# Patient Record
Sex: MALE | Race: WHITE | NOT HISPANIC OR LATINO | Employment: OTHER | URBAN - METROPOLITAN AREA
[De-identification: names, ages, dates, MRNs, and addresses within clinical notes are randomized per-mention and may not be internally consistent; named-entity substitution may affect disease eponyms.]

---

## 2017-01-04 ENCOUNTER — HOSPITAL ENCOUNTER (OUTPATIENT)
Dept: RESPIRATORY THERAPY | Facility: CLINIC | Age: 72
Discharge: HOME | End: 2017-01-04
Attending: INTERNAL MEDICINE | Admitting: INTERNAL MEDICINE
Payer: COMMERCIAL

## 2017-01-04 DIAGNOSIS — J44.9 CHRONIC OBSTRUCTIVE PULMONARY DISEASE, UNSPECIFIED COPD TYPE (H): ICD-10-CM

## 2017-01-04 PROCEDURE — 94729 DIFFUSING CAPACITY: CPT | Mod: 26 | Performed by: INTERNAL MEDICINE

## 2017-01-04 PROCEDURE — 94729 DIFFUSING CAPACITY: CPT

## 2017-01-04 PROCEDURE — 94726 PLETHYSMOGRAPHY LUNG VOLUMES: CPT

## 2017-01-04 PROCEDURE — 94060 EVALUATION OF WHEEZING: CPT | Mod: 26 | Performed by: INTERNAL MEDICINE

## 2017-01-04 PROCEDURE — 94060 EVALUATION OF WHEEZING: CPT

## 2017-01-04 PROCEDURE — 94726 PLETHYSMOGRAPHY LUNG VOLUMES: CPT | Mod: 26 | Performed by: INTERNAL MEDICINE

## 2017-01-07 LAB
DLCOCOR-%PRED-PRE: 94 %
DLCOCOR-PRE: 23.21 ML/MIN/MMHG
DLCOUNC-%PRED-PRE: 97 %
DLCOUNC-PRE: 23.9 ML/MIN/MMHG
DLCOUNC-PRED: 24.6 ML/MIN/MMHG
ERV-%PRED-PRE: 117 %
ERV-PRE: 1.18 L
ERV-PRED: 1.01 L
EXPTIME-PRE: 10.59 SEC
FEF2575-%PRED-POST: 26 %
FEF2575-%PRED-PRE: 32 %
FEF2575-POST: 0.58 L/SEC
FEF2575-PRE: 0.72 L/SEC
FEF2575-PRED: 2.22 L/SEC
FEFMAX-%PRED-PRE: 71 %
FEFMAX-PRE: 5.43 L/SEC
FEFMAX-PRED: 7.57 L/SEC
FEV1-%PRED-PRE: 72 %
FEV1-PRE: 2.08 L
FEV1FEV6-PRE: 57 %
FEV1FEV6-PRED: 77 %
FEV1FVC-PRE: 50 %
FEV1FVC-PRED: 76 %
FEV1SVC-PRE: 35 %
FEV1SVC-PRED: 68 %
FIFMAX-PRE: 6.46 L/SEC
FRCPLETH-%PRED-PRE: 173 %
FRCPLETH-PRE: 6.14 L
FRCPLETH-PRED: 3.53 L
FVC-%PRED-PRE: 110 %
FVC-PRE: 4.18 L
FVC-PRED: 3.78 L
IC-%PRED-PRE: 118 %
IC-PRE: 3.79 L
IC-PRED: 3.2 L
RVPLETH-%PRED-PRE: 155 %
RVPLETH-PRE: 3.99 L
RVPLETH-PRED: 2.57 L
TLCPLETH-%PRED-PRE: 152 %
TLCPLETH-PRE: 9.93 L
TLCPLETH-PRED: 6.52 L
VA-%PRED-PRE: 153 %
VA-PRE: 9.55 L
VC-%PRED-PRE: 140 %
VC-PRE: 5.94 L
VC-PRED: 4.21 L

## 2017-01-10 DIAGNOSIS — J44.1 CHRONIC OBSTRUCTIVE PULMONARY DISEASE WITH ACUTE EXACERBATION (H): Primary | ICD-10-CM

## 2017-01-12 ENCOUNTER — HOSPITAL ENCOUNTER (OUTPATIENT)
Dept: CT IMAGING | Facility: CLINIC | Age: 72
Discharge: HOME | End: 2017-01-12
Attending: NURSE PRACTITIONER | Admitting: NURSE PRACTITIONER
Payer: COMMERCIAL

## 2017-01-12 DIAGNOSIS — J44.1 CHRONIC OBSTRUCTIVE PULMONARY DISEASE WITH ACUTE EXACERBATION (H): ICD-10-CM

## 2017-01-12 PROCEDURE — 71250 CT THORAX DX C-: CPT

## 2017-01-13 PROBLEM — I71.20 THORACIC AORTIC ANEURYSM WITHOUT RUPTURE (H): Status: ACTIVE | Noted: 2017-01-13

## 2017-01-13 PROBLEM — J43.2 CENTRILOBULAR EMPHYSEMA (H): Status: ACTIVE | Noted: 2017-01-13

## 2017-01-18 ENCOUNTER — OFFICE VISIT (OUTPATIENT)
Dept: FAMILY MEDICINE | Facility: CLINIC | Age: 72
End: 2017-01-18
Payer: COMMERCIAL

## 2017-01-18 ENCOUNTER — TELEPHONE (OUTPATIENT)
Dept: OTHER | Facility: CLINIC | Age: 72
End: 2017-01-18

## 2017-01-18 VITALS
BODY MASS INDEX: 24.06 KG/M2 | WEIGHT: 156 LBS | DIASTOLIC BLOOD PRESSURE: 72 MMHG | SYSTOLIC BLOOD PRESSURE: 128 MMHG | HEART RATE: 73 BPM | TEMPERATURE: 97.3 F

## 2017-01-18 DIAGNOSIS — J44.9 CHRONIC OBSTRUCTIVE PULMONARY DISEASE, UNSPECIFIED COPD TYPE (H): ICD-10-CM

## 2017-01-18 DIAGNOSIS — I71.20 THORACIC AORTIC ANEURYSM WITHOUT RUPTURE (H): Primary | ICD-10-CM

## 2017-01-18 PROCEDURE — 99214 OFFICE O/P EST MOD 30 MIN: CPT | Performed by: NURSE PRACTITIONER

## 2017-01-18 NOTE — NURSING NOTE
"Initial /72 mmHg  Pulse 73  Temp(Src) 97.3  F (36.3  C) (Tympanic)  Wt 156 lb (70.761 kg) Estimated body mass index is 24.06 kg/(m^2) as calculated from the following:    Height as of 6/9/15: 5' 7.5\" (1.715 m).    Weight as of this encounter: 156 lb (70.761 kg). .      "

## 2017-01-18 NOTE — MR AVS SNAPSHOT
After Visit Summary   1/18/2017    Adolfo Rendon    MRN: [id number]           Patient Information     Date Of Birth          1945        Visit Information        Provider Department      1/18/2017 11:20 AM Danielle Mercado APRN [facility]        Today's Diagnoses     Thoracic aortic aneurysm without rupture (H)    -  1     Chronic obstructive pulmonary disease, unspecified COPD type (H)           Care Instructions      Discharge Instructions: COPD  You have been diagnosed with chronic obstructive pulmonary disease (COPD). This is a name given to a group of diseases that limit the flow of air in and out of your lungs. This makes it harder to breathe. With COPD, you are also more likely to get lung infections. COPD includes chronic bronchitis and emphysema. COPD is most often caused by heavy, long-term cigarette smoking.  Home care  Quit smoking    If you smoke, quit. It is the best thing you can do for your COPD and your overall health.    Join a stop-smoking program. There are even telephone, text message, and Internet programs to help you quit.    Ask your health care provider about medications or other methods to help you quit.    Ask family members to quit smoking as well.    Don't allow people to smoke in your home, in your car, or when they are around you.  Protect yourself from infection    Wash your hands often. Do your best to keep your hands away from your face. Most germs are spread from your hands to your mouth.    Get a flu shot every year. Also ask your provider about pneumonia vaccines.    Avoid crowds. It's especially important to do this in the winter when more people have colds and flu.    To stay healthy, get enough sleep, exercise regularly, and eat a balanced diet. You should:    Get about 8 hours of sleep every night.    Try to exercise for at least 30 minutes on most days.    Have healthy foods including fruits and vegetables, 100% whole grains,  lean meats and fish, and low-fat dairy products. Try to stay away from foods high in fats and sugar.  Take your medications  Take your medications exactly as directed. Don't skip doses.  Manage your stress  Stress can make COPD worse. Use this stress management technique:    Find a quiet place and sit or lie in a comfortable position.    Close your eyes and perform breathing exercises for several minutes. Ask your provider about the best way to breathe.  Pulmonary rehabilitation    Pulmonary rehab can help you feel better. These programs include exercise, breathing techniques, information about COPD, counseling, and help for smokers.    Ask your provider or your local hospital about programs in your area.  When to call your health care provider  Call your provider immediately if you have any of the following:    Shortness of breath, wheezing, or coughing    Increased mucus    Yellow, green, bloody, or smelly mucus    Fever or chills    Tightness in your chest that does not go away with rest or medication    An irregular heartbeat or a feeling that your heart is beating very fast    Swollen ankles     7949-2059 [employer]. [address] [place] [address]. All rights reserved. This information is not intended as a substitute for professional medical care. Always follow your healthcare professional's instructions.        Medications for Chronic Obstructive Pulmonary Disease  Some medications for COPD help control or prevent symptoms. They are called maintenance medications. Take these medications every day or as instructed by your health care provider. Some are rescue medications. Take these only when you have symptoms like increased shortness of breath or chest tightness. Take this medication sheet with you to your next office visit and ask your provider or nurse to help you complete it.  Bronchodilators  What they do: Relax the muscles around airways. This allows you to breathe more  easily.     Short-acting beta-2 agonists. These start working shortly after you use them. They are rescue medications.  My medications: __________________________________________  When to take: __________________________________________     Long-acting beta-2 agonists. These work more slowly than the fast-acting type. But the effects last longer. They are maintenance medications.  My medications: __________________________________________  When to take: __________________________________________     Anticholinergics. Rescue: These may be used along with a short-acting beta-2 agonist to help keep airways open.  My medications: __________________________________________  When to take: __________________________________________     Anticholinergics. Maintenance: There are long-acting anticholinergenics.  My medications: __________________________________________  When to take: __________________________________________     Methylxanthines. These are maintenance medications and have long-lasting effects. They may be useful if symptoms occur during sleep.  My medications: __________________________________________  When to take: __________________________________________     Corticosteroids  What they do: Reduce inflammation, swelling, and mucus production. This allows you to breathe more easily.     Inhaled corticosteroids. These medications are taken with an inhaler or nebulizer. They are maintenance medications.  My medications: __________________________________________  When to take: __________________________________________     Oral corticosteroids. These medications are taken by mouth. They may be used when symptoms worsen.  My medications: __________________________________________  When to take: __________________________________________     Phosphodiesterase Type 4 (PDE4) Inhibitors  What they do: Reduce the risk of flare-ups in patients with severe COPD.  My medications:  __________________________________________  When to take: __________________________________________     Combination Medications  What they do: Combine the effects of different types of medications. For example, a combination medication may relax the muscles around the airways and lessen the swelling or inflammation in the airway.  My medications: __________________________________________  When to take: __________________________________________     Other Medications  Other medication for COPD.  My medication: __________________________________________  What it does: __________________________________________  When to take: __________________________________________  Herbal products and supplements  There are products for COPD that are available without a subscription. For example, herbs, extracts, or supplements. Be sure to talk with your health care provider before taking any of these products. They can interact with medications you re taking.     0270-5763 [employer]. [address] [place] [address], [place] [address]. All rights reserved. This information is not intended as a substitute for professional medical care. Always follow your healthcare professional's instructions.        Chronic Lung Disease: Preventing Lung Infections  Chronic lung diseases include chronic obstructive pulmonary disease (COPD), which includes chronic bronchitis and emphysema. Other chronic lung diseases include pulmonary fibrosis, sarcoidosis, and other conditions. When you have chronic lung diseases, it's very important to protect yourself from respiratory infections, like colds, the flu, and lung infections. Infections may cause your lung condition to worsen. Although you can't completely avoid them, there are things you can do to lessen the chance of infections.    Take precautions  Taking the following precautions can help you avoid illness:    Remember to keep your hands away from your nose and mouth. Germs on your hands  get into your respiratory system this way.    Wash your hands often. When you wash them:    Use soap and warm water.    Rub your hands together well for at least 20 seconds.    Make sure to rinse them well.    Dry your hands on clean towels or air-dry them.    Use hand  containing alcohol, if you are unable to wash your hands. Use the  after touching doorknobs, handles, and supermarket carts, for example, since lots of people touch them. Then wash your hands as soon as you can.    To help prevent the flu, get a flu vaccination every year. This may be given at your health care provider's office, a drugstore, or pharmacy, or at work. Get your flu shot as soon as the vaccines are available in your area. This is usually around September each year.    To help prevent pneumococcal pneumonia, get pneumonia vaccinations. Talk with your health care provider about which pneumococcal vaccinations you need.    Try to stay away from people with respiratory infections, such as colds or the flu. Stay away from crowded places, like shopping centers or movie theatres during cold and flu season.    If you smoke, think about quitting. In addition to causing or worsening many lung conditions, the lung damage from smoking increases your risk of infections. Stay away from others who smoke, too. This is also harmful and increases your chance of infections.    5774-3043 [employer]. [address] [place] [address], [place] [id number]. All rights reserved. This information is not intended as a substitute for professional medical care. Always follow your healthcare professional's instructions.              Follow-ups after your visit        Additional Services     PULMONARY MEDICINE REFERRAL       Your provider has referred you to: [facility]: [facility] [phone]   [email or web address]    Please be aware that coverage of these services is subject to the terms and  limitations of your health insurance plan.  Call member services at your health plan with any benefit or coverage questions.      Please bring the following with you to your appointment:    (1) Any X-Rays, CTs or MRIs which have been performed.  Contact the facility where they were done to arrange for  prior to your scheduled appointment.    (2) List of current medications   (3) This referral request   (4) Any documents/labs given to you for this referral            VASCULAR REFERRAL       Your provider has referred [facility] to [facility].  WANTS TO BE SEEN IN [facility]     Reason for Referral: Vascular Medicine    Urgency of Referral: Next available    Please be aware that coverage of these services is subject to the terms and limitations of your health insurance plan.  Call member services at your health plan with any benefit or coverage questions.      Please bring the following with you to your appointment:  (1) Any X-Rays, CTs or MRIs which have been performed.  Contact the facility where they were done to arrange for  prior to your scheduled appointment.  Any new CT, MRI or other procedures ordered by your specialist must be performed at a [facility] facility or coordinated by your clinic's referral office.    (2) List of current medications   (3) This referral request   (4) Any documents/labs given to you for this referral                  Who to contact     If you have questions or need follow up information about today's clinic visit or your schedule please contact [facility] directly at [phone].  Normal or non-critical lab and imaging results will be communicated to you by MyChart, letter or phone within 4 business days after the clinic has received the results. If you do not hear from us within 7 days, please contact the clinic through MyChart or phone. If you have a critical or abnormal lab result, we will notify you by phone as soon as  "possible.  Submit refill requests through [facility] or call your pharmacy and they will forward the refill request to us. Please allow 3 business days for your refill to be completed.          Additional Information About Your Visit        [facility] Information     [facility] lets you send messages to your doctor, view your test results, renew your prescriptions, schedule appointments and more. To sign up, go to [email or web address] . Click on \"Log in\" on the left side of the screen, which will take you to the Welcome page. Then click on \"Sign up Now\" on the right side of the page.     You will be asked to enter the access code listed below, as well as some personal information. Please follow the directions to create your username and password.     Your access code is: [email or web address]  Expires: 2017 11:56 AM     Your access code will  in 90 days. If you need help or a new code, please call your [facility] clinic or [phone].        Care EveryWhere ID     This is your Care EveryWhere ID. This could be used by other organizations to access your [facility] medical records  [id number]        Your Vitals Were     Pulse Temperature                73 97.3  F (36.3  C) (Tympanic)           Blood Pressure from Last 3 Encounters:   17 128/72   16 158/90   10/05/16 157/95    Weight from Last 3 Encounters:   17 156 lb (70.761 kg)   16 157 lb (71.215 kg)   10/05/16 156 lb (70.761 kg)              We Performed the Following     PULMONARY MEDICINE REFERRAL     VASCULAR REFERRAL          Today's Medication Changes          These changes are accurate as of: 17 11:56 AM.  If you have any questions, ask your nurse or doctor.               These medicines have changed or have updated prescriptions.        Dose/Directions    lisinopril 40 MG tablet   Commonly known as:  PRINIVIL/ZESTRIL   This may have changed:  Another medication with the same name was removed. Continue taking this medication, and " follow the directions you see here.   Used for:  Essential hypertension with goal blood pressure less than 140/90   Changed by:  Danielle Mercado APRN CNP        Dose:  40 mg   Take 1 tablet (40 mg) by mouth daily   Quantity:  90 tablet   Refills:  1                Primary Care Provider Office Phone # Fax #    ANTONIO Carranza [phone]       [facility] [address] [facility] [address]        Thank you!     Thank you for choosing [facility]  for your care. Our goal is always to provide you with excellent care. Hearing back from our patients is one way we can continue to improve our services. Please take a few minutes to complete the written survey that you may receive in the mail after your visit with us. Thank you!             Your Updated Medication List - Protect others around you: Learn how to safely use, store and throw away your medicines at www.disposemymeds.org.          This list is accurate as of: 1/18/17 11:56 AM.  Always use your most recent med list.                   Brand Name Dispense Instructions for use    albuterol 108 (90 BASE) MCG/ACT Inhaler    PROAIR HFA/PROVENTIL HFA/VENTOLIN HFA    1 Inhaler    Inhale 2 puffs into the lungs every 6 hours as needed for shortness of breath / dyspnea or wheezing       aspirin 81 MG tablet     100 tablet    ONE DAILY       carvedilol 12.5 MG tablet    COREG    180 tablet    Take 1 tablet (12.5 mg) by mouth 2 times daily (with meals)       lisinopril 40 MG tablet    PRINIVIL/ZESTRIL    90 tablet    Take 1 tablet (40 mg) by mouth daily       PARoxetine 40 MG tablet    PAXIL    14 tablet    Take 1 tablet (40 mg) by mouth At Bedtime       pravastatin 20 MG tablet    PRAVACHOL    90 tablet    Take 1 tablet (20 mg) by mouth daily

## 2017-01-18 NOTE — TELEPHONE ENCOUNTER
Pt referred to [facility] by Danielle Mercado NP for mild thoracic aorta aneurysm.    1/12/17 CT chest w/o contrast:  IMPRESSION: Mild aneurysmal dilatation of the thoracic aorta, as  above.    Pt needs to be scheduled for a consult with Vascular Surgery.  Will route to scheduling to coordinate an appointment next available.    Mary Cortes RN BSN

## 2017-01-18 NOTE — PATIENT INSTRUCTIONS
Discharge Instructions: COPD  You have been diagnosed with chronic obstructive pulmonary disease (COPD). This is a name given to a group of diseases that limit the flow of air in and out of your lungs. This makes it harder to breathe. With COPD, you are also more likely to get lung infections. COPD includes chronic bronchitis and emphysema. COPD is most often caused by heavy, long-term cigarette smoking.  Home care  Quit smoking    If you smoke, quit. It is the best thing you can do for your COPD and your overall health.    Join a stop-smoking program. There are even telephone, text message, and Internet programs to help you quit.    Ask your health care provider about medications or other methods to help you quit.    Ask family members to quit smoking as well.    Don't allow people to smoke in your home, in your car, or when they are around you.  Protect yourself from infection    Wash your hands often. Do your best to keep your hands away from your face. Most germs are spread from your hands to your mouth.    Get a flu shot every year. Also ask your provider about pneumonia vaccines.    Avoid crowds. It's especially important to do this in the winter when more people have colds and flu.    To stay healthy, get enough sleep, exercise regularly, and eat a balanced diet. You should:    Get about 8 hours of sleep every night.    Try to exercise for at least 30 minutes on most days.    Have healthy foods including fruits and vegetables, 100% whole grains, lean meats and fish, and low-fat dairy products. Try to stay away from foods high in fats and sugar.  Take your medications  Take your medications exactly as directed. Don't skip doses.  Manage your stress  Stress can make COPD worse. Use this stress management technique:    Find a quiet place and sit or lie in a comfortable position.    Close your eyes and perform breathing exercises for several minutes. Ask your provider about the best way to breathe.  Pulmonary  rehabilitation    Pulmonary rehab can help you feel better. These programs include exercise, breathing techniques, information about COPD, counseling, and help for smokers.    Ask your provider or your local hospital about programs in your area.  When to call your health care provider  Call your provider immediately if you have any of the following:    Shortness of breath, wheezing, or coughing    Increased mucus    Yellow, green, bloody, or smelly mucus    Fever or chills    Tightness in your chest that does not go away with rest or medication    An irregular heartbeat or a feeling that your heart is beating very fast    Swollen ankles     7458-7465 [employer]. [address] [place] [address]. All rights reserved. This information is not intended as a substitute for professional medical care. Always follow your healthcare professional's instructions.        Medications for Chronic Obstructive Pulmonary Disease  Some medications for COPD help control or prevent symptoms. They are called maintenance medications. Take these medications every day or as instructed by your health care provider. Some are rescue medications. Take these only when you have symptoms like increased shortness of breath or chest tightness. Take this medication sheet with you to your next office visit and ask your provider or nurse to help you complete it.  Bronchodilators  What they do: Relax the muscles around airways. This allows you to breathe more easily.     Short-acting beta-2 agonists. These start working shortly after you use them. They are rescue medications.  My medications: __________________________________________  When to take: __________________________________________     Long-acting beta-2 agonists. These work more slowly than the fast-acting type. But the effects last longer. They are maintenance medications.  My medications: __________________________________________  When to  take: __________________________________________     Anticholinergics. Rescue: These may be used along with a short-acting beta-2 agonist to help keep airways open.  My medications: __________________________________________  When to take: __________________________________________     Anticholinergics. Maintenance: There are long-acting anticholinergenics.  My medications: __________________________________________  When to take: __________________________________________     Methylxanthines. These are maintenance medications and have long-lasting effects. They may be useful if symptoms occur during sleep.  My medications: __________________________________________  When to take: __________________________________________     Corticosteroids  What they do: Reduce inflammation, swelling, and mucus production. This allows you to breathe more easily.     Inhaled corticosteroids. These medications are taken with an inhaler or nebulizer. They are maintenance medications.  My medications: __________________________________________  When to take: __________________________________________     Oral corticosteroids. These medications are taken by mouth. They may be used when symptoms worsen.  My medications: __________________________________________  When to take: __________________________________________     Phosphodiesterase Type 4 (PDE4) Inhibitors  What they do: Reduce the risk of flare-ups in patients with severe COPD.  My medications: __________________________________________  When to take: __________________________________________     Combination Medications  What they do: Combine the effects of different types of medications. For example, a combination medication may relax the muscles around the airways and lessen the swelling or inflammation in the airway.  My medications: __________________________________________  When to take: __________________________________________     Other Medications  Other medication  for COPD.  My medication: __________________________________________  What it does: __________________________________________  When to take: __________________________________________  Herbal products and supplements  There are products for COPD that are available without a subscription. For example, herbs, extracts, or supplements. Be sure to talk with your health care provider before taking any of these products. They can interact with medications you re taking.     8753-4450 [employer]. [address] [place] [address]. All rights reserved. This information is not intended as a substitute for professional medical care. Always follow your healthcare professional's instructions.        Chronic Lung Disease: Preventing Lung Infections  Chronic lung diseases include chronic obstructive pulmonary disease (COPD), which includes chronic bronchitis and emphysema. Other chronic lung diseases include pulmonary fibrosis, sarcoidosis, and other conditions. When you have chronic lung diseases, it's very important to protect yourself from respiratory infections, like colds, the flu, and lung infections. Infections may cause your lung condition to worsen. Although you can't completely avoid them, there are things you can do to lessen the chance of infections.    Take precautions  Taking the following precautions can help you avoid illness:    Remember to keep your hands away from your nose and mouth. Germs on your hands get into your respiratory system this way.    Wash your hands often. When you wash them:    Use soap and warm water.    Rub your hands together well for at least 20 seconds.    Make sure to rinse them well.    Dry your hands on clean towels or air-dry them.    Use hand  containing alcohol, if you are unable to wash your hands. Use the  after touching doorknobs, handles, and supermarket carts, for example, since lots of people touch them. Then wash your hands as soon as  you can.    To help prevent the flu, get a flu vaccination every year. This may be given at your health care provider's office, a drugstore, or pharmacy, or at work. Get your flu shot as soon as the vaccines are available in your area. This is usually around September each year.    To help prevent pneumococcal pneumonia, get pneumonia vaccinations. Talk with your health care provider about which pneumococcal vaccinations you need.    Try to stay away from people with respiratory infections, such as colds or the flu. Stay away from crowded places, like shopping centers or movie theatres during cold and flu season.    If you smoke, think about quitting. In addition to causing or worsening many lung conditions, the lung damage from smoking increases your risk of infections. Stay away from others who smoke, too. This is also harmful and increases your chance of infections.    3822-6427 [employer]. [address] [place] [address], [place] [address]. All rights reserved. This information is not intended as a substitute for professional medical care. Always follow your healthcare professional's instructions.

## 2017-01-18 NOTE — PROGRESS NOTES
SUBJECTIVE:                                                    Adolfo Rendon is a 71 year old male who presents to clinic today for the following health issues:      RESULTS   Results for orders placed or performed during the hospital encounter of 01/12/17   CT Chest w/o Contrast    Narrative    CT CHEST WITHOUT CONTRAST  1/12/2017 2:59 PM     HISTORY: COPD. Long smoking history. Shortness of breath with  exertion. Abnormal pulmonary function tests.    TECHNIQUE:   No IV contrast. Radiation dose for this scan was reduced  using automated exposure control, adjustment of the mA and/or kV  according to patient size, or iterative reconstruction technique.    COMPARISON: None.    FINDINGS:   There is mild aneurysmal dilatation of the descending  thoracic aorta with a diameter of 3.2 cm. There is mild aneurysmal  dilatation at the posterior aspect of the aortic arch with a diameter  of 3.6 cm. There is minimal aneurysmal dilatation of the ascending  thoracic aorta with a diameter 4 cm. Coronary artery calcifications.  No pleural effusion or acute consolidation. Mild bilateral  emphysematous changes. There are two small calcified left lung  granulomas. There is a tiny right lung granuloma.      Impression    IMPRESSION: Mild aneurysmal dilatation of the thoracic aorta, as  above.    SUSAN ARMSTRONG MD     Quit smoking last Saturday  Using gum which causes sores in his mouth. Using the patches which cause red hives on his skin.  Shortness of breath with exertion. Deconditioned. Intermittent use of albuterol at bedtime helps with cough. Does not use consistently during the day for cough wheeze or shortness of breath.  Patient is here to review his recent CTFindings    -------------------------------------    Problem list and histories reviewed & adjusted, as indicated.  Additional history: as documented    Patient Active Problem List   Diagnosis     MIXED HYPERLIPIDEMIA     Benign neoplasm of epididymis     Malignant  neoplasm of prostate (H)     Major depressive disorder, recurrent episode, severe (H)     HL (hearing loss)     HYPERLIPIDEMIA LDL GOAL <130     Muscle pain     Tobacco abuse     SANTIAGO (dyspnea on exertion)     Advanced directives, counseling/discussion     Generalized anxiety disorder     Right inguinal hernia     Health Care Home     HTN, goal below 140/90     COPD (chronic obstructive pulmonary disease) (H)     Thoracic aortic aneurysm without rupture (H)     Centrilobular emphysema (H)     Past Surgical History   Procedure Laterality Date     Hydrocelectomy scrotal  01/2004     left hydrocele repair     Suprapubic prostatectomy       Herniorrhaphy inguinal  7/14/2011     Procedure:HERNIORRHAPHY INGUINAL; Open Repair Right Inguinal Hernia Repair        Colonoscopy  3/1/2005     Appendectomy  1966       Social History   Substance Use Topics     Smoking status: Former Smoker -- 1.00 packs/day     Types: Cigarettes     Quit date: 01/14/2017     Smokeless tobacco: Never Used     Alcohol Use: No      Comment: quit 1989     Family History   Problem Relation Age of Onset     CEREBROVASCULAR DISEASE Mother      Hypertension Mother      Prostate Cancer Brother      Dementia Brother      Arthritis Sister      rhematoid     CANCER Brother      Lung cancer, lymphoma     Other - See Comments Brother      HIV     CANCER Sister      skin cancer on nose     Pacemaker Sister      Aneurysm Sister      heart           ROS:  C: NEGATIVE for fever, chills, change in weight  E/M: NEGATIVE for ear, mouth and throat problems  R: NEGATIVE for significant cough or SOB  CV: NEGATIVE for chest pain, palpitations or peripheral edema    OBJECTIVE:                                                    /72 mmHg  Pulse 73  Temp(Src) 97.3  F (36.3  C) (Tympanic)  Wt 156 lb (70.761 kg)  Body mass index is 24.06 kg/(m^2).   GENERAL: healthy, alert, well nourished, well hydrated, no distress  HENT: ear canals- normal; TMs- normal; Nose- normal;  Mouth- no ulcers, no lesions  NECK: no tenderness, no adenopathy, no asymmetry, no masses, no stiffness; thyroid- normal to palpation  RESP: lungs clear to auscultation - no rales, no rhonchi, no wheezes  CV: regular rates and rhythm, normal S1 S2, no S3 or S4 and no murmur, no click or rub -  ABDOMEN: soft, no tenderness, no  hepatosplenomegaly, no masses, normal bowel sounds    Diagnostic test results:  Results for orders placed or performed during the hospital encounter of 01/12/17   CT Chest w/o Contrast    Narrative    CT CHEST WITHOUT CONTRAST  1/12/2017 2:59 PM     HISTORY: COPD. Long smoking history. Shortness of breath with  exertion. Abnormal pulmonary function tests.    TECHNIQUE:   No IV contrast. Radiation dose for this scan was reduced  using automated exposure control, adjustment of the mA and/or kV  according to patient size, or iterative reconstruction technique.    COMPARISON: None.    FINDINGS:   There is mild aneurysmal dilatation of the descending  thoracic aorta with a diameter of 3.2 cm. There is mild aneurysmal  dilatation at the posterior aspect of the aortic arch with a diameter  of 3.6 cm. There is minimal aneurysmal dilatation of the ascending  thoracic aorta with a diameter 4 cm. Coronary artery calcifications.  No pleural effusion or acute consolidation. Mild bilateral  emphysematous changes. There are two small calcified left lung  granulomas. There is a tiny right lung granuloma.      Impression    IMPRESSION: Mild aneurysmal dilatation of the thoracic aorta, as  above.    SUSAN ARMSTRONG MD        ASSESSMENT/PLAN:                                                    1. Thoracic aortic aneurysm without rupture (H)  We discussed thoracic aneurysm. Information given about controlling blood pressure, cholesterol and warning signs to be aware of.  Smoking cessation strongly encouraged  Referral placed to consult with thoracic team. No surgery indicated at this time. We will need to continue to  monitor this.  - VASCULAR REFERRAL    2. Chronic obstructive pulmonary disease, unspecified COPD type (H)  Smoking cessation strongly encouraged. Chantix offered and declined  Use albuterol as needed for wheeze, shortness of breath  Consider use of Serevent  Referral placed for  - PULMONARY MEDICINE REFERRAL      Follow up with Provider - Call or return to the clinic with any worsening of symptoms or no resolution. Patient/Parent verbalized understanding and is in agreement. Medication side effects reviewed.   Current Outpatient Prescriptions   Medication Sig Dispense Refill     pravastatin (PRAVACHOL) 20 MG tablet Take 1 tablet (20 mg) by mouth daily 90 tablet 1     albuterol (PROAIR HFA/PROVENTIL HFA/VENTOLIN HFA) 108 (90 BASE) MCG/ACT Inhaler Inhale 2 puffs into the lungs every 6 hours as needed for shortness of breath / dyspnea or wheezing 1 Inhaler 4     PARoxetine (PAXIL) 40 MG tablet Take 1 tablet (40 mg) by mouth At Bedtime 14 tablet 0     lisinopril (PRINIVIL/ZESTRIL) 40 MG tablet Take 1 tablet (40 mg) by mouth daily 90 tablet 1     carvedilol (COREG) 12.5 MG tablet Take 1 tablet (12.5 mg) by mouth 2 times daily (with meals) 180 tablet 3     aspirin 81 MG tablet ONE DAILY 100 tablet 3     [DISCONTINUED] lisinopril (PRINIVIL,ZESTRIL) 10 MG tablet Take 2 tablets (20 mg) by mouth daily 180 tablet 1        See Patient Instructions    ANTONIO Carranza [facility]

## 2017-01-19 NOTE — TELEPHONE ENCOUNTER
LM on pt's home phone asking for callback to schedule recommended consult.      Mary Cortes RN BSN

## 2017-01-23 NOTE — TELEPHONE ENCOUNTER
Left message on pt's VM stating that this is our last attempt to contact you regarding the referral that we received from Danielle Mercado NP.  We will not be making any additional attempts to contact you, however, if you would like to schedule the recommended consult you can call us back and we will be happy to assist you.     Mary Cortes RN BSN

## 2017-01-24 ENCOUNTER — TELEPHONE (OUTPATIENT)
Dept: FAMILY MEDICINE | Facility: CLINIC | Age: 72
End: 2017-01-24

## 2017-01-24 NOTE — TELEPHONE ENCOUNTER
Pt was evaluated at [facility] ED [place], DX near syncope pt does not have a seizure HX, and asymptomatic since, pt will keep his appt for 1-25-17 and F/U sooner or call me back with questions.  Whitney Yeung RN

## 2017-01-24 NOTE — TELEPHONE ENCOUNTER
Reason for call:  Patient reporting a symptom    Symptom or request: Seizure over the weekend- went to ER. Pt is wondering if he should be seen sooner or can he wait until 1/25/17. Did schedule pt with Danielle EMERY 1/25/17.       Phone Number patient can be reached at:  Home number on file [phone] (home)    Best Time:  Any Time      Can we leave a detailed message on this number:  YES    Call taken on 1/24/2017 at 8:26 AM by Tammy Vázquez

## 2017-01-25 ENCOUNTER — OFFICE VISIT (OUTPATIENT)
Dept: FAMILY MEDICINE | Facility: CLINIC | Age: 72
End: 2017-01-25
Payer: COMMERCIAL

## 2017-01-25 VITALS
WEIGHT: 158 LBS | DIASTOLIC BLOOD PRESSURE: 86 MMHG | HEART RATE: 72 BPM | SYSTOLIC BLOOD PRESSURE: 132 MMHG | BODY MASS INDEX: 24.37 KG/M2 | TEMPERATURE: 97.3 F

## 2017-01-25 DIAGNOSIS — I10 ESSENTIAL HYPERTENSION WITH GOAL BLOOD PRESSURE LESS THAN 140/90: Primary | ICD-10-CM

## 2017-01-25 DIAGNOSIS — K14.8 TONGUE BITING: ICD-10-CM

## 2017-01-25 DIAGNOSIS — R55 VASOVAGAL SYNCOPE: ICD-10-CM

## 2017-01-25 PROCEDURE — 99214 OFFICE O/P EST MOD 30 MIN: CPT | Performed by: NURSE PRACTITIONER

## 2017-01-25 RX ORDER — LISINOPRIL 40 MG/1
60 TABLET ORAL DAILY
Qty: 135 TABLET | Refills: 1 | Status: SHIPPED
Start: 2017-01-25 | End: 2017-04-19

## 2017-01-25 NOTE — PROGRESS NOTES
SUBJECTIVE:                                                    Adolfo Rendon is a 71 year old male who presents to clinic today for the following health issues:      ED/UC Followup:    Facility:  [place]   Date of visit: 01/21/2017  Reason for visit: syncope   Current Status: better but some weakness    Weaned off the carvedilol 2 days before this episode. Felt that his moods and his fatigue were better off of this medication.  Patient had episodes where he was with a friend helping him old needed to stop for a minute while helping him hold things and his friend noticed he started having shocking sensations in his head after that he was noted  to be having jerking-like sensations that lasted for approximately 5 minutes.  Patient states he was seen at the [facility] in the emergency department for an hour and a half for evaluation and was sent home. Care everywhere records are reviewed.  He has had another episode since then  where he woke up in the morning after biting his tongue  Feels very lethargic and worn out after these episodes for 24 hours or more.  He has not had any problems with incontinence of bowel or bladder      -------------------------------------    Problem list and histories reviewed & adjusted, as indicated.  Additional history: as documented    Labs reviewed in EPIC  Problem list, Medication list, Allergies, and Medical/Social/Surgical histories reviewed in [facility] and updated as appropriate.    ROS:  C: NEGATIVE for fever, chills, change in weight  E/M: NEGATIVE for ear, mouth and throat problems  R: NEGATIVE for significant cough or SOB  CV: NEGATIVE for chest pain, palpitations or peripheral edema  GI no incontinence   no complaints    OBJECTIVE:                                                    /86 mmHg  Pulse 72  Temp(Src) 97.3  F (36.3  C) (Tympanic)  Wt 158 lb (71.668 kg)  Body mass index is 24.37 kg/(m^2).   GENERAL: healthy, alert, well nourished, well  hydrated, no distress  HENT: ear canals- normal; TMs- normal; Nose- normal; Mouth- no ulcers, no lesions  NECK: no tenderness, no adenopathy, no asymmetry, no masses, no stiffness; thyroid- normal to palpation  RESP: lungs clear to auscultation - no rales, no rhonchi, no wheezes  CV: regular rates and rhythm, normal S1 S2, no S3 or S4 and no murmur, no click or rub -  ABDOMEN: soft, no tenderness, no  hepatosplenomegaly, no masses, normal bowel sounds  NEURO NEURO:  equal  strength, neg Romberg, DTR II/IV bilaterally (UE and LE), finger to nose normal, CN intact, ambulates without difficulty.  no focal deficits noted.      Diagnostic test results:  Results for orders placed or performed in visit on 01/18/17   VASCULAR REFERRAL    Impression    After several attempts to reach the patient, we have been unable  to schedule the below referral.  We will not be making any more  attempts to contact the pt, if the pt would like to schedule the  recommended consult he/she can feel free to contact the [facility].   Please make sure that the referring provider is aware that the  patient has not scheduled the recommended consult.         Thank you,    Mary Cortes RN BSN            ASSESSMENT/PLAN:                                                    1. Essential hypertension with goal blood pressure less than 140/90  BP Readings from Last 3 Encounters:   01/25/17 132/86   01/18/17 128/72   12/22/16 158/90     Blood pressure stable today. Continue  - lisinopril (PRINIVIL/ZESTRIL) 40 MG tablet; Take 1.5 tablets (60 mg) by mouth daily  Dispense: 135 tablet; Refill: 1      2. Vasovagal syncope  Questioning seizure like activity  Discussed EEG  Would like to see neurology for consultation first  - NEUROLOGY ADULT REFERRAL    3. Tongue biting  Safety reviewed  - NEUROLOGY ADULT REFERRAL      Follow up with Provider - Call or return to the clinic with any worsening of symptoms or no resolution. Patient/Parent verbalized understanding  and is in agreement. Medication side effects reviewed.   Current Outpatient Prescriptions   Medication Sig Dispense Refill     lisinopril (PRINIVIL/ZESTRIL) 40 MG tablet Take 1.5 tablets (60 mg) by mouth daily 135 tablet 1     pravastatin (PRAVACHOL) 20 MG tablet Take 1 tablet (20 mg) by mouth daily 90 tablet 1     albuterol (PROAIR HFA/PROVENTIL HFA/VENTOLIN HFA) 108 (90 BASE) MCG/ACT Inhaler Inhale 2 puffs into the lungs every 6 hours as needed for shortness of breath / dyspnea or wheezing 1 Inhaler 4     PARoxetine (PAXIL) 40 MG tablet Take 1 tablet (40 mg) by mouth At Bedtime 14 tablet 0     aspirin 81 MG tablet ONE DAILY 100 tablet 3        See Patient Instructions    ANTONIO Carranza [facility]

## 2017-01-25 NOTE — MR AVS SNAPSHOT
After Visit Summary   1/25/2017    Adolfo Rendon    MRN: [id number]           Patient Information     Date Of Birth          1945        Visit Information        Provider Department      1/25/2017 11:00 AM Danielle Mercado APRN [facility]        Today's Diagnoses     Essential hypertension with goal blood pressure less than 140/90    -  1     Vasovagal syncope         Tongue biting           Care Instructions      Fainting: Uncertain Cause  Fainting (syncope) is a temporary loss of consciousness. It s also called passing out. It occurs when blood flow to the brain is less than normal. Near-fainting (near-syncope) is very similar to fainting, but you don t fully pass out.  Common minor causes of fainting include:    Sudden fear    Pain    Nausea    Emotional stress    Overexertion  Suddenly standing up after sitting or lying for a long time can also cause fainting.  More serious causes of fainting include:    Very slow or very fast heartbeat (arrhythmia)    Other types of heart disease    Dehydration    Loss of blood loss    Seizure    Stroke    Ruptured blood vessel in the brain  Taking too much high blood pressure medicine can also cause low blood pressure and fainting.  Your health care provider does not know the exact cause of your fainting. But the tests today did not show any of the serious causes of fainting. Sometimes you may need more tests to find out if you have a serious problem. That s why it s important to follow up with your provider as advised.  Home care  Follow these guidelines when caring for yourself at home:    Rest today. You may go back to your normal activities when you are feeling back to normal. It is best to stay with someone who can check on you for the next 24 hours to watch for another episode of fainting.    If you become lightheaded or dizzy, lie down right away. Or sit with your head between your knees.    Because the provider doesn t  know the exact cause of your fainting or near-fainting spell, it s possible for you to have another spell without warning. Because of this, don t drive a car or operate dangerous equipment. Don t take a bath alone. Use a shower instead. Don t swim alone until your health care provider says that you are no longer in danger of having another fainting spell.  Follow-up care  Follow up with your health care provider, or as advised.  When to seek medical advice  Call your health care provider right away if any of these occur:    Another fainting spell that s not explained by the common causes listed above    Pain in your chest, arm, neck, jaw, back, or abdomen    Shortness of breath    Severe headache or seizure    Blood in vomit or stools (black or red color)    Unexpected vaginal bleeding    Your heart beats very rapidly, very slowly, or irregularly (palpitations)  Also call your provider if you have signs of stroke:    Weakness in an arm or leg or on one side of the face    Difficulty speaking or seeing    Extreme drowsiness, confusion, dizziness, or fainting    7829-3435 [employer]. [address] [place] [address]. All rights reserved. This information is not intended as a substitute for professional medical care. Always follow your healthcare professional's instructions.        Fainting: Vagal Reaction  Fainting (syncope) is a temporary loss of consciousness. It s also called passing out. It occurs when blood flow to the brain is less than normal. Your health care provider believes that your fainting was because of a vagal reaction. This condition is not a sign of serious disease.  A vagal reaction is a response in your body that causes your pulse to slow down or the blood vessels to expand. This causes your blood pressure to fall. And this sends less blood to your brain if you are standing or sitting. That results in dizziness, near-fainting, or fainting. Lying down usually stops the  reaction within 60 seconds.  This response can occur during sudden fear, severe pain, emotional stress, overexertion, overheating, hunger, nausea or vomiting, prolonged standing, or standing up after sitting or lying for a long time.  Home care  Follow these guidelines when caring for yourself at home:    Rest today. Go back to your normal activities as soon as you are feeling back to normal.    If you feel lightheaded or dizzy, lie down right away. Or sit with your head lowered between your knees.  Follow-up care  Follow up with your health care provider, or as advised.  When to seek medical advice  Call your health care provider right away if any of these occur:    Another fainting spell that s not explained by the common causes listed above    Pain in your chest, arm, neck, jaw, back, or abdomen    Shortness of breath    Severe headache or seizure    Blood in vomit or stools (black or red color)    Unexpected vaginal bleeding    Your heart beats very rapidly, very slowly, or irregularly (palpitations)  Also call your provider if you have signs of stroke:    Weakness in an arm or leg or on one side of the face    Difficulty speaking or seeing    Extreme drowsiness, confusion, dizziness, or fainting     9526-7564 [employer]. [address] [place] [address]. All rights reserved. This information is not intended as a substitute for professional medical care. Always follow your healthcare professional's instructions.              Follow-ups after your visit        Additional Services     NEUROLOGY ADULT REFERRAL       Your provider has referred you to: [facility]: [facility] [phone]   [email or web address]    Reason for Referral: Consult    Please be aware that coverage of these services is subject to the terms and limitations of your health insurance plan.  Call member services at your health plan with any benefit or coverage questions.      Please  "bring the following with you to your appointment:    (1) Any X-Rays, CTs or MRIs which have been performed.  Contact the facility where they were done to arrange for  prior to your scheduled appointment.    (2) List of current medications  (3) This referral request   (4) Any documents/labs given to you for this referral                  Your next 10 appointments already scheduled     Feb 07, 2017  9:45 AM   New Visit with Kameron Guzman MD   [facility] ([facility]    [address] [facility] [address] [phone]              Who to contact     If you have questions or need follow up information about today's clinic visit or your schedule please contact [facility] directly at [phone].  Normal or non-critical lab and imaging results will be communicated to you by MyChart, letter or phone within 4 business days after the clinic has received the results. If you do not hear from us within 7 days, please contact the clinic through MyChart or phone. If you have a critical or abnormal lab result, we will notify you by phone as soon as possible.  Submit refill requests through [employer] or call your pharmacy and they will forward the refill request to us. Please allow 3 business days for your refill to be completed.          Additional Information About Your Visit        [employer] Information     [employer] lets you send messages to your doctor, view your test results, renew your prescriptions, schedule appointments and more. To sign up, go to [email or web address] . Click on \"Log in\" on the left side of the screen, which will take you to the Welcome page. Then click on \"Sign up Now\" on the right side of the page.     You will be asked to enter the access code listed below, as well as some personal information. Please follow the directions to create your username and password.     Your access code is: [email or web address]  Expires: 4/18/2017 11:56 AM     Your " access code will  in 90 days. If you need help or a new code, please call your [facility] clinic or [phone].        Care EveryWhere ID     This is your Care EveryWhere ID. This could be used by other organizations to access your [facility] medical records  [id number]        Your Vitals Were     Pulse Temperature                72 97.3  F (36.3  C) (Tympanic)           Blood Pressure from Last 3 Encounters:   17 132/86   17 128/72   16 158/90    Weight from Last 3 Encounters:   17 158 lb (71.668 kg)   17 156 lb (70.761 kg)   16 157 lb (71.215 kg)              We Performed the Following     NEUROLOGY ADULT REFERRAL          Today's Medication Changes          These changes are accurate as of: 17 12:12 PM.  If you have any questions, ask your nurse or doctor.               These medicines have changed or have updated prescriptions.        Dose/Directions    lisinopril 40 MG tablet   Commonly known as:  PRINIVIL/ZESTRIL   This may have changed:  how much to take   Used for:  Essential hypertension with goal blood pressure less than 140/90   Changed by:  Danielle Mercado APRN CNP        Dose:  60 mg   Take 1.5 tablets (60 mg) by mouth daily   Quantity:  135 tablet   Refills:  1         Stop taking these medicines if you haven't already. Please contact your care team if you have questions.     carvedilol 12.5 MG tablet   Commonly known as:  COREG   Stopped by:  Danielle Mercado APRN CNP                Where to get your medicines      These medications were sent to [facility] [place], [place] - [facility] [address]  [address] [place] [address] [address] [facility] [address]     Phone:  [phone]    - lisinopril 40 MG tablet             Primary Care Provider Office Phone # Fax #    ANTONIO Carranza [phone]       [facility] [address]  [facility] [address]        Thank you!     Thank you for choosing [facility]  for your care. Our goal is always to provide you with excellent care. Hearing back from our patients is one way we can continue to improve our services. Please take a few minutes to complete the written survey that you may receive in the mail after your visit with us. Thank you!             Your Updated Medication List - Protect others around you: Learn how to safely use, store and throw away your medicines at www.disposemymeds.org.          This list is accurate as of: 1/25/17 12:12 PM.  Always use your most recent med list.                   Brand Name Dispense Instructions for use    albuterol 108 (90 BASE) MCG/ACT Inhaler    PROAIR HFA/PROVENTIL HFA/VENTOLIN HFA    1 Inhaler    Inhale 2 puffs into the lungs every 6 hours as needed for shortness of breath / dyspnea or wheezing       aspirin 81 MG tablet     100 tablet    ONE DAILY       lisinopril 40 MG tablet    PRINIVIL/ZESTRIL    135 tablet    Take 1.5 tablets (60 mg) by mouth daily       PARoxetine 40 MG tablet    PAXIL    14 tablet    Take 1 tablet (40 mg) by mouth At Bedtime       pravastatin 20 MG tablet    PRAVACHOL    90 tablet    Take 1 tablet (20 mg) by mouth daily

## 2017-01-25 NOTE — NURSING NOTE
"Initial /62 mmHg  Pulse 72  Temp(Src) 97.3  F (36.3  C) (Tympanic)  Wt 158 lb (71.668 kg) Estimated body mass index is 24.37 kg/(m^2) as calculated from the following:    Height as of 6/9/15: 5' 7.5\" (1.715 m).    Weight as of this encounter: 158 lb (71.668 kg). .      "

## 2017-01-25 NOTE — PATIENT INSTRUCTIONS
Fainting: Uncertain Cause  Fainting (syncope) is a temporary loss of consciousness. It s also called passing out. It occurs when blood flow to the brain is less than normal. Near-fainting (near-syncope) is very similar to fainting, but you don t fully pass out.  Common minor causes of fainting include:    Sudden fear    Pain    Nausea    Emotional stress    Overexertion  Suddenly standing up after sitting or lying for a long time can also cause fainting.  More serious causes of fainting include:    Very slow or very fast heartbeat (arrhythmia)    Other types of heart disease    Dehydration    Loss of blood loss    Seizure    Stroke    Ruptured blood vessel in the brain  Taking too much high blood pressure medicine can also cause low blood pressure and fainting.  Your health care provider does not know the exact cause of your fainting. But the tests today did not show any of the serious causes of fainting. Sometimes you may need more tests to find out if you have a serious problem. That s why it s important to follow up with your provider as advised.  Home care  Follow these guidelines when caring for yourself at home:    Rest today. You may go back to your normal activities when you are feeling back to normal. It is best to stay with someone who can check on you for the next 24 hours to watch for another episode of fainting.    If you become lightheaded or dizzy, lie down right away. Or sit with your head between your knees.    Because the provider doesn t know the exact cause of your fainting or near-fainting spell, it s possible for you to have another spell without warning. Because of this, don t drive a car or operate dangerous equipment. Don t take a bath alone. Use a shower instead. Don t swim alone until your health care provider says that you are no longer in danger of having another fainting spell.  Follow-up care  Follow up with your health care provider, or as advised.  When to seek medical advice  Call  your health care provider right away if any of these occur:    Another fainting spell that s not explained by the common causes listed above    Pain in your chest, arm, neck, jaw, back, or abdomen    Shortness of breath    Severe headache or seizure    Blood in vomit or stools (black or red color)    Unexpected vaginal bleeding    Your heart beats very rapidly, very slowly, or irregularly (palpitations)  Also call your provider if you have signs of stroke:    Weakness in an arm or leg or on one side of the face    Difficulty speaking or seeing    Extreme drowsiness, confusion, dizziness, or fainting    3696-7509 [employer]. [address] [place] [address]. All rights reserved. This information is not intended as a substitute for professional medical care. Always follow your healthcare professional's instructions.        Fainting: Vagal Reaction  Fainting (syncope) is a temporary loss of consciousness. It s also called passing out. It occurs when blood flow to the brain is less than normal. Your health care provider believes that your fainting was because of a vagal reaction. This condition is not a sign of serious disease.  A vagal reaction is a response in your body that causes your pulse to slow down or the blood vessels to expand. This causes your blood pressure to fall. And this sends less blood to your brain if you are standing or sitting. That results in dizziness, near-fainting, or fainting. Lying down usually stops the reaction within 60 seconds.  This response can occur during sudden fear, severe pain, emotional stress, overexertion, overheating, hunger, nausea or vomiting, prolonged standing, or standing up after sitting or lying for a long time.  Home care  Follow these guidelines when caring for yourself at home:    Rest today. Go back to your normal activities as soon as you are feeling back to normal.    If you feel lightheaded or dizzy, lie down right away. Or sit with your  head lowered between your knees.  Follow-up care  Follow up with your health care provider, or as advised.  When to seek medical advice  Call your health care provider right away if any of these occur:    Another fainting spell that s not explained by the common causes listed above    Pain in your chest, arm, neck, jaw, back, or abdomen    Shortness of breath    Severe headache or seizure    Blood in vomit or stools (black or red color)    Unexpected vaginal bleeding    Your heart beats very rapidly, very slowly, or irregularly (palpitations)  Also call your provider if you have signs of stroke:    Weakness in an arm or leg or on one side of the face    Difficulty speaking or seeing    Extreme drowsiness, confusion, dizziness, or fainting     1926-2112 [employer]. [address] [place] [address], [place] [address]. All rights reserved. This information is not intended as a substitute for professional medical care. Always follow your healthcare professional's instructions.

## 2017-02-07 ENCOUNTER — OFFICE VISIT (OUTPATIENT)
Dept: SURGERY | Facility: CLINIC | Age: 72
End: 2017-02-07
Payer: COMMERCIAL

## 2017-02-07 VITALS
DIASTOLIC BLOOD PRESSURE: 94 MMHG | WEIGHT: 156.8 LBS | TEMPERATURE: 98.1 F | HEIGHT: 68 IN | SYSTOLIC BLOOD PRESSURE: 148 MMHG | HEART RATE: 82 BPM | BODY MASS INDEX: 23.76 KG/M2

## 2017-02-07 DIAGNOSIS — I71.20 THORACIC AORTIC ANEURYSM WITHOUT RUPTURE (H): Primary | ICD-10-CM

## 2017-02-07 PROCEDURE — 99204 OFFICE O/P NEW MOD 45 MIN: CPT | Performed by: SURGERY

## 2017-02-07 NOTE — NURSING NOTE
"Initial /97 mmHg  Pulse 75  Temp(Src) 98.1  F (36.7  C) (Oral)  Ht 1.727 m (5' 8\")  Wt 71.124 kg (156 lb 12.8 oz)  BMI 23.85 kg/m2 Estimated body mass index is 23.85 kg/(m^2) as calculated from the following:    Height as of this encounter: 1.727 m (5' 8\").    Weight as of this encounter: 71.124 kg (156 lb 12.8 oz). .    Renee Omalley MA    "

## 2017-02-07 NOTE — MR AVS SNAPSHOT
After Visit Summary   2/7/2017    Adolfo Rendon    MRN: [id number]           Patient Information     Date Of Birth          1945        Visit Information        Provider Department      2/7/2017 9:45 AM Kameron Guzman MD [facility]        Today's Diagnoses     Thoracic aortic aneurysm without rupture (H)    -  1       Care Instructions    Per physician's instructions        Follow-ups after your visit        Your next 10 appointments already scheduled     Mar 13, 2017  8:45 AM   New Visit with India Means MD   [facility] ([facility])    [address] [facility] [address] [phone]            Mar 17, 2017 12:30 PM   New Visit with Beau Mesa MD   [facility] ([facility])    [address] [facility] [address] [phone]              Future tests that were ordered for you today     Open Future Orders        Priority Expected Expires Ordered    [place] Aorta Medicare AAA Screening Routine  2/7/2018 2/7/2017            Who to contact     If you have questions or need follow up information about today's clinic visit or your schedule please contact [facility] directly at [phone].  Normal or non-critical lab and imaging results will be communicated to you by MyChart, letter or phone within 4 business days after the clinic has received the results. If you do not hear from us within 7 days, please contact the clinic through [employer] or phone. If you have a critical or abnormal lab result, we will notify you by phone as soon as possible.  Submit refill requests through [employer] or call your pharmacy and they will forward the refill request to us. Please allow 3 business days for your refill to be completed.          Additional Information About Your Visit        [employer] Information     [employer] lets you send messages to your doctor, view your test results, renew your  "prescriptions, schedule appointments and more. To sign up, go to [email or web address] . Click on \"Log in\" on the left side of the screen, which will take you to the Welcome page. Then click on \"Sign up Now\" on the right side of the page.     You will be asked to enter the access code listed below, as well as some personal information. Please follow the directions to create your username and password.     Your access code is: [email or web address]  Expires: 2017 11:56 AM     Your access code will  in 90 days. If you need help or a new code, please call your [facility] clinic or [phone].        Care EveryWhere ID     This is your Care EveryWhere ID. This could be used by other organizations to access your [facility] medical records  [id number]        Your Vitals Were     Pulse Temperature Height BMI (Body Mass Index)          75 98.1  F (36.7  C) (Oral) 1.727 m (5' 8\") 23.85 kg/m2         Blood Pressure from Last 3 Encounters:   17 151/97   17 132/86   17 128/72    Weight from Last 3 Encounters:   17 71.124 kg (156 lb 12.8 oz)   17 71.668 kg (158 lb)   17 70.761 kg (156 lb)               Primary Care Provider Office Phone # Fax #    ANTONIO Carranza [facility] [phone]       [address] [address]        Thank you!     Thank you for choosing [facility]  for your care. Our goal is always to provide you with excellent care. Hearing back from our patients is one way we can continue to improve our services. Please take a few minutes to complete the written survey that you may receive in the mail after your visit with us. Thank you!             Your Updated Medication List - Protect others around you: Learn how to safely use, store and throw away your medicines at www.disposemymeds.org.          This list is accurate as of: 17 10:21 AM.  Always use your most recent med list.                   Brand Name " Dispense Instructions for use    albuterol 108 (90 BASE) MCG/ACT Inhaler    PROAIR HFA/PROVENTIL HFA/VENTOLIN HFA    1 Inhaler    Inhale 2 puffs into the lungs every 6 hours as needed for shortness of breath / dyspnea or wheezing       aspirin 81 MG tablet     100 tablet    ONE DAILY       lisinopril 40 MG tablet    PRINIVIL/ZESTRIL    135 tablet    Take 1.5 tablets (60 mg) by mouth daily       PARoxetine 40 MG tablet    PAXIL    14 tablet    Take 1 tablet (40 mg) by mouth At Bedtime       pravastatin 20 MG tablet    PRAVACHOL    90 tablet    Take 1 tablet (20 mg) by mouth daily

## 2017-02-08 DIAGNOSIS — I71.20 ANEURYSM OF THORACIC AORTA (H): Primary | ICD-10-CM

## 2017-02-08 NOTE — PROGRESS NOTES
2017      CLINIC NOTE      Re:  Adolfo Rendon,  1945      PRESENTING COMPLAINT:  Thoracic aortic aneurysm.      HISTORY OF PRESENTING ILLNESS:  Mr. Rendon is a 71-year-old patient whom we have been asked to see for further evaluation of an incidentally found thoracic aortic aneurysm.  He was undergoing workup for poor pulmonary function tests and shortness of breath when he was found to have an incidental arch aneurysm.  He has never had any symptoms from that.  He has also never been evaluated for an abdominal aortic aneurysm.      PAST MEDICAL HISTORY:   1.  Hyperlipidemia.   2.  History of prostate cancer.   3.  Depression.   4.  Hearing loss.   5.  Anxiety disorder.   6.  Hypertension.   7.  Chronic obstructive pulmonary disease.      PAST SURGICAL HISTORY:     1.  Laparoscopic prostatectomy.   2.  Bilateral inguinal hernia repair.      SOCIAL HISTORY:  He used to work in the construction field.  He quit smoking 3 weeks ago.      FAMILY HISTORY:  He tells me one of his sisters  of an aneurysm that was located in her abdomen.  She was in her 50s.      REVIEW OF SYSTEMS:  Recently he had a vasovagal episode for which he was evaluated.  The initial thought was that this was a seizure, but evaluation further into it did not reveal that to be the case.  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  Otherwise, review of systems is negative.      PHYSICAL EXAMINATION:   GENERAL:  He appears comfortable and is in no acute distress.   VITAL SIGNS:  Reviewed.   HEENT:  Head is atraumatic, normocephalic, mucosa are pink.   EYES:  Extraocular motions are intact.  Sclerae are anicteric.   MENTAL:  Alert and oriented.  Judgment and insight are good.   LYMPHATIC:  No supraclavicular or cervical adenopathy noted.   Re:  Adolfo CRAIGTorey Sinholli, page 2      CARDIOVASCULAR:  Regular rate and rhythm.  S1 plus S2 plus 0.   RESPIRATORY:  Good air entry bilaterally.   Good respiratory effort, no accessory muscles are being used.   ABDOMEN:  Soft, nontender, no hepatosplenomegaly is noted.   EXTREMITIES:  No clubbing, cyanosis or edema.   VASCULAR:  No carotid bruits.  3+ bilateral radial and femoral pulses.  Aortic impulse is palpable and nontender.      IMAGING DATA:  I reviewed the CT scan of the chest.  This was done without contrast.  On axial imaging, the ascending aorta is 4.1 cm, and descending is 3.2.  The arch itself is 3.7.  The scan goes down to his renals, but the infrarenal aorta is not visualized.      DIAGNOSIS:  Aortic arch aneurysm.      PLAN:  I explained to him that his aneurysm is quite small and does not need any further evaluation.  We will get a CT angiogram of his chest in 1 year for reevaluation.  Also, with history of smoking and a proximal aneurysm, I would like to formally rule out the presence of an abdominal aortic aneurysm, and he will be scheduled for an ultrasonography.  I will contact him once I review the results of the ultrasound.  We will see him back in 1 year.            MD AARON Fierro/lizeth      Dictation #[id number]  D: 2017  T: 2017         MOUNA MEADOWS MD             D: 2017 10:41   T: 2017 07:48   MT: lizeth      Name:     RACHAEL COVARRUBIAS   MRN:      [id number]        Account:      [id number]   :      1945           Service Date: 2017      Document: [id number]

## 2017-02-09 ENCOUNTER — HOSPITAL ENCOUNTER (OUTPATIENT)
Dept: ULTRASOUND IMAGING | Facility: CLINIC | Age: 72
Discharge: HOME | End: 2017-02-09
Attending: SURGERY | Admitting: SURGERY
Payer: COMMERCIAL

## 2017-02-09 DIAGNOSIS — I71.20 THORACIC AORTIC ANEURYSM WITHOUT RUPTURE (H): ICD-10-CM

## 2017-02-09 PROCEDURE — 76706 US ABDL AORTA SCREEN AAA: CPT

## 2017-02-10 ENCOUNTER — TELEPHONE (OUTPATIENT)
Dept: OTHER | Facility: CLINIC | Age: 72
End: 2017-02-10
Payer: COMMERCIAL

## 2017-02-10 DIAGNOSIS — I71.20 ANEURYSM OF THORACIC AORTA (H): Primary | ICD-10-CM

## 2017-03-13 ENCOUNTER — OFFICE VISIT (OUTPATIENT)
Dept: NEUROLOGY | Facility: CLINIC | Age: 72
End: 2017-03-13
Payer: COMMERCIAL

## 2017-03-13 VITALS
HEART RATE: 76 BPM | OXYGEN SATURATION: 94 % | WEIGHT: 159.4 LBS | SYSTOLIC BLOOD PRESSURE: 144 MMHG | DIASTOLIC BLOOD PRESSURE: 97 MMHG | BODY MASS INDEX: 24.24 KG/M2 | TEMPERATURE: 98.2 F

## 2017-03-13 DIAGNOSIS — R40.4 SPELL OF ALTERED CONSCIOUSNESS: Primary | ICD-10-CM

## 2017-03-13 PROCEDURE — 99204 OFFICE O/P NEW MOD 45 MIN: CPT | Performed by: PSYCHIATRY & NEUROLOGY

## 2017-03-13 NOTE — NURSING NOTE
"Chief Complaint   Patient presents with     Consult     Seizure.  Referral by Danielle Mercado NP.       Initial BP (!) 144/97 (BP Location: Left arm, Patient Position: Chair, Cuff Size: Adult Regular)  Pulse 76  Temp 98.2  F (36.8  C) (Oral)  Wt 159 lb 6.4 oz (72.3 kg)  SpO2 94%  BMI 24.24 kg/m2 Estimated body mass index is 24.24 kg/(m^2) as calculated from the following:    Height as of 2/7/17: 5' 8\" (1.727 m).    Weight as of this encounter: 159 lb 6.4 oz (72.3 kg).  Medication Reconciliation: complete    Patient prefers to be contacted: [phone] and letter  Okay to leave detailed message on voicemail: yes    Lalitha Felix NR-CMA    "

## 2017-03-13 NOTE — PROGRESS NOTES
"INITIAL NEUROLOGY CONSULTATION    DATE OF VISIT: 3/13/2017  MRN: [id number]  PATIENT NAME: Adolfo Rendon  YOB: 1945    REFERRING PROVIDER: No ref. provider found    Chief Complaint   Patient presents with     Consult     Seizure.  Referral by Danielle Mercado NP.       SUBJECTIVE:                                                      HPI:   Adolfo Rendon is a 71 year old male who presents for evaluation of a spell. This occurred in January, when he was helping a friend move. The patient tells me that he suddenly felt a strange sensation, like his head was \"shorting out\" and he had to squat down due to weakness. He then had some intermittent jerking movements of the arms and yelling, per report. No incontinence, no tongue bite. He apparently returned to baseline rather quickly and did not clearly lose consciousness during the event. Witnesses said this lasted 4-5 minutes. He was evaluated in the [place] ED. Blood pressure was quite high. The patient mentions that he had weaned off of a beta-blocker the day prior to this event (carvedilol). No head imaging was done. EKG, CXR and blood work were unremarakble. He did have an incidental finding of a thoracic aortic aneurysm. He has seen Dr. Guzman for this.     The patient denies previous spells as above or similar events since. He does have periods of extreme fatigue that last 24 hours or more. These are not necessarily predicated by any particular events. He says these have been happening over the past 2-3 years. He denies staring spells, convulsions or febrile seizures. No major concussions, no central nervous system infection. He has occasional vertigo. The note by his primary care provider from 27 indicates one tongue bite in sleep. Patient does not mention this event in our discussion today.     In terms of family history, he says that his brother had one seizure and was on Dilantin for 30+ years. He also had a cousin who  with " epilepsy.     Past Medical History   Diagnosis Date     Depressive disorder, not elsewhere classified      Hypertrophy (benign) of prostate      Impotence of organic origin      Other and unspecified hyperlipidemia      Other anxiety states      Tobacco use disorder      Past Surgical History   Procedure Laterality Date     Hydrocelectomy scrotal  2004     left hydrocele repair     Suprapubic prostatectomy       Herniorrhaphy inguinal  2011     Procedure:HERNIORRHAPHY INGUINAL; Open Repair Right Inguinal Hernia Repair        Colonoscopy  3/1/2005     Appendectomy  1966         Current Outpatient Prescriptions on File Prior to Visit:  lisinopril (PRINIVIL/ZESTRIL) 40 MG tablet Take 1.5 tablets (60 mg) by mouth daily   pravastatin (PRAVACHOL) 20 MG tablet Take 1 tablet (20 mg) by mouth daily   albuterol (PROAIR HFA/PROVENTIL HFA/VENTOLIN HFA) 108 (90 BASE) MCG/ACT Inhaler Inhale 2 puffs into the lungs every 6 hours as needed for shortness of breath / dyspnea or wheezing   PARoxetine (PAXIL) 40 MG tablet Take 1 tablet (40 mg) by mouth At Bedtime   aspirin 81 MG tablet ONE DAILY     No current facility-administered medications on file prior to visit.   Allergies   Allergen Reactions     Claritin      Mood , irritablity      Remeron [Mirtazapine]      Agitation        Wellbutrin [Bupropion Hcl]      Sig mood issues           Problem (# of Occurrences) Relation (Name,Age of Onset)    Aneurysm (1) Sister (Keeley- 65): heart    Arthritis (1) Sister (Arias): rhematoid    CANCER (2) Brother ( at 53): Lung cancer, lymphoma, Sister (Annabel): skin cancer on nose    CEREBROVASCULAR DISEASE (1) Mother    Dementia (1) Brother (Shailesh)    Hypertension (1) Mother    Other - See Comments (1) Brother ( at 53): HIV    Pacemaker (1) Sister (Britta)    Prostate Cancer (1) Brother (Shailesh)        Social History   Substance Use Topics     Smoking status: Former Smoker     Packs/day: 1.00     Types: Cigarettes     Quit date:  1/14/2017     Smokeless tobacco: Never Used     Alcohol use No      Comment: quit 1989       REVIEW OF SYSTEMS:                                                      10-point review of systems is negative except as mentioned above in HPI.     EXAM:                                                      Physical Exam:   Vitals: BP (!) 144/97 (BP Location: Left arm, Patient Position: Chair, Cuff Size: Adult Regular)  Pulse 76  Temp 98.2  F (36.8  C) (Oral)  Wt 159 lb 6.4 oz (72.3 kg)  SpO2 94%  BMI 24.24 kg/m2  BMI= Body mass index is 24.24 kg/(m^2).  GENERAL: NAD.   Neurologic:  MENTAL STATUS: Alert, attentive. Speech is fluent. Normal comprehension. Normal concentration. Adequate fund of knowledge.   CRANIAL NERVES: Visual fields intact to confrontation. Pupils equally, round and reactive to light. Facial sensation and movement normal. EOM full. Slightly hard of hearing. Trapezius strength intact. Palate moves symmetrically. Tongue midline.  MOTOR: 5/5 in proximal and distal muscle groups of upper and lower extremities. Tone and bulk normal.   DTRs: Intact and symmetric. Babinski down-going bilaeterally.   SENSATION: Normal light touch and pinprick. Intact proprioception. Vibration: Normal at both ankles.   COORDINATION: Normal finger nose finger. Finger tapping normal. Knee heel shin normal.  STATION AND GAIT: Romberg negative. Tandem minimally unsteady.  CV: RRR. S1, S2.   NECK: No bruits.    ASSESSMENT and PLAN:                                                      Assessment and Plan:     ICD-10-CM    1. Spell of altered consciousness R40.4 MR Brain w/o & w Contrast     EEG sleep deprived       Mr. Rendon is a pleasant 72 yo man seen in neurology for follow-up regarding a spell concerning for seizure. The patient has one near-syncopal spell with twitching. The patient also has days of generalized fatigue, but no additional clear seizure history. An alternative diagnosis for his spell is convulsive syncope related  to his blood pressure given the correlating medication change. I recommend that we do a first-time seizure work-up to be on the safe side. Of course, normal work-up does not necessarily rule out possible seizure disorder. Still, I would not recommend an anti-epileptic unless he has additional spells convincing for seizure or if there is an abnormality on electroencephalogram indicating a propensity for seizures.  The patient understands and agrees with the plan.    Patient Instructions:  MRI brain and electroencephalogram.  Return to clinic after the above studies are completed (6-8 weeks).  Remember the safety rules we discussed regarding driving (3 months).  Do seek medical attention if you have another spell.    Total Time: 45 minutes were spent with the patient. More than 50% of the time spent on counseling (as described above in Assessment and Plan) /coordinating the care.    India Means MD  Neurology    CC: Danielle Mercado, MORIAH

## 2017-03-13 NOTE — PATIENT INSTRUCTIONS
Plan:    MRI brain and electroencephalogram.  Return to clinic after the above studies are completed (6-8 weeks).  Remember the safety rules we discussed regarding driving (3 months).  Do seek medical attention if you have another spell.

## 2017-03-13 NOTE — MR AVS SNAPSHOT
After Visit Summary   3/13/2017    Adolfo Rendon    MRN: [id number]           Patient Information     Date Of Birth          1945        Visit Information        Provider Department      3/13/2017 8:45 AM India Means MD [facility]        Today's Diagnoses     Spell of altered consciousness    -  1      Care Instructions    Plan:    MRI brain and electroencephalogram.  Return to clinic after the above studies are completed (6-8 weeks).  Remember the safety rules we discussed regarding driving (3 months).  Do seek medical attention if you have another spell.        Follow-ups after your visit        Your next 10 appointments already scheduled     Mar 17, 2017 12:30 PM CDT   New Visit with Beau Mesa MD   [facility]    [address] [address] [phone]              Future tests that were ordered for you today     Open Future Orders        Priority Expected Expires Ordered    MR Brain w/o & w Contrast Routine  3/13/2018 3/13/2017    EEG sleep deprived Routine  9/13/2017 3/13/2017            Who to contact     If you have questions or need follow up information about today's clinic visit or your schedule please contact [facility] directly at [phone].  Normal or non-critical lab and imaging results will be communicated to you by [employer], letter or phone within 4 business days after the clinic has received the results. If you do not hear from us within 7 days, please contact the clinic through [employer] or phone. If you have a critical or abnormal lab result, we will notify you by phone as soon as possible.  Submit refill requests through [employer] or call your pharmacy and they will forward the refill request to us. Please allow 3 business days for your refill to be completed.          Additional Information About Your Visit        [employer] Information     [employer] lets you send messages  "to your doctor, view your test results, renew your prescriptions, schedule appointments and more. To sign up, go to [email or web address] . Click on \"Log in\" on the left side of the screen, which will take you to the Welcome page. Then click on \"Sign up Now\" on the right side of the page.     You will be asked to enter the access code listed below, as well as some personal information. Please follow the directions to create your username and password.     Your access code is: [email or web address]  Expires: 2017 12:56 PM     Your access code will  in 90 days. If you need help or a new code, please call your [place] clinic or [phone].        Care EveryWhere ID     This is your Care EveryWhere ID. This could be used by other organizations to access your [place] medical records  [id number]        Your Vitals Were     Pulse Temperature Pulse Oximetry BMI (Body Mass Index)          76 98.2  F (36.8  C) (Oral) 94% 24.24 kg/m2         Blood Pressure from Last 3 Encounters:   17 (!) 144/97   17 (!) 151/97   17 132/86    Weight from Last 3 Encounters:   17 159 lb 6.4 oz (72.3 kg)   17 156 lb 12.8 oz (71.1 kg)   17 158 lb (71.7 kg)               Primary Care Provider Office Phone # Fax #    ANTONIO Carranza [facility] [phone]       [facility] [address] [address]        Thank you!     Thank you for choosing [facility]  for your care. Our goal is always to provide you with excellent care. Hearing back from our patients is one way we can continue to improve our services. Please take a few minutes to complete the written survey that you may receive in the mail after your visit with us. Thank you!             Your Updated Medication List - Protect others around you: Learn how to safely use, store and throw away your medicines at www.disposemymeds.org.          This list is accurate as of: 3/13/17  9:32 AM.  Always use " your most recent med list.                   Brand Name Dispense Instructions for use    albuterol 108 (90 BASE) MCG/ACT Inhaler    PROAIR HFA/PROVENTIL HFA/VENTOLIN HFA    1 Inhaler    Inhale 2 puffs into the lungs every 6 hours as needed for shortness of breath / dyspnea or wheezing       aspirin 81 MG tablet     100 tablet    ONE DAILY       lisinopril 40 MG tablet    PRINIVIL/ZESTRIL    135 tablet    Take 1.5 tablets (60 mg) by mouth daily       PARoxetine 40 MG tablet    PAXIL    14 tablet    Take 1 tablet (40 mg) by mouth At Bedtime       pravastatin 20 MG tablet    PRAVACHOL    90 tablet    Take 1 tablet (20 mg) by mouth daily

## 2017-03-17 ENCOUNTER — HOSPITAL ENCOUNTER (OUTPATIENT)
Dept: MRI IMAGING | Facility: CLINIC | Age: 72
Discharge: HOME | End: 2017-03-17
Attending: PSYCHIATRY & NEUROLOGY | Admitting: PSYCHIATRY & NEUROLOGY
Payer: COMMERCIAL

## 2017-03-17 ENCOUNTER — OFFICE VISIT (OUTPATIENT)
Dept: PULMONOLOGY | Facility: CLINIC | Age: 72
End: 2017-03-17
Payer: COMMERCIAL

## 2017-03-17 VITALS
RESPIRATION RATE: 18 BRPM | WEIGHT: 157.1 LBS | HEART RATE: 94 BPM | HEIGHT: 68 IN | OXYGEN SATURATION: 96 % | TEMPERATURE: 97.4 F | BODY MASS INDEX: 23.81 KG/M2

## 2017-03-17 DIAGNOSIS — J43.2 CENTRILOBULAR EMPHYSEMA (H): Primary | ICD-10-CM

## 2017-03-17 DIAGNOSIS — R40.4 SPELL OF ALTERED CONSCIOUSNESS: ICD-10-CM

## 2017-03-17 LAB
CREAT BLD-MCNC: 1.1 MG/DL (ref 0.66–1.25)
FEF 25/75: 21
FEV-1: 75
FEV1/FVC: 59
FVC: 128
GFR SERPL CREATININE-BSD FRML MDRD: 66 ML/MIN/1.7M2

## 2017-03-17 PROCEDURE — 82565 ASSAY OF CREATININE: CPT | Performed by: PSYCHIATRY & NEUROLOGY

## 2017-03-17 PROCEDURE — 25500064 ZZH RX 255 OP 636: Performed by: RADIOLOGY

## 2017-03-17 PROCEDURE — 99204 OFFICE O/P NEW MOD 45 MIN: CPT | Mod: 25 | Performed by: INTERNAL MEDICINE

## 2017-03-17 PROCEDURE — 70553 MRI BRAIN STEM W/O & W/DYE: CPT

## 2017-03-17 PROCEDURE — A9585 GADOBUTROL INJECTION: HCPCS | Performed by: RADIOLOGY

## 2017-03-17 PROCEDURE — 94010 BREATHING CAPACITY TEST: CPT | Performed by: INTERNAL MEDICINE

## 2017-03-17 RX ORDER — GADOBUTROL 604.72 MG/ML
7.5 INJECTION INTRAVENOUS ONCE
Status: COMPLETED | OUTPATIENT
Start: 2017-03-17 | End: 2017-03-17

## 2017-03-17 RX ADMIN — GADOBUTROL 7.5 ML: 604.72 INJECTION INTRAVENOUS at 14:45

## 2017-03-17 ASSESSMENT — PAIN SCALES - GENERAL: PAINLEVEL: NO PAIN (0)

## 2017-03-17 NOTE — MR AVS SNAPSHOT
After Visit Summary   3/17/2017    Adolfo Rendon    MRN: [id number]           Patient Information     Date Of Birth          1945        Visit Information        Provider Department      3/17/2017 12:30 PM Beau Mesa MD [facility]        Today's Diagnoses     Centrilobular emphysema (H)    -  1       Follow-ups after your visit        Your next 10 appointments already scheduled     Mar 17, 2017  2:00 PM CDT   MR BRAIN W/O & W CONTRAST with PHMR1   [facility] ([facility])    [address] [place] [address]   [phone]           Take your medicines as usual, unless your doctor tells you not to. Bring a list of your current medicines to your exam (including vitamins, minerals and over-the-counter drugs).  You will be given intravenous contrast for this exam. To prepare:   The day before your exam, drink extra fluids at least six 8-ounce glasses (unless your doctor tells you to restrict your fluids).   Have a blood test (creatinine test) within 30 days of your exam. Go to your clinic or Diagnostic Imaging Department for this test.  The MRI machine uses a strong magnet. Please wear clothes without metal (snaps, zippers). A sweatsuit works well, or we may give you a hospital gown.  Please remove any body piercings and hair extensions before you arrive. You will also remove watches, jewelry, hairpins, wallets, dentures, partial dental plates and hearing aids. You may wear contact lenses, and you may be able to wear your rings. We have a safe place to keep your personal items, but it is safer to leave them at home.   **IMPORTANT** THE INSTRUCTIONS BELOW ARE ONLY FOR THOSE PATIENTS WHO HAVE BEEN TOLD THEY WILL RECEIVE SEDATION OR GENERAL ANESTHESIA DURING THEIR MRI PROCEDURE:  IF YOU WILL RECEIVE SEDATION (take medicine to help you relax during your exam):   You must get the medicine from your doctor before you arrive. Bring the  "medicine to the exam. Do not take it at home.   Arrive one hour early. Bring someone who can take you home after the test. Your medicine will make you sleepy. After the exam, you may not drive, take a bus or take a taxi by yourself.   No eating 8 hours before your exam. You may have clear liquids up until 4 hours before your exam. (Clear liquids include water, clear tea, black coffee and fruit juice without pulp.)  IF YOU WILL RECEIVE ANESTHESIA (be asleep for your exam):   Arrive 1 1/2 hours early. Bring someone who can take you home after the test. You may not drive, take a bus or take a taxi by yourself.   No eating 8 hours before your exam. You may have clear liquids up until 4 hours before your exam. (Clear liquids include water, clear tea, black coffee and fruit juice without pulp.)  Please call the Imaging Department at your exam site with any questions.              Who to contact     If you have questions or need follow up information about today's clinic visit or your schedule please contact [facility] directly at [phone].  Normal or non-critical lab and imaging results will be communicated to you by [employer], letter or phone within 4 business days after the clinic has received the results. If you do not hear from us within 7 days, please contact the clinic through [employer] or phone. If you have a critical or abnormal lab result, we will notify you by phone as soon as possible.  Submit refill requests through [employer] or call your pharmacy and they will forward the refill request to us. Please allow 3 business days for your refill to be completed.          Additional Information About Your Visit        [employer] Information     [employer] lets you send messages to your doctor, view your test results, renew your prescriptions, schedule appointments and more. To sign up, go to [email or web address] . Click on \"Log in\" on the left side of the screen, which will take you to the Welcome page. " "Then click on \"Sign up Now\" on the right side of the page.     You will be asked to enter the access code listed below, as well as some personal information. Please follow the directions to create your username and password.     Your access code is: [email or web address]  Expires: 2017 12:56 PM     Your access code will  in 90 days. If you need help or a new code, please call your [place] clinic or [phone].        Care EveryWhere ID     This is your Care EveryWhere ID. This could be used by other organizations to access your [place] medical records  [id number]        Your Vitals Were     Pulse Temperature Respirations Height Pulse Oximetry BMI (Body Mass Index)    94 97.4  F (36.3  C) (Temporal) 18 5' 8\" (1.727 m) 96% 23.89 kg/m2       Blood Pressure from Last 3 Encounters:   17 (!) 144/97   17 (!) 151/97   17 132/86    Weight from Last 3 Encounters:   17 157 lb 1.6 oz (71.3 kg)   17 159 lb 6.4 oz (72.3 kg)   17 156 lb 12.8 oz (71.1 kg)              We Performed the Following     Spirometry, Breathing Capacity: Normal Order, Clinic Performed        Primary Care Provider Office Phone # Fax #    ANTONIO Carranza [facility] [phone]       [address] [address]        Thank you!     Thank you for choosing [facility]  for your care. Our goal is always to provide you with excellent care. Hearing back from our patients is one way we can continue to improve our services. Please take a few minutes to complete the written survey that you may receive in the mail after your visit with us. Thank you!             Your Updated Medication List - Protect others around you: Learn how to safely use, store and throw away your medicines at www.disposemymeds.org.          This list is accurate as of: 3/17/17  1:56 PM.  Always use your most recent med list.                   Brand Name Dispense Instructions for use    " albuterol 108 (90 BASE) MCG/ACT Inhaler    PROAIR HFA/PROVENTIL HFA/VENTOLIN HFA    1 Inhaler    Inhale 2 puffs into the lungs every 6 hours as needed for shortness of breath / dyspnea or wheezing       aspirin 81 MG tablet     100 tablet    ONE DAILY       lisinopril 40 MG tablet    PRINIVIL/ZESTRIL    135 tablet    Take 1.5 tablets (60 mg) by mouth daily       PARoxetine 40 MG tablet    PAXIL    14 tablet    Take 1 tablet (40 mg) by mouth At Bedtime       pravastatin 20 MG tablet    PRAVACHOL    90 tablet    Take 1 tablet (20 mg) by mouth daily

## 2017-03-17 NOTE — PROGRESS NOTES
Past Medical History   Diagnosis Date     Depressive disorder, not elsewhere classified      Hypertrophy (benign) of prostate      Impotence of organic origin      Other and unspecified hyperlipidemia      Other anxiety states      Tobacco use disorder      Current Outpatient Prescriptions   Medication Sig Dispense Refill     lisinopril (PRINIVIL/ZESTRIL) 40 MG tablet Take 1.5 tablets (60 mg) by mouth daily 135 tablet 1     pravastatin (PRAVACHOL) 20 MG tablet Take 1 tablet (20 mg) by mouth daily 90 tablet 1     albuterol (PROAIR HFA/PROVENTIL HFA/VENTOLIN HFA) 108 (90 BASE) MCG/ACT Inhaler Inhale 2 puffs into the lungs every 6 hours as needed for shortness of breath / dyspnea or wheezing 1 Inhaler 4     PARoxetine (PAXIL) 40 MG tablet Take 1 tablet (40 mg) by mouth At Bedtime 14 tablet 0     aspirin 81 MG tablet ONE DAILY 100 tablet 3     Family History   Problem Relation Age of Onset     CEREBROVASCULAR DISEASE Mother      Hypertension Mother      Prostate Cancer Brother      Dementia Brother      Arthritis Sister      rhematoid     CANCER Brother      Lung cancer, lymphoma     Other - See Comments Brother      HIV     CANCER Sister      skin cancer on nose     Pacemaker Sister      Aneurysm Sister      heart     Social History     Social History     Marital status:      Spouse name: N/A     Number of children: N/A     Years of education: N/A     Occupational History     Not on file.     Social History Main Topics     Smoking status: Current Every Day Smoker     Packs/day: 1.00     Types: Cigarettes     Last attempt to quit: 1/14/2017     Smokeless tobacco: Never Used     Alcohol use No      Comment: quit 1989     Drug use: No     Sexual activity: Not Currently     Other Topics Concern     Caffeine Concern No     2-3 cups of coffee in the morning     Sleep Concern No     falls asleep easy but doesn't sleep long     Stress Concern No     Weight Concern No     Special Diet No     Exercise No     work:  "hard labor     Seat Belt Yes     Social History Narrative     Constitutional: occasional overwhelming fatigue with resolution after 24h, No fever or weight loss  Eyes: NEGATIVE for vision changes or irritation and redness.  ENT/Mouth: NEGATIVE for hoarseness and sore throat  CV: NEGATIVE for chest pain, palpitations or chest pressure, lower extremity edema but near -syncope in Jan   Respiratory:  NEGATIVE for significant cough or shortness of breath at rest, hemoptysis, excessive sleepiness  GI: NEGATIVE for nausea, abdominal pain, heartburn, or change in bowel habits  Musculoskeletal: no arthralgias or joint swelling  Integumentary/Skin: NEGATIVE for rash  Neurological:  NEGATIVE for numbness or tingling and focal weakness  Hemotologic/Lymphatic: NEGATIVE for bleeding disorder and swollen nodes  Allergic/Immunologic: No rhinitis or hives  RESPIRATORY EXAM:  Pulse 94  Temp 97.4  F (36.3  C) (Temporal)  Resp 18  Ht 5' 8\" (1.727 m)  Wt 157 lb 1.6 oz (71.3 kg)  SpO2 96%  BMI 23.89 kg/m2  Patient is well-nourished and well-appearing   Moves easily to exam table without dyspnea, voice quality normal  Nares have no obstruction or d/c and normal mucosa.  No nataliia-pharyngeal lesions.    No neck masses or thyromegaly or jugular venous distention   Symmetrical chest, normal configuration, without accessory muscle use or tenderness on palpation. Clear breath sounds. No stridor.   Normal but soft S1 and S2 heart sounds without murmur rub or gallop. No limb edema.  No abdominal distension  No neck or supraclavicular adenopathy.   No muscle atrophy. 5/5 lower limb strength bilaterally.  No tremor.  No digit clubbing.  No skin rash.   Affect is normal; cognition is normal.     Remainder of visit dictated. Transcription immediately below.  Beau Mesa MD, MPH  Associate Professor of Medicine      "

## 2017-03-17 NOTE — NURSING NOTE
"Chief Complaint   Patient presents with     Consult     COPD       Initial Pulse 94  Temp 97.4  F (36.3  C) (Temporal)  Resp 18  Ht 5' 8\" (1.727 m)  Wt 157 lb 1.6 oz (71.3 kg)  SpO2 96%  BMI 23.89 kg/m2 Estimated body mass index is 23.89 kg/(m^2) as calculated from the following:    Height as of this encounter: 5' 8\" (1.727 m).    Weight as of this encounter: 157 lb 1.6 oz (71.3 kg).  Medication Reconciliation: complete   Spirometry performed in clinic per provider.    Liss Emerson CMA        "

## 2017-03-17 NOTE — PROGRESS NOTES
PULMONARY CONSULTATION      PHYSICIAN REQUESTING CONSULTATION:  Danielle Mercado, APRN, CNP.      REASON FOR CONSULTATION:  COPD and exertional dyspnea.      HISTORY OF PRESENT ILLNESS:  Mr. Adolfo Rendon is a 71-year-old male who started smoking when he was 40 after starting a stressful job and he has continued about 1 to 1-1/2 packs per day up until the current time.  He had no respiratory difficulties until a couple years ago when he noticed exertional dyspnea.  This was not severe.  He was prescribed an albuterol inhaler for this about a year ago, but did not use it very much.  In the last 3 or 4 months this has been more obvious to him that when he has to hurry or climb a slight hill on his property he becomes breathless.  He is never short of breath at night.  He does not wake up at night.  He is never short of breath at rest and has no nocturnal awakening.  He has a minimal cough productive of white sputum.  Never has hemoptysis.  He can walk around a large store without any difficulty.  He was represcribed the albuterol inhaler, but he takes this only 2 puffs prior to bedtime.      He has had other unexplained issues such as an event in January where he was with some friends; became confused and had to sit down for fear of fainting and then per onlookers had some jerking motions for several minutes.  He has poor recollection of this.  He is now seeing a neurologist for seizure workup and getting an MRI today.  He is not on antiepileptic medication and it is possible this was related to high blood pressure from not being on enough blood pressure medications.  He did stop smoking for a couple of weeks, but has restarted.  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  He is up-to-date on his influenza and pneumococcal vaccinations.  Weight has been stable.  Appetite is good.  No aspiration, not a lot of sinus problems.  No allergy  symptoms.  Not much heartburn.  Does hear himself wheeze occasionally at night, but this is not associated with dyspnea.      FAMILY HISTORY:  Significant for father who was a smoker, also had COPD.         LABORATORY DATA AND DIAGNOSTIC STUDIES:  Pulmonary function tests obtained today and reviewed by me show an FVC of 5.13 128% of predicted, FEV1 of 2.21, 75% of predicted with a ratio of 43%.  This is mild airflow obstruction and is actually slightly better than 01/04/2017 when the FEV1 was 2.08.  He did have a CT scan on 02/10.  I reviewed the images with the patient showing mild upper lobe bilateral emphysema but otherwise normal looking lung tissue with no suspicious pulmonary nodules, no pleural effusions, and no pulmonary fibrosis.  He does have an incidentally found 3.5 cm thoracic aneurysm which was followed up by an ultrasound of his aorta showing the maximum diameter was 3 cm which is borderline dilated.  Metabolic panel in December was normal and he had a normal hemoglobin.      ASSESSMENT AND PLAN:  A 71-year-old male, smoker with evidence of mild emphysema on CT scan and mild airflow obstruction on 2 pulmonary function tests consistent with COPD, however, this is mild.  The patient has symptoms only with significant exertion and has normal oxygen levels.  The patient is reluctant to take a lot of medications if it is not going to make him feel that much better and I would say at this point he probably would not have a lot of benefit to combination long-acting bronchodilators and inhaled corticosteroids or Spiriva.  I did ask him to switch his albuterol from nighttime to daytime and he can take 2 puffs prior to any planned exertion.  He is up-to-date on his immunizations.  He should seek medical attention for symptoms of an exacerbation, which he has not had in years.  I did tell him that complete smoking cessation is the best thing for his lungs and actually will reduce the probability of his aneurysm  progressing.  Seizure workup is ongoing.  I did tell him I did not think his spell had anything to do with his respiratory status.  He can follow with me shereen LUCAS MD, MPH             D: 2017 13:45   T: 2017 14:25   MT: HEIDE#[id number]      Name:     RACHAEL COVARRUBIAS   MRN:      [id number]        Account:      [id number]   :      1945           Visit Date:   2017      Document: [id number]       cc: Danielle ALVAREZ, CNP

## 2017-03-23 NOTE — PROGRESS NOTES
"Called and spoke to patient, informed per Dr Means that \"his MRI shows some age-related changes. Nothing structurally to explain his spell in January. Will await electroencephalogram results.\" Patient voiced understanding and in agreement with plan.    Mamta Diez MA  "

## 2017-03-23 NOTE — PROGRESS NOTES
Please advise Adolfo Rendon,  1945, that his MRI shows some age-related changes. Nothing structurally to explain his spell in January. Will await electroencephalogram results.   [phone] (home)   India Means

## 2017-04-10 ENCOUNTER — ALLIED HEALTH/NURSE VISIT (OUTPATIENT)
Dept: NEUROLOGY | Facility: CLINIC | Age: 72
End: 2017-04-10

## 2017-04-10 DIAGNOSIS — R40.4 SPELL OF ALTERED CONSCIOUSNESS: ICD-10-CM

## 2017-04-10 NOTE — MR AVS SNAPSHOT
After Visit Summary   4/10/2017    Adolfo Rendon    MRN: [id number]           Patient Information     Date Of Birth          1945        Visit Information        Provider Department      4/10/2017 8:00 AM [facility] EEG TECH 1 [facility]        Today's Diagnoses     Spell of altered consciousness           Follow-ups after your visit        Who to contact     Please call your clinic at [phone] to:    Ask questions about your health    Make or cancel appointments    Discuss your medicines    Learn about your test results    Speak to your doctor   If you have compliments or concerns about an experience at your clinic, or if you wish to file a complaint, please contact [facility] Patient Relations at [phone] or email us at [email or web address]         Additional Information About Your Visit        MyChart Information     [email or web address] is an electronic gateway that provides easy, online access to your medical records. With [email or web address], you can request a clinic appointment, read your test results, renew a prescription or communicate with your care team.     To sign up for [employer] visit the website at [email or web address]   You will be asked to enter the access code listed below, as well as some personal information. Please follow the directions to create your username and password.     Your access code is: [email or web address]  Expires: 2017 12:56 PM     Your access code will  in 90 days. If you need help or a new code, please contact your [facility] or call [phone] for assistance.        Care EveryWhere ID     This is your Care EveryWhere ID. This could be used by other organizations to access your [place] medical records  [id number]         Blood Pressure from Last 3 Encounters:   17 (!) 144/97   17 (!) 151/97   17 132/86    Weight from Last 3 Encounters:   17 71.3 kg (157 lb 1.6 oz)    03/13/17 72.3 kg (159 lb 6.4 oz)   02/07/17 71.1 kg (156 lb 12.8 oz)              We Performed the Following     EEG SLEEP DEPRIVED ([id number])     EEG sleep deprived        Primary Care Provider Office Phone # Fax #    ANTONIO Carranza [phone]       [facility] [address] [facility] [address]        Thank you!     Thank you for choosing [facility]  for your care. Our goal is always to provide you with excellent care. Hearing back from our patients is one way we can continue to improve our services. Please take a few minutes to complete the written survey that you may receive in the mail after your visit with us. Thank you!             Your Updated Medication List - Protect others around you: Learn how to safely use, store and throw away your medicines at www.disposemymeds.org.          This list is accurate as of: 4/10/17 11:30 AM.  Always use your most recent med list.                   Brand Name Dispense Instructions for use    albuterol 108 (90 BASE) MCG/ACT Inhaler    PROAIR HFA/PROVENTIL HFA/VENTOLIN HFA    1 Inhaler    Inhale 2 puffs into the lungs every 6 hours as needed for shortness of breath / dyspnea or wheezing       aspirin 81 MG tablet     100 tablet    ONE DAILY       lisinopril 40 MG tablet    PRINIVIL/ZESTRIL    135 tablet    Take 1.5 tablets (60 mg) by mouth daily       PARoxetine 40 MG tablet    PAXIL    14 tablet    Take 1 tablet (40 mg) by mouth At Bedtime       pravastatin 20 MG tablet    PRAVACHOL    90 tablet    Take 1 tablet (20 mg) by mouth daily

## 2017-04-19 DIAGNOSIS — I10 ESSENTIAL HYPERTENSION WITH GOAL BLOOD PRESSURE LESS THAN 140/90: ICD-10-CM

## 2017-04-19 RX ORDER — LISINOPRIL 40 MG/1
60 TABLET ORAL DAILY
Qty: 135 TABLET | Refills: 3 | Status: SHIPPED | OUTPATIENT
Start: 2017-04-19 | End: 2018-02-22

## 2017-04-19 NOTE — TELEPHONE ENCOUNTER
Patient needs RX [facility] daily, last rx went to [facility] and not his mail order so he did not get. No need to return call to patient

## 2017-06-08 DIAGNOSIS — F33.2 SEVERE EPISODE OF RECURRENT MAJOR DEPRESSIVE DISORDER, WITHOUT PSYCHOTIC FEATURES (H): ICD-10-CM

## 2017-06-08 NOTE — TELEPHONE ENCOUNTER
PARoxetine (PAXIL) 40 MG tablet     Last Written Prescription Date: 12/22/2016  Last Fill Quantity: 14, # refills: 0  Last Office Visit with G primary care provider:  1/25/2017        Last PHQ-9 score on record=   PHQ-9 SCORE 4/20/2016   Total Score -   Total Score 13

## 2017-06-13 NOTE — TELEPHONE ENCOUNTER
Unable to leave voicemail to clarify Paxil.  Whitney Yeung RN      . Severe episode of recurrent major depressive disorder, without psychotic features (H)  PHQ-9 score:    PHQ-9 SCORE 4/20/2016   Total Score -   Total Score 13    improvement needed psychotherapy recommended   verbal no harm contract generated  Continue SSRI  - PARoxetine (PAXIL) 40 MG tablet; Take 1 tablet (40 mg) by mouth At Bedtime  Dispense: 14 tablet; Refill: 0

## 2017-06-19 RX ORDER — PAROXETINE 40 MG/1
TABLET, FILM COATED ORAL
Qty: 90 TABLET | Refills: 0 | OUTPATIENT
Start: 2017-06-19

## 2017-07-13 DIAGNOSIS — F33.2 SEVERE EPISODE OF RECURRENT MAJOR DEPRESSIVE DISORDER, WITHOUT PSYCHOTIC FEATURES (H): ICD-10-CM

## 2017-07-13 DIAGNOSIS — E78.5 HYPERLIPIDEMIA LDL GOAL <100: ICD-10-CM

## 2017-07-13 RX ORDER — PRAVASTATIN SODIUM 20 MG
20 TABLET ORAL DAILY
Qty: 30 TABLET | Refills: 0 | Status: SHIPPED | OUTPATIENT
Start: 2017-07-13 | End: 2017-07-13

## 2017-07-13 RX ORDER — PAROXETINE 40 MG/1
40 TABLET, FILM COATED ORAL AT BEDTIME
Qty: 90 TABLET | Refills: 0 | Status: SHIPPED | OUTPATIENT
Start: 2017-07-13 | End: 2017-10-23

## 2017-07-13 RX ORDER — PAROXETINE 40 MG/1
40 TABLET, FILM COATED ORAL AT BEDTIME
Qty: 30 TABLET | Refills: 0 | Status: SHIPPED | OUTPATIENT
Start: 2017-07-13 | End: 2017-07-13

## 2017-07-13 RX ORDER — PRAVASTATIN SODIUM 20 MG
20 TABLET ORAL DAILY
Qty: 90 TABLET | Refills: 0 | Status: SHIPPED | OUTPATIENT
Start: 2017-07-13 | End: 2017-10-23

## 2017-07-14 ASSESSMENT — PATIENT HEALTH QUESTIONNAIRE - PHQ9: SUM OF ALL RESPONSES TO PHQ QUESTIONS 1-9: 3

## 2017-07-15 ENCOUNTER — HEALTH MAINTENANCE LETTER (OUTPATIENT)
Age: 72
End: 2017-07-15

## 2017-10-23 DIAGNOSIS — F33.2 SEVERE EPISODE OF RECURRENT MAJOR DEPRESSIVE DISORDER, WITHOUT PSYCHOTIC FEATURES (H): ICD-10-CM

## 2017-10-23 DIAGNOSIS — E78.5 HYPERLIPIDEMIA LDL GOAL <100: ICD-10-CM

## 2017-10-23 RX ORDER — PRAVASTATIN SODIUM 20 MG
TABLET ORAL
Qty: 90 TABLET | Refills: 1 | Status: SHIPPED | OUTPATIENT
Start: 2017-10-23 | End: 2018-02-22

## 2017-10-23 RX ORDER — PAROXETINE 40 MG/1
TABLET, FILM COATED ORAL
Qty: 90 TABLET | Refills: 1 | Status: SHIPPED | OUTPATIENT
Start: 2017-10-23 | End: 2018-02-22

## 2017-10-23 NOTE — TELEPHONE ENCOUNTER
PARoxetine (PAXIL) 40 MG tablet      Last Written Prescription Date: 7/13/17  Last Fill Quantity: 90,  # refills: 0   Last Office Visit with [facility], [facility] or [facility] prescribing provider:     pravastatin (PRAVACHOL) 20 MG tablet see protocol

## 2018-02-22 ENCOUNTER — OFFICE VISIT (OUTPATIENT)
Dept: FAMILY MEDICINE | Facility: CLINIC | Age: 73
End: 2018-02-22
Payer: COMMERCIAL

## 2018-02-22 VITALS
DIASTOLIC BLOOD PRESSURE: 89 MMHG | WEIGHT: 155 LBS | SYSTOLIC BLOOD PRESSURE: 135 MMHG | OXYGEN SATURATION: 99 % | TEMPERATURE: 98.3 F | BODY MASS INDEX: 23.49 KG/M2 | HEIGHT: 68 IN | HEART RATE: 85 BPM | RESPIRATION RATE: 14 BRPM

## 2018-02-22 DIAGNOSIS — E78.5 HYPERLIPIDEMIA LDL GOAL <100: ICD-10-CM

## 2018-02-22 DIAGNOSIS — F33.2 SEVERE EPISODE OF RECURRENT MAJOR DEPRESSIVE DISORDER, WITHOUT PSYCHOTIC FEATURES (H): ICD-10-CM

## 2018-02-22 DIAGNOSIS — Z23 NEED FOR PROPHYLACTIC VACCINATION AND INOCULATION AGAINST INFLUENZA: Primary | ICD-10-CM

## 2018-02-22 DIAGNOSIS — I25.10 CORONARY ARTERY DISEASE INVOLVING NATIVE CORONARY ARTERY OF NATIVE HEART WITHOUT ANGINA PECTORIS: ICD-10-CM

## 2018-02-22 DIAGNOSIS — I10 ESSENTIAL HYPERTENSION WITH GOAL BLOOD PRESSURE LESS THAN 140/90: ICD-10-CM

## 2018-02-22 LAB
ALT SERPL W P-5'-P-CCNC: 16 U/L (ref 0–70)
ANION GAP SERPL CALCULATED.3IONS-SCNC: 5 MMOL/L (ref 3–14)
BUN SERPL-MCNC: 18 MG/DL (ref 7–30)
CALCIUM SERPL-MCNC: 9.2 MG/DL (ref 8.5–10.1)
CHLORIDE SERPL-SCNC: 105 MMOL/L (ref 94–109)
CHOLEST SERPL-MCNC: 204 MG/DL
CO2 SERPL-SCNC: 28 MMOL/L (ref 20–32)
CREAT SERPL-MCNC: 1.07 MG/DL (ref 0.66–1.25)
GFR SERPL CREATININE-BSD FRML MDRD: 68 ML/MIN/1.7M2
GLUCOSE SERPL-MCNC: 85 MG/DL (ref 70–99)
HDLC SERPL-MCNC: 48 MG/DL
LDLC SERPL CALC-MCNC: 140 MG/DL
NONHDLC SERPL-MCNC: 156 MG/DL
POTASSIUM SERPL-SCNC: 4.2 MMOL/L (ref 3.4–5.3)
SODIUM SERPL-SCNC: 138 MMOL/L (ref 133–144)
TRIGL SERPL-MCNC: 82 MG/DL

## 2018-02-22 PROCEDURE — 99214 OFFICE O/P EST MOD 30 MIN: CPT | Mod: 25 | Performed by: NURSE PRACTITIONER

## 2018-02-22 PROCEDURE — 90662 IIV NO PRSV INCREASED AG IM: CPT | Performed by: NURSE PRACTITIONER

## 2018-02-22 PROCEDURE — 36415 COLL VENOUS BLD VENIPUNCTURE: CPT | Performed by: INTERNAL MEDICINE

## 2018-02-22 PROCEDURE — 80048 BASIC METABOLIC PNL TOTAL CA: CPT | Performed by: INTERNAL MEDICINE

## 2018-02-22 PROCEDURE — 80061 LIPID PANEL: CPT | Performed by: INTERNAL MEDICINE

## 2018-02-22 PROCEDURE — 84460 ALANINE AMINO (ALT) (SGPT): CPT | Performed by: INTERNAL MEDICINE

## 2018-02-22 PROCEDURE — G0008 ADMIN INFLUENZA VIRUS VAC: HCPCS | Performed by: NURSE PRACTITIONER

## 2018-02-22 RX ORDER — PRAVASTATIN SODIUM 20 MG
20 TABLET ORAL DAILY
Qty: 90 TABLET | Refills: 3 | Status: SHIPPED | OUTPATIENT
Start: 2018-02-22 | End: 2019-08-01

## 2018-02-22 RX ORDER — LISINOPRIL 40 MG/1
40 TABLET ORAL DAILY
Qty: 90 TABLET | Refills: 3 | Status: SHIPPED | OUTPATIENT
Start: 2018-02-22 | End: 2019-08-01

## 2018-02-22 RX ORDER — HYDROCHLOROTHIAZIDE 25 MG/1
25 TABLET ORAL DAILY
Qty: 90 TABLET | Refills: 3 | Status: SHIPPED | OUTPATIENT
Start: 2018-02-22 | End: 2019-08-01

## 2018-02-22 RX ORDER — PAROXETINE 40 MG/1
40 TABLET, FILM COATED ORAL AT BEDTIME
Qty: 90 TABLET | Refills: 3 | Status: SHIPPED | OUTPATIENT
Start: 2018-02-22 | End: 2019-04-05

## 2018-02-22 ASSESSMENT — PATIENT HEALTH QUESTIONNAIRE - PHQ9: 5. POOR APPETITE OR OVEREATING: SEVERAL DAYS

## 2018-02-22 ASSESSMENT — ANXIETY QUESTIONNAIRES
7. FEELING AFRAID AS IF SOMETHING AWFUL MIGHT HAPPEN: NOT AT ALL
IF YOU CHECKED OFF ANY PROBLEMS ON THIS QUESTIONNAIRE, HOW DIFFICULT HAVE THESE PROBLEMS MADE IT FOR YOU TO DO YOUR WORK, TAKE CARE OF THINGS AT HOME, OR GET ALONG WITH OTHER PEOPLE: NOT DIFFICULT AT ALL
6. BECOMING EASILY ANNOYED OR IRRITABLE: SEVERAL DAYS
GAD7 TOTAL SCORE: 6
2. NOT BEING ABLE TO STOP OR CONTROL WORRYING: SEVERAL DAYS
1. FEELING NERVOUS, ANXIOUS, OR ON EDGE: SEVERAL DAYS
3. WORRYING TOO MUCH ABOUT DIFFERENT THINGS: SEVERAL DAYS
5. BEING SO RESTLESS THAT IT IS HARD TO SIT STILL: SEVERAL DAYS

## 2018-02-22 ASSESSMENT — PAIN SCALES - GENERAL: PAINLEVEL: NO PAIN (0)

## 2018-02-22 NOTE — PROGRESS NOTES
"  SUBJECTIVE:   Adolfo Rendon is a 72 year old male who presents to clinic today for the following health issues:      Hyperlipidemia Follow-Up      Rate your low fat/cholesterol diet?: good    Taking statin?  No    Other lipid medications/supplements?:  none    Hypertension Follow-up      Outpatient blood pressures are not being checked.    Low Salt Diet: no added salt  BP Readings from Last 3 Encounters:   02/22/18 135/89   03/13/17 (!) 144/97   02/07/17 (!) 151/97             Depression Followup    Status since last visit: Stable     See PHQ-9 for current symptoms.  Other associated symptoms: None    Complicating factors:   Significant life event:  No   Current substance abuse:  None  Anxiety or Panic symptoms:  No    PHQ-9 4/20/2016 7/13/2017   Total Score 13 3   Q9: Suicide Ideation Several days Not at all           -------------------------------------    Problem list and histories reviewed & adjusted, as indicated.  Additional history: as documented    Labs reviewed in EPIC    Reviewed and updated as needed this visit by clinical staff  Tobacco  Allergies  Meds       Reviewed and updated as needed this visit by Provider         ROS:   ROS: 10 point ROS neg other than the symptoms noted above in the HPI.      OBJECTIVE:                                                    /89  Pulse 85  Temp 98.3  F (36.8  C) (Tympanic)  Resp 14  Ht 5' 8\" (1.727 m)  Wt 155 lb (70.3 kg)  SpO2 99%  BMI 23.57 kg/m2  Body mass index is 23.57 kg/(m^2).   GENERAL: healthy, alert, well nourished, well hydrated, no distress  HENT: ear canals- normal; TMs- normal; Nose- normal; Mouth- no ulcers, no lesions  NECK: no tenderness, no adenopathy, no asymmetry, no masses, no stiffness; thyroid- normal to palpation  RESP: lungs clear to auscultation - no rales, no rhonchi, no wheezes  CV: regular rates and rhythm, normal S1 S2, no S3 or S4 and no murmur, no click or rub -  ABDOMEN: soft, no tenderness, no  " hepatosplenomegaly, no masses, normal bowel sounds  MS: extremities- no gross deformities noted, no edema  NEURO: strength and tone- normal, sensory exam- grossly normal, mentation- intact, speech- normal, reflexes- symmetric    Diagnostic test results:  Results for orders placed or performed in visit on 02/22/18   Basic metabolic panel   Result Value Ref Range    Sodium 138 133 - 144 mmol/L    Potassium 4.2 3.4 - 5.3 mmol/L    Chloride 105 94 - 109 mmol/L    Carbon Dioxide 28 20 - 32 mmol/L    Anion Gap 5 3 - 14 mmol/L    Glucose 85 70 - 99 mg/dL    Urea Nitrogen 18 7 - 30 mg/dL    Creatinine 1.07 0.66 - 1.25 mg/dL    GFR Estimate 68 >60 mL/min/1.7m2    GFR Estimate If Black 82 >60 mL/min/1.7m2    Calcium 9.2 8.5 - 10.1 mg/dL   Lipid Profile   Result Value Ref Range    Cholesterol 204 (H) <200 mg/dL    Triglycerides 82 <150 mg/dL    HDL Cholesterol 48 >39 mg/dL    LDL Cholesterol Calculated 140 (H) <100 mg/dL    Non HDL Cholesterol 156 (H) <130 mg/dL   ALT   Result Value Ref Range    ALT 16 0 - 70 U/L        ASSESSMENT/PLAN:                                                    1. Hyperlipidemia LDL goal <100  Return to the clinic for fasting labs.  Statin refilled today.  Low-fat diet recommended.  Daily physical activity for 30-60 minutes recommended  - pravastatin (PRAVACHOL) 20 MG tablet; Take 1 tablet (20 mg) by mouth daily  Dispense: 90 tablet; Refill: 3    2. Severe episode of recurrent major depressive disorder, without psychotic features (H)  Improve this winter.  Stable.  Denies any suicidal or homicidal ideation  Continue paroxetine  - PARoxetine (PAXIL) 40 MG tablet; Take 1 tablet (40 mg) by mouth At Bedtime  Dispense: 90 tablet; Refill: 3    3. Essential hypertension with goal blood pressure less than 140/90  Blood pressure elevated.  Add in hydrochlorothiazide recheck blood pressure and basic metabolic panel 1 month  - lisinopril (PRINIVIL/ZESTRIL) 40 MG tablet; Take 1 tablet (40 mg) by mouth daily   Dispense: 90 tablet; Refill: 3  - Basic metabolic panel  - Lipid Profile  - ALT  - hydrochlorothiazide (HYDRODIURIL) 25 MG tablet; Take 1 tablet (25 mg) by mouth daily  Dispense: 90 tablet; Refill: 3  - Basic metabolic panel  (Ca, Cl, CO2, Creat, Gluc, K, Na, BUN); Future    4. Need for prophylactic vaccination and inoculation against influenza  Today  - FLU VACCINE, INCREASED ANTIGEN, PRESV FREE, AGE 65+ [95501]  - ADMIN INFLUENZA (For MEDICARE Patients ONLY) []    5. Coronary artery disease involving native coronary artery of native heart without angina pectoris  Labs ordered  - Basic metabolic panel  - Lipid Profile  - ALT      Follow up with Provider - Please fax copy of results to [facility] at [phone].  Current Outpatient Prescriptions   Medication Sig Dispense Refill     pravastatin (PRAVACHOL) 20 MG tablet Take 1 tablet (20 mg) by mouth daily 90 tablet 3     PARoxetine (PAXIL) 40 MG tablet Take 1 tablet (40 mg) by mouth At Bedtime 90 tablet 3     lisinopril (PRINIVIL/ZESTRIL) 40 MG tablet Take 1 tablet (40 mg) by mouth daily 90 tablet 3     hydrochlorothiazide (HYDRODIURIL) 25 MG tablet Take 1 tablet (25 mg) by mouth daily 90 tablet 3     aspirin 81 MG tablet ONE DAILY 100 tablet 3     albuterol (PROAIR HFA/PROVENTIL HFA/VENTOLIN HFA) 108 (90 BASE) MCG/ACT Inhaler Inhale 2 puffs into the lungs every 6 hours as needed for shortness of breath / dyspnea or wheezing 1 Inhaler 4        See Patient Instructions    ANTONIO Carranza [facility]    Injectable Influenza Immunization Documentation    1.  Is the person to be vaccinated sick today?   No    2. Does the person to be vaccinated have an allergy to a component   of the vaccine?   No  Egg Allergy Algorithm Link    3. Has the person to be vaccinated ever had a serious reaction   to influenza vaccine in the past?   No    4. Has the person to be vaccinated ever had Guillain-Barré syndrome?   No    Form  completed by sb

## 2018-02-22 NOTE — PATIENT INSTRUCTIONS
Continue lisinopril 40 mg daily  Start hydrochlorithiazide daily  Repeat lab non fasting in 1 month after starting new blood pressure medication.   Fasting labs done today  Call central scheduling to schedule the CT Angiogram      Uncontrolled High Blood Pressure (Established)    Your blood pressure was unusually high today. This can occur if you ve missed doses of your blood pressure medicine. Or it can happen if you are taking other medicines. These include some asthma inhalers, decongestants, diet pills, and street drugs like cocaine and amphetamine.  Other causes include:    Weight gain    More salt in your diet    Smoking    Caffeine  Your blood pressure can also rise if you are emotionally upset or in intense pain. It may go back to normal after a period of rest.  A blood pressure reading is made up of 2 numbers. There is a top number over a bottom number. The top number is the systolic pressure. The bottom number is the diastolic pressure. A normal blood pressure is a systolic pressure of less than 120 over a diastolic pressure of less than 80. High blood pressure (hypertension) is when the top number is 140 or higher. Or it is when the bottom number is 90 or higher. You will see your blood pressure readings written together. For example, a person with a systolic pressure of 118 and a diastolic pressure of 78 will have 118/78 written in the medical record. To be high blood pressure, the numbers must be higher when tested over a period of time. The blood pressures between normal and hypertension are called prehypertension. Prehypertension is a warning sign. The information gives you a chance to make lifestyle changes (weight loss, more exercise) that can keep your blood pressure from going higher.  Home care  It s important to take steps to lower your blood pressure. If you are taking blood pressure medicine, the guidelines below may help you need less or no medicines in the future.    Begin a weight-loss  program if you are overweight.    Cut back on the amount of salt in your diet:    Avoid high-salt foods like olives, pickles, smoked meats, and salted potato chips.    Don t add salt to your food at the table.    Use only small amounts of salt when cooking.    Begin an exercise program. Talk with your health care provider about what exercise program is best for you. It doesn t have to be difficult. Even brisk walking for 20 minutes 3 times a week is a good form of exercise.    Avoid medicines that stimulates the heart. This includes many over-the-counter cold and sinus decongestant pills and sprays, as well as diet pills. Check the warnings about hypertension on the label. Before purchasing any over-the-counter medicines or supplements, always ask the pharmacist about the product's potential interaction with your high blood pressure and your medicines.    Stimulants such as amphetamine or cocaine could be lethal for someone with hypertension. Never take these.    Limit how much caffeine you drink. Or switch to noncaffeinated beverages.    Stop smoking. If you are a long-time smoker, this can be hard. Enroll in a stop-smoking program to make it more likely that you will succeed. Talk with your provider about ways to quit.    Learn how to handle stress better. This is an important part of any program to lower blood pressure. Learn ways to relax. These include meditation, yoga, and biofeedback.    If medicines were prescribed, take them exactly as directed. Missing doses may cause your blood pressure to get out of control.    If you miss a dose or doses of your medicines, check with your healthcare provider or pharmacist about what to do.    Consider buying an automatic blood pressure machine. Your provider may recommend a certain type. You can get one of these at most pharmacies. Measure your blood pressure twice a day, in the morning, and in the late afternoon. Keep a written record of your home blood pressure  readings and take the record to your medical appointments.  Here are some additional guidelines on home blood pressure monitoring from the American [employer] Association.    Don't smoke or drink coffee for 30 minutes    Go to the bathroom before the test.    Relax for 5 minutes before taking the measurement.    Sit correctly. Be sure your back is supported. Don't sit on a couch or soft chair. Uncross your feet and place them flat on the floor. Place your arm on a solid, flat surface like a table with the upper arm at heart level. Make certain the middle of the cuff is directly above the eye of the elbow. Check the monitor's instruction manual for an illustration.    Take multiple readings. When you measure, take 2 or 3 readings one minute apart and record all of the results.    Take your blood pressure at the same time every day, or as your healthcare provider recommends.    Record the date, time, and blood pressure reading.    Take the record with you to your next appointment. If your blood pressure monitor has a built-in memory, simply take the monitor with you to your next appointment.    Call your provider if you have several high readings. Don't be frightened by a single high reading, but if you get several high readings, check in with your healthcare provider.    Note: When blood pressure reaches a systolic (top number) of 180 or higher or a diastolic (bottom number) of 110 or higher, emergency medical treatment is required. Call your healthcare provider immediately.  Follow-up care  Regular visits to your own healthcare provider for blood pressure and medicine checks are an important part of your care. Make a follow-up appointment as directed. Bring the record of your home blood pressure readings to the appointment.  When to seek medical advice  Call your healthcare provider right away if any of these occur:    Blood pressure reaches a systolic (top number) of 180 or higher or diastolic (bottom number) of 110 or  higher, emergency medical treatment is required.    Chest, arm, shoulder, neck, or upper back pain    Shortness of breath    Severe headache    Throbbing or rushing sound in the ears    Nosebleed    Extreme drowsiness, confusion, or fainting    Dizziness or dizziness with spinning sensation (vertigo)    Weakness in an arm or leg or on one side of the face    Trouble speaking or seeing   Date Last Reviewed: 1/1/2017 2000-2017 [employer]. [address] [place] [address]. All rights reserved. This information is not intended as a substitute for professional medical care. Always follow your healthcare professional's instructions.

## 2018-02-22 NOTE — LETTER
My Depression Action Plan  Name: Adolfo Rendon   Date of Birth 1945  Date: 2/22/2018    My doctor: Danielle Mercado   My clinic: [facility]  [address] [facility] [address]  [phone]          GREEN    ZONE   Good Control    What it looks like:     Things are going generally well. You have normal up s and down s. You may even feel depressed from time to time, but bad moods usually last less than a day.   What you need to do:  1. Continue to care for yourself (see self care plan)  2. Check your depression survival kit and update it as needed  3. Follow your physician s recommendations including any medication.  4. Do not stop taking medication unless you consult with your physician first.           YELLOW         ZONE Getting Worse    What it looks like:     Depression is starting to interfere with your life.     It may be hard to get out of bed; you may be starting to isolate yourself from others.    Symptoms of depression are starting to last most all day and this has happened for several days.     You may have suicidal thoughts but they are not constant.   What you need to do:     1. Call your care team, your response to treatment will improve if you keep your care team informed of your progress. Yellow periods are signs an adjustment may need to be made.     2. Continue your self-care, even if you have to fake it!    3. Talk to someone in your support network    4. Open up your depression survival kit           RED    ZONE Medical Alert - Get Help    What it looks like:     Depression is seriously interfering with your life.     You may experience these or other symptoms: You can t get out of bed most days, can t work or engage in other necessary activities, you have trouble taking care of basic hygiene, or basic responsibilities, thoughts of suicide or death that will not go away, self-injurious behavior.     What you need to do:  1. Call your care team and  request a same-day appointment. If they are not available (weekends or after hours) call your local crisis line, emergency room or 911.      Electronically signed by: Telma Cannon, February 22, 2018    Depression Self Care Plan / Survival Kit    Self-Care for Depression  Here s the deal. Your body and mind are really not as separate as most people think.  What you do and think affects how you feel and how you feel influences what you do and think. This means if you do things that people who feel good do, it will help you feel better.  Sometimes this is all it takes.  There is also a place for medication and therapy depending on how severe your depression is, so be sure to consult with your medical provider and/ or Behavioral Health Consultant if your symptoms are worsening or not improving.     In order to better manage my stress, I will:    Exercise  Get some form of exercise, every day. This will help reduce pain and release endorphins, the  feel good  chemicals in your brain. This is almost as good as taking antidepressants!  This is not the same as joining a gym and then never going! (they count on that by the way ) It can be as simple as just going for a walk or doing some gardening, anything that will get you moving.      Hygiene   Maintain good hygiene (Get out of bed in the morning, Make your bed, Brush your teeth, Take a shower, and Get dressed like you were going to work, even if you are unemployed).  If your clothes don't fit try to get ones that do.    Diet  I will strive to eat foods that are good for me, drink plenty of water, and avoid excessive sugar, caffeine, alcohol, and other mood-altering substances.  Some foods that are helpful in depression are: complex carbohydrates, B vitamins, flaxseed, fish or fish oil, fresh fruits and vegetables.    Psychotherapy  I agree to participate in Individual Therapy (if recommended).    Medication  If prescribed medications, I agree to take them.  Missing  doses can result in serious side effects.  I understand that drinking alcohol, or other illicit drug use, may cause potential side effects.  I will not stop my medication abruptly without first discussing it with my provider.    Staying Connected With Others  I will stay in touch with my friends, family members, and my primary care provider/team.    Use your imagination  Be creative.  We all have a creative side; it doesn t matter if it s oil painting, sand castles, or mud pies! This will also kick up the endorphins.    Witness Beauty  (AKA stop and smell the roses) Take a look outside, even in mid-winter. Notice colors, textures. Watch the squirrels and birds.     Service to others  Be of service to others.  There is always someone else in need.  By helping others we can  get out of ourselves  and remember the really important things.  This also provides opportunities for practicing all the other parts of the program.    Humor  Laugh and be silly!  Adjust your TV habits for less news and crime-drama and more comedy.    Control your stress  Try breathing deep, massage therapy, biofeedback, and meditation. Find time to relax each day.     My support system    Clinic Contact:  Phone number:    Contact 1:  Phone number:    Contact 2:  Phone number:    [employer]/:  Phone number:    Therapist:  Phone number:    Local crisis center:    Phone number:    Other community support:  Phone number:

## 2018-02-22 NOTE — MR AVS SNAPSHOT
After Visit Summary   2/22/2018    Adolfo Rendon    MRN: [id number]           Patient Information     Date Of Birth          1945        Visit Information        Provider Department      2/22/2018 9:20 AM Danielle Mercado APRN [facility]        Today's Diagnoses     Need for prophylactic vaccination and inoculation against influenza    -  1    Hyperlipidemia LDL goal <100        Severe episode of recurrent major depressive disorder, without psychotic features (H)        Essential hypertension with goal blood pressure less than 140/90        Coronary artery disease involving native coronary artery of native heart without angina pectoris          Care Instructions    Continue lisinopril 40 mg daily  Start hydrochlorithiazide daily  Repeat lab non fasting in 1 month after starting new blood pressure medication.   Fasting labs done today  Call central scheduling to schedule the CT Angiogram      Uncontrolled High Blood Pressure (Established)    Your blood pressure was unusually high today. This can occur if you ve missed doses of your blood pressure medicine. Or it can happen if you are taking other medicines. These include some asthma inhalers, decongestants, diet pills, and street drugs like cocaine and amphetamine.  Other causes include:    Weight gain    More salt in your diet    Smoking    Caffeine  Your blood pressure can also rise if you are emotionally upset or in intense pain. It may go back to normal after a period of rest.  A blood pressure reading is made up of 2 numbers. There is a top number over a bottom number. The top number is the systolic pressure. The bottom number is the diastolic pressure. A normal blood pressure is a systolic pressure of less than 120 over a diastolic pressure of less than 80. High blood pressure (hypertension) is when the top number is 140 or higher. Or it is when the bottom number is 90 or higher. You will see your blood pressure  readings written together. For example, a person with a systolic pressure of 118 and a diastolic pressure of 78 will have 118/78 written in the medical record. To be high blood pressure, the numbers must be higher when tested over a period of time. The blood pressures between normal and hypertension are called prehypertension. Prehypertension is a warning sign. The information gives you a chance to make lifestyle changes (weight loss, more exercise) that can keep your blood pressure from going higher.  Home care  It s important to take steps to lower your blood pressure. If you are taking blood pressure medicine, the guidelines below may help you need less or no medicines in the future.    Begin a weight-loss program if you are overweight.    Cut back on the amount of salt in your diet:    Avoid high-salt foods like olives, pickles, smoked meats, and salted potato chips.    Don t add salt to your food at the table.    Use only small amounts of salt when cooking.    Begin an exercise program. Talk with your health care provider about what exercise program is best for you. It doesn t have to be difficult. Even brisk walking for 20 minutes 3 times a week is a good form of exercise.    Avoid medicines that stimulates the heart. This includes many over-the-counter cold and sinus decongestant pills and sprays, as well as diet pills. Check the warnings about hypertension on the label. Before purchasing any over-the-counter medicines or supplements, always ask the pharmacist about the product's potential interaction with your high blood pressure and your medicines.    Stimulants such as amphetamine or cocaine could be lethal for someone with hypertension. Never take these.    Limit how much caffeine you drink. Or switch to noncaffeinated beverages.    Stop smoking. If you are a long-time smoker, this can be hard. Enroll in a stop-smoking program to make it more likely that you will succeed. Talk with your provider about  ways to quit.    Learn how to handle stress better. This is an important part of any program to lower blood pressure. Learn ways to relax. These include meditation, yoga, and biofeedback.    If medicines were prescribed, take them exactly as directed. Missing doses may cause your blood pressure to get out of control.    If you miss a dose or doses of your medicines, check with your healthcare provider or pharmacist about what to do.    Consider buying an automatic blood pressure machine. Your provider may recommend a certain type. You can get one of these at most pharmacies. Measure your blood pressure twice a day, in the morning, and in the late afternoon. Keep a written record of your home blood pressure readings and take the record to your medical appointments.  Here are some additional guidelines on home blood pressure monitoring from the American Heart Association.    Don't smoke or drink coffee for 30 minutes    Go to the bathroom before the test.    Relax for 5 minutes before taking the measurement.    Sit correctly. Be sure your back is supported. Don't sit on a couch or soft chair. Uncross your feet and place them flat on the floor. Place your arm on a solid, flat surface like a table with the upper arm at heart level. Make certain the middle of the cuff is directly above the eye of the elbow. Check the monitor's instruction manual for an illustration.    Take multiple readings. When you measure, take 2 or 3 readings one minute apart and record all of the results.    Take your blood pressure at the same time every day, or as your healthcare provider recommends.    Record the date, time, and blood pressure reading.    Take the record with you to your next appointment. If your blood pressure monitor has a built-in memory, simply take the monitor with you to your next appointment.    Call your provider if you have several high readings. Don't be frightened by a single high reading, but if you get several high  readings, check in with your healthcare provider.    Note: When blood pressure reaches a systolic (top number) of 180 or higher or a diastolic (bottom number) of 110 or higher, emergency medical treatment is required. Call your healthcare provider immediately.  Follow-up care  Regular visits to your own healthcare provider for blood pressure and medicine checks are an important part of your care. Make a follow-up appointment as directed. Bring the record of your home blood pressure readings to the appointment.  When to seek medical advice  Call your healthcare provider right away if any of these occur:    Blood pressure reaches a systolic (top number) of 180 or higher or diastolic (bottom number) of 110 or higher, emergency medical treatment is required.    Chest, arm, shoulder, neck, or upper back pain    Shortness of breath    Severe headache    Throbbing or rushing sound in the ears    Nosebleed    Extreme drowsiness, confusion, or fainting    Dizziness or dizziness with spinning sensation (vertigo)    Weakness in an arm or leg or on one side of the face    Trouble speaking or seeing   Date Last Reviewed: 1/1/2017 2000-2017 [employer]. [address] [place] [address]. All rights reserved. This information is not intended as a substitute for professional medical care. Always follow your healthcare professional's instructions.                Follow-ups after your visit        Your next 10 appointments already scheduled     Feb 27, 2018  2:00 PM CST   Return Visit with Sina Cardona MD   [facility] ([facility])    [address] [place] [id number]   [phone]              Future tests that were ordered for you today     Open Future Orders        Priority Expected Expires Ordered    Basic metabolic panel  (Ca, Cl, CO2, Creat, Gluc, K, Na, BUN) Routine  2/22/2019 2/22/2018            Who to contact     If you have questions or  "need follow up information about today's clinic visit or your schedule please contact [facility] directly at [phone].  Normal or non-critical lab and imaging results will be communicated to you by Get10hart, letter or phone within 4 business days after the clinic has received the results. If you do not hear from us within 7 days, please contact the clinic through Get10hart or phone. If you have a critical or abnormal lab result, we will notify you by phone as soon as possible.  Submit refill requests through iTherX or call your pharmacy and they will forward the refill request to us. Please allow 3 business days for your refill to be completed.          Additional Information About Your Visit        Get10harBuildFax Information     iTherX lets you send messages to your doctor, view your test results, renew your prescriptions, schedule appointments and more. To sign up, go to [email or web address] . Click on \"Log in\" on the left side of the screen, which will take you to the Welcome page. Then click on \"Sign up Now\" on the right side of the page.     You will be asked to enter the access code listed below, as well as some personal information. Please follow the directions to create your username and password.     Your access code is: [email or web address]  Expires: 2018 10:14 AM     Your access code will  in 90 days. If you need help or a new code, please call your [place] clinic or [phone].        Care EveryWhere ID     This is your Care EveryWhere ID. This could be used by other organizations to access your [place] medical records  [id number]        Your Vitals Were     Pulse Temperature Respirations Height Pulse Oximetry BMI (Body Mass Index)    85 98.3  F (36.8  C) (Tympanic) 14 5' 8\" (1.727 m) 99% 23.57 kg/m2       Blood Pressure from Last 3 Encounters:   18 135/89   17 (!) 144/97   17 (!) 151/97    Weight from Last 3 Encounters:   18 155 lb (70.3 kg)   17 157 " lb 1.6 oz (71.3 kg)   03/13/17 159 lb 6.4 oz (72.3 kg)              We Performed the Following     ADMIN INFLUENZA (For MEDICARE Patients ONLY) []     ALT     Basic metabolic panel     DEPRESSION ACTION PLAN (DAP)     FLU VACCINE, INCREASED ANTIGEN, PRESV FREE, AGE 65+ [61038]     Lipid Profile          Today's Medication Changes          These changes are accurate as of 2/22/18 10:14 AM.  If you have any questions, ask your nurse or doctor.               Start taking these medicines.        Dose/Directions    hydrochlorothiazide 25 MG tablet   Commonly known as:  HYDRODIURIL   Used for:  Essential hypertension with goal blood pressure less than 140/90   Started by:  Danielle Mercado APRN CNP        Dose:  25 mg   Take 1 tablet (25 mg) by mouth daily   Quantity:  90 tablet   Refills:  3         These medicines have changed or have updated prescriptions.        Dose/Directions    lisinopril 40 MG tablet   Commonly known as:  PRINIVIL/ZESTRIL   This may have changed:  how much to take   Used for:  Essential hypertension with goal blood pressure less than 140/90   Changed by:  Danielle Mercado APRN CNP        Dose:  40 mg   Take 1 tablet (40 mg) by mouth daily   Quantity:  90 tablet   Refills:  3       PARoxetine 40 MG tablet   Commonly known as:  PAXIL   This may have changed:  See the new instructions.   Used for:  Severe episode of recurrent major depressive disorder, without psychotic features (H)   Changed by:  Danielle Mercado APRN CNP        Dose:  40 mg   Take 1 tablet (40 mg) by mouth At Bedtime   Quantity:  90 tablet   Refills:  3       pravastatin 20 MG tablet   Commonly known as:  PRAVACHOL   This may have changed:  See the new instructions.   Used for:  Hyperlipidemia LDL goal <100   Changed by:  Danielle Mercado APRN CNP        Dose:  20 mg   Take 1 tablet (20 mg) by mouth daily   Quantity:  90 tablet   Refills:  3            Where to get your medicines      These  medications were sent to [facility] - [place], [place] - [address]  [address] [address]     Phone:  [phone]     hydrochlorothiazide 25 MG tablet    lisinopril 40 MG tablet    PARoxetine 40 MG tablet    pravastatin 20 MG tablet                Primary Care Provider Office Phone # Fax #    Danielle Mercado, [facility] [phone]       [address] [facility] [address]        Equal Access to Services     ROBERT NEGRO : Hadii aad ku hadasho Soomaali, waaxda luqadaha, qaybta kaalmada adeegyada, waxay idiin hayaan adeeg kharash la'neena . So [facility] [phone].    ATENCIÓN: Si habla español, tiene a roman disposición servicios gratuitos de asistencia lingüística. [facility] [phone].    We comply with applicable federal civil rights laws and [place] laws. We do not discriminate on the basis of race, color, national origin, age, disability, sex, sexual orientation, or gender identity.            Thank you!     Thank you for choosing [facility]  for your care. Our goal is always to provide you with excellent care. Hearing back from our patients is one way we can continue to improve our services. Please take a few minutes to complete the written survey that you may receive in the mail after your visit with us. Thank you!             Your Updated Medication List - Protect others around you: Learn how to safely use, store and throw away your medicines at www.disposemymeds.org.          This list is accurate as of 2/22/18 10:14 AM.  Always use your most recent med list.                   Brand Name Dispense Instructions for use Diagnosis    albuterol 108 (90 BASE) MCG/ACT Inhaler    PROAIR HFA/PROVENTIL HFA/VENTOLIN HFA    1 Inhaler    Inhale 2 puffs into the lungs every 6 hours as needed for shortness of breath / dyspnea or wheezing    Chronic obstructive pulmonary disease, unspecified COPD type (H)       aspirin 81 MG tablet     100 tablet     ONE DAILY        hydrochlorothiazide 25 MG tablet    HYDRODIURIL    90 tablet    Take 1 tablet (25 mg) by mouth daily    Essential hypertension with goal blood pressure less than 140/90       lisinopril 40 MG tablet    PRINIVIL/ZESTRIL    90 tablet    Take 1 tablet (40 mg) by mouth daily    Essential hypertension with goal blood pressure less than 140/90       PARoxetine 40 MG tablet    PAXIL    90 tablet    Take 1 tablet (40 mg) by mouth At Bedtime    Severe episode of recurrent major depressive disorder, without psychotic features (H)       pravastatin 20 MG tablet    PRAVACHOL    90 tablet    Take 1 tablet (20 mg) by mouth daily    Hyperlipidemia LDL goal <100

## 2018-02-23 ASSESSMENT — ANXIETY QUESTIONNAIRES: GAD7 TOTAL SCORE: 6

## 2018-02-23 ASSESSMENT — PATIENT HEALTH QUESTIONNAIRE - PHQ9: SUM OF ALL RESPONSES TO PHQ QUESTIONS 1-9: 4

## 2018-02-27 ENCOUNTER — OFFICE VISIT (OUTPATIENT)
Dept: CARDIOLOGY | Facility: CLINIC | Age: 73
End: 2018-02-27
Payer: COMMERCIAL

## 2018-02-27 VITALS
HEART RATE: 74 BPM | BODY MASS INDEX: 23.87 KG/M2 | DIASTOLIC BLOOD PRESSURE: 89 MMHG | WEIGHT: 157 LBS | OXYGEN SATURATION: 97 % | SYSTOLIC BLOOD PRESSURE: 127 MMHG

## 2018-02-27 DIAGNOSIS — Z72.0 TOBACCO ABUSE: ICD-10-CM

## 2018-02-27 DIAGNOSIS — R06.09 DOE (DYSPNEA ON EXERTION): ICD-10-CM

## 2018-02-27 DIAGNOSIS — I25.10 CORONARY ARTERY DISEASE INVOLVING NATIVE CORONARY ARTERY OF NATIVE HEART WITHOUT ANGINA PECTORIS: Primary | ICD-10-CM

## 2018-02-27 DIAGNOSIS — E78.5 HYPERLIPIDEMIA LDL GOAL <130: ICD-10-CM

## 2018-02-27 PROCEDURE — 99214 OFFICE O/P EST MOD 30 MIN: CPT | Performed by: INTERNAL MEDICINE

## 2018-02-27 NOTE — PROGRESS NOTES
Service Date: 02/27/2018      HISTORY OF PRESENT ILLNESS:  Mr. Rendon is a pleasant 72-year-old gentleman with a history of coronary artery disease with a cardiac catheterization in 06/2015 showing a chronically occluded right coronary artery with left to right collaterals, and a 50% proximal LAD stenosis, tobacco abuse with mild COPD with an FEV1 of 75% of predicted, dyslipidemia with intolerance to more potent statins, and mildly dilated ascending aorta measuring 4.1 cm for which he is seeing Dr. Guzman in Vascular Medicine, who returns in annual followup.      Since I have seen the patient last, he has had progressive fatigue with exertion.  He states that when he exercises vigorously he becomes quite drained.  He states this occurred several months after his cardiac catheterization.  In reviewing his cardiac catheterization workup prior to that, he had a stress test performed in which he could perform 14.8 mets.  He does not feel that he has the same level of exercise ability.  He otherwise has no other cardiovascular complaints other than some mild dyspnea with exertion.  He denies any chest pain, chest heaviness.  He has had no shortness of breath at rest.  He denies increased dyspnea with exertion, has had no orthopnea or paroxysmal nocturnal dyspnea.      Of note, the patient did not tolerate beta blockers in the past which he states worsened his overall sense of fatigue.      Unfortunately, the patient continues to smoke 1 pack per day.  He is quite frustrated by this.  His most recent lipids were performed this month showing a total cholesterol of 204, HDL of 48, LDL of 140, and triglycerides of 82.  The patient states that he has been taking his pravastatin 20 mg daily without fail.      Please see my separate note with his full physical examination.      IMPRESSION AND PLAN:     1.  Overall fatigue with exertion - while this could be an anginal equivalent it is likely noncardiac in origin.  We  discussed a possible cardiac catheterization versus watchful waiting versus a stress test.  At this time, we have elected to proceed with a stress test.  He has a known occluded right coronary artery and if we see inferior ischemia, I would likely recommend medical management; however, I would like to screen for ischemia in the LAD territory.  If there is evidence for ischemia in the LAD territory, I would plan to send the patient for a cardiac catheterization and possible revascularization.  I will ask the patient to follow back with Chang Carvajal after the stress test has been completed.   2.  Coronary artery disease - as above, we are obtaining a stress test for risk stratification.  Unfortunately, he cannot take more potent statins.  I did discuss a plant-based diet to try to lower his LDL. Unfortunately, he also continues to smoke and we discussed tobacco cessation to lessen his future cardiovascular risk.  Finally, his blood pressure is currently well controlled.   3.  Thoracic aortic aneurysm - the patient is following with Dr. Guzman for this and has a CT scheduled, I believe upcoming soon.      The patient will undergo a stress test and follow back with Danielle Carvajal.  If this test is unremarkable, then I would ask Danielle to set up a 1-year followup thereafter.         JT STEWART MD             D: 2018   T: 2018   MT: NIYA      Name:     RACHAEL COVARRUBIAS   MRN:      [id number]        Account:      [id number]   :      1945           Service Date: 2018      Document: [id number]

## 2018-02-27 NOTE — MR AVS SNAPSHOT
After Visit Summary   2/27/2018    Adolfo Rendon    MRN: [id number]           Patient Information     Date Of Birth          1945        Visit Information        Provider Department      2/27/2018 2:00 PM Sina Cardona MD [facility]        Today's Diagnoses     Coronary artery disease involving native coronary artery of native heart without angina pectoris    -  1    SANTIAGO (dyspnea on exertion)        Hyperlipidemia LDL goal <130        Tobacco abuse           Follow-ups after your visit        Additional Services     Follow-Up with Cardiac Advanced Practice Provider                 Your next 10 appointments already scheduled     Feb 27, 2018  2:00 PM CST   Return Visit with Sina Cardona MD   [facility] ([facility])    [address] [address]   [phone]            Mar 06, 2018 10:00 AM CST   (Arrive by 9:45 AM)   CT CHEST ANGIO W/O & W CONTRAST with [facility]CT1   [facility] CT ([facility])    [address] [address]   [phone]           Please bring any scans or X-rays taken at other hospitals, if similar tests were done. Also bring a list of your medicines, including vitamins, minerals and over-the-counter drugs. It is safest to leave personal items at home.  Be sure to tell your doctor:   If you have any allergies.   If there s any chance you are pregnant.   If you are breastfeeding.    If you have diabetes as your medication may need to be adjusted for this exam.  You will have contrast for this exam. To prepare:   Do not eat or drink for 2 hours before your exam. If you need to take medicine, you may take it with small sips of water. (We may ask you to take liquid medicine as well.)   The day before your exam, drink extra fluids at least six 8-ounce glasses (unless your doctor tells you to restrict your fluids).  Patients over 70 or  patients with diabetes or kidney problems:   If you haven t had a blood test (creatinine test) within the last 30 days, the Cardiologist/Radiologist may require you to get this test prior to your exam.  Please wear loose clothing, such as a sweat suit or jogging clothes. Avoid snaps, zippers and other metal. We may ask you to undress and put on a hospital gown.  If you have any questions, please call the Imaging Department where you will have your exam.            Mar 06, 2018 11:30 AM CST   Return Visit with Kameron Guzman MD   [facility]    [address] [address]   [phone]              Future tests that were ordered for you today     Open Future Orders        Priority Expected Expires Ordered    Follow-Up with Cardiac Advanced Practice Provider Routine 3/29/2018 2/27/2019 2/27/2018    NM Exercise stress test (nuc card) Routine 3/6/2018 2/27/2019 2/27/2018            Who to contact     If you have questions or need follow up information about today's clinic visit or your schedule please contact [facility] directly at [phone].  Normal or non-critical lab and imaging results will be communicated to you by MyChart, letter or phone within 4 business days after the clinic has received the results. If you do not hear from us within 7 days, please contact the clinic through [facility] Ling[facility] or phone. If you have a critical or abnormal lab result, we will notify you by phone as soon as possible.  Submit refill requests through OrderBorder or call your pharmacy and they will forward the refill request to us. Please allow 3 business days for your refill to be completed.          Additional Information About Your Visit        MD LingoharCTS Media Information     OrderBorder lets you send messages to your doctor, view your test results, renew your prescriptions, schedule appointments and more. To sign up, go to [email or web address]  [email or web address] Click on \"Log in\" on the left side of the screen, which will take you to the Welcome page. Then click on \"Sign up Now\" on the right side of the page.     You will be asked to enter the access code listed below, as well as some personal information. Please follow the directions to create your username and password.     Your access code is: [email or web address]  Expires: 2018 10:14 AM     Your access code will  in 90 days. If you need help or a new code, please call your [facility] clinic or [phone].        Care EveryWhere ID     This is your Care EveryWhere ID. This could be used by other organizations to access your [facility] medical records  [id number]        Your Vitals Were     Pulse Pulse Oximetry BMI (Body Mass Index)             74 97% 23.87 kg/m2          Blood Pressure from Last 3 Encounters:   18 127/89   18 135/89   17 (!) 144/97    Weight from Last 3 Encounters:   18 71.2 kg (157 lb)   18 70.3 kg (155 lb)   17 71.3 kg (157 lb 1.6 oz)               Primary Care Provider Office Phone # Fax #    Danielle MckayANTONIO [facility] [phone]       [address] [place] [id number]        Equal Access to Services     ROBERT NEGRO : Hadii radha brown hadasho Soomaali, waaxda luqadaha, qaybta kaalmada yamilet, irvin almazan . So [facility] [phone].    ATENCIÓN: Si habla español, tiene a roman disposición servicios gratuitos de asistencia lingüística. Leonard al [phone].    We comply with applicable federal civil rights laws and [place] laws. We do not discriminate on the basis of race, color, national origin, age, disability, sex, sexual orientation, or gender identity.            Thank you!     Thank you for choosing [facility]  for your care. Our goal is always to provide you with excellent care. Hearing back from our patients is one way we can continue to improve our services. Please take a " few minutes to complete the written survey that you may receive in the mail after your visit with us. Thank you!             Your Updated Medication List - Protect others around you: Learn how to safely use, store and throw away your medicines at www.disposemymeds.org.          This list is accurate as of 2/27/18  1:57 PM.  Always use your most recent med list.                   Brand Name Dispense Instructions for use Diagnosis    albuterol 108 (90 BASE) MCG/ACT Inhaler    PROAIR HFA/PROVENTIL HFA/VENTOLIN HFA    1 Inhaler    Inhale 2 puffs into the lungs every 6 hours as needed for shortness of breath / dyspnea or wheezing    Chronic obstructive pulmonary disease, unspecified COPD type (H)       aspirin 81 MG tablet     100 tablet    ONE DAILY        CO Q 10 PO           hydrochlorothiazide 25 MG tablet    HYDRODIURIL    90 tablet    Take 1 tablet (25 mg) by mouth daily    Essential hypertension with goal blood pressure less than 140/90       lisinopril 40 MG tablet    PRINIVIL/ZESTRIL    90 tablet    Take 1 tablet (40 mg) by mouth daily    Essential hypertension with goal blood pressure less than 140/90       PARoxetine 40 MG tablet    PAXIL    90 tablet    Take 1 tablet (40 mg) by mouth At Bedtime    Severe episode of recurrent major depressive disorder, without psychotic features (H)       pravastatin 20 MG tablet    PRAVACHOL    90 tablet    Take 1 tablet (20 mg) by mouth daily    Hyperlipidemia LDL goal <100       VITAMIN D (CHOLECALCIFEROL) PO      Take by mouth daily

## 2018-02-27 NOTE — LETTER
2/27/2018    Danielle Mercado, ANTONIO CNP  [address] [facility] [address]    RE: Adolfo Rendon       Dear Colleague,    I had the pleasure of seeing Adolfo Rendon in the [facility].    HPI and Plan:   See dictation    Orders Placed This Encounter   Procedures     NM Exercise stress test (nuc card)     Follow-Up with Cardiac Advanced Practice Provider       Orders Placed This Encounter   Medications     Coenzyme Q10 (CO Q 10 PO)     VITAMIN D, CHOLECALCIFEROL, PO     Sig: Take by mouth daily       There are no discontinued medications.      Encounter Diagnoses   Name Primary?     Coronary artery disease involving native coronary artery of native heart without angina pectoris Yes     SANTIAGO (dyspnea on exertion)      Hyperlipidemia LDL goal <130      Tobacco abuse        CURRENT MEDICATIONS:  Current Outpatient Prescriptions   Medication Sig Dispense Refill     Coenzyme Q10 (CO Q 10 PO)        VITAMIN D, CHOLECALCIFEROL, PO Take by mouth daily       pravastatin (PRAVACHOL) 20 MG tablet Take 1 tablet (20 mg) by mouth daily 90 tablet 3     PARoxetine (PAXIL) 40 MG tablet Take 1 tablet (40 mg) by mouth At Bedtime 90 tablet 3     lisinopril (PRINIVIL/ZESTRIL) 40 MG tablet Take 1 tablet (40 mg) by mouth daily 90 tablet 3     hydrochlorothiazide (HYDRODIURIL) 25 MG tablet Take 1 tablet (25 mg) by mouth daily 90 tablet 3     aspirin 81 MG tablet ONE DAILY 100 tablet 3     albuterol (PROAIR HFA/PROVENTIL HFA/VENTOLIN HFA) 108 (90 BASE) MCG/ACT Inhaler Inhale 2 puffs into the lungs every 6 hours as needed for shortness of breath / dyspnea or wheezing (Patient not taking: Reported on 2/27/2018) 1 Inhaler 4       ALLERGIES     Allergies   Allergen Reactions     Claritin      Mood , irritablity      Remeron [Mirtazapine]      Agitation        Wellbutrin [Bupropion Hcl]      Sig mood issues          PAST MEDICAL HISTORY:  Past Medical History:   Diagnosis Date     Depressive disorder, not  elsewhere classified      Hypertrophy (benign) of prostate      Impotence of organic origin      Other and unspecified hyperlipidemia      Other anxiety states      Tobacco use disorder        PAST SURGICAL HISTORY:  Past Surgical History:   Procedure Laterality Date     APPENDECTOMY  1966     COLONOSCOPY  3/1/2005     HERNIORRHAPHY INGUINAL  7/14/2011    Procedure:HERNIORRHAPHY INGUINAL; Open Repair Right Inguinal Hernia Repair        HYDROCELECTOMY SCROTAL  01/2004    left hydrocele repair     SUPRAPUBIC PROSTATECTOMY         FAMILY HISTORY:  Family History   Problem Relation Age of Onset     CEREBROVASCULAR DISEASE Mother      Hypertension Mother      Prostate Cancer Brother      Dementia Brother      Arthritis Sister      rhematoid     CANCER Brother      Lung cancer, lymphoma     Other - See Comments Brother      HIV     CANCER Sister      skin cancer on nose     Pacemaker Sister      Aneurysm Sister      heart       SOCIAL HISTORY:  Social History     Social History     Marital status:      Spouse name: N/A     Number of children: N/A     Years of education: N/A     Social History Main Topics     Smoking status: Former Smoker     Packs/day: 1.00     Types: Cigarettes     Quit date: 1/14/2017     Smokeless tobacco: Former User     Quit date: 9/1/2017     Alcohol use No      Comment: quit 1989     Drug use: No     Sexual activity: Not Currently     Other Topics Concern     Caffeine Concern No     2-3 cups of coffee in the morning     Sleep Concern No     falls asleep easy but doesn't sleep long     Stress Concern No     Weight Concern No     Special Diet No     Exercise No     work: hard labor     Seat Belt Yes     Social History Narrative       Review of Systems:  Skin:  Negative       Eyes:  Positive for glasses reading  ENT:  Positive for hearing loss    Respiratory:  Positive for dyspnea on exertion;cough;shortness of breath COPD   Cardiovascular:    Positive for;dizziness;lightheadedness;fatigue weak  spells  Gastroenterology: Negative      Genitourinary:  Negative      Musculoskeletal:  Negative      Neurologic:  Positive for seizures    Psychiatric:  Positive for sleep disturbances    Heme/Lymph/Imm:  Negative      Endocrine:  Negative        Physical Exam:  Vitals: /89  Pulse 74  Wt 71.2 kg (157 lb)  SpO2 97%  BMI 23.87 kg/m2    Constitutional:  cooperative;in no acute distress        Skin:  warm and dry to the touch          Head:  normocephalic        Eyes:  pupils equal and round        Lymph:No Cervical lymphadenopathy present     ENT:  no pallor or cyanosis        Neck:  JVP normal        Respiratory:  clear to auscultation         Cardiac: regular rhythm;normal S1 and S2                pulses full and equal                                        GI:  abdomen soft        Extremities and Muscular Skeletal:  no edema              Neurological:  no gross motor deficits;affect appropriate        Psych:  affect appropriate, oriented to time, person and place        CC  No referring provider defined for this encounter.                Thank you for allowing me to participate in the care of your patient.      Sincerely,     Sina Cardona MD     [facility]    cc:   No referring provider defined for this encounter.

## 2018-02-27 NOTE — LETTER
2/27/2018      Danielle Mercado, ANTONIO CNP  [address] [facility] [address]      RE: Adolfo Rendon       Dear Colleague,    I had the pleasure of seeing Adolfo Rendon in the [facility].    Service Date: 02/27/2018      HISTORY OF PRESENT ILLNESS:  Mr. Rendon is a pleasant 72-year-old gentleman with a history of coronary artery disease with a cardiac catheterization in 06/2015 showing a chronically occluded right coronary artery with left to right collaterals, and a 50% proximal LAD stenosis, tobacco abuse with mild COPD with an FEV1 of 75% of predicted, dyslipidemia with intolerance to more potent statins, and mildly dilated ascending aorta measuring 4.1 cm for which he is seeing Dr. Guzman in Vascular Medicine, who returns in annual followup.      Since I have seen the patient last, he has had progressive fatigue with exertion.  He states that when he exercises vigorously he becomes quite drained.  He states this occurred several months after his cardiac catheterization.  In reviewing his cardiac catheterization workup prior to that, he had a stress test performed in which he could perform 14.8 mets.  He does not feel that he has the same level of exercise ability.  He otherwise has no other cardiovascular complaints other than some mild dyspnea with exertion.  He denies any chest pain, chest heaviness.  He has had no shortness of breath at rest.  He denies increased dyspnea with exertion, has had no orthopnea or paroxysmal nocturnal dyspnea.      Of note, the patient did not tolerate beta blockers in the past which he states worsened his overall sense of fatigue.      Unfortunately, the patient continues to smoke 1 pack per day.  He is quite frustrated by this.  His most recent lipids were performed this month showing a total cholesterol of 204, HDL of 48, LDL of 140, and triglycerides of 82.  The patient states that he has been taking his pravastatin 20 mg daily without  fail.      Please see my separate note with his full physical examination.      IMPRESSION AND PLAN:     1.  Overall fatigue with exertion - while this could be an anginal equivalent it is likely noncardiac in origin.  We discussed a possible cardiac catheterization versus watchful waiting versus a stress test.  At this time, we have elected to proceed with a stress test.  He has a known occluded right coronary artery and if we see inferior ischemia, I would likely recommend medical management; however, I would like to screen for ischemia in the LAD territory.  If there is evidence for ischemia in the LAD territory, I would plan to send the patient for a cardiac catheterization and possible revascularization.  I will ask the patient to follow back with Chang Carvajal after the stress test has been completed.   2.  Coronary artery disease - as above, we are obtaining a stress test for risk stratification.  Unfortunately, he cannot take more potent statins.  I did discuss a plant-based diet to try to lower his LDL. Unfortunately, he also continues to smoke and we discussed tobacco cessation to lessen his future cardiovascular risk.  Finally, his blood pressure is currently well controlled.   3.  Thoracic aortic aneurysm - the patient is following with Dr. Guzman for this and has a CT scheduled, I believe upcoming soon.      The patient will undergo a stress test and follow back with Danielle Carvajal.  If this test is unremarkable, then I would ask Danielle to set up a 1-year followup thereafter.         JT STEWART MD             D: 2018   T: 2018   MT: NIYA      Name:     RACHAEL COVARRUBIAS   MRN:      [id number]        Account:      [id number]   :      1945           Service Date: 2018      Document: [id number]         Outpatient Encounter Prescriptions as of 2018   Medication Sig Dispense Refill     Coenzyme Q10 (CO Q 10 PO)        VITAMIN D, CHOLECALCIFEROL, PO Take by mouth  daily       pravastatin (PRAVACHOL) 20 MG tablet Take 1 tablet (20 mg) by mouth daily 90 tablet 3     PARoxetine (PAXIL) 40 MG tablet Take 1 tablet (40 mg) by mouth At Bedtime 90 tablet 3     lisinopril (PRINIVIL/ZESTRIL) 40 MG tablet Take 1 tablet (40 mg) by mouth daily 90 tablet 3     hydrochlorothiazide (HYDRODIURIL) 25 MG tablet Take 1 tablet (25 mg) by mouth daily 90 tablet 3     aspirin 81 MG tablet ONE DAILY 100 tablet 3     albuterol (PROAIR HFA/PROVENTIL HFA/VENTOLIN HFA) 108 (90 BASE) MCG/ACT Inhaler Inhale 2 puffs into the lungs every 6 hours as needed for shortness of breath / dyspnea or wheezing (Patient not taking: Reported on 2/27/2018) 1 Inhaler 4     No facility-administered encounter medications on file as of 2/27/2018.        Again, thank you for allowing me to participate in the care of your patient.      Sincerely,    Sina Cardona MD     [facility]

## 2018-02-27 NOTE — PROGRESS NOTES
HPI and Plan:   See dictation    Orders Placed This Encounter   Procedures     NM Exercise stress test (nuc card)     Follow-Up with Cardiac Advanced Practice Provider       Orders Placed This Encounter   Medications     Coenzyme Q10 (CO Q 10 PO)     VITAMIN D, CHOLECALCIFEROL, PO     Sig: Take by mouth daily       There are no discontinued medications.      Encounter Diagnoses   Name Primary?     Coronary artery disease involving native coronary artery of native heart without angina pectoris Yes     SANTIAGO (dyspnea on exertion)      Hyperlipidemia LDL goal <130      Tobacco abuse        CURRENT MEDICATIONS:  Current Outpatient Prescriptions   Medication Sig Dispense Refill     Coenzyme Q10 (CO Q 10 PO)        VITAMIN D, CHOLECALCIFEROL, PO Take by mouth daily       pravastatin (PRAVACHOL) 20 MG tablet Take 1 tablet (20 mg) by mouth daily 90 tablet 3     PARoxetine (PAXIL) 40 MG tablet Take 1 tablet (40 mg) by mouth At Bedtime 90 tablet 3     lisinopril (PRINIVIL/ZESTRIL) 40 MG tablet Take 1 tablet (40 mg) by mouth daily 90 tablet 3     hydrochlorothiazide (HYDRODIURIL) 25 MG tablet Take 1 tablet (25 mg) by mouth daily 90 tablet 3     aspirin 81 MG tablet ONE DAILY 100 tablet 3     albuterol (PROAIR HFA/PROVENTIL HFA/VENTOLIN HFA) 108 (90 BASE) MCG/ACT Inhaler Inhale 2 puffs into the lungs every 6 hours as needed for shortness of breath / dyspnea or wheezing (Patient not taking: Reported on 2/27/2018) 1 Inhaler 4       ALLERGIES     Allergies   Allergen Reactions     Claritin      Mood , irritablity      Remeron [Mirtazapine]      Agitation        Wellbutrin [Bupropion Hcl]      Sig mood issues          PAST MEDICAL HISTORY:  Past Medical History:   Diagnosis Date     Depressive disorder, not elsewhere classified      Hypertrophy (benign) of prostate      Impotence of organic origin      Other and unspecified hyperlipidemia      Other anxiety states      Tobacco use disorder        PAST SURGICAL HISTORY:  Past Surgical  History:   Procedure Laterality Date     APPENDECTOMY  1966     COLONOSCOPY  3/1/2005     HERNIORRHAPHY INGUINAL  7/14/2011    Procedure:HERNIORRHAPHY INGUINAL; Open Repair Right Inguinal Hernia Repair        HYDROCELECTOMY SCROTAL  01/2004    left hydrocele repair     SUPRAPUBIC PROSTATECTOMY         FAMILY HISTORY:  Family History   Problem Relation Age of Onset     CEREBROVASCULAR DISEASE Mother      Hypertension Mother      Prostate Cancer Brother      Dementia Brother      Arthritis Sister      rhematoid     CANCER Brother      Lung cancer, lymphoma     Other - See Comments Brother      HIV     CANCER Sister      skin cancer on nose     Pacemaker Sister      Aneurysm Sister      heart       SOCIAL HISTORY:  Social History     Social History     Marital status:      Spouse name: N/A     Number of children: N/A     Years of education: N/A     Social History Main Topics     Smoking status: Former Smoker     Packs/day: 1.00     Types: Cigarettes     Quit date: 1/14/2017     Smokeless tobacco: Former User     Quit date: 9/1/2017     Alcohol use No      Comment: quit 1989     Drug use: No     Sexual activity: Not Currently     Other Topics Concern     Caffeine Concern No     2-3 cups of coffee in the morning     Sleep Concern No     falls asleep easy but doesn't sleep long     Stress Concern No     Weight Concern No     Special Diet No     Exercise No     work: hard labor     Seat Belt Yes     Social History Narrative       Review of Systems:  Skin:  Negative       Eyes:  Positive for glasses reading  ENT:  Positive for hearing loss    Respiratory:  Positive for dyspnea on exertion;cough;shortness of breath COPD   Cardiovascular:    Positive for;dizziness;lightheadedness;fatigue weak spells  Gastroenterology: Negative      Genitourinary:  Negative      Musculoskeletal:  Negative      Neurologic:  Positive for seizures    Psychiatric:  Positive for sleep disturbances    Heme/Lymph/Imm:  Negative      Endocrine:   Negative        Physical Exam:  Vitals: /89  Pulse 74  Wt 71.2 kg (157 lb)  SpO2 97%  BMI 23.87 kg/m2    Constitutional:  cooperative;in no acute distress        Skin:  warm and dry to the touch          Head:  normocephalic        Eyes:  pupils equal and round        Lymph:No Cervical lymphadenopathy present     ENT:  no pallor or cyanosis        Neck:  JVP normal        Respiratory:  clear to auscultation         Cardiac: regular rhythm;normal S1 and S2                pulses full and equal                                        GI:  abdomen soft        Extremities and Muscular Skeletal:  no edema              Neurological:  no gross motor deficits;affect appropriate        Psych:  affect appropriate, oriented to time, person and place        CC  No referring provider defined for this encounter.

## 2018-03-05 RX ORDER — IOPAMIDOL 755 MG/ML
100 INJECTION, SOLUTION INTRAVASCULAR ONCE
Status: COMPLETED | OUTPATIENT
Start: 2018-03-05 | End: 2018-03-06

## 2018-03-06 ENCOUNTER — OFFICE VISIT (OUTPATIENT)
Dept: VASCULAR SURGERY | Facility: CLINIC | Age: 73
End: 2018-03-06
Attending: SURGERY
Payer: COMMERCIAL

## 2018-03-06 ENCOUNTER — HOSPITAL ENCOUNTER (OUTPATIENT)
Dept: CT IMAGING | Facility: CLINIC | Age: 73
Discharge: HOME | End: 2018-03-06
Attending: SURGERY | Admitting: SURGERY
Payer: COMMERCIAL

## 2018-03-06 ENCOUNTER — HOSPITAL ENCOUNTER (OUTPATIENT)
Dept: NUCLEAR MEDICINE | Facility: CLINIC | Age: 73
Setting detail: NUCLEAR MEDICINE
Discharge: HOME | End: 2018-03-06
Attending: INTERNAL MEDICINE | Admitting: INTERNAL MEDICINE
Payer: COMMERCIAL

## 2018-03-06 VITALS — DIASTOLIC BLOOD PRESSURE: 95 MMHG | SYSTOLIC BLOOD PRESSURE: 145 MMHG | RESPIRATION RATE: 16 BRPM | HEART RATE: 71 BPM

## 2018-03-06 DIAGNOSIS — I25.10 CORONARY ARTERY DISEASE INVOLVING NATIVE CORONARY ARTERY OF NATIVE HEART WITHOUT ANGINA PECTORIS: ICD-10-CM

## 2018-03-06 DIAGNOSIS — I71.20 ANEURYSM OF THORACIC AORTA (H): ICD-10-CM

## 2018-03-06 DIAGNOSIS — R06.09 DOE (DYSPNEA ON EXERTION): ICD-10-CM

## 2018-03-06 DIAGNOSIS — I71.22 AORTIC ARCH ANEURYSM (H): Primary | ICD-10-CM

## 2018-03-06 PROCEDURE — 25000125 ZZHC RX 250: Performed by: SURGERY

## 2018-03-06 PROCEDURE — 78452 HT MUSCLE IMAGE SPECT MULT: CPT

## 2018-03-06 PROCEDURE — 34300033 ZZH RX 343: Performed by: INTERNAL MEDICINE

## 2018-03-06 PROCEDURE — 78452 HT MUSCLE IMAGE SPECT MULT: CPT | Mod: 26 | Performed by: INTERNAL MEDICINE

## 2018-03-06 PROCEDURE — 25000128 H RX IP 250 OP 636: Performed by: SURGERY

## 2018-03-06 PROCEDURE — 93016 CV STRESS TEST SUPVJ ONLY: CPT | Performed by: INTERNAL MEDICINE

## 2018-03-06 PROCEDURE — 93018 CV STRESS TEST I&R ONLY: CPT | Performed by: INTERNAL MEDICINE

## 2018-03-06 PROCEDURE — 93017 CV STRESS TEST TRACING ONLY: CPT

## 2018-03-06 PROCEDURE — 71275 CT ANGIOGRAPHY CHEST: CPT

## 2018-03-06 PROCEDURE — A9502 TC99M TETROFOSMIN: HCPCS | Performed by: INTERNAL MEDICINE

## 2018-03-06 PROCEDURE — 99212 OFFICE O/P EST SF 10 MIN: CPT | Performed by: SURGERY

## 2018-03-06 RX ADMIN — SODIUM CHLORIDE 100 ML: 9 INJECTION, SOLUTION INTRAVENOUS at 09:45

## 2018-03-06 RX ADMIN — TETROFOSMIN 10.6 MCI.: 1.38 INJECTION, POWDER, LYOPHILIZED, FOR SOLUTION INTRAVENOUS at 11:40

## 2018-03-06 RX ADMIN — TETROFOSMIN 32.5 MCI.: 1.38 INJECTION, POWDER, LYOPHILIZED, FOR SOLUTION INTRAVENOUS at 13:14

## 2018-03-06 RX ADMIN — IOPAMIDOL 100 ML: 755 INJECTION, SOLUTION INTRAVENOUS at 09:45

## 2018-03-06 NOTE — NURSING NOTE
"Chief Complaint   Patient presents with     RECHECK       Initial BP (!) 145/95 (BP Location: Left arm, Patient Position: Chair, Cuff Size: Adult Regular)  Pulse 71  Resp 16 Estimated body mass index is 23.87 kg/(m^2) as calculated from the following:    Height as of 2/22/18: 1.727 m (5' 8\").    Weight as of 2/27/18: 71.2 kg (157 lb).  Medication Reconciliation: complete.  nilson lópez LPN      "

## 2018-03-06 NOTE — PROGRESS NOTES
I had seen Mr. Rendon last year a little over a year ago for an incidental and asymptomatic arch aneurysm. It measured 4.1 cm. His BP has been about 140/90 mmHg at home he has recently had addition of HCTZ 25 mg daily. There is no change in the size of his arch aneurysm on CTA from today. If BP is not controlled (target 120/80 mmHg for his arch aneurysm) then I would advise addition of metoprolol 25 mg PO daily.   We will see him back in one year with CTA chest.

## 2018-03-06 NOTE — LETTER
[facility] [address]  [place] [address]  Phone: [phone]  Fax: [phone]      2018    Re: Adolfo Rendon - 1945    I had seen Mr. Rendon last year a little over a year ago for an incidental and asymptomatic arch aneurysm. It measured 4.1 cm. His BP has been about 140/90 mmHg at home he has recently had addition of HCTZ 25 mg daily. There is no change in the size of his arch aneurysm on CTA from today. If BP is not controlled (target 120/80 mmHg for his arch aneurysm) then I would advise addition of metoprolol 25 mg PO daily.   We will see him back in one year with CTA chest.    MOUNA MEADOWS MD

## 2018-03-07 DIAGNOSIS — I71.22 AORTIC ARCH ANEURYSM (H): Primary | ICD-10-CM

## 2018-03-13 PROBLEM — I70.1: Status: ACTIVE | Noted: 2018-03-13

## 2018-03-13 PROBLEM — R91.8 PULMONARY NODULES: Status: ACTIVE | Noted: 2018-03-13

## 2018-04-02 ENCOUNTER — ALLIED HEALTH/NURSE VISIT (OUTPATIENT)
Dept: FAMILY MEDICINE | Facility: CLINIC | Age: 73
End: 2018-04-02
Payer: COMMERCIAL

## 2018-04-02 ENCOUNTER — OFFICE VISIT (OUTPATIENT)
Dept: CARDIOLOGY | Facility: CLINIC | Age: 73
End: 2018-04-02
Attending: INTERNAL MEDICINE
Payer: COMMERCIAL

## 2018-04-02 VITALS — HEART RATE: 82 BPM | SYSTOLIC BLOOD PRESSURE: 116 MMHG | DIASTOLIC BLOOD PRESSURE: 86 MMHG

## 2018-04-02 VITALS
OXYGEN SATURATION: 97 % | HEART RATE: 79 BPM | WEIGHT: 155.4 LBS | BODY MASS INDEX: 23.63 KG/M2 | SYSTOLIC BLOOD PRESSURE: 120 MMHG | DIASTOLIC BLOOD PRESSURE: 81 MMHG

## 2018-04-02 VITALS — SYSTOLIC BLOOD PRESSURE: 116 MMHG | HEART RATE: 82 BPM | DIASTOLIC BLOOD PRESSURE: 86 MMHG

## 2018-04-02 DIAGNOSIS — I10 ESSENTIAL HYPERTENSION WITH GOAL BLOOD PRESSURE LESS THAN 140/90: Primary | ICD-10-CM

## 2018-04-02 DIAGNOSIS — I10 ESSENTIAL HYPERTENSION: ICD-10-CM

## 2018-04-02 DIAGNOSIS — Z72.0 TOBACCO ABUSE: ICD-10-CM

## 2018-04-02 DIAGNOSIS — I25.10 CORONARY ARTERY DISEASE INVOLVING NATIVE CORONARY ARTERY OF NATIVE HEART WITHOUT ANGINA PECTORIS: Primary | ICD-10-CM

## 2018-04-02 PROCEDURE — 99207 ZZC NO CHARGE NURSE ONLY: CPT

## 2018-04-02 PROCEDURE — 99214 OFFICE O/P EST MOD 30 MIN: CPT | Performed by: PHYSICIAN ASSISTANT

## 2018-04-02 RX ORDER — AMLODIPINE BESYLATE 2.5 MG/1
2.5 TABLET ORAL DAILY
Qty: 30 TABLET | Refills: 1 | Status: SHIPPED | OUTPATIENT
Start: 2018-04-02 | End: 2018-05-29

## 2018-04-02 NOTE — LETTER
"4/2/2018      Danielle Mercado, ANTONIO CNP  [address] [facility] [address]      RE: Adolfo KIARA Rendon       Dear Colleague,    I had the pleasure of seeing Adolfo CRAIG Odsabrinaholli in the [facility].    Service Date: 04/02/2018      HISTORY OF PRESENT ILLNESS:  Mr. Rendon is a pleasant 72-year-old gentleman who presents to Cardiology office today to follow up after a recent stress test.      His cardiac history includes coronary artery disease with cardiac catheterization in 06/2015 showing a chronically occluded RCA with left-to-right collaterals and a 50% proximal LAD stenosis, dyslipidemia with intolerance to more potent statins, hypertension and a mildly dilated ascending aorta with a maximal diameter of 3.9 cm (he does follow with Vascular Medicine).  He also has ongoing tobacco use and a history of mild COPD with an FEV1 of 75% of the predicted.  He recently returned for routine followup with Dr. Cardona.  At that time he complained of progressive fatigue with exertion.  He had been on beta blockers in the past, but this had worsened his overall sense of fatigue.  When they were discontinued, he did improve somewhat.  Due to his symptomatology, he was sent for a repeat stress test.      At this point, the patient states that he is overall stable.  Again, he also has a \"draining\" sensation with more strenuous fatigue.      CURRENT CARDIAC MEDICATIONS:   1.  Pravastatin 20 mg daily.   2.  Lisinopril 40 mg daily.   3.  Hydrochlorothiazide 25 mg daily.   4.  Aspirin 81 mg daily.      The remainder of his medications, allergies and review of systems were reviewed and as are documented separately.      PHYSICAL EXAMINATION:   GENERAL:  The patient is a 72-year-old gentleman who appears his stated age.  He is in no apparent distress.   VITAL SIGNS:  His blood pressure is 120/81, pulse is 79, weight is 155 pounds which is stable.  Breathing is nonlabored.   LUNGS:  Clear to auscultation, " although he has somewhat prolonged expiratory phase.   CARDIAC:  Regular rate and rhythm, no murmurs appreciated.   NEUROLOGIC:  Alert and oriented.      We reviewed the results of his recent nuclear stress test.  The perfusion images showed a moderate intensity of the basal to mid inferior and inferolateral wall with mild genaro-infarct ischemia.  Gated images showed normal systolic function at rest at 57% and a reduction in the EF to 44% with stress.      ASSESSMENT AND PLAN:  The patient is a 72-year-old gentleman with known coronary artery disease including a  of the RCA as well as a 50% known stenosis in his LAD, based on cardiac catheterization in 2015 along with hypertension, dyslipidemia, ongoing tobacco use, and a mild and stable aortic aneurysm who has ongoing fatigue with exertion.  Overall, his recent stress test is consistent with prior findings on his prior coronary angiogram.  We discussed that I do not see a definitive cardiac reason for his symptomatology and that it is certainly possible that his symptoms are a combination of cardiac and pulmonary etiologies.  I did suggest that we could trial a low dose of amlodipine as an antianginal to see if this helps.  He was interested in this and will start amlodipine 2.5 mg daily.  I suggested he follow up in the Cardiology office in about 6-8 weeks so we can re-evaluate his symptoms.      Thank you for allowing me to participate in the care of this pleasant patient.         PARTH BYNUM PA-C             D: 2018   T: 2018   MT: NIYA      Name:     RACHAEL COVARRUBIAS   MRN:      [id number]        Account:      [id number]   :      1945           Service Date: 2018      Document: [id number]           Outpatient Encounter Prescriptions as of 2018   Medication Sig Dispense Refill     amLODIPine (NORVASC) 2.5 MG tablet Take 1 tablet (2.5 mg) by mouth daily 30 tablet 1     Coenzyme Q10 (CO Q 10 PO)        VITAMIN D,  CHOLECALCIFEROL, PO Take by mouth daily       pravastatin (PRAVACHOL) 20 MG tablet Take 1 tablet (20 mg) by mouth daily 90 tablet 3     PARoxetine (PAXIL) 40 MG tablet Take 1 tablet (40 mg) by mouth At Bedtime 90 tablet 3     lisinopril (PRINIVIL/ZESTRIL) 40 MG tablet Take 1 tablet (40 mg) by mouth daily 90 tablet 3     hydrochlorothiazide (HYDRODIURIL) 25 MG tablet Take 1 tablet (25 mg) by mouth daily 90 tablet 3     albuterol (PROAIR HFA/PROVENTIL HFA/VENTOLIN HFA) 108 (90 BASE) MCG/ACT Inhaler Inhale 2 puffs into the lungs every 6 hours as needed for shortness of breath / dyspnea or wheezing 1 Inhaler 4     aspirin 81 MG tablet ONE DAILY 100 tablet 3     No facility-administered encounter medications on file as of 4/2/2018.        Again, thank you for allowing me to participate in the care of your patient.      Sincerely,    Danielle Carvajal PA-C     [facility]

## 2018-04-02 NOTE — PATIENT INSTRUCTIONS
[facility] HEART CARE  [facility]~[address]~[place], [place]~[address]  Thank you for your [facility] Heart Care visit today. If you have questions regarding your visit, please contact your cardiology RN's, Elizabeth Acharya or Melba Rodriguez, at [phone]. Your provider has recommended the following:  Medication Changes:  Start amlodipine 2.5mg daily.  Recommendations:  Nothing else new at this time  Follow-up:  See Danielle DELATORRE for cardiology follow up in 6-8 weeks  To schedule a future appointment, we kindly ask that you call cardiology scheduling at [phone] three months prior to requested revisit date.               Reminder:  For your safety, we ask that you bring in your current medication(s) or an updated list of your medications with you to EACH office visit. Include the medication name, dose of pill on bottle and how you are taking it. Include over-the-counter medications or supplements. Your provider will review this at each visit and plan your care based on your current information.

## 2018-04-02 NOTE — NURSING NOTE
Left non-detailed message for patient to return a call to the clinic RN hoping to catch him before he left the clinic campus.  I am happy to assist him.    No blood pressure reading collected since pt chose not to wait.       EVERETTE Paniagua RN

## 2018-04-02 NOTE — MR AVS SNAPSHOT
"              After Visit Summary   4/2/2018    Adolfo Rendon    MRN: [id number]           Patient Information     Date Of Birth          1945        Visit Information        Provider Department      4/2/2018 11:30 AM RADHA URIOSTEGUI/KELI KHOURY [facility]        Today's Diagnoses     Essential hypertension with goal blood pressure less than 140/90    -  1       Follow-ups after your visit        Your next 10 appointments already scheduled     May 29, 2018 11:00 AM CDT   Return Visit with Danielle Carvajal PA-C   [facility] ([facility])    [address] [address]   [phone]              Future tests that were ordered for you today     Open Future Orders        Priority Expected Expires Ordered    Follow-Up with Cardiac Advanced Practice Provider Routine 5/14/2018 4/1/2020 4/2/2018            Who to contact     If you have questions or need follow up information about today's clinic visit or your schedule please contact [facility] directly at [phone].  Normal or non-critical lab and imaging results will be communicated to you by MyChart, letter or phone within 4 business days after the clinic has received the results. If you do not hear from us within 7 days, please contact the clinic through Superblyhart or phone. If you have a critical or abnormal lab result, we will notify you by phone as soon as possible.  Submit refill requests through Baitianshi or call your pharmacy and they will forward the refill request to us. Please allow 3 business days for your refill to be completed.          Additional Information About Your Visit        MyChart Information     Baitianshi lets you send messages to your doctor, view your test results, renew your prescriptions, schedule appointments and more. To sign up, go to [email or web address] . Click on \"Log in\" on the left side of the screen, which will take you to the Welcome page. " "Then click on \"Sign up Now\" on the right side of the page.     You will be asked to enter the access code listed below, as well as some personal information. Please follow the directions to create your username and password.     Your access code is: [email or web address]  Expires: 2018 11:14 AM     Your access code will  in 90 days. If you need help or a new code, please call your [facility] clinic or [phone].        Care EveryWhere ID     This is your Care EveryWhere ID. This could be used by other organizations to access your [facility] medical records  [id number]        Your Vitals Were     Pulse                   82            Blood Pressure from Last 3 Encounters:   18 116/86   18 120/81   18 116/86    Weight from Last 3 Encounters:   18 155 lb 6.4 oz (70.5 kg)   18 157 lb (71.2 kg)   18 155 lb (70.3 kg)              Today, you had the following     No orders found for display         Today's Medication Changes          These changes are accurate as of 18 12:47 PM.  If you have any questions, ask your nurse or doctor.               Start taking these medicines.        Dose/Directions    amLODIPine 2.5 MG tablet   Commonly known as:  NORVASC   Used for:  Essential hypertension, Coronary artery disease involving native coronary artery of native heart without angina pectoris   Started by:  Danielle Carvajal PA-C        Dose:  2.5 mg   Take 1 tablet (2.5 mg) by mouth daily   Quantity:  30 tablet   Refills:  1            Where to get your medicines      These medications were sent to [facility] [address] [address]     Phone:  [phone]     amLODIPine 2.5 MG tablet                Primary Care Provider Office Phone # Fax #    ANTONIO Carranza [phone]       [facility] [address] [place] [address]        Equal Access to Services     ROBERT NEGRO AH: Jessi mathias " Sobrooks, wanishada luqadaha, qaybta kaalmada yamilet, irvin kwonhari fallon. So [facility] [phone].    ATENCIÓN: Si michael azar, tiene a roman disposición servicios gratuitos de asistencia lingüística. Leonard al [phone].    We comply with applicable federal civil rights laws and [place] laws. We do not discriminate on the basis of race, color, national origin, age, disability, sex, sexual orientation, or gender identity.            Thank you!     Thank you for choosing [facility]  for your care. Our goal is always to provide you with excellent care. Hearing back from our patients is one way we can continue to improve our services. Please take a few minutes to complete the written survey that you may receive in the mail after your visit with us. Thank you!             Your Updated Medication List - Protect others around you: Learn how to safely use, store and throw away your medicines at www.disposemymeds.org.          This list is accurate as of 4/2/18 12:47 PM.  Always use your most recent med list.                   Brand Name Dispense Instructions for use Diagnosis    albuterol 108 (90 BASE) MCG/ACT Inhaler    PROAIR HFA/PROVENTIL HFA/VENTOLIN HFA    1 Inhaler    Inhale 2 puffs into the lungs every 6 hours as needed for shortness of breath / dyspnea or wheezing    Chronic obstructive pulmonary disease, unspecified COPD type (H)       amLODIPine 2.5 MG tablet    NORVASC    30 tablet    Take 1 tablet (2.5 mg) by mouth daily    Essential hypertension, Coronary artery disease involving native coronary artery of native heart without angina pectoris       aspirin 81 MG tablet     100 tablet    ONE DAILY        CO Q 10 PO           hydrochlorothiazide 25 MG tablet    HYDRODIURIL    90 tablet    Take 1 tablet (25 mg) by mouth daily    Essential hypertension with goal blood pressure less than 140/90       lisinopril 40 MG tablet    PRINIVIL/ZESTRIL    90 tablet    Take 1 tablet (40  mg) by mouth daily    Essential hypertension with goal blood pressure less than 140/90       PARoxetine 40 MG tablet    PAXIL    90 tablet    Take 1 tablet (40 mg) by mouth At Bedtime    Severe episode of recurrent major depressive disorder, without psychotic features (H)       pravastatin 20 MG tablet    PRAVACHOL    90 tablet    Take 1 tablet (20 mg) by mouth daily    Hyperlipidemia LDL goal <100       VITAMIN D (CHOLECALCIFEROL) PO      Take by mouth daily

## 2018-04-02 NOTE — MR AVS SNAPSHOT
After Visit Summary   4/2/2018    Adolfo Rendon    MRN: [id number]           Patient Information     Date Of Birth          1945        Visit Information        Provider Department      4/2/2018 11:00 AM Danielle Carvajal PA-C [facility]        Today's Diagnoses     Coronary artery disease involving native coronary artery of native heart without angina pectoris    -  1    Tobacco abuse        Essential hypertension          Care Instructions    [facility]~[address]~[place], [place]~[address]  Thank you for your  Heart Care visit today. If you have questions regarding your visit, please contact your cardiology RN's, Elizabeth Acharya or Melba Rodriguez, at [phone]. Your provider has recommended the following:  Medication Changes:  Start amlodipine 2.5mg daily.  Recommendations:  Nothing else new at this time  Follow-up:  See Danielle DELATORRE for cardiology follow up in 6-8 weeks  To schedule a future appointment, we kindly ask that you call cardiology scheduling at [phone] three months prior to requested revisit date.               Reminder:  For your safety, we ask that you bring in your current medication(s) or an updated list of your medications with you to EACH office visit. Include the medication name, dose of pill on bottle and how you are taking it. Include over-the-counter medications or supplements. Your provider will review this at each visit and plan your care based on your current information.               Follow-ups after your visit        Additional Services     Follow-Up with Cardiac Advanced Practice Provider                 Future tests that were ordered for you today     Open Future Orders        Priority Expected Expires Ordered    Follow-Up with Cardiac Advanced Practice Provider Routine 5/14/2018 4/1/2020 4/2/2018            Who to contact     If you have questions or  "need follow up information about today's clinic visit or your schedule please contact [facility] directly at [phone].  Normal or non-critical lab and imaging results will be communicated to you by MyChart, letter or phone within 4 business days after the clinic has received the results. If you do not hear from us within 7 days, please contact the clinic through Pocket Change Cardhart or phone. If you have a critical or abnormal lab result, we will notify you by phone as soon as possible.  Submit refill requests through H[employer] Top-Level Domain or call your pharmacy and they will forward the refill request to us. Please allow 3 business days for your refill to be completed.          Additional Information About Your Visit        Pocket Change CardharHunch Information     [employer]Level [employer] lets you send messages to your doctor, view your test results, renew your prescriptions, schedule appointments and more. To sign up, go to [email or web address] . Click on \"Log in\" on the left side of the screen, which will take you to the Welcome page. Then click on \"Sign up Now\" on the right side of the page.     You will be asked to enter the access code listed below, as well as some personal information. Please follow the directions to create your username and password.     Your access code is: [email or web address]  Expires: 2018 11:14 AM     Your access code will  in 90 days. If you need help or a new code, please call your [facility] clinic or [phone].        Care EveryWhere ID     This is your Care EveryWhere ID. This could be used by other organizations to access your [facility] medical records  [id number]        Your Vitals Were     Pulse Pulse Oximetry BMI (Body Mass Index)             79 97% 23.63 kg/m2          Blood Pressure from Last 3 Encounters:   18 120/81   18 (!) 145/95   18 127/89    Weight from Last 3 Encounters:   18 70.5 kg (155 lb 6.4 oz)   18 71.2 kg (157 lb)   18 70.3 kg (155 " lb)              We Performed the Following     Follow-Up with Cardiac Advanced Practice Provider          Today's Medication Changes          These changes are accurate as of 4/2/18 11:22 AM.  If you have any questions, ask your nurse or doctor.               Start taking these medicines.        Dose/Directions    amLODIPine 2.5 MG tablet   Commonly known as:  NORVASC   Used for:  Essential hypertension, Coronary artery disease involving native coronary artery of native heart without angina pectoris   Started by:  Danielle Carvajal PA-C        Dose:  2.5 mg   Take 1 tablet (2.5 mg) by mouth daily   Quantity:  30 tablet   Refills:  1            Where to get your medicines      These medications were sent to [facility] [address] [address]     Phone:  [phone]     amLODIPine 2.5 MG tablet                Primary Care Provider Office Phone # Fax #    Danielle Weller ANTONIO Mercado [facility] [phone]       [address] [place] [address]        Equal Access to Services     ROBERT NEGRO AH: Hadii aad ku hadasho Soomaali, waaxda luqadaha, qaybta kaalmada adeegyada, waxay idiin hayaan tammie zamoraarabharath almazan . So [facility] [phone].    ATENCIÓN: Si habla español, tiene a roman disposición servicios gratuitos de asistencia lingüística. [facility] [phone].    We comply with applicable federal civil rights laws and [place] laws. We do not discriminate on the basis of race, color, national origin, age, disability, sex, sexual orientation, or gender identity.            Thank you!     Thank you for choosing [facility]  for your care. Our goal is always to provide you with excellent care. Hearing back from our patients is one way we can continue to improve our services. Please take a few minutes to complete the written survey that you may receive in the mail after your visit with us. Thank you!              Your Updated Medication List - Protect others around you: Learn how to safely use, store and throw away your medicines at www.disposemymeds.org.          This list is accurate as of 4/2/18 11:22 AM.  Always use your most recent med list.                   Brand Name Dispense Instructions for use Diagnosis    albuterol 108 (90 BASE) MCG/ACT Inhaler    PROAIR HFA/PROVENTIL HFA/VENTOLIN HFA    1 Inhaler    Inhale 2 puffs into the lungs every 6 hours as needed for shortness of breath / dyspnea or wheezing    Chronic obstructive pulmonary disease, unspecified COPD type (H)       amLODIPine 2.5 MG tablet    NORVASC    30 tablet    Take 1 tablet (2.5 mg) by mouth daily    Essential hypertension, Coronary artery disease involving native coronary artery of native heart without angina pectoris       aspirin 81 MG tablet     100 tablet    ONE DAILY        CO Q 10 PO           hydrochlorothiazide 25 MG tablet    HYDRODIURIL    90 tablet    Take 1 tablet (25 mg) by mouth daily    Essential hypertension with goal blood pressure less than 140/90       lisinopril 40 MG tablet    PRINIVIL/ZESTRIL    90 tablet    Take 1 tablet (40 mg) by mouth daily    Essential hypertension with goal blood pressure less than 140/90       PARoxetine 40 MG tablet    PAXIL    90 tablet    Take 1 tablet (40 mg) by mouth At Bedtime    Severe episode of recurrent major depressive disorder, without psychotic features (H)       pravastatin 20 MG tablet    PRAVACHOL    90 tablet    Take 1 tablet (20 mg) by mouth daily    Hyperlipidemia LDL goal <100       VITAMIN D (CHOLECALCIFEROL) PO      Take by mouth daily

## 2018-04-02 NOTE — NURSING NOTE
Nursing Note   Tiff Paniagua RN (Registered Nurse)      Pt returned for RN blood pressure check.    It turns out pt had to leave to attend his cardiology appt.       Even though his blood pressure was very good up in cardiology today, pt wanted a second reading here with the clinic RN today.  He explains that he had been getting erratic and elevated readings at home.  Pt wonders if his home monitor is accurate?     Today's reading is at goal of 116/86, pulse of 82.  This is consistent with his reading from cardiology.     Pt will bring his home BP monitor to his next appt for comparison readings.    Reviewed steps is checking home readings and reminded to relax for 10 min before checking his BP.         BP Readings from Last 6 Encounters:   04/02/18 120/81   04/02/18 116/86   03/06/18 (!) 145/95   02/27/18 127/89   02/22/18 135/89   03/13/17 (!) 144/97            Lab Results   Component Value Date     CR 1.07 02/22/2018            Lab Results   Component Value Date     POTASSIUM 4.2 02/22/2018      Tiff Paniagua RN

## 2018-04-02 NOTE — NURSING NOTE
I called pt back for his 10:30 appt at 10:41, I was finishing up with a complex care pt prior to him, but pt elected not to wait for his blood pressure appt.   Pt was a walk in.  His appt was not pre-scheduled.  Tiff Paniagua RN

## 2018-04-02 NOTE — NURSING NOTE
Pt returned for RN blood pressure check.    It turns out pt had to leave to attend his cardiology appt.      Even though his blood pressure was very good up in cardiology today, pt wanted a second reading here with the clinic RN today.  He explains that he had been getting erratic and elevated readings at home.  Pt wonders if his home monitor is accurate?    Today's reading is at goal of 116/86, pulse of 82.  This is consistent with his reading from cardiology.    Pt will bring his home BP monitor to his next appt for comparison readings.    Reviewed steps is checking home readings and reminded to relax for 10 min before checking his BP.    BP Readings from Last 6 Encounters:   04/02/18 120/81   04/02/18 116/86   03/06/18 (!) 145/95   02/27/18 127/89   02/22/18 135/89   03/13/17 (!) 144/97     Lab Results   Component Value Date    CR 1.07 02/22/2018     Lab Results   Component Value Date    POTASSIUM 4.2 02/22/2018     Tiff Paniagua RN

## 2018-04-02 NOTE — PROGRESS NOTES
"Service Date: 04/02/2018      HISTORY OF PRESENT ILLNESS:  Mr. Rendon is a pleasant 72-year-old gentleman who presents to Cardiology office today to follow up after a recent stress test.      His cardiac history includes coronary artery disease with cardiac catheterization in 06/2015 showing a chronically occluded RCA with left-to-right collaterals and a 50% proximal LAD stenosis, dyslipidemia with intolerance to more potent statins, hypertension and a mildly dilated ascending aorta with a maximal diameter of 3.9 cm (he does follow with Vascular Medicine).  He also has ongoing tobacco use and a history of mild COPD with an FEV1 of 75% of the predicted.  He recently returned for routine followup with Dr. Cardona.  At that time he complained of progressive fatigue with exertion.  He had been on beta blockers in the past, but this had worsened his overall sense of fatigue.  When they were discontinued, he did improve somewhat.  Due to his symptomatology, he was sent for a repeat stress test.      At this point, the patient states that he is overall stable.  Again, he also has a \"draining\" sensation with more strenuous fatigue.      CURRENT CARDIAC MEDICATIONS:   1.  Pravastatin 20 mg daily.   2.  Lisinopril 40 mg daily.   3.  Hydrochlorothiazide 25 mg daily.   4.  Aspirin 81 mg daily.      The remainder of his medications, allergies and review of systems were reviewed and as are documented separately.      PHYSICAL EXAMINATION:   GENERAL:  The patient is a 72-year-old gentleman who appears his stated age.  He is in no apparent distress.   VITAL SIGNS:  His blood pressure is 120/81, pulse is 79, weight is 155 pounds which is stable.  Breathing is nonlabored.   LUNGS:  Clear to auscultation, although he has somewhat prolonged expiratory phase.   CARDIAC:  Regular rate and rhythm, no murmurs appreciated.   NEUROLOGIC:  Alert and oriented.      We reviewed the results of his recent nuclear stress test.  The perfusion " images showed a moderate intensity of the basal to mid inferior and inferolateral wall with mild genaro-infarct ischemia.  Gated images showed normal systolic function at rest at 57% and a reduction in the EF to 44% with stress.      ASSESSMENT AND PLAN:  The patient is a 72-year-old gentleman with known coronary artery disease including a  of the RCA as well as a 50% known stenosis in his LAD, based on cardiac catheterization in  along with hypertension, dyslipidemia, ongoing tobacco use, and a mild and stable aortic aneurysm who has ongoing fatigue with exertion.  Overall, his recent stress test is consistent with prior findings on his prior coronary angiogram.  We discussed that I do not see a definitive cardiac reason for his symptomatology and that it is certainly possible that his symptoms are a combination of cardiac and pulmonary etiologies.  I did suggest that we could trial a low dose of amlodipine as an antianginal to see if this helps.  He was interested in this and will start amlodipine 2.5 mg daily.  I suggested he follow up in the Cardiology office in about 6-8 weeks so we can re-evaluate his symptoms.      Thank you for allowing me to participate in the care of this pleasant patient.         PARTH BYNUM PA-C             D: 2018   T: 2018   MT: NIYA      Name:     RACHAEL COVARRUBIAS   MRN:      [id number]        Account:      [id number]   :      1945           Service Date: 2018      Document: [id number]

## 2018-04-02 NOTE — PROGRESS NOTES
Please see separate dictation for HPI, PHYSICAL EXAM AND IMPRESSION/PLAN.    CURRENT MEDICATIONS:  Current Outpatient Prescriptions   Medication Sig Dispense Refill     Coenzyme Q10 (CO Q 10 PO)        VITAMIN D, CHOLECALCIFEROL, PO Take by mouth daily       pravastatin (PRAVACHOL) 20 MG tablet Take 1 tablet (20 mg) by mouth daily 90 tablet 3     PARoxetine (PAXIL) 40 MG tablet Take 1 tablet (40 mg) by mouth At Bedtime 90 tablet 3     lisinopril (PRINIVIL/ZESTRIL) 40 MG tablet Take 1 tablet (40 mg) by mouth daily 90 tablet 3     hydrochlorothiazide (HYDRODIURIL) 25 MG tablet Take 1 tablet (25 mg) by mouth daily 90 tablet 3     albuterol (PROAIR HFA/PROVENTIL HFA/VENTOLIN HFA) 108 (90 BASE) MCG/ACT Inhaler Inhale 2 puffs into the lungs every 6 hours as needed for shortness of breath / dyspnea or wheezing 1 Inhaler 4     aspirin 81 MG tablet ONE DAILY 100 tablet 3       ALLERGIES:     Allergies   Allergen Reactions     Claritin      Mood , irritablity      Remeron [Mirtazapine]      Agitation        Wellbutrin [Bupropion Hcl]      Sig mood issues          PAST MEDICAL HISTORY:  Past Medical History:   Diagnosis Date     Depressive disorder, not elsewhere classified      Hypertrophy (benign) of prostate      Impotence of organic origin      Other and unspecified hyperlipidemia      Other anxiety states      Tobacco use disorder        PAST SURGICAL HISTORY:  Past Surgical History:   Procedure Laterality Date     APPENDECTOMY  1966     COLONOSCOPY  3/1/2005     HERNIORRHAPHY INGUINAL  7/14/2011    Procedure:HERNIORRHAPHY INGUINAL; Open Repair Right Inguinal Hernia Repair        HYDROCELECTOMY SCROTAL  01/2004    left hydrocele repair     SUPRAPUBIC PROSTATECTOMY         SOCIAL HISTORY:  Social History     Social History     Marital status:      Spouse name: N/A     Number of children: N/A     Years of education: N/A     Social History Main Topics     Smoking status: Former Smoker     Packs/day: 1.00     Types:  Cigarettes     Quit date: 1/14/2017     Smokeless tobacco: Former User     Quit date: 9/1/2017     Alcohol use No      Comment: quit 1989     Drug use: No     Sexual activity: Not Currently     Other Topics Concern     Caffeine Concern No     2-3 cups of coffee in the morning     Sleep Concern No     falls asleep easy but doesn't sleep long     Stress Concern No     Weight Concern No     Special Diet No     Exercise No     work: hard labor     Seat Belt Yes     Social History Narrative       FAMILY HISTORY:  Family History   Problem Relation Age of Onset     CEREBROVASCULAR DISEASE Mother      Hypertension Mother      Prostate Cancer Brother      Dementia Brother      Arthritis Sister      rhematoid     CANCER Brother      Lung cancer, lymphoma     Other - See Comments Brother      HIV     CANCER Sister      skin cancer on nose     Pacemaker Sister      Aneurysm Sister      heart       Review of Systems:  Skin:  Negative       Eyes:  Positive for glasses reading  ENT:  Positive for hearing loss;nasal congestion    Respiratory:  Positive for dyspnea on exertion;cough;wheezing COPD   Cardiovascular:    Positive for;fatigue;lightheadedness    Gastroenterology: Negative      Genitourinary:  Negative      Musculoskeletal:  Negative      Neurologic:  Positive for seizures    Psychiatric:  Positive for sleep disturbances;anxiety;depression    Heme/Lymph/Imm:  Negative      Endocrine:  Negative         Reviewed. Remainder of the note dictated.    Danielle Carvajal PA-C

## 2018-04-02 NOTE — MR AVS SNAPSHOT
"              After Visit Summary   4/2/2018    Adolfo Rendon    MRN: [id number]           Patient Information     Date Of Birth          1945        Visit Information        Provider Department      4/2/2018 10:30 AM RADHA URIOSTEGUI/KELI KHOURY [facility]        Today's Diagnoses     Essential hypertension with goal blood pressure less than 140/90    -  1       Follow-ups after your visit        Your next 10 appointments already scheduled     Apr 02, 2018 11:00 AM CDT   Return Visit with Danielle Carvajal PA-C   [facility] ([facility])    [address] [address] [phone]              Who to contact     If you have questions or need follow up information about today's clinic visit or your schedule please contact [facility] directly at [phone].  Normal or non-critical lab and imaging results will be communicated to you by MyChart, letter or phone within 4 business days after the clinic has received the results. If you do not hear from us within 7 days, please contact the clinic through MyChart or phone. If you have a critical or abnormal lab result, we will notify you by phone as soon as possible.  Submit refill requests through Actions or call your pharmacy and they will forward the refill request to us. Please allow 3 business days for your refill to be completed.          Additional Information About Your Visit        MyChart Information     Actions lets you send messages to your doctor, view your test results, renew your prescriptions, schedule appointments and more. To sign up, go to [email or web address] . Click on \"Log in\" on the left side of the screen, which will take you to the Welcome page. Then click on \"Sign up Now\" on the right side of the page.     You will be asked to enter the access code listed below, as well as some personal information. Please follow the directions to create your username " and password.     Your access code is: [email or web address]  Expires: 2018 11:14 AM     Your access code will  in 90 days. If you need help or a new code, please call your [place] clinic or [phone].        Care EveryWhere ID     This is your Care EveryWhere ID. This could be used by other organizations to access your [place] medical records  [id number]         Blood Pressure from Last 3 Encounters:   18 (!) 145/95   18 127/89   18 135/89    Weight from Last 3 Encounters:   18 157 lb (71.2 kg)   18 155 lb (70.3 kg)   17 157 lb 1.6 oz (71.3 kg)              Today, you had the following     No orders found for display       Primary Care Provider Office Phone # Fax #    Danielle Mckayrai Mercado, APRN [facility] [phone]       [address] [place] [address]        Equal Access to Services     NIGEL NEGRO AH: Hadii aad ku hadasho Soomaali, waaxda luqadaha, qaybta kaalmada adeegyada, waxay idiin hayneena almazan . So [facility] [phone].    ATENCIÓN: Si habla español, tiene a roman disposición servicios gratuitos de asistencia lingüística. Ll[facility] [phone].    We comply with applicable federal civil rights laws and [place] laws. We do not discriminate on the basis of race, color, national origin, age, disability, sex, sexual orientation, or gender identity.            Thank you!     Thank you for choosing [facility]  for your care. Our goal is always to provide you with excellent care. Hearing back from our patients is one way we can continue to improve our services. Please take a few minutes to complete the written survey that you may receive in the mail after your visit with us. Thank you!             Your Updated Medication List - Protect others around you: Learn how to safely use, store and throw away your medicines at www.disposemymeds.org.          This list is accurate as of 18 10:48 AM.  Always use your most recent med list.                    Brand Name Dispense Instructions for use Diagnosis    albuterol 108 (90 BASE) MCG/ACT Inhaler    PROAIR HFA/PROVENTIL HFA/VENTOLIN HFA    1 Inhaler    Inhale 2 puffs into the lungs every 6 hours as needed for shortness of breath / dyspnea or wheezing    Chronic obstructive pulmonary disease, unspecified COPD type (H)       aspirin 81 MG tablet     100 tablet    ONE DAILY        CO Q 10 PO           hydrochlorothiazide 25 MG tablet    HYDRODIURIL    90 tablet    Take 1 tablet (25 mg) by mouth daily    Essential hypertension with goal blood pressure less than 140/90       lisinopril 40 MG tablet    PRINIVIL/ZESTRIL    90 tablet    Take 1 tablet (40 mg) by mouth daily    Essential hypertension with goal blood pressure less than 140/90       PARoxetine 40 MG tablet    PAXIL    90 tablet    Take 1 tablet (40 mg) by mouth At Bedtime    Severe episode of recurrent major depressive disorder, without psychotic features (H)       pravastatin 20 MG tablet    PRAVACHOL    90 tablet    Take 1 tablet (20 mg) by mouth daily    Hyperlipidemia LDL goal <100       VITAMIN D (CHOLECALCIFEROL) PO      Take by mouth daily

## 2018-04-02 NOTE — LETTER
4/2/2018    Danielle Mercado, ANTONIO CNP  [address] [facility] [address]    RE: Adolfo Rendon       Dear Colleague,    I had the pleasure of seeing Adolfo Rendon in the [facility].    Please see separate dictation for HPI, PHYSICAL EXAM AND IMPRESSION/PLAN.    CURRENT MEDICATIONS:  Current Outpatient Prescriptions   Medication Sig Dispense Refill     Coenzyme Q10 (CO Q 10 PO)        VITAMIN D, CHOLECALCIFEROL, PO Take by mouth daily       pravastatin (PRAVACHOL) 20 MG tablet Take 1 tablet (20 mg) by mouth daily 90 tablet 3     PARoxetine (PAXIL) 40 MG tablet Take 1 tablet (40 mg) by mouth At Bedtime 90 tablet 3     lisinopril (PRINIVIL/ZESTRIL) 40 MG tablet Take 1 tablet (40 mg) by mouth daily 90 tablet 3     hydrochlorothiazide (HYDRODIURIL) 25 MG tablet Take 1 tablet (25 mg) by mouth daily 90 tablet 3     albuterol (PROAIR HFA/PROVENTIL HFA/VENTOLIN HFA) 108 (90 BASE) MCG/ACT Inhaler Inhale 2 puffs into the lungs every 6 hours as needed for shortness of breath / dyspnea or wheezing 1 Inhaler 4     aspirin 81 MG tablet ONE DAILY 100 tablet 3       ALLERGIES:     Allergies   Allergen Reactions     Claritin      Mood , irritablity      Remeron [Mirtazapine]      Agitation        Wellbutrin [Bupropion Hcl]      Sig mood issues          PAST MEDICAL HISTORY:  Past Medical History:   Diagnosis Date     Depressive disorder, not elsewhere classified      Hypertrophy (benign) of prostate      Impotence of organic origin      Other and unspecified hyperlipidemia      Other anxiety states      Tobacco use disorder        PAST SURGICAL HISTORY:  Past Surgical History:   Procedure Laterality Date     APPENDECTOMY  1966     COLONOSCOPY  3/1/2005     HERNIORRHAPHY INGUINAL  7/14/2011    Procedure:HERNIORRHAPHY INGUINAL; Open Repair Right Inguinal Hernia Repair        HYDROCELECTOMY SCROTAL  01/2004    left hydrocele repair     SUPRAPUBIC PROSTATECTOMY         SOCIAL HISTORY:  Social  History     Social History     Marital status:      Spouse name: N/A     Number of children: N/A     Years of education: N/A     Social History Main Topics     Smoking status: Former Smoker     Packs/day: 1.00     Types: Cigarettes     Quit date: 1/14/2017     Smokeless tobacco: Former User     Quit date: 9/1/2017     Alcohol use No      Comment: quit 1989     Drug use: No     Sexual activity: Not Currently     Other Topics Concern     Caffeine Concern No     2-3 cups of coffee in the morning     Sleep Concern No     falls asleep easy but doesn't sleep long     Stress Concern No     Weight Concern No     Special Diet No     Exercise No     work: hard labor     Seat Belt Yes     Social History Narrative       FAMILY HISTORY:  Family History   Problem Relation Age of Onset     CEREBROVASCULAR DISEASE Mother      Hypertension Mother      Prostate Cancer Brother      Dementia Brother      Arthritis Sister      rhematoid     CANCER Brother      Lung cancer, lymphoma     Other - See Comments Brother      HIV     CANCER Sister      skin cancer on nose     Pacemaker Sister      Aneurysm Sister      heart       Review of Systems:  Skin:  Negative       Eyes:  Positive for glasses reading  ENT:  Positive for hearing loss;nasal congestion    Respiratory:  Positive for dyspnea on exertion;cough;wheezing COPD   Cardiovascular:    Positive for;fatigue;lightheadedness    Gastroenterology: Negative      Genitourinary:  Negative      Musculoskeletal:  Negative      Neurologic:  Positive for seizures    Psychiatric:  Positive for sleep disturbances;anxiety;depression    Heme/Lymph/Imm:  Negative      Endocrine:  Negative         Reviewed. Remainder of the note dictated.    Danielle Carvajal PA-C        Service Date: 04/02/2018      HISTORY OF PRESENT ILLNESS:  Mr. Rendon is a pleasant 72-year-old gentleman who presents to Cardiology office today to follow up after a recent stress test.      His cardiac history includes  "coronary artery disease with cardiac catheterization in 06/2015 showing a chronically occluded RCA with left-to-right collaterals and a 50% proximal LAD stenosis, dyslipidemia with intolerance to more potent statins, hypertension and a mildly dilated ascending aorta with a maximal diameter of 3.9 cm (he does follow with Vascular Medicine).  He also has ongoing tobacco use and a history of mild COPD with an FEV1 of 75% of the predicted.  He recently returned for routine followup with Dr. Cardona.  At that time he complained of progressive fatigue with exertion.  He had been on beta blockers in the past, but this had worsened his overall sense of fatigue.  When they were discontinued, he did improve somewhat.  Due to his symptomatology, he was sent for a repeat stress test.      At this point, the patient states that he is overall stable.  Again, he also has a \"draining\" sensation with more strenuous fatigue.      CURRENT CARDIAC MEDICATIONS:   1.  Pravastatin 20 mg daily.   2.  Lisinopril 40 mg daily.   3.  Hydrochlorothiazide 25 mg daily.   4.  Aspirin 81 mg daily.      The remainder of his medications, allergies and review of systems were reviewed and as are documented separately.      PHYSICAL EXAMINATION:   GENERAL:  The patient is a 72-year-old gentleman who appears his stated age.  He is in no apparent distress.   VITAL SIGNS:  His blood pressure is 120/81, pulse is 79, weight is 155 pounds which is stable.  Breathing is nonlabored.   LUNGS:  Clear to auscultation, although he has somewhat prolonged expiratory phase.   CARDIAC:  Regular rate and rhythm, no murmurs appreciated.   NEUROLOGIC:  Alert and oriented.      We reviewed the results of his recent nuclear stress test.  The perfusion images showed a moderate intensity of the basal to mid inferior and inferolateral wall with mild genaro-infarct ischemia.  Gated images showed normal systolic function at rest at 57% and a reduction in the EF to 44% with stress. "      ASSESSMENT AND PLAN:  The patient is a 72-year-old gentleman with known coronary artery disease including a  of the RCA as well as a 50% known stenosis in his LAD, based on cardiac catheterization in 2015 along with hypertension, dyslipidemia, ongoing tobacco use, and a mild and stable aortic aneurysm who has ongoing fatigue with exertion.  Overall, his recent stress test is consistent with prior findings on his prior coronary angiogram.  We discussed that I do not see a definitive cardiac reason for his symptomatology and that it is certainly possible that his symptoms are a combination of cardiac and pulmonary etiologies.  I did suggest that we could trial a low dose of amlodipine as an antianginal to see if this helps.  He was interested in this and will start amlodipine 2.5 mg daily.  I suggested he follow up in the Cardiology office in about 6-8 weeks so we can re-evaluate his symptoms.      Thank you for allowing me to participate in the care of this pleasant patient.         DANIELLE BYNUM PA-C             D: 2018   T: 2018   MT: NIYA      Name:     RACHAEL OCVARRUBIAS   MRN:      [id number]        Account:      [id number]   :      1945           Service Date: 2018      Document: [id number]        Thank you for allowing me to participate in the care of your patient.      Sincerely,     Danielle Bynum PA-C     [facility]    cc:   Sina Cardona MD  [address]  [place] [id number]

## 2018-05-29 ENCOUNTER — OFFICE VISIT (OUTPATIENT)
Dept: CARDIOLOGY | Facility: CLINIC | Age: 73
End: 2018-05-29
Attending: PHYSICIAN ASSISTANT
Payer: COMMERCIAL

## 2018-05-29 VITALS
SYSTOLIC BLOOD PRESSURE: 110 MMHG | OXYGEN SATURATION: 100 % | DIASTOLIC BLOOD PRESSURE: 82 MMHG | BODY MASS INDEX: 23.26 KG/M2 | HEART RATE: 83 BPM | WEIGHT: 153 LBS

## 2018-05-29 DIAGNOSIS — I10 ESSENTIAL HYPERTENSION: ICD-10-CM

## 2018-05-29 DIAGNOSIS — I25.10 CORONARY ARTERY DISEASE INVOLVING NATIVE CORONARY ARTERY OF NATIVE HEART WITHOUT ANGINA PECTORIS: Primary | ICD-10-CM

## 2018-05-29 PROCEDURE — 99214 OFFICE O/P EST MOD 30 MIN: CPT | Performed by: PHYSICIAN ASSISTANT

## 2018-05-29 RX ORDER — AMLODIPINE BESYLATE 2.5 MG/1
2.5 TABLET ORAL DAILY
Qty: 90 TABLET | Refills: 3 | Status: SHIPPED | OUTPATIENT
Start: 2018-05-29 | End: 2019-08-01

## 2018-05-29 NOTE — PATIENT INSTRUCTIONS
[facility]  [facility]~[address]~[place], [place]~[address]  Thank you for your [facility] visit today. If you have questions regarding your visit, please contact your cardiology RN's, Elizabeth Acharya or Melba Rodriguez, at [phone]. Your provider has recommended the following:  Medication Changes:  No changes. I [facility] a ref[facility]  Recommendations:  Try to increase activity as much as you can tolerate  Follow-up:  See Dr Cardona or Danielle DELATORRE for cardiology follow up in 1 year  To schedule a future appointment, we kindly ask that you call cardiology scheduling at [phone] three months prior to requested revisit date.               Reminder:  For your safety, we ask that you bring in your current medication(s) or an updated list of your medications with you to EACH office visit. Include the medication name, dose of pill on bottle and how you are taking it. Include over-the-counter medications or supplements. Your provider will review this at each visit and plan your care based on your current information.

## 2018-05-29 NOTE — PROGRESS NOTES
Please see separate dictation for HPI, PHYSICAL EXAM AND IMPRESSION/PLAN.    CURRENT MEDICATIONS:  Current Outpatient Prescriptions   Medication Sig Dispense Refill     amLODIPine (NORVASC) 2.5 MG tablet Take 1 tablet (2.5 mg) by mouth daily 30 tablet 1     aspirin 81 MG tablet ONE DAILY 100 tablet 3     Coenzyme Q10 (CO Q 10 PO)        hydrochlorothiazide (HYDRODIURIL) 25 MG tablet Take 1 tablet (25 mg) by mouth daily 90 tablet 3     lisinopril (PRINIVIL/ZESTRIL) 40 MG tablet Take 1 tablet (40 mg) by mouth daily 90 tablet 3     PARoxetine (PAXIL) 40 MG tablet Take 1 tablet (40 mg) by mouth At Bedtime 90 tablet 3     pravastatin (PRAVACHOL) 20 MG tablet Take 1 tablet (20 mg) by mouth daily 90 tablet 3     VITAMIN D, CHOLECALCIFEROL, PO Take by mouth daily       albuterol (PROAIR HFA/PROVENTIL HFA/VENTOLIN HFA) 108 (90 BASE) MCG/ACT Inhaler Inhale 2 puffs into the lungs every 6 hours as needed for shortness of breath / dyspnea or wheezing (Patient not taking: Reported on 5/29/2018) 1 Inhaler 4       ALLERGIES:     Allergies   Allergen Reactions     Claritin      Mood , irritablity      Remeron [Mirtazapine]      Agitation        Wellbutrin [Bupropion Hcl]      Sig mood issues          PAST MEDICAL HISTORY:  Past Medical History:   Diagnosis Date     Depressive disorder, not elsewhere classified      Hypertrophy (benign) of prostate      Impotence of organic origin      Other and unspecified hyperlipidemia      Other anxiety states      Tobacco use disorder        PAST SURGICAL HISTORY:  Past Surgical History:   Procedure Laterality Date     APPENDECTOMY  1966     COLONOSCOPY  3/1/2005     HERNIORRHAPHY INGUINAL  7/14/2011    Procedure:HERNIORRHAPHY INGUINAL; Open Repair Right Inguinal Hernia Repair        HYDROCELECTOMY SCROTAL  01/2004    left hydrocele repair     SUPRAPUBIC PROSTATECTOMY         SOCIAL HISTORY:  Social History     Social History     Marital status:      Spouse name: N/A     Number of  children: N/A     Years of education: N/A     Social History Main Topics     Smoking status: Former Smoker     Packs/day: 1.00     Types: Cigarettes     Quit date: 1/14/2017     Smokeless tobacco: Former User     Quit date: 9/1/2017     Alcohol use No      Comment: quit 1989     Drug use: No     Sexual activity: Not Currently     Other Topics Concern     Caffeine Concern No     2-3 cups of coffee in the morning     Sleep Concern No     falls asleep easy but doesn't sleep long     Stress Concern No     Weight Concern No     Special Diet No     Exercise No     work: hard labor     Seat Belt Yes     Social History Narrative       FAMILY HISTORY:  Family History   Problem Relation Age of Onset     CEREBROVASCULAR DISEASE Mother      Hypertension Mother      Prostate Cancer Brother      Dementia Brother      Arthritis Sister      rhematoid     CANCER Brother      Lung cancer, lymphoma     Other - See Comments Brother      HIV     CANCER Sister      skin cancer on nose     Pacemaker Sister      Aneurysm Sister      heart       Review of Systems:  Skin:  Negative       Eyes:  Positive for glasses reading  ENT:  Positive for hearing loss;nasal congestion    Respiratory:  Positive for dyspnea on exertion;cough;wheezing COPD   Cardiovascular:    Positive for;fatigue;lightheadedness weak spells  Gastroenterology: Negative      Genitourinary:  Negative      Musculoskeletal:  Negative   general aches & pains  Neurologic:  Positive for seizures    Psychiatric:  Positive for sleep disturbances;anxiety;depression    Heme/Lymph/Imm:  Negative      Endocrine:  Negative         Reviewed. Remainder of the note dictated.    Danielle Carvajal PA-C

## 2018-05-29 NOTE — LETTER
5/29/2018    Danielle Mercado, ANTONIO CNP  [address] [facility] [address]    RE: Adolfo Rendon       Dear Colleague,    I had the pleasure of seeing Adolfo Rendon in the [facility].    Please see separate dictation for HPI, PHYSICAL EXAM AND IMPRESSION/PLAN.    CURRENT MEDICATIONS:  Current Outpatient Prescriptions   Medication Sig Dispense Refill     amLODIPine (NORVASC) 2.5 MG tablet Take 1 tablet (2.5 mg) by mouth daily 30 tablet 1     aspirin 81 MG tablet ONE DAILY 100 tablet 3     Coenzyme Q10 (CO Q 10 PO)        hydrochlorothiazide (HYDRODIURIL) 25 MG tablet Take 1 tablet (25 mg) by mouth daily 90 tablet 3     lisinopril (PRINIVIL/ZESTRIL) 40 MG tablet Take 1 tablet (40 mg) by mouth daily 90 tablet 3     PARoxetine (PAXIL) 40 MG tablet Take 1 tablet (40 mg) by mouth At Bedtime 90 tablet 3     pravastatin (PRAVACHOL) 20 MG tablet Take 1 tablet (20 mg) by mouth daily 90 tablet 3     VITAMIN D, CHOLECALCIFEROL, PO Take by mouth daily       albuterol (PROAIR HFA/PROVENTIL HFA/VENTOLIN HFA) 108 (90 BASE) MCG/ACT Inhaler Inhale 2 puffs into the lungs every 6 hours as needed for shortness of breath / dyspnea or wheezing (Patient not taking: Reported on 5/29/2018) 1 Inhaler 4       ALLERGIES:     Allergies   Allergen Reactions     Claritin      Mood , irritablity      Remeron [Mirtazapine]      Agitation        Wellbutrin [Bupropion Hcl]      Sig mood issues          PAST MEDICAL HISTORY:  Past Medical History:   Diagnosis Date     Depressive disorder, not elsewhere classified      Hypertrophy (benign) of prostate      Impotence of organic origin      Other and unspecified hyperlipidemia      Other anxiety states      Tobacco use disorder        PAST SURGICAL HISTORY:  Past Surgical History:   Procedure Laterality Date     APPENDECTOMY  1966     COLONOSCOPY  3/1/2005     HERNIORRHAPHY INGUINAL  7/14/2011    Procedure:HERNIORRHAPHY INGUINAL; Open Repair Right Inguinal Hernia  Repair        HYDROCELECTOMY SCROTAL  01/2004    left hydrocele repair     SUPRAPUBIC PROSTATECTOMY         SOCIAL HISTORY:  Social History     Social History     Marital status:      Spouse name: N/A     Number of children: N/A     Years of education: N/A     Social History Main Topics     Smoking status: Former Smoker     Packs/day: 1.00     Types: Cigarettes     Quit date: 1/14/2017     Smokeless tobacco: Former User     Quit date: 9/1/2017     Alcohol use No      Comment: quit 1989     Drug use: No     Sexual activity: Not Currently     Other Topics Concern     Caffeine Concern No     2-3 cups of coffee in the morning     Sleep Concern No     falls asleep easy but doesn't sleep long     Stress Concern No     Weight Concern No     Special Diet No     Exercise No     work: hard labor     Seat Belt Yes     Social History Narrative       FAMILY HISTORY:  Family History   Problem Relation Age of Onset     CEREBROVASCULAR DISEASE Mother      Hypertension Mother      Prostate Cancer Brother      Dementia Brother      Arthritis Sister      rhematoid     CANCER Brother      Lung cancer, lymphoma     Other - See Comments Brother      HIV     CANCER Sister      skin cancer on nose     Pacemaker Sister      Aneurysm Sister      heart       Review of Systems:  Skin:  Negative       Eyes:  Positive for glasses reading  ENT:  Positive for hearing loss;nasal congestion    Respiratory:  Positive for dyspnea on exertion;cough;wheezing COPD   Cardiovascular:    Positive for;fatigue;lightheadedness weak spells  Gastroenterology: Negative      Genitourinary:  Negative      Musculoskeletal:  Negative   general aches & pains  Neurologic:  Positive for seizures    Psychiatric:  Positive for sleep disturbances;anxiety;depression    Heme/Lymph/Imm:  Negative      Endocrine:  Negative         Reviewed. Remainder of the note dictated.    Danielle Carvajal PA-C        Service Date: 05/29/2018      HISTORY OF PRESENT ILLNESS:   Mr. Rendon is a pleasant 72-year-old gentleman who presents to Cardiology office today for followup after the recent addition of amlodipine to his medication regimen.      His cardiac history includes coronary artery disease with cardiac catheterization in 2015 showing a chronically occluded RCA with left-to-right collaterals and a 50% proximal LAD stenosis, dyslipidemia with intolerance to more potent statins, hypertension and a mildly dilated ascending aorta with maximal diameter of 3.9 cm (he follows with Vascular Medicine).  He also has ongoing tobacco use and history of mild COPD with an FEV1 of 75% of predicted.        Earlier this year, he presented for routine followup.  At that time, he complained of progressive fatigue with exertion.  He had been on beta blockers in the past but this had worsened his overall sense of fatigue when they were discontinued.  It did improve somewhat but did not completely resolve.  Due to this, he was sent for repeat nuclear stress test.  This showed a moderate intensity defect of the basal to mid inferior and inferolateral wall with mild genaro-infarct ischemia.  No anterior wall ischemia was noted.  I reviewed the results of his stress test with him and due to somewhat ongoing symptoms, I discussed with him potentially adding amlodipine to see if this improved his symptomatology at all.  He returns today for followup.      The patient thinks he feels a bit better, although he is not sure if this is due to the recent warmer weather and finally getting outside and being a bit more active or due to the medication.  He denies any side effects from the medication.  He really does not have any new concerns or complaints today.      CURRENT CARDIAC MEDICATIONS:   1.  Amlodipine 2.5 mg daily.   2.  Aspirin 81 mg daily.   3.  CoQ10.   4.  Hydrochlorothiazide 25 mg daily.   5.  Lisinopril 40 mg daily.   6.  Pravastatin 20 mg daily.      The remainder of his medications, allergies and  review of systems were reviewed and as are documented separately.      PHYSICAL EXAMINATION:   GENERAL:  The patient is a 72-year-old gentleman who appears his stated age.  He is in no apparent distress.   VITAL SIGNS:  Blood pressure is 110/82, pulse 83, weight is 153 pounds, which is stable.   LUNGS:  Clear to auscultation bilaterally.  His breathing is nonlabored.   CARDIAC:  Reveals a regular rate and rhythm.  He has a 1/6 systolic ejection murmur heard best at the upper left sternal border.   NEUROLOGIC:  Alert and oriented.      ASSESSMENT AND PLAN:  The patient is a 72-year-old gentleman with known coronary artery disease with a  of RCA with left-to-right collaterals as well as a 50% proximal LAD stenosis who also has hypertension, dyslipidemia, ongoing tobacco use and a mild stable aortic aneurysm who was complaining of fatigue with exertion earlier in the year.  He underwent a recent stress test which was consistent with his prior RCA disease without any anterior ischemia noted.  I did start him on a low dose of amlodipine as an antianginal to see if this improved his symptoms and he is feeling somewhat better at this time.  I suggested that we continue on his current dose.  I encouraged him to try to increase his activity as much as tolerated.      We will plan to see him back in the Cardiology office in about 1 year with repeat blood work prior.  Certainly, if his symptoms progress over the next year, we would be happy to see him back sooner.      Thank you for allowing us to participate in the care of this pleasant patient.         PARTH BYNUM PA-C             D: 2018   T: 2018   MT: KAMAR      Name:     RACHAEL COVARRUBIAS   MRN:      [id number]        Account:      [id number]   :      1945           Service Date: 2018      Document: [id number]        Thank you for allowing me to participate in the care of your patient.      Sincerely,     Parth Bynum  KIRILL     [facility]    cc:   Danielle Carvajal PA-C  [address] [place] [address]

## 2018-05-29 NOTE — MR AVS SNAPSHOT
After Visit Summary   5/29/2018    Adolfo Rendon    MRN: [id number]           Patient Information     Date Of Birth          1945        Visit Information        Provider Department      5/29/2018 11:00 AM Danielle Carvajal PA-C [facility]        Today's Diagnoses     Coronary artery disease involving native coronary artery of native heart without angina pectoris    -  1    Essential hypertension          Care Instructions    [facility]~[address]~[place], [place]~[address]  Thank you for your  Heart Care visit today. If you have questions regarding your visit, please contact your cardiology RN's, Elizabeth Acharya or Melba Rodriguez, at [phone]. Your provider has recommended the following:  Medication Changes:  No changes. I [facility] a [facility]  Recommendations:  Try to increase activity as much as you can tolerate  Follow-up:  See Dr Cardona or Danielle DELATORRE for cardiology follow up in 1 year  To schedule a future appointment, we kindly ask that you call cardiology scheduling at [phone] three months prior to requested revisit date.               Reminder:  For your safety, we ask that you bring in your current medication(s) or an updated list of your medications with you to EACH office visit. Include the medication name, dose of pill on bottle and how you are taking it. Include over-the-counter medications or supplements. Your provider will review this at each visit and plan your care based on your current information.               Follow-ups after your visit        Additional Services     Follow-Up with Cardiologist                 Future tests that were ordered for you today     Open Future Orders        Priority Expected Expires Ordered    Basic metabolic panel Routine 5/29/2019 5/30/2019 5/29/2018    Lipid Profile Routine 5/29/2019 5/29/2019 5/29/2018     ALT Routine 5/29/2019 5/29/2019 5/29/2018    Follow-Up with Cardiologist Routine 5/29/2019 5/30/2019 5/29/2018            Who to contact     If you have questions or need follow up information about today's clinic visit or your schedule please contact [facility] directly at [phone].  Normal or non-critical lab and imaging results will be communicated to you by MyChart, letter or phone within 4 business days after the clinic has received the results. If you do not hear from us within 7 days, please contact the clinic through MyChart or phone. If you have a critical or abnormal lab result, we will notify you by phone as soon as possible.  Submit refill requests through [employer] or call your pharmacy and they will forward the refill request to us. Please allow 3 business days for your refill to be completed.          Additional Information About Your Visit        Care EveryWhere ID     This is your Care EveryWhere ID. This could be used by other organizations to access your [place] medical records  [id number]        Your Vitals Were     Pulse Pulse Oximetry BMI (Body Mass Index)             83 100% 23.26 kg/m2          Blood Pressure from Last 3 Encounters:   05/29/18 110/82   04/02/18 116/86   04/02/18 120/81    Weight from Last 3 Encounters:   05/29/18 69.4 kg (153 lb)   04/02/18 70.5 kg (155 lb 6.4 oz)   02/27/18 71.2 kg (157 lb)              We Performed the Following     Follow-Up with Cardiac Advanced Practice Provider          Where to get your medicines      These medications were sent to [facility], [address]  [address] [facility] [address]     Phone:  [phone]     amLODIPine 2.5 MG tablet          Primary Care Provider Office Phone # Fax #    ANTONIO Carranza [facility] [phone] [address] [place] [address]        Equal Access to Services     ROBERT NEGRO AH: Jessi garcia  kevin Velez, wanishada maryadaha, qaybta kacheo woodard, irvin tamicain hayaahari kuhnambika noahkristian la'yihari fallon. So [facility] [phone].    ATENCIÓN: Si habla español, tiene a roman disposición servicios gratuitos de asistencia lingüística. Leonard al [phone].    We comply with applicable federal civil rights laws and [place] laws. We do not discriminate on the basis of race, color, national origin, age, disability, sex, sexual orientation, or gender identity.            Thank you!     Thank you for choosing [facility]  for your care. Our goal is always to provide you with excellent care. Hearing back from our patients is one way we can continue to improve our services. Please take a few minutes to complete the written survey that you may receive in the mail after your visit with us. Thank you!             Your Updated Medication List - Protect others around you: Learn how to safely use, store and throw away your medicines at www.disposemymeds.org.          This list is accurate as of 5/29/18 11:12 AM.  Always use your most recent med list.                   Brand Name Dispense Instructions for use Diagnosis    albuterol 108 (90 Base) MCG/ACT Inhaler    PROAIR HFA/PROVENTIL HFA/VENTOLIN HFA    1 Inhaler    Inhale 2 puffs into the lungs every 6 hours as needed for shortness of breath / dyspnea or wheezing    Chronic obstructive pulmonary disease, unspecified COPD type (H)       amLODIPine 2.5 MG tablet    NORVASC    90 tablet    Take 1 tablet (2.5 mg) by mouth daily    Essential hypertension, Coronary artery disease involving native coronary artery of native heart without angina pectoris       aspirin 81 MG tablet     100 tablet    ONE DAILY        CO Q 10 PO           hydrochlorothiazide 25 MG tablet    HYDRODIURIL    90 tablet    Take 1 tablet (25 mg) by mouth daily    Essential hypertension with goal blood pressure less than 140/90       lisinopril 40 MG tablet     PRINIVIL/ZESTRIL    90 tablet    Take 1 tablet (40 mg) by mouth daily    Essential hypertension with goal blood pressure less than 140/90       PARoxetine 40 MG tablet    PAXIL    90 tablet    Take 1 tablet (40 mg) by mouth At Bedtime    Severe episode of recurrent major depressive disorder, without psychotic features (H)       pravastatin 20 MG tablet    PRAVACHOL    90 tablet    Take 1 tablet (20 mg) by mouth daily    Hyperlipidemia LDL goal <100       VITAMIN D (CHOLECALCIFEROL) PO      Take by mouth daily

## 2018-05-29 NOTE — PROGRESS NOTES
Service Date: 05/29/2018      HISTORY OF PRESENT ILLNESS:  Mr. Rendon is a pleasant 72-year-old gentleman who presents to Cardiology office today for followup after the recent addition of amlodipine to his medication regimen.      His cardiac history includes coronary artery disease with cardiac catheterization in 2015 showing a chronically occluded RCA with left-to-right collaterals and a 50% proximal LAD stenosis, dyslipidemia with intolerance to more potent statins, hypertension and a mildly dilated ascending aorta with maximal diameter of 3.9 cm (he follows with Vascular Medicine).  He also has ongoing tobacco use and history of mild COPD with an FEV1 of 75% of predicted.        Earlier this year, he presented for routine followup.  At that time, he complained of progressive fatigue with exertion.  He had been on beta blockers in the past but this had worsened his overall sense of fatigue when they were discontinued.  It did improve somewhat but did not completely resolve.  Due to this, he was sent for repeat nuclear stress test.  This showed a moderate intensity defect of the basal to mid inferior and inferolateral wall with mild genaro-infarct ischemia.  No anterior wall ischemia was noted.  I reviewed the results of his stress test with him and due to somewhat ongoing symptoms, I discussed with him potentially adding amlodipine to see if this improved his symptomatology at all.  He returns today for followup.      The patient thinks he feels a bit better, although he is not sure if this is due to the recent warmer weather and finally getting outside and being a bit more active or due to the medication.  He denies any side effects from the medication.  He really does not have any new concerns or complaints today.      CURRENT CARDIAC MEDICATIONS:   1.  Amlodipine 2.5 mg daily.   2.  Aspirin 81 mg daily.   3.  CoQ10.   4.  Hydrochlorothiazide 25 mg daily.   5.  Lisinopril 40 mg daily.   6.  Pravastatin 20 mg  daily.      The remainder of his medications, allergies and review of systems were reviewed and as are documented separately.      PHYSICAL EXAMINATION:   GENERAL:  The patient is a 72-year-old gentleman who appears his stated age.  He is in no apparent distress.   VITAL SIGNS:  Blood pressure is 110/82, pulse 83, weight is 153 pounds, which is stable.   LUNGS:  Clear to auscultation bilaterally.  His breathing is nonlabored.   CARDIAC:  Reveals a regular rate and rhythm.  He has a 1/6 systolic ejection murmur heard best at the upper left sternal border.   NEUROLOGIC:  Alert and oriented.      ASSESSMENT AND PLAN:  The patient is a 72-year-old gentleman with known coronary artery disease with a  of RCA with left-to-right collaterals as well as a 50% proximal LAD stenosis who also has hypertension, dyslipidemia, ongoing tobacco use and a mild stable aortic aneurysm who was complaining of fatigue with exertion earlier in the year.  He underwent a recent stress test which was consistent with his prior RCA disease without any anterior ischemia noted.  I did start him on a low dose of amlodipine as an antianginal to see if this improved his symptoms and he is feeling somewhat better at this time.  I suggested that we continue on his current dose.  I encouraged him to try to increase his activity as much as tolerated.      We will plan to see him back in the Cardiology office in about 1 year with repeat blood work prior.  Certainly, if his symptoms progress over the next year, we would be happy to see him back sooner.      Thank you for allowing us to participate in the care of this pleasant patient.         PARTH BYNUM PA-C             D: 2018   T: 2018   MT: KAMAR      Name:     RACHAEL COVARRUBIAS   MRN:      [id number]        Account:      [id number]   :      1945           Service Date: 2018      Document: [id number]

## 2018-05-29 NOTE — LETTER
5/29/2018      Danielle Mercado, ANTONIO CNP  [address] [facility] [address]      RE: Adolfo Rendon       Dear Colleague,    I had the pleasure of seeing Adolfo CRAIG Odsabrinaholli in the [facility].    Service Date: 05/29/2018      HISTORY OF PRESENT ILLNESS:  Mr. Rendon is a pleasant 72-year-old gentleman who presents to Cardiology office today for followup after the recent addition of amlodipine to his medication regimen.      His cardiac history includes coronary artery disease with cardiac catheterization in 2015 showing a chronically occluded RCA with left-to-right collaterals and a 50% proximal LAD stenosis, dyslipidemia with intolerance to more potent statins, hypertension and a mildly dilated ascending aorta with maximal diameter of 3.9 cm (he follows with Vascular Medicine).  He also has ongoing tobacco use and history of mild COPD with an FEV1 of 75% of predicted.        Earlier this year, he presented for routine followup.  At that time, he complained of progressive fatigue with exertion.  He had been on beta blockers in the past but this had worsened his overall sense of fatigue when they were discontinued.  It did improve somewhat but did not completely resolve.  Due to this, he was sent for repeat nuclear stress test.  This showed a moderate intensity defect of the basal to mid inferior and inferolateral wall with mild genaro-infarct ischemia.  No anterior wall ischemia was noted.  I reviewed the results of his stress test with him and due to somewhat ongoing symptoms, I discussed with him potentially adding amlodipine to see if this improved his symptomatology at all.  He returns today for followup.      The patient thinks he feels a bit better, although he is not sure if this is due to the recent warmer weather and finally getting outside and being a bit more active or due to the medication.  He denies any side effects from the medication.  He really does not have any new  concerns or complaints today.      CURRENT CARDIAC MEDICATIONS:   1.  Amlodipine 2.5 mg daily.   2.  Aspirin 81 mg daily.   3.  CoQ10.   4.  Hydrochlorothiazide 25 mg daily.   5.  Lisinopril 40 mg daily.   6.  Pravastatin 20 mg daily.      The remainder of his medications, allergies and review of systems were reviewed and as are documented separately.      PHYSICAL EXAMINATION:   GENERAL:  The patient is a 72-year-old gentleman who appears his stated age.  He is in no apparent distress.   VITAL SIGNS:  Blood pressure is 110/82, pulse 83, weight is 153 pounds, which is stable.   LUNGS:  Clear to auscultation bilaterally.  His breathing is nonlabored.   CARDIAC:  Reveals a regular rate and rhythm.  He has a 1/6 systolic ejection murmur heard best at the upper left sternal border.   NEUROLOGIC:  Alert and oriented.      ASSESSMENT AND PLAN:  The patient is a 72-year-old gentleman with known coronary artery disease with a  of RCA with left-to-right collaterals as well as a 50% proximal LAD stenosis who also has hypertension, dyslipidemia, ongoing tobacco use and a mild stable aortic aneurysm who was complaining of fatigue with exertion earlier in the year.  He underwent a recent stress test which was consistent with his prior RCA disease without any anterior ischemia noted.  I did start him on a low dose of amlodipine as an antianginal to see if this improved his symptoms and he is feeling somewhat better at this time.  I suggested that we continue on his current dose.  I encouraged him to try to increase his activity as much as tolerated.      We will plan to see him back in the Cardiology office in about 1 year with repeat blood work prior.  Certainly, if his symptoms progress over the next year, we would be happy to see him back sooner.      Thank you for allowing us to participate in the care of this pleasant patient.         PARTH BYNUM PA-C             D: 05/29/2018   T: 05/29/2018   MT: KAMAR       Name:     RACHAEL COVARRUBIAS   MRN:      [id number]        Account:      [id number]   :      1945           Service Date: 2018      Document: [id number]           Outpatient Encounter Prescriptions as of 2018   Medication Sig Dispense Refill     amLODIPine (NORVASC) 2.5 MG tablet Take 1 tablet (2.5 mg) by mouth daily 90 tablet 3     aspirin 81 MG tablet ONE DAILY 100 tablet 3     Coenzyme Q10 (CO Q 10 PO)        hydrochlorothiazide (HYDRODIURIL) 25 MG tablet Take 1 tablet (25 mg) by mouth daily 90 tablet 3     lisinopril (PRINIVIL/ZESTRIL) 40 MG tablet Take 1 tablet (40 mg) by mouth daily 90 tablet 3     PARoxetine (PAXIL) 40 MG tablet Take 1 tablet (40 mg) by mouth At Bedtime 90 tablet 3     pravastatin (PRAVACHOL) 20 MG tablet Take 1 tablet (20 mg) by mouth daily 90 tablet 3     VITAMIN D, CHOLECALCIFEROL, PO Take by mouth daily       albuterol (PROAIR HFA/PROVENTIL HFA/VENTOLIN HFA) 108 (90 BASE) MCG/ACT Inhaler Inhale 2 puffs into the lungs every 6 hours as needed for shortness of breath / dyspnea or wheezing (Patient not taking: Reported on 2018) 1 Inhaler 4     [DISCONTINUED] amLODIPine (NORVASC) 2.5 MG tablet Take 1 tablet (2.5 mg) by mouth daily 30 tablet 1     No facility-administered encounter medications on file as of 2018.        Again, thank you for allowing me to participate in the care of your patient.      Sincerely,    Danielle Carvajal PA-C     [facility]

## 2019-02-12 DIAGNOSIS — I71.22 AORTIC ARCH ANEURYSM (H): Primary | ICD-10-CM

## 2019-04-05 DIAGNOSIS — F33.2 SEVERE EPISODE OF RECURRENT MAJOR DEPRESSIVE DISORDER, WITHOUT PSYCHOTIC FEATURES (H): ICD-10-CM

## 2019-04-05 RX ORDER — PAROXETINE 40 MG/1
40 TABLET, FILM COATED ORAL AT BEDTIME
Qty: 90 TABLET | Refills: 0 | Status: SHIPPED | OUTPATIENT
Start: 2019-04-05 | End: 2019-05-06

## 2019-04-05 NOTE — TELEPHONE ENCOUNTER
"Requested Prescriptions   Pending Prescriptions Disp Refills     PARoxetine (PAXIL) 40 MG tablet 90 tablet 3     Sig: Take 1 tablet (40 mg) by mouth At Bedtime    SSRIs Protocol Failed - 4/5/2019 10:47 AM       Failed - PHQ-9 score less than 5 in past 6 months    Please review last PHQ-9 score.          Passed - Medication is active on med list       Passed - Patient is age 18 or older       Passed - Recent (6 mo) or future (30 days) visit within the authorizing provider's specialty    Patient had office visit in the last 6 months or has a visit in the next 30 days with authorizing provider or within the authorizing provider's specialty.  See \"Patient Info\" tab in inbasket, or \"Choose Columns\" in Meds & Orders section of the refill encounter.              "

## 2019-04-10 ENCOUNTER — ALLIED HEALTH/NURSE VISIT (OUTPATIENT)
Dept: FAMILY MEDICINE | Facility: CLINIC | Age: 74
End: 2019-04-10
Payer: COMMERCIAL

## 2019-04-10 VITALS — DIASTOLIC BLOOD PRESSURE: 88 MMHG | HEART RATE: 64 BPM | SYSTOLIC BLOOD PRESSURE: 138 MMHG

## 2019-04-10 DIAGNOSIS — I10 HTN, GOAL BELOW 140/90: Primary | ICD-10-CM

## 2019-04-10 PROCEDURE — 99207 ZZC NO CHARGE NURSE ONLY: CPT | Performed by: NURSE PRACTITIONER

## 2019-04-10 NOTE — NURSING NOTE
Adolfo Rendon is enrolled/participating in the retail pharmacy Blood Pressure Goals Achievement Program (BPGAP).  Adolfo Rendon was evaluated at [facility] on April 10, 2019 at which time his blood pressure was:    BP Readings from Last 3 Encounters:   04/10/19 138/88   05/29/18 110/82   04/02/18 116/86     Reviewed lifestyle modifications for blood pressure control and reduction: including making healthy food choices, managing weight, getting regular exercise, smoking cessation, reducing alcohol consumption, monitoring blood pressure regularly.     Adolfo Rendon is not experiencing symptoms.    Follow-Up: BP is at goal of < 140/90mmHg (patient 18+ years of age with or without diabetes).  Recommended follow-up at pharmacy in 6 months.     [facility]  [phone]      This note completed by: Rupesh Nur [facility]  [phone]

## 2019-04-12 ENCOUNTER — OFFICE VISIT (OUTPATIENT)
Dept: FAMILY MEDICINE | Facility: CLINIC | Age: 74
End: 2019-04-12
Payer: COMMERCIAL

## 2019-04-12 VITALS
RESPIRATION RATE: 18 BRPM | TEMPERATURE: 97.3 F | HEART RATE: 76 BPM | WEIGHT: 145 LBS | BODY MASS INDEX: 21.98 KG/M2 | HEIGHT: 68 IN | SYSTOLIC BLOOD PRESSURE: 146 MMHG | DIASTOLIC BLOOD PRESSURE: 98 MMHG

## 2019-04-12 DIAGNOSIS — F32.0 MILD MAJOR DEPRESSION (H): ICD-10-CM

## 2019-04-12 DIAGNOSIS — I10 ESSENTIAL HYPERTENSION WITH GOAL BLOOD PRESSURE LESS THAN 140/90: ICD-10-CM

## 2019-04-12 DIAGNOSIS — Z12.11 SCREEN FOR COLON CANCER: ICD-10-CM

## 2019-04-12 DIAGNOSIS — F41.1 GAD (GENERALIZED ANXIETY DISORDER): ICD-10-CM

## 2019-04-12 DIAGNOSIS — E78.5 HYPERLIPIDEMIA LDL GOAL <100: ICD-10-CM

## 2019-04-12 DIAGNOSIS — Z12.5 SCREENING FOR PROSTATE CANCER: ICD-10-CM

## 2019-04-12 DIAGNOSIS — J44.9 CHRONIC OBSTRUCTIVE PULMONARY DISEASE, UNSPECIFIED COPD TYPE (H): ICD-10-CM

## 2019-04-12 DIAGNOSIS — I10 ESSENTIAL HYPERTENSION: ICD-10-CM

## 2019-04-12 DIAGNOSIS — I25.10 CORONARY ARTERY DISEASE INVOLVING NATIVE CORONARY ARTERY OF NATIVE HEART WITHOUT ANGINA PECTORIS: ICD-10-CM

## 2019-04-12 DIAGNOSIS — Z00.00 ENCOUNTER FOR INITIAL PREVENTIVE PHYSICAL EXAMINATION COVERED BY MEDICARE: Primary | ICD-10-CM

## 2019-04-12 LAB
ALBUMIN SERPL-MCNC: 3.8 G/DL (ref 3.4–5)
ALP SERPL-CCNC: 64 U/L (ref 40–150)
ALT SERPL W P-5'-P-CCNC: 12 U/L (ref 0–70)
ANION GAP SERPL CALCULATED.3IONS-SCNC: 5 MMOL/L (ref 3–14)
AST SERPL W P-5'-P-CCNC: 13 U/L (ref 0–45)
BILIRUB SERPL-MCNC: 0.5 MG/DL (ref 0.2–1.3)
BUN SERPL-MCNC: 15 MG/DL (ref 7–30)
CALCIUM SERPL-MCNC: 8.9 MG/DL (ref 8.5–10.1)
CHLORIDE SERPL-SCNC: 104 MMOL/L (ref 94–109)
CHOLEST SERPL-MCNC: 239 MG/DL
CO2 SERPL-SCNC: 29 MMOL/L (ref 20–32)
CREAT SERPL-MCNC: 1.09 MG/DL (ref 0.66–1.25)
ERYTHROCYTE [DISTWIDTH] IN BLOOD BY AUTOMATED COUNT: 14.2 % (ref 10–15)
GFR SERPL CREATININE-BSD FRML MDRD: 67 ML/MIN/{1.73_M2}
GLUCOSE SERPL-MCNC: 86 MG/DL (ref 70–99)
HCT VFR BLD AUTO: 45.5 % (ref 40–53)
HDLC SERPL-MCNC: 50 MG/DL
HGB BLD-MCNC: 15.6 G/DL (ref 13.3–17.7)
LDLC SERPL CALC-MCNC: 167 MG/DL
MCH RBC QN AUTO: 28.1 PG (ref 26.5–33)
MCHC RBC AUTO-ENTMCNC: 34.3 G/DL (ref 31.5–36.5)
MCV RBC AUTO: 82 FL (ref 78–100)
NONHDLC SERPL-MCNC: 189 MG/DL
PLATELET # BLD AUTO: 201 10E9/L (ref 150–450)
POTASSIUM SERPL-SCNC: 4.1 MMOL/L (ref 3.4–5.3)
PROT SERPL-MCNC: 7.3 G/DL (ref 6.8–8.8)
PSA SERPL-ACNC: <0.01 UG/L (ref 0–4)
RBC # BLD AUTO: 5.55 10E12/L (ref 4.4–5.9)
SODIUM SERPL-SCNC: 138 MMOL/L (ref 133–144)
TRIGL SERPL-MCNC: 108 MG/DL
WBC # BLD AUTO: 5.7 10E9/L (ref 4–11)

## 2019-04-12 PROCEDURE — G0438 PPPS, INITIAL VISIT: HCPCS | Performed by: NURSE PRACTITIONER

## 2019-04-12 PROCEDURE — 80061 LIPID PANEL: CPT | Performed by: NURSE PRACTITIONER

## 2019-04-12 PROCEDURE — 99214 OFFICE O/P EST MOD 30 MIN: CPT | Mod: 25 | Performed by: NURSE PRACTITIONER

## 2019-04-12 PROCEDURE — G0103 PSA SCREENING: HCPCS | Performed by: NURSE PRACTITIONER

## 2019-04-12 PROCEDURE — 80053 COMPREHEN METABOLIC PANEL: CPT | Performed by: NURSE PRACTITIONER

## 2019-04-12 PROCEDURE — 36415 COLL VENOUS BLD VENIPUNCTURE: CPT | Performed by: NURSE PRACTITIONER

## 2019-04-12 PROCEDURE — 85027 COMPLETE CBC AUTOMATED: CPT | Performed by: NURSE PRACTITIONER

## 2019-04-12 RX ORDER — HYDROCHLOROTHIAZIDE 25 MG/1
25 TABLET ORAL DAILY
Qty: 90 TABLET | Refills: 3 | Status: CANCELLED | OUTPATIENT
Start: 2019-04-12

## 2019-04-12 RX ORDER — METOPROLOL SUCCINATE 50 MG/1
50 TABLET, EXTENDED RELEASE ORAL DAILY
Qty: 90 TABLET | Refills: 1 | Status: SHIPPED | OUTPATIENT
Start: 2019-04-12 | End: 2019-08-01

## 2019-04-12 RX ORDER — LISINOPRIL 40 MG/1
40 TABLET ORAL DAILY
Qty: 90 TABLET | Refills: 3 | Status: CANCELLED | OUTPATIENT
Start: 2019-04-12

## 2019-04-12 RX ORDER — PAROXETINE 20 MG/1
20 TABLET, FILM COATED ORAL EVERY MORNING
Qty: 90 TABLET | Refills: 1 | Status: SHIPPED | OUTPATIENT
Start: 2019-04-12 | End: 2019-12-23

## 2019-04-12 RX ORDER — PRAVASTATIN SODIUM 20 MG
20 TABLET ORAL DAILY
Qty: 90 TABLET | Refills: 3 | Status: CANCELLED | OUTPATIENT
Start: 2019-04-12

## 2019-04-12 RX ORDER — AMLODIPINE BESYLATE 2.5 MG/1
2.5 TABLET ORAL DAILY
Qty: 90 TABLET | Refills: 3 | Status: CANCELLED | OUTPATIENT
Start: 2019-04-12

## 2019-04-12 RX ORDER — EZETIMIBE 10 MG/1
10 TABLET ORAL DAILY
Qty: 90 TABLET | Refills: 1 | Status: SHIPPED | OUTPATIENT
Start: 2019-04-12 | End: 2019-08-01

## 2019-04-12 RX ORDER — PAROXETINE 40 MG/1
40 TABLET, FILM COATED ORAL EVERY MORNING
Qty: 90 TABLET | Refills: 1 | Status: SHIPPED | OUTPATIENT
Start: 2019-04-12 | End: 2020-04-06

## 2019-04-12 ASSESSMENT — MIFFLIN-ST. JEOR: SCORE: 1381.19

## 2019-04-12 NOTE — PROGRESS NOTES
"  SUBJECTIVE:   Adolfo Rendon is a 73 year old male who presents for Preventive Visit.  Are you in the first 12 months of your Medicare Part B coverage?  No    Physical Health:    In general, how would you rate your overall physical health? poor    Outside of work, how many days during the week do you exercise? none not enough    Outside of work, approximately how many minutes a day do you exercise?not applicable    If you drink alcohol do you typically have >3 drinks per day or >7 drinks per week? No    Do you usually eat at least 4 servings of fruit and vegetables a day, include whole grains & fiber and avoid regularly eating high fat or \"junk\" foods? NO, not 4 servings    Do you have any problems taking medications regularly?  No, stopped taking all medications except Paxil    Do you have any side effects from medications? none    Needs assistance for the following daily activities: no assistance needed    Which of the following safety concerns are present in your home?  none identified     Hearing impairment: No    In the past 6 months, have you been bothered by leaking of urine? no    Mental Health:    In general, how would you rate your overall mental or emotional health? Poor, depression and anxiety have gotten worse  PHQ-2 Score:      Do you feel safe in your environment? Yes    Do you have a Health Care Directive? No: Advance care planning reviewed with patient; information given to patient to review.    Additional concerns to address?  No    Fall risk:  Fallen 2 or more times in the past year?: No  Any fall with injury in the past year?: No    Cognitive Screenin) Repeat 3 items (Leader, Season, Table)    2) Clock draw: NORMAL  3) 3 item recall: Recalls 3 objects  Results: 3 items recalled: COGNITIVE IMPAIRMENT LESS LIKELY    Mini-CogTM Copyright NANDINI Melchor. Licensed by the author for use in [facility]; reprinted with permission (viky@.[facility]). All rights reserved.      Do you have sleep " apnea, excessive snoring or daytime drowsiness?: daytime drownsiness      Mid summer stopped all his meds   Taking his antidepressant and his asa daily  Stopped taking his meds. Felt like he perked up during the summer but not sure this winter has been a downer.  Napping during the day every day. Affects his ability to sleep.  Moods have not been good during the winter.   Doesn't want to leaver home or go anywhere.  Feels safe and secure at home. Hard to step out of his comfort zone is difficult.   Appetite is poor in the morning.   Smoking again  Wheezing     Wt Readings from Last 5 Encounters:   19 65.8 kg (145 lb)   18 69.4 kg (153 lb)   18 70.5 kg (155 lb 6.4 oz)   18 71.2 kg (157 lb)   18 70.3 kg (155 lb)     Reviewed and updated as needed this visit by clinical staff  Allergies  Meds         Reviewed and updated as needed this visit by Provider        Social History     Tobacco Use     Smoking status: Current Every Day Smoker     Packs/day: 1.00     Types: Cigarettes     Last attempt to quit: 2017     Years since quittin.3     Smokeless tobacco: Former User     Quit date: 2017   Substance Use Topics     Alcohol use: No     Alcohol/week: 0.0 oz     Comment: quit                            Current providers sharing in care for this patient include:   Patient Care Team:  Danielle Mercado APRN CNP as PCP - General (Family Practice)  Danielle Mercado APRN CNP as Assigned PCP    The following health maintenance items are reviewed in Epic and correct as of today:  Health Maintenance   Topic Date Due     ZOSTER IMMUNIZATION (2 of 3) 2009     MEDICARE ANNUAL WELLNESS VISIT  2012     DTAP/TDAP/TD IMMUNIZATION (2 - Td) 2012     COLONOSCOPY Q10 YR  2015     ADVANCE DIRECTIVE PLANNING Q5 YRS  2016     COPD ACTION PLAN Q1 YR  2017     PHQ-9 Q6 MONTHS  2018     INFLUENZA VACCINE (Season Ended) 2019     FALL RISK  "ASSESSMENT  04/12/2020     LIPID SCREEN Q5 YR MALE (SYSTEM ASSIGNED)  04/12/2024     SPIROMETRY ONETIME  Completed     DEPRESSION ACTION PLAN  Completed     AORTIC ANEURYSM SCREENING (SYSTEM ASSIGNED)  Completed     HEPATITIS C SCREENING  Completed     IPV IMMUNIZATION  Aged Out     MENINGITIS IMMUNIZATION  Aged Out     Labs reviewed in EPIC  Pneumonia Vaccine: COMPLETED     ROS:  Constitutional, HEENT, cardiovascular, pulmonary, GI, , musculoskeletal, neuro, skin, endocrine and psych systems are negative, except as otherwise noted.    OBJECTIVE:   BP (!) 146/98 (BP Location: Right arm, Patient Position: Chair, Cuff Size: Adult Regular)   Pulse 76   Temp 97.3  F (36.3  C) (Tympanic)   Resp 18   Ht 1.734 m (5' 8.25\")   Wt 65.8 kg (145 lb)   BMI 21.89 kg/m   Estimated body mass index is 21.89 kg/m  as calculated from the following:    Height as of this encounter: 1.734 m (5' 8.25\").    Weight as of this encounter: 65.8 kg (145 lb).  EXAM:   GENERAL: healthy, alert and no distress  EYES: Eyes grossly normal to inspection, PERRL and conjunctivae and sclerae normal  HENT: ear canals and TM's normal, nose and mouth without ulcers or lesions  NECK: no adenopathy, no asymmetry, masses, or scars and thyroid normal to palpation  RESP: lungs clear to auscultation - no rales, rhonchi or wheezes  CV: regular rate and rhythm, normal S1 S2, no S3 or S4, no murmur, click or rub, no peripheral edema and peripheral pulses strong  ABDOMEN: soft, nontender, no hepatosplenomegaly, no masses and bowel sounds normal  MS: no gross musculoskeletal defects noted, no edema  SKIN: no suspicious lesions or rashes  NEURO: Normal strength and tone, mentation intact and speech normal  PSYCH: mentation appears normal, affect normal/bright    Results for orders placed or performed in visit on 04/12/19   CBC with platelets   Result Value Ref Range    WBC 5.7 4.0 - 11.0 10e9/L    RBC Count 5.55 4.4 - 5.9 10e12/L    Hemoglobin 15.6 13.3 - 17.7 " g/dL    Hematocrit 45.5 40.0 - 53.0 %    MCV 82 78 - 100 fl    MCH 28.1 26.5 - 33.0 pg    MCHC 34.3 31.5 - 36.5 g/dL    RDW 14.2 10.0 - 15.0 %    Platelet Count 201 150 - 450 10e9/L   Comprehensive metabolic panel   Result Value Ref Range    Sodium 138 133 - 144 mmol/L    Potassium 4.1 3.4 - 5.3 mmol/L    Chloride 104 94 - 109 mmol/L    Carbon Dioxide 29 20 - 32 mmol/L    Anion Gap 5 3 - 14 mmol/L    Glucose 86 70 - 99 mg/dL    Urea Nitrogen 15 7 - 30 mg/dL    Creatinine 1.09 0.66 - 1.25 mg/dL    GFR Estimate 67 >60 mL/min/[1.73_m2]    GFR Estimate If Black 77 >60 mL/min/[1.73_m2]    Calcium 8.9 8.5 - 10.1 mg/dL    Bilirubin Total 0.5 0.2 - 1.3 mg/dL    Albumin 3.8 3.4 - 5.0 g/dL    Protein Total 7.3 6.8 - 8.8 g/dL    Alkaline Phosphatase 64 40 - 150 U/L    ALT 12 0 - 70 U/L    AST 13 0 - 45 U/L   Lipid panel reflex to direct LDL Fasting   Result Value Ref Range    Cholesterol 239 (H) <200 mg/dL    Triglycerides 108 <150 mg/dL    HDL Cholesterol 50 >39 mg/dL    LDL Cholesterol Calculated 167 (H) <100 mg/dL    Non HDL Cholesterol 189 (H) <130 mg/dL   PSA, screen   Result Value Ref Range    PSA <0.01 0 - 4 ug/L     PHQ-9 SCORE 4/20/2016 7/13/2017 2/22/2018   PHQ-9 Total Score - - -   PHQ-9 Total Score 13 3 4     GRETA-7 SCORE 1/27/2014 3/24/2015 2/22/2018   Total Score 2 2 -   Total Score - - 6           ASSESSMENT / PLAN:   Adolfo was seen today for medicare visit and hypertension.    Diagnoses and all orders for this visit:    Screen for colon cancer  -     GASTROENTEROLOGY ADULT REF PROCEDURE ONLY    Hyperlipidemia LDL goal <100  -     ezetimibe (ZETIA) 10 MG tablet; Take 1 tablet (10 mg) by mouth daily  -     CBC with platelets  -     Comprehensive metabolic panel  -     Lipid panel reflex to direct LDL Fasting    Essential hypertension with goal blood pressure less than 140/90  -     metoprolol succinate ER (TOPROL-XL) 50 MG 24 hr tablet; Take 1 tablet (50 mg) by mouth daily    Essential hypertension    Coronary  "artery disease involving native coronary artery of native heart without angina pectoris    Screening for prostate cancer  -     PSA, screen    Chronic obstructive pulmonary disease, unspecified COPD type (H)  -     ipratropium (ATROVENT HFA) 17 MCG/ACT inhaler; Inhale 2 puffs into the lungs every 6 hours  -     salmeterol (SEREVENT) 50 MCG/DOSE inhaler; Inhale 1 puff into the lungs 2 times daily    GRETA (generalized anxiety disorder)  -     PARoxetine (PAXIL) 40 MG tablet; Take 1 tablet (40 mg) by mouth every morning Add to 20 mg daily  -     PARoxetine (PAXIL) 20 MG tablet; Take 1 tablet (20 mg) by mouth every morning Add to 40 mg daily    Mild major depression (H)  -     PARoxetine (PAXIL) 40 MG tablet; Take 1 tablet (40 mg) by mouth every morning Add to 20 mg daily  -     PARoxetine (PAXIL) 20 MG tablet; Take 1 tablet (20 mg) by mouth every morning Add to 40 mg daily      Increase Paxil 60 mg daily  Psychotherapy strongly recommended  Begin Serevent  Continue Atrovent  Labs today we will call with results  Colonoscopy ordered call schedule  Not able to tolerate statin  Zetia 10 mg daily  Increase metoprolol to 50 mg daily  Recheck blood pressure heart rate 1 month    End of Life Planning:  Patient currently has an advanced directive: No.  I have verified the patient's ablity to prepare an advanced directive/make health care decisions.  Literature was provided to assist patient in preparing an advanced directive.    COUNSELING:  Reviewed preventive health counseling, as reflected in patient instructions    BP Readings from Last 1 Encounters:   04/12/19 (!) 146/98     Estimated body mass index is 21.89 kg/m  as calculated from the following:    Height as of this encounter: 1.734 m (5' 8.25\").    Weight as of this encounter: 65.8 kg (145 lb).  Wt Readings from Last 5 Encounters:   04/12/19 65.8 kg (145 lb)   05/29/18 69.4 kg (153 lb)   04/02/18 70.5 kg (155 lb 6.4 oz)   02/27/18 71.2 kg (157 lb)   02/22/18 70.3 kg " (155 lb)       Weight management plan nutritionist consultation and return visit in 3 months     reports that he has been smoking cigarettes.  He has been smoking about 1.00 pack per day. He quit smokeless tobacco use about 20 months ago.  Tobacco Cessation Action Plan: Information offered: Patient not interested at this time    Appropriate preventive services were discussed with this patient, including applicable screening as appropriate for cardiovascular disease, diabetes, osteopenia/osteoporosis, and glaucoma.  As appropriate for age/gender, discussed screening for colorectal cancer, prostate cancer, breast cancer, and cervical cancer. Checklist reviewing preventive services available has been given to the patient.    Reviewed patients plan of care and provided an AVS. The Intermediate Care Plan ( asthma action plan, low back pain action plan, and migraine action plan) for Adolfo meets the Care Plan requirement. This Care Plan has been established and reviewed with the Patient    Call or return to the clinic with any worsening of symptoms or no resolution. Patient/Parent verbalized understanding and is in agreement. Medication side effects reviewed.   Current Outpatient Medications   Medication Sig Dispense Refill     albuterol (PROAIR HFA/PROVENTIL HFA/VENTOLIN HFA) 108 (90 BASE) MCG/ACT Inhaler Inhale 2 puffs into the lungs every 6 hours as needed for shortness of breath / dyspnea or wheezing 1 Inhaler 4     aspirin 81 MG tablet ONE DAILY 100 tablet 3     ezetimibe (ZETIA) 10 MG tablet Take 1 tablet (10 mg) by mouth daily 90 tablet 1     ipratropium (ATROVENT HFA) 17 MCG/ACT inhaler Inhale 2 puffs into the lungs every 6 hours 12.9 g 5     metoprolol succinate ER (TOPROL-XL) 50 MG 24 hr tablet Take 1 tablet (50 mg) by mouth daily 90 tablet 1     PARoxetine (PAXIL) 20 MG tablet Take 1 tablet (20 mg) by mouth every morning Add to 40 mg daily 90 tablet 1     PARoxetine (PAXIL) 40 MG tablet Take 1 tablet (40 mg) by  mouth every morning Add to 20 mg daily 90 tablet 1     salmeterol (SEREVENT) 50 MCG/DOSE inhaler Inhale 1 puff into the lungs 2 times daily 60 each 5     amLODIPine (NORVASC) 2.5 MG tablet Take 1 tablet (2.5 mg) by mouth daily (Patient not taking: Reported on 4/12/2019) 90 tablet 3     Coenzyme Q10 (CO Q 10 PO)        hydrochlorothiazide (HYDRODIURIL) 25 MG tablet Take 1 tablet (25 mg) by mouth daily (Patient not taking: Reported on 4/12/2019) 90 tablet 3     lisinopril (PRINIVIL/ZESTRIL) 40 MG tablet Take 1 tablet (40 mg) by mouth daily (Patient not taking: Reported on 4/12/2019) 90 tablet 3     pravastatin (PRAVACHOL) 20 MG tablet Take 1 tablet (20 mg) by mouth daily (Patient not taking: Reported on 4/12/2019) 90 tablet 3     VITAMIN D, CHOLECALCIFEROL, PO Take by mouth daily       Chart documentation with Dragon Voice recognition Software. Although reviewed after completion, some words and grammatical errors may remain.  .    Counseling Resources:  ATP IV Guidelines  Pooled Cohorts Equation Calculator  Breast Cancer Risk Calculator  FRAX Risk Assessment  ICSI Preventive Guidelines  Dietary Guidelines for Americans, 2010  USDA's MyPlate  ASA Prophylaxis  Lung CA Screening    ANTONIO Carranza [facility]

## 2019-04-12 NOTE — NURSING NOTE
"Chief Complaint   Patient presents with     Medicare Visit     Hypertension       Initial BP (!) 146/98 (BP Location: Right arm, Patient Position: Chair, Cuff Size: Adult Regular)   Pulse 76   Temp 97.3  F (36.3  C) (Tympanic)   Resp 18   Ht 1.734 m (5' 8.25\")   Wt 65.8 kg (145 lb)   BMI 21.89 kg/m   Estimated body mass index is 21.89 kg/m  as calculated from the following:    Height as of this encounter: 1.734 m (5' 8.25\").    Weight as of this encounter: 65.8 kg (145 lb).    Patient presents to the clinic using No DME    Health Maintenance that is potentially due pending provider review:  Colonoscopy/FIT    Kathya Hernandez MA  9:39 AM 4/12/2019    .        "

## 2019-04-12 NOTE — PROGRESS NOTES
"SUBJECTIVE:   Adolfo Rendon is a 73 year old male who presents for Preventive Visit.  Are you in the first 12 months of your Medicare coverage?  No    HPI  Do you feel safe in your environment? Yes    Do you have a Health Care Directive? Yes: Advance Directive has been received and scanned.      Fall risk  { :382722}  {If any of the above assessments are answered yes, consider ordering appropriate referrals (Optional):808526::\"click delete button to remove this line now\"}  Cognitive Screening { :094756}    {Do you have sleep apnea, excessive snoring or daytime drowsiness? (Optional):193821}    Reviewed and updated as needed this visit by clinical staff  Allergies  Meds         Reviewed and updated as needed this visit by Provider        Social History     Tobacco Use     Smoking status: Former Smoker     Packs/day: 1.00     Types: Cigarettes     Last attempt to quit: 2017     Years since quittin.2     Smokeless tobacco: Former User     Quit date: 2017   Substance Use Topics     Alcohol use: No     Alcohol/week: 0.0 oz     Comment: quit      {Rooming Staff- Complete this question if Prescreen response is not shown below for today's visit. If you drink alcohol do you typically have >3 drinks per day or >7 drinks per week? (Optional):968466}    No flowsheet data found.{add AUDIT responses (Optional) (A score of 7 for adult men is an indication of hazardous drinking; a score of 8 or more is an indication of an alcohol use disorder.  A score of 7 or more for adult women is an indication of hazardous drinking or an alchohol use disorder):425668}    {Outside tests to abstract? :939001}    {additional problems to add (Optional):179492}    Current providers sharing in care for this patient include: {Rooming staff:  Please update Care Team in Rooming Activity, refresh this note and then delete this statement}  Patient Care Team:  Danielle Mercado APRN CNP as PCP - General (Family " "Practice)  Danielle Mercado, ANTONIO MAJOR as Assigned PCP    The following health maintenance items are reviewed in Epic and correct as of today:  Health Maintenance   Topic Date Due     ZOSTER IMMUNIZATION (2 of 3) 2009     MEDICARE ANNUAL WELLNESS VISIT  2012     DTAP/TDAP/TD IMMUNIZATION (2 - Td) 2012     COLONOSCOPY Q10 YR  2015     ADVANCE DIRECTIVE PLANNING Q5 YRS  2016     COPD ACTION PLAN Q1 YR  2017     PHQ-9 Q6 MONTHS  2018     INFLUENZA VACCINE (1) 2018     FALL RISK ASSESSMENT  2019     LIPID SCREEN Q5 YR MALE (SYSTEM ASSIGNED)  2023     SPIROMETRY ONETIME  Completed     DEPRESSION ACTION PLAN  Completed     AORTIC ANEURYSM SCREENING (SYSTEM ASSIGNED)  Completed     HEPATITIS C SCREENING  Completed     IPV IMMUNIZATION  Aged Out     MENINGITIS IMMUNIZATION  Aged Out     {Chronicprobdata (Optional):271318}  {Decision Support (Optional):783544}    Review of Systems  {ROS COMP (Optional):314188}    OBJECTIVE:   There were no vitals taken for this visit. Estimated body mass index is 23.26 kg/m  as calculated from the following:    Height as of 18: 1.727 m (5' 8\").    Weight as of 18: 69.4 kg (153 lb).  Physical Exam  {Exam (Optional) :975514}    {Diagnostic Test Results (Optional):714838::\"Diagnostic Test Results:\",\"none \"}    ASSESSMENT / PLAN:   {Diag Picklist:081851}    End of Life Planning:  Patient currently has an advanced directive: { :528884}    COUNSELING:  {Medicare Counselin}    BP Readings from Last 1 Encounters:   04/10/19 138/88     Estimated body mass index is 23.26 kg/m  as calculated from the following:    Height as of 18: 1.727 m (5' 8\").    Weight as of 18: 69.4 kg (153 lb).    {BP Counseling- Complete if BP >= 120/80  (Optional):432557}  {Weight Management Plan (ACO) Complete if BMI is abnormal-  Ages 18-64  BMI >24.9.  Age 65+ with BMI <23 or >30 (Optional):410612}     reports that he quit smoking " about 2 years ago. His smoking use included cigarettes. He smoked 1.00 pack per day. He quit smokeless tobacco use about 19 months ago.  {Tobacco Cessation -- Complete if patient is a smoker (Optional):389483}    Appropriate preventive services were discussed with this patient, including applicable screening as appropriate for cardiovascular disease, diabetes, osteopenia/osteoporosis, and glaucoma.  As appropriate for age/gender, discussed screening for colorectal cancer, prostate cancer, breast cancer, and cervical cancer. Checklist reviewing preventive services available has been given to the patient.    Reviewed patients plan of care and provided an AVS. The {CarePlan:475409} for Adolfo meets the Care Plan requirement. This Care Plan has been established and reviewed with the {PATIENT, FAMILY MEMBER, CAREGIVER:527359}.    Counseling Resources:  ATP IV Guidelines  Pooled Cohorts Equation Calculator  Breast Cancer Risk Calculator  FRAX Risk Assessment  ICSI Preventive Guidelines  Dietary Guidelines for Americans, 2010  USDA's MyPlate  ASA Prophylaxis  Lung CA Screening    ANTONIO Carranza CNP  [facility]    Identified Health Risks:

## 2019-04-12 NOTE — PATIENT INSTRUCTIONS
Patient Education   Personalized Prevention Plan  You are due for the preventive services outlined below.  Your care team is available to assist you in scheduling these services.  If you have already completed any of these items, please share that information with your care team to update in your medical record.  Health Maintenance Due   Topic Date Due     Zoster (Shingles) Vaccine (2 of 3) 08/31/2009     Annual Wellness Visit  04/20/2012     Diptheria Tetanus Pertussis (DTAP/TDAP/TD) Vaccine (2 - Td) 07/07/2012     Colonoscopy - ever 10 years  03/01/2015     Discuss Advance Directive Planning  04/20/2016     COPD ACTION PLAN Q1 YR  12/22/2017     Depression Assessment - every 6 months  08/22/2018     Flu Vaccine (1) 09/01/2018     FALL RISK ASSESSMENT  02/22/2019

## 2019-04-12 NOTE — LETTER
April 15, 2019      Adolfo Rendon  [address] [facility] [address]        Dear ,    We are writing to inform you of your test results.    Normal electrolytes.  Normal fasting glucose.  Normal kidney function.  Normal liver function.   Normal red and white blood cell count.   Normal prostate screening   Cholesterol levels are elevated.  LDL cholesterol is high.   Has increased since stopping your statin medication due to side effects.   Repeat the labs again in 3 months to include lipids fasting and an AST after starting the Zetia.   Continue to work on dietary changes to lower your cholesterol   Resulted Orders   CBC with platelets   Result Value Ref Range    WBC 5.7 4.0 - 11.0 10e9/L    RBC Count 5.55 4.4 - 5.9 10e12/L    Hemoglobin 15.6 13.3 - 17.7 g/dL    Hematocrit 45.5 40.0 - 53.0 %    MCV 82 78 - 100 fl    MCH 28.1 26.5 - 33.0 pg    MCHC 34.3 31.5 - 36.5 g/dL    RDW 14.2 10.0 - 15.0 %    Platelet Count 201 150 - 450 10e9/L   Comprehensive metabolic panel   Result Value Ref Range    Sodium 138 133 - 144 mmol/L    Potassium 4.1 3.4 - 5.3 mmol/L    Chloride 104 94 - 109 mmol/L    Carbon Dioxide 29 20 - 32 mmol/L    Anion Gap 5 3 - 14 mmol/L    Glucose 86 70 - 99 mg/dL      Comment:      Fasting specimen    Urea Nitrogen 15 7 - 30 mg/dL    Creatinine 1.09 0.66 - 1.25 mg/dL    GFR Estimate 67 >60 mL/min/[1.73_m2]      Comment:      Non  GFR Calc  Starting 12/18/2018, serum creatinine based estimated GFR (eGFR) will be   calculated using the Chronic Kidney Disease Epidemiology Collaboration   (CKD-EPI) equation.      GFR Estimate If Black 77 >60 mL/min/[1.73_m2]      Comment:       GFR Calc  Starting 12/18/2018, serum creatinine based estimated GFR (eGFR) will be   calculated using the Chronic Kidney Disease Epidemiology Collaboration   (CKD-EPI) equation.      Calcium 8.9 8.5 - 10.1 mg/dL    Bilirubin Total 0.5 0.2 - 1.3 mg/dL    Albumin 3.8 3.4 - 5.0 g/dL     Protein Total 7.3 6.8 - 8.8 g/dL    Alkaline Phosphatase 64 40 - 150 U/L    ALT 12 0 - 70 U/L    AST 13 0 - 45 U/L   Lipid panel reflex to direct LDL Fasting   Result Value Ref Range    Cholesterol 239 (H) <200 mg/dL      Comment:      Desirable:       <200 mg/dl    Triglycerides 108 <150 mg/dL      Comment:      Fasting specimen    HDL Cholesterol 50 >39 mg/dL    LDL Cholesterol Calculated 167 (H) <100 mg/dL      Comment:      Above desirable:  100-129 mg/dl  Borderline High:  130-159 mg/dL  High:             160-189 mg/dL  Very high:       >189 mg/dl      Non HDL Cholesterol 189 (H) <130 mg/dL      Comment:      Above Desirable:  130-159 mg/dl  Borderline high:  160-189 mg/dl  High:             190-219 mg/dl  Very high:       >219 mg/dl     PSA, screen   Result Value Ref Range    PSA <0.01 0 - 4 ug/L      Comment:      Assay Method:  Chemiluminescence using Siemens Vista analyzer   If you have any questions or concerns, please call the clinic at the number listed above.     Sincerely,    ANTONIO Carranza CNP

## 2019-05-07 ENCOUNTER — HOSPITAL ENCOUNTER (OUTPATIENT)
Dept: CT IMAGING | Facility: CLINIC | Age: 74
Discharge: HOME | End: 2019-05-07
Attending: SURGERY | Admitting: SURGERY
Payer: COMMERCIAL

## 2019-05-07 ENCOUNTER — OFFICE VISIT (OUTPATIENT)
Dept: VASCULAR SURGERY | Facility: CLINIC | Age: 74
End: 2019-05-07
Attending: SURGERY
Payer: COMMERCIAL

## 2019-05-07 VITALS
HEIGHT: 68 IN | HEART RATE: 78 BPM | BODY MASS INDEX: 21.98 KG/M2 | DIASTOLIC BLOOD PRESSURE: 92 MMHG | SYSTOLIC BLOOD PRESSURE: 141 MMHG | RESPIRATION RATE: 18 BRPM | WEIGHT: 145 LBS

## 2019-05-07 DIAGNOSIS — I71.22 AORTIC ARCH ANEURYSM (H): ICD-10-CM

## 2019-05-07 DIAGNOSIS — I71.20 THORACIC AORTIC ANEURYSM WITHOUT RUPTURE (H): Primary | ICD-10-CM

## 2019-05-07 PROCEDURE — 25000125 ZZHC RX 250: Performed by: RADIOLOGY

## 2019-05-07 PROCEDURE — 25000128 H RX IP 250 OP 636: Performed by: RADIOLOGY

## 2019-05-07 PROCEDURE — 99213 OFFICE O/P EST LOW 20 MIN: CPT | Performed by: SURGERY

## 2019-05-07 PROCEDURE — 71275 CT ANGIOGRAPHY CHEST: CPT

## 2019-05-07 RX ORDER — IOPAMIDOL 755 MG/ML
80 INJECTION, SOLUTION INTRAVASCULAR ONCE
Status: COMPLETED | OUTPATIENT
Start: 2019-05-07 | End: 2019-05-07

## 2019-05-07 RX ADMIN — IOPAMIDOL 80 ML: 755 INJECTION, SOLUTION INTRAVENOUS at 08:11

## 2019-05-07 RX ADMIN — SODIUM CHLORIDE 100 ML: 9 INJECTION, SOLUTION INTRAVENOUS at 08:11

## 2019-05-07 ASSESSMENT — MIFFLIN-ST. JEOR: SCORE: 1381.19

## 2019-05-07 NOTE — LETTER
[facility] [address]  [place] [address]  Phone: [phone]  Fax: [phone]      May 7, 2019       Re: Adolfo Rendon - 1945    Mr. Rendon has been under our surveillance for a thoracic aortic aneurysm.  This was incidentally found in 2017.  He has been asymptomatic from that.  It measured 4.1 cm at the time of diagnosis.     No surgical intervention was undertaken and blood pressure management and tobacco cessation was advised.     Unfortunately he has failed to follow either of these advice.  He tells me that he is not taking any of his blood pressure medication and that his blood pressure at home is routinely above 140 mmHg systolic.  He continues to smoke.     I reviewed the CT angiogram of the chest that was performed.  There is no change in the size of his aneurysm.  I reassured him of the findings.  But I continue to stress upon the importance of good blood pressure control and tobacco cessation.     We will see him back in 1 year with CT angiogram of the chest      MOUNA MEADOWS MD

## 2019-05-07 NOTE — NURSING NOTE
"Chief Complaint   Patient presents with     RECHECK     aortic arch aneurysm - had CTA done today       Initial BP (!) 141/92 (BP Location: Left arm, Patient Position: Chair, Cuff Size: Adult Regular)   Pulse 78   Resp 18   Ht 1.734 m (5' 8.25\")   Wt 65.8 kg (145 lb)   BMI 21.89 kg/m   Estimated body mass index is 21.89 kg/m  as calculated from the following:    Height as of this encounter: 1.734 m (5' 8.25\").    Weight as of this encounter: 65.8 kg (145 lb).  Medications and allergies reviewed.    Vane ASHBY, CMA    "

## 2019-05-07 NOTE — PROGRESS NOTES
Mr. Rendon has been under our surveillance for a thoracic aortic aneurysm.  This was incidentally found in February 2017.  He has been asymptomatic from that.  It measured 4.1 cm at the time of diagnosis.    No surgical intervention was undertaken and blood pressure management and tobacco cessation was advised.    Unfortunately he has failed to follow either of these advice.  He tells me that he is not taking any of his blood pressure medication and that his blood pressure at home is routinely above 140 mmHg systolic.  He continues to smoke.    I reviewed the CT angiogram of the chest that was performed.  There is no change in the size of his aneurysm.  I reassured him of the findings.  But I continue to stress upon the importance of good blood pressure control and tobacco cessation.    We will see him back in 1 year with CT angiogram of the chest.

## 2019-05-08 ENCOUNTER — ANESTHESIA EVENT (OUTPATIENT)
Dept: GASTROENTEROLOGY | Facility: CLINIC | Age: 74
End: 2019-05-08
Payer: COMMERCIAL

## 2019-05-08 ASSESSMENT — LIFESTYLE VARIABLES: TOBACCO_USE: 1

## 2019-05-08 ASSESSMENT — COPD QUESTIONNAIRES: COPD: 1

## 2019-05-08 NOTE — ANESTHESIA PREPROCEDURE EVALUATION
Anesthesia Pre-Procedure Evaluation    Patient: Adolfo Rendon   MRN: [id number] : 1945          Preoperative Diagnosis: screening    Procedure(s):  COLONOSCOPY    Past Medical History:   Diagnosis Date     Depressive disorder, not elsewhere classified      Hypertrophy (benign) of prostate      Impotence of organic origin      Other and unspecified hyperlipidemia      Other anxiety states      Tobacco use disorder      Past Surgical History:   Procedure Laterality Date     APPENDECTOMY  1966     COLONOSCOPY  3/1/2005     HERNIORRHAPHY INGUINAL  2011    Procedure:HERNIORRHAPHY INGUINAL; Open Repair Right Inguinal Hernia Repair        HYDROCELECTOMY SCROTAL  2004    left hydrocele repair     SUPRAPUBIC PROSTATECTOMY         Anesthesia Evaluation     . Pt has had prior anesthetic. Type: MAC and General    No history of anesthetic complications          ROS/MED HX    ENT/Pulmonary:     (+)tobacco use, Current use mild COPD, , . Other pulmonary disease pulmonary nodules.    Neurologic:  - neg neurologic ROS     Cardiovascular: Comment: Thoracic aortic aneurysm without rupture  Aortic arch aneurysm    (+) Dyslipidemia, hypertension-Peripheral Vascular Disease---. : . . SANTIAGO, . :. . Previous cardiac testing date:results:Stress Testdate:3-2018 results:Impression    1. Exercise: Average functional capacity, target heart rate  achieved       2. EKG: Mild baseline abnormalities, 1.5 mm diffuse ST depression  with horizontal segments. This is a nonspecific finding in the context  of baseline abnormalities.       3. Symptoms: Mild dyspnea, but no chest pain with exercise  4. Myocardial perfusion imaging using single isotope technique  demonstrated moderate sized infarct, moderate intensity of the basal  to mid inferior and inferolateral wall. Minimal genaro-infarct ischemia.  Summed stress score 15, summed rest score 13.  5. Gated images demonstrated hypokinesis of the basal inferior wall,  otherwise normal wall  motion.  The left ventricular systolic function  is 57% at rest, 44% with stress.  6. Compared to the prior study from June 2015, there are no changes . date: results:Cath date: 6-2015 results:FINDINGS  1. Left ventricular end diastolic pressure was around 13 mmHg. There  was no gradient across the aortic valve on pullback.   2. Left ventriculogram revealed moderate to severe hypokinesia of the  proximal two-thirds of the inferior wall. The apical inferior wall and  anterior wall contracted normally. Visually estimated ejection  fraction was around 45%. There was some ectopy with the left  ventriculogram, but no significant mitral insufficiency was seen.   3. The right coronary artery was dominant. There was a diffuse ectasia  of the proximal right coronary. It is a large right coronary artery  with.then  diffuse narrowing in the midportion followed by occlusion  of the vessel in the midportion. There was slight bridging collaterals  right to right. There was extensive left to right collaterals from the  left coronary injection.   4. The left main had some of ulceration, but there was no stenosis  greater than of 20% by angiography. The circumflex artery and  intermediate artery had no significant stenosis. The circumflex artery  in the AV groove there may be some mild narrowing of around of 30%.  The very proximal center of the left anterior descending artery was  large. This was followed by of a narrowing compared to the proximal  part of the LAD of around 50%. The rest of the LAD was relatively  angiographically smooth. Again there was extensive collateralization  to the large distal right coronary artery.      ASSESSMENT AND RECOMMENDATION: The patient has an occlusion of the  right coronary artery. Angiographically it looks chronic as there is a  extensive left to right as well as some bridging collaterals right to  right.  By report, the patient underwent a 12-minute exercise nuclear  stress test with with a  manual protocol, although he was achieved of  14.8 METs without chest pain.  At this point, I would recommend  aggressive medical treatment. His lipids and blood pressure should be  controlled. The patient should refrain from any smoking. If he fails  medical therapy, one could consider recanalization of the total  occlusion. Angiographically, it looks chronic.          METS/Exercise Tolerance:  4 - Raking leaves, gardening   Hematologic:  - neg hematologic  ROS       Musculoskeletal:  - neg musculoskeletal ROS       GI/Hepatic:  - neg GI/hepatic ROS       Renal/Genitourinary: Comment: Unilateral atherosclerotic renal artery stenosis     (+) chronic renal disease, BPH,       Endo:  - neg endo ROS       Psychiatric:     (+) psychiatric history anxiety and depression      Infectious Disease:  - neg infectious disease ROS       Malignancy:   (+) Malignancy History of Prostate and Other  Prostate CA Remission status post Surgery,         Other:                          Physical Exam  Normal systems: cardiovascular, pulmonary and dental    Airway   Mallampati: I  TM distance: >3 FB  Neck ROM: full    Dental     Cardiovascular       Pulmonary             Lab Results   Component Value Date    WBC 5.7 04/12/2019    HGB 15.6 04/12/2019    HCT 45.5 04/12/2019     04/12/2019    SED 4 04/20/2011     04/12/2019    POTASSIUM 4.1 04/12/2019    CHLORIDE 104 04/12/2019    CO2 29 04/12/2019    BUN 15 04/12/2019    CR 1.09 04/12/2019    GLC 86 04/12/2019    KARL 8.9 04/12/2019    PHOS 3.7 06/07/2007    MAG 2.0 06/07/2007    ALBUMIN 3.8 04/12/2019    PROTTOTAL 7.3 04/12/2019    ALT 12 04/12/2019    AST 13 04/12/2019    ALKPHOS 64 04/12/2019    BILITOTAL 0.5 04/12/2019    PTT 39 (H) 06/09/2015    INR 0.94 06/09/2015    TSH 1.48 04/20/2016    T4 1.10 04/20/2011    T3 125 04/20/2011       Preop Vitals  BP Readings from Last 3 Encounters:   05/07/19 (!) 141/92   04/12/19 (!) 146/98   04/10/19 138/88    Pulse Readings from Last  "3 Encounters:   05/07/19 78   04/12/19 76   04/08/19 64      Resp Readings from Last 3 Encounters:   05/07/19 18   04/12/19 18   03/06/18 16    SpO2 Readings from Last 3 Encounters:   05/29/18 100%   04/02/18 97%   02/27/18 97%      Temp Readings from Last 1 Encounters:   04/12/19 36.3  C (97.3  F) (Tympanic)    Ht Readings from Last 1 Encounters:   05/07/19 1.734 m (5' 8.25\")      Wt Readings from Last 1 Encounters:   05/07/19 65.8 kg (145 lb)    Estimated body mass index is 21.89 kg/m  as calculated from the following:    Height as of 5/7/19: 1.734 m (5' 8.25\").    Weight as of 5/7/19: 65.8 kg (145 lb).       Anesthesia Plan      History & Physical Review  History and physical reviewed and following examination; no interval change.    ASA Status:  3 .    NPO Status:  > 4 hours    Plan for MAC Maintenance will be Balanced.  Reason for MAC:  Deep or markedly invasive procedure (G8)         Postoperative Care      Consents  Anesthetic plan, risks, benefits and alternatives discussed with:  Patient and Other (See Comment)..                 Drew Schmidt CRNA, APRN CRNA  "

## 2019-05-09 ENCOUNTER — ANESTHESIA (OUTPATIENT)
Dept: GASTROENTEROLOGY | Facility: CLINIC | Age: 74
End: 2019-05-09
Payer: COMMERCIAL

## 2019-05-09 ENCOUNTER — HOSPITAL ENCOUNTER (OUTPATIENT)
Facility: CLINIC | Age: 74
Discharge: HOME | End: 2019-05-09
Attending: SURGERY | Admitting: SURGERY
Payer: COMMERCIAL

## 2019-05-09 VITALS
BODY MASS INDEX: 23.49 KG/M2 | WEIGHT: 155 LBS | TEMPERATURE: 97.6 F | OXYGEN SATURATION: 97 % | HEIGHT: 68 IN | HEART RATE: 90 BPM | DIASTOLIC BLOOD PRESSURE: 97 MMHG | RESPIRATION RATE: 16 BRPM | SYSTOLIC BLOOD PRESSURE: 125 MMHG

## 2019-05-09 LAB — COLONOSCOPY: NORMAL

## 2019-05-09 PROCEDURE — 37000008 ZZH ANESTHESIA TECHNICAL FEE, 1ST 30 MIN: Performed by: SURGERY

## 2019-05-09 PROCEDURE — 25800030 ZZH RX IP 258 OP 636: Performed by: NURSE ANESTHETIST, CERTIFIED REGISTERED

## 2019-05-09 PROCEDURE — G0121 COLON CA SCRN NOT HI RSK IND: HCPCS | Performed by: SURGERY

## 2019-05-09 PROCEDURE — 25000128 H RX IP 250 OP 636: Performed by: NURSE ANESTHETIST, CERTIFIED REGISTERED

## 2019-05-09 PROCEDURE — 45378 DIAGNOSTIC COLONOSCOPY: CPT | Performed by: SURGERY

## 2019-05-09 PROCEDURE — 25000125 ZZHC RX 250: Performed by: NURSE ANESTHETIST, CERTIFIED REGISTERED

## 2019-05-09 RX ORDER — PROPOFOL 10 MG/ML
INJECTION, EMULSION INTRAVENOUS PRN
Status: DISCONTINUED | OUTPATIENT
Start: 2019-05-09 | End: 2019-05-09

## 2019-05-09 RX ORDER — LIDOCAINE HYDROCHLORIDE 10 MG/ML
INJECTION, SOLUTION INFILTRATION; PERINEURAL PRN
Status: DISCONTINUED | OUTPATIENT
Start: 2019-05-09 | End: 2019-05-09

## 2019-05-09 RX ORDER — PROPOFOL 10 MG/ML
INJECTION, EMULSION INTRAVENOUS CONTINUOUS PRN
Status: DISCONTINUED | OUTPATIENT
Start: 2019-05-09 | End: 2019-05-09

## 2019-05-09 RX ORDER — GLYCOPYRROLATE 0.2 MG/ML
INJECTION, SOLUTION INTRAMUSCULAR; INTRAVENOUS PRN
Status: DISCONTINUED | OUTPATIENT
Start: 2019-05-09 | End: 2019-05-09

## 2019-05-09 RX ORDER — SODIUM CHLORIDE, SODIUM LACTATE, POTASSIUM CHLORIDE, CALCIUM CHLORIDE 600; 310; 30; 20 MG/100ML; MG/100ML; MG/100ML; MG/100ML
INJECTION, SOLUTION INTRAVENOUS CONTINUOUS PRN
Status: DISCONTINUED | OUTPATIENT
Start: 2019-05-09 | End: 2019-05-09

## 2019-05-09 RX ORDER — ONDANSETRON 2 MG/ML
4 INJECTION INTRAMUSCULAR; INTRAVENOUS
Status: DISCONTINUED | OUTPATIENT
Start: 2019-05-09 | End: 2019-05-09 | Stop reason: HOSPADM

## 2019-05-09 RX ORDER — LIDOCAINE 40 MG/G
CREAM TOPICAL
Status: DISCONTINUED | OUTPATIENT
Start: 2019-05-09 | End: 2019-05-09 | Stop reason: HOSPADM

## 2019-05-09 RX ORDER — SODIUM CHLORIDE, SODIUM LACTATE, POTASSIUM CHLORIDE, CALCIUM CHLORIDE 600; 310; 30; 20 MG/100ML; MG/100ML; MG/100ML; MG/100ML
INJECTION, SOLUTION INTRAVENOUS CONTINUOUS
Status: DISCONTINUED | OUTPATIENT
Start: 2019-05-09 | End: 2019-05-09 | Stop reason: HOSPADM

## 2019-05-09 RX ADMIN — LIDOCAINE HYDROCHLORIDE 30 MG: 10 INJECTION, SOLUTION INFILTRATION; PERINEURAL at 10:11

## 2019-05-09 RX ADMIN — GLYCOPYRROLATE 0.2 MG: 0.2 INJECTION, SOLUTION INTRAMUSCULAR; INTRAVENOUS at 10:10

## 2019-05-09 RX ADMIN — PROPOFOL 200 MCG/KG/MIN: 10 INJECTION, EMULSION INTRAVENOUS at 10:10

## 2019-05-09 RX ADMIN — SODIUM CHLORIDE, POTASSIUM CHLORIDE, SODIUM LACTATE AND CALCIUM CHLORIDE: 600; 310; 30; 20 INJECTION, SOLUTION INTRAVENOUS at 10:09

## 2019-05-09 RX ADMIN — PROPOFOL 40 MG: 10 INJECTION, EMULSION INTRAVENOUS at 10:11

## 2019-05-09 ASSESSMENT — COPD QUESTIONNAIRES: CAT_SEVERITY: MILD

## 2019-05-09 ASSESSMENT — MIFFLIN-ST. JEOR: SCORE: 1422.58

## 2019-05-09 NOTE — ANESTHESIA POSTPROCEDURE EVALUATION
Patient: Adolfo Rendon    Procedure(s):  COLONOSCOPY    Diagnosis:screening  Diagnosis Additional Information: No value filed.    Anesthesia Type:  No value filed.    Note:  Anesthesia Post Evaluation    Patient location during evaluation: Phase 2  Patient participation: Able to fully participate in evaluation  Level of consciousness: awake  Pain management: adequate  Airway patency: patent  Cardiovascular status: acceptable and hemodynamically stable  Respiratory status: acceptable, room air and spontaneous ventilation  Hydration status: acceptable  PONV: none     Anesthetic complications: None          Last vitals:  Vitals:    05/09/19 0921   BP: (!) 131/103   Pulse: 90   Resp: 18   Temp: 36.4  C (97.6  F)   SpO2: 98%         Electronically Signed By: ANTONIO Gold CRNA  May 9, 2019  10:15 AM

## 2019-05-09 NOTE — H&P
"73 year old year old male here for colonoscopy for screening.    Patient Active Problem List   Diagnosis     MIXED HYPERLIPIDEMIA     Benign neoplasm of epididymis     Malignant neoplasm of prostate (H)     Major depressive disorder, recurrent episode, severe (H)     HL (hearing loss)     HYPERLIPIDEMIA LDL GOAL <130     Muscle pain     Tobacco abuse     SANTIAGO (dyspnea on exertion)     Advanced directives, counseling/discussion     Generalized anxiety disorder     Right inguinal hernia     Health Care Home     HTN, goal below 140/90     COPD (chronic obstructive pulmonary disease) (H)     Thoracic aortic aneurysm without rupture (H)     Centrilobular emphysema (H)     Aortic arch aneurysm (H)     Unilateral atherosclerotic renal artery stenosis (H)     Pulmonary nodules       Past Medical History:   Diagnosis Date     Depressive disorder, not elsewhere classified      Hypertrophy (benign) of prostate      Impotence of organic origin      Other and unspecified hyperlipidemia      Other anxiety states      Tobacco use disorder        Past Surgical History:   Procedure Laterality Date     APPENDECTOMY  1966     COLONOSCOPY  3/1/2005     HERNIORRHAPHY INGUINAL  7/14/2011    Procedure:HERNIORRHAPHY INGUINAL; Open Repair Right Inguinal Hernia Repair        HYDROCELECTOMY SCROTAL  01/2004    left hydrocele repair     SUPRAPUBIC PROSTATECTOMY         @[facility]@    No current outpatient medications on file.       Allergies   Allergen Reactions     Claritin      Mood , irritablity      Remeron [Mirtazapine]      Agitation        Wellbutrin [Bupropion Hcl]      Sig mood issues          Pt reports that he has been smoking cigarettes.  He has been smoking about 1.00 pack per day. He quit smokeless tobacco use about 20 months ago. He reports that he does not drink alcohol or use drugs.    Exam:  BP (!) 131/103 (BP Location: Right arm)   Pulse 90   Temp 97.6  F (36.4  C) (Oral)   Resp 18   Ht 1.727 m (5' 8\")   Wt 70.3 kg (155 lb)  "  SpO2 98%   BMI 23.57 kg/m      Awake, Alert OX3  Lungs - CTA bilaterally  CV - RRR, no murmurs, distal pulses intact  Abd - soft, non-distended, non-tender, +BS  Extr - No cyanosis or edema    A/P 73 year old year old male in need of colonoscopy for screening. Risks, benefits, alternatives, and complications were discussed including the possibility of perforation and the patient agreed to proceed    Camilo Beard MD

## 2019-05-09 NOTE — ANESTHESIA CARE TRANSFER NOTE
Patient: Adolfo Rendon    Procedure(s):  COLONOSCOPY    Diagnosis: screening  Diagnosis Additional Information: No value filed.    Anesthesia Type:   No value filed.     Note:  Airway :Room Air  Patient transferred to:Phase II  Handoff Report: Identifed the Patient, Identified the Reponsible Provider, Reviewed the pertinent medical history, Discussed the surgical course, Reviewed Intra-OP anesthesia mangement and issues during anesthesia, Set expectations for post-procedure period and Allowed opportunity for questions and acknowledgement of understanding      Vitals: (Last set prior to Anesthesia Care Transfer)    CRNA VITALS  5/9/2019 0954 - 5/9/2019 1024      5/9/2019             Pulse:  91    Ht Rate:  90    SpO2:  98 %                Electronically Signed By: ANTONIO Gold CRNA  May 9, 2019  10:24 AM

## 2019-05-19 PROBLEM — K57.30 DIVERTICULOSIS OF LARGE INTESTINE: Status: ACTIVE | Noted: 2019-05-19

## 2019-05-19 PROBLEM — K51.20: Status: ACTIVE | Noted: 2019-05-19

## 2019-07-25 DIAGNOSIS — I25.10 CORONARY ARTERY DISEASE INVOLVING NATIVE CORONARY ARTERY OF NATIVE HEART WITHOUT ANGINA PECTORIS: ICD-10-CM

## 2019-07-25 LAB
ALT SERPL W P-5'-P-CCNC: 14 U/L (ref 0–70)
ANION GAP SERPL CALCULATED.3IONS-SCNC: 7 MMOL/L (ref 3–14)
BUN SERPL-MCNC: 14 MG/DL (ref 7–30)
CALCIUM SERPL-MCNC: 8.9 MG/DL (ref 8.5–10.1)
CHLORIDE SERPL-SCNC: 105 MMOL/L (ref 94–109)
CHOLEST SERPL-MCNC: 227 MG/DL
CO2 SERPL-SCNC: 28 MMOL/L (ref 20–32)
CREAT SERPL-MCNC: 0.97 MG/DL (ref 0.66–1.25)
GFR SERPL CREATININE-BSD FRML MDRD: 77 ML/MIN/{1.73_M2}
GLUCOSE SERPL-MCNC: 91 MG/DL (ref 70–99)
HDLC SERPL-MCNC: 57 MG/DL
LDLC SERPL CALC-MCNC: 150 MG/DL
NONHDLC SERPL-MCNC: 170 MG/DL
POTASSIUM SERPL-SCNC: 3.9 MMOL/L (ref 3.4–5.3)
SODIUM SERPL-SCNC: 140 MMOL/L (ref 133–144)
TRIGL SERPL-MCNC: 101 MG/DL

## 2019-07-25 PROCEDURE — 80061 LIPID PANEL: CPT | Performed by: PHYSICIAN ASSISTANT

## 2019-07-25 PROCEDURE — 36415 COLL VENOUS BLD VENIPUNCTURE: CPT | Performed by: PHYSICIAN ASSISTANT

## 2019-07-25 PROCEDURE — 84460 ALANINE AMINO (ALT) (SGPT): CPT | Performed by: PHYSICIAN ASSISTANT

## 2019-07-25 PROCEDURE — 80048 BASIC METABOLIC PNL TOTAL CA: CPT | Performed by: PHYSICIAN ASSISTANT

## 2019-07-25 NOTE — RESULT ENCOUNTER NOTE
Results noted: slight improvement in lipids, ALT, electrolytes and kidney function WNL. To be discussed at OV with Dr Espino on 8/1/19.

## 2019-08-01 ENCOUNTER — OFFICE VISIT (OUTPATIENT)
Dept: CARDIOLOGY | Facility: CLINIC | Age: 74
End: 2019-08-01
Attending: PHYSICIAN ASSISTANT
Payer: COMMERCIAL

## 2019-08-01 VITALS
WEIGHT: 144.8 LBS | HEART RATE: 66 BPM | SYSTOLIC BLOOD PRESSURE: 166 MMHG | DIASTOLIC BLOOD PRESSURE: 105 MMHG | BODY MASS INDEX: 22.02 KG/M2 | OXYGEN SATURATION: 97 %

## 2019-08-01 DIAGNOSIS — I25.9 CHEST PAIN DUE TO MYOCARDIAL ISCHEMIA, UNSPECIFIED ISCHEMIC CHEST PAIN TYPE: Primary | ICD-10-CM

## 2019-08-01 DIAGNOSIS — I25.10 CORONARY ARTERY DISEASE INVOLVING NATIVE CORONARY ARTERY OF NATIVE HEART WITHOUT ANGINA PECTORIS: ICD-10-CM

## 2019-08-01 PROBLEM — R07.9 CHEST PAIN: Status: ACTIVE | Noted: 2019-08-01

## 2019-08-01 PROCEDURE — 99205 OFFICE O/P NEW HI 60 MIN: CPT | Performed by: INTERNAL MEDICINE

## 2019-08-01 RX ORDER — LIDOCAINE 40 MG/G
CREAM TOPICAL
Status: CANCELLED | OUTPATIENT
Start: 2019-08-01

## 2019-08-01 RX ORDER — ASPIRIN 81 MG/1
81 TABLET ORAL ONCE
Status: CANCELLED | OUTPATIENT
Start: 2019-08-01 | End: 2019-08-01

## 2019-08-01 RX ORDER — ISOSORBIDE MONONITRATE 30 MG/1
30 TABLET, EXTENDED RELEASE ORAL DAILY
Qty: 30 TABLET | Refills: 3 | Status: ON HOLD | OUTPATIENT
Start: 2019-08-01 | End: 2019-09-09

## 2019-08-01 RX ORDER — CARVEDILOL 3.12 MG/1
3.12 TABLET ORAL 2 TIMES DAILY WITH MEALS
Qty: 60 TABLET | Refills: 3 | Status: SHIPPED | OUTPATIENT
Start: 2019-08-01 | End: 2019-08-16

## 2019-08-01 RX ORDER — SODIUM CHLORIDE 9 MG/ML
INJECTION, SOLUTION INTRAVENOUS CONTINUOUS
Status: CANCELLED | OUTPATIENT
Start: 2019-08-01

## 2019-08-01 NOTE — PROGRESS NOTES
"HPI and Plan:   See dictation [id number]    No orders of the defined types were placed in this encounter.    No orders of the defined types were placed in this encounter.    Medications Discontinued During This Encounter   Medication Reason     amLODIPine (NORVASC) 2.5 MG tablet Stopped by Patient     ezetimibe (ZETIA) 10 MG tablet Stopped by Patient     hydrochlorothiazide (HYDRODIURIL) 25 MG tablet Stopped by Patient     lisinopril (PRINIVIL/ZESTRIL) 40 MG tablet Stopped by Patient     metoprolol succinate ER (TOPROL-XL) 50 MG 24 hr tablet Stopped by Patient     pravastatin (PRAVACHOL) 20 MG tablet Stopped by Patient     albuterol (PROAIR HFA/PROVENTIL HFA/VENTOLIN HFA) 108 (90 BASE) MCG/ACT Inhaler Stopped by Patient         Encounter Diagnoses   Name Primary?     Coronary artery disease involving native coronary artery of native heart without angina pectoris      Chest pain Yes       CURRENT MEDICATIONS:  Current Outpatient Medications   Medication Sig Dispense Refill     aspirin 81 MG tablet ONE DAILY 100 tablet 3     ipratropium (ATROVENT HFA) 17 MCG/ACT inhaler Inhale 2 puffs into the lungs every 6 hours 12.9 g 5     PARoxetine (PAXIL) 20 MG tablet Take 1 tablet (20 mg) by mouth every morning Add to 40 mg daily 90 tablet 1     PARoxetine (PAXIL) 40 MG tablet Take 1 tablet (40 mg) by mouth every morning Add to 20 mg daily 90 tablet 1     salmeterol (SEREVENT) 50 MCG/DOSE inhaler Inhale 1 puff into the lungs 2 times daily 60 each 5       ALLERGIES     Allergies   Allergen Reactions     Amlodipine Fatigue     \"felt like zombie\"     Claritin      Mood , irritablity      Hctz [Hydrochlorothiazide] Fatigue     \"felt like zombie\"     Lisinopril Fatigue     \"felt like zombie\"     Metoprolol Fatigue     \"felt like zombie\"     Pravastatin Fatigue     \"felt like zombie\"     Remeron [Mirtazapine]      Agitation        Wellbutrin [Bupropion Hcl]      Sig mood issues          PAST MEDICAL HISTORY:  Past Medical History: "   Diagnosis Date     Coronary artery disease      Depressive disorder, not elsewhere classified      Hypertrophy (benign) of prostate      Impotence of organic origin      Other and unspecified hyperlipidemia      Other anxiety states      Tobacco use disorder        PAST SURGICAL HISTORY:  Past Surgical History:   Procedure Laterality Date     APPENDECTOMY  1966     COLONOSCOPY  3/1/2005     COLONOSCOPY N/A 2019    Procedure: COLONOSCOPY;  Surgeon: Camilo Beard MD;  Location: [facility]     HERNIORRHAPHY INGUINAL  2011    Procedure:HERNIORRHAPHY INGUINAL; Open Repair Right Inguinal Hernia Repair        HYDROCELECTOMY SCROTAL  2004    left hydrocele repair     SUPRAPUBIC PROSTATECTOMY         FAMILY HISTORY:  Family History   Problem Relation Age of Onset     Cerebrovascular Disease Mother      Hypertension Mother      Prostate Cancer Brother      Dementia Brother      Arthritis Sister         rhematoid     Cancer Brother         Lung cancer, lymphoma     Other - See Comments Brother         HIV     Cancer Sister         skin cancer on nose     Pacemaker Sister      Aneurysm Sister         heart       SOCIAL HISTORY:  Social History     Socioeconomic History     Marital status:      Spouse name: None     Number of children: None     Years of education: None     Highest education level: None   Occupational History     None   Social Needs     Financial resource strain: None     Food insecurity:     Worry: None     Inability: None     Transportation needs:     Medical: None     Non-medical: None   Tobacco Use     Smoking status: Current Every Day Smoker     Packs/day: 1.00     Types: Cigarettes     Last attempt to quit: 2017     Years since quittin.5     Smokeless tobacco: Former User     Quit date: 2017   Substance and Sexual Activity     Alcohol use: No     Alcohol/week: 0.0 oz     Comment: quit      Drug use: No     Sexual activity: Not Currently   Lifestyle     Physical activity:      Days per week: None     Minutes per session: None     Stress: None   Relationships     Social connections:     Talks on phone: None     Gets together: None     Attends Hinduism service: None     Active member of club or organization: None     Attends meetings of clubs or organizations: None     Relationship status: None     Intimate partner violence:     Fear of current or ex partner: None     Emotionally abused: None     Physically abused: None     Forced sexual activity: None   Other Topics Concern     Parent/sibling w/ CABG, MI or angioplasty before 65F 55M? Not Asked      Service Not Asked     Blood Transfusions Not Asked     Caffeine Concern No     Comment: 2-3 cups of coffee in the morning     Occupational Exposure Not Asked     Hobby Hazards Not Asked     Sleep Concern No     Comment: falls asleep easy but doesn't sleep long     Stress Concern No     Weight Concern No     Special Diet No     Back Care Not Asked     Exercise No     Comment: work: hard labor     Bike Helmet Not Asked     Seat Belt Yes     Self-Exams Not Asked   Social History Narrative     None       Review of Systems:  Skin:  Negative     Eyes:  Positive for glasses  ENT:  Positive for hearing loss;nasal congestion  Respiratory:  Positive for dyspnea on exertion;cough;wheezing  Cardiovascular:    Positive for;fatigue;lightheadedness  Gastroenterology: Negative    Genitourinary:  Negative    Musculoskeletal:  Positive for joint pain  Neurologic:  Positive for seizures  Psychiatric:  Positive for sleep disturbances;anxiety;depression  Heme/Lymph/Imm:  Negative    Endocrine:  Negative      Physical Exam:  Vitals: BP (!) 166/105 (BP Location: Right arm, Patient Position: Sitting, Cuff Size: Adult Regular)   Pulse 66   Wt 65.7 kg (144 lb 12.8 oz)   SpO2 97%   BMI 22.02 kg/m      Recent Lab Results:  LIPID RESULTS:  Lab Results   Component Value Date    CHOL 227 (H) 07/25/2019    HDL 57 07/25/2019     (H) 07/25/2019    TRIG 101  07/25/2019    CHOLHDLRATIO 4.1 03/24/2015       LIVER ENZYME RESULTS:  Lab Results   Component Value Date    AST 13 04/12/2019    ALT 14 07/25/2019       CBC RESULTS:  Lab Results   Component Value Date    WBC 5.7 04/12/2019    RBC 5.55 04/12/2019    HGB 15.6 04/12/2019    HCT 45.5 04/12/2019    MCV 82 04/12/2019    MCH 28.1 04/12/2019    MCHC 34.3 04/12/2019    RDW 14.2 04/12/2019     04/12/2019       BMP RESULTS:  Lab Results   Component Value Date     07/25/2019    POTASSIUM 3.9 07/25/2019    CHLORIDE 105 07/25/2019    CO2 28 07/25/2019    ANIONGAP 7 07/25/2019    GLC 91 07/25/2019    BUN 14 07/25/2019    CR 0.97 07/25/2019    GFRESTIMATED 77 07/25/2019    GFRESTBLACK 89 07/25/2019    KARL 8.9 07/25/2019        A1C RESULTS:  Lab Results   Component Value Date    A1C 5.5 04/20/2016       INR RESULTS:  Lab Results   Component Value Date    INR 0.94 06/09/2015           CC  ARDEN KanC  [facility]  [address] [place] [address]

## 2019-08-01 NOTE — PROGRESS NOTES
Service Date: 08/01/2019      REASON FOR VISIT:  Coronary artery disease.      HISTORY OF PRESENTING ILLNESS:  This is a very pleasant, 73-year-old gentleman who is presenting to the Cardiology office today.  He was a patient of Dr. Nunez, who since has left the practice, and as such, he is following up with me.  The patient's cardiovascular history includes a history of coronary artery disease, including a cardiac catheterization in 2015 that showed a chronically occluded right coronary artery with left-to-right collaterals and a 50% proximal LAD stenosis.  The patient over the years has had marked intolerance to multiple medications that have been put on his allergy list, including amlodipine, hydrochlorothiazide, high-potency statin and metoprolol, etc.  More recently, he used to be on pravastatin, which he stopped.  He has also stopped all his antihypertensive medications.  Blood pressure at home runs in the 140-160 range systolic.  His LDLs have consistently been in the 150 range.  He takes his aspirin diligently, but no other cardiac medications.  No active bleeding problems.      He is here to establish care.  He was last seen in the Cardiology Clinic last year by Danielle Carvajal.  Today he tells me that he is just getting out of wind and out of stamina.  Doing any strenuous activity is significantly burdensome.  Occasionally, he gets right-sided chest heaviness that radiates to his right neck.  Clearly, he has significant vascular disease.  He denies any syncope, presyncope, palpitations, lower extremity edema or orthopnea.      ALLERGIES:  Include amlodipine, hydrochlorothiazide, high-potency statin and metoprolol.      PHYSICAL EXAMINATION:   VITAL SIGNS:  Blood pressure is 166/105 with a pulse of 66.  Weight is 144 pounds.   GENERAL:  Alert and oriented x3, in no acute distress.   NECK:  Supple.  JVP of 6 cm.   LUNGS:  Clear to auscultation bilaterally.   CARDIAC:  Cardiac sounds are regular  "without murmurs, rubs or gallops.   EXTREMITIES:  Warm without edema.   PSYCHIATRIC:  Appropriate affect.   ABDOMEN:  Soft, nontender and nondistended.      PERTINENT DATA:  LDL is 150, HDL 57, total cholesterol 227.  Creatinine is normal, potassium and sodium normal.  Hemoglobin is normal at 15.6.  Last nuclear stress test was in 03/2018, which showed inferior defect with no anterior ischemia.  Last coronary angiography was in 2015.  He also has a mild ascending aortic aneurysm at 40 mm being followed up with Vascular Medicine.        ASSESSMENT AND PLAN:  Mr. Rendon is a 73-year-old gentleman with a history as mentioned above.  He has clearly significant risk factors for progression for coronary artery disease, including active smoking, a very uncontrolled lipid panel and uncontrolled blood pressures due to \"medication intolerance.\"  Today he tells me that despite not being on these medications, he is still fatigued and extremely tired.  As such, he was wondering during our conversation if it is actually his medications, or is there something else going on.  I completely agree that he has multiple different classes of medications that have been put on his allergy list, and as such, I do not think it is these medications that are causing him to be fatigued.  After an extensive discussion, we decided to start Imdur 30 mg and carvedilol 3.125 mg b.i.d.  His PCP had recently prescribed him Zetia, which he has not started, he says, but I have encouraged him to start 10 mg of Zetia at this time.  He wishes to hold off on a statin, which I think is okay at this time, and let us see how he is doing with the other meds that we are starting.  I want him to follow up with his blood pressures very closely.  Given his right-sided chest discomfort and his known coronary history, I recommend coronary angiography with intent to revascularize. We will also start off with an echocardiogram, and I will see him back in clinic with " all of these results.  Should dual antiplatelet therapy be recommended in case a PCI were to be undertaken, he is okay with taking that.  He reports no upcoming surgeries or bleeding problems.  I will let him follow up with his Vascular doctors for his renal artery stenosis that has been noted on the CT and his mild ascending thoracic aneurysm.  I will see him back in about a month.     Risks and benefits of the procedure were discussed with the patient in detail that includes but not limited to the risk of stroke, heart attack, death, cardiac or vascular perforation, emergent stenting or bypass, contrast induced allergic reaction, renal dysfunction (including risks of temporary or permanent dialysis), and pulmonary, sedation, and vascular complications (including bleeding and transfusion). Patient denies any major active bleeding issues and is willing to take and comply with dual antiplatelet therapy and understands associated bleeding risks. Patient understands the overall risks of the procedure and wishes to proceed.    cc:   Danielle Carvajal PA-C   [facility]    [address] [place] [address]      ANTONIO Najera, CNP   [address] [address]         MAMIE SMITH MD             D: 2019   T: 2019   MT: elliot      Name:     RACHAEL COVARRUBIAS   MRN:      [id number]        Account:      [id number]   :      1945           Service Date: 2019      Document: [id number]

## 2019-08-01 NOTE — LETTER
8/1/2019      Danielle Mercado, APRN CNP  [address] [facility] [address]      RE: Adolfo Rendon       Dear Colleague,    I had the pleasure of seeing Adolfo Rendon in the [facility].    Service Date: 08/01/2019      REASON FOR VISIT:  Coronary artery disease.      HISTORY OF PRESENTING ILLNESS:  This is a very pleasant, 73-year-old gentleman who is presenting to the Cardiology office today.  He was a patient of Dr. Nunez, who since has left the practice, and as such, he is following up with me.  The patient's cardiovascular history includes a history of coronary artery disease, including a cardiac catheterization in 2015 that showed a chronically occluded right coronary artery with left-to-right collaterals and a 50% proximal LAD stenosis.  The patient over the years has had marked intolerance to multiple medications that have been put on his allergy list, including amlodipine, hydrochlorothiazide, high-potency statin and metoprolol, etc.  More recently, he used to be on pravastatin, which he stopped.  He has also stopped all his antihypertensive medications.  Blood pressure at home runs in the 140-160 range systolic.  His LDLs have consistently been in the 150 range.  He takes his aspirin diligently, but no other cardiac medications.  No active bleeding problems.      He is here to establish care.  He was last seen in the Cardiology Clinic last year by Danielle Carvajal.  Today he tells me that he is just getting out of wind and out of stamina.  Doing any strenuous activity is significantly burdensome.  Occasionally, he gets right-sided chest heaviness that radiates to his right neck.  Clearly, he has significant vascular disease.  He denies any syncope, presyncope, palpitations, lower extremity edema or orthopnea.      ALLERGIES:  Include amlodipine, hydrochlorothiazide, high-potency statin and metoprolol.          PHYSICAL EXAMINATION:   VITAL SIGNS:  Blood  "pressure is 166/105 with a pulse of 66.  Weight is 144 pounds.   GENERAL:  Alert and oriented x3, in no acute distress.   NECK:  Supple.  JVP of 6 cm.   LUNGS:  Clear to auscultation bilaterally.   CARDIAC:  Cardiac sounds are regular without murmurs, rubs or gallops.   EXTREMITIES:  Warm without edema.   PSYCHIATRIC:  Appropriate affect.   ABDOMEN:  Soft, nontender and nondistended.      PERTINENT DATA:  LDL is 150, HDL 57, total cholesterol 227.  Creatinine is normal, potassium and sodium normal.  Hemoglobin is normal at 15.6.  Last nuclear stress test was in 03/2018, which showed inferior defect with no anterior ischemia.  Last coronary angiography was in 2015.  He also has a mild ascending aortic aneurysm at 40 mm being followed up with Vascular Medicine.        ASSESSMENT AND PLAN:  Mr. Rendon is a 73-year-old gentleman with a history as mentioned above.  He has clearly significant risk factors for progression for coronary artery disease, including active smoking, a very uncontrolled lipid panel and uncontrolled blood pressures due to \"medication intolerance.\"  Today he tells me that despite not being on these medications, he is still fatigued and extremely tired.  As such, he was wondering during our conversation if it is actually his medications, or is there something else going on.  I completely agree that he has multiple different classes of medications that have been put on his allergy list, and as such, I do not think it is these medications that are causing him to be fatigued.  After an extensive discussion, we decided to start Imdur 30 mg and carvedilol 3.125 mg b.i.d.  His PCP had recently prescribed him Zetia, which he has not started, he says, but I have encouraged him to start 10 mg of Zetia at this time.  He wishes to hold off on a statin, which I think is okay at this time, and let us see how he is doing with the other meds that we are starting.  I want him to follow up with his blood pressures " very closely.  Given his right-sided chest discomfort and his known coronary history, I recommend coronary angiography with intent to revascularize. We will also start off with an echocardiogram, and I will see him back in clinic with all of these results.  Should dual antiplatelet therapy be recommended in case a PCI were to be undertaken, he is okay with taking that.  He reports no upcoming surgeries or bleeding problems.  I will let him follow up with his Vascular doctors for his renal artery stenosis that has been noted on the CT and his mild ascending thoracic aneurysm.  I will see him back in about a month.     Risks and benefits of the procedure were discussed with the patient in detail that includes but not limited to the risk of stroke, heart attack, death, cardiac or vascular perforation, emergent stenting or bypass, contrast induced allergic reaction, renal dysfunction (including risks of temporary or permanent dialysis), and pulmonary, sedation, and vascular complications (including bleeding and transfusion). Patient denies any major active bleeding issues and is willing to take and comply with dual antiplatelet therapy and understands associated bleeding risks. Patient understands the overall risks of the procedure and wishes to proceed.    cc:   Danielle Carvajal PA-C   [facility]    [address] [place] [address]      ANTONIO Najera, CNP   [facility]    [address] [place] [address]         MAMIE SMITH MD             D: 2019   T: 2019   MT: elliot      Name:     RACHAEL COVARRUBIAS   MRN:      [id number]        Account:      [id number]   :      1945           Service Date: 2019      Document: [id number]           Outpatient Encounter Medications as of 2019   Medication Sig Dispense Refill     aspirin 81 MG tablet ONE DAILY 100 tablet 3     carvedilol (COREG) 3.125 MG tablet Take 1 tablet (3.125 mg) by  mouth 2 times daily (with meals) 60 tablet 3     ipratropium (ATROVENT HFA) 17 MCG/ACT inhaler Inhale 2 puffs into the lungs every 6 hours 12.9 g 5     isosorbide mononitrate (IMDUR) 30 MG 24 hr tablet Take 1 tablet (30 mg) by mouth daily 30 tablet 3     PARoxetine (PAXIL) 20 MG tablet Take 1 tablet (20 mg) by mouth every morning Add to 40 mg daily 90 tablet 1     PARoxetine (PAXIL) 40 MG tablet Take 1 tablet (40 mg) by mouth every morning Add to 20 mg daily 90 tablet 1     salmeterol (SEREVENT) 50 MCG/DOSE inhaler Inhale 1 puff into the lungs 2 times daily 60 each 5     [DISCONTINUED] albuterol (PROAIR HFA/PROVENTIL HFA/VENTOLIN HFA) 108 (90 BASE) MCG/ACT Inhaler Inhale 2 puffs into the lungs every 6 hours as needed for shortness of breath / dyspnea or wheezing 1 Inhaler 4     [DISCONTINUED] amLODIPine (NORVASC) 2.5 MG tablet Take 1 tablet (2.5 mg) by mouth daily 90 tablet 3     [DISCONTINUED] ezetimibe (ZETIA) 10 MG tablet Take 1 tablet (10 mg) by mouth daily 90 tablet 1     [DISCONTINUED] hydrochlorothiazide (HYDRODIURIL) 25 MG tablet Take 1 tablet (25 mg) by mouth daily 90 tablet 3     [DISCONTINUED] lisinopril (PRINIVIL/ZESTRIL) 40 MG tablet Take 1 tablet (40 mg) by mouth daily 90 tablet 3     [DISCONTINUED] metoprolol succinate ER (TOPROL-XL) 50 MG 24 hr tablet Take 1 tablet (50 mg) by mouth daily 90 tablet 1     [DISCONTINUED] pravastatin (PRAVACHOL) 20 MG tablet Take 1 tablet (20 mg) by mouth daily 90 tablet 3     No facility-administered encounter medications on file as of 8/1/2019.        Again, thank you for allowing me to participate in the care of your patient.      Sincerely,    Lucy Espino MD     [facility]

## 2019-08-06 ENCOUNTER — HOSPITAL ENCOUNTER (OUTPATIENT)
Dept: CARDIOLOGY | Facility: CLINIC | Age: 74
Discharge: HOME | End: 2019-08-06
Attending: INTERNAL MEDICINE | Admitting: INTERNAL MEDICINE
Payer: COMMERCIAL

## 2019-08-06 ENCOUNTER — TELEPHONE (OUTPATIENT)
Dept: FAMILY MEDICINE | Facility: CLINIC | Age: 74
End: 2019-08-06

## 2019-08-06 ENCOUNTER — ALLIED HEALTH/NURSE VISIT (OUTPATIENT)
Dept: FAMILY MEDICINE | Facility: CLINIC | Age: 74
End: 2019-08-06
Payer: COMMERCIAL

## 2019-08-06 VITALS — OXYGEN SATURATION: 96 % | SYSTOLIC BLOOD PRESSURE: 174 MMHG | HEART RATE: 63 BPM | DIASTOLIC BLOOD PRESSURE: 106 MMHG

## 2019-08-06 DIAGNOSIS — Z00.00 HEALTH CARE MAINTENANCE: Primary | ICD-10-CM

## 2019-08-06 DIAGNOSIS — I25.9 CHEST PAIN DUE TO MYOCARDIAL ISCHEMIA, UNSPECIFIED ISCHEMIC CHEST PAIN TYPE: ICD-10-CM

## 2019-08-06 DIAGNOSIS — I25.10 CORONARY ARTERY DISEASE INVOLVING NATIVE CORONARY ARTERY OF NATIVE HEART WITHOUT ANGINA PECTORIS: ICD-10-CM

## 2019-08-06 PROCEDURE — 99207 ZZC NO CHARGE NURSE ONLY: CPT

## 2019-08-06 PROCEDURE — 93306 TTE W/DOPPLER COMPLETE: CPT | Mod: 26 | Performed by: INTERNAL MEDICINE

## 2019-08-06 PROCEDURE — 93306 TTE W/DOPPLER COMPLETE: CPT

## 2019-08-06 NOTE — TELEPHONE ENCOUNTER
S:  Walk in visit for BP check     B:  Patient started on carvedilol 8/1/19  Patient has been taking medication as prescribed  Patient was in cardiology today and had BP taken up there and patient reports /100 in cardiology  Patient came to Walk IN RN visit to have BP reassessed    A:  Vital Signs 8/6/2019   Systolic 173   Diastolic 107   Pulse 63   Temperature    Respirations    Weight (LB)    Height    BMI (Calculated)    Pain    O2 96     Vital Signs 8/6/2019   Systolic 174   Diastolic 106     Denies headache, palpitations, pressure behind eyes, hearing heart beat in ears, SOB, weakness, chest pain/tightness reports he is asymptomatic other than the high BP    R:  ED precautions r/t above listed symptoms discussed and patient stated understanding.  Patient declined to go to ER for evaluation at this time.  Routed to PCP:  Please review vitals and provide further instruction regarding therapy and follow up.    Channing Cunningham RN

## 2019-08-06 NOTE — TELEPHONE ENCOUNTER
Recommend increase in carvedilol to 6.25 mg twice daily.   Recheck BP and Pulse in 1 week.  Thanks Danielle Mercado P-BC

## 2019-08-06 NOTE — PROGRESS NOTES
S:  Walk in visit for BP check     B:  Patient started on carvedilol 8/1/19  Patient has been taking medication as prescribed  Patient was in cardiology today and had BP taken up there and patient reports /100 in cardiology  Patient came to Walk IN RN visit to have BP reassessed    A:  Vital Signs 8/6/2019   Systolic 173   Diastolic 107   Pulse 63   Temperature    Respirations    Weight (LB)    Height    BMI (Calculated)    Pain    O2 96     Vital Signs 8/6/2019   Systolic 174   Diastolic 106     Denies headache, palpitations, pressure behind eyes, hearing heart beat in ears, SOB, weakness, chest pain/tightness reports he is asymptomatic other than the high BP    R:  ED precautions r/t above listed symptoms discussed and patient stated understanding.  Patient declined to go to ER for evaluation at this time.  Routed to PCP:  Please review vitals and provide further instruction regarding therapy and follow up.    No further action necessary.  Encounter closed.    Channing Cunningham RN

## 2019-08-06 NOTE — RESULT ENCOUNTER NOTE
Results noted: EF 45-50% (45% on 2015 cath); WMAs as specified, no significant valve disease. To be discussed at OV with Garima Clemons NP on 8/16/19.

## 2019-08-08 ENCOUNTER — APPOINTMENT (OUTPATIENT)
Dept: ULTRASOUND IMAGING | Facility: CLINIC | Age: 74
End: 2019-08-08
Attending: THORACIC SURGERY (CARDIOTHORACIC VASCULAR SURGERY)
Payer: COMMERCIAL

## 2019-08-08 ENCOUNTER — APPOINTMENT (OUTPATIENT)
Dept: GENERAL RADIOLOGY | Facility: CLINIC | Age: 74
End: 2019-08-08
Attending: THORACIC SURGERY (CARDIOTHORACIC VASCULAR SURGERY)
Payer: COMMERCIAL

## 2019-08-08 ENCOUNTER — TELEPHONE (OUTPATIENT)
Dept: OTHER | Facility: CLINIC | Age: 74
End: 2019-08-08

## 2019-08-08 ENCOUNTER — HOSPITAL ENCOUNTER (OUTPATIENT)
Facility: CLINIC | Age: 74
Discharge: HOME | End: 2019-08-08
Attending: INTERNAL MEDICINE | Admitting: INTERNAL MEDICINE
Payer: COMMERCIAL

## 2019-08-08 VITALS
DIASTOLIC BLOOD PRESSURE: 101 MMHG | WEIGHT: 146.1 LBS | HEART RATE: 55 BPM | OXYGEN SATURATION: 96 % | SYSTOLIC BLOOD PRESSURE: 143 MMHG | TEMPERATURE: 97.6 F | RESPIRATION RATE: 16 BRPM | BODY MASS INDEX: 22.14 KG/M2 | HEIGHT: 68 IN

## 2019-08-08 DIAGNOSIS — R07.9 CHEST PAIN: ICD-10-CM

## 2019-08-08 DIAGNOSIS — I25.10 CORONARY ARTERY DISEASE INVOLVING NATIVE CORONARY ARTERY OF NATIVE HEART WITHOUT ANGINA PECTORIS: Primary | ICD-10-CM

## 2019-08-08 DIAGNOSIS — I25.10 CORONARY ARTERY DISEASE INVOLVING NATIVE CORONARY ARTERY OF NATIVE HEART WITHOUT ANGINA PECTORIS: ICD-10-CM

## 2019-08-08 PROBLEM — Z98.890 STATUS POST CORONARY ANGIOGRAM: Status: ACTIVE | Noted: 2019-08-08

## 2019-08-08 LAB
ALBUMIN SERPL-MCNC: 3.3 G/DL (ref 3.4–5)
ALBUMIN UR-MCNC: NEGATIVE MG/DL
ALP SERPL-CCNC: 62 U/L (ref 40–150)
ALT SERPL W P-5'-P-CCNC: 14 U/L (ref 0–70)
ANION GAP SERPL CALCULATED.3IONS-SCNC: 3 MMOL/L (ref 3–14)
APPEARANCE UR: CLEAR
APTT PPP: 41 SEC (ref 22–37)
AST SERPL W P-5'-P-CCNC: 14 U/L (ref 0–45)
BILIRUB DIRECT SERPL-MCNC: <0.1 MG/DL (ref 0–0.2)
BILIRUB SERPL-MCNC: 0.4 MG/DL (ref 0.2–1.3)
BILIRUB UR QL STRIP: NEGATIVE
BUN SERPL-MCNC: 24 MG/DL (ref 7–30)
CALCIUM SERPL-MCNC: 8.7 MG/DL (ref 8.5–10.1)
CHLORIDE SERPL-SCNC: 110 MMOL/L (ref 94–109)
CO2 SERPL-SCNC: 29 MMOL/L (ref 20–32)
COLOR UR AUTO: YELLOW
CREAT SERPL-MCNC: 1.03 MG/DL (ref 0.66–1.25)
ERYTHROCYTE [DISTWIDTH] IN BLOOD BY AUTOMATED COUNT: 14 % (ref 10–15)
GFR SERPL CREATININE-BSD FRML MDRD: 71 ML/MIN/{1.73_M2}
GLUCOSE SERPL-MCNC: 91 MG/DL (ref 70–99)
GLUCOSE UR STRIP-MCNC: NEGATIVE MG/DL
HBA1C MFR BLD: 5.7 % (ref 0–5.6)
HCT VFR BLD AUTO: 40.8 % (ref 40–53)
HGB BLD-MCNC: 14.1 G/DL (ref 13.3–17.7)
HGB UR QL STRIP: NEGATIVE
INR PPP: 0.96 (ref 0.86–1.14)
KCT BLD-ACNC: 344 SEC (ref 75–150)
KETONES UR STRIP-MCNC: NEGATIVE MG/DL
LEUKOCYTE ESTERASE UR QL STRIP: NEGATIVE
MCH RBC QN AUTO: 28.5 PG (ref 26.5–33)
MCHC RBC AUTO-ENTMCNC: 34.6 G/DL (ref 31.5–36.5)
MCV RBC AUTO: 83 FL (ref 78–100)
MUCOUS THREADS #/AREA URNS LPF: PRESENT /LPF
NITRATE UR QL: NEGATIVE
PH UR STRIP: 5.5 PH (ref 5–7)
PLATELET # BLD AUTO: 186 10E9/L (ref 150–450)
POTASSIUM SERPL-SCNC: 3.9 MMOL/L (ref 3.4–5.3)
PROT SERPL-MCNC: 6.8 G/DL (ref 6.8–8.8)
RBC # BLD AUTO: 4.94 10E12/L (ref 4.4–5.9)
RBC #/AREA URNS AUTO: <1 /HPF (ref 0–2)
SODIUM SERPL-SCNC: 142 MMOL/L (ref 133–144)
SOURCE: ABNORMAL
SP GR UR STRIP: 1.01 (ref 1–1.03)
SQUAMOUS #/AREA URNS AUTO: <1 /HPF (ref 0–1)
UROBILINOGEN UR STRIP-MCNC: NORMAL MG/DL (ref 0–2)
WBC # BLD AUTO: 6.5 10E9/L (ref 4–11)
WBC #/AREA URNS AUTO: 0 /HPF (ref 0–5)

## 2019-08-08 PROCEDURE — 83036 HEMOGLOBIN GLYCOSYLATED A1C: CPT | Performed by: INTERNAL MEDICINE

## 2019-08-08 PROCEDURE — 25800030 ZZH RX IP 258 OP 636: Performed by: INTERNAL MEDICINE

## 2019-08-08 PROCEDURE — 80076 HEPATIC FUNCTION PANEL: CPT | Performed by: INTERNAL MEDICINE

## 2019-08-08 PROCEDURE — 85027 COMPLETE CBC AUTOMATED: CPT | Performed by: INTERNAL MEDICINE

## 2019-08-08 PROCEDURE — 86923 COMPATIBILITY TEST ELECTRIC: CPT | Performed by: THORACIC SURGERY (CARDIOTHORACIC VASCULAR SURGERY)

## 2019-08-08 PROCEDURE — 36415 COLL VENOUS BLD VENIPUNCTURE: CPT

## 2019-08-08 PROCEDURE — 93970 EXTREMITY STUDY: CPT

## 2019-08-08 PROCEDURE — 40000235 ZZH STATISTIC TELEMETRY

## 2019-08-08 PROCEDURE — 93005 ELECTROCARDIOGRAM TRACING: CPT

## 2019-08-08 PROCEDURE — 86850 RBC ANTIBODY SCREEN: CPT | Performed by: THORACIC SURGERY (CARDIOTHORACIC VASCULAR SURGERY)

## 2019-08-08 PROCEDURE — 40000852 ZZH STATISTIC HEART CATH LAB OR EP LAB

## 2019-08-08 PROCEDURE — 93010 ELECTROCARDIOGRAM REPORT: CPT | Performed by: INTERNAL MEDICINE

## 2019-08-08 PROCEDURE — 93454 CORONARY ARTERY ANGIO S&I: CPT | Performed by: INTERNAL MEDICINE

## 2019-08-08 PROCEDURE — 25000128 H RX IP 250 OP 636: Performed by: INTERNAL MEDICINE

## 2019-08-08 PROCEDURE — 40000065 ZZH STATISTIC EKG NON-CHARGEABLE

## 2019-08-08 PROCEDURE — 81001 URINALYSIS AUTO W/SCOPE: CPT | Performed by: THORACIC SURGERY (CARDIOTHORACIC VASCULAR SURGERY)

## 2019-08-08 PROCEDURE — 86900 BLOOD TYPING SEROLOGIC ABO: CPT | Performed by: THORACIC SURGERY (CARDIOTHORACIC VASCULAR SURGERY)

## 2019-08-08 PROCEDURE — 93880 EXTRACRANIAL BILAT STUDY: CPT

## 2019-08-08 PROCEDURE — 71046 X-RAY EXAM CHEST 2 VIEWS: CPT

## 2019-08-08 PROCEDURE — 85347 COAGULATION TIME ACTIVATED: CPT

## 2019-08-08 PROCEDURE — C1894 INTRO/SHEATH, NON-LASER: HCPCS | Performed by: INTERNAL MEDICINE

## 2019-08-08 PROCEDURE — 99152 MOD SED SAME PHYS/QHP 5/>YRS: CPT | Performed by: INTERNAL MEDICINE

## 2019-08-08 PROCEDURE — C1769 GUIDE WIRE: HCPCS | Performed by: INTERNAL MEDICINE

## 2019-08-08 PROCEDURE — 36415 COLL VENOUS BLD VENIPUNCTURE: CPT | Performed by: THORACIC SURGERY (CARDIOTHORACIC VASCULAR SURGERY)

## 2019-08-08 PROCEDURE — 80048 BASIC METABOLIC PNL TOTAL CA: CPT | Performed by: INTERNAL MEDICINE

## 2019-08-08 PROCEDURE — 85610 PROTHROMBIN TIME: CPT | Performed by: INTERNAL MEDICINE

## 2019-08-08 PROCEDURE — 86901 BLOOD TYPING SEROLOGIC RH(D): CPT | Performed by: THORACIC SURGERY (CARDIOTHORACIC VASCULAR SURGERY)

## 2019-08-08 PROCEDURE — 85730 THROMBOPLASTIN TIME PARTIAL: CPT | Performed by: INTERNAL MEDICINE

## 2019-08-08 PROCEDURE — 25000125 ZZHC RX 250: Performed by: INTERNAL MEDICINE

## 2019-08-08 PROCEDURE — 27210794 ZZH OR GENERAL SUPPLY STERILE: Performed by: INTERNAL MEDICINE

## 2019-08-08 PROCEDURE — 99153 MOD SED SAME PHYS/QHP EA: CPT | Performed by: INTERNAL MEDICINE

## 2019-08-08 PROCEDURE — C1887 CATHETER, GUIDING: HCPCS | Performed by: INTERNAL MEDICINE

## 2019-08-08 RX ORDER — LIDOCAINE 40 MG/G
CREAM TOPICAL
Status: DISCONTINUED | OUTPATIENT
Start: 2019-08-08 | End: 2019-08-08 | Stop reason: HOSPADM

## 2019-08-08 RX ORDER — HYDROCODONE BITARTRATE AND ACETAMINOPHEN 5; 325 MG/1; MG/1
1-2 TABLET ORAL EVERY 4 HOURS PRN
Status: DISCONTINUED | OUTPATIENT
Start: 2019-08-08 | End: 2019-08-08 | Stop reason: HOSPADM

## 2019-08-08 RX ORDER — VERAPAMIL HYDROCHLORIDE 2.5 MG/ML
INJECTION, SOLUTION INTRAVENOUS
Status: DISCONTINUED | OUTPATIENT
Start: 2019-08-08 | End: 2019-08-08 | Stop reason: HOSPADM

## 2019-08-08 RX ORDER — FLUMAZENIL 0.1 MG/ML
0.2 INJECTION, SOLUTION INTRAVENOUS
Status: DISCONTINUED | OUTPATIENT
Start: 2019-08-08 | End: 2019-08-08 | Stop reason: HOSPADM

## 2019-08-08 RX ORDER — ASPIRIN 81 MG/1
81 TABLET ORAL ONCE
Status: DISCONTINUED | OUTPATIENT
Start: 2019-08-08 | End: 2019-08-08 | Stop reason: HOSPADM

## 2019-08-08 RX ORDER — FENTANYL CITRATE 50 UG/ML
INJECTION, SOLUTION INTRAMUSCULAR; INTRAVENOUS
Status: DISCONTINUED | OUTPATIENT
Start: 2019-08-08 | End: 2019-08-08 | Stop reason: HOSPADM

## 2019-08-08 RX ORDER — NITROGLYCERIN 5 MG/ML
VIAL (ML) INTRAVENOUS
Status: DISCONTINUED | OUTPATIENT
Start: 2019-08-08 | End: 2019-08-08 | Stop reason: HOSPADM

## 2019-08-08 RX ORDER — LORAZEPAM 0.5 MG/1
0.5 TABLET ORAL
Status: DISCONTINUED | OUTPATIENT
Start: 2019-08-08 | End: 2019-08-08 | Stop reason: HOSPADM

## 2019-08-08 RX ORDER — LORAZEPAM 2 MG/ML
0.5 INJECTION INTRAMUSCULAR
Status: DISCONTINUED | OUTPATIENT
Start: 2019-08-08 | End: 2019-08-08 | Stop reason: HOSPADM

## 2019-08-08 RX ORDER — SODIUM CHLORIDE 9 MG/ML
INJECTION, SOLUTION INTRAVENOUS CONTINUOUS
Status: DISCONTINUED | OUTPATIENT
Start: 2019-08-08 | End: 2019-08-08 | Stop reason: HOSPADM

## 2019-08-08 RX ORDER — NALOXONE HYDROCHLORIDE 0.4 MG/ML
.1-.4 INJECTION, SOLUTION INTRAMUSCULAR; INTRAVENOUS; SUBCUTANEOUS
Status: DISCONTINUED | OUTPATIENT
Start: 2019-08-08 | End: 2019-08-08 | Stop reason: HOSPADM

## 2019-08-08 RX ORDER — NALOXONE HYDROCHLORIDE 0.4 MG/ML
.2-.4 INJECTION, SOLUTION INTRAMUSCULAR; INTRAVENOUS; SUBCUTANEOUS
Status: DISCONTINUED | OUTPATIENT
Start: 2019-08-08 | End: 2019-08-08 | Stop reason: HOSPADM

## 2019-08-08 RX ORDER — HEPARIN SODIUM 1000 [USP'U]/ML
INJECTION, SOLUTION INTRAVENOUS; SUBCUTANEOUS
Status: DISCONTINUED | OUTPATIENT
Start: 2019-08-08 | End: 2019-08-08 | Stop reason: HOSPADM

## 2019-08-08 RX ORDER — ACETAMINOPHEN 325 MG/1
650 TABLET ORAL EVERY 4 HOURS PRN
Status: DISCONTINUED | OUTPATIENT
Start: 2019-08-08 | End: 2019-08-08 | Stop reason: HOSPADM

## 2019-08-08 RX ORDER — FENTANYL CITRATE 50 UG/ML
25-50 INJECTION, SOLUTION INTRAMUSCULAR; INTRAVENOUS
Status: DISCONTINUED | OUTPATIENT
Start: 2019-08-08 | End: 2019-08-08 | Stop reason: HOSPADM

## 2019-08-08 RX ORDER — POTASSIUM CHLORIDE 1500 MG/1
20 TABLET, EXTENDED RELEASE ORAL
Status: DISCONTINUED | OUTPATIENT
Start: 2019-08-08 | End: 2019-08-08 | Stop reason: HOSPADM

## 2019-08-08 RX ORDER — ATROPINE SULFATE 0.1 MG/ML
0.5 INJECTION INTRAVENOUS EVERY 5 MIN PRN
Status: DISCONTINUED | OUTPATIENT
Start: 2019-08-08 | End: 2019-08-08 | Stop reason: HOSPADM

## 2019-08-08 RX ADMIN — SODIUM CHLORIDE: 9 INJECTION, SOLUTION INTRAVENOUS at 09:30

## 2019-08-08 ASSESSMENT — MIFFLIN-ST. JEOR: SCORE: 1382.21

## 2019-08-08 NOTE — TELEPHONE ENCOUNTER
"Pt referred to [facility] by Lai Mchugh MD (see OV note 8/8/19) For renal artery stenosis prior to scheduling CABG.    Pt already established with Dr. Guzman for Hx of Thoracic AA 4.1.      5/7/19 CTA Chest with contrast showed \"3. Moderate to severe stenosis in the proximal right renal artery with  poststenotic dilatation, unchanged.\"    Pt needs to be scheduled for f/u with Dr. Guzman. Will route to scheduling to coordinate an appointment as soon as possible.     NEIL HankinsN, RN    "

## 2019-08-08 NOTE — DISCHARGE INSTRUCTIONS
Cardiac Angiogram Discharge Instructions - Radial    After you go home:      Have an adult stay with you until tomorrow.    Drink extra fluids for 2 days.    You may resume your normal diet.    No smoking       For 24 hours - due to the sedation you received:    Relax and take it easy.    Do NOT make any important or legal decisions.    Do NOT drive or operate machines at home or at work.    Do NOT drink alcohol.    Care of Wrist Puncture Site:      For the first 24 hrs - check the puncture site every 1-2 hours while awake.    It is normal to have soreness at the puncture site and mild tingling in your hand for up to 3 days.    Remove the bandaid after 24 hours. If there is minor oozing, apply another bandaid and remove it after 12 hours.    You may shower tomorrow.  Do NOT take a bath, or use a hot tub or pool for at least 3 days. Do NOT scrub the site. Do not use lotion or powder near the puncture site.           Activity:        For 2 days:     do not use your hand or arm to support your weight (such as rising from a chair)     do not bend your wrist (such as lifting a garage door).    do not lift more than 5 pounds or exercise your arm (such as tennis, golf or bowling).    Do NOT do any heavy activity such as exercise, lifting, or straining.     Bleeding:      If you start bleeding from the site in your wrist, sit down and press firmly on/above the site for 10 minutes.     Once bleeding stops, keep arm still for 2 hours.     Call [facility] as soon as you can.       Call 911 right away if you have heavy bleeding or bleeding that does not stop.      Medicines:      If you are taking an antiplatelet medication such as Plavix, Brilinta or Effient, do not stop taking it until you talk to your cardiologist.        If you have stopped any medicines, check with your provider about when to restart them.    Follow Up Appointments:      Follow up with [facility] Nurse Practitioner at [facility] of patient preference  in 7-10 days.    Call the clinic if:      You have a large or growing hard lump around the site.    The site is red, swollen, hot or tender.    Blood or fluid is draining from the site.    You have chills or a fever greater than 101 F (38 C).    Your arm feels numb, cool or changes color.    You have hives, a rash or unusual itching.    Any questions or concerns.          [facility] [place]:    [phone] [facility] (7 days a week)    Call JOSE Kang to schedule pulmonary function test @ [phone]    Call vascular surgery at  for preop workup. @ [phone]

## 2019-08-08 NOTE — PROGRESS NOTES
Care Suites Discharge Nursing Note    Report received from Rolando to continue plan of care.  Pt has some radial site bleeding, hemostasis achieved with mannual pressure applied for 10 min.  Site is bruise.  Arm board on.   Web text message to Dr. Barrera.  Continue monitor pt for one hour.    If stable, pt will be discharged.     Education/questions answered: yes  Patient DC location: home/  door  Accompanied by: Oliver MINOR discharge time: 1720

## 2019-08-08 NOTE — CONSULTS
CARDIAC SURGERY CONSULT NOTE    Consult Reason: coronary artery disease    HPI: 73 year old male seen in Care Suites today following cor angio.    He was seen by Dr Espino in early August to establish care. At that time, he noted worsening fatigue as well as exertional right jaw pain. He affirms this history for me as well.    He has had prior CAG in 2015, which revealed  of his right coronary, and he has had several nuclear scans, most recently in 2018, revealing perfusion defect consistent with prior infarct in that territory with mild ischemia.    He continues to smoke, and has largely been non compliant with medications in the past, reportedly due to intolerance.    We also discussed the finding of right renal artery stenosis on his recent CT scan. He reports this is the first he has heard of this, and that no further evaluation has taken place for that.    He additionally states he has been told he has COPD, and uses multiple inhalers at home. He is not oxygen dependent.      A/P: Patient is a 73 year old male with severe multivessel coronary disease. He is non diabetic, but continues to smoke and has uncontrolled blood pressure and cholesterol.    His prior nuclear perfusion scans reveal infarct in the region of his known RCA occlusion, although the branch vessels do fill distally via collaterals.    We will need to obtain vein mapping, and carotid duplex.     I also strongly counseled him on smoking cessation, and would prefer to have him quit for two weeks pre op if possible.    We will also work on obtaining PFTs given his smoking history, and inhaler usage. I think this will allow us to better risk stratify him from a pulmonary perspective.    His coronary disease is certainly amenable to CABG, and I think he is an appropriate candidate for this approach, barring any significantly abnormal findings on further studies.    We will also arrange for him to be seen by Vascular for evaluation of his renal  "artery stenosis. I am concerned both for its effect on his BP control, as well as his risk for renal failure perioperatively.    Anticipate scheduling him for surgery in the near future pending the above.    Thank you for the opportunity to participate in the care of this patient.      Lai Mchugh MD        PMH:  Past Medical History:   Diagnosis Date     Coronary artery disease      Depressive disorder, not elsewhere classified      Hypertrophy (benign) of prostate      Impotence of organic origin      Other and unspecified hyperlipidemia      Other anxiety states      Tobacco use disorder          PSH:  Past Surgical History:   Procedure Laterality Date     APPENDECTOMY  1966     COLONOSCOPY  3/1/2005     COLONOSCOPY N/A 5/9/2019    Procedure: COLONOSCOPY;  Surgeon: Camilo Beard MD;  Location: [facility]     HERNIORRHAPHY INGUINAL  7/14/2011    Procedure:HERNIORRHAPHY INGUINAL; Open Repair Right Inguinal Hernia Repair        HYDROCELECTOMY SCROTAL  01/2004    left hydrocele repair     SUPRAPUBIC PROSTATECTOMY           FH:  family history includes Aneurysm in his sister; Arthritis in his sister; Cancer in his brother and sister; Cerebrovascular Disease in his mother; Dementia in his brother; Hypertension in his mother; Other - See Comments in his brother; Pacemaker in his sister; Prostate Cancer in his brother.      SH:  Active Tobacco use, over 50 pack years    Allergies:  Allergies   Allergen Reactions     Amlodipine Fatigue     \"felt like zombie\"     Claritin      Mood , irritablity      Hctz [Hydrochlorothiazide] Fatigue     \"felt like zombie\"     Lisinopril Fatigue     \"felt like zombie\"     Metoprolol Fatigue     \"felt like zombie\"     Pravastatin Fatigue     \"felt like zombie\"     Remeron [Mirtazapine]      Agitation        Wellbutrin [Bupropion Hcl]      Sig mood issues          Home Meds:  Medications Prior to Admission   Medication Sig Dispense Refill Last Dose     aspirin 81 MG tablet ONE DAILY 100 " tablet 3 8/8/2019 at am     carvedilol (COREG) 3.125 MG tablet Take 1 tablet (3.125 mg) by mouth 2 times daily (with meals) (Patient taking differently: Take 6.25 mg by mouth 2 times daily (with meals) ) 60 tablet 3 8/8/2019 at am     ipratropium (ATROVENT HFA) 17 MCG/ACT inhaler Inhale 2 puffs into the lungs every 6 hours 12.9 g 5 8/8/2019 at am     isosorbide mononitrate (IMDUR) 30 MG 24 hr tablet Take 1 tablet (30 mg) by mouth daily 30 tablet 3 8/8/2019 at am     PARoxetine (PAXIL) 20 MG tablet Take 1 tablet (20 mg) by mouth every morning Add to 40 mg daily 90 tablet 1 8/8/2019 at am     PARoxetine (PAXIL) 40 MG tablet Take 1 tablet (40 mg) by mouth every morning Add to 20 mg daily 90 tablet 1 8/8/2019 at am     salmeterol (SEREVENT) 50 MCG/DOSE inhaler Inhale 1 puff into the lungs 2 times daily 60 each 5 8/8/2019 at am       ROS: + as noted above    Physical Exam:  Temp:  [97.6  F (36.4  C)] 97.6  F (36.4  C)  Pulse:  [54-55] 55  Resp:  [16] 16  BP: (119-153)/() 150/103  SpO2:  [95 %-98 %] 98 %    Gen: NAD, resting comfortably in bed  Lungs: non-labored breathing  CV: regular rhythm, normal rate on tele, quite hypertensive (160s/100s)  Abd: non distended  Ext: cool  Neuro: AOx3    Labs:  ABG No lab results found in last 7 days.  CBC  Recent Labs   Lab 08/08/19  0925   WBC 6.5   HGB 14.1        BMP  Recent Labs   Lab 08/08/19  0925      POTASSIUM 3.9   CHLORIDE 110*   CO2 29   BUN 24   CR 1.03   GLC 91     LFT  Recent Labs   Lab 08/08/19  0925   INR 0.96     PancreasNo lab results found in last 7 days.    Imaging:    NUCLEAR STRESS 3/2018    Impression       1. Exercise: Average functional capacity, target heart rate  achieved       2. EKG: Mild baseline abnormalities, 1.5 mm diffuse ST depression  with horizontal segments. This is a nonspecific finding in the context  of baseline abnormalities.       3. Symptoms: Mild dyspnea, but no chest pain with exercise  4. Myocardial perfusion imaging  using single isotope technique  demonstrated moderate sized infarct, moderate intensity of the basal  to mid inferior and inferolateral wall. Minimal genaro-infarct ischemia.  Summed stress score 15, summed rest score 13.  5. Gated images demonstrated hypokinesis of the basal inferior wall,  otherwise normal wall motion.  The left ventricular systolic function  is 57% at rest, 44% with stress.  6. Compared to the prior study from June 2015, there are no changes .        CHEST CTA    IMPRESSION:  1. Ascending thoracic aorta measures up to 3.8 x 3.8 cm, unchanged.  Aortic arch measures up to 3.4 cm, unchanged.  2. Enlarged right hilar lymph node is 1.9 x 1.6 cm, relatively stable  since 2017.  3. Moderate to severe stenosis in the proximal right renal artery with  poststenotic dilatation, unchanged.      ECHO    Interpretation Summary     The visual ejection fraction is estimated at 45-50%.  Left ventricular systolic function is borderline reduced.  There are regional wall motion abnormalities as specified.  The ascending aorta is Mildly dilated.  _____________________________________________________________________________  __        Left Ventricle  The left ventricle is normal in size. There is normal left ventricular wall  thickness. Diastolic Doppler findings (E/E' ratio and/or other parameters)  suggest left ventricular filling pressures are indeterminate. The visual  ejection fraction is estimated at 45-50%. Grade I or early diastolic  dysfunction. Left ventricular systolic function is borderline reduced. The  basal inferior and basal inferolateral wall appears moderately to severely  hypokinetic. There are regional wall motion abnormalities as specified. A  false chord is noted (normal variant).     Right Ventricle  The right ventricle is normal in size and function.     Atria  Normal left atrial size. Right atrial size is normal. Lipomatous hypertrophy  of the interatrial septum is noted. There is no color  Doppler evidence of an  atrial shunt.     Mitral Valve  There is trace mitral regurgitation.        Tricuspid Valve  There is trace tricuspid regurgitation. The right ventricular systolic  pressure is approximated at 34.2 mmHg plus the right atrial pressure. Right  ventricular systolic pressure is elevated, consistent with mild pulmonary  hypertension.     Aortic Valve  The aortic valve is trileaflet. There is moderate trileaflet aortic sclerosis.  There is trace aortic regurgitation. No hemodynamically significant valvular  aortic stenosis.     Pulmonic Valve  There is trace pulmonic valvular regurgitation. Normal pulmonic valve  velocity.     Vessels  The aortic root is normal size. The ascending aorta is Mildly dilated. The  inferior vena cava was normal in size with preserved respiratory variability.     Pericardium  There is no pericardial effusion.        Rhythm  Sinus rhythm was noted.        COR ANGIO    Diagnostic   Dominance: Right   Left Main   Mid LM lesion is 20% stenosed. Aneurysm   Left Anterior Descending   Prox LAD lesion is 90% stenosed.   Mid LAD lesion is 50% stenosed.   Left Circumflex   Mid Cx lesion is 70% stenosed.   Right Coronary Artery   Collaterals   Dist RCA filled by collaterals from 1st Sept.      Mid RCA lesion is 100% stenosed.   Acute Marginal Branch   Collaterals   Acute Mrg filled by collaterals from Mid RCA.      Right Posterior Atrioventricular Branch   Collaterals   Post Atrio filled by collaterals from Dist LAD.

## 2019-08-08 NOTE — TELEPHONE ENCOUNTER
Left message on home number for patient to call back to schedule follow up appointment with Dr. Guzman.

## 2019-08-08 NOTE — PROGRESS NOTES
Care Suites Admission Nursing Note    Reason for admission: HC  CS arrival time: 0915  Accompanied by: rashmi  Name/phone of DC : rashmi Johnson   Medications held: na  Consent signed: no  Abnormal assessment/labs: no  If abnormal, provider notified:   Education/questions answered: Pt states understanding of procedure.  Plan: proceed    [facility]es Post-Procedure Note    Procedure: HC  CS arrival time: 1205  Accompanied by: CLRN  Concerns/abnormal assessment after procedure: no  Plan: monitor    Care Suites Discharge Nursing Note    Education/questions answered: Pt and rashmi stated understanding of discharge instructions. AVS to pt.   Patient DC location:   Accompanied by:   CS discharge time:

## 2019-08-08 NOTE — RESULT ENCOUNTER NOTE
Results noted: severe multivessel disease. Referred to CV surgery. To be discussed at OV with Garima Clemons NP on 8/16/19

## 2019-08-08 NOTE — PRE-PROCEDURE
GENERAL PRE-PROCEDURE:   Procedure:  Cath  Date/Time:  8/8/2019 11:18 AM    Verbal consent obtained?: Yes    Written consent obtained?: Yes    Risks and benefits: Risks, benefits and alternatives were discussed    Consent given by:  Patient  Patient states understanding of procedure being performed: Yes    Patient's understanding of procedure matches consent: Yes    Procedure consent matches procedure scheduled: Yes    Expected level of sedation:  Moderate  Appropriately NPO:  Yes  ASA Class:  Class 2- mild systemic disease, no acute problems, no functional limitations  Mallampati  :  Grade 2- soft palate, base of uvula, tonsillar pillars, and portion of posterior pharyngeal wall visible  Lungs:  Lungs clear with good breath sounds bilaterally  Heart:  Normal heart sounds and rate  History & Physical reviewed:  History and physical reviewed and no updates needed  Statement of review:  I have reviewed the lab findings, diagnostic data, medications, and the plan for sedation

## 2019-08-08 NOTE — PROVIDER NOTIFICATION
Notified Person Name:  March    Notification Date/Time: 4:36 PM    Notification Interaction:    Pager    Purpose of Notification: Left wrist site bleed and hemostasis    Orders Received:  No

## 2019-08-09 LAB — INTERPRETATION ECG - MUSE: NORMAL

## 2019-08-09 NOTE — TELEPHONE ENCOUNTER
Scheduled 8/12/19 at [facility] with Dr Guzman. Stephanie Dela Cruz -  at [facility]

## 2019-08-12 ENCOUNTER — OFFICE VISIT (OUTPATIENT)
Dept: OTHER | Facility: CLINIC | Age: 74
End: 2019-08-12
Attending: SURGERY
Payer: COMMERCIAL

## 2019-08-12 VITALS
WEIGHT: 147 LBS | HEART RATE: 65 BPM | RESPIRATION RATE: 16 BRPM | BODY MASS INDEX: 22.28 KG/M2 | DIASTOLIC BLOOD PRESSURE: 79 MMHG | SYSTOLIC BLOOD PRESSURE: 130 MMHG | HEIGHT: 68 IN

## 2019-08-12 DIAGNOSIS — I25.10 CORONARY ARTERY DISEASE INVOLVING NATIVE CORONARY ARTERY OF NATIVE HEART WITHOUT ANGINA PECTORIS: ICD-10-CM

## 2019-08-12 PROCEDURE — 99213 OFFICE O/P EST LOW 20 MIN: CPT | Mod: ZP | Performed by: SURGERY

## 2019-08-12 PROCEDURE — G0463 HOSPITAL OUTPT CLINIC VISIT: HCPCS

## 2019-08-12 ASSESSMENT — MIFFLIN-ST. JEOR: SCORE: 1386.29

## 2019-08-12 NOTE — PROGRESS NOTES
"Adolfo Rendon is a 73 year old male who presents for:  Chief Complaint   Patient presents with     RECHECK     New referral from Dr Simone Mchugh for renal artery stenosis prior to scheduling CABG.        Vitals:    Vitals:    08/12/19 1052   BP: 130/79   BP Location: Left arm   Patient Position: Chair   Cuff Size: Adult Regular   Pulse: 65   Resp: 16   Weight: 147 lb (66.7 kg)   Height: 5' 8\" (1.727 m)       BMI:  Estimated body mass index is 22.35 kg/m  as calculated from the following:    Height as of this encounter: 5' 8\" (1.727 m).    Weight as of this encounter: 147 lb (66.7 kg).    Pain Score:  Data Unavailable        Brenda Mcdowell    "

## 2019-08-12 NOTE — LETTER
[facility] [place] [address]  [place] [address]  Phone: [phone]  Fax: [phone]      2019       Re: Adolfo Rendon - 1945    Mr. Rendon is a 73-year-old male who is known to myself from being under surveillance for a thoracic aortic aneurysm.  He is now planning on undergoing a coronary artery bypass graft surgery.  He is been sent back to us for evaluation of right renal artery stenosis.     This is been a known finding from his CT angiogram in 2018 and again in May 2019.     He is presently on carvedilol 3.125 mg twice daily.  He has taken higher doses of this with side effects resulting in significant lethargy.  He tells me that his blood pressure has been well controlled recently.     In the clinic today his blood pressure is 130 x 79 mmHg.     I again went over the CT angiogram with him and showed him the imaging.  He has moderate stenosis of the proximal right renal artery.  This is unchanged from before.     There is no need for renal artery intervention.  If better blood pressure control is desired then he can be treated medically.  I explained the rationale to him.     We had been following his thoracic aortic aneurysm.  Now that he will be under the care of the CT surgery team he does not for the need to follow-up with vascular surgery.  He can follow-up on a as needed basis.        MOUNA MEADOWS MD

## 2019-08-13 NOTE — PROGRESS NOTES
Mr. Rendon is a 73-year-old male who is known to myself from being under surveillance for a thoracic aortic aneurysm.  He is now planning on undergoing a coronary artery bypass graft surgery.  He is been sent back to us for evaluation of right renal artery stenosis.    This is been a known finding from his CT angiogram in March 2018 and again in May 2019.    He is presently on carvedilol 3.125 mg twice daily.  He has taken higher doses of this with side effects resulting in significant lethargy.  He tells me that his blood pressure has been well controlled recently.    In the clinic today his blood pressure is 130 x 79 mmHg.    I again went over the CT angiogram with him and showed him the imaging.  He has moderate stenosis of the proximal right renal artery.  This is unchanged from before.    There is no need for renal artery intervention.  If better blood pressure control is desired then he can be treated medically.  I explained the rationale to him.    We had been following his thoracic aortic aneurysm.  Now that he will be under the care of the CT surgery team he does not for the need to follow-up with vascular surgery.  He can follow-up on a as needed basis.

## 2019-08-14 ENCOUNTER — HOSPITAL ENCOUNTER (OUTPATIENT)
Dept: RESPIRATORY THERAPY | Facility: CLINIC | Age: 74
Discharge: HOME | End: 2019-08-14
Attending: INTERNAL MEDICINE | Admitting: INTERNAL MEDICINE
Payer: COMMERCIAL

## 2019-08-14 DIAGNOSIS — I25.10 CORONARY ARTERY DISEASE INVOLVING NATIVE CORONARY ARTERY OF NATIVE HEART WITHOUT ANGINA PECTORIS: ICD-10-CM

## 2019-08-14 PROCEDURE — 94729 DIFFUSING CAPACITY: CPT

## 2019-08-14 PROCEDURE — 94060 EVALUATION OF WHEEZING: CPT

## 2019-08-14 PROCEDURE — 40000809 ZZH STATISTIC NO DOCUMENTATION TO SUPPORT CHARGE

## 2019-08-14 PROCEDURE — 94729 DIFFUSING CAPACITY: CPT | Mod: 26 | Performed by: INTERNAL MEDICINE

## 2019-08-14 PROCEDURE — 94799 UNLISTED PULMONARY SVC/PX: CPT

## 2019-08-14 PROCEDURE — 94726 PLETHYSMOGRAPHY LUNG VOLUMES: CPT

## 2019-08-14 PROCEDURE — 94060 EVALUATION OF WHEEZING: CPT | Mod: 26 | Performed by: INTERNAL MEDICINE

## 2019-08-14 PROCEDURE — 94726 PLETHYSMOGRAPHY LUNG VOLUMES: CPT | Mod: 26 | Performed by: INTERNAL MEDICINE

## 2019-08-14 PROCEDURE — 25000125 ZZHC RX 250: Performed by: THORACIC SURGERY (CARDIOTHORACIC VASCULAR SURGERY)

## 2019-08-14 PROCEDURE — 94200 LUNG FUNCTION TEST (MBC/MVV): CPT

## 2019-08-14 RX ORDER — ALBUTEROL SULFATE 0.83 MG/ML
2.5 SOLUTION RESPIRATORY (INHALATION) ONCE
Status: COMPLETED | OUTPATIENT
Start: 2019-08-14 | End: 2019-08-14

## 2019-08-14 RX ADMIN — ALBUTEROL SULFATE 2.5 MG: 2.5 SOLUTION RESPIRATORY (INHALATION) at 12:10

## 2019-08-15 NOTE — PROGRESS NOTES
Cardiology Clinic Progress Note  Aodlfo Rendon MRN# [id number]   YOB: 1945 Age: 73 year old     Reason For Visit: Angiogram follow-up   Primary Cardiologist:   Dr. Espino          History of Presenting Illness:    Adolfo Rendon is a pleasant 73 year old patient with a past cardiac history significant for CAD, ischemic cardiomyopathy, hypertension, hyperlipidemia. Past medical history significant for mild ascending aortic aneurysm 4 cm, ongoing tobacco abuse, and COPD.    He has a history of multiple medication intolerances including amlodipine, hydrochlorothiazide, high-intensity statin, metoprolol, and pravastatin. He has discontinued all antihypertensive medications in the past.   He continued taking his aspirin daily but no other cardiac medications. Coronary angiogram in 2015 showed chronically occluded RCA with left-to-right collaterals along with 50% pLAD stenosis.    Pt was last seen by Dr. Espino on 8/1/2019. He reported shortness of breath and fatigue.  He had occasional right-sided chest heaviness radiating to his right neck. He was started on imdur, carvedilol, and Zetia.  Echocardiogram and angiogram were recommended.    Pt presents today for angiogram follow-up. Echocardiogram 8/6/2019 showing LVEF 45-50%, grade 1 diastolic dysfunction, regional wall motion abnormalities, mild pulmonary hypertension, and mildly dilated ascending aorta. Coronary angiogram patient was noted to have severe multivessel CAD and was set up for CV surgery consultation for four-vessel bypass. CABG was recommended with PFTs prior. Carotid ultrasound 8/8/2019 showing less than 50% stenosis bilateral ICAs. These results were reviewed with him today.    His weight today in the clinic is stable and he denies any other heart failure symptoms. After his angiogram, he was seen by PCP who increased carvedilol to 6.25 mg b.i.d. for hypertension. He denies any side effects after starting new medications.  He has not  started Zetia but will do this when he gets home.  He is agreeable to starting PCSK9 inhibitors given that he had statin intolerances in the past. He currently declines restarting any statin therapy.  Since he was last seen, he actually has felt better and shortness of breath has been improving.  He feels like he has more energy.  He is trying to quit smoking and is using nicotine gum. Blood pressure today in the clinic is elevated but he has not taken his medications yet this morning.  He was seen on 8/12/2019 with blood pressure of 130 systolic, on medications. He did notice headaches after starting Imdur, which have been improving.  His left radial angiogram site is without ecchymosis, bleeding, hematoma, or bruit. Patient reports no chest pain, PND, orthopnea, presyncope, syncope, edema, heart racing, or palpitations.    Current Cardiac Medications   Aspirin 81 mg daily  Carvedilol 6.25 mg b.i.d.   Imdur 30 mg daily                     Assessment and Plan:     Plan  1. Start Zetia 10 mg daily as previously prescribed  2. Start PCSK9 inhibitor  3.  Follow-up with general cardiology as directed by CV surgery, post CABG      1. CAD    Severe multivessel disease - CABG recommended     SOB improving     continue aspirin, beta blocker, Imdur      2. ischemic cardiomyopathy    LVEF 45-50% 8/2019    SOB improving     Continue beta blocker    Check daily weights and call the clinic if your weight has increased more than 2 lbs in one day or 5 lbs in one week.     2000 mg Na diet       3. hypertension    Controlled    Moderate Right renal artery stenosis on CT scan 2018 and 2019 stable - no intervention recommended by vascular surgery. Med manage HTN     Continue carvedilol, Imdur    Check blood pressure at least 1 hour after medications. Call the clinic if your blood pressure is consistently greater than 130/80.       4. Hyperlipidemia    Last  and 7/2019    Declines statin therapy will start PCSK9 inhibitor        5. Thoracic aortic aneurysm    Mildly dilated ascending aorta 3.8 cm echo 8/2019 and CTa 5/2019    Follow with echo          Thank you for allowing me to participate in this delightful patient's care.      This note was completed in part using Dragon voice recognition software. Although reviewed after completion, some word and grammatical errors may occur.    Garima Thornton, APRN, CNP           Data:   All laboratory data reviewed        HPI and Plan:   See dictation    No orders of the defined types were placed in this encounter.      Orders Placed This Encounter   Medications     ezetimibe (ZETIA) 10 MG tablet     Sig: Take 1 tablet (10 mg) by mouth daily     Dispense:  30 tablet     Refill:  11     carvedilol (COREG) 6.25 MG tablet     Sig: Take 1 tablet (6.25 mg) by mouth 2 times daily (with meals)     Dispense:  180 tablet     Refill:  3       Medications Discontinued During This Encounter   Medication Reason     carvedilol (COREG) 3.125 MG tablet          Encounter Diagnoses   Name Primary?     Coronary artery disease of native artery of native heart with stable angina pectoris (H) Yes     Thoracic aortic aneurysm without rupture (H)      Hyperlipidemia LDL goal <70      Benign essential hypertension      Ischemic cardiomyopathy        CURRENT MEDICATIONS:  Current Outpatient Medications   Medication Sig Dispense Refill     aspirin 81 MG tablet ONE DAILY 100 tablet 3     carvedilol (COREG) 6.25 MG tablet Take 1 tablet (6.25 mg) by mouth 2 times daily (with meals) 180 tablet 3     ezetimibe (ZETIA) 10 MG tablet Take 1 tablet (10 mg) by mouth daily 30 tablet 11     ipratropium (ATROVENT HFA) 17 MCG/ACT inhaler Inhale 2 puffs into the lungs every 6 hours 12.9 g 5     isosorbide mononitrate (IMDUR) 30 MG 24 hr tablet Take 1 tablet (30 mg) by mouth daily 30 tablet 3     PARoxetine (PAXIL) 20 MG tablet Take 1 tablet (20 mg) by mouth every morning Add to 40 mg daily 90 tablet 1     PARoxetine  "(PAXIL) 40 MG tablet Take 1 tablet (40 mg) by mouth every morning Add to 20 mg daily 90 tablet 1     salmeterol (SEREVENT) 50 MCG/DOSE inhaler Inhale 1 puff into the lungs 2 times daily 60 each 5       ALLERGIES     Allergies   Allergen Reactions     Amlodipine Fatigue     \"felt like zombie\"     Claritin      Mood , irritablity      Hctz [Hydrochlorothiazide] Fatigue     \"felt like zombie\"     Lisinopril Fatigue     \"felt like zombie\"     Metoprolol Fatigue     \"felt like zombie\"     Pravastatin Fatigue     \"felt like zombie\"     Remeron [Mirtazapine]      Agitation        Wellbutrin [Bupropion Hcl]      Sig mood issues          PAST MEDICAL HISTORY:  Past Medical History:   Diagnosis Date     Coronary artery disease      Depressive disorder, not elsewhere classified      Hypertrophy (benign) of prostate      Impotence of organic origin      Other and unspecified hyperlipidemia      Other anxiety states      Tobacco use disorder        PAST SURGICAL HISTORY:  Past Surgical History:   Procedure Laterality Date     APPENDECTOMY  1966     COLONOSCOPY  3/1/2005     COLONOSCOPY N/A 5/9/2019    Procedure: COLONOSCOPY;  Surgeon: Camilo Beard MD;  Location: [facility]     CV LEFT HEART CATH N/A 8/8/2019    Procedure: Left Heart Cath--also measure LVEDP;  Surgeon: Krystle Barrera MD;  Location:  HEART CARDIAC CATH LAB     HERNIORRHAPHY INGUINAL  7/14/2011    Procedure:HERNIORRHAPHY INGUINAL; Open Repair Right Inguinal Hernia Repair        HYDROCELECTOMY SCROTAL  01/2004    left hydrocele repair     SUPRAPUBIC PROSTATECTOMY         FAMILY HISTORY:  Family History   Problem Relation Age of Onset     Cerebrovascular Disease Mother      Hypertension Mother      Prostate Cancer Brother      Dementia Brother      Arthritis Sister         rhematoid     Cancer Brother         Lung cancer, lymphoma     Other - See Comments Brother         HIV     Cancer Sister         skin cancer on nose     Pacemaker Sister      Aneurysm " Sister         heart       SOCIAL HISTORY:  Social History     Socioeconomic History     Marital status:      Spouse name: None     Number of children: None     Years of education: None     Highest education level: None   Occupational History     None   Social Needs     Financial resource strain: None     Food insecurity:     Worry: None     Inability: None     Transportation needs:     Medical: None     Non-medical: None   Tobacco Use     Smoking status: Current Every Day Smoker     Packs/day: 1.00     Types: Cigarettes     Last attempt to quit: 2017     Years since quittin.5     Smokeless tobacco: Former User     Quit date: 2017   Substance and Sexual Activity     Alcohol use: No     Alcohol/week: 0.0 oz     Comment: quit      Drug use: No     Sexual activity: Not Currently   Lifestyle     Physical activity:     Days per week: None     Minutes per session: None     Stress: None   Relationships     Social connections:     Talks on phone: None     Gets together: None     Attends [employer] service: None     Active member of club or organization: None     Attends meetings of clubs or organizations: None     Relationship status: None     Intimate partner violence:     Fear of current or ex partner: None     Emotionally abused: None     Physically abused: None     Forced sexual activity: None   Other Topics Concern     Parent/sibling w/ CABG, MI or angioplasty before 65F 55M? Not Asked      Service Not Asked     Blood Transfusions Not Asked     Caffeine Concern No     Comment: 2-3 cups of coffee in the morning     Occupational Exposure Not Asked     Hobby Hazards Not Asked     Sleep Concern No     Comment: falls asleep easy but doesn't sleep long     Stress Concern No     Weight Concern No     Special Diet No     Back Care Not Asked     Exercise No     Comment: work: hard labor     Bike Helmet Not Asked     Seat Belt Yes     Self-Exams Not Asked   Social History Narrative     None        Review of Systems:  Skin:  Negative       Eyes:  Positive for glasses reading  ENT:  Positive for hearing loss;nasal congestion    Respiratory:  Positive for dyspnea on exertion;cough;wheezing COPD   Cardiovascular:    Positive for;fatigue;chest pain weak spells; patient states that he feels the pulse in his head in the morning after coughing  Gastroenterology: Negative      Genitourinary:  Negative      Musculoskeletal:  Positive for joint pain general aches & pains  Neurologic:  Positive for seizures    Psychiatric:  Positive for sleep disturbances;anxiety;depression    Heme/Lymph/Imm:  Negative      Endocrine:  Negative        Physical Exam:  Vitals: BP (!) 151/101   Pulse 67   Wt 65.3 kg (144 lb)   SpO2 98%   BMI 21.90 kg/m      Constitutional:  cooperative;in no acute distress thin      Skin:  warm and dry to the touch          Head:  normocephalic        Eyes:  sclera white        Lymph:      ENT:  no pallor or cyanosis        Neck:  JVP normal        Respiratory:  clear to auscultation;normal symmetry         Cardiac: regular rhythm;normal S1 and S2                pulses full and equal                                 left radial bruit (-)      GI:  abdomen soft        Extremities and Muscular Skeletal:  no edema              Neurological:  no gross motor deficits;affect appropriate        Psych:  affect appropriate, oriented to time, person and place        CC  Lucy Espino MD  [address]  [place] [id number]

## 2019-08-16 ENCOUNTER — OFFICE VISIT (OUTPATIENT)
Dept: CARDIOLOGY | Facility: CLINIC | Age: 74
End: 2019-08-16
Payer: COMMERCIAL

## 2019-08-16 ENCOUNTER — TELEPHONE (OUTPATIENT)
Dept: CARDIOLOGY | Facility: CLINIC | Age: 74
End: 2019-08-16

## 2019-08-16 VITALS
HEART RATE: 67 BPM | DIASTOLIC BLOOD PRESSURE: 101 MMHG | BODY MASS INDEX: 21.9 KG/M2 | OXYGEN SATURATION: 98 % | SYSTOLIC BLOOD PRESSURE: 151 MMHG | WEIGHT: 144 LBS

## 2019-08-16 DIAGNOSIS — E78.5 HYPERLIPIDEMIA LDL GOAL <70: ICD-10-CM

## 2019-08-16 DIAGNOSIS — I71.20 THORACIC AORTIC ANEURYSM WITHOUT RUPTURE (H): ICD-10-CM

## 2019-08-16 DIAGNOSIS — I25.5 ISCHEMIC CARDIOMYOPATHY: ICD-10-CM

## 2019-08-16 DIAGNOSIS — I10 BENIGN ESSENTIAL HYPERTENSION: ICD-10-CM

## 2019-08-16 DIAGNOSIS — E78.5 HYPERLIPIDEMIA LDL GOAL <70: Primary | ICD-10-CM

## 2019-08-16 DIAGNOSIS — I25.118 CORONARY ARTERY DISEASE OF NATIVE ARTERY OF NATIVE HEART WITH STABLE ANGINA PECTORIS (H): Primary | ICD-10-CM

## 2019-08-16 PROCEDURE — 99215 OFFICE O/P EST HI 40 MIN: CPT | Performed by: NURSE PRACTITIONER

## 2019-08-16 RX ORDER — CARVEDILOL 6.25 MG/1
6.25 TABLET ORAL 2 TIMES DAILY WITH MEALS
Qty: 180 TABLET | Refills: 3 | Status: ON HOLD | OUTPATIENT
Start: 2019-08-16 | End: 2019-09-09

## 2019-08-16 RX ORDER — EZETIMIBE 10 MG/1
10 TABLET ORAL DAILY
Qty: 30 TABLET | Refills: 11 | COMMUNITY
Start: 2019-08-16 | End: 2020-01-03

## 2019-08-16 NOTE — LETTER
8/16/2019    Danielle Mercado, APRN CNP  [address] [facility] [address]    RE: Adolfo Rendon       Dear Colleague,    I had the pleasure of seeing Adolfo Rendon in the [facility].    Cardiology Clinic Progress Note  Adolfo Rendon MRN# [id number]   YOB: 1945 Age: 73 year old     Reason For Visit: Angiogram follow-up   Primary Cardiologist:   Dr. Espino          History of Presenting Illness:    Adolfo Rendon is a pleasant 73 year old patient with a past cardiac history significant for CAD, ischemic cardiomyopathy, hypertension, hyperlipidemia. Past medical history significant for mild ascending aortic aneurysm 4 cm, ongoing tobacco abuse, and COPD.    He has a history of multiple medication intolerances including amlodipine, hydrochlorothiazide, high-intensity statin, metoprolol, and pravastatin. He has discontinued all antihypertensive medications in the past.   He continued taking his aspirin daily but no other cardiac medications. Coronary angiogram in 2015 showed chronically occluded RCA with left-to-right collaterals along with 50% pLAD stenosis.    Pt was last seen by Dr. Espino on 8/1/2019. He reported shortness of breath and fatigue.  He had occasional right-sided chest heaviness radiating to his right neck. He was started on imdur, carvedilol, and Zetia.  Echocardiogram and angiogram were recommended.    Pt presents today for angiogram follow-up. Echocardiogram 8/6/2019 showing LVEF 45-50%, grade 1 diastolic dysfunction, regional wall motion abnormalities, mild pulmonary hypertension, and mildly dilated ascending aorta. Coronary angiogram patient was noted to have severe multivessel CAD and was set up for CV surgery consultation for four-vessel bypass. CABG was recommended with PFTs prior. Carotid ultrasound 8/8/2019 showing less than 50% stenosis bilateral ICAs. These results were reviewed with him today.    His weight today in the clinic is  stable and he denies any other heart failure symptoms. After his angiogram, he was seen by PCP who increased carvedilol to 6.25 mg b.i.d. for hypertension. He denies any side effects after starting new medications.  He has not started Zetia but will do this when he gets home.  He is agreeable to starting PCSK9 inhibitors given that he had statin intolerances in the past. He currently declines restarting any statin therapy.  Since he was last seen, he actually has felt better and shortness of breath has been improving.  He feels like he has more energy.  He is trying to quit smoking and is using nicotine gum. Blood pressure today in the clinic is elevated but he has not taken his medications yet this morning.  He was seen on 8/12/2019 with blood pressure of 130 systolic, on medications. He did notice headaches after starting Imdur, which have been improving.  His left radial angiogram site is without ecchymosis, bleeding, hematoma, or bruit. Patient reports no chest pain, PND, orthopnea, presyncope, syncope, edema, heart racing, or palpitations.    Current Cardiac Medications   Aspirin 81 mg daily  Carvedilol 6.25 mg b.i.d.   Imdur 30 mg daily                     Assessment and Plan:     Plan  1. Start Zetia 10 mg daily as previously prescribed  2. Start PCSK9 inhibitor  3.  Follow-up with general cardiology as directed by CV surgery, post CABG      1. CAD    Severe multivessel disease - CABG recommended     SOB improving     continue aspirin, beta blocker, Imdur      2. ischemic cardiomyopathy    LVEF 45-50% 8/2019    SOB improving     Continue beta blocker    Check daily weights and call the clinic if your weight has increased more than 2 lbs in one day or 5 lbs in one week.     2000 mg Na diet       3. hypertension    Controlled    Moderate Right renal artery stenosis on CT scan 2018 and 2019 stable - no intervention recommended by vascular surgery. Med manage HTN     Continue carvedilol, Imdur    Check blood  pressure at least 1 hour after medications. Call the clinic if your blood pressure is consistently greater than 130/80.       4. Hyperlipidemia    Last  and 7/2019    Declines statin therapy will start PCSK9 inhibitor       5. Thoracic aortic aneurysm    Mildly dilated ascending aorta 3.8 cm echo 8/2019 and CTa 5/2019    Follow with echo          Thank you for allowing me to participate in this delightful patient's care.      This note was completed in part using Dragon voice recognition software. Although reviewed after completion, some word and grammatical errors may occur.    Garima Thornton, APRN, CNP           Data:   All laboratory data reviewed        HPI and Plan:   See dictation    No orders of the defined types were placed in this encounter.      Orders Placed This Encounter   Medications     ezetimibe (ZETIA) 10 MG tablet     Sig: Take 1 tablet (10 mg) by mouth daily     Dispense:  30 tablet     Refill:  11     carvedilol (COREG) 6.25 MG tablet     Sig: Take 1 tablet (6.25 mg) by mouth 2 times daily (with meals)     Dispense:  180 tablet     Refill:  3       Medications Discontinued During This Encounter   Medication Reason     carvedilol (COREG) 3.125 MG tablet          Encounter Diagnoses   Name Primary?     Coronary artery disease of native artery of native heart with stable angina pectoris (H) Yes     Thoracic aortic aneurysm without rupture (H)      Hyperlipidemia LDL goal <70      Benign essential hypertension      Ischemic cardiomyopathy        CURRENT MEDICATIONS:  Current Outpatient Medications   Medication Sig Dispense Refill     aspirin 81 MG tablet ONE DAILY 100 tablet 3     carvedilol (COREG) 6.25 MG tablet Take 1 tablet (6.25 mg) by mouth 2 times daily (with meals) 180 tablet 3     ezetimibe (ZETIA) 10 MG tablet Take 1 tablet (10 mg) by mouth daily 30 tablet 11     ipratropium (ATROVENT HFA) 17 MCG/ACT inhaler Inhale 2 puffs into the lungs every 6 hours 12.9 g 5      "isosorbide mononitrate (IMDUR) 30 MG 24 hr tablet Take 1 tablet (30 mg) by mouth daily 30 tablet 3     PARoxetine (PAXIL) 20 MG tablet Take 1 tablet (20 mg) by mouth every morning Add to 40 mg daily 90 tablet 1     PARoxetine (PAXIL) 40 MG tablet Take 1 tablet (40 mg) by mouth every morning Add to 20 mg daily 90 tablet 1     salmeterol (SEREVENT) 50 MCG/DOSE inhaler Inhale 1 puff into the lungs 2 times daily 60 each 5       ALLERGIES     Allergies   Allergen Reactions     Amlodipine Fatigue     \"felt like zombie\"     Claritin      Mood , irritablity      Hctz [Hydrochlorothiazide] Fatigue     \"felt like zombie\"     Lisinopril Fatigue     \"felt like zombie\"     Metoprolol Fatigue     \"felt like zombie\"     Pravastatin Fatigue     \"felt like zombie\"     Remeron [Mirtazapine]      Agitation        Wellbutrin [Bupropion Hcl]      Sig mood issues          PAST MEDICAL HISTORY:  Past Medical History:   Diagnosis Date     Coronary artery disease      Depressive disorder, not elsewhere classified      Hypertrophy (benign) of prostate      Impotence of organic origin      Other and unspecified hyperlipidemia      Other anxiety states      Tobacco use disorder        PAST SURGICAL HISTORY:  Past Surgical History:   Procedure Laterality Date     APPENDECTOMY  1966     COLONOSCOPY  3/1/2005     COLONOSCOPY N/A 5/9/2019    Procedure: COLONOSCOPY;  Surgeon: Camilo Beard MD;  Location: [facility]     CV LEFT HEART CATH N/A 8/8/2019    Procedure: Left Heart Cath--also measure LVEDP;  Surgeon: Krystle Barrera MD;  Location:  HEART CARDIAC CATH LAB     HERNIORRHAPHY INGUINAL  7/14/2011    Procedure:HERNIORRHAPHY INGUINAL; Open Repair Right Inguinal Hernia Repair        HYDROCELECTOMY SCROTAL  01/2004    left hydrocele repair     SUPRAPUBIC PROSTATECTOMY         FAMILY HISTORY:  Family History   Problem Relation Age of Onset     Cerebrovascular Disease Mother      Hypertension Mother      Prostate Cancer Brother      Dementia " Brother      Arthritis Sister         rhematoid     Cancer Brother         Lung cancer, lymphoma     Other - See Comments Brother         HIV     Cancer Sister         skin cancer on nose     Pacemaker Sister      Aneurysm Sister         heart       SOCIAL HISTORY:  Social History     Socioeconomic History     Marital status:      Spouse name: None     Number of children: None     Years of education: None     Highest education level: None   Occupational History     None   Social Needs     Financial resource strain: None     Food insecurity:     Worry: None     Inability: None     Transportation needs:     Medical: None     Non-medical: None   Tobacco Use     Smoking status: Current Every Day Smoker     Packs/day: 1.00     Types: Cigarettes     Last attempt to quit: 2017     Years since quittin.5     Smokeless tobacco: Former User     Quit date: 2017   Substance and Sexual Activity     Alcohol use: No     Alcohol/week: 0.0 oz     Comment: quit      Drug use: No     Sexual activity: Not Currently   Lifestyle     Physical activity:     Days per week: None     Minutes per session: None     Stress: None   Relationships     Social connections:     Talks on phone: None     Gets together: None     Attends [employer] service: None     Active member of club or organization: None     Attends meetings of clubs or organizations: None     Relationship status: None     Intimate partner violence:     Fear of current or ex partner: None     Emotionally abused: None     Physically abused: None     Forced sexual activity: None   Other Topics Concern     Parent/sibling w/ CABG, MI or angioplasty before 65F 55M? Not Asked      Service Not Asked     Blood Transfusions Not Asked     Caffeine Concern No     Comment: 2-3 cups of coffee in the morning     Occupational Exposure Not Asked     Hobby Hazards Not Asked     Sleep Concern No     Comment: falls asleep easy but doesn't sleep long     Stress Concern No      Weight Concern No     Special Diet No     Back Care Not Asked     Exercise No     Comment: work: hard labor     Bike Helmet Not Asked     Seat Belt Yes     Self-Exams Not Asked   Social History Narrative     None       Review of Systems:  Skin:  Negative       Eyes:  Positive for glasses reading  ENT:  Positive for hearing loss;nasal congestion    Respiratory:  Positive for dyspnea on exertion;cough;wheezing COPD   Cardiovascular:    Positive for;fatigue;chest pain weak spells; patient states that he feels the pulse in his head in the morning after coughing  Gastroenterology: Negative      Genitourinary:  Negative      Musculoskeletal:  Positive for joint pain general aches & pains  Neurologic:  Positive for seizures    Psychiatric:  Positive for sleep disturbances;anxiety;depression    Heme/Lymph/Imm:  Negative      Endocrine:  Negative        Physical Exam:  Vitals: BP (!) 151/101   Pulse 67   Wt 65.3 kg (144 lb)   SpO2 98%   BMI 21.90 kg/m       Constitutional:  cooperative;in no acute distress thin      Skin:  warm and dry to the touch          Head:  normocephalic        Eyes:  sclera white        Lymph:      ENT:  no pallor or cyanosis        Neck:  JVP normal        Respiratory:  clear to auscultation;normal symmetry         Cardiac: regular rhythm;normal S1 and S2                pulses full and equal                                 left radial bruit (-)      GI:  abdomen soft        Extremities and Muscular Skeletal:  no edema              Neurological:  no gross motor deficits;affect appropriate        Psych:  affect appropriate, oriented to time, person and place          Thank you for allowing me to participate in the care of your patient.    Sincerely,     ANTONIO Gusman [facility]

## 2019-08-16 NOTE — PATIENT INSTRUCTIONS
"[facility] HEART CARE  [facility]~[address]~[place], [place]~[address]  Thank you for your  Heart Care visit today. If you have questions regarding your visit, please contact your cardiology RN's, Melba Rodriguez, at [phone]. Your provider has recommended the following:  Medication Changes:  START zetia 10 mg daily- call my nurse if you don't have it    Recommendations:  1. I would like you to start a medication called a \"PCSK9 inhibitor\" for high cholesterol. This medication is used to decrease your LDL cholesterol and is injected under the skin (SQ) every two weeks.  Currently, there are two medications to pick from, \"Repatha\" (evolocumab) and \"Praluent\" (alirocumab) The medication chosen will be determined by your insurance coverage as well as coverage from the . Coverage for this medication may take some time. We will submit the prescription and our dedicated RN located in [facility] will follow the progress and be in touch with you directly by phone. Until then, please feel free to contact our office ([phone]) with any questions. I am providing a phone number for you as well as a website for patient support regarding taking and administering this medication. Repatha=[phone]  [email or web address] and Praluent=[phone] [email or web address]  Once you have been started and have been taking this medication for about 8 weeks or so, we will want to recheck your fasting cholesterol. Please contact us to schedule your lab. ([phone])   2. Have home blood pressure cuff checked for accuracy with primary provider nurse    Follow-up:  1. See general cardiology follow up after bypass   To schedule a future appointment, we kindly ask that you call cardiology scheduling at [phone] three months prior to requested revisit date.      [facility] cardiology clinic is staffed with \"Advance Practice Providers\". These are our cardiology Physician Assistants " "and Nurse Practitioners.   Please call cardiology scheduling if you feel you need clinical evaluation with them at any time for any cardiac reason.   Reminder:  For your safety, we ask that you bring in your current medication(s) or an updated list of your medications with you to EACH office visit. Include the medication name, dose of pill on bottle and how you are taking it. Include over-the-counter medications or supplements. Your provider will review this at each visit and plan your care based on your current information.   ~~~~~~~~~~~~~~~~~~~~~~~~~~~~~~~~~~~~~~~  \"[facility]\" [place] telephone numbers for reference:  Cardiology Scheduling~[phone]  Diagnostic Imaging Scheduling~[phone]  Lab Scheduling~[phone]  Anticoagulation Clinic~[phone]  Cardiac Rehabilitation~[phone]  CORE Clinic RN's~[phone] (at [facility])  Cardiology Clinic RN's~[phone] (Melba Rodriguez RN)  ~~~~~~~~~~~~~~~~~~~~~~~~~~~~~~~~~~~~~~~~    "

## 2019-08-19 ENCOUNTER — TELEPHONE (OUTPATIENT)
Dept: CARDIOLOGY | Facility: CLINIC | Age: 74
End: 2019-08-19

## 2019-08-19 ENCOUNTER — TELEPHONE (OUTPATIENT)
Dept: OTHER | Facility: CLINIC | Age: 74
End: 2019-08-19

## 2019-08-19 NOTE — TELEPHONE ENCOUNTER
PA Initiation    Medication: Repatha 140mg/ mL Sureclick pens  Insurance Company: [facility] - Phone [phone] Fax [phone]  Pharmacy Filling the Rx: [facility] - [place], [place] - [address]  Filling Pharmacy Phone: [phone]  Filling Pharmacy Fax:    Start Date: 8/19/2019

## 2019-08-19 NOTE — TELEPHONE ENCOUNTER
Pre op PFT's and Vascular surgery clearance obtained.Message left with contact information to proceed with CABG.

## 2019-08-19 NOTE — TELEPHONE ENCOUNTER
Prior Authorization Approval    Authorization Effective Date: 7/20/2019  Authorization Expiration Date: 8/18/2020  Medication: Repatha 140mg/ mL Sureclick pens   Approved Dose/Quantity: 2 per 28 days  Reference #: [facility] key# [id number]   Insurance Company: [employer] - Phone [phone] Fax [phone]  Expected CoPay: $128     CoPay Card Available: No    Foundation Assistance Needed:    Which Pharmacy is filling the prescription (Not needed for infusion/clinic administered): [facility] - [place], [place] - [address]  Pharmacy Notified:    Patient Notified:

## 2019-08-19 NOTE — PATIENT INSTRUCTIONS
Staff message sent to Judith Comer RN to inform her of Dr Guzman's recommendation that no renal artery intervention is needed and he can proceed to CABG. Melba Rodriguez RN Cardiology August 19, 2019, 8:52 AM

## 2019-08-20 ENCOUNTER — TELEPHONE (OUTPATIENT)
Dept: OTHER | Facility: CLINIC | Age: 74
End: 2019-08-20

## 2019-08-21 ENCOUNTER — TELEPHONE (OUTPATIENT)
Dept: CARDIOLOGY | Facility: CLINIC | Age: 74
End: 2019-08-21

## 2019-08-21 NOTE — TELEPHONE ENCOUNTER
Per task, pt needs to schedule surgery with Dr. Mchugh. LM asking to call back. Will try again later

## 2019-08-22 RX ORDER — CEFAZOLIN SODIUM 1 G/50ML
2 SOLUTION INTRAVENOUS
Status: CANCELLED | OUTPATIENT
Start: 2019-08-22

## 2019-08-22 RX ORDER — MUPIROCIN 20 MG/G
OINTMENT TOPICAL 2 TIMES DAILY
Status: CANCELLED | OUTPATIENT
Start: 2019-08-22 | End: 2019-08-23

## 2019-08-22 RX ORDER — METOPROLOL TARTRATE 25 MG/1
25 TABLET, FILM COATED ORAL
Status: CANCELLED | OUTPATIENT
Start: 2019-08-22

## 2019-08-22 RX ORDER — CEFAZOLIN SODIUM 1 G/3ML
1 INJECTION, POWDER, FOR SOLUTION INTRAMUSCULAR; INTRAVENOUS SEE ADMIN INSTRUCTIONS
Status: CANCELLED | OUTPATIENT
Start: 2019-08-22

## 2019-08-22 NOTE — TELEPHONE ENCOUNTER
Pt has 1st order Repatha set up for 8/23 delivery from [facility].

## 2019-08-23 NOTE — TELEPHONE ENCOUNTER
RN spoke with patient regarding injection training. Pt will schedule first injection with the nurse at his PMD's office. Reviewed injection instructions, most common side effects, storage, and lipid profile lab draw. Pt will call at the time of the injection training with any questions and plans to have the lipid profile drawn there. Asked pt to have the results faxed to our clinic.Fax number provided. Provided pt with the website for the nurse to access and review the training video.

## 2019-08-26 ENCOUNTER — OFFICE VISIT (OUTPATIENT)
Dept: FAMILY MEDICINE | Facility: CLINIC | Age: 74
End: 2019-08-26
Payer: COMMERCIAL

## 2019-08-26 DIAGNOSIS — E78.5 HYPERLIPIDEMIA LDL GOAL <100: Primary | ICD-10-CM

## 2019-08-26 PROCEDURE — 99207 ZZC NO CHARGE NURSE ONLY: CPT

## 2019-08-26 NOTE — NURSING NOTE
Adolfo Ortiz from [facility] pharmacy self injection teaching. He demonstrated understanding and was able to self inject his first dose.

## 2019-08-27 LAB
DLCOCOR-%PRED-PRE: 80 %
DLCOCOR-PRE: 18.74 ML/MIN/MMHG
DLCOUNC-%PRED-PRE: 79 %
DLCOUNC-PRE: 18.47 ML/MIN/MMHG
DLCOUNC-PRED: 23.16 ML/MIN/MMHG
ERV-%PRED-PRE: 119 %
ERV-PRE: 1.3 L
ERV-PRED: 1.09 L
EXPTIME-PRE: 10.32 SEC
FEF2575-%PRED-POST: 28 %
FEF2575-%PRED-PRE: 22 %
FEF2575-POST: 0.61 L/SEC
FEF2575-PRE: 0.49 L/SEC
FEF2575-PRED: 2.15 L/SEC
FEFMAX-%PRED-PRE: 60 %
FEFMAX-PRE: 4.49 L/SEC
FEFMAX-PRED: 7.39 L/SEC
FEV1-%PRED-PRE: 53 %
FEV1-PRE: 1.52 L
FEV1FEV6-PRE: 50 %
FEV1FEV6-PRED: 77 %
FEV1FVC-PRE: 44 %
FEV1FVC-PRED: 76 %
FEV1SVC-PRE: 35 %
FEV1SVC-PRED: 68 %
FIFMAX-PRE: 4.49 L/SEC
FRCPLETH-%PRED-PRE: 200 %
FRCPLETH-PRE: 7.13 L
FRCPLETH-PRED: 3.55 L
FVC-%PRED-PRE: 92 %
FVC-PRE: 3.45 L
FVC-PRED: 3.72 L
IC-%PRED-PRE: 93 %
IC-PRE: 2.88 L
IC-PRED: 3.08 L
MEP-PRE: 154 CMH2O
MIP-PRE: -84 CMH2O
MVV-%PRED-PRE: 59 %
MVV-PRE: 68 L/MIN
MVV-PRED: 114 L/MIN
RVPLETH-%PRED-PRE: 219 %
RVPLETH-PRE: 5.72 L
RVPLETH-PRED: 2.61 L
TLCPLETH-%PRED-PRE: 153 %
TLCPLETH-PRE: 10.01 L
TLCPLETH-PRED: 6.52 L
VA-%PRED-PRE: 122 %
VA-PRE: 6.99 L
VC-%PRED-PRE: 102 %
VC-PRE: 4.29 L
VC-PRED: 4.17 L

## 2019-08-30 ENCOUNTER — ANESTHESIA EVENT (OUTPATIENT)
Dept: SURGERY | Facility: CLINIC | Age: 74
DRG: 236 | End: 2019-08-30
Payer: COMMERCIAL

## 2019-09-02 ASSESSMENT — COPD QUESTIONNAIRES
CAT_SEVERITY: MODERATE
COPD: 1

## 2019-09-02 ASSESSMENT — LIFESTYLE VARIABLES: TOBACCO_USE: 1

## 2019-09-03 ENCOUNTER — APPOINTMENT (OUTPATIENT)
Dept: GENERAL RADIOLOGY | Facility: CLINIC | Age: 74
DRG: 236 | End: 2019-09-03
Attending: PHYSICIAN ASSISTANT
Payer: COMMERCIAL

## 2019-09-03 ENCOUNTER — HOSPITAL ENCOUNTER (INPATIENT)
Facility: CLINIC | Age: 74
LOS: 6 days | Discharge: NURSING, REHAB OR LONG-TERM CARE | DRG: 236 | End: 2019-09-09
Attending: THORACIC SURGERY (CARDIOTHORACIC VASCULAR SURGERY) | Admitting: INTERNAL MEDICINE
Payer: COMMERCIAL

## 2019-09-03 ENCOUNTER — ANESTHESIA (OUTPATIENT)
Dept: SURGERY | Facility: CLINIC | Age: 74
DRG: 236 | End: 2019-09-03
Payer: COMMERCIAL

## 2019-09-03 DIAGNOSIS — F32.0 MILD MAJOR DEPRESSION (H): ICD-10-CM

## 2019-09-03 DIAGNOSIS — I25.118 CORONARY ARTERY DISEASE OF NATIVE ARTERY OF NATIVE HEART WITH STABLE ANGINA PECTORIS (H): ICD-10-CM

## 2019-09-03 DIAGNOSIS — F41.1 GAD (GENERALIZED ANXIETY DISORDER): ICD-10-CM

## 2019-09-03 DIAGNOSIS — E78.5 HYPERLIPIDEMIA LDL GOAL <70: ICD-10-CM

## 2019-09-03 DIAGNOSIS — Z95.1 S/P CABG X 4: Primary | ICD-10-CM

## 2019-09-03 LAB
ABO + RH BLD: NORMAL
ABO + RH BLD: NORMAL
ALBUMIN SERPL-MCNC: 2.9 G/DL (ref 3.4–5)
ALP SERPL-CCNC: 46 U/L (ref 40–150)
ALT SERPL W P-5'-P-CCNC: 15 U/L (ref 0–70)
ANION GAP SERPL CALCULATED.3IONS-SCNC: 4 MMOL/L (ref 3–14)
APTT PPP: 40 SEC (ref 22–37)
APTT PPP: 40 SEC (ref 22–37)
AST SERPL W P-5'-P-CCNC: 32 U/L (ref 0–45)
BASE DEFICIT BLDA-SCNC: 1.2 MMOL/L
BASE DEFICIT BLDA-SCNC: 2.1 MMOL/L
BASE DEFICIT BLDA-SCNC: 2.2 MMOL/L
BASE DEFICIT BLDA-SCNC: 3 MMOL/L
BASE DEFICIT BLDA-SCNC: 4 MMOL/L
BILIRUB SERPL-MCNC: 0.4 MG/DL (ref 0.2–1.3)
BLD GP AB SCN SERPL QL: NORMAL
BLD PROD TYP BPU: NORMAL
BLD UNIT ID BPU: 0
BLOOD BANK CMNT PATIENT-IMP: NORMAL
BLOOD BANK CMNT PATIENT-IMP: NORMAL
BLOOD PRODUCT CODE: NORMAL
BPU ID: NORMAL
BUN SERPL-MCNC: 21 MG/DL (ref 7–30)
CA-I BLD-MCNC: 4.5 MG/DL (ref 4.4–5.2)
CA-I BLD-SCNC: 4 MG/DL (ref 4.4–5.2)
CA-I BLD-SCNC: 4.1 MG/DL (ref 4.4–5.2)
CA-I BLD-SCNC: 4.2 MG/DL (ref 4.4–5.2)
CA-I BLD-SCNC: 4.3 MG/DL (ref 4.4–5.2)
CA-I BLD-SCNC: 4.7 MG/DL (ref 4.4–5.2)
CA-I BLD-SCNC: 5 MG/DL (ref 4.4–5.2)
CALCIUM SERPL-MCNC: 7.7 MG/DL (ref 8.5–10.1)
CHLORIDE SERPL-SCNC: 115 MMOL/L (ref 94–109)
CO2 BLD-SCNC: 26 MMOL/L (ref 21–28)
CO2 BLD-SCNC: 26 MMOL/L (ref 21–28)
CO2 BLD-SCNC: 27 MMOL/L (ref 21–28)
CO2 BLD-SCNC: 28 MMOL/L (ref 21–28)
CO2 BLD-SCNC: 28 MMOL/L (ref 21–28)
CO2 BLDCOV-SCNC: 28 MMOL/L (ref 21–28)
CO2 SERPL-SCNC: 26 MMOL/L (ref 20–32)
CPB APPLIED: ABNORMAL
CREAT SERPL-MCNC: 1.03 MG/DL (ref 0.66–1.25)
ERYTHROCYTE [DISTWIDTH] IN BLOOD BY AUTOMATED COUNT: 14.1 % (ref 10–15)
ERYTHROCYTE [DISTWIDTH] IN BLOOD BY AUTOMATED COUNT: 14.1 % (ref 10–15)
FIBRINOGEN PPP-MCNC: 217 MG/DL (ref 200–420)
GFR SERPL CREATININE-BSD FRML MDRD: 71 ML/MIN/{1.73_M2}
GLUCOSE BLDC GLUCOMTR-MCNC: 100 MG/DL (ref 70–99)
GLUCOSE BLDC GLUCOMTR-MCNC: 107 MG/DL (ref 70–99)
GLUCOSE BLDC GLUCOMTR-MCNC: 110 MG/DL (ref 70–99)
GLUCOSE BLDC GLUCOMTR-MCNC: 128 MG/DL (ref 70–99)
GLUCOSE BLDC GLUCOMTR-MCNC: 84 MG/DL (ref 70–99)
GLUCOSE BLDC GLUCOMTR-MCNC: 97 MG/DL (ref 70–99)
GLUCOSE SERPL-MCNC: 99 MG/DL (ref 70–99)
HCO3 BLD-SCNC: 24 MMOL/L (ref 21–28)
HCO3 BLD-SCNC: 25 MMOL/L (ref 21–28)
HCO3 BLD-SCNC: 26 MMOL/L (ref 21–28)
HCT VFR BLD AUTO: 31.7 % (ref 40–53)
HCT VFR BLD AUTO: 32 % (ref 40–53)
HCT VFR BLD CALC: 28 %PCV (ref 40–53)
HCT VFR BLD CALC: 30 %PCV (ref 40–53)
HCT VFR BLD CALC: 30 %PCV (ref 40–53)
HCT VFR BLD CALC: 35 %PCV (ref 40–53)
HGB BLD CALC-MCNC: 10.2 G/DL (ref 13.3–17.7)
HGB BLD CALC-MCNC: 10.2 G/DL (ref 13.3–17.7)
HGB BLD CALC-MCNC: 11.9 G/DL (ref 13.3–17.7)
HGB BLD CALC-MCNC: 9.5 G/DL (ref 13.3–17.7)
HGB BLD-MCNC: 10.8 G/DL (ref 13.3–17.7)
HGB BLD-MCNC: 11 G/DL (ref 13.3–17.7)
INR PPP: 1.3 (ref 0.86–1.14)
INR PPP: 1.47 (ref 0.86–1.14)
KCT BLD-ACNC: 126 SEC (ref 75–150)
KCT BLD-ACNC: 126 SEC (ref 75–150)
KCT BLD-ACNC: 445 SEC (ref 75–150)
KCT BLD-ACNC: 521 SEC (ref 75–150)
KCT BLD-ACNC: 582 SEC (ref 75–150)
LACTATE BLD-SCNC: 0.8 MMOL/L (ref 0.7–2)
MAGNESIUM SERPL-MCNC: 2.5 MG/DL (ref 1.6–2.3)
MCH RBC QN AUTO: 28.1 PG (ref 26.5–33)
MCH RBC QN AUTO: 28.4 PG (ref 26.5–33)
MCHC RBC AUTO-ENTMCNC: 34.1 G/DL (ref 31.5–36.5)
MCHC RBC AUTO-ENTMCNC: 34.4 G/DL (ref 31.5–36.5)
MCV RBC AUTO: 83 FL (ref 78–100)
MCV RBC AUTO: 83 FL (ref 78–100)
MRSA DNA SPEC QL NAA+PROBE: NEGATIVE
NUM BPU REQUESTED: 4
OXYHGB MFR BLD: 94 % (ref 92–100)
OXYHGB MFR BLD: 95 % (ref 92–100)
OXYHGB MFR BLD: 96 % (ref 92–100)
OXYHGB MFR BLD: 97 % (ref 92–100)
OXYHGB MFR BLD: 97 % (ref 92–100)
PCO2 BLD: 35 MM HG (ref 35–45)
PCO2 BLD: 39 MM HG (ref 35–45)
PCO2 BLD: 41 MM HG (ref 35–45)
PCO2 BLD: 42 MM HG (ref 35–45)
PCO2 BLD: 49 MM HG (ref 35–45)
PCO2 BLD: 50 MM HG (ref 35–45)
PCO2 BLD: 52 MM HG (ref 35–45)
PCO2 BLD: 55 MM HG (ref 35–45)
PCO2 BLD: 59 MM HG (ref 35–45)
PCO2 BLD: 69 MM HG (ref 35–45)
PCO2 BLDV: 53 MM HG (ref 40–50)
PH BLD: 7.18 PH (ref 7.35–7.45)
PH BLD: 7.24 PH (ref 7.35–7.45)
PH BLD: 7.27 PH (ref 7.35–7.45)
PH BLD: 7.29 PH (ref 7.35–7.45)
PH BLD: 7.34 PH (ref 7.35–7.45)
PH BLD: 7.36 PH (ref 7.35–7.45)
PH BLD: 7.37 PH (ref 7.35–7.45)
PH BLD: 7.43 PH (ref 7.35–7.45)
PH BLD: 7.44 PH (ref 7.35–7.45)
PH BLD: 7.48 PH (ref 7.35–7.45)
PH BLDV: 7.34 PH (ref 7.32–7.43)
PHOSPHATE SERPL-MCNC: 2.2 MG/DL (ref 2.5–4.5)
PLATELET # BLD AUTO: 92 10E9/L (ref 150–450)
PLATELET # BLD AUTO: 99 10E9/L (ref 150–450)
PO2 BLD: 100 MM HG (ref 80–105)
PO2 BLD: 108 MM HG (ref 80–105)
PO2 BLD: 113 MM HG (ref 80–105)
PO2 BLD: 129 MM HG (ref 80–105)
PO2 BLD: 180 MM HG (ref 80–105)
PO2 BLD: 385 MM HG (ref 80–105)
PO2 BLD: 418 MM HG (ref 80–105)
PO2 BLD: 423 MM HG (ref 80–105)
PO2 BLD: 458 MM HG (ref 80–105)
PO2 BLD: 511 MM HG (ref 80–105)
PO2 BLDV: 56 MM HG (ref 25–47)
POTASSIUM BLD-SCNC: 3.9 MMOL/L (ref 3.4–5.3)
POTASSIUM BLD-SCNC: 4 MMOL/L (ref 3.4–5.3)
POTASSIUM BLD-SCNC: 4.7 MMOL/L (ref 3.4–5.3)
POTASSIUM BLD-SCNC: 4.9 MMOL/L (ref 3.4–5.3)
POTASSIUM BLD-SCNC: 5.1 MMOL/L (ref 3.4–5.3)
POTASSIUM BLD-SCNC: 5.8 MMOL/L (ref 3.4–5.3)
POTASSIUM SERPL-SCNC: 4.1 MMOL/L (ref 3.4–5.3)
POTASSIUM SERPL-SCNC: 4.3 MMOL/L (ref 3.4–5.3)
PROT SERPL-MCNC: 5.1 G/DL (ref 6.8–8.8)
RBC # BLD AUTO: 3.84 10E12/L (ref 4.4–5.9)
RBC # BLD AUTO: 3.88 10E12/L (ref 4.4–5.9)
SAO2 % BLDA FROM PO2: 100 % (ref 92–100)
SAO2 % BLDV FROM PO2: 86 %
SODIUM BLD-SCNC: 141 MMOL/L (ref 133–144)
SODIUM BLD-SCNC: 142 MMOL/L (ref 133–144)
SODIUM SERPL-SCNC: 145 MMOL/L (ref 133–144)
SPECIMEN EXP DATE BLD: NORMAL
SPECIMEN SOURCE: NORMAL
TRANSFUSION STATUS PATIENT QL: NORMAL
WBC # BLD AUTO: 6.7 10E9/L (ref 4–11)
WBC # BLD AUTO: 6.7 10E9/L (ref 4–11)

## 2019-09-03 PROCEDURE — 25000128 H RX IP 250 OP 636: Performed by: THORACIC SURGERY (CARDIOTHORACIC VASCULAR SURGERY)

## 2019-09-03 PROCEDURE — 25000132 ZZH RX MED GY IP 250 OP 250 PS 637: Performed by: PHYSICIAN ASSISTANT

## 2019-09-03 PROCEDURE — 85027 COMPLETE CBC AUTOMATED: CPT | Performed by: PHYSICIAN ASSISTANT

## 2019-09-03 PROCEDURE — 25000125 ZZHC RX 250: Performed by: NURSE ANESTHETIST, CERTIFIED REGISTERED

## 2019-09-03 PROCEDURE — 85610 PROTHROMBIN TIME: CPT | Performed by: PHYSICIAN ASSISTANT

## 2019-09-03 PROCEDURE — 06BP4ZZ EXCISION OF RIGHT SAPHENOUS VEIN, PERCUTANEOUS ENDOSCOPIC APPROACH: ICD-10-PCS | Performed by: THORACIC SURGERY (CARDIOTHORACIC VASCULAR SURGERY)

## 2019-09-03 PROCEDURE — 40000008 ZZH STATISTIC AIRWAY CARE

## 2019-09-03 PROCEDURE — 85730 THROMBOPLASTIN TIME PARTIAL: CPT | Performed by: PHYSICIAN ASSISTANT

## 2019-09-03 PROCEDURE — 25000128 H RX IP 250 OP 636

## 2019-09-03 PROCEDURE — 83605 ASSAY OF LACTIC ACID: CPT | Performed by: THORACIC SURGERY (CARDIOTHORACIC VASCULAR SURGERY)

## 2019-09-03 PROCEDURE — 25000131 ZZH RX MED GY IP 250 OP 636 PS 637: Performed by: THORACIC SURGERY (CARDIOTHORACIC VASCULAR SURGERY)

## 2019-09-03 PROCEDURE — 36000073 ZZH SURGERY LEVEL 6 1ST 30 MIN: Performed by: THORACIC SURGERY (CARDIOTHORACIC VASCULAR SURGERY)

## 2019-09-03 PROCEDURE — 25000301 ZZH OR RX SURGIFLO W/THROMBIN KIT 2ML 1991 OPNP: Performed by: THORACIC SURGERY (CARDIOTHORACIC VASCULAR SURGERY)

## 2019-09-03 PROCEDURE — 25000128 H RX IP 250 OP 636: Performed by: NURSE PRACTITIONER

## 2019-09-03 PROCEDURE — 25800030 ZZH RX IP 258 OP 636: Performed by: PHYSICIAN ASSISTANT

## 2019-09-03 PROCEDURE — 25800030 ZZH RX IP 258 OP 636: Performed by: THORACIC SURGERY (CARDIOTHORACIC VASCULAR SURGERY)

## 2019-09-03 PROCEDURE — 25800030 ZZH RX IP 258 OP 636

## 2019-09-03 PROCEDURE — 37000008 ZZH ANESTHESIA TECHNICAL FEE, 1ST 30 MIN: Performed by: THORACIC SURGERY (CARDIOTHORACIC VASCULAR SURGERY)

## 2019-09-03 PROCEDURE — 25000128 H RX IP 250 OP 636: Performed by: PHYSICIAN ASSISTANT

## 2019-09-03 PROCEDURE — 41000023 ZZH PERA-PERFUSION, SH ONLY,  EACH ADDTL 15 MIN: Performed by: THORACIC SURGERY (CARDIOTHORACIC VASCULAR SURGERY)

## 2019-09-03 PROCEDURE — 84132 ASSAY OF SERUM POTASSIUM: CPT | Performed by: ANESTHESIOLOGY

## 2019-09-03 PROCEDURE — 94003 VENT MGMT INPAT SUBQ DAY: CPT

## 2019-09-03 PROCEDURE — 82805 BLOOD GASES W/O2 SATURATION: CPT | Performed by: PHYSICIAN ASSISTANT

## 2019-09-03 PROCEDURE — C9113 INJ PANTOPRAZOLE SODIUM, VIA: HCPCS | Performed by: PHYSICIAN ASSISTANT

## 2019-09-03 PROCEDURE — 93005 ELECTROCARDIOGRAM TRACING: CPT

## 2019-09-03 PROCEDURE — 25800030 ZZH RX IP 258 OP 636: Performed by: NURSE ANESTHETIST, CERTIFIED REGISTERED

## 2019-09-03 PROCEDURE — 40000275 ZZH STATISTIC RCP TIME EA 10 MIN

## 2019-09-03 PROCEDURE — 25000125 ZZHC RX 250: Performed by: NURSE PRACTITIONER

## 2019-09-03 PROCEDURE — 25000128 H RX IP 250 OP 636: Performed by: NURSE ANESTHETIST, CERTIFIED REGISTERED

## 2019-09-03 PROCEDURE — 36415 COLL VENOUS BLD VENIPUNCTURE: CPT | Performed by: ANESTHESIOLOGY

## 2019-09-03 PROCEDURE — 82803 BLOOD GASES ANY COMBINATION: CPT

## 2019-09-03 PROCEDURE — 02100Z9 BYPASS CORONARY ARTERY, ONE ARTERY FROM LEFT INTERNAL MAMMARY, OPEN APPROACH: ICD-10-PCS | Performed by: THORACIC SURGERY (CARDIOTHORACIC VASCULAR SURGERY)

## 2019-09-03 PROCEDURE — P9041 ALBUMIN (HUMAN),5%, 50ML: HCPCS

## 2019-09-03 PROCEDURE — 5A1221Z PERFORMANCE OF CARDIAC OUTPUT, CONTINUOUS: ICD-10-PCS | Performed by: THORACIC SURGERY (CARDIOTHORACIC VASCULAR SURGERY)

## 2019-09-03 PROCEDURE — 25800030 ZZH RX IP 258 OP 636: Performed by: ANESTHESIOLOGY

## 2019-09-03 PROCEDURE — 25000132 ZZH RX MED GY IP 250 OP 250 PS 637: Performed by: THORACIC SURGERY (CARDIOTHORACIC VASCULAR SURGERY)

## 2019-09-03 PROCEDURE — 87641 MR-STAPH DNA AMP PROBE: CPT | Performed by: THORACIC SURGERY (CARDIOTHORACIC VASCULAR SURGERY)

## 2019-09-03 PROCEDURE — 93010 ELECTROCARDIOGRAM REPORT: CPT | Performed by: INTERNAL MEDICINE

## 2019-09-03 PROCEDURE — 84100 ASSAY OF PHOSPHORUS: CPT | Performed by: PHYSICIAN ASSISTANT

## 2019-09-03 PROCEDURE — 25000125 ZZHC RX 250

## 2019-09-03 PROCEDURE — C1779 LEAD, PMKR, TRANSVENOUS VDD: HCPCS | Performed by: THORACIC SURGERY (CARDIOTHORACIC VASCULAR SURGERY)

## 2019-09-03 PROCEDURE — 85027 COMPLETE CBC AUTOMATED: CPT | Performed by: THORACIC SURGERY (CARDIOTHORACIC VASCULAR SURGERY)

## 2019-09-03 PROCEDURE — 25000565 ZZH ISOFLURANE, EA 15 MIN: Performed by: THORACIC SURGERY (CARDIOTHORACIC VASCULAR SURGERY)

## 2019-09-03 PROCEDURE — C1758 CATHETER, URETERAL: HCPCS | Performed by: THORACIC SURGERY (CARDIOTHORACIC VASCULAR SURGERY)

## 2019-09-03 PROCEDURE — 25000125 ZZHC RX 250: Performed by: ANESTHESIOLOGY

## 2019-09-03 PROCEDURE — 00000146 ZZHCL STATISTIC GLUCOSE BY METER IP

## 2019-09-03 PROCEDURE — 021209W BYPASS CORONARY ARTERY, THREE ARTERIES FROM AORTA WITH AUTOLOGOUS VENOUS TISSUE, OPEN APPROACH: ICD-10-PCS | Performed by: THORACIC SURGERY (CARDIOTHORACIC VASCULAR SURGERY)

## 2019-09-03 PROCEDURE — 40000986 XR CHEST PORT 1 VW

## 2019-09-03 PROCEDURE — 83735 ASSAY OF MAGNESIUM: CPT | Performed by: PHYSICIAN ASSISTANT

## 2019-09-03 PROCEDURE — 85610 PROTHROMBIN TIME: CPT | Performed by: THORACIC SURGERY (CARDIOTHORACIC VASCULAR SURGERY)

## 2019-09-03 PROCEDURE — 40000171 ZZH STATISTIC PRE-PROCEDURE ASSESSMENT III: Performed by: THORACIC SURGERY (CARDIOTHORACIC VASCULAR SURGERY)

## 2019-09-03 PROCEDURE — 80053 COMPREHEN METABOLIC PANEL: CPT | Performed by: PHYSICIAN ASSISTANT

## 2019-09-03 PROCEDURE — 82330 ASSAY OF CALCIUM: CPT

## 2019-09-03 PROCEDURE — 87640 STAPH A DNA AMP PROBE: CPT | Performed by: THORACIC SURGERY (CARDIOTHORACIC VASCULAR SURGERY)

## 2019-09-03 PROCEDURE — 25800030 ZZH RX IP 258 OP 636: Performed by: NURSE PRACTITIONER

## 2019-09-03 PROCEDURE — P9041 ALBUMIN (HUMAN),5%, 50ML: HCPCS | Performed by: NURSE ANESTHETIST, CERTIFIED REGISTERED

## 2019-09-03 PROCEDURE — 85730 THROMBOPLASTIN TIME PARTIAL: CPT | Performed by: THORACIC SURGERY (CARDIOTHORACIC VASCULAR SURGERY)

## 2019-09-03 PROCEDURE — 84295 ASSAY OF SERUM SODIUM: CPT

## 2019-09-03 PROCEDURE — 85014 HEMATOCRIT: CPT

## 2019-09-03 PROCEDURE — 85347 COAGULATION TIME ACTIVATED: CPT

## 2019-09-03 PROCEDURE — 85384 FIBRINOGEN ACTIVITY: CPT | Performed by: THORACIC SURGERY (CARDIOTHORACIC VASCULAR SURGERY)

## 2019-09-03 PROCEDURE — 99291 CRITICAL CARE FIRST HOUR: CPT | Performed by: NURSE PRACTITIONER

## 2019-09-03 PROCEDURE — 25000128 H RX IP 250 OP 636: Performed by: ANESTHESIOLOGY

## 2019-09-03 PROCEDURE — 84132 ASSAY OF SERUM POTASSIUM: CPT

## 2019-09-03 PROCEDURE — 82330 ASSAY OF CALCIUM: CPT | Performed by: THORACIC SURGERY (CARDIOTHORACIC VASCULAR SURGERY)

## 2019-09-03 PROCEDURE — 20000003 ZZH R&B ICU

## 2019-09-03 PROCEDURE — 41000022 ZZH PER-PERFUSION, SH ONLY,  1ST 30 MIN: Performed by: THORACIC SURGERY (CARDIOTHORACIC VASCULAR SURGERY)

## 2019-09-03 PROCEDURE — 37000009 ZZH ANESTHESIA TECHNICAL FEE, EACH ADDTL 15 MIN: Performed by: THORACIC SURGERY (CARDIOTHORACIC VASCULAR SURGERY)

## 2019-09-03 PROCEDURE — P9041 ALBUMIN (HUMAN),5%, 50ML: HCPCS | Performed by: PHYSICIAN ASSISTANT

## 2019-09-03 PROCEDURE — 25000125 ZZHC RX 250: Performed by: THORACIC SURGERY (CARDIOTHORACIC VASCULAR SURGERY)

## 2019-09-03 PROCEDURE — 36000075 ZZH SURGERY LEVEL 6 EA 15 ADDTL MIN: Performed by: THORACIC SURGERY (CARDIOTHORACIC VASCULAR SURGERY)

## 2019-09-03 PROCEDURE — 27210794 ZZH OR GENERAL SUPPLY STERILE: Performed by: THORACIC SURGERY (CARDIOTHORACIC VASCULAR SURGERY)

## 2019-09-03 DEVICE — LEAD PACER MYOCARDIAL BIPOLAR TEMPORARY 53CM 6495F: Type: IMPLANTABLE DEVICE | Status: FUNCTIONAL

## 2019-09-03 RX ORDER — GLYCOPYRROLATE 0.2 MG/ML
INJECTION, SOLUTION INTRAMUSCULAR; INTRAVENOUS PRN
Status: DISCONTINUED | OUTPATIENT
Start: 2019-09-03 | End: 2019-09-03

## 2019-09-03 RX ORDER — NEOSTIGMINE METHYLSULFATE 1 MG/ML
VIAL (ML) INJECTION PRN
Status: DISCONTINUED | OUTPATIENT
Start: 2019-09-03 | End: 2019-09-03

## 2019-09-03 RX ORDER — CEFAZOLIN SODIUM 1 G/3ML
1 INJECTION, POWDER, FOR SOLUTION INTRAMUSCULAR; INTRAVENOUS EVERY 8 HOURS
Status: COMPLETED | OUTPATIENT
Start: 2019-09-03 | End: 2019-09-04

## 2019-09-03 RX ORDER — ONDANSETRON 2 MG/ML
INJECTION INTRAMUSCULAR; INTRAVENOUS PRN
Status: DISCONTINUED | OUTPATIENT
Start: 2019-09-03 | End: 2019-09-03

## 2019-09-03 RX ORDER — HYDROMORPHONE HYDROCHLORIDE 1 MG/ML
.3-.5 INJECTION, SOLUTION INTRAMUSCULAR; INTRAVENOUS; SUBCUTANEOUS
Status: DISCONTINUED | OUTPATIENT
Start: 2019-09-03 | End: 2019-09-04

## 2019-09-03 RX ORDER — ALBUMIN, HUMAN INJ 5% 5 %
SOLUTION INTRAVENOUS CONTINUOUS PRN
Status: DISCONTINUED | OUTPATIENT
Start: 2019-09-03 | End: 2019-09-03

## 2019-09-03 RX ORDER — HEPARIN SODIUM 1000 [USP'U]/ML
INJECTION, SOLUTION INTRAVENOUS; SUBCUTANEOUS PRN
Status: DISCONTINUED | OUTPATIENT
Start: 2019-09-03 | End: 2019-09-03

## 2019-09-03 RX ORDER — CEFAZOLIN SODIUM 1 G/3ML
1 INJECTION, POWDER, FOR SOLUTION INTRAMUSCULAR; INTRAVENOUS SEE ADMIN INSTRUCTIONS
Status: DISCONTINUED | OUTPATIENT
Start: 2019-09-03 | End: 2019-09-03 | Stop reason: HOSPADM

## 2019-09-03 RX ORDER — ONDANSETRON 2 MG/ML
4 INJECTION INTRAMUSCULAR; INTRAVENOUS EVERY 6 HOURS PRN
Status: DISCONTINUED | OUTPATIENT
Start: 2019-09-03 | End: 2019-09-09 | Stop reason: HOSPADM

## 2019-09-03 RX ORDER — POTASSIUM CL/LIDO/0.9 % NACL 10MEQ/0.1L
10 INTRAVENOUS SOLUTION, PIGGYBACK (ML) INTRAVENOUS
Status: DISCONTINUED | OUTPATIENT
Start: 2019-09-03 | End: 2019-09-09 | Stop reason: HOSPADM

## 2019-09-03 RX ORDER — DEXTROSE MONOHYDRATE 25 G/50ML
25-50 INJECTION, SOLUTION INTRAVENOUS
Status: DISCONTINUED | OUTPATIENT
Start: 2019-09-03 | End: 2019-09-03

## 2019-09-03 RX ORDER — MEPERIDINE HYDROCHLORIDE 25 MG/ML
12.5-25 INJECTION INTRAMUSCULAR; INTRAVENOUS; SUBCUTANEOUS
Status: DISCONTINUED | OUTPATIENT
Start: 2019-09-03 | End: 2019-09-04

## 2019-09-03 RX ORDER — POTASSIUM CHLORIDE 1.5 G/1.58G
20-40 POWDER, FOR SOLUTION ORAL
Status: DISCONTINUED | OUTPATIENT
Start: 2019-09-03 | End: 2019-09-09 | Stop reason: HOSPADM

## 2019-09-03 RX ORDER — PROPOFOL 10 MG/ML
INJECTION, EMULSION INTRAVENOUS CONTINUOUS PRN
Status: DISCONTINUED | OUTPATIENT
Start: 2019-09-03 | End: 2019-09-03

## 2019-09-03 RX ORDER — EPHEDRINE SULFATE 50 MG/ML
INJECTION, SOLUTION INTRAMUSCULAR; INTRAVENOUS; SUBCUTANEOUS PRN
Status: DISCONTINUED | OUTPATIENT
Start: 2019-09-03 | End: 2019-09-03

## 2019-09-03 RX ORDER — MUPIROCIN 20 MG/G
0.5 OINTMENT TOPICAL 2 TIMES DAILY
Status: DISCONTINUED | OUTPATIENT
Start: 2019-09-03 | End: 2019-09-03 | Stop reason: CLARIF

## 2019-09-03 RX ORDER — DEXTROSE MONOHYDRATE, SODIUM CHLORIDE, AND POTASSIUM CHLORIDE 50; 1.49; 4.5 G/1000ML; G/1000ML; G/1000ML
INJECTION, SOLUTION INTRAVENOUS CONTINUOUS
Status: DISCONTINUED | OUTPATIENT
Start: 2019-09-03 | End: 2019-09-09 | Stop reason: HOSPADM

## 2019-09-03 RX ORDER — FENTANYL CITRATE 50 UG/ML
25-100 INJECTION, SOLUTION INTRAMUSCULAR; INTRAVENOUS
Status: COMPLETED | OUTPATIENT
Start: 2019-09-03 | End: 2019-09-03

## 2019-09-03 RX ORDER — FENTANYL CITRATE 50 UG/ML
INJECTION, SOLUTION INTRAMUSCULAR; INTRAVENOUS PRN
Status: DISCONTINUED | OUTPATIENT
Start: 2019-09-03 | End: 2019-09-03

## 2019-09-03 RX ORDER — POTASSIUM CHLORIDE 29.8 MG/ML
20 INJECTION INTRAVENOUS
Status: DISCONTINUED | OUTPATIENT
Start: 2019-09-03 | End: 2019-09-09 | Stop reason: HOSPADM

## 2019-09-03 RX ORDER — HYDROXYZINE HYDROCHLORIDE 10 MG/1
10 TABLET, FILM COATED ORAL EVERY 6 HOURS PRN
Status: DISCONTINUED | OUTPATIENT
Start: 2019-09-03 | End: 2019-09-09 | Stop reason: HOSPADM

## 2019-09-03 RX ORDER — MAGNESIUM SULFATE HEPTAHYDRATE 40 MG/ML
2 INJECTION, SOLUTION INTRAVENOUS DAILY PRN
Status: DISCONTINUED | OUTPATIENT
Start: 2019-09-03 | End: 2019-09-09 | Stop reason: HOSPADM

## 2019-09-03 RX ORDER — ACETAMINOPHEN 325 MG/1
650 TABLET ORAL EVERY 4 HOURS PRN
Status: DISCONTINUED | OUTPATIENT
Start: 2019-09-06 | End: 2019-09-09 | Stop reason: HOSPADM

## 2019-09-03 RX ORDER — DEXTROSE MONOHYDRATE 25 G/50ML
25-50 INJECTION, SOLUTION INTRAVENOUS
Status: DISCONTINUED | OUTPATIENT
Start: 2019-09-03 | End: 2019-09-09 | Stop reason: HOSPADM

## 2019-09-03 RX ORDER — IPRATROPIUM BROMIDE AND ALBUTEROL SULFATE 2.5; .5 MG/3ML; MG/3ML
3 SOLUTION RESPIRATORY (INHALATION) ONCE
Status: COMPLETED | OUTPATIENT
Start: 2019-09-03 | End: 2019-09-03

## 2019-09-03 RX ORDER — NICOTINE POLACRILEX 4 MG
15-30 LOZENGE BUCCAL
Status: DISCONTINUED | OUTPATIENT
Start: 2019-09-03 | End: 2019-09-03

## 2019-09-03 RX ORDER — PROTAMINE SULFATE 10 MG/ML
INJECTION, SOLUTION INTRAVENOUS PRN
Status: DISCONTINUED | OUTPATIENT
Start: 2019-09-03 | End: 2019-09-03

## 2019-09-03 RX ORDER — ONDANSETRON 4 MG/1
4 TABLET, ORALLY DISINTEGRATING ORAL EVERY 6 HOURS PRN
Status: DISCONTINUED | OUTPATIENT
Start: 2019-09-03 | End: 2019-09-09 | Stop reason: HOSPADM

## 2019-09-03 RX ORDER — FUROSEMIDE 10 MG/ML
20 INJECTION INTRAMUSCULAR; INTRAVENOUS
Status: DISCONTINUED | OUTPATIENT
Start: 2019-09-03 | End: 2019-09-05

## 2019-09-03 RX ORDER — OXYCODONE HYDROCHLORIDE 5 MG/1
5-10 TABLET ORAL EVERY 4 HOURS PRN
Status: DISCONTINUED | OUTPATIENT
Start: 2019-09-03 | End: 2019-09-09 | Stop reason: HOSPADM

## 2019-09-03 RX ORDER — MAGNESIUM HYDROXIDE 1200 MG/15ML
LIQUID ORAL PRN
Status: DISCONTINUED | OUTPATIENT
Start: 2019-09-03 | End: 2019-09-03 | Stop reason: HOSPADM

## 2019-09-03 RX ORDER — LIDOCAINE 40 MG/G
CREAM TOPICAL
Status: DISCONTINUED | OUTPATIENT
Start: 2019-09-03 | End: 2019-09-09 | Stop reason: HOSPADM

## 2019-09-03 RX ORDER — METHOCARBAMOL 500 MG/1
500 TABLET, FILM COATED ORAL 4 TIMES DAILY PRN
Status: DISCONTINUED | OUTPATIENT
Start: 2019-09-03 | End: 2019-09-09 | Stop reason: HOSPADM

## 2019-09-03 RX ORDER — NITROGLYCERIN 20 MG/100ML
.07-2 INJECTION INTRAVENOUS CONTINUOUS
Status: DISCONTINUED | OUTPATIENT
Start: 2019-09-03 | End: 2019-09-04

## 2019-09-03 RX ORDER — NALOXONE HYDROCHLORIDE 0.4 MG/ML
.1-.4 INJECTION, SOLUTION INTRAMUSCULAR; INTRAVENOUS; SUBCUTANEOUS
Status: DISCONTINUED | OUTPATIENT
Start: 2019-09-03 | End: 2019-09-09 | Stop reason: HOSPADM

## 2019-09-03 RX ORDER — NITROGLYCERIN 10 MG/100ML
INJECTION INTRAVENOUS PRN
Status: DISCONTINUED | OUTPATIENT
Start: 2019-09-03 | End: 2019-09-03

## 2019-09-03 RX ORDER — BUPIVACAINE HYDROCHLORIDE 5 MG/ML
INJECTION, SOLUTION PERINEURAL PRN
Status: DISCONTINUED | OUTPATIENT
Start: 2019-09-03 | End: 2019-09-03 | Stop reason: HOSPADM

## 2019-09-03 RX ORDER — PROPOFOL 10 MG/ML
5-75 INJECTION, EMULSION INTRAVENOUS CONTINUOUS
Status: DISCONTINUED | OUTPATIENT
Start: 2019-09-03 | End: 2019-09-04

## 2019-09-03 RX ORDER — SODIUM CHLORIDE, SODIUM LACTATE, POTASSIUM CHLORIDE, CALCIUM CHLORIDE 600; 310; 30; 20 MG/100ML; MG/100ML; MG/100ML; MG/100ML
INJECTION, SOLUTION INTRAVENOUS CONTINUOUS PRN
Status: DISCONTINUED | OUTPATIENT
Start: 2019-09-03 | End: 2019-09-03

## 2019-09-03 RX ORDER — METOPROLOL TARTRATE 1 MG/ML
5 INJECTION, SOLUTION INTRAVENOUS EVERY 6 HOURS
Status: DISCONTINUED | OUTPATIENT
Start: 2019-09-04 | End: 2019-09-04

## 2019-09-03 RX ORDER — POTASSIUM CHLORIDE 7.45 MG/ML
10 INJECTION INTRAVENOUS
Status: DISCONTINUED | OUTPATIENT
Start: 2019-09-03 | End: 2019-09-09 | Stop reason: HOSPADM

## 2019-09-03 RX ORDER — POTASSIUM CHLORIDE 1500 MG/1
20-40 TABLET, EXTENDED RELEASE ORAL
Status: DISCONTINUED | OUTPATIENT
Start: 2019-09-03 | End: 2019-09-09 | Stop reason: HOSPADM

## 2019-09-03 RX ORDER — NICOTINE POLACRILEX 4 MG
15-30 LOZENGE BUCCAL
Status: DISCONTINUED | OUTPATIENT
Start: 2019-09-03 | End: 2019-09-09 | Stop reason: HOSPADM

## 2019-09-03 RX ORDER — LIDOCAINE 4 G/G
2 PATCH TOPICAL
Status: DISCONTINUED | OUTPATIENT
Start: 2019-09-04 | End: 2019-09-09 | Stop reason: HOSPADM

## 2019-09-03 RX ORDER — CEFAZOLIN SODIUM 2 G/100ML
2 INJECTION, SOLUTION INTRAVENOUS
Status: DISCONTINUED | OUTPATIENT
Start: 2019-09-03 | End: 2019-09-03 | Stop reason: HOSPADM

## 2019-09-03 RX ORDER — PAROXETINE 30 MG/1
60 TABLET, FILM COATED ORAL EVERY MORNING
Status: DISCONTINUED | OUTPATIENT
Start: 2019-09-04 | End: 2019-09-09 | Stop reason: HOSPADM

## 2019-09-03 RX ORDER — GABAPENTIN 100 MG/1
100 CAPSULE ORAL 3 TIMES DAILY
Status: DISPENSED | OUTPATIENT
Start: 2019-09-03 | End: 2019-09-06

## 2019-09-03 RX ORDER — CEFAZOLIN SODIUM 2 G/100ML
INJECTION, SOLUTION INTRAVENOUS PRN
Status: DISCONTINUED | OUTPATIENT
Start: 2019-09-03 | End: 2019-09-03

## 2019-09-03 RX ORDER — SODIUM CHLORIDE 9 MG/ML
INJECTION, SOLUTION INTRAVENOUS CONTINUOUS PRN
Status: DISCONTINUED | OUTPATIENT
Start: 2019-09-03 | End: 2019-09-03

## 2019-09-03 RX ORDER — METOPROLOL TARTRATE 25 MG/1
25 TABLET, FILM COATED ORAL
Status: DISCONTINUED | OUTPATIENT
Start: 2019-09-03 | End: 2019-09-03 | Stop reason: HOSPADM

## 2019-09-03 RX ORDER — EZETIMIBE 10 MG/1
10 TABLET ORAL DAILY
Status: DISCONTINUED | OUTPATIENT
Start: 2019-09-03 | End: 2019-09-09 | Stop reason: HOSPADM

## 2019-09-03 RX ORDER — SODIUM CHLORIDE, SODIUM LACTATE, POTASSIUM CHLORIDE, CALCIUM CHLORIDE 600; 310; 30; 20 MG/100ML; MG/100ML; MG/100ML; MG/100ML
INJECTION, SOLUTION INTRAVENOUS CONTINUOUS
Status: DISCONTINUED | OUTPATIENT
Start: 2019-09-03 | End: 2019-09-03 | Stop reason: HOSPADM

## 2019-09-03 RX ORDER — AMOXICILLIN 250 MG
2 CAPSULE ORAL 2 TIMES DAILY
Status: DISCONTINUED | OUTPATIENT
Start: 2019-09-03 | End: 2019-09-09 | Stop reason: HOSPADM

## 2019-09-03 RX ORDER — ACETAMINOPHEN 325 MG/1
975 TABLET ORAL EVERY 8 HOURS
Status: DISPENSED | OUTPATIENT
Start: 2019-09-03 | End: 2019-09-06

## 2019-09-03 RX ORDER — HYDRALAZINE HYDROCHLORIDE 20 MG/ML
10 INJECTION INTRAMUSCULAR; INTRAVENOUS EVERY 30 MIN PRN
Status: DISCONTINUED | OUTPATIENT
Start: 2019-09-03 | End: 2019-09-09 | Stop reason: HOSPADM

## 2019-09-03 RX ORDER — AMOXICILLIN 250 MG
1 CAPSULE ORAL 2 TIMES DAILY
Status: DISCONTINUED | OUTPATIENT
Start: 2019-09-03 | End: 2019-09-09 | Stop reason: HOSPADM

## 2019-09-03 RX ORDER — PROPOFOL 10 MG/ML
INJECTION, EMULSION INTRAVENOUS PRN
Status: DISCONTINUED | OUTPATIENT
Start: 2019-09-03 | End: 2019-09-03

## 2019-09-03 RX ORDER — IBUPROFEN 200 MG
200-400 TABLET ORAL 2 TIMES DAILY PRN
Status: ON HOLD | COMMUNITY
End: 2019-09-09

## 2019-09-03 RX ORDER — VECURONIUM BROMIDE 1 MG/ML
INJECTION, POWDER, LYOPHILIZED, FOR SOLUTION INTRAVENOUS PRN
Status: DISCONTINUED | OUTPATIENT
Start: 2019-09-03 | End: 2019-09-03

## 2019-09-03 RX ORDER — SODIUM CHLORIDE 9 MG/ML
INJECTION, SOLUTION INTRAVENOUS CONTINUOUS
Status: DISCONTINUED | OUTPATIENT
Start: 2019-09-03 | End: 2019-09-08

## 2019-09-03 RX ORDER — PAROXETINE 40 MG/1
40 TABLET, FILM COATED ORAL EVERY MORNING
Status: DISCONTINUED | OUTPATIENT
Start: 2019-09-04 | End: 2019-09-03 | Stop reason: DRUGHIGH

## 2019-09-03 RX ORDER — ALBUMIN, HUMAN INJ 5% 5 %
500-1000 SOLUTION INTRAVENOUS
Status: COMPLETED | OUTPATIENT
Start: 2019-09-03 | End: 2019-09-03

## 2019-09-03 RX ORDER — MUPIROCIN 20 MG/G
OINTMENT TOPICAL 2 TIMES DAILY
Status: DISCONTINUED | OUTPATIENT
Start: 2019-09-03 | End: 2019-09-03 | Stop reason: HOSPADM

## 2019-09-03 RX ORDER — MAGNESIUM SULFATE HEPTAHYDRATE 40 MG/ML
4 INJECTION, SOLUTION INTRAVENOUS EVERY 4 HOURS PRN
Status: DISCONTINUED | OUTPATIENT
Start: 2019-09-03 | End: 2019-09-09 | Stop reason: HOSPADM

## 2019-09-03 RX ADMIN — HEPARIN SODIUM 27000 UNITS: 1000 INJECTION, SOLUTION INTRAVENOUS; SUBCUTANEOUS at 09:06

## 2019-09-03 RX ADMIN — PHENYLEPHRINE HYDROCHLORIDE 50 MCG: 10 INJECTION INTRAVENOUS at 12:14

## 2019-09-03 RX ADMIN — Medication 5 MG: at 08:45

## 2019-09-03 RX ADMIN — PROPOFOL 60 MG: 10 INJECTION, EMULSION INTRAVENOUS at 07:36

## 2019-09-03 RX ADMIN — IPRATROPIUM BROMIDE AND ALBUTEROL SULFATE 3 ML: .5; 3 SOLUTION RESPIRATORY (INHALATION) at 07:21

## 2019-09-03 RX ADMIN — FENTANYL CITRATE 200 MCG: 50 INJECTION, SOLUTION INTRAMUSCULAR; INTRAVENOUS at 11:41

## 2019-09-03 RX ADMIN — AMINOCAPROIC ACID 5 G/HR: 250 INJECTION, SOLUTION INTRAVENOUS at 08:15

## 2019-09-03 RX ADMIN — CEFAZOLIN SODIUM 1 G: 2 INJECTION, SOLUTION INTRAVENOUS at 13:45

## 2019-09-03 RX ADMIN — FENTANYL CITRATE 100 MCG: 50 INJECTION, SOLUTION INTRAMUSCULAR; INTRAVENOUS at 08:10

## 2019-09-03 RX ADMIN — ALBUMIN HUMAN: 0.05 INJECTION, SOLUTION INTRAVENOUS at 13:00

## 2019-09-03 RX ADMIN — FENTANYL CITRATE 150 MCG: 50 INJECTION, SOLUTION INTRAMUSCULAR; INTRAVENOUS at 12:06

## 2019-09-03 RX ADMIN — ONDANSETRON 4 MG: 2 INJECTION INTRAMUSCULAR; INTRAVENOUS at 12:55

## 2019-09-03 RX ADMIN — SODIUM CHLORIDE, POTASSIUM CHLORIDE, SODIUM LACTATE AND CALCIUM CHLORIDE: 600; 310; 30; 20 INJECTION, SOLUTION INTRAVENOUS at 06:40

## 2019-09-03 RX ADMIN — Medication 5 MG: at 08:40

## 2019-09-03 RX ADMIN — FENTANYL CITRATE 50 MCG: 50 INJECTION, SOLUTION INTRAMUSCULAR; INTRAVENOUS at 06:48

## 2019-09-03 RX ADMIN — SODIUM CHLORIDE 30 ML/HR: 9 INJECTION, SOLUTION INTRAVENOUS at 15:08

## 2019-09-03 RX ADMIN — EPINEPHRINE 0.03 MCG/KG/MIN: 1 INJECTION INTRAMUSCULAR; INTRAVENOUS; SUBCUTANEOUS at 11:45

## 2019-09-03 RX ADMIN — VECURONIUM BROMIDE 5 MG: 1 INJECTION, POWDER, LYOPHILIZED, FOR SOLUTION INTRAVENOUS at 09:55

## 2019-09-03 RX ADMIN — VECURONIUM BROMIDE 5 MG: 1 INJECTION, POWDER, LYOPHILIZED, FOR SOLUTION INTRAVENOUS at 09:05

## 2019-09-03 RX ADMIN — CEFAZOLIN SODIUM 2 G: 2 INJECTION, SOLUTION INTRAVENOUS at 07:45

## 2019-09-03 RX ADMIN — NITROGLYCERIN 40 MCG: 10 INJECTION INTRAVENOUS at 12:06

## 2019-09-03 RX ADMIN — PROPOFOL 5 MCG/KG/MIN: 10 INJECTION, EMULSION INTRAVENOUS at 12:31

## 2019-09-03 RX ADMIN — GABAPENTIN 100 MG: 100 CAPSULE ORAL at 21:55

## 2019-09-03 RX ADMIN — PROPOFOL 20 MG: 10 INJECTION, EMULSION INTRAVENOUS at 12:30

## 2019-09-03 RX ADMIN — RANITIDINE 150 MG: 150 TABLET ORAL at 05:56

## 2019-09-03 RX ADMIN — FENTANYL CITRATE 25 MCG: 50 INJECTION, SOLUTION INTRAMUSCULAR; INTRAVENOUS at 06:58

## 2019-09-03 RX ADMIN — PHENYLEPHRINE HYDROCHLORIDE 0.15 MCG/KG/MIN: 10 INJECTION INTRAVENOUS at 08:42

## 2019-09-03 RX ADMIN — VECURONIUM BROMIDE 5 MG: 1 INJECTION, POWDER, LYOPHILIZED, FOR SOLUTION INTRAVENOUS at 08:05

## 2019-09-03 RX ADMIN — FENTANYL CITRATE 100 MCG: 50 INJECTION, SOLUTION INTRAMUSCULAR; INTRAVENOUS at 08:05

## 2019-09-03 RX ADMIN — ACETAMINOPHEN 975 MG: 325 TABLET ORAL at 21:54

## 2019-09-03 RX ADMIN — Medication 5 MG: at 09:20

## 2019-09-03 RX ADMIN — MIDAZOLAM HYDROCHLORIDE 1 MG: 1 INJECTION, SOLUTION INTRAMUSCULAR; INTRAVENOUS at 11:42

## 2019-09-03 RX ADMIN — ALBUMIN HUMAN: 0.05 INJECTION, SOLUTION INTRAVENOUS at 12:10

## 2019-09-03 RX ADMIN — POTASSIUM PHOSPHATE, MONOBASIC AND POTASSIUM PHOSPHATE, DIBASIC 15 MMOL: 224; 236 INJECTION, SOLUTION INTRAVENOUS at 16:38

## 2019-09-03 RX ADMIN — NITROGLYCERIN 40 MCG: 10 INJECTION INTRAVENOUS at 12:07

## 2019-09-03 RX ADMIN — POTASSIUM CHLORIDE, DEXTROSE MONOHYDRATE AND SODIUM CHLORIDE 10 ML/HR: 150; 5; 450 INJECTION, SOLUTION INTRAVENOUS at 14:18

## 2019-09-03 RX ADMIN — FENTANYL CITRATE 100 MCG: 50 INJECTION, SOLUTION INTRAMUSCULAR; INTRAVENOUS at 07:53

## 2019-09-03 RX ADMIN — MIDAZOLAM HYDROCHLORIDE 1 MG: 1 INJECTION, SOLUTION INTRAMUSCULAR; INTRAVENOUS at 06:59

## 2019-09-03 RX ADMIN — Medication 5 MG: at 09:15

## 2019-09-03 RX ADMIN — GLYCOPYRROLATE 0.3 MG: 0.2 INJECTION, SOLUTION INTRAMUSCULAR; INTRAVENOUS at 13:45

## 2019-09-03 RX ADMIN — SODIUM CHLORIDE, POTASSIUM CHLORIDE, SODIUM LACTATE AND CALCIUM CHLORIDE: 600; 310; 30; 20 INJECTION, SOLUTION INTRAVENOUS at 09:59

## 2019-09-03 RX ADMIN — VECURONIUM BROMIDE 5 MG: 1 INJECTION, POWDER, LYOPHILIZED, FOR SOLUTION INTRAVENOUS at 11:06

## 2019-09-03 RX ADMIN — PHENYLEPHRINE HYDROCHLORIDE 0.2 MCG/KG/MIN: 10 INJECTION INTRAVENOUS at 07:45

## 2019-09-03 RX ADMIN — PROTAMINE SULFATE 210 MG: 10 INJECTION, SOLUTION INTRAVENOUS at 12:04

## 2019-09-03 RX ADMIN — Medication 5 MG: at 07:49

## 2019-09-03 RX ADMIN — FENTANYL CITRATE 100 MCG: 50 INJECTION, SOLUTION INTRAMUSCULAR; INTRAVENOUS at 07:59

## 2019-09-03 RX ADMIN — Medication 5 MG: at 09:41

## 2019-09-03 RX ADMIN — SODIUM CHLORIDE: 9 INJECTION, SOLUTION INTRAVENOUS at 07:30

## 2019-09-03 RX ADMIN — LIDOCAINE HYDROCHLORIDE 0.3 ML: 10 INJECTION, SOLUTION EPIDURAL; INFILTRATION; INTRACAUDAL; PERINEURAL at 06:32

## 2019-09-03 RX ADMIN — PHENYLEPHRINE HYDROCHLORIDE 50 MCG: 10 INJECTION INTRAVENOUS at 08:42

## 2019-09-03 RX ADMIN — MIDAZOLAM HYDROCHLORIDE 2 MG: 1 INJECTION, SOLUTION INTRAMUSCULAR; INTRAVENOUS at 09:35

## 2019-09-03 RX ADMIN — FENTANYL CITRATE 150 MCG: 50 INJECTION, SOLUTION INTRAMUSCULAR; INTRAVENOUS at 08:26

## 2019-09-03 RX ADMIN — SODIUM CHLORIDE: 9 INJECTION, SOLUTION INTRAVENOUS at 09:59

## 2019-09-03 RX ADMIN — PHENYLEPHRINE HYDROCHLORIDE 50 MCG: 10 INJECTION INTRAVENOUS at 12:04

## 2019-09-03 RX ADMIN — Medication 5 MG: at 08:06

## 2019-09-03 RX ADMIN — HYDROMORPHONE HYDROCHLORIDE 0.5 MG: 1 INJECTION, SOLUTION INTRAMUSCULAR; INTRAVENOUS; SUBCUTANEOUS at 15:32

## 2019-09-03 RX ADMIN — SODIUM CHLORIDE, POTASSIUM CHLORIDE, SODIUM LACTATE AND CALCIUM CHLORIDE: 600; 310; 30; 20 INJECTION, SOLUTION INTRAVENOUS at 07:30

## 2019-09-03 RX ADMIN — CEFAZOLIN 1 G: 1 INJECTION, POWDER, FOR SOLUTION INTRAMUSCULAR; INTRAVENOUS at 21:38

## 2019-09-03 RX ADMIN — NEOSTIGMINE METHYLSULFATE 3 MG: 1 INJECTION, SOLUTION INTRAVENOUS at 13:45

## 2019-09-03 RX ADMIN — AMIODARONE HYDROCHLORIDE 150 MG: 1.5 INJECTION, SOLUTION INTRAVENOUS at 11:50

## 2019-09-03 RX ADMIN — FENTANYL CITRATE 500 MCG: 50 INJECTION, SOLUTION INTRAMUSCULAR; INTRAVENOUS at 07:36

## 2019-09-03 RX ADMIN — CEFAZOLIN SODIUM 1 G: 2 INJECTION, SOLUTION INTRAVENOUS at 09:45

## 2019-09-03 RX ADMIN — Medication 5 MG: at 08:55

## 2019-09-03 RX ADMIN — CEFAZOLIN SODIUM 1 G: 2 INJECTION, SOLUTION INTRAVENOUS at 11:45

## 2019-09-03 RX ADMIN — MIDAZOLAM HYDROCHLORIDE 1 MG: 1 INJECTION, SOLUTION INTRAMUSCULAR; INTRAVENOUS at 06:47

## 2019-09-03 RX ADMIN — PHENYLEPHRINE HYDROCHLORIDE 50 MCG: 10 INJECTION INTRAVENOUS at 09:41

## 2019-09-03 RX ADMIN — PHENYLEPHRINE HYDROCHLORIDE 50 MCG: 10 INJECTION INTRAVENOUS at 08:49

## 2019-09-03 RX ADMIN — Medication 5 MG: at 09:00

## 2019-09-03 RX ADMIN — PANTOPRAZOLE SODIUM 40 MG: 40 INJECTION, POWDER, LYOPHILIZED, FOR SOLUTION INTRAVENOUS at 15:32

## 2019-09-03 RX ADMIN — MIDAZOLAM HYDROCHLORIDE 5 MG: 1 INJECTION, SOLUTION INTRAMUSCULAR; INTRAVENOUS at 07:36

## 2019-09-03 RX ADMIN — Medication 5 MG: at 09:05

## 2019-09-03 RX ADMIN — Medication 5 MG: at 07:48

## 2019-09-03 RX ADMIN — ALBUMIN HUMAN 500 ML: 0.05 INJECTION, SOLUTION INTRAVENOUS at 18:19

## 2019-09-03 RX ADMIN — Medication 5 MG: at 09:10

## 2019-09-03 RX ADMIN — ROCURONIUM BROMIDE 50 MG: 10 INJECTION INTRAVENOUS at 07:36

## 2019-09-03 RX ADMIN — SODIUM CHLORIDE, POTASSIUM CHLORIDE, SODIUM LACTATE AND CALCIUM CHLORIDE: 600; 310; 30; 20 INJECTION, SOLUTION INTRAVENOUS at 07:00

## 2019-09-03 RX ADMIN — FENTANYL CITRATE 100 MCG: 50 INJECTION, SOLUTION INTRAMUSCULAR; INTRAVENOUS at 08:15

## 2019-09-03 RX ADMIN — Medication 5 MG: at 08:50

## 2019-09-03 RX ADMIN — HYDROMORPHONE HYDROCHLORIDE 0.3 MG: 1 INJECTION, SOLUTION INTRAMUSCULAR; INTRAVENOUS; SUBCUTANEOUS at 21:18

## 2019-09-03 ASSESSMENT — ACTIVITIES OF DAILY LIVING (ADL)
COGNITION: 0 - NO COGNITION ISSUES REPORTED
RETIRED_COMMUNICATION: 0-->UNDERSTANDS/COMMUNICATES WITHOUT DIFFICULTY
TRANSFERRING: 0-->INDEPENDENT
SWALLOWING: 0-->SWALLOWS FOODS/LIQUIDS WITHOUT DIFFICULTY
BATHING: 0-->INDEPENDENT
ADLS_ACUITY_SCORE: 14
ADLS_ACUITY_SCORE: 12
FALL_HISTORY_WITHIN_LAST_SIX_MONTHS: NO
AMBULATION: 0-->INDEPENDENT
DRESS: 0-->INDEPENDENT
RETIRED_EATING: 0-->INDEPENDENT
TOILETING: 0-->INDEPENDENT

## 2019-09-03 ASSESSMENT — MIFFLIN-ST. JEOR: SCORE: 1377.22

## 2019-09-03 NOTE — H&P
[facility]    History and Physical/ Consult    Critical Care Service  Date of Admission:  9/3/2019    Date of Service (when I saw the patient): 09/03/19    Assessment & Plan   Adolfo Rendon is a 73 year old male with a past medical history of CAD, ischemic cardiomyopathy, hypertension that has been difficult to manage, hyperlipidemia, dilated AAA at 3.8 cm on 8/6/2019, renal artery stenosis, ongoing tobacco abuse, and COPD.    He was seen on  8/1/2019 by PCP due to shortness of breath, fatigue and  occasional right-sided chest heaviness radiating to his right neck. He was started on imdur, carvedilol, and Zetia and he was scheduled for an echocardiogram and angiogram. Echo showed an EF of 45-50% with WMA. Angiogram on 8/8/2019 revealed severe multivessel coronary artery disease.      Neuro/Pain/Psych  1. Acute pain  2. Sedation  3. History of depression   Plan:  -- Scheduled acetaminophen, Lidoderm, robaxin, Neurontin and melatonin with PRN dilaudid and oxycodone.  -- Propofol for sedation as needed until extubation  -- continue PTA paxil    CV  1. S/p CAB x4, LIMA to LAD, SV to PL, OM, PDA   2. Ischemic cardiomyopathy   3. HFrEF with EF of 45-50%  4. HTN, difficult to manage, possibly due to renal artery stenosis  5. AAA, last measured 8/6/2019 at 3.8cm  Plan:  -- Per pathway  -- Cardiac meds per CV surgery: asa, metoprolol, zetia  -- gtts are not currently needed but available:   Nitroglycerine to keep SBP less than 130.  Epinephrine and phenylephrine if MAP less than 65    Resp:  1. Post operative ventilator management  2. COPD. PFT 8/2019 shows FEV1 at 53 and FEV1/FVC ratio at 76  3. Nicotine dependence   Plan:  -- AC ventilation: 580/16/5/40  -- PST when hemodynamically stable  -- continue PTA Atrovent and Serevent. Duonebs PRN    GI/Nutrition  1. No prior hx  Plan:  -- NPO for now, advanced as tolerated once extubated  -- PPI    Renal  1. Renal artery stenosis. Seen by vascular surgeon and  no planned interventions  1. Baseline creatinine appears to be 1.0. Cr currently at 1.03  Plan:  --monitor function and electrolytes as needed with replacement per ICU protocols.   -- generally avoid nephrotoxic agents such as NSAID, IV contrast unless specifically required  -- adjust medications as needed for renal clearance  -- follow I/O's as appropriate.  -- maintain euvolemia    ID  1. No current issue  2. MRSA negative  Plan:  -- perioperative ancef    Endocrine  1. Stress Hyperglycemia  Plan:  --  sliding scale insulin  -- Keep BG  <180 for optimal healing    Heme:  1. Acute blood loss anemia  2. Thrombocytopenia  Plan:  -- Monitor hemoglobin and platelets  -- Transfuse to keep > 7.0    MSK  1. Potential for deconditioning due to critical illness  Plan:  -- PT/OT consults    Skin  1. Sternal incision  2. Right leg harvest site  Plan:  -- monitor site    General cares:  DVT Prophylaxis: SCDs  GI Prophylaxis: PPI  Restraints: Restraints for medical healing needed: NO  Family update by me today: Yes   Current lines are required for patient management  Access:      Lev Gilliland    Time Spent on this Encounter   Billing:  I spent 35 minutes bedside and on the inpatient unit today managing the critical care of Adolfo Rendon in relation to the issues listed in this note.    Code Status   Full Code    Primary Care Physician   Danielle Mercado    Chief Complaint   Chest pain  CAD    History is obtained from the patient    History of Present Illness   Adolfo Rendon is a 73 year old male with a past medical history of CAD, ischemic cardiomyopathy, hypertension that has been difficult to manage, hyperlipidemia, dilated AAA at 3.8 cm on 8/6/2019, renal artery stenosis, ongoing tobacco abuse, and COPD.    He was seen on  8/1/2019 by PCP due to shortness of breath, fatigue and  occasional right-sided chest heaviness radiating to his right neck. He was started on imdur, carvedilol, and Zetia and he was  scheduled for an echocardiogram and angiogram. Echo showed an EF of 45-50% with WMA. Angiogram on 8/8/2019 revealed severe multivessel coronary artery disease.    On 9/3/2019 he had CORONARY ARTERY BYPASS GRAFT X 4 - LIMA TO LAD, SV TO PL, OM, PDA ; ON PUMP WITH TERRENCE; ENDO VEIN HARVEST RIGHT LEG. Intubation was easy with the use of videoscope. On pump at 0948 and off at 1203 for a total of 135 minutes.  ml. Received 1800 crystalloids, 500cc albumin, and 140 cell saver. Presented to ICU intubated with propofol for sedation, AV paced, and 2 chest tube sites. He was reversed at 1343      Past Medical History    I have reviewed this patient's medical history and updated it with pertinent information if needed.     Past Medical History:   Diagnosis Date     Coronary artery disease      Depressive disorder, not elsewhere classified      Hypertrophy (benign) of prostate      Impotence of organic origin      Other and unspecified hyperlipidemia      Other anxiety states      Tobacco use disorder        Past Surgical History   I have reviewed this patient's surgical history and updated it with pertinent information if needed.    Past Surgical History:   Procedure Laterality Date     APPENDECTOMY  1966     COLONOSCOPY  3/1/2005     COLONOSCOPY N/A 5/9/2019    Procedure: COLONOSCOPY;  Surgeon: Camilo Beard MD;  Location: [facility]     CV LEFT HEART CATH N/A 8/8/2019    Procedure: Left Heart Cath--also measure LVEDP;  Surgeon: Krystle Barrera MD;  Location: [facility] CARDIAC CATH LAB     HERNIORRHAPHY INGUINAL  7/14/2011    Procedure:HERNIORRHAPHY INGUINAL; Open Repair Right Inguinal Hernia Repair        HYDROCELECTOMY SCROTAL  01/2004    left hydrocele repair     SUPRAPUBIC PROSTATECTOMY         Prior to Admission Medications   Prior to Admission Medications   Prescriptions Last Dose Informant Patient Reported? Taking?   PARoxetine (PAXIL) 20 MG tablet 9/2/2019 at am Self No Yes   Sig: Take 1 tablet (20 mg) by mouth  "every morning Add to 40 mg daily   PARoxetine (PAXIL) 40 MG tablet 9/2/2019 at am Self No Yes   Sig: Take 1 tablet (40 mg) by mouth every morning Add to 20 mg daily   aspirin 81 MG tablet 9/2/2019 at am Self No Yes   Sig: ONE DAILY   carvedilol (COREG) 6.25 MG tablet 9/2/2019 at pm Self No Yes   Sig: Take 1 tablet (6.25 mg) by mouth 2 times daily (with meals)   evolocumab (REPATHA) 140 MG/ML prefilled autoinjector 8/30/2019 Self No Yes   Sig: Inject 1 mL (140 mg) Subcutaneous every 14 days   ezetimibe (ZETIA) 10 MG tablet 9/2/2019 at am Self Yes Yes   Sig: Take 1 tablet (10 mg) by mouth daily   ibuprofen (ADVIL/MOTRIN) 200 MG tablet 9/1/2019 Self Yes Yes   Sig: Take 200-400 mg by mouth 2 times daily as needed for mild pain   ipratropium (ATROVENT HFA) 17 MCG/ACT inhaler 9/2/2019 at pm Self No Yes   Sig: Inhale 2 puffs into the lungs every 6 hours   isosorbide mononitrate (IMDUR) 30 MG 24 hr tablet 9/2/2019 at am Self No Yes   Sig: Take 1 tablet (30 mg) by mouth daily   nicotine polacrilex (NICORETTE) 4 MG gum 9/3/2019 at am Self Yes Yes   Sig: Place 4 mg inside cheek every hour as needed for smoking cessation   salmeterol (SEREVENT) 50 MCG/DOSE inhaler 9/2/2019 at pm Self No Yes   Sig: Inhale 1 puff into the lungs 2 times daily      Facility-Administered Medications: None     Allergies   Allergies   Allergen Reactions     Amlodipine Fatigue     \"felt like zombie\"     Claritin      Mood , irritablity      Hctz [Hydrochlorothiazide] Fatigue     \"felt like zombie\"     Lisinopril Fatigue     \"felt like zombie\"     Metoprolol Fatigue     \"felt like zombie\"     Pravastatin Fatigue     \"felt like zombie\"     Remeron [Mirtazapine]      Agitation        Wellbutrin [Bupropion Hcl]      Sig mood issues          Social History   Smoking history: He quit smoking 2 weeks before surgery      Family History   I have reviewed this patient's family history and updated it with pertinent information if needed.   Family History "   Problem Relation Age of Onset     Cerebrovascular Disease Mother      Hypertension Mother      Prostate Cancer Brother      Dementia Brother      Arthritis Sister         rhematoid     Cancer Brother         Lung cancer, lymphoma     Other - See Comments Brother         HIV     Cancer Sister         skin cancer on nose     Pacemaker Sister      Aneurysm Sister         heart       Review of Systems   The 10 point Review of Systems is negative other than noted in the HPI or here.     Physical Exam   Temp: 98.2  F (36.8  C) Temp src: Temporal Temp  Min: 98.2  F (36.8  C)  Max: 98.2  F (36.8  C) BP: (!) 172/89 Pulse: 63   Resp: 16 SpO2: 98 % O2 Device: Nasal cannula Oxygen Delivery: 2 LPM  Vital Signs with Ranges  Temp:  [98.2  F (36.8  C)] 98.2  F (36.8  C)  Pulse:  [63-65] 63  Resp:  [16] 16  BP: (172-175)/() 172/89  SpO2:  [97 %-99 %] 98 %  145 lbs 0 oz    GEN: sedated, appears comfortable  EYES: PERRL, Anicteric sclera.   HEENT:  Normocephalic, atraumatic, trachea midline, ETT secure  CV: RRR, no gallops, rubs, or murmurs  PULM/CHEST: Clear breath sounds bilaterally without rhonchi, crackles or wheeze, symmetric chest rise  GI: normal bowel sounds, soft, non-tender, no rebound tenderness or guarding, no masses  : ta catheter in place, urine yellow and clear  EXTREMITIES: No peripheral edema, moving all extremities, peripheral pulses intact  NEURO: sedated  SKIN: No rashes, sores or ulcerations. Incisions intact. Chest tube site intact  PSYCH:  sedated    Imaging personally reviewed:    CXR  ECG    Echocardiogram 8/6/2019   The visual ejection fraction is estimated at 45-50%.  Left ventricular systolic function is borderline reduced.  There are regional wall motion abnormalities as specified.  The ascending aorta is Mildly dilated.    Cath 8/8/2019  Severe multivessel coronary artery disease     PFTs 8/14/2019  Moderate Airflow Obstruction   MIP/MEP and MVV are normal      Data   Results for orders  placed or performed during the hospital encounter of 09/03/19 (from the past 24 hour(s))   Methicillin Resistant Staph Aureus PCR   Result Value Ref Range    Specimen Description Nares     Methicillin Resist/Sens S. aureus PCR Negative NEG^Negative   Potassium   Result Value Ref Range    Potassium 4.3 3.4 - 5.3 mmol/L   ISTAT gases elec ica art POCT   Result Value Ref Range    pH Arterial 7.36 7.35 - 7.45 pH    pCO2 Arterial 49 (H) 35 - 45 mm Hg    pO2 Arterial 511 (H) 80 - 105 mm Hg    Bicarbonate Arterial 28 21 - 28 mmol/L    O2 Sat Arterial 100 92 - 100 %    Sodium 141 133 - 144 mmol/L    Potassium 4.0 3.4 - 5.3 mmol/L    Calcium Ionized 4.7 4.4 - 5.2 mg/dL    Hemoglobin 11.9 (L) 13.3 - 17.7 g/dL    Hematocrit - POCT 35 (L) 40.0 - 53.0 %PCV   Glucose by meter   Result Value Ref Range    Glucose 84 70 - 99 mg/dL   Activated clotting time POCT   Result Value Ref Range    Activated Clot Time 126 75 - 150 sec   Activated clotting time POCT   Result Value Ref Range    Activated Clot Time 582 (H) 75 - 150 sec   ISTAT gases elec ica gaetano POCT   Result Value Ref Range    Ph Venous 7.34 7.32 - 7.43 pH    PCO2 Venous 53 (H) 40 - 50 mm Hg    PO2 Venous 56 (H) 25 - 47 mm Hg    Bicarbonate Venous 28 21 - 28 mmol/L    O2 Sat Venous 86 %    Sodium 142 133 - 144 mmol/L    Potassium 5.8 (H) 3.4 - 5.3 mmol/L    Calcium Ionized 4.2 (L) 4.4 - 5.2 mg/dL    CPB Applied       CPB Algorithm Applied to Hemoglobin and/or Hematocrit    Hemoglobin 9.5 (L) 13.3 - 17.7 g/dL    Hematocrit - POCT 28 (L) 40.0 - 53.0 %PCV   Glucose by meter   Result Value Ref Range    Glucose 97 70 - 99 mg/dL   ISTAT gases elec ica art POCT   Result Value Ref Range    pH Arterial 7.44 7.35 - 7.45 pH    pCO2 Arterial 41 35 - 45 mm Hg    pO2 Arterial 423 (H) 80 - 105 mm Hg    Bicarbonate Arterial 28 21 - 28 mmol/L    O2 Sat Arterial 100 92 - 100 %    Sodium 141 133 - 144 mmol/L    Potassium 5.1 3.4 - 5.3 mmol/L    Calcium Ionized 4.3 (L) 4.4 - 5.2 mg/dL    CPB  Applied       CPB Algorithm Applied to Hemoglobin and/or Hematocrit    Hemoglobin 10.2 (L) 13.3 - 17.7 g/dL    Hematocrit - POCT 30 (L) 40.0 - 53.0 %PCV   Activated clotting time POCT   Result Value Ref Range    Activated Clot Time 521 (H) 75 - 150 sec   ISTAT gases elec ica art POCT   Result Value Ref Range    pH Arterial 7.43 7.35 - 7.45 pH    pCO2 Arterial 39 35 - 45 mm Hg    pO2 Arterial 418 (H) 80 - 105 mm Hg    Bicarbonate Arterial 26 21 - 28 mmol/L    O2 Sat Arterial 100 92 - 100 %    Sodium 141 133 - 144 mmol/L    Potassium 4.9 3.4 - 5.3 mmol/L    Calcium Ionized 4.1 (L) 4.4 - 5.2 mg/dL    CPB Applied       CPB Algorithm Applied to Hemoglobin and/or Hematocrit    Hemoglobin 9.5 (L) 13.3 - 17.7 g/dL    Hematocrit - POCT 28 (L) 40.0 - 53.0 %PCV   Activated clotting time POCT   Result Value Ref Range    Activated Clot Time 445 (H) 75 - 150 sec   ISTAT gases elec ica art POCT   Result Value Ref Range    pH Arterial 7.48 (H) 7.35 - 7.45 pH    pCO2 Arterial 35 35 - 45 mm Hg    pO2 Arterial 385 (H) 80 - 105 mm Hg    Bicarbonate Arterial 26 21 - 28 mmol/L    O2 Sat Arterial 100 92 - 100 %    Sodium 142 133 - 144 mmol/L    Potassium 4.7 3.4 - 5.3 mmol/L    Calcium Ionized 4.0 (L) 4.4 - 5.2 mg/dL    CPB Applied       CPB Algorithm Applied to Hemoglobin and/or Hematocrit    Hemoglobin 9.5 (L) 13.3 - 17.7 g/dL    Hematocrit - POCT 28 (L) 40.0 - 53.0 %PCV   CBC with platelets   Result Value Ref Range    WBC 6.7 4.0 - 11.0 10e9/L    RBC Count 3.84 (L) 4.4 - 5.9 10e12/L    Hemoglobin 10.8 (L) 13.3 - 17.7 g/dL    Hematocrit 31.7 (L) 40.0 - 53.0 %    MCV 83 78 - 100 fl    MCH 28.1 26.5 - 33.0 pg    MCHC 34.1 31.5 - 36.5 g/dL    RDW 14.1 10.0 - 15.0 %    Platelet Count 99 (L) 150 - 450 10e9/L   Fibrinogen activity   Result Value Ref Range    Fibrinogen 217 200 - 420 mg/dL   INR   Result Value Ref Range    INR 1.47 (H) 0.86 - 1.14   Partial thromboplastin time   Result Value Ref Range    PTT 40 (H) 22 - 37 sec   ISTAT gases  elec ica art POCT   Result Value Ref Range    pH Arterial 7.34 (L) 7.35 - 7.45 pH    pCO2 Arterial 50 (H) 35 - 45 mm Hg    pO2 Arterial 458 (H) 80 - 105 mm Hg    Bicarbonate Arterial 27 21 - 28 mmol/L    O2 Sat Arterial 100 92 - 100 %    Sodium 142 133 - 144 mmol/L    Potassium 3.9 3.4 - 5.3 mmol/L    Calcium Ionized 5.0 4.4 - 5.2 mg/dL    CPB Applied       CPB Algorithm Applied to Hemoglobin and/or Hematocrit    Hemoglobin 10.2 (L) 13.3 - 17.7 g/dL    Hematocrit - POCT 30 (L) 40.0 - 53.0 %PCV   Activated clotting time POCT   Result Value Ref Range    Activated Clot Time 126 75 - 150 sec   Glucose by meter   Result Value Ref Range    Glucose 110 (H) 70 - 99 mg/dL

## 2019-09-03 NOTE — ANESTHESIA PROCEDURE NOTES
ARTERIAL LINE PROCEDURE NOTE:   Pre-Procedure  Performed by Shon Saldaña MD  Location: pre-op      Pre-Anesthestic Checklist: patient identified, IV checked, risks and benefits discussed, informed consent and pre-op evaluation    Timeout  Correct Patient: Yes   Correct Procedure: Yes   Correct Site: Yes   Correct Laterality: Yes   Correct Position: Yes   Site Marked: N/A   .   Procedure Documentation  Procedure: arterial line    Supine  Insertion Site:radial, right.Skin infiltrated with mL of 1% lidocaine. Injection technique: ultrasound guided  .  Artery evaluated via ultrasound confirming patency.  Using realtime imaging the artery was punctured and needle was observed entering artery..  Patient Prep;chlorhexidine gluconate and isopropyl alcohol    Assessment/Narrative    Catheter: 20 gauge, 1.75 in/4.5 cm quick cath (integral wire)     Secured by other  Tegaderm dressing used.    Arterial waveform: Yes     Comments:  Ultrasound used for placement (no image)  Secured with adhesive spray, tegaderm, and tape

## 2019-09-03 NOTE — ANESTHESIA CARE TRANSFER NOTE
Patient: Adolfo Rendon    Procedure(s):  CORONARY ARTERY BYPASS GRAFT X 4 - LIMA TO LAD, SV TO PL, OM, PDA ; ON PUMP WITH TERRENCE; ENDOVEIN HARVEST RIGHT LEG    Diagnosis: cad  Diagnosis Additional Information: No value filed.    Anesthesia Type:   General, ETT     Note:  Airway :ETT  Patient transferred to:ICU  Comments: Transferred to ICU, ETT in place, ambu bag with 10L oxygen for transport, all monitors and alarms on for transport, IV/lines patent and drips continued per anesthesia record.  Placed on ventilator per RT in ICU, , RR 16, PEEP 5, FiO2 60. Placed on ICU monitors per ICU RN, alarms on, VSS.  Report to ICU RN. Before trans. To ICU, VSS, BBSE, apical lung sounds clear, #18 oral gastric tube placed with ease, with posit. Gastric returns suctioned.ICU Handoff: Call for PAUSE to initiate/utilize ICU HANDOFF, Identified Patient, Identified Responsible Provider, Reviewed the Pertinent Medical History, Discussed Surgical Course, Reviewed Intra-OP Anesthesia Management and Issues during Anesthesia, Set Expectations for Post Procedure Period and Allowed Opportunity for Questions and Acknowledgement of Understanding      Vitals: (Last set prior to Anesthesia Care Transfer)    CRNA VITALS  9/3/2019 1257 - 9/3/2019 1352      9/3/2019             Resp Rate (observed):  8    Resp Rate (set):  8                Electronically Signed By: ANTONIO Chaney CRNA  September 3, 2019  1:52 PM

## 2019-09-03 NOTE — ANESTHESIA POSTPROCEDURE EVALUATION
Patient: Adolfo Rendon    Procedure(s):  CORONARY ARTERY BYPASS GRAFT X 4 - LIMA TO LAD, SV TO PL, OM, PDA ; ON PUMP WITH TERRENEC; ENDOVEIN HARVEST RIGHT LEG    Diagnosis:cad  Diagnosis Additional Information: No value filed.    Anesthesia Type:  General, ETT    Note:  Anesthesia Post Evaluation    Patient location during evaluation: PACU  Patient participation: Unable to evaluate secondary to administered sedation  Pain management: unable to assess  Airway patency: patent  Cardiovascular status: acceptable  Respiratory status: ETT and ventilator  Hydration status: acceptable  PONV: unable to assess     Anesthetic complications: None    Comments: Transferred the patient to the ICU, intubated, sedated, hemodynamically stable sedated with propofol.  Reversed once stable in ICU.  Should be hopefully extubatable in a typical fast track, however he does have significant COPD and uncontrolled HTN, so these may be factors delaying early extubation.          Last vitals:  Vitals:    09/03/19 0603 09/03/19 0700 09/03/19 0701   BP: (!) 175/105 (!) 172/89    Pulse: 65 63    Resp: 16     Temp: 36.8  C (98.2  F)     SpO2: 97% 99% 98%         Electronically Signed By: Shon Saldaña MD  September 3, 2019  1:50 PM

## 2019-09-03 NOTE — OP NOTE
Date of Service: 9/3/2019    Preoperative Diagnosis:   1) Severe multivessel coronary artery disease  2) Hypertension  3) Tobacco abuse  4) Renal artery stenosis  5) Hyperlipidemia  6) Chronic obstructive pulmonary disease    Postoperative Diagnosis:   Same    Operation: Coronary artery bypass grafting x4, LIMA-LAD, SVG-OM, SVG-PDA, SVG-JORDON; endoscopic harvest right greater saphenous vein    Surgeon: Lai Mchugh MD    Assistant: Bia Chambers MD; CHARISSE Valentin      Indication: 73 year old male seen in [facility] on 8/8 following cor angio.     He was seen by Dr Espino in early August to establish care. At that time, he noted worsening fatigue as well as exertional right jaw pain. He affirms this history for me as well.     He has had prior CAG in 2015, which revealed  of his right coronary, and he has had several nuclear scans, most recently in 2018, revealing perfusion defect consistent with prior infarct in that territory with mild ischemia.     His cor angio on 8/8 revealed severe multivessel disease. He was counseled on smoking cessation and reportedly has done so. Following discussion of risks and benefits, he has elected to proceed with surgery.    Findings: Sternum of adequate quality. LIMA 1.75mm and good flow. Saphenous vein 4mm and good quality. JORDON 1.75mm and good target. PDA 1.75mm and good target. OM 2mm and good target. LAD 2mm and good target.       Procedure: The patient was brought to the operating room and placed supine on the OR table. Appropriate monitoring lines were placed and general endotracheal anesthesia established. The patient was then sterilely prepped and draped in the standard fashion. A time out was performed to confirm patient identity, procedure to be performed, and the administration of pre operative antibiotics.    A midline sternotomy was performed. Peristernal hemostasis was achieved and the pericardium opened. The left pleural space was entered and the left internal  thoracic artery was dissected off the chest wall from the level of the subclavian vein to the xiphoid process. Simultaneously, the greater saphenous vein was harvested from the right lower extremity.    The patient was systemically heparinized and the internal thoracic artery divided. Hemostasis of the chest wall was confirmed. The pericardium was tented on sutures and the vein inspected and noted to be adequate for conduit. After confirmation of adequate ACT, the distal ascending aorta and right atrial appendage were cannulated. Additionally a retrograde cardioplegia catheter was inserted into the coronary sinus and an antegrade catheter/root vent secured in the mid ascending aorta. Cardiopulmonary bypass was established.    The aorta was cross clamped and the heart arrested with antegrade cardioplegia. Additional retrograde was given to ensure good position, and additional doses of cardioplegia were supplied every 15 minutes for the remainder of cross clamp.    The posterolateral coronary artery was identified and dissected out over approximately 1 cm and opened for approximately 7 mm. The vein was brought to the field and the distal anastomosis created in end to side fashion with running 7-0 Prolene. Cardioplegia was flushed down the graft and the anastomosis noted to be hemostatic.    The posterior descending coronary artery was identified and dissected out over approximately 1 cm and opened for approximately 7 mm. The vein was brought to the field and the distal anastomosis created in end to side fashion with running 7-0 Prolene. Cardioplegia was flushed down the graft and the anastomosis noted to be hemostatic.    The obtuse marginal coronary artery was identified and dissected out over approximately 1 cm and opened for approximately 7 mm. The vein was brought to the field and the distal anastomosis created in end to side fashion with running 7-0 Prolene. Cardioplegia was flushed down the graft and the  anastomosis noted to be hemostatic.    The left anterior descending coronary artery was identified and dissected out over approximately 1 cm and opened for approximately 7 mm. The internal thoracic artery was brought to the field and the distal anastomosis created in end to side fashion with running 7-0 Prolene. Native flow was allowed down the graft and the anastomosis noted to be hemostatic.    Three separate punch aortotomies were created on the ascending aorta, and the proximal anastomoses created in end to side fashion with running 6-0 Prolene. The hot shot was administered in a retrograde fashion and the aortic cross clamp removed. Total period of aortic cross clamp was 112 minutes. The vein grafts were de aired and the retrograde catheter removed. The distal anastomoses were inspected and noted to be hemostatic. Atrial and ventricular pacing wires were placed.    Ventilation was resumed and the patient weaned from cardiopulmonary bypass without difficulty. Total period of cardiopulmonary bypass was 135 minutes. Protamine was administered and the patient decannulated after the pump volume was infused.      A 32F angled chest tube was placed in the left pleural space, as well as a 32F mediastinal chest tube, both of which were brought out through separate subcostal stab incisions. The sternum was then reapproximated with interrupted single and double steel wires and the presternal tissues closed in multiple layers of absorbable suture. Sterile dressings were applied. All counts were reported as correct.    The patient was then taken to the intensive care unit in critical but stable condition.        Lai Mchugh MD

## 2019-09-03 NOTE — PROGRESS NOTES
[facility] ICU RESPIRATORY NOTE        Date of Admission: 9/3/2019    Date of Intubation (most recent): 9/3    Reason for Mechanical Ventilation: CABG    Number of Days on Mechanical Ventilation: 1    Met Criteria for Pressure Support Trial: No    Length of Pressure Support Trial: >40 minutes    Reason for Stopping Pressure Support Trial: Abnormal ABG      Significant Events Today: None    ABG Results:   Recent Labs   Lab 09/03/19  1636 09/03/19  1400 09/03/19  1224 09/03/19  1136   PH 7.18* 7.37 7.34* 7.48*   PCO2 69* 42 50* 35   PO2 100 180* 458* 385*   HCO3 26 24 27 26       Current Vent Settings: Ventilation Mode: CMV/AC  (Continuous Mandatory Ventilation/ Assist Control)  FiO2 (%): 40 %  Rate Set (breaths/minute): 16 breaths/min  Tidal Volume Set (mL): 580 mL  PEEP (cm H2O): 4 cmH2O  Oxygen Concentration (%): 40 %  Resp: 10      Skin Assessment: Intact    Plan: Weaned on PSV5/5 X 40 minutes. Stopped due to abnormal ABG. Will attempt to wean later this shift with a goal to extubate. RT will continue to follow.       Lesley Agarwal, RT

## 2019-09-03 NOTE — PROGRESS NOTES
Admission medication history interview status for the 9/3/2019  admission is complete. See EPIC admission navigator for prior to admission medications     Medication history source reliability:Good    Medication history interview source(s):Patient    Medication history resources (including written lists, pill bottles, clinic record):None    Primary pharmacy.[facility]; [place]    Additional medication history information not noted on PTA med list :None    Time spent in this activity: 30 minutes    Prior to Admission medications    Medication Sig Last Dose Taking? Auth Provider   aspirin 81 MG tablet ONE DAILY 9/2/2019 at am Yes Danielle Mercado APRN CNP   carvedilol (COREG) 6.25 MG tablet Take 1 tablet (6.25 mg) by mouth 2 times daily (with meals) 9/2/2019 at pm Yes Garima Clemons APRN CNP   evolocumab (REPATHA) 140 MG/ML prefilled autoinjector Inject 1 mL (140 mg) Subcutaneous every 14 days 8/30/2019 Yes Garima Clemons APRN CNP   ezetimibe (ZETIA) 10 MG tablet Take 1 tablet (10 mg) by mouth daily 9/2/2019 at am Yes Garima Clemons APRN CNP   ibuprofen (ADVIL/MOTRIN) 200 MG tablet Take 200-400 mg by mouth 2 times daily as needed for mild pain 9/1/2019 Yes Reported, Patient   ipratropium (ATROVENT HFA) 17 MCG/ACT inhaler Inhale 2 puffs into the lungs every 6 hours 9/2/2019 at pm Yes Danielle Mercado APRN CNP   isosorbide mononitrate (IMDUR) 30 MG 24 hr tablet Take 1 tablet (30 mg) by mouth daily 9/2/2019 at am Yes Lucy Espino MD   nicotine polacrilex (NICORETTE) 4 MG gum Place 4 mg inside cheek every hour as needed for smoking cessation 9/3/2019 at am Yes Reported, Patient   PARoxetine (PAXIL) 20 MG tablet Take 1 tablet (20 mg) by mouth every morning Add to 40 mg daily 9/2/2019 at am Yes Danielle Mercado APRN CNP   PARoxetine (PAXIL) 40 MG tablet Take 1 tablet (40 mg) by mouth every morning Add to 20 mg daily 9/2/2019 at am Yes Danielle Mercado APRN CNP    salmeterol (SEREVENT) 50 MCG/DOSE inhaler Inhale 1 puff into the lungs 2 times daily 9/2/2019 at pm Yes Danielle Mercado APRN CNP

## 2019-09-03 NOTE — ANESTHESIA PROCEDURE NOTES
CENTRAL LINE INSERTION PROCEDURE NOTE:   Pre-Procedure  Performed by Shon Saldaña MD  Location: pre-op      Pre-Anesthestic Checklist: patient identified, IV checked, risks and benefits discussed and pre-op evaluation    Timeout  Correct Patient: Yes   Correct Procedure: Yes   Correct Site: Yes     Correct Position: Yes     .   Procedure Documentation   Procedure: central line  Position: Trendelenburg  Patient Prep;all elements of maximal sterile barrier technique followed, mask, hat, sterile gown, sterile gloves, draped, hand hygiene, chlorhexidine gluconate and isopropyl alcohol, patient draped      Insertion Site:right, internal jugular  Using U/S with sterile probe cover and sterile gel, vein evaluated for patency/adequacy of cortis insertion is adequate, and using realtime U/S imaging the priyanka was punctured, and needle was observed entering vein on U/S. A permanent image is entered into the patient's record.   Skin infiltrated with 5 mL of 1% lidocaine.    Assessment/Narrative    Catheter: PA Catheter     Secured by suture  Tegaderm and Biopatch dressing used.  blood aspirated from all lumens  All lumens flushed: Yes  Verification method: Placement to be verified post-op  Comments:  -8.5F Introducer  -Ultrasound used for placement, imaged saved  -Tegaderm completely covering catheter insertion site and suture site.  Prior to threading catheter, wire viewed in lumen of blood vessel with in plane ultrasound view.  -Once introducer placed, TLIC inserted through port and secured

## 2019-09-03 NOTE — PROGRESS NOTES
Pt failed PS with multiple trials, blood gas results were acidotic. Pt remains to be quite sleepy. Plan is  to try PS in one hour. Intesivist  at bedside,  informed via text.

## 2019-09-03 NOTE — ANESTHESIA PREPROCEDURE EVALUATION
Anesthesia Pre-Procedure Evaluation    Patient: Adolfo Rendon   MRN: [id number] : 1945          Preoperative Diagnosis: cad    Procedure(s):  CORONARY ARTERY BYPASS GRAFT (ON PUMP)    Past Medical History:   Diagnosis Date     Coronary artery disease      Depressive disorder, not elsewhere classified      Hypertrophy (benign) of prostate      Impotence of organic origin      Other and unspecified hyperlipidemia      Other anxiety states      Tobacco use disorder      Past Surgical History:   Procedure Laterality Date     APPENDECTOMY  1966     COLONOSCOPY  3/1/2005     COLONOSCOPY N/A 2019    Procedure: COLONOSCOPY;  Surgeon: Camilo Beard MD;  Location: [facility]     CV LEFT HEART CATH N/A 2019    Procedure: Left Heart Cath--also measure LVEDP;  Surgeon: Krystle Barrera MD;  Location:  HEART CARDIAC CATH LAB     HERNIORRHAPHY INGUINAL  2011    Procedure:HERNIORRHAPHY INGUINAL; Open Repair Right Inguinal Hernia Repair        HYDROCELECTOMY SCROTAL  2004    left hydrocele repair     SUPRAPUBIC PROSTATECTOMY         Anesthesia Evaluation     . Pt has had prior anesthetic.     No history of anesthetic complications          ROS/MED HX    ENT/Pulmonary:     (+)tobacco use, Current use moderate COPD, , . .   (-) sleep apnea   Neurologic:       Cardiovascular:     (+) Dyslipidemia, hypertension-range: poor control, see plan discussion, Peripheral Vascular Disease (mild thoracic aneurysm, renal artery stenosis)-CAD, --. : . . . :. . Previous cardiac testing Echodate:2019results:The visual ejection fraction is estimated at 45-50%.  Left ventricular systolic function is borderline reduced.  There are regional wall motion abnormalities as specified.  The ascending aorta is Mildly dilated.date: results: date: results: date: results:          METS/Exercise Tolerance:     Hematologic:         Musculoskeletal:         GI/Hepatic:        (-) GERD   Renal/Genitourinary:         Endo:        "  Psychiatric:     (+) psychiatric history anxiety and depression      Infectious Disease:         Malignancy:         Other:                          Physical Exam  Normal systems: cardiovascular, pulmonary and dental    Airway   Mallampati: III  TM distance: >3 FB  Neck ROM: full    Dental     Cardiovascular   Rhythm and rate: regular and normal      Pulmonary    breath sounds clear to auscultation            Lab Results   Component Value Date    WBC 6.5 08/08/2019    HGB 14.1 08/08/2019    HCT 40.8 08/08/2019     08/08/2019    SED 4 04/20/2011     08/08/2019    POTASSIUM 3.9 08/08/2019    CHLORIDE 110 (H) 08/08/2019    CO2 29 08/08/2019    BUN 24 08/08/2019    CR 1.03 08/08/2019    GLC 91 08/08/2019    KARL 8.7 08/08/2019    PHOS 3.7 06/07/2007    MAG 2.0 06/07/2007    ALBUMIN 3.3 (L) 08/08/2019    PROTTOTAL 6.8 08/08/2019    ALT 14 08/08/2019    AST 14 08/08/2019    ALKPHOS 62 08/08/2019    BILITOTAL 0.4 08/08/2019    PTT 41 (H) 08/08/2019    INR 0.96 08/08/2019    TSH 1.48 04/20/2016    T4 1.10 04/20/2011    T3 125 04/20/2011       Preop Vitals  BP Readings from Last 3 Encounters:   08/16/19 (!) 151/101   08/12/19 130/79   08/08/19 (!) 143/101    Pulse Readings from Last 3 Encounters:   08/16/19 67   08/12/19 65   08/08/19 55      Resp Readings from Last 3 Encounters:   08/12/19 16   08/08/19 16   05/09/19 16    SpO2 Readings from Last 3 Encounters:   08/16/19 98%   08/08/19 96%   08/06/19 96%      Temp Readings from Last 1 Encounters:   08/08/19 36.4  C (97.6  F) (Oral)    Ht Readings from Last 1 Encounters:   08/12/19 1.727 m (5' 8\")      Wt Readings from Last 1 Encounters:   08/16/19 65.3 kg (144 lb)    Estimated body mass index is 21.9 kg/m  as calculated from the following:    Height as of 8/12/19: 1.727 m (5' 8\").    Weight as of 8/16/19: 65.3 kg (144 lb).       Anesthesia Plan      History & Physical Review  History and physical reviewed and following examination; no interval change.    ASA " Status:  4 .    NPO Status:  > 8 hours    Plan for General and ETT with Intravenous induction. Maintenance will be Balanced.      Additional equipment: Videolaryngoscope, Arterial Line, Central Line, CVP and TERRENCE Pt's BP high in preop, and per him for some time.  Had a long discussion with patient, as well as surgeon.  Given his renal artery stenosis, and BP medication intolerance, it is felt by surgery that they will not be able to get his BP under good control prior to surgery, and the patient will have to accept the potential increased risk of stroke, renal failure, etc.  The patient is aware of this and agrees, and does not want to delay surgery.  He understands there are benefits to delaying the surgery, but also risks to doing so, and he chooses to proceed today.      Postoperative Care  Postoperative pain management:  IV analgesics.      Consents  Anesthetic plan, risks, benefits and alternatives discussed with:  Patient..                 Shon Saldaña MD

## 2019-09-03 NOTE — PLAN OF CARE
Pt is getting more alert, can follow commands, but still remains drowsy. Remains paced 100%, CT out is moderate, U/O adequate . Albumin 5% given 500cc, continue to monitor SB/P >90 and <130. Family at bedside updated.

## 2019-09-03 NOTE — BRIEF OP NOTE
[facility]    Brief Operative Note    Pre-operative diagnosis: cad  Post-operative diagnosis Coronary artery disease  Procedure: Procedure(s):  CORONARY ARTERY BYPASS GRAFT X 4 - LIMA TO LAD, SV TO PL, OM, PDA ; ON PUMP WITH TERRENCE; ENDOVEIN HARVEST RIGHT LEG  Surgeon: Surgeon(s) and Role:     * Lai Mchugh MD - Primary     * Yonathan Enrique PA-C - Assisting     * Bia Chambers MD - Fellow - Assisting  Anesthesia: General   Estimated blood loss: 500 ml  Drains: Chest tubes  Specimens: * No specimens in log *  Findings:   Good conduit. Good targets..  Complications: None.  Implants:  * No implants in log *

## 2019-09-03 NOTE — CONSULTS
CARDIAC SURGERY NUTRITION CONSULT    Received standing order to assess and educate patient.    Will follow and complete assessment once patient is extubated and/or is transferred to medical unit.    Patient will receive nutrition education during the Outpatient Cardiac Rehab Program (nutrition classes/dietitian counseling).    Keeley Wynne, ED, LD, [facility]

## 2019-09-04 ENCOUNTER — APPOINTMENT (OUTPATIENT)
Dept: OCCUPATIONAL THERAPY | Facility: CLINIC | Age: 74
DRG: 236 | End: 2019-09-04
Attending: PHYSICIAN ASSISTANT
Payer: COMMERCIAL

## 2019-09-04 ENCOUNTER — APPOINTMENT (OUTPATIENT)
Dept: GENERAL RADIOLOGY | Facility: CLINIC | Age: 74
DRG: 236 | End: 2019-09-04
Attending: PHYSICIAN ASSISTANT
Payer: COMMERCIAL

## 2019-09-04 LAB
ALBUMIN SERPL-MCNC: 3.4 G/DL (ref 3.4–5)
ALP SERPL-CCNC: 32 U/L (ref 40–150)
ALT SERPL W P-5'-P-CCNC: 14 U/L (ref 0–70)
ANION GAP SERPL CALCULATED.3IONS-SCNC: 6 MMOL/L (ref 3–14)
AST SERPL W P-5'-P-CCNC: 26 U/L (ref 0–45)
BASE DEFICIT BLDA-SCNC: 2.1 MMOL/L
BASE DEFICIT BLDA-SCNC: 2.2 MMOL/L
BASE DEFICIT BLDA-SCNC: 2.4 MMOL/L
BILIRUB SERPL-MCNC: 0.5 MG/DL (ref 0.2–1.3)
BLD PROD TYP BPU: NORMAL
BLD PROD TYP BPU: NORMAL
BLD UNIT ID BPU: 0
BLD UNIT ID BPU: 0
BLOOD PRODUCT CODE: NORMAL
BLOOD PRODUCT CODE: NORMAL
BPU ID: NORMAL
BPU ID: NORMAL
BUN SERPL-MCNC: 21 MG/DL (ref 7–30)
CA-I BLD-MCNC: 4.4 MG/DL (ref 4.4–5.2)
CALCIUM SERPL-MCNC: 7.8 MG/DL (ref 8.5–10.1)
CHLORIDE SERPL-SCNC: 112 MMOL/L (ref 94–109)
CO2 SERPL-SCNC: 25 MMOL/L (ref 20–32)
CREAT SERPL-MCNC: 1.16 MG/DL (ref 0.66–1.25)
ERYTHROCYTE [DISTWIDTH] IN BLOOD BY AUTOMATED COUNT: 14.7 % (ref 10–15)
GFR SERPL CREATININE-BSD FRML MDRD: 62 ML/MIN/{1.73_M2}
GLUCOSE BLDC GLUCOMTR-MCNC: 111 MG/DL (ref 70–99)
GLUCOSE BLDC GLUCOMTR-MCNC: 111 MG/DL (ref 70–99)
GLUCOSE BLDC GLUCOMTR-MCNC: 122 MG/DL (ref 70–99)
GLUCOSE BLDC GLUCOMTR-MCNC: 126 MG/DL (ref 70–99)
GLUCOSE BLDC GLUCOMTR-MCNC: 128 MG/DL (ref 70–99)
GLUCOSE BLDC GLUCOMTR-MCNC: 129 MG/DL (ref 70–99)
GLUCOSE SERPL-MCNC: 126 MG/DL (ref 70–99)
HCO3 BLD-SCNC: 25 MMOL/L (ref 21–28)
HCT VFR BLD AUTO: 30.5 % (ref 40–53)
HGB BLD-MCNC: 10.5 G/DL (ref 13.3–17.7)
MAGNESIUM SERPL-MCNC: 2.4 MG/DL (ref 1.6–2.3)
MCH RBC QN AUTO: 28.8 PG (ref 26.5–33)
MCHC RBC AUTO-ENTMCNC: 34.4 G/DL (ref 31.5–36.5)
MCV RBC AUTO: 84 FL (ref 78–100)
OXYHGB MFR BLD: 94 % (ref 92–100)
OXYHGB MFR BLD: 97 % (ref 92–100)
OXYHGB MFR BLD: 98 % (ref 92–100)
PCO2 BLD: 52 MM HG (ref 35–45)
PCO2 BLD: 53 MM HG (ref 35–45)
PCO2 BLD: 55 MM HG (ref 35–45)
PH BLD: 7.27 PH (ref 7.35–7.45)
PH BLD: 7.28 PH (ref 7.35–7.45)
PH BLD: 7.29 PH (ref 7.35–7.45)
PHOSPHATE SERPL-MCNC: 3.9 MG/DL (ref 2.5–4.5)
PLATELET # BLD AUTO: 100 10E9/L (ref 150–450)
PO2 BLD: 129 MM HG (ref 80–105)
PO2 BLD: 139 MM HG (ref 80–105)
PO2 BLD: 79 MM HG (ref 80–105)
POTASSIUM SERPL-SCNC: 4.2 MMOL/L (ref 3.4–5.3)
PROT SERPL-MCNC: 5.5 G/DL (ref 6.8–8.8)
RBC # BLD AUTO: 3.64 10E12/L (ref 4.4–5.9)
SODIUM SERPL-SCNC: 143 MMOL/L (ref 133–144)
TRANSFUSION STATUS PATIENT QL: NORMAL
WBC # BLD AUTO: 8.2 10E9/L (ref 4–11)

## 2019-09-04 PROCEDURE — 93005 ELECTROCARDIOGRAM TRACING: CPT

## 2019-09-04 PROCEDURE — 12000000 ZZH R&B MED SURG/OB

## 2019-09-04 PROCEDURE — 99291 CRITICAL CARE FIRST HOUR: CPT | Performed by: NURSE PRACTITIONER

## 2019-09-04 PROCEDURE — 94799 UNLISTED PULMONARY SVC/PX: CPT

## 2019-09-04 PROCEDURE — 84100 ASSAY OF PHOSPHORUS: CPT | Performed by: PHYSICIAN ASSISTANT

## 2019-09-04 PROCEDURE — 82330 ASSAY OF CALCIUM: CPT | Performed by: PHYSICIAN ASSISTANT

## 2019-09-04 PROCEDURE — 25000128 H RX IP 250 OP 636: Performed by: PHYSICIAN ASSISTANT

## 2019-09-04 PROCEDURE — 40000008 ZZH STATISTIC AIRWAY CARE

## 2019-09-04 PROCEDURE — 97165 OT EVAL LOW COMPLEX 30 MIN: CPT | Mod: GO

## 2019-09-04 PROCEDURE — 93010 ELECTROCARDIOGRAM REPORT: CPT | Performed by: INTERNAL MEDICINE

## 2019-09-04 PROCEDURE — 40000275 ZZH STATISTIC RCP TIME EA 10 MIN

## 2019-09-04 PROCEDURE — C9113 INJ PANTOPRAZOLE SODIUM, VIA: HCPCS | Performed by: PHYSICIAN ASSISTANT

## 2019-09-04 PROCEDURE — 25000125 ZZHC RX 250: Performed by: PHYSICIAN ASSISTANT

## 2019-09-04 PROCEDURE — 94003 VENT MGMT INPAT SUBQ DAY: CPT

## 2019-09-04 PROCEDURE — 82805 BLOOD GASES W/O2 SATURATION: CPT | Performed by: NURSE PRACTITIONER

## 2019-09-04 PROCEDURE — 25000132 ZZH RX MED GY IP 250 OP 250 PS 637: Performed by: PHYSICIAN ASSISTANT

## 2019-09-04 PROCEDURE — 82805 BLOOD GASES W/O2 SATURATION: CPT | Performed by: PHYSICIAN ASSISTANT

## 2019-09-04 PROCEDURE — P9041 ALBUMIN (HUMAN),5%, 50ML: HCPCS | Performed by: THORACIC SURGERY (CARDIOTHORACIC VASCULAR SURGERY)

## 2019-09-04 PROCEDURE — 25000128 H RX IP 250 OP 636: Performed by: NURSE PRACTITIONER

## 2019-09-04 PROCEDURE — 97110 THERAPEUTIC EXERCISES: CPT | Mod: GO

## 2019-09-04 PROCEDURE — 85027 COMPLETE CBC AUTOMATED: CPT | Performed by: PHYSICIAN ASSISTANT

## 2019-09-04 PROCEDURE — 83735 ASSAY OF MAGNESIUM: CPT | Performed by: PHYSICIAN ASSISTANT

## 2019-09-04 PROCEDURE — 71045 X-RAY EXAM CHEST 1 VIEW: CPT

## 2019-09-04 PROCEDURE — 97530 THERAPEUTIC ACTIVITIES: CPT | Mod: GO

## 2019-09-04 PROCEDURE — 25000128 H RX IP 250 OP 636: Performed by: THORACIC SURGERY (CARDIOTHORACIC VASCULAR SURGERY)

## 2019-09-04 PROCEDURE — 00000146 ZZHCL STATISTIC GLUCOSE BY METER IP

## 2019-09-04 PROCEDURE — 80053 COMPREHEN METABOLIC PANEL: CPT | Performed by: PHYSICIAN ASSISTANT

## 2019-09-04 RX ORDER — PANTOPRAZOLE SODIUM 40 MG/1
40 TABLET, DELAYED RELEASE ORAL
Status: DISCONTINUED | OUTPATIENT
Start: 2019-09-05 | End: 2019-09-09 | Stop reason: HOSPADM

## 2019-09-04 RX ORDER — ATORVASTATIN CALCIUM 40 MG/1
40 TABLET, FILM COATED ORAL EVERY EVENING
Status: DISCONTINUED | OUTPATIENT
Start: 2019-09-04 | End: 2019-09-09 | Stop reason: HOSPADM

## 2019-09-04 RX ORDER — HEPARIN SODIUM 5000 [USP'U]/.5ML
5000 INJECTION, SOLUTION INTRAVENOUS; SUBCUTANEOUS EVERY 8 HOURS
Status: DISCONTINUED | OUTPATIENT
Start: 2019-09-04 | End: 2019-09-09 | Stop reason: HOSPADM

## 2019-09-04 RX ORDER — HYDROMORPHONE HYDROCHLORIDE 1 MG/ML
0.2 INJECTION, SOLUTION INTRAMUSCULAR; INTRAVENOUS; SUBCUTANEOUS
Status: DISCONTINUED | OUTPATIENT
Start: 2019-09-04 | End: 2019-09-09 | Stop reason: HOSPADM

## 2019-09-04 RX ORDER — ALBUMIN, HUMAN INJ 5% 5 %
25 SOLUTION INTRAVENOUS ONCE
Status: COMPLETED | OUTPATIENT
Start: 2019-09-04 | End: 2019-09-04

## 2019-09-04 RX ADMIN — SALMETEROL XINAFOATE 1 PUFF: 50 POWDER, METERED ORAL; RESPIRATORY (INHALATION) at 10:42

## 2019-09-04 RX ADMIN — HYDROMORPHONE HYDROCHLORIDE 0.3 MG: 1 INJECTION, SOLUTION INTRAMUSCULAR; INTRAVENOUS; SUBCUTANEOUS at 07:31

## 2019-09-04 RX ADMIN — METOPROLOL TARTRATE 12.5 MG: 25 TABLET, FILM COATED ORAL at 20:41

## 2019-09-04 RX ADMIN — HYDROMORPHONE HYDROCHLORIDE 0.3 MG: 1 INJECTION, SOLUTION INTRAMUSCULAR; INTRAVENOUS; SUBCUTANEOUS at 01:39

## 2019-09-04 RX ADMIN — CEFAZOLIN 1 G: 1 INJECTION, POWDER, FOR SOLUTION INTRAMUSCULAR; INTRAVENOUS at 07:00

## 2019-09-04 RX ADMIN — ACETAMINOPHEN 975 MG: 325 TABLET ORAL at 23:38

## 2019-09-04 RX ADMIN — PAROXETINE HYDROCHLORIDE 60 MG: 20 TABLET, FILM COATED ORAL at 13:07

## 2019-09-04 RX ADMIN — ASPIRIN 325 MG: 325 TABLET, DELAYED RELEASE ORAL at 16:21

## 2019-09-04 RX ADMIN — PANTOPRAZOLE SODIUM 40 MG: 40 INJECTION, POWDER, LYOPHILIZED, FOR SOLUTION INTRAVENOUS at 08:14

## 2019-09-04 RX ADMIN — GABAPENTIN 100 MG: 100 CAPSULE ORAL at 16:21

## 2019-09-04 RX ADMIN — GABAPENTIN 100 MG: 100 CAPSULE ORAL at 23:39

## 2019-09-04 RX ADMIN — CEFAZOLIN 1 G: 1 INJECTION, POWDER, FOR SOLUTION INTRAMUSCULAR; INTRAVENOUS at 14:03

## 2019-09-04 RX ADMIN — HEPARIN SODIUM 5000 UNITS: 5000 INJECTION, SOLUTION INTRAVENOUS; SUBCUTANEOUS at 13:01

## 2019-09-04 RX ADMIN — ALBUMIN HUMAN 25 G: 0.05 INJECTION, SOLUTION INTRAVENOUS at 02:54

## 2019-09-04 RX ADMIN — EZETIMIBE 10 MG: 10 TABLET ORAL at 16:21

## 2019-09-04 RX ADMIN — OXYCODONE HYDROCHLORIDE 5 MG: 5 TABLET ORAL at 10:39

## 2019-09-04 RX ADMIN — HYDROXYZINE HYDROCHLORIDE 10 MG: 10 TABLET ORAL at 20:51

## 2019-09-04 RX ADMIN — ATORVASTATIN CALCIUM 40 MG: 40 TABLET, FILM COATED ORAL at 20:41

## 2019-09-04 RX ADMIN — OXYCODONE HYDROCHLORIDE 10 MG: 5 TABLET ORAL at 20:50

## 2019-09-04 RX ADMIN — SALMETEROL XINAFOATE 1 PUFF: 50 POWDER, METERED ORAL; RESPIRATORY (INHALATION) at 23:36

## 2019-09-04 RX ADMIN — METOPROLOL TARTRATE 5 MG: 5 INJECTION INTRAVENOUS at 09:50

## 2019-09-04 RX ADMIN — HEPARIN SODIUM 5000 UNITS: 5000 INJECTION, SOLUTION INTRAVENOUS; SUBCUTANEOUS at 20:54

## 2019-09-04 RX ADMIN — OXYCODONE HYDROCHLORIDE 5 MG: 5 TABLET ORAL at 15:35

## 2019-09-04 RX ADMIN — LIDOCAINE 1 PATCH: 560 PATCH PERCUTANEOUS; TOPICAL; TRANSDERMAL at 07:56

## 2019-09-04 RX ADMIN — HYDROMORPHONE HYDROCHLORIDE 0.3 MG: 1 INJECTION, SOLUTION INTRAMUSCULAR; INTRAVENOUS; SUBCUTANEOUS at 05:30

## 2019-09-04 RX ADMIN — SENNOSIDES AND DOCUSATE SODIUM 1 TABLET: 8.6; 5 TABLET ORAL at 13:08

## 2019-09-04 RX ADMIN — ACETAMINOPHEN 975 MG: 325 TABLET ORAL at 14:03

## 2019-09-04 RX ADMIN — SENNOSIDES AND DOCUSATE SODIUM 1 TABLET: 8.6; 5 TABLET ORAL at 20:41

## 2019-09-04 ASSESSMENT — ACTIVITIES OF DAILY LIVING (ADL)
ADLS_ACUITY_SCORE: 13
ADLS_ACUITY_SCORE: 13
PREVIOUS_RESPONSIBILITIES: HOUSEKEEPING;MEAL PREP;LAUNDRY;SHOPPING;MEDICATION MANAGEMENT;FINANCES;DRIVING
ADLS_ACUITY_SCORE: 13
ADLS_ACUITY_SCORE: 13
ADLS_ACUITY_SCORE: 14
ADLS_ACUITY_SCORE: 13

## 2019-09-04 ASSESSMENT — MIFFLIN-ST. JEOR
SCORE: 1405.79
SCORE: 1406.5

## 2019-09-04 NOTE — PROGRESS NOTES
One final attempt made to wean from vent this shift without success. Back on full vent settings and resting comfortably.

## 2019-09-04 NOTE — PLAN OF CARE
Arouses easily, follows commands. Attempted to wean from vent multiple times during noc without success. On/off ismael for short time but able to wean off with albumin 5%. Good pain control with dilaudid. Cont to monitor.

## 2019-09-04 NOTE — PROGRESS NOTES
[facility]    Critical Care Service  Progress Note    Date of Service (when I saw the patient): 09/04/2019     Main Plans for Today      - minimal use of narcotic during PS  - Pressure support and extubate  - wean pressors  - PO intake  - out of bed        Assessment & Plan   Adolfo Rendon is a 73 year old male with a past medical history of CAD, ischemic cardiomyopathy, hypertension that has been difficult to manage, hyperlipidemia, dilated AAA at 3.8 cm on 8/6/2019, renal artery stenosis, ongoing tobacco abuse, and COPD.    He was seen on  8/1/2019 by PCP due to shortness of breath, fatigue and  occasional right-sided chest heaviness radiating to his right neck. He was started on imdur, carvedilol, and Zetia and he was scheduled for an echocardiogram and angiogram. Echo showed an EF of 45-50% with WMA. Angiogram on 8/8/2019 revealed severe multivessel coronary artery disease.      Neuro/Pain/Psych  1. Acute pain  2. Sedation  3. History of depression   Plan:  -- Scheduled acetaminophen, Lidoderm, robaxin, Neurontin and melatonin with PRN dilaudid and oxycodone.  -- Propofol for sedation as needed until extubation  -- continue PTA paxil    CV  1. S/p CAB x4, LIMA to LAD, SV to PL, OM, PDA   2. Ischemic cardiomyopathy   3. HFrEF with EF of 45-50%  4. History of HTN, difficult to manage, possibly due to renal artery stenosis  5. AAA, last measured 8/6/2019 at 3.8cm  Plan:  -- Per pathway  -- Cardiac meds per CV surgery: asa, metoprolol, zetia  -- gtts are not currently needed but available:   Nitroglycerine to keep SBP less than 130.  Epinephrine and phenylephrine to keep MAP greater than 65    Resp:  1. Post operative ventilator management  2. COPD. PFT 8/2019 shows FEV1 at 53 and FEV1/FVC ratio at 76  3. Nicotine dependence   Plan:  -- AC ventilation: 500/16/5/40  -- PST when hemodynamically stable  -- continue PTA Atrovent and Serevent. Duonebs PRN    GI/Nutrition  1. No prior hx  Plan:  -- NPO  for now, advanced as tolerated once extubated  -- PPI    Renal  1. Renal artery stenosis. Seen by vascular surgeon and no planned interventions  1. Baseline creatinine appears to be 1.0. Cr currently at 1.16  Plan:  --monitor function and electrolytes as needed with replacement per ICU protocols.   -- generally avoid nephrotoxic agents such as NSAID, IV contrast unless specifically required  -- adjust medications as needed for renal clearance  -- follow I/O's as appropriate.  -- maintain euvolemia    ID  1. No current issue  2. MRSA negative  Plan:  -- perioperative ancef    Endocrine  1. Stress Hyperglycemia  Plan:  --  sliding scale insulin  -- Keep BG  <180 for optimal healing    Heme:  1. Acute blood loss anemia. HGB 10.5 today  2. Thrombocytopenia, plts of 100  Plan:  -- Monitor hemoglobin and platelets  -- Transfuse to keep Hgb > 7.0    MSK  1. Potential for deconditioning due to critical illness  Plan:  -- PT/OT consults    Skin  1. Sternal incision  2. Right leg harvest site  Plan:  -- monitor site    General cares:  DVT Prophylaxis: SCDs  GI Prophylaxis: PPI  Restraints: Restraints for medical healing needed: NO  Family update by me today: Yes     Current lines are required for patient management  Access:  Right internal jugular  Right arterial line  PIV  Mo      Lev Gilliland    Assessment & Plan   Lev Gilliland  Time Spent on this Encounter   Billing:  I spent 35 minutes bedside and on the inpatient unit today managing the critical care of Adolfo Rendon in relation to the issues listed in this note.    Interval History   Unable to extubate overnight. This morning, he is more awake and responsive than he was yesterday. Denies chest pain or shortness of breath.      Physical Exam   Temp: 97.7  F (36.5  C) Temp src: Bladder Temp  Min: 96.1  F (35.6  C)  Max: 99.32  F (37.4  C) BP: 102/72 Pulse: 80 Heart Rate: 68 Resp: 12 SpO2: 98 % O2 Device: Mechanical Ventilator    Vitals:     09/03/19 0603 09/04/19 0400   Weight: 65.8 kg (145 lb) 68.7 kg (151 lb 7.3 oz)     I/O last 3 completed shifts:  In: 4629.68 [I.V.:2939.68; Other:140; NG/GT:50]  Out: 2555 [Urine:1535; Emesis/NG output:150; Blood:320; Chest Tube:550]    GEN: Awake and appears comfortable  EYES: PERRL, Anicteric sclera.   HEENT:  Normocephalic, atraumatic, trachea midline, ETT secure  CV: RRR, no gallops, rubs, or murmurs  PULM/CHEST: Clear breath sounds bilaterally without rhonchi, crackles or wheeze, symmetric chest rise  GI: normal bowel sounds, soft, non-tender, no rebound tenderness or guarding, no masses  : ta catheter in place, urine yellow and clear  EXTREMITIES: No  peripheral edema, moving all extremities, peripheral pulses intact  NEURO: Cranial nerves II-XII grossly intact, no motor-sensory deficits noted  SKIN: No rashes, sores or ulcerations  PSYCH:  Affect: appropriate. No anxiety or restlessness    Labs and Imaging personally reviewed:  ECG  Chest x-ray  echocardiogram    Data   Recent Labs   Lab 09/04/19  0345 09/03/19  1400 09/03/19  1224 09/03/19  1220   WBC 8.2 6.7  --  6.7   HGB 10.5* 11.0* 10.2* 10.8*   MCV 84 83  --  83   * 92*  --  99*   INR  --  1.30*  --  1.47*    145* 142  --    POTASSIUM 4.2 4.1 3.9  --    CHLORIDE 112* 115*  --   --    CO2 25 26  --   --    BUN 21 21  --   --    CR 1.16 1.03  --   --    ANIONGAP 6 4  --   --    KARL 7.8* 7.7*  --   --    * 99  --   --    ALBUMIN 3.4 2.9*  --   --    PROTTOTAL 5.5* 5.1*  --   --    BILITOTAL 0.5 0.4  --   --    ALKPHOS 32* 46  --   --    ALT 14 15  --   --    AST 26 32  --   --

## 2019-09-04 NOTE — PROGRESS NOTES
Failed PS trial. Discussed with Dr. Daniel, will go back on full vent settings and try again around MN. Dr Mchugh here and aware of plan.

## 2019-09-04 NOTE — PROGRESS NOTES
[facility]  Cardiovascular and Thoracic Surgery Daily Note          Assessment and Plan:   POD#1 s/p Coronary artery bypass grafting x4, LIMA-LAD, SVG-OM, SVG-PDA, SVG-JORDON; endoscopic harvest right greater saphenous vein by Dr. Lai Mchugh  -CVS: HR: 60s-80s. SBP: 100s-110s. Pre op EF: 45-50%. ASA, BB, statin, zetia. Weaned off gtts. Chest tubes to suction. Pacer wires capped.   -Resp: Extubated within 24 hours per protocol. Saturating well on 2L. Continue IS, cough, deep breathing, ambulation.  -Neuro:  Grossly intact. Pain controlled.   -Renal: good UO, up about 3kg from preoperative weight. Will hold diuretics today and re-evaluate diuretic need.  Creatinine   Date Value Ref Range Status   2019 1.16 0.66 - 1.25 mg/dL Final   ]  -GI:  Tolerating clears. No BM. Continue bowel regimen  -:  Mo in place d/t limited mobility and need for accurate I&Os.  -Endo: pre op A1c: 5.7. Minimal insulin gtt requirements. Transition to sliding scale insulin.   -FEN: replete lytes as needed. Na: 143. K: 4.2  Orders Placed This Encounter      Clear Liquid Diet    -ID: Temp (24hrs), Av.3  F (36.8  C), Min:96.1  F (35.6  C), Max:99.5  F (37.5  C)   Completed perioperative abx.   WBC   Date Value Ref Range Status   2019 8.2 4.0 - 11.0 10e9/L Final   ]  -Heme: plt: 100. Acute blood loss anemia and thrombocytopenia.   Hemoglobin   Date Value Ref Range Status   2019 10.5 (L) 13.3 - 17.7 g/dL Final   ],   -Proph: PCD, ASA, BB, statin, PPI, sub q heparin  -Dispo: Transfer to [facility]. Continue therapies. Continue to encourage IS, cough, deep breathing, ambulation.           Interval History:   Extubated this am. Chest tubes to suction. Pacer wires capped. Saturating well on 2L. Pain controlled. Tolerating diet. No BM.          Medications:       acetaminophen  975 mg Oral Q8H     aspirin  325 mg Oral Daily     atorvastatin  40 mg Oral QPM     ceFAZolin  1 g Intravenous Q8H     ezetimibe  10 mg  "Oral Daily     gabapentin  100 mg Oral TID     heparin  5,000 Units Subcutaneous Q8H     insulin aspart  1-7 Units Subcutaneous TID AC     insulin aspart  1-5 Units Subcutaneous At Bedtime     lidocaine  2 patch Transdermal Q24H     lidocaine   Transdermal Q8H     lidocaine   Transdermal Q24h     metoprolol tartrate  12.5 mg Oral BID     [START ON 9/5/2019] pantoprazole  40 mg Oral QAM AC     PARoxetine  60 mg Oral QAM     salmeterol  1 puff Inhalation BID     senna-docusate  1 tablet Oral BID    Or     senna-docusate  2 tablet Oral BID     sodium chloride (PF)  3 mL Intracatheter Q8H     umeclidinium  1 puff Inhalation Daily     [START ON 9/6/2019] acetaminophen, glucose **OR** dextrose **OR** glucagon, furosemide, hydrALAZINE, HYDROmorphone, hydrOXYzine, lidocaine 4%, lidocaine (buffered or not buffered), magnesium sulfate, magnesium sulfate, melatonin, meperidine, methocarbamol, naloxone, nicotine polacrilex, ondansetron **OR** ondansetron, oxyCODONE IR, potassium chloride, potassium chloride with lidocaine, potassium chloride, potassium chloride, potassium chloride, potassium phosphate (KPHOS) in D5W IV, potassium phosphate (KPHOS) in D5W IV, potassium phosphate (KPHOS) in D5W IV, potassium phosphate (KPHOS) in D5W IV, sodium chloride (PF)          Physical Exam:   Vitals were reviewed  Blood pressure 112/68, pulse 67, temperature 99.3  F (37.4  C), resp. rate 20, height 1.727 m (5' 8\"), weight 68.7 kg (151 lb 7.3 oz), SpO2 95 %.  Rhythm: NSR    Lungs: diminished bases    Cardiovascular: RRR normal s1 and s2    Abdomen: soft NTND    Extremeties: minimal edema    Incision: CDI    CT: to suction    Weight:   Vitals:    09/03/19 0603 09/04/19 0400   Weight: 65.8 kg (145 lb) 68.7 kg (151 lb 7.3 oz)            Data:   Labs:   Lab Results   Component Value Date    WBC 8.2 09/04/2019     Lab Results   Component Value Date    RBC 3.64 09/04/2019     Lab Results   Component Value Date    HGB 10.5 09/04/2019     Lab " Results   Component Value Date    HCT 30.5 09/04/2019     No components found for: MCT  Lab Results   Component Value Date    MCV 84 09/04/2019     Lab Results   Component Value Date    MCH 28.8 09/04/2019     Lab Results   Component Value Date    MCHC 34.4 09/04/2019     Lab Results   Component Value Date    RDW 14.7 09/04/2019     Lab Results   Component Value Date     09/04/2019       Last Basic Metabolic Panel:  Lab Results   Component Value Date     09/04/2019      Lab Results   Component Value Date    POTASSIUM 4.2 09/04/2019     Lab Results   Component Value Date    CHLORIDE 112 09/04/2019     Lab Results   Component Value Date    KARL 7.8 09/04/2019     Lab Results   Component Value Date    CO2 25 09/04/2019     Lab Results   Component Value Date    BUN 21 09/04/2019     Lab Results   Component Value Date    CR 1.16 09/04/2019     Lab Results   Component Value Date     09/04/2019       CXR: 9/4/19 Final read pending. Clinical read stable post operative changes.     Sully Orellana PA-C  CV Surgery  Pager #[phone]

## 2019-09-04 NOTE — PROGRESS NOTES
PS 12/5 unable to get MV >6 with BPM 6-10. Discussed with Dr. Saenz and will leave pt on full vent settings remainder of noc. Will allow pt to rest and try again on dayshift.

## 2019-09-04 NOTE — PLAN OF CARE
Discharge Planner OT   Patient plan for discharge: knows he may not be able to discharge home as he will not have A     Current status: order received, chart reviewed, eval completed, tx initiated. Pt seen POD 1 s/p CABG x 4. Pt lives alone in house with 15 stairs to upstairs. Pt reports IND with ADL/IADL's prior.     Educated pt on sternal precautions and bed mobility technique. Pt required min A for bed mobility supine > sit with cues for log roll technique. pt reports mild dizziness sitting EOB, /83. Min A for sit <> stand with cues for sternal precautions, /76. CGA for stand pivot transfer bed > chair. Pt completed standing and sitting CVC exercises with CGA for safety. Pt completed 3 exercises x 3 sets of 30 seconds each with rest breaks. Normal CV response, pt reports mild dizziness but BP stable. 119/83 pre exercise, 120/76 middle of exercise, 105/68 post exercise. Tolerates exercise well.     Barriers to return to prior living situation: lives alone, sternal precautions, no A at discharge     Recommendations for discharge: likely home with A for transportation, and higher level IADL's including lawn care, laundry. OP CR     Rationale for recommendations: anticipate pt will progress with IP CR for safe discharge home however CT surgery may recommend TCU as pt may not have adequate support at home at discharge. Will continue to update discharge recs as pt able to inc participate with IP CR.        Entered by: Lisa Molina 09/04/2019 3:30 PM

## 2019-09-04 NOTE — PROGRESS NOTES
[facility] ICU RESPIRATORY NOTE           Date of Admission: 9/3/2019     Date of Intubation (most recent): 9/3     Reason for Mechanical Ventilation: CABG     Number of Days on Mechanical Ventilation: 2     Met Criteria for Pressure Support Trial: Yes     Length of Pressure Support Trial: 30 mins x3     Reason for Stopping Pressure Support Trial: Abnormal ABG        Significant Events Today: None overnight     ABG Results:   Recent Labs   Lab 09/04/19  0030 09/03/19 2050 09/03/19 2015 09/03/19  1800   PH 7.28* 7.27* 7.29* 7.24*   PCO2 53* 55* 52* 59*   PO2 129* 113* 129* 108*   HCO3 25 25 25 25     Ventilation Mode: CMV/AC  (Continuous Mandatory Ventilation/ Assist Control)  FiO2 (%): 40 %  Rate Set (breaths/minute): 16 breaths/min  Tidal Volume Set (mL): 500 mL  PEEP (cm H2O): 5 cmH2O  Pressure Support (cm H2O): 5 cmH2O  Oxygen Concentration (%): 40 %  Resp: 15  Pt able to wean on ventilator and follow commands, but ABG precludes extubation per MD. Will attempt weaning trial again in AM.

## 2019-09-04 NOTE — PROGRESS NOTES
09/04/19 1444   Quick Adds   Type of Visit Initial Occupational Therapy Evaluation   Living Environment   Lives With alone   Living Arrangements house   Transportation Anticipated car, drives self   Living Environment Comment 13 stairs to second floor. does not have anyone at the moment who can stay at [place] to help.    Self-Care   Usual Activity Tolerance good   Current Activity Tolerance fair   Regular Exercise Yes   Equipment Currently Used at Home none   Activity/Exercise/Self-Care Comment stays very active.    Functional Level   Ambulation 0-->independent   Transferring 0-->independent   Toileting 0-->independent   Bathing 0-->independent   Dressing 0-->independent   Eating 0-->independent   Communication 0-->understands/communicates without difficulty   Swallowing 0-->swallows foods/liquids without difficulty   Cognition 0 - no cognition issues reported   Fall history within last six months no   Which of the above functional risks had a recent onset or change? none   Prior Functional Level Comment IND prior    General Information   Onset of Illness/Injury or Date of Surgery - Date 09/03/19   Referring Physician Yonathan Enrique, KIRILL   Patient/Family Goals Statement open to the idea of TCU if he cannot have help at home    Additional Occupational Profile Info/Pertinent History of Current Problem CABG x 4    Precautions/Limitations fall precautions;sternal precautions;oxygen therapy device and L/min   General Info Comments on 3 L O2.    Cognitive Status Examination   Orientation orientation to person, place and time   Level of Consciousness alert   Follows Commands (Cognition) WNL   Memory intact   Attention No deficits were identified   Organization/Problem Solving No deficits were identified   Executive Function No deficits were identified   Visual Perception   Visual Perception Wears glasses   Sensory Examination   Sensory Quick Adds No deficits were identified   Pain Assessment   Patient Currently in Pain  Yes, see Vital Sign flowsheet  (3/10)   Integumentary/Edema   Integumentary/Edema no deficits were identifed   Range of Motion (ROM)   ROM Comment Not tested due to pain    Strength   Strength Comments Not tested due to pain    Muscle Tone Assessment   Muscle Tone Quick Adds No deficits were identified   Coordination   Upper Extremity Coordination No deficits were identified   Mobility   Bed Mobility Bed mobility skill: Supine to sit   Bed Mobility Skill: Supine to Sit   Level of [place]: Supine/Sit moderate assist (50% patients effort)   Transfer Skill: Bed to Chair/Chair to Bed   Level of [place]: Bed to Chair contact guard   Transfer Skill: Sit to Stand   Level of [place]: Sit/Stand minimum assist (75% patients effort)   Upper Body Dressing   Level of [place]: Dress Upper Body moderate assist (50% patients effort)   Lower Body Dressing   Level of [place]: Dress Lower Body maximum assist (25% patients effort)   Toileting   Level of [place]: Toilet moderate assist (50% patients effort)   Eating/Self Feeding   Level of [place]: Eating independent   Instrumental Activities of Daily Living (IADL)   Previous Responsibilities housekeeping;meal prep;laundry;shopping;medication management;finances;driving   Activities of Daily Living Analysis   Impairments Contributing to Impaired Activities of Daily Living balance impaired;pain;post surgical precautions   General Therapy Interventions   Planned Therapy Interventions ADL retraining;transfer training;progressive activity/exercise;risk factor education;home program guidelines   Clinical Impression   Criteria for Skilled Therapeutic Interventions Met yes, treatment indicated   OT Diagnosis dec IND with ADL's and transfers   Influenced by the following impairments dec ax tolerance, pain   Assessment of Occupational Performance 3-5 Performance Deficits   Identified Performance Deficits LE dresssing, toilet transfer, toileting, exercise  "  Clinical Decision Making (Complexity) Low complexity   Therapy Frequency 2x/day   Predicted Duration of Therapy Intervention (days/wks) 6 days   Anticipated Discharge Disposition Transitional Care Facility   Risks and Benefits of Treatment have been explained. Yes   Patient, Family & other staff in agreement with plan of care Yes   [facility] TM \"6 Clicks\"   2016, Trustees of [facility], under license to [employer].  All rights reserved.   6 Clicks Short Forms Daily Activity Inpatient Short Form   [facility]  \"6 Clicks\" Daily Activity Inpatient Short Form   1. Putting on and taking off regular lower body clothing? 2 - A Lot   2. Bathing (including washing, rinsing, drying)? 2 - A Lot   3. Toileting, which includes using toilet, bedpan or urinal? 2 - A Lot   4. Putting on and taking off regular upper body clothing? 2 - A Lot   5. Taking care of personal grooming such as brushing teeth? 4 - None   6. Eating meals? 4 - None   Daily Activity Raw Score (Score out of 24.Lower scores equate to lower levels of function) 16   Total Evaluation Time   Total Evaluation Time (Minutes) 8     "

## 2019-09-04 NOTE — PROGRESS NOTES
Pt successfully completed PSV trial. Extubated to a 2 l NC, tolerating well. No stridor noted. Will continue to monitor.    Lesley Agarwal, RT

## 2019-09-05 ENCOUNTER — APPOINTMENT (OUTPATIENT)
Dept: OCCUPATIONAL THERAPY | Facility: CLINIC | Age: 74
DRG: 236 | End: 2019-09-05
Attending: THORACIC SURGERY (CARDIOTHORACIC VASCULAR SURGERY)
Payer: COMMERCIAL

## 2019-09-05 LAB
ANION GAP SERPL CALCULATED.3IONS-SCNC: 3 MMOL/L (ref 3–14)
BUN SERPL-MCNC: 24 MG/DL (ref 7–30)
CALCIUM SERPL-MCNC: 8.5 MG/DL (ref 8.5–10.1)
CHLORIDE SERPL-SCNC: 110 MMOL/L (ref 94–109)
CO2 SERPL-SCNC: 27 MMOL/L (ref 20–32)
CREAT SERPL-MCNC: 1.22 MG/DL (ref 0.66–1.25)
ERYTHROCYTE [DISTWIDTH] IN BLOOD BY AUTOMATED COUNT: 15.2 % (ref 10–15)
GFR SERPL CREATININE-BSD FRML MDRD: 58 ML/MIN/{1.73_M2}
GLUCOSE BLDC GLUCOMTR-MCNC: 103 MG/DL (ref 70–99)
GLUCOSE BLDC GLUCOMTR-MCNC: 78 MG/DL (ref 70–99)
GLUCOSE BLDC GLUCOMTR-MCNC: 85 MG/DL (ref 70–99)
GLUCOSE SERPL-MCNC: 114 MG/DL (ref 70–99)
HCT VFR BLD AUTO: 31.1 % (ref 40–53)
HGB BLD-MCNC: 10.2 G/DL (ref 13.3–17.7)
INTERPRETATION ECG - MUSE: NORMAL
INTERPRETATION ECG - MUSE: NORMAL
MCH RBC QN AUTO: 28.1 PG (ref 26.5–33)
MCHC RBC AUTO-ENTMCNC: 32.8 G/DL (ref 31.5–36.5)
MCV RBC AUTO: 86 FL (ref 78–100)
PLATELET # BLD AUTO: 107 10E9/L (ref 150–450)
POTASSIUM SERPL-SCNC: 4.4 MMOL/L (ref 3.4–5.3)
RBC # BLD AUTO: 3.63 10E12/L (ref 4.4–5.9)
SODIUM SERPL-SCNC: 140 MMOL/L (ref 133–144)
WBC # BLD AUTO: 8.6 10E9/L (ref 4–11)

## 2019-09-05 PROCEDURE — 25000128 H RX IP 250 OP 636: Performed by: PHYSICIAN ASSISTANT

## 2019-09-05 PROCEDURE — P9041 ALBUMIN (HUMAN),5%, 50ML: HCPCS | Performed by: PHYSICIAN ASSISTANT

## 2019-09-05 PROCEDURE — 97535 SELF CARE MNGMENT TRAINING: CPT | Mod: GO

## 2019-09-05 PROCEDURE — 85027 COMPLETE CBC AUTOMATED: CPT | Performed by: PHYSICIAN ASSISTANT

## 2019-09-05 PROCEDURE — 36415 COLL VENOUS BLD VENIPUNCTURE: CPT | Performed by: PHYSICIAN ASSISTANT

## 2019-09-05 PROCEDURE — 80048 BASIC METABOLIC PNL TOTAL CA: CPT | Performed by: PHYSICIAN ASSISTANT

## 2019-09-05 PROCEDURE — 12000000 ZZH R&B MED SURG/OB

## 2019-09-05 PROCEDURE — 94799 UNLISTED PULMONARY SVC/PX: CPT

## 2019-09-05 PROCEDURE — 40000275 ZZH STATISTIC RCP TIME EA 10 MIN

## 2019-09-05 PROCEDURE — 25000132 ZZH RX MED GY IP 250 OP 250 PS 637: Performed by: PHYSICIAN ASSISTANT

## 2019-09-05 PROCEDURE — 00000146 ZZHCL STATISTIC GLUCOSE BY METER IP

## 2019-09-05 PROCEDURE — 25800030 ZZH RX IP 258 OP 636: Performed by: PHYSICIAN ASSISTANT

## 2019-09-05 PROCEDURE — 97110 THERAPEUTIC EXERCISES: CPT | Mod: GO

## 2019-09-05 RX ORDER — ALBUMIN, HUMAN INJ 5% 5 %
250 SOLUTION INTRAVENOUS ONCE
Status: COMPLETED | OUTPATIENT
Start: 2019-09-05 | End: 2019-09-05

## 2019-09-05 RX ADMIN — HEPARIN SODIUM 5000 UNITS: 5000 INJECTION, SOLUTION INTRAVENOUS; SUBCUTANEOUS at 23:28

## 2019-09-05 RX ADMIN — ACETAMINOPHEN 975 MG: 325 TABLET ORAL at 08:06

## 2019-09-05 RX ADMIN — HEPARIN SODIUM 5000 UNITS: 5000 INJECTION, SOLUTION INTRAVENOUS; SUBCUTANEOUS at 12:17

## 2019-09-05 RX ADMIN — ACETAMINOPHEN 975 MG: 325 TABLET ORAL at 15:13

## 2019-09-05 RX ADMIN — SENNOSIDES AND DOCUSATE SODIUM 2 TABLET: 8.6; 5 TABLET ORAL at 23:25

## 2019-09-05 RX ADMIN — OXYCODONE HYDROCHLORIDE 10 MG: 5 TABLET ORAL at 06:37

## 2019-09-05 RX ADMIN — SENNOSIDES AND DOCUSATE SODIUM 2 TABLET: 8.6; 5 TABLET ORAL at 08:08

## 2019-09-05 RX ADMIN — GABAPENTIN 100 MG: 100 CAPSULE ORAL at 15:13

## 2019-09-05 RX ADMIN — PANTOPRAZOLE SODIUM 40 MG: 40 TABLET, DELAYED RELEASE ORAL at 06:32

## 2019-09-05 RX ADMIN — ASPIRIN 325 MG: 325 TABLET, DELAYED RELEASE ORAL at 08:08

## 2019-09-05 RX ADMIN — METOPROLOL TARTRATE 12.5 MG: 25 TABLET, FILM COATED ORAL at 08:08

## 2019-09-05 RX ADMIN — EZETIMIBE 10 MG: 10 TABLET ORAL at 08:08

## 2019-09-05 RX ADMIN — GABAPENTIN 100 MG: 100 CAPSULE ORAL at 23:25

## 2019-09-05 RX ADMIN — METOPROLOL TARTRATE 12.5 MG: 25 TABLET, FILM COATED ORAL at 23:24

## 2019-09-05 RX ADMIN — SALMETEROL XINAFOATE 1 PUFF: 50 POWDER, METERED ORAL; RESPIRATORY (INHALATION) at 23:29

## 2019-09-05 RX ADMIN — PAROXETINE HYDROCHLORIDE 60 MG: 20 TABLET, FILM COATED ORAL at 08:07

## 2019-09-05 RX ADMIN — LIDOCAINE 2 PATCH: 560 PATCH PERCUTANEOUS; TOPICAL; TRANSDERMAL at 08:08

## 2019-09-05 RX ADMIN — HEPARIN SODIUM 5000 UNITS: 5000 INJECTION, SOLUTION INTRAVENOUS; SUBCUTANEOUS at 04:14

## 2019-09-05 RX ADMIN — UMECLIDINIUM 1 PUFF: 62.5 AEROSOL, POWDER ORAL at 09:38

## 2019-09-05 RX ADMIN — ACETAMINOPHEN 975 MG: 325 TABLET ORAL at 23:26

## 2019-09-05 RX ADMIN — ALBUMIN HUMAN 250 ML: 0.05 INJECTION, SOLUTION INTRAVENOUS at 16:48

## 2019-09-05 RX ADMIN — SODIUM CHLORIDE: 9 INJECTION, SOLUTION INTRAVENOUS at 16:40

## 2019-09-05 RX ADMIN — ALBUMIN HUMAN 250 ML: 0.05 INJECTION, SOLUTION INTRAVENOUS at 09:38

## 2019-09-05 RX ADMIN — SALMETEROL XINAFOATE 1 PUFF: 50 POWDER, METERED ORAL; RESPIRATORY (INHALATION) at 09:38

## 2019-09-05 RX ADMIN — ATORVASTATIN CALCIUM 40 MG: 40 TABLET, FILM COATED ORAL at 23:25

## 2019-09-05 RX ADMIN — OXYCODONE HYDROCHLORIDE 10 MG: 5 TABLET ORAL at 02:11

## 2019-09-05 RX ADMIN — GABAPENTIN 100 MG: 100 CAPSULE ORAL at 08:08

## 2019-09-05 ASSESSMENT — ACTIVITIES OF DAILY LIVING (ADL)
ADLS_ACUITY_SCORE: 13
ADLS_ACUITY_SCORE: 14
ADLS_ACUITY_SCORE: 15
ADLS_ACUITY_SCORE: 13
ADLS_ACUITY_SCORE: 14
ADLS_ACUITY_SCORE: 14

## 2019-09-05 ASSESSMENT — MIFFLIN-ST. JEOR: SCORE: 1405.5

## 2019-09-05 NOTE — PLAN OF CARE
Discharge Planner OT   Patient plan for discharge: TCU at [facility] followed by OPCR at [facility]  Current status: pt seen for PM cardiac rehab session, chest tubes out. Performed supine<>sit following cues, sit-stand CGA again needing cueing to maintain sternal precautions. Tolerated amb x 9min using FWW, CGA provided for safety only but no LOB. Improved activity tolerance this PM, denied symptoms except mild fatigue. Maintained stable cardiac response on 2L.   Barriers to return to prior living situation: lives alone, does not support available for household tasks  Recommendations for discharge: TCU followed by OPCR as noted above  Rationale for recommendations: pt will benefit from short TCU stay w/therapies to advance indep and safety with IADLs and mobilities prior to return home alone. Will then need monitored progressive exercise program in OPCR setting to maximize recovery and cardiac health. Pt prefers [place] location for OPCR.        Entered by: Chang uKmar 09/05/2019 4:23 PM

## 2019-09-05 NOTE — CONSULTS
NUTRITION ASSESSMENT      REASON FOR ASSESSMENT:  Cardiac Surgery Nutrition Consult    CURRENT DIET / INTAKE:  Diet Regular  Pt ordered cream of mushroom soup, chocolate ice cream, and chocolate pudding for lunch and ate part of everything.  He took 915 mL oral yesterday.  He would be receptive to a chocolate flavored protein supplement routinely in the pm.    He lives alone and does his own shopping and cooking.    He likes meats such as hamburger, pork chops, and steak.  He typically adds salt to foods at the table.  No issues with appetite or intake PTA.    ANTHROPOMETRICS:   Ht:  5 ft 8 in  Admit Wt:  65.8 kg  Current Wt:  68.2 kg (up 2.8 kg since admit)  BMI:  22.05  IBW:  70 kg  Weight Status: Normal BMI  %IBW:  94%    MALNUTRITION:  Patient does not meet two of the following criteria necessary for diagnosing malnutrition:  significant weight loss, reduced intake, subcutaneous fat loss, muscle loss or fluid retention. Nutrition Focused Physical Assessment (NFPA) not appropriate at this time.    NUTRITION DIAGNOSIS:   Inadequate oral intake R/t decreased appetite and early satiety s/p heart surgery AEB oral intake fair at best.    INTERVENTIONS:    Nutrition Prescription:  Add chocolate Plus2 Shake once daily at 2 pm    Implementation:  Nutrition Education (Content):  Discussed the importance of adequate nutrition post-op for healing and energy, emphasizing protein foods, and encouraged patient to order a protein food at each meal.    Goals:  Patient to consume ~75% at meals and supplement in the next 3 - 5 days    Follow Up/Monitoring (InPatient):  Food and Fluid intake -  Monitor for adequacy    Follow Up/Monitoring (OutPatient):  Patient will participate in out-patient cardiac rehab and attend nutrition classes during the program    Keeley Wynne, RD, LD, CNSC

## 2019-09-05 NOTE — PROGRESS NOTES
[facility]  Cardiovascular and Thoracic Surgery Daily Note          Assessment and Plan:   POD#2 s/p Coronary artery bypass grafting x4, LIMA-LAD, SVG-OM, SVG-PDA, SVG-JORDON; endoscopic harvest right greater saphenous vein by Dr. Lai Mchugh  -CVS: HR: 60s. SBP: 100s-110s. Pre op EF: 45-50%. ASA, BB, statin, zetia. Chest tubes & wires removed, CXR tomorrow, albumin this am for low UO   -Resp: Extubated within 24 hours per protocol. Saturating well on 2L,wean as able. Continue IS, cough, deep breathing, ambulation.  -Neuro:  Grossly intact. Pain controlled.   -Renal: good UO, up about 3kg from preoperative weight. Will hold diuretics today-  re-eval tomorrow  -GI:  Tolerating clears. No BM. Continue bowel regimen  -: Remove Mo today  -Endo: pre op A1c: 5.7. Minimal insulin gtt requirements. Transition to sliding scale insulin.   -FEN: replete lytes as needed. Na: 143. K: 4.2  Orders Placed This Encounter      Regular Diet Adult  -ID: WBC: 8.6, Temp (24hrs), Av.3  F (36.8  C), Min:96.1  F (35.6  C), Max:99.5  F (37.5  C), Completed perioperative abx.   -Heme: Hgb: 10.2 plt: 107. Acute blood loss anemia and thrombocytopenia.    -Proph: PCD, ASA, BB, statin, PPI, sub q heparin  -Dispo: [facility]. Continue therapies. Continue to encourage IS, cough, deep breathing, ambulation, home 2-3 days          Interval History:   Sitting up in bed, denies pain, tolerating diet, breathing fine         Medications:       acetaminophen  975 mg Oral Q8H     aspirin  325 mg Oral Daily     atorvastatin  40 mg Oral QPM     ezetimibe  10 mg Oral Daily     gabapentin  100 mg Oral TID     heparin  5,000 Units Subcutaneous Q8H     insulin aspart  1-7 Units Subcutaneous TID AC     insulin aspart  1-5 Units Subcutaneous At Bedtime     lidocaine  2 patch Transdermal Q24H     lidocaine   Transdermal Q8H     lidocaine   Transdermal Q24h     metoprolol tartrate  12.5 mg Oral BID     pantoprazole  40 mg Oral QAM AC      "PARoxetine  60 mg Oral QAM     salmeterol  1 puff Inhalation BID     senna-docusate  1 tablet Oral BID    Or     senna-docusate  2 tablet Oral BID     sodium chloride (PF)  3 mL Intracatheter Q8H     umeclidinium  1 puff Inhalation Daily     @MEDSPRN-@          Physical Exam:   Vitals were reviewed  Blood pressure 107/64, pulse 67, temperature 97.8  F (36.6  C), temperature source Oral, resp. rate 16, height 1.727 m (5' 8\"), weight 68.6 kg (151 lb 3.8 oz), SpO2 95 %.  Rhythm: NSR    Lungs: diminished bases    Cardiovascular: s1/s2, no m/r/g    Abdomen: s/nt/jillian    Extremeties: no LE edema    Incision: cdi    CT: na    Weight:   Vitals:    09/03/19 0603 09/04/19 0400 09/04/19 1742 09/05/19 0600   Weight: 65.8 kg (145 lb) 68.7 kg (151 lb 7.3 oz) 68.6 kg (151 lb 4.8 oz) 68.6 kg (151 lb 3.8 oz)            Data:   Labs:   Lab Results   Component Value Date    WBC 8.6 09/05/2019     Lab Results   Component Value Date    RBC 3.63 09/05/2019     Lab Results   Component Value Date    HGB 10.2 09/05/2019     Lab Results   Component Value Date    HCT 31.1 09/05/2019     No components found for: MCT  Lab Results   Component Value Date    MCV 86 09/05/2019     Lab Results   Component Value Date    MCH 28.1 09/05/2019     Lab Results   Component Value Date    MCHC 32.8 09/05/2019     Lab Results   Component Value Date    RDW 15.2 09/05/2019     Lab Results   Component Value Date     09/05/2019       Last Basic Metabolic Panel:  Lab Results   Component Value Date     09/05/2019      Lab Results   Component Value Date    POTASSIUM 4.4 09/05/2019     Lab Results   Component Value Date    CHLORIDE 110 09/05/2019     Lab Results   Component Value Date    KARL 8.5 09/05/2019     Lab Results   Component Value Date    CO2 27 09/05/2019     Lab Results   Component Value Date    BUN 24 09/05/2019     Lab Results   Component Value Date    CR 1.22 09/05/2019     Lab Results   Component Value Date     09/05/2019       Ivory " KIRILL Pradhan  Pager #: [phone]

## 2019-09-05 NOTE — PLAN OF CARE
Pt. A & O x 4.  VSS except on 2L O2.  Incisions INOCENTE, Right LE reddened area around incision, marked, no change.  LS diminished.  Tele=SR.  Mo patent, decreased urine output; albumin x 2 & IV fluids started.  Pain managed w/ scheduled tylenol.  CT's pulled today.  Regular diet; BS+, flatus+, no BM yet. Ambulated in seals w/ gait belt & walker; assist x 1.

## 2019-09-05 NOTE — PLAN OF CARE
A+Ox4. VSS on 2L O2. Bowel sounds active, passing gas. Advanced to regular diet, taking it slow. Needs help to order meals.  Incision sites clean, dry and intact. R ankle incision redness, marked with permanent marker, no heat or swelling around site. Urine output low, albumin given, improved some over shift. Lidocaine patches in place on either side of incision. Up with 1 and walker.

## 2019-09-05 NOTE — PLAN OF CARE
A&O. VSS on room air while awake, 1-2L NC while sleeping. Lung sounds coarse but improving with strong encouragement to participate in pulmonary toileting. Tele: SR. Low urinary output. Incisions and CT WNL. Pain controlled with tylenol and oxycodone.

## 2019-09-06 ENCOUNTER — APPOINTMENT (OUTPATIENT)
Dept: OCCUPATIONAL THERAPY | Facility: CLINIC | Age: 74
DRG: 236 | End: 2019-09-06
Attending: THORACIC SURGERY (CARDIOTHORACIC VASCULAR SURGERY)
Payer: COMMERCIAL

## 2019-09-06 ENCOUNTER — APPOINTMENT (OUTPATIENT)
Dept: GENERAL RADIOLOGY | Facility: CLINIC | Age: 74
DRG: 236 | End: 2019-09-06
Attending: PHYSICIAN ASSISTANT
Payer: COMMERCIAL

## 2019-09-06 LAB
ANION GAP SERPL CALCULATED.3IONS-SCNC: 3 MMOL/L (ref 3–14)
BUN SERPL-MCNC: 20 MG/DL (ref 7–30)
CALCIUM SERPL-MCNC: 8.4 MG/DL (ref 8.5–10.1)
CHLORIDE SERPL-SCNC: 108 MMOL/L (ref 94–109)
CO2 SERPL-SCNC: 28 MMOL/L (ref 20–32)
CREAT SERPL-MCNC: 0.92 MG/DL (ref 0.66–1.25)
ERYTHROCYTE [DISTWIDTH] IN BLOOD BY AUTOMATED COUNT: 14.9 % (ref 10–15)
GFR SERPL CREATININE-BSD FRML MDRD: 82 ML/MIN/{1.73_M2}
GLUCOSE BLDC GLUCOMTR-MCNC: 85 MG/DL (ref 70–99)
GLUCOSE BLDC GLUCOMTR-MCNC: 86 MG/DL (ref 70–99)
GLUCOSE BLDC GLUCOMTR-MCNC: 93 MG/DL (ref 70–99)
GLUCOSE BLDC GLUCOMTR-MCNC: 98 MG/DL (ref 70–99)
GLUCOSE SERPL-MCNC: 104 MG/DL (ref 70–99)
HCT VFR BLD AUTO: 30 % (ref 40–53)
HGB BLD-MCNC: 10 G/DL (ref 13.3–17.7)
MCH RBC QN AUTO: 28.4 PG (ref 26.5–33)
MCHC RBC AUTO-ENTMCNC: 33.3 G/DL (ref 31.5–36.5)
MCV RBC AUTO: 85 FL (ref 78–100)
PLATELET # BLD AUTO: 105 10E9/L (ref 150–450)
POTASSIUM SERPL-SCNC: 4 MMOL/L (ref 3.4–5.3)
RBC # BLD AUTO: 3.52 10E12/L (ref 4.4–5.9)
SODIUM SERPL-SCNC: 139 MMOL/L (ref 133–144)
WBC # BLD AUTO: 6.3 10E9/L (ref 4–11)

## 2019-09-06 PROCEDURE — 25000132 ZZH RX MED GY IP 250 OP 250 PS 637: Performed by: PHYSICIAN ASSISTANT

## 2019-09-06 PROCEDURE — 36415 COLL VENOUS BLD VENIPUNCTURE: CPT | Performed by: PHYSICIAN ASSISTANT

## 2019-09-06 PROCEDURE — 97110 THERAPEUTIC EXERCISES: CPT | Mod: GO

## 2019-09-06 PROCEDURE — 80048 BASIC METABOLIC PNL TOTAL CA: CPT | Performed by: PHYSICIAN ASSISTANT

## 2019-09-06 PROCEDURE — 00000146 ZZHCL STATISTIC GLUCOSE BY METER IP

## 2019-09-06 PROCEDURE — 71046 X-RAY EXAM CHEST 2 VIEWS: CPT

## 2019-09-06 PROCEDURE — 85027 COMPLETE CBC AUTOMATED: CPT | Performed by: PHYSICIAN ASSISTANT

## 2019-09-06 PROCEDURE — 25000128 H RX IP 250 OP 636: Performed by: PHYSICIAN ASSISTANT

## 2019-09-06 PROCEDURE — 12000000 ZZH R&B MED SURG/OB

## 2019-09-06 PROCEDURE — 97535 SELF CARE MNGMENT TRAINING: CPT | Mod: GO

## 2019-09-06 RX ORDER — MAGNESIUM CARB/ALUMINUM HYDROX 105-160MG
148 TABLET,CHEWABLE ORAL ONCE
Status: DISCONTINUED | OUTPATIENT
Start: 2019-09-06 | End: 2019-09-09 | Stop reason: HOSPADM

## 2019-09-06 RX ORDER — FUROSEMIDE 10 MG/ML
20 INJECTION INTRAMUSCULAR; INTRAVENOUS ONCE
Status: COMPLETED | OUTPATIENT
Start: 2019-09-06 | End: 2019-09-06

## 2019-09-06 RX ADMIN — EZETIMIBE 10 MG: 10 TABLET ORAL at 10:02

## 2019-09-06 RX ADMIN — ASPIRIN 325 MG: 325 TABLET, DELAYED RELEASE ORAL at 10:02

## 2019-09-06 RX ADMIN — METOPROLOL TARTRATE 12.5 MG: 25 TABLET, FILM COATED ORAL at 21:09

## 2019-09-06 RX ADMIN — HEPARIN SODIUM 5000 UNITS: 5000 INJECTION, SOLUTION INTRAVENOUS; SUBCUTANEOUS at 07:47

## 2019-09-06 RX ADMIN — GABAPENTIN 100 MG: 100 CAPSULE ORAL at 10:02

## 2019-09-06 RX ADMIN — ACETAMINOPHEN 975 MG: 325 TABLET ORAL at 07:47

## 2019-09-06 RX ADMIN — PAROXETINE HYDROCHLORIDE 60 MG: 20 TABLET, FILM COATED ORAL at 10:02

## 2019-09-06 RX ADMIN — METOPROLOL TARTRATE 12.5 MG: 25 TABLET, FILM COATED ORAL at 10:02

## 2019-09-06 RX ADMIN — OXYCODONE HYDROCHLORIDE 10 MG: 5 TABLET ORAL at 07:47

## 2019-09-06 RX ADMIN — FUROSEMIDE 20 MG: 10 INJECTION, SOLUTION INTRAVENOUS at 11:51

## 2019-09-06 RX ADMIN — PANTOPRAZOLE SODIUM 40 MG: 40 TABLET, DELAYED RELEASE ORAL at 07:47

## 2019-09-06 RX ADMIN — SENNOSIDES AND DOCUSATE SODIUM 2 TABLET: 8.6; 5 TABLET ORAL at 10:02

## 2019-09-06 RX ADMIN — ATORVASTATIN CALCIUM 40 MG: 40 TABLET, FILM COATED ORAL at 21:09

## 2019-09-06 RX ADMIN — SALMETEROL XINAFOATE 1 PUFF: 50 POWDER, METERED ORAL; RESPIRATORY (INHALATION) at 21:09

## 2019-09-06 ASSESSMENT — ACTIVITIES OF DAILY LIVING (ADL)
ADLS_ACUITY_SCORE: 13

## 2019-09-06 ASSESSMENT — MIFFLIN-ST. JEOR: SCORE: 1417.5

## 2019-09-06 NOTE — CONSULTS
Care Transition Initial Assessment - SW     Met with: Patient    Active Problems:    S/P CABG x 4       DATA  Lives With: alone   Living Arrangements: house  Identified issues/concerns regarding health management: Need for TCU at discharge.  Resources List: Skilled Nursing Facility  Transportation Anticipated: car, drives self    ASSESSMENT  Cognitive Status:  awake, alert and oriented  Concerns to be addressed: TCU needed at discharge  SW consulted to assist with discharge planning, emotional support and transportation arrangements.  Pt is a 73 yr old male who admitted on 9/3/19 for CABGx4. Pt previously lived at home alone in a single family home.   PLAN  Financial costs for the patient includes TBD  Patient given options and choices for discharge: SNF list  Patient/family is agreeable to the plan?  Yes: Prefers [facility] TCU  Transportation/person available to transport on day of discharge  is TBD   Patient Goals and Preferences: TCU .  Patient anticipates discharging to:  TCU .  TCU referrals sent to [facility], followed up by  message for admissions inquiring about bed avail over the weekend. Return call pending.  1407: SW called [facility] again and informed there are no TCU beds avail until Tues next week. SW to get additional TCU choices from patient.  1441: SW informed patient of no beds avail at [facility]. Discussed other TCU options around the area of [place]. Pt agreed for this writer to send referrals out to surrounding communities. SW to inform patient of open beds.   Patient indicates he would prefer family to transport at discharge.   SW to continue to follow and assist with discharge planning.

## 2019-09-06 NOTE — PLAN OF CARE
Dx: Severe multivessel coronary artery disease, Hypertension, Tobacco abuse, Renal artery stenosis, Hyperlipidemia, Chronic obstructive pulmonary disease. Surg: CABGx4. VSS on RA. Tele: NSR. A/Ox4. Neuro's intact. Incisions approximated, sternal cares done. CMS intact. Pain controlled with prn Oxy. Up with SBA and walker. Tolerating regular diet. Denies N&V. +gas, +bm. Voiding to ta. LS clear. Will continue to monitor.

## 2019-09-06 NOTE — PLAN OF CARE
A&O. VSS on room air while awake, 1-2L while sleeping. Lung sounds clear.  Incisions WNL with mild bruising (reddened area resolved? Maybe temporarily reddened from crossing ankles?) Tele: SR. Pain controlled with tylenol. Good urinary output to ta, fluids running TKO.

## 2019-09-06 NOTE — PROGRESS NOTES
[facility]  Cardiovascular and Thoracic Surgery Daily Note          Assessment and Plan:   POD# 3 s/p Coronary artery bypass grafting x4, LIMA-LAD, SVG-OM, SVG-PDA, SVG-JORDON; endoscopic harvest right greater saphenous vein by Dr. Lai Mchugh  -CVS: HR: 70s. SBP: 120s. Pre op EF: 45-50%, ASA, BB, statin, zetia. CXR stable post operative changes.   -Resp: Extubated within protocol. Saturating well on 1.5L. Wean as able. Continue to encourage IS, cough, deep breathing, ambulation.   -Neuro:  Grossly intact. Pain controlled.   -Renal: good UOP. Up about 4kg from preoperative weight. Cr: 0.92. Will give one dose IV lasix and continue to monitor for diuretic need.  -GI:  Tolerating diet. No BM. Continue bowel regimen.  -:  Remove ta today.  -Endo: pre op A1c: 5.7. Completed insulin gtt per protocol. Continue sliding scale insulin.   -FEN: replace electrolytes as needed. Na: 139. K: 4.0.   Orders Placed This Encounter      Regular Diet Adult    -ID: Temp (24hrs), Av.2  F (36.8  C), Min:97.8  F (36.6  C), Max:99.1  F (37.3  C)  WBC: 6.3. Completed perioperative abx.   -Heme: Hgb: 10.0. Plt: 105. Acute blood loss anemia and thrombocytopenia related to surgery.   -Proph: PCD, ASA, BB, statin, PPI, sub q heparin  -Dispo: [facility] Continue therapies. Shower today. Continue to encourage IS, cough, deep breathing, ambulation. Wean oxygen. Plan for discharge to TCU in 1-2 days. Social work consulted.          Interval History:   No acute events overnight. Saturating well on 1.5L. Pain controlled. Tolerating diet. No BM.          Medications:       acetaminophen  975 mg Oral Q8H     aspirin  325 mg Oral Daily     atorvastatin  40 mg Oral QPM     ezetimibe  10 mg Oral Daily     gabapentin  100 mg Oral TID     heparin  5,000 Units Subcutaneous Q8H     insulin aspart  1-7 Units Subcutaneous TID AC     insulin aspart  1-5 Units Subcutaneous At Bedtime     lidocaine  2 patch Transdermal Q24H     lidocaine    "Transdermal Q8H     lidocaine   Transdermal Q24h     magnesium citrate  148 mL Oral Once     metoprolol tartrate  12.5 mg Oral BID     pantoprazole  40 mg Oral QAM AC     PARoxetine  60 mg Oral QAM     salmeterol  1 puff Inhalation BID     senna-docusate  1 tablet Oral BID    Or     senna-docusate  2 tablet Oral BID     sodium chloride (PF)  3 mL Intracatheter Q8H     umeclidinium  1 puff Inhalation Daily     acetaminophen, glucose **OR** dextrose **OR** glucagon, hydrALAZINE, HYDROmorphone, hydrOXYzine, lidocaine 4%, lidocaine (buffered or not buffered), magnesium sulfate, magnesium sulfate, melatonin, methocarbamol, naloxone, nicotine polacrilex, ondansetron **OR** ondansetron, oxyCODONE IR, potassium chloride, potassium chloride with lidocaine, potassium chloride, potassium chloride, potassium chloride, potassium phosphate (KPHOS) in D5W IV, potassium phosphate (KPHOS) in D5W IV, potassium phosphate (KPHOS) in D5W IV, potassium phosphate (KPHOS) in D5W IV, sodium chloride (PF)          Physical Exam:   Vitals were reviewed  Blood pressure (!) 144/99, pulse 75, temperature 98.1  F (36.7  C), temperature source Oral, resp. rate 16, height 1.727 m (5' 8\"), weight 69.8 kg (153 lb 14.1 oz), SpO2 93 %.  Rhythm: NSR    Lungs: diminished bases    Cardiovascular: RRR normal s1 and s2    Abdomen: soft NTND    Extremeties: minimal edema    Incision: CDI    CT: n/a    Weight:   Vitals:    09/03/19 0603 09/04/19 0400 09/04/19 1742 09/05/19 0600   Weight: 65.8 kg (145 lb) 68.7 kg (151 lb 7.3 oz) 68.6 kg (151 lb 4.8 oz) 68.6 kg (151 lb 3.8 oz)    09/06/19 0653   Weight: 69.8 kg (153 lb 14.1 oz)            Data:   Labs:   Lab Results   Component Value Date    WBC 6.3 09/06/2019     Lab Results   Component Value Date    RBC 3.52 09/06/2019     Lab Results   Component Value Date    HGB 10.0 09/06/2019     Lab Results   Component Value Date    HCT 30.0 09/06/2019     No components found for: MCT  Lab Results   Component Value Date "    MCV 85 09/06/2019     Lab Results   Component Value Date    MCH 28.4 09/06/2019     Lab Results   Component Value Date    MCHC 33.3 09/06/2019     Lab Results   Component Value Date    RDW 14.9 09/06/2019     Lab Results   Component Value Date     09/06/2019       Last Basic Metabolic Panel:  Lab Results   Component Value Date     09/06/2019      Lab Results   Component Value Date    POTASSIUM 4.0 09/06/2019     Lab Results   Component Value Date    CHLORIDE 108 09/06/2019     Lab Results   Component Value Date    KARL 8.4 09/06/2019     Lab Results   Component Value Date    CO2 28 09/06/2019     Lab Results   Component Value Date    BUN 20 09/06/2019     Lab Results   Component Value Date    CR 0.92 09/06/2019     Lab Results   Component Value Date     09/06/2019       CXR: final read pending  Clinical read: stable post operative changes.     Sully Orellana PA-C  CV Surgery  Pager # [phone]

## 2019-09-06 NOTE — PLAN OF CARE
"Discharge Planner OT   Patient plan for discharge: TCU  Current status: pt seen [facility] today for cardiac rehab.  Did better in AM as in PM he was much more fatigued. He reported that he slept poorly last night and was awake from 1-6AM then had \"all the action start\" in morning followed by therapy then visitors and hadn't rested all day. He stated that he loved his siblings but wished they would have stayed at home. OT encouraged him to let nursing know when he didn't want visitors and they could follow through with signage, etc.. In AM, pt tolerated amb x 9min CGA, denied symptoms and VSS on RA. In PM, pt was on 1.5L 02 in bed and in low 90's, removed NC for ambulation activity and he tolerated 6min amb, cited fatigue throughout, moved more slowly and requested to return to bed. Difficulty getting reading for 02 trying multiple fingers and ranged from 85%-100%. Reapplied NC at 1.5L and informed nursing who agreed to monitor. /83, HR 74. Min A bed mobility.   Barriers to return to prior living situation: lives alone, current level of A  Recommendations for discharge: TCU  Rationale for recommendations: pt w3ill benefit from daily therapies in TCU setting to advance indep and safety with ADLs and mobilities prior to return home and initiation of OPCR program. Pt interested in [facility] in [place].        Entered by: Chang Kumar 09/06/2019 3:45 PM      "

## 2019-09-07 ENCOUNTER — APPOINTMENT (OUTPATIENT)
Dept: OCCUPATIONAL THERAPY | Facility: CLINIC | Age: 74
DRG: 236 | End: 2019-09-07
Attending: THORACIC SURGERY (CARDIOTHORACIC VASCULAR SURGERY)
Payer: COMMERCIAL

## 2019-09-07 LAB
ANION GAP SERPL CALCULATED.3IONS-SCNC: 7 MMOL/L (ref 3–14)
BUN SERPL-MCNC: 15 MG/DL (ref 7–30)
CALCIUM SERPL-MCNC: 8.7 MG/DL (ref 8.5–10.1)
CHLORIDE SERPL-SCNC: 105 MMOL/L (ref 94–109)
CO2 SERPL-SCNC: 27 MMOL/L (ref 20–32)
CREAT SERPL-MCNC: 0.81 MG/DL (ref 0.66–1.25)
GFR SERPL CREATININE-BSD FRML MDRD: 88 ML/MIN/{1.73_M2}
GLUCOSE BLDC GLUCOMTR-MCNC: 101 MG/DL (ref 70–99)
GLUCOSE BLDC GLUCOMTR-MCNC: 105 MG/DL (ref 70–99)
GLUCOSE BLDC GLUCOMTR-MCNC: 127 MG/DL (ref 70–99)
GLUCOSE BLDC GLUCOMTR-MCNC: 93 MG/DL (ref 70–99)
GLUCOSE BLDC GLUCOMTR-MCNC: 94 MG/DL (ref 70–99)
GLUCOSE SERPL-MCNC: 110 MG/DL (ref 70–99)
POTASSIUM SERPL-SCNC: 3.3 MMOL/L (ref 3.4–5.3)
SODIUM SERPL-SCNC: 139 MMOL/L (ref 133–144)

## 2019-09-07 PROCEDURE — 93010 ELECTROCARDIOGRAM REPORT: CPT | Performed by: INTERNAL MEDICINE

## 2019-09-07 PROCEDURE — 97530 THERAPEUTIC ACTIVITIES: CPT | Mod: GO | Performed by: OCCUPATIONAL THERAPIST

## 2019-09-07 PROCEDURE — 25000128 H RX IP 250 OP 636: Performed by: PHYSICIAN ASSISTANT

## 2019-09-07 PROCEDURE — 80048 BASIC METABOLIC PNL TOTAL CA: CPT | Performed by: PHYSICIAN ASSISTANT

## 2019-09-07 PROCEDURE — 12000000 ZZH R&B MED SURG/OB

## 2019-09-07 PROCEDURE — 93005 ELECTROCARDIOGRAM TRACING: CPT

## 2019-09-07 PROCEDURE — 97535 SELF CARE MNGMENT TRAINING: CPT | Mod: GO | Performed by: OCCUPATIONAL THERAPIST

## 2019-09-07 PROCEDURE — 25000132 ZZH RX MED GY IP 250 OP 250 PS 637: Performed by: PHYSICIAN ASSISTANT

## 2019-09-07 PROCEDURE — 00000146 ZZHCL STATISTIC GLUCOSE BY METER IP

## 2019-09-07 PROCEDURE — 36415 COLL VENOUS BLD VENIPUNCTURE: CPT | Performed by: PHYSICIAN ASSISTANT

## 2019-09-07 PROCEDURE — 25800030 ZZH RX IP 258 OP 636: Performed by: PHYSICIAN ASSISTANT

## 2019-09-07 RX ORDER — FUROSEMIDE 10 MG/ML
40 INJECTION INTRAMUSCULAR; INTRAVENOUS DAILY
Status: DISCONTINUED | OUTPATIENT
Start: 2019-09-07 | End: 2019-09-09

## 2019-09-07 RX ORDER — LORAZEPAM 2 MG/ML
1 INJECTION INTRAMUSCULAR EVERY 6 HOURS PRN
Status: DISCONTINUED | OUTPATIENT
Start: 2019-09-07 | End: 2019-09-09 | Stop reason: HOSPADM

## 2019-09-07 RX ADMIN — METHOCARBAMOL 500 MG: 500 TABLET, FILM COATED ORAL at 17:38

## 2019-09-07 RX ADMIN — HEPARIN SODIUM 5000 UNITS: 5000 INJECTION, SOLUTION INTRAVENOUS; SUBCUTANEOUS at 00:51

## 2019-09-07 RX ADMIN — POTASSIUM CHLORIDE 20 MEQ: 1500 TABLET, EXTENDED RELEASE ORAL at 17:38

## 2019-09-07 RX ADMIN — LORAZEPAM 1 MG: 2 INJECTION INTRAMUSCULAR; INTRAVENOUS at 22:14

## 2019-09-07 RX ADMIN — SALMETEROL XINAFOATE 1 PUFF: 50 POWDER, METERED ORAL; RESPIRATORY (INHALATION) at 08:42

## 2019-09-07 RX ADMIN — PANTOPRAZOLE SODIUM 40 MG: 40 TABLET, DELAYED RELEASE ORAL at 06:23

## 2019-09-07 RX ADMIN — EZETIMIBE 10 MG: 10 TABLET ORAL at 08:41

## 2019-09-07 RX ADMIN — ATORVASTATIN CALCIUM 40 MG: 40 TABLET, FILM COATED ORAL at 21:03

## 2019-09-07 RX ADMIN — ASPIRIN 325 MG: 325 TABLET, DELAYED RELEASE ORAL at 08:41

## 2019-09-07 RX ADMIN — HYDRALAZINE HYDROCHLORIDE 10 MG: 20 INJECTION INTRAMUSCULAR; INTRAVENOUS at 05:30

## 2019-09-07 RX ADMIN — METOPROLOL TARTRATE 12.5 MG: 25 TABLET, FILM COATED ORAL at 08:41

## 2019-09-07 RX ADMIN — HEPARIN SODIUM 5000 UNITS: 5000 INJECTION, SOLUTION INTRAVENOUS; SUBCUTANEOUS at 17:38

## 2019-09-07 RX ADMIN — UMECLIDINIUM 1 PUFF: 62.5 AEROSOL, POWDER ORAL at 08:43

## 2019-09-07 RX ADMIN — SODIUM CHLORIDE: 9 INJECTION, SOLUTION INTRAVENOUS at 00:52

## 2019-09-07 RX ADMIN — LORAZEPAM 1 MG: 2 INJECTION INTRAMUSCULAR; INTRAVENOUS at 08:40

## 2019-09-07 RX ADMIN — POTASSIUM CHLORIDE 40 MEQ: 1.5 POWDER, FOR SOLUTION ORAL at 14:40

## 2019-09-07 RX ADMIN — HYDROXYZINE HYDROCHLORIDE 10 MG: 10 TABLET ORAL at 02:40

## 2019-09-07 RX ADMIN — FUROSEMIDE 40 MG: 10 INJECTION, SOLUTION INTRAVENOUS at 09:02

## 2019-09-07 RX ADMIN — PAROXETINE HYDROCHLORIDE 60 MG: 20 TABLET, FILM COATED ORAL at 08:41

## 2019-09-07 RX ADMIN — METHOCARBAMOL 500 MG: 500 TABLET, FILM COATED ORAL at 06:23

## 2019-09-07 RX ADMIN — METOPROLOL TARTRATE 12.5 MG: 25 TABLET, FILM COATED ORAL at 21:03

## 2019-09-07 RX ADMIN — SALMETEROL XINAFOATE 1 PUFF: 50 POWDER, METERED ORAL; RESPIRATORY (INHALATION) at 21:07

## 2019-09-07 ASSESSMENT — ACTIVITIES OF DAILY LIVING (ADL)
ADLS_ACUITY_SCORE: 13

## 2019-09-07 ASSESSMENT — MIFFLIN-ST. JEOR: SCORE: 1404.5

## 2019-09-07 NOTE — PROGRESS NOTES
"[facility]  Cardiovascular and Thoracic Surgery Daily Note          Assessment and Plan:   POD# 4 s/p Coronary artery bypass grafting x4, LIMA-LAD, SVG-OM, SVG-PDA, SVG-JORDON; endoscopic harvest right greater saphenous vein by Dr. Lai Mchugh    -CVS: HR: 70s. SBP: 120s-130s. Pre op EF: 45-50%, ASA, BB, statin, zetia. CXR stable post operative changes.     -Resp: Extubated within protocol. Saturating well on 1L. Wean as able. Continue to encourage IS, cough, deep breathing, ambulation. PTA inhaler resumed    -Neuro:  Grossly intact. Pain controlled. Feeling anxious this am, withdrawing from smoking? Nicorette available, 1 dose 1 mg IVP ativan this am    -Renal: good UOP. Up about 3<4kg from preoperative weight. Cr: 0.92. Lasix daily    -GI:  Tolerating diet. + BM. Continue bowel regimen.    -:  Voiding on own    -Endo: pre op A1c: 5.7. Completed insulin gtt per protocol. Continue sliding scale insulin.     -FEN: replace electrolytes as needed. Na & K  Orders Placed This Encounter      Regular Diet Adult     -ID: Temp (24hrs), Av  F (36.7  C), Min:97.6  F (36.4  C), Max:98.3  F (36.8  C), Completed perioperative abx.     -Heme: Hgb & plt. Acute blood loss anemia and thrombocytopenia related to surgery.     -Proph: PCD, ASA, BB, statin, PPI, sub q heparin    -Dispo: [facility] Continue therapies. Shower today. Continue to encourage IS, cough, deep breathing, ambulation. Wean oxygen. Plan for discharge to TCU in 1-2 days. Social work consulted          Interval History:   Sitting up in bed, didn't sleep much, feeling anxious, \"could crawl out of skin\", inc RR, no pin point pain-generalized         Medications:       aspirin  325 mg Oral Daily     atorvastatin  40 mg Oral QPM     ezetimibe  10 mg Oral Daily     heparin  5,000 Units Subcutaneous Q8H     insulin aspart  1-7 Units Subcutaneous TID AC     insulin aspart  1-5 Units Subcutaneous At Bedtime     lidocaine  2 patch Transdermal Q24H     " "lidocaine   Transdermal Q8H     lidocaine   Transdermal Q24h     magnesium citrate  148 mL Oral Once     metoprolol tartrate  12.5 mg Oral BID     pantoprazole  40 mg Oral QAM AC     PARoxetine  60 mg Oral QAM     salmeterol  1 puff Inhalation BID     senna-docusate  1 tablet Oral BID    Or     senna-docusate  2 tablet Oral BID     sodium chloride (PF)  3 mL Intracatheter Q8H     umeclidinium  1 puff Inhalation Daily     @[email or web address]-@          Physical Exam:   Vitals were reviewed  Blood pressure (!) 138/92, pulse 90, temperature 98  F (36.7  C), temperature source Oral, resp. rate 16, height 1.727 m (5' 8\"), weight 68.5 kg (151 lb 0.2 oz), SpO2 93 %.  Rhythm: NSR    Lungs: diminished bases    Cardiovascular: s1/s2, no m/r/g    Abdomen: s/nt/dn    Extremeties: no LE edema    Incision: cdi    CT: na    Weight:   Vitals:    09/04/19 0400 09/04/19 1742 09/05/19 0600 09/06/19 0653   Weight: 68.7 kg (151 lb 7.3 oz) 68.6 kg (151 lb 4.8 oz) 68.6 kg (151 lb 3.8 oz) 69.8 kg (153 lb 14.1 oz)    09/07/19 0600   Weight: 68.5 kg (151 lb 0.2 oz)            Data:   Labs:   Lab Results   Component Value Date    WBC 6.3 09/06/2019     Lab Results   Component Value Date    RBC 3.52 09/06/2019     Lab Results   Component Value Date    HGB 10.0 09/06/2019     Lab Results   Component Value Date    HCT 30.0 09/06/2019     No components found for: MCT  Lab Results   Component Value Date    MCV 85 09/06/2019     Lab Results   Component Value Date    MCH 28.4 09/06/2019     Lab Results   Component Value Date    MCHC 33.3 09/06/2019     Lab Results   Component Value Date    RDW 14.9 09/06/2019     Lab Results   Component Value Date     09/06/2019       Last Basic Metabolic Panel:  Lab Results   Component Value Date     09/06/2019      Lab Results   Component Value Date    POTASSIUM 4.0 09/06/2019     Lab Results   Component Value Date    CHLORIDE 108 09/06/2019     Lab Results   Component Value Date    KARL 8.4 09/06/2019     Lab " Results   Component Value Date    CO2 28 09/06/2019     Lab Results   Component Value Date    BUN 20 09/06/2019     Lab Results   Component Value Date    CR 0.92 09/06/2019     Lab Results   Component Value Date     09/06/2019       Ivory Pradhan PA-C  Pager #: [phone]

## 2019-09-07 NOTE — PLAN OF CARE
A+Ox4 AVSS on 1L O2. LS diminished, SANTIAGO, IS to 1250. Tele SR w/ PACs. BS active, flatus+. Voiding adequately. Incisions bruised. Trace edema in BLE. Denies pain. Atarax and robaxin for anxiety. Up SBA w/ walker.

## 2019-09-07 NOTE — PROGRESS NOTES
SW:    I: SW following for discharge planning. CATHERINE left messages with [facility] admissions re: possible TCU placement, awaiting return calls. Anticipate pt to be ready for discharge in 1-2 days when medically appropriate and placement secured.     P: SW to follow.    PENNY Riley, [facility] HospitalSW  Daytime (8:00am-4:30pm): [phone]  After-Hours SW Pager (4:30pm-11:30pm): [phone]

## 2019-09-07 NOTE — PLAN OF CARE
Discharge Planner OT   Patient plan for discharge: none stated.   Current status: pt reports headache 6/10 and not feeling right and pt impulsive standing up without A and had 1 LOB needing min A and cues to sit down and to wait for therapist A. Sit <> stand with CGA and completed toilet transfer with CGA/MIN A with max cues for ww use and sternal precautions throughout needed to maintain. Pt ambulated for approx 8 mins with CGA with ww, slowly, pt fatigues and reports not feeling quite right. BP blunted, see vital sign flow sheet for details. Nursing notified and reports pt had an ativan earlier. Pt oriented x2, reoriented to type of place.  2nd treatment: woke pt, supine <> sit with MIN A and cues for log roll, sit <> stand and ambulate to BR and back with ww with CGA for safety, completed toilet transfer with CGA/MIN A and cues for tech, pt completed toileting hygiene with Supervision. Pt stood at sink with CGA to wash hands and reported lightheadedness and assisted pt to sit down on toilet, reports lightheadedness decreased and assisted back to EOB, BP blunted and nursing notified and present regarding lightheadedness. Pt agrees to TCU.   Barriers to return to prior living situation: lives alone, sternal precautions, decreased safety, pain, balance, activity tolerance, strength, etc.   Recommendations for discharge: TCU  Rationale for recommendations: pt needing increased A with ADLs and functional mobility and unsafe to return home alone and requires daily therapy to increase ADL and functional mobility independence and safety to PLOF, eventual OP CR needed when returns home.        Entered by: Gloria Watts 09/07/2019 12:13 PM

## 2019-09-08 ENCOUNTER — APPOINTMENT (OUTPATIENT)
Dept: OCCUPATIONAL THERAPY | Facility: CLINIC | Age: 74
DRG: 236 | End: 2019-09-08
Attending: THORACIC SURGERY (CARDIOTHORACIC VASCULAR SURGERY)
Payer: COMMERCIAL

## 2019-09-08 LAB
ANION GAP SERPL CALCULATED.3IONS-SCNC: 3 MMOL/L (ref 3–14)
BUN SERPL-MCNC: 16 MG/DL (ref 7–30)
CALCIUM SERPL-MCNC: 8.5 MG/DL (ref 8.5–10.1)
CHLORIDE SERPL-SCNC: 106 MMOL/L (ref 94–109)
CO2 SERPL-SCNC: 29 MMOL/L (ref 20–32)
CREAT SERPL-MCNC: 0.84 MG/DL (ref 0.66–1.25)
ERYTHROCYTE [DISTWIDTH] IN BLOOD BY AUTOMATED COUNT: 14.2 % (ref 10–15)
GFR SERPL CREATININE-BSD FRML MDRD: 86 ML/MIN/{1.73_M2}
GLUCOSE BLDC GLUCOMTR-MCNC: 100 MG/DL (ref 70–99)
GLUCOSE BLDC GLUCOMTR-MCNC: 100 MG/DL (ref 70–99)
GLUCOSE BLDC GLUCOMTR-MCNC: 101 MG/DL (ref 70–99)
GLUCOSE BLDC GLUCOMTR-MCNC: 104 MG/DL (ref 70–99)
GLUCOSE BLDC GLUCOMTR-MCNC: 113 MG/DL (ref 70–99)
GLUCOSE SERPL-MCNC: 103 MG/DL (ref 70–99)
HCT VFR BLD AUTO: 30.8 % (ref 40–53)
HGB BLD-MCNC: 10.4 G/DL (ref 13.3–17.7)
MCH RBC QN AUTO: 28.1 PG (ref 26.5–33)
MCHC RBC AUTO-ENTMCNC: 33.8 G/DL (ref 31.5–36.5)
MCV RBC AUTO: 83 FL (ref 78–100)
PLATELET # BLD AUTO: 171 10E9/L (ref 150–450)
POTASSIUM SERPL-SCNC: 3.8 MMOL/L (ref 3.4–5.3)
RBC # BLD AUTO: 3.7 10E12/L (ref 4.4–5.9)
SODIUM SERPL-SCNC: 138 MMOL/L (ref 133–144)
WBC # BLD AUTO: 5.3 10E9/L (ref 4–11)

## 2019-09-08 PROCEDURE — 25000132 ZZH RX MED GY IP 250 OP 250 PS 637: Performed by: PHYSICIAN ASSISTANT

## 2019-09-08 PROCEDURE — 00000146 ZZHCL STATISTIC GLUCOSE BY METER IP

## 2019-09-08 PROCEDURE — 97535 SELF CARE MNGMENT TRAINING: CPT | Mod: GO | Performed by: OCCUPATIONAL THERAPIST

## 2019-09-08 PROCEDURE — 25000128 H RX IP 250 OP 636: Performed by: PHYSICIAN ASSISTANT

## 2019-09-08 PROCEDURE — 25800030 ZZH RX IP 258 OP 636: Performed by: PHYSICIAN ASSISTANT

## 2019-09-08 PROCEDURE — 12000000 ZZH R&B MED SURG/OB

## 2019-09-08 PROCEDURE — 97530 THERAPEUTIC ACTIVITIES: CPT | Mod: GO | Performed by: OCCUPATIONAL THERAPIST

## 2019-09-08 PROCEDURE — 97110 THERAPEUTIC EXERCISES: CPT | Mod: GO | Performed by: OCCUPATIONAL THERAPIST

## 2019-09-08 PROCEDURE — 36415 COLL VENOUS BLD VENIPUNCTURE: CPT | Performed by: PHYSICIAN ASSISTANT

## 2019-09-08 PROCEDURE — 80048 BASIC METABOLIC PNL TOTAL CA: CPT | Performed by: PHYSICIAN ASSISTANT

## 2019-09-08 PROCEDURE — 85027 COMPLETE CBC AUTOMATED: CPT | Performed by: PHYSICIAN ASSISTANT

## 2019-09-08 RX ORDER — LISINOPRIL 5 MG/1
5 TABLET ORAL DAILY
Status: DISCONTINUED | OUTPATIENT
Start: 2019-09-08 | End: 2019-09-09 | Stop reason: HOSPADM

## 2019-09-08 RX ORDER — CARVEDILOL 6.25 MG/1
12.5 TABLET ORAL 2 TIMES DAILY WITH MEALS
Status: DISCONTINUED | OUTPATIENT
Start: 2019-09-08 | End: 2019-09-09 | Stop reason: HOSPADM

## 2019-09-08 RX ADMIN — ATORVASTATIN CALCIUM 40 MG: 40 TABLET, FILM COATED ORAL at 21:09

## 2019-09-08 RX ADMIN — SODIUM CHLORIDE: 9 INJECTION, SOLUTION INTRAVENOUS at 06:57

## 2019-09-08 RX ADMIN — ASPIRIN 325 MG: 325 TABLET, DELAYED RELEASE ORAL at 09:14

## 2019-09-08 RX ADMIN — LISINOPRIL 5 MG: 5 TABLET ORAL at 12:40

## 2019-09-08 RX ADMIN — SENNOSIDES AND DOCUSATE SODIUM 2 TABLET: 8.6; 5 TABLET ORAL at 09:14

## 2019-09-08 RX ADMIN — LORAZEPAM 1 MG: 2 INJECTION INTRAMUSCULAR; INTRAVENOUS at 04:24

## 2019-09-08 RX ADMIN — CARVEDILOL 12.5 MG: 6.25 TABLET, FILM COATED ORAL at 09:14

## 2019-09-08 RX ADMIN — PANTOPRAZOLE SODIUM 40 MG: 40 TABLET, DELAYED RELEASE ORAL at 06:56

## 2019-09-08 RX ADMIN — ACETAMINOPHEN 650 MG: 325 TABLET ORAL at 18:18

## 2019-09-08 RX ADMIN — FUROSEMIDE 40 MG: 10 INJECTION, SOLUTION INTRAVENOUS at 09:13

## 2019-09-08 RX ADMIN — HEPARIN SODIUM 5000 UNITS: 5000 INJECTION, SOLUTION INTRAVENOUS; SUBCUTANEOUS at 00:35

## 2019-09-08 RX ADMIN — EZETIMIBE 10 MG: 10 TABLET ORAL at 09:14

## 2019-09-08 RX ADMIN — POTASSIUM CHLORIDE 20 MEQ: 1.5 POWDER, FOR SOLUTION ORAL at 12:41

## 2019-09-08 RX ADMIN — UMECLIDINIUM 1 PUFF: 62.5 AEROSOL, POWDER ORAL at 09:15

## 2019-09-08 RX ADMIN — HEPARIN SODIUM 5000 UNITS: 5000 INJECTION, SOLUTION INTRAVENOUS; SUBCUTANEOUS at 18:17

## 2019-09-08 RX ADMIN — SALMETEROL XINAFOATE 1 PUFF: 50 POWDER, METERED ORAL; RESPIRATORY (INHALATION) at 09:15

## 2019-09-08 RX ADMIN — SALMETEROL XINAFOATE 1 PUFF: 50 POWDER, METERED ORAL; RESPIRATORY (INHALATION) at 21:09

## 2019-09-08 RX ADMIN — CARVEDILOL 12.5 MG: 6.25 TABLET, FILM COATED ORAL at 18:17

## 2019-09-08 RX ADMIN — HEPARIN SODIUM 5000 UNITS: 5000 INJECTION, SOLUTION INTRAVENOUS; SUBCUTANEOUS at 09:14

## 2019-09-08 RX ADMIN — PAROXETINE HYDROCHLORIDE 60 MG: 20 TABLET, FILM COATED ORAL at 09:15

## 2019-09-08 ASSESSMENT — ACTIVITIES OF DAILY LIVING (ADL)
ADLS_ACUITY_SCORE: 13

## 2019-09-08 ASSESSMENT — MIFFLIN-ST. JEOR: SCORE: 1383.5

## 2019-09-08 NOTE — PROGRESS NOTES
[facility]  Cardiovascular and Thoracic Surgery Daily Note          Assessment and Plan:   POD# 5 s/p Coronary artery bypass grafting x4, LIMA-LAD, SVG-OM, SVG-PDA, SVG-JORDON; endoscopic harvest right greater saphenous vein by Dr. Lai Mchugh  -CVS: HR: 70s-80s. SBP: 120s-130s. Pre op EF: 45-50%, ASA, BB switch to carvedilol, statin, zetia.   -Resp: Extubated within protocol. Saturating well on 1L. Wean off today. Continue to encourage IS, cough, deep breathing, ambulation. PTA inhaler resumed  -Neuro:  Grossly intact. Pain controlled. Feeling anxious this am, Nicorette available, 1 dose 1 mg IVP ativan this am  -Renal: good UOP. Up about 1<3<4kg from preoperative weight. Cr: 0.84. Lasix daily  -GI:  Tolerating diet. + BM. Continue bowel regimen.  -:  Voiding on own  -Endo: pre op A1c: 5.7. Completed insulin gtt per protocol. Continue sliding scale insulin.   -FEN: replace electrolytes as needed. Na 138 K 3.8  Orders Placed This Encounter      Regular Diet Adult  -ID: WBC: 5.3, Temp (24hrs), Av.5  F (36.9  C), Min:98.1  F (36.7  C), Max:98.9  F (37.2  C), Completed perioperative abx.   -Heme: Hgb 10.4 plt 171. Acute blood loss anemia and thrombocytopenia related to surgery.   -Proph: PCD, ASA, BB, statin, PPI, sub q heparin  -Dispo: [facility] Continue therapies. Continue to encourage IS, cough, deep breathing, ambulation. Wean oxygen. Plan for discharge to TCU tomorrow. Social work consulted, encourage PO intake/help order food and intake of fluids, IV fluids off as pt needs to be drinking/eating more-needs aggressive nursing care          Interval History:   Sitting up in bed, denies pain, tolerating diet although diminished appetite, breathing fine         Medications:       aspirin  325 mg Oral Daily     atorvastatin  40 mg Oral QPM     carvedilol  12.5 mg Oral BID w/meals     ezetimibe  10 mg Oral Daily     furosemide  40 mg Intravenous Daily     heparin  5,000 Units Subcutaneous Q8H      "insulin aspart  1-7 Units Subcutaneous TID AC     insulin aspart  1-5 Units Subcutaneous At Bedtime     lidocaine  2 patch Transdermal Q24H     lidocaine   Transdermal Q8H     lidocaine   Transdermal Q24h     lisinopril  5 mg Oral Daily     magnesium citrate  148 mL Oral Once     pantoprazole  40 mg Oral QAM AC     PARoxetine  60 mg Oral QAM     salmeterol  1 puff Inhalation BID     senna-docusate  1 tablet Oral BID    Or     senna-docusate  2 tablet Oral BID     sodium chloride (PF)  3 mL Intracatheter Q8H     umeclidinium  1 puff Inhalation Daily     @[email or web address]-@          Physical Exam:   Vitals were reviewed  Blood pressure (!) 132/94, pulse 91, temperature 98.8  F (37.1  C), temperature source Oral, resp. rate 18, height 1.727 m (5' 8\"), weight 66.4 kg (146 lb 6.2 oz), SpO2 (!) 71 %.  Rhythm: NSR    Lungs: clear    Cardiovascular: s1/s2, no m/r/g    Abdomen: s/nt/nd    Extremeties: no LE edema    Incision: cdi    CT: na    Weight:   Vitals:    09/04/19 1742 09/05/19 0600 09/06/19 0653 09/07/19 0600   Weight: 68.6 kg (151 lb 4.8 oz) 68.6 kg (151 lb 3.8 oz) 69.8 kg (153 lb 14.1 oz) 68.5 kg (151 lb 0.2 oz)    09/08/19 0600   Weight: 66.4 kg (146 lb 6.2 oz)            Data:   Labs:   Lab Results   Component Value Date    WBC 5.3 09/08/2019     Lab Results   Component Value Date    RBC 3.70 09/08/2019     Lab Results   Component Value Date    HGB 10.4 09/08/2019     Lab Results   Component Value Date    HCT 30.8 09/08/2019     No components found for: MCT  Lab Results   Component Value Date    MCV 83 09/08/2019     Lab Results   Component Value Date    MCH 28.1 09/08/2019     Lab Results   Component Value Date    MCHC 33.8 09/08/2019     Lab Results   Component Value Date    RDW 14.2 09/08/2019     Lab Results   Component Value Date     09/08/2019       Last Basic Metabolic Panel:  Lab Results   Component Value Date     09/08/2019      Lab Results   Component Value Date    POTASSIUM 3.8 09/08/2019     Lab " Results   Component Value Date    CHLORIDE 106 09/08/2019     Lab Results   Component Value Date    KARL 8.5 09/08/2019     Lab Results   Component Value Date    CO2 29 09/08/2019     Lab Results   Component Value Date    BUN 16 09/08/2019     Lab Results   Component Value Date    CR 0.84 09/08/2019     Lab Results   Component Value Date     09/08/2019       Ivory Pradhan PA-C  Pager #: [phone]

## 2019-09-08 NOTE — PROGRESS NOTES
SW:    I: CATHERINE following for discharge planning. Messages left with admissions at [facility], [facility], and [facility]. SW faxed new referral to [facility] to assess for Monday, in case beds available (did not have bed availability on weekend). Per SW note 9/6, [facility] and [facility] may have bed available Monday, requesting SW check back Monday. Per CV surgery, anticipate ready for discharge 9/9/19.     P: SW to follow.    PENNY Riley, LICSW  Daytime (8:00am-4:30pm): [phone]  After-Hours SW Pager (4:30pm-11:30pm): [phone]

## 2019-09-08 NOTE — PLAN OF CARE
A+Ox4 AVSS on 1L O2. LS diminished, IS to 1250. Tele NSR. Neuros intact. Incisions bruised, INOCENTE. Bowels active,  Passing flatus. Voiding adequately. Up SBA w/ walker. Denies pain. Ativan for anxiety. CMS intact.

## 2019-09-08 NOTE — PLAN OF CARE
Dx: Severe multivessel coronary artery disease, Hypertension, Tobacco abuse, Renal artery stenosis, Hyperlipidemia, Chronic obstructive pulmonary disease. Surg: CABGx4. VSS on RA. Tele: NSR. A/Ox4. Neuro's intact. Incisions approximated, sternal cares done. CMS intact. Pain controlled with prn Oxy. Up with SBA and walker. Tolerating regular diet. Denies N&V. +gas, +bm. Voiding. LS clear. Will continue to monitor.

## 2019-09-08 NOTE — PLAN OF CARE
Discharge Planner OT   Patient plan for discharge: none stated  Current status: pt reports feeling better this morning. Pt agreed to walk, sit <> stand with CGA/MIN A, ambulated with CGA/MIN A with ww for approx 7 mins pt with 2-3 LOB's requiring min A and cues to regain balance. Pt reports some lightheadedness but OK to walk, fatigue and SOB. Pt RA, O2 sats decreased to 71%, with rest break and cues for PLB and O2 3 L rebounded to 92% after approx 15-30 sec then to 96%, turned O2 to 2 L. Pt completed toilet transfer with CGA/MIN A and stood at sink for oral facial hygiene with CGA/MIN A for balance and safety and SOB and fatigue and cues for ww safety and sternal precautions throughout.  BP blunted. Nursing notified regarding O2 sats RA. See vital sign flow sheet for details.     2nd treatment: supine <> sit with MIN A and cues for tech, completed toilet transfer x2 with CGA for safety. Pt ambulated apporx 11 mins with ww and seated rest break USP. Pt had 1 LOB turning from BR into room requiring mod A to regain balance with ww. Pt reports feeling weak and fatigued. O2 sats 77% upon return from walk and activity on 1 L O2, cues for PLB, O2 sats rebounded to 91% 1 L O2 after approx 45 sec to 1 min. BP WFL, see flow sheet for details.   Barriers to return to prior living situation: decreased balance, safety, activity tolerance, sternal precautions, fall risk  Recommendations for discharge: TCU  Rationale for recommendations: pt needing increased A with ADL's and functional mobility and requires daily therapy to increase ADL and functional mobility independence and safety to PLOF at discharge, eventual OP CR when returns home.        Entered by: Gloria Watts 09/08/2019 9:00 AM

## 2019-09-08 NOTE — PLAN OF CARE
Pt needs much encouragement to ambulate, use IS deep breathe and cough.take in oral intake Telemtry reading NSR today. Nursing assistant orders food for patient today does have productive cough lung sounds diminished . O2 on 1-2 liters per n/c today needed increase to 2 liters when up ambulating. Incisions intact bruised bed and chair alarms implemented this shift for patient forgetful at times. Potassium 3.8 being replaced

## 2019-09-09 ENCOUNTER — APPOINTMENT (OUTPATIENT)
Dept: GENERAL RADIOLOGY | Facility: CLINIC | Age: 74
DRG: 236 | End: 2019-09-09
Attending: PHYSICIAN ASSISTANT
Payer: COMMERCIAL

## 2019-09-09 ENCOUNTER — APPOINTMENT (OUTPATIENT)
Dept: OCCUPATIONAL THERAPY | Facility: CLINIC | Age: 74
DRG: 236 | End: 2019-09-09
Attending: THORACIC SURGERY (CARDIOTHORACIC VASCULAR SURGERY)
Payer: COMMERCIAL

## 2019-09-09 VITALS
WEIGHT: 141.76 LBS | DIASTOLIC BLOOD PRESSURE: 67 MMHG | SYSTOLIC BLOOD PRESSURE: 90 MMHG | BODY MASS INDEX: 21.48 KG/M2 | HEART RATE: 78 BPM | TEMPERATURE: 98 F | RESPIRATION RATE: 16 BRPM | HEIGHT: 68 IN | OXYGEN SATURATION: 90 %

## 2019-09-09 DIAGNOSIS — Z95.1 S/P CABG (CORONARY ARTERY BYPASS GRAFT): Primary | ICD-10-CM

## 2019-09-09 PROBLEM — D62 ANEMIA DUE TO BLOOD LOSS, ACUTE: Status: ACTIVE | Noted: 2019-09-09

## 2019-09-09 PROBLEM — R73.9 TRANSIENT HYPERGLYCEMIA POST PROCEDURE: Status: ACTIVE | Noted: 2019-09-09

## 2019-09-09 PROBLEM — E87.70 FLUID OVERLOAD: Status: ACTIVE | Noted: 2019-09-09

## 2019-09-09 LAB
GLUCOSE BLDC GLUCOMTR-MCNC: 101 MG/DL (ref 70–99)
GLUCOSE BLDC GLUCOMTR-MCNC: 102 MG/DL (ref 70–99)
GLUCOSE BLDC GLUCOMTR-MCNC: 92 MG/DL (ref 70–99)
INTERPRETATION ECG - MUSE: NORMAL
POTASSIUM SERPL-SCNC: 3.6 MMOL/L (ref 3.4–5.3)

## 2019-09-09 PROCEDURE — 25000128 H RX IP 250 OP 636: Performed by: PHYSICIAN ASSISTANT

## 2019-09-09 PROCEDURE — 25000132 ZZH RX MED GY IP 250 OP 250 PS 637: Performed by: PHYSICIAN ASSISTANT

## 2019-09-09 PROCEDURE — 84132 ASSAY OF SERUM POTASSIUM: CPT | Performed by: THORACIC SURGERY (CARDIOTHORACIC VASCULAR SURGERY)

## 2019-09-09 PROCEDURE — 97110 THERAPEUTIC EXERCISES: CPT | Mod: GO

## 2019-09-09 PROCEDURE — 25000131 ZZH RX MED GY IP 250 OP 636 PS 637: Performed by: PHYSICIAN ASSISTANT

## 2019-09-09 PROCEDURE — 00000146 ZZHCL STATISTIC GLUCOSE BY METER IP

## 2019-09-09 PROCEDURE — 36415 COLL VENOUS BLD VENIPUNCTURE: CPT | Performed by: THORACIC SURGERY (CARDIOTHORACIC VASCULAR SURGERY)

## 2019-09-09 PROCEDURE — 71046 X-RAY EXAM CHEST 2 VIEWS: CPT

## 2019-09-09 RX ORDER — CARVEDILOL 12.5 MG/1
12.5 TABLET ORAL 2 TIMES DAILY WITH MEALS
Qty: 60 TABLET | Refills: 3 | DISCHARGE
Start: 2019-09-09 | End: 2020-09-17

## 2019-09-09 RX ORDER — ACETAMINOPHEN 325 MG/1
650 TABLET ORAL EVERY 4 HOURS PRN
DISCHARGE
Start: 2019-09-09 | End: 2020-02-14

## 2019-09-09 RX ORDER — AMOXICILLIN 250 MG
1 CAPSULE ORAL 2 TIMES DAILY PRN
Qty: 30 TABLET | Refills: 0 | DISCHARGE
Start: 2019-09-09 | End: 2019-11-21

## 2019-09-09 RX ORDER — PANTOPRAZOLE SODIUM 40 MG/1
40 TABLET, DELAYED RELEASE ORAL
Qty: 14 TABLET | Refills: 0 | DISCHARGE
Start: 2019-09-10 | End: 2021-03-23

## 2019-09-09 RX ORDER — METHOCARBAMOL 500 MG/1
500 TABLET, FILM COATED ORAL 4 TIMES DAILY PRN
Qty: 20 TABLET | Refills: 0 | DISCHARGE
Start: 2019-09-09 | End: 2020-02-14

## 2019-09-09 RX ORDER — SIMETHICONE 80 MG
80 TABLET,CHEWABLE ORAL ONCE
Status: COMPLETED | OUTPATIENT
Start: 2019-09-09 | End: 2019-09-09

## 2019-09-09 RX ORDER — OXYCODONE HYDROCHLORIDE 5 MG/1
5-10 TABLET ORAL EVERY 4 HOURS PRN
Qty: 20 TABLET | Refills: 0 | Status: SHIPPED | DISCHARGE
Start: 2019-09-09 | End: 2019-09-20

## 2019-09-09 RX ORDER — ATORVASTATIN CALCIUM 40 MG/1
40 TABLET, FILM COATED ORAL EVERY EVENING
DISCHARGE
Start: 2019-09-09 | End: 2019-09-09

## 2019-09-09 RX ORDER — LISINOPRIL 5 MG/1
5 TABLET ORAL DAILY
DISCHARGE
Start: 2019-09-10 | End: 2019-11-21

## 2019-09-09 RX ORDER — ASPIRIN 325 MG
TABLET ORAL
Qty: 30 TABLET | Refills: 3 | DISCHARGE
Start: 2019-09-09 | End: 2021-11-01 | Stop reason: ALTCHOICE

## 2019-09-09 RX ADMIN — EZETIMIBE 10 MG: 10 TABLET ORAL at 08:31

## 2019-09-09 RX ADMIN — ONDANSETRON 4 MG: 4 TABLET, ORALLY DISINTEGRATING ORAL at 10:25

## 2019-09-09 RX ADMIN — PAROXETINE HYDROCHLORIDE 60 MG: 20 TABLET, FILM COATED ORAL at 08:30

## 2019-09-09 RX ADMIN — PANTOPRAZOLE SODIUM 40 MG: 40 TABLET, DELAYED RELEASE ORAL at 06:55

## 2019-09-09 RX ADMIN — UMECLIDINIUM 1 PUFF: 62.5 AEROSOL, POWDER ORAL at 08:33

## 2019-09-09 RX ADMIN — ASPIRIN 325 MG: 325 TABLET, DELAYED RELEASE ORAL at 08:31

## 2019-09-09 RX ADMIN — LORAZEPAM 1 MG: 2 INJECTION INTRAMUSCULAR; INTRAVENOUS at 00:14

## 2019-09-09 RX ADMIN — HEPARIN SODIUM 5000 UNITS: 5000 INJECTION, SOLUTION INTRAVENOUS; SUBCUTANEOUS at 08:29

## 2019-09-09 RX ADMIN — SENNOSIDES AND DOCUSATE SODIUM 1 TABLET: 8.6; 5 TABLET ORAL at 08:33

## 2019-09-09 RX ADMIN — HEPARIN SODIUM 5000 UNITS: 5000 INJECTION, SOLUTION INTRAVENOUS; SUBCUTANEOUS at 00:14

## 2019-09-09 RX ADMIN — LISINOPRIL 5 MG: 5 TABLET ORAL at 08:30

## 2019-09-09 RX ADMIN — CARVEDILOL 12.5 MG: 6.25 TABLET, FILM COATED ORAL at 08:30

## 2019-09-09 RX ADMIN — POTASSIUM CHLORIDE 20 MEQ: 1500 TABLET, EXTENDED RELEASE ORAL at 11:57

## 2019-09-09 RX ADMIN — SIMETHICONE CHEW TAB 80 MG 80 MG: 80 TABLET ORAL at 10:25

## 2019-09-09 RX ADMIN — SALMETEROL XINAFOATE 1 PUFF: 50 POWDER, METERED ORAL; RESPIRATORY (INHALATION) at 08:33

## 2019-09-09 RX ADMIN — HEPARIN SODIUM 5000 UNITS: 5000 INJECTION, SOLUTION INTRAVENOUS; SUBCUTANEOUS at 16:04

## 2019-09-09 ASSESSMENT — ACTIVITIES OF DAILY LIVING (ADL)
ADLS_ACUITY_SCORE: 15

## 2019-09-09 ASSESSMENT — MIFFLIN-ST. JEOR: SCORE: 1362.5

## 2019-09-09 NOTE — PLAN OF CARE
Discharge Planner OT   Patient plan for discharge: TCU  Current status: pt seen for AM cardiac rehab and tolerated 12min ambulation with 2min standing rest break after 8.5min. Amb with FWW CGA on 1L. At completion, 02 93%, /71, HR 85. Pt cited fatigue at 4/10 on Omni scale.   Barriers to return to prior living situation: lives alone, currently requiring A with some ADL/IADL, impaired balance  Recommendations for discharge: TCU followed by OPCR once returned home  Rationale for recommendations: pt will benefit from daily therapies in TCU setting to advance safety and indep with ADLs/IADLS prior to return home. Then will need OPCR with progressive monitored exercise to maximize recovery.        Entered by: Chang Kumar 09/09/2019 9:46 AM

## 2019-09-09 NOTE — DISCHARGE SUMMARY
Discharge Summary    Adolfo Rendon MRN# [id number]   YOB: 1945 Age: 73 year old     Date of Admission:  9/3/2019  Date of Discharge:  9/9/2019  Admitting Physician:  Lai Mchugh MD  Discharge Physician:  Lai Mchugh, *  Discharging Service:  Cardiovascular and Thoracic Surgery     Home clinic:   [address] [place] [address]      Primary Phone: [phone] Primary Fax: [phone]       Primary Provider: Danielle Mercado          Admission Diagnoses:   cad  S/P CABG x 4          Discharge Diagnosis:   Patient Active Problem List   Diagnosis     MIXED HYPERLIPIDEMIA     Benign neoplasm of epididymis     Malignant neoplasm of prostate (H)     Major depressive disorder, recurrent episode, severe (H)     HL (hearing loss)     Hyperlipidemia LDL goal <70     Muscle pain     Tobacco abuse     SANTIAGO (dyspnea on exertion)     Advanced directives, counseling/discussion     Generalized anxiety disorder     Right inguinal hernia     Health Care Home     HTN, goal below 140/90     COPD (chronic obstructive pulmonary disease) (H)     Thoracic aortic aneurysm without rupture (H)     Centrilobular emphysema (H)     Aortic arch aneurysm (H)     Unilateral atherosclerotic renal artery stenosis (H)     Pulmonary nodules     Diverticulosis of large intestine     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 rate 16, height 1.727 m (5' 8\"), weight 64.3 kg (141 lb 12.1 oz), SpO2 92 %.     Code status on discharge: Full Code           Procedures:   On 9/3/19, Adolfo Mendoza underwent successful Coronary artery bypass grafting x4, LIMA-LAD, SVG-OM, SVG-PDA, SVG-JORDON; endoscopic harvest right greater saphenous vein by Dr. Lai Mhcugh          Medications Prior to Admission:     Medications Prior to Admission   Medication Sig Dispense Refill Last Dose     evolocumab (REPATHA) 140 MG/ML prefilled autoinjector Inject 1 mL (140 mg) Subcutaneous every 14 days 6 mL 3 8/30/2019     ezetimibe (ZETIA) 10 MG tablet Take 1 tablet (10 mg) by mouth daily 30 tablet 11 9/2/2019 at am     ipratropium (ATROVENT HFA) 17 MCG/ACT inhaler Inhale 2 puffs into the lungs every 6 hours 12.9 g 5 9/2/2019 at pm     nicotine polacrilex (NICORETTE) 4 MG gum Place 4 mg inside cheek every hour as needed for smoking cessation   9/3/2019 at am     PARoxetine (PAXIL) 20 MG tablet Take 1 tablet (20 mg) by mouth every morning Add to 40 mg daily 90 tablet 1 9/2/2019 at am     PARoxetine (PAXIL) 40 MG tablet Take 1 tablet (40 mg) by mouth every morning Add to 20 mg daily 90 tablet 1 9/2/2019 at am     salmeterol (SEREVENT) 50 MCG/DOSE inhaler Inhale 1 puff into the lungs 2 times daily 60 each 5 9/2/2019 at pm     [DISCONTINUED] ibuprofen (ADVIL/MOTRIN) 200 MG tablet Take 200-400 mg by mouth 2 times daily as needed for mild pain   9/1/2019     [DISCONTINUED] isosorbide mononitrate (IMDUR) 30 MG 24 hr tablet Take 1 tablet (30 mg) by mouth daily 30 tablet 3 9/2/2019 at am             Discharge Medications:     Current Discharge Medication List      START taking these medications    Details   acetaminophen (TYLENOL) 325 MG tablet Take 2 tablets (650 mg) by mouth every 4 hours as needed for other (multimodal surgical pain management along with NSAIDS and opioid medication as indicated based on pain control and physical function.)    Associated Diagnoses: S/P " CABG x 4      lisinopril (PRINIVIL/ZESTRIL) 5 MG tablet Take 1 tablet (5 mg) by mouth daily    Associated Diagnoses: S/P CABG x 4      methocarbamol (ROBAXIN) 500 MG tablet Take 1 tablet (500 mg) by mouth 4 times daily as needed for muscle spasms  Qty: 20 tablet, Refills: 0    Associated Diagnoses: S/P CABG x 4      oxyCODONE (ROXICODONE) 5 MG tablet Take 1-2 tablets (5-10 mg) by mouth every 4 hours as needed for moderate to severe pain  Qty: 20 tablet, Refills: 0    Associated Diagnoses: S/P CABG x 4      pantoprazole (PROTONIX) 40 MG EC tablet Take 1 tablet (40 mg) by mouth every morning (before breakfast)  Qty: 14 tablet, Refills: 0    Associated Diagnoses: S/P CABG x 4      senna-docusate (SENOKOT-S/PERICOLACE) 8.6-50 MG tablet Take 1 tablet by mouth 2 times daily as needed for constipation  Qty: 30 tablet, Refills: 0    Associated Diagnoses: S/P CABG x 4         CONTINUE these medications which have CHANGED    Details   aspirin (ASA) 325 MG tablet ONE DAILY  Qty: 30 tablet, Refills: 3    Associated Diagnoses: S/P CABG x 4      carvedilol (COREG) 12.5 MG tablet Take 1 tablet (12.5 mg) by mouth 2 times daily (with meals)  Qty: 60 tablet, Refills: 3    Associated Diagnoses: Coronary artery disease of native artery of native heart with stable angina pectoris (H)         CONTINUE these medications which have NOT CHANGED    Details   evolocumab (REPATHA) 140 MG/ML prefilled autoinjector Inject 1 mL (140 mg) Subcutaneous every 14 days  Qty: 6 mL, Refills: 3    Associated Diagnoses: Hyperlipidemia LDL goal <70      ezetimibe (ZETIA) 10 MG tablet Take 1 tablet (10 mg) by mouth daily  Qty: 30 tablet, Refills: 11      ipratropium (ATROVENT HFA) 17 MCG/ACT inhaler Inhale 2 puffs into the lungs every 6 hours  Qty: 12.9 g, Refills: 5    Associated Diagnoses: Chronic obstructive pulmonary disease, unspecified COPD type (H)      nicotine polacrilex (NICORETTE) 4 MG gum Place 4 mg inside cheek every hour as needed for smoking  cessation      !! PARoxetine (PAXIL) 20 MG tablet Take 1 tablet (20 mg) by mouth every morning Add to 40 mg daily  Qty: 90 tablet, Refills: 1    Associated Diagnoses: GRETA (generalized anxiety disorder); Mild major depression (H)      !! PARoxetine (PAXIL) 40 MG tablet Take 1 tablet (40 mg) by mouth every morning Add to 20 mg daily  Qty: 90 tablet, Refills: 1    Associated Diagnoses: GRETA (generalized anxiety disorder); Mild major depression (H)      salmeterol (SEREVENT) 50 MCG/DOSE inhaler Inhale 1 puff into the lungs 2 times daily  Qty: 60 each, Refills: 5    Associated Diagnoses: Chronic obstructive pulmonary disease, unspecified COPD type (H)       !! - Potential duplicate medications found. Please discuss with provider.      STOP taking these medications       ibuprofen (ADVIL/MOTRIN) 200 MG tablet Comments:   Reason for Stopping:         isosorbide mononitrate (IMDUR) 30 MG 24 hr tablet Comments:   Reason for Stopping:                     Consultations:   Nutrition, Intensivist,              Brief History of Illness:   Adolfo Mendoza is a 73 year old male seen in [facility] on 8[facility] following cor angio.     He was seen by Dr Espino in early August to establish care. At that time, he noted worsening fatigue as well as exertional right jaw pain. He affirms this history for me as well.     He has had prior CAG in 2015, which revealed  of his right coronary, and he has had several nuclear scans, most recently in 2018, revealing perfusion defect consistent with prior infarct in that territory with mild ischemia.      His cor angio on 8/8 revealed severe multivessel disease. He was counseled on smoking cessation and reportedly has done so. Following discussion of risks and benefits, he has elected to proceed with surgery.          Hospital Course:   On 9/3/19, Adolfo Mendoza underwent successful Coronary artery bypass grafting x4, LIMA-LAD, SVG-OM, SVG-PDA, SVG-JORDON; endoscopic harvest right greater  saphenous vein by Dr. Lai Mchugh    He was extubated within 24 hours of surgery per protocol. Once weaned from hemodynamic stabilizing infusions was transitioned to the surgical telemetry floor.     He was fluid overloaded and treated with IV diuretics. At discharge he is below preoperative weight and no further diuretic therapies are needed at discharge.     He was transiently hyperglycemic and treated with insulin infusion then transitioned to sliding scale insulin per protocol.     He was working well with rehab and is stable to discharge to TCU on POD#6. He has CAD and will discharge on ASA, BB. He is currently taking repatha and zetia. TCU does not do repatha injections. Ok to resume Repatha upon return home. Follow up with cardiac surgery and cardiology have been arranged. Patient to follow up with cardiac rehab upon discharge from TCU.             Significant Results:   Lab Results   Component Value Date    WBC 5.3 09/08/2019     Lab Results   Component Value Date    RBC 3.70 09/08/2019     Lab Results   Component Value Date    HGB 10.4 09/08/2019     Lab Results   Component Value Date    HCT 30.8 09/08/2019     No components found for: MCT  Lab Results   Component Value Date    MCV 83 09/08/2019     Lab Results   Component Value Date    MCH 28.1 09/08/2019     Lab Results   Component Value Date    MCHC 33.8 09/08/2019     Lab Results   Component Value Date    RDW 14.2 09/08/2019     Lab Results   Component Value Date     09/08/2019       Last Basic Metabolic Panel:  Lab Results   Component Value Date     09/08/2019      Lab Results   Component Value Date    POTASSIUM 3.6 09/09/2019     Lab Results   Component Value Date    CHLORIDE 106 09/08/2019     Lab Results   Component Value Date    KARL 8.5 09/08/2019     Lab Results   Component Value Date    CO2 29 09/08/2019     Lab Results   Component Value Date    BUN 16 09/08/2019     Lab Results   Component Value Date    CR 0.84 09/08/2019     Lab  Results   Component Value Date     09/08/2019                  Pending Results:   None           Discharge Instructions and Follow-Up:   Discharge diet: Regular   Discharge activity: Daily weights: Call if weight gain 2-3 lbs over 24 hours or if greater than 5 lbs in 1 week.  No lifting more than 10 lbs for 8-12 weeks.  No driving for 1 month.  Call for pain medication refill.  Call for temperature greater than 101.0  Daily shower with antibacterial soap.   Discharge follow-up: Follow Up and recommended labs and tests    *Follow up primary care provider in 7-10 days after discharge in order to review your medication, vital signs, obtain any necessary lab work your doctor may want, and to update them on your hospitalization and medical issues.   *Follow up with Ivory/Sully/Neetu Rainey with Dr Mchugh, heart surgeon, on 9/16/19 at 2:00pm at [facility] at [facility] [address]. If any questions or concerns call [phone].  You will see us once at this visit and then if everything is going well you will not need to see us again.  You will follow long term with your cardiologist.   *Follow up with Dr Espino, cardiologist, on 11/5/19 at 11am. This is who you will follow with long term about your heart issues. [phone].   *Please schedule outpatient cardiac rehab within 5 days of discharge from TCU, call [phone].            Outpatient therapy: Cardiac rehab   Home Care agency: None    Supplies and equipment: None   Lines and drains: None    Wound care: Wash incision daily with antibacterial soap   Other instructions: None

## 2019-09-09 NOTE — PROGRESS NOTES
CV Surgery    S: No acute events overnight. Saturating well on 1L. Pain controlled. Tolerating diet. +BM    O: B/P: 105/71, T: 97.1, P: 83, R: 16  General: NAD  Heart: RRR normal s1 and s2  Lungs: diminished bases  Abd: soft NTND  Sternum: CDI  Extremities: no LE edema    Lab Results   Component Value Date    WBC 5.3 09/08/2019     Lab Results   Component Value Date    RBC 3.70 09/08/2019     Lab Results   Component Value Date    HGB 10.4 09/08/2019     Lab Results   Component Value Date    HCT 30.8 09/08/2019     No components found for: MCT  Lab Results   Component Value Date    MCV 83 09/08/2019     Lab Results   Component Value Date    MCH 28.1 09/08/2019     Lab Results   Component Value Date    MCHC 33.8 09/08/2019     Lab Results   Component Value Date    RDW 14.2 09/08/2019     Lab Results   Component Value Date     09/08/2019       Last Basic Metabolic Panel:  Lab Results   Component Value Date     09/08/2019      Lab Results   Component Value Date    POTASSIUM 3.6 09/09/2019     Lab Results   Component Value Date    CHLORIDE 106 09/08/2019     Lab Results   Component Value Date    KARL 8.5 09/08/2019     Lab Results   Component Value Date    CO2 29 09/08/2019     Lab Results   Component Value Date    BUN 16 09/08/2019     Lab Results   Component Value Date    CR 0.84 09/08/2019     Lab Results   Component Value Date     09/08/2019       A/P: POD#6 s/p Coronary artery bypass grafting x4, LIMA-LAD, SVG-OM, SVG-PDA, SVG-JORDON; endoscopic harvest right greater saphenous vein by Dr. Lai Mchugh    CXR stable with small left pleural effusion that will likely resolve on its own  Stable for discharge to TCU Today    Sully Orellana PA-C  Pager [phone]

## 2019-09-09 NOTE — PROGRESS NOTES
Spiritual Health    Pt was offered spiritual health services and decline services.     No SH follow up needed.     Daily Berg  Chaplain Resident

## 2019-09-09 NOTE — PROGRESS NOTES
CATHERINE  D/I: PAS completed and faxed with script to facility.    PAS-RR    D: Per DHS regulation, SW completed and submitted PAS-RR to [place] [employer] Direct Connect via the Senior LinkAge Line.  PAS-RR confirmation # is : [id number]    I: CATHERINE spoke with patient and he is aware a PAS-RR has been submitted.  CATHERINE reviewed with patient that he may be contacted for a follow up appointment within 10 days of hospital discharge if their SNF stay is < 30 days.  Contact information for Senior Link[employer] Line was also provided.    A: Patient verbalized understanding.    P: Further questions may be directed to Senior LinkAge Line at #[phone], option #4 for PAS-RR staff.

## 2019-09-09 NOTE — PLAN OF CARE
Dx: Severe multivessel coronary artery disease, Hypertension, Tobacco abuse, Renal artery stenosis, Hyperlipidemia, Chronic obstructive pulmonary disease. Surg: CABGx4. VSS on 1L nc. Tele: NSR. A/Ox4. Neuro's intact. Incisions approximated, sternal cares done. CMS intact. Pain controlled with prn Oxy. Up with SBA and walker. Tolerating regular diet. Denies N&V. +gas, +bm. Voiding. LS clear, diminished in lower lobes. IS: 1000

## 2019-09-09 NOTE — PROGRESS NOTES
A/OX4. But can be forgetful. AVSS. Patient denies pain, N/V. Up SBA with walker. Sternal incisions WDL. Heart videos complete. Patient discharged to TCU via [facility] transport on 2LO2.

## 2019-09-09 NOTE — PLAN OF CARE
A+Ox4 AVSS on 1L O2. LS diminished. Tele NSR. Incisions bruised, INOCENTE. BS hypo, flatus+. Voiding adequately. Denies pain. Up SBA w/ walker. Ativan x1 for anxiety. Denies pain. Reg diet.

## 2019-09-09 NOTE — PROGRESS NOTES
CATHERINE  D/I: Jayesh in admissions at [facility] requested updated nursing, OT, and PT notes be faxed to [phone] for admission review.  Updated nursing and OT faxed at 9:00 am.  P: CATHERINE will continue to follow.

## 2019-09-09 NOTE — PLAN OF CARE
A/O x4. Patient was very tired today. Still on 1L of O2. VSS. Patient ate a hamburger that his family brought him for lunch, otherwise he was not hungry. Pt met with  to plan for discharge to TCU. Incision site on chest had mild bruising. NSR. SBA with walker. Skipped lidocaine patch due to mild rash on chest area by incision. Did have a BM this morning. Voids spontaneously and is having gas. Set to discharge around 5:30pm. Set up patient with heart videos (still in progress, patient falling asleep).

## 2019-09-09 NOTE — PLAN OF CARE
Discharge Planner OT   Patient plan for discharge: TCU  Current status: pt seen for PM cardiac rehab session in [facility]. He completed 10 stairs and 10min on Nustep, cited minimal fatigue. 02 90-92% on 1L, attempted on RA and decreased to 88% however it continues to be difficult to obtain readings d/t low perfusion in pt's fingers. HR maintained between 75-77bpm throughout, his BP readings low at 91/53, 91/54.   Barriers to return to prior living situation: lives alone, current level of A  Recommendations for discharge: TCU  Rationale for recommendations: pt needing increased A with ADLs and functional mobility and unsafe to return home alone and requires daily therapy to increase ADL and functional mobility independence and safety to PLOF, eventual OP CR needed when returns home. Occupational Therapy Discharge Summary    Reason for therapy discharge:    Discharged to transitional care facility.    Progress towards therapy goal(s). See goals on Care Plan in [facility] electronic health record for goal details.  Goals partially met.  Barriers to achieving goals:   limited tolerance for therapy and discharge from facility.    Therapy recommendation(s):    Continued therapy is recommended.  Rationale/Recommendations:  OT/PT therapies recommended at TCU to advance ADLs and mobilities prior to return home to [place]. Pt would then benefit from OCPR program for progressive, monitored exercise program to maximize recovery and general cardiac health.            Entered by: Chang Kumar 09/09/2019 2:02 PM

## 2019-09-09 NOTE — PROGRESS NOTES
CATHERINE  D/I: Spoke to [facility] liaison, Alexia Holden.  Initial SNF faxed manually to Alexia for review at admission at [facility].  Fax sent at 10:23am to [phone].  P: Will continue to follow.    D/I: Spoke to admissions at [facility].  Patient does have a bed available for today.  Admissions notified social work to arrange transport for today.  P: Will continue to follow.    D/I: Met with patient, patient's significant other, and patient's nephew to discuss bed available today at [facility].  Pt and pt family requested that social work contact [facility] to check on bed availability for today because that is closer to patient's home.  This writer contacted [facility] to confirm bed for today.  Writer contacted [facility] to decline available bed for today.  Bed is available today for patient at [facility] pending that patient is in agreement to withholding his repatha injections while at TCU.  Patient, and patient's family met with medical team and agreed to withhold the medication and accept bed for today at [facility].  Nursing notified facility of update.  Discharge orders faxed via DOD to facility.  Writer spoke to family regarding transportation; [facility] versus family transport.  Family will notify social work after they discuss their preferences.  P: Will continue to follow.    D/I: Updated orders faxed. Pt and pt family decided to use [facility] w/[facility] transport and are aware of, and in agreement with the transport cost of approximately 70.00 base rate and 3.50/4.00 per mile.  Contacted [facility] and arranged transport for 5:30 PM.  Patient and medical team updated with discharge time.  Contacted [facility] and confirmed bed for patient with Nicolle in admissions.  Confirmed [facility] uses  [facility] for oxygen.  This writer contacted  respiratory and ordered portable tank for transfer.  Requested material including oxygen order faxed to  respiratory.  Patient agreed to the 75.00 fee for portable  tank.  P: Will continue to follow.

## 2019-09-10 ENCOUNTER — NURSING HOME VISIT (OUTPATIENT)
Dept: GERIATRICS | Facility: CLINIC | Age: 74
End: 2019-09-10
Payer: COMMERCIAL

## 2019-09-10 VITALS
RESPIRATION RATE: 18 BRPM | DIASTOLIC BLOOD PRESSURE: 58 MMHG | WEIGHT: 147 LBS | SYSTOLIC BLOOD PRESSURE: 103 MMHG | HEART RATE: 60 BPM | BODY MASS INDEX: 22.35 KG/M2 | TEMPERATURE: 98.5 F | OXYGEN SATURATION: 95 %

## 2019-09-10 DIAGNOSIS — I25.10 CORONARY ARTERY DISEASE INVOLVING NATIVE CORONARY ARTERY OF NATIVE HEART WITHOUT ANGINA PECTORIS: ICD-10-CM

## 2019-09-10 DIAGNOSIS — J43.2 CENTRILOBULAR EMPHYSEMA (H): ICD-10-CM

## 2019-09-10 DIAGNOSIS — I71.20 THORACIC AORTIC ANEURYSM WITHOUT RUPTURE (H): ICD-10-CM

## 2019-09-10 DIAGNOSIS — F33.2 SEVERE EPISODE OF RECURRENT MAJOR DEPRESSIVE DISORDER, WITHOUT PSYCHOTIC FEATURES (H): ICD-10-CM

## 2019-09-10 DIAGNOSIS — Z95.1 S/P CABG X 4: Primary | ICD-10-CM

## 2019-09-10 DIAGNOSIS — I71.22 AORTIC ARCH ANEURYSM (H): ICD-10-CM

## 2019-09-10 DIAGNOSIS — D62 ANEMIA DUE TO BLOOD LOSS, ACUTE: ICD-10-CM

## 2019-09-10 DIAGNOSIS — I10 BENIGN ESSENTIAL HYPERTENSION: ICD-10-CM

## 2019-09-10 PROCEDURE — 99354 ZZC PROLONGED SERV,OFFICE,1ST HR: CPT | Performed by: FAMILY MEDICINE

## 2019-09-10 NOTE — PROGRESS NOTES
[facility]  PRIMARY CARE PROVIDER AND CLINIC:  Danielle Mercado, ANTONIO CNP, [address] [facility] / [facility] [address]  Chief Complaint   Patient presents with     Hospital F/U     [place] Medical Record Number:  [id number]  Place of Service where encounter took place:  [facility] ([facility]) [[id number]]    Adolfo Rendon  is a 73 year old  (1945), admitted to the above facility from  [facility]. Hospital stay 9/3/19 through 9/9/19..  Admitted to this facility for  rehab, medical management and nursing care.    HPI:    HPI information obtained from: facility staff, patient report and [facility] chart review.   Brief Summary of Hospital Course:   Patient with medical history notable for hyperlipidemia, AAA/TAA, CAD, CABG in 2015,  underwent CABG x4, started on lisinopril, Protonix 40 mg, oxycodone and Robaxin as finished to 325 mg, and Coreg, to 12.5 mg daily, ibuprofen  And Imdur stopped.   - hospitalization complicated with volume overload txed with IV diuretics and no po required on discharge time, and acute blood loss anemia and transient hyperglycemia managed with insulin infusion then SSI.     Updates on Status Since Skilled nursing Admission:   -Patient seen and examined today in his room.  Reports that he started physical therapy yet.  Reports aching pain over the chest surgical site with changing position, transient and bearable. Reports slight dyspnea with exertion, but no palpitation, had some dry cough but no phlegm. Reports sent to this facility on O2, and does not like it.     CODE STATUS/ADVANCE DIRECTIVES DISCUSSION:   CPR/Full code   Patient's living condition: lives with spouse  ALLERGIES: Amlodipine; Claritin; Hctz [hydrochlorothiazide]; Lisinopril; Metoprolol; Pravastatin; Remeron [mirtazapine]; and Wellbutrin [bupropion hcl]  PAST MEDICAL HISTORY:  has a past medical history of Coronary artery disease, Depressive disorder, not elsewhere classified,  Hypertrophy (benign) of prostate, Impotence of organic origin, Other and unspecified hyperlipidemia, Other anxiety states, and Tobacco use disorder.  PAST SURGICAL HISTORY:   has a past surgical history that includes Hydrocelectomy scrotal (01/2004); suprapubic prostatectomy; Herniorrhaphy inguinal (7/14/2011); colonoscopy (3/1/2005); appendectomy (1966); Colonoscopy (N/A, 5/9/2019); Left Heart Cath (N/A, 8/8/2019); and Bypass graft artery coronary (N/A, 9/3/2019).  FAMILY HISTORY: family history includes Aneurysm in his sister; Arthritis in his sister; Cancer in his brother and sister; Cerebrovascular Disease in his mother; Dementia in his brother; Hypertension in his mother; Other - See Comments in his brother; Pacemaker in his sister; Prostate Cancer in his brother.  SOCIAL HISTORY:   reports that he quit smoking about 2 years ago. His smoking use included cigarettes. He smoked 1.00 pack per day. He quit smokeless tobacco use about 2 years ago. He reports that he does not drink alcohol or use drugs.    Post Discharge Medication Reconciliation Status: discharge medications reconciled and changed, per note/orders (see AVS)  Current Outpatient Medications   Medication Sig Dispense Refill     acetaminophen (TYLENOL) 325 MG tablet Take 2 tablets (650 mg) by mouth every 4 hours as needed for other (multimodal surgical pain management along with NSAIDS and opioid medication as indicated based on pain control and physical function.)       aspirin (ASA) 325 MG tablet ONE DAILY 30 tablet 3     carvedilol (COREG) 12.5 MG tablet Take 1 tablet (12.5 mg) by mouth 2 times daily (with meals) 60 tablet 3     ezetimibe (ZETIA) 10 MG tablet Take 1 tablet (10 mg) by mouth daily 30 tablet 11     ipratropium (ATROVENT HFA) 17 MCG/ACT inhaler Inhale 2 puffs into the lungs every 6 hours 12.9 g 5     lisinopril (PRINIVIL/ZESTRIL) 5 MG tablet Take 1 tablet (5 mg) by mouth daily       methocarbamol (ROBAXIN) 500 MG tablet Take 1 tablet  (500 mg) by mouth 4 times daily as needed for muscle spasms 20 tablet 0     nicotine polacrilex (NICORETTE) 4 MG gum Place 4 mg inside cheek every hour as needed for smoking cessation       oxyCODONE (ROXICODONE) 5 MG tablet Take 1-2 tablets (5-10 mg) by mouth every 4 hours as needed for moderate to severe pain 20 tablet 0     pantoprazole (PROTONIX) 40 MG EC tablet Take 1 tablet (40 mg) by mouth every morning (before breakfast) 14 tablet 0     PARoxetine (PAXIL) 20 MG tablet Take 1 tablet (20 mg) by mouth every morning Add to 40 mg daily 90 tablet 1     PARoxetine (PAXIL) 40 MG tablet Take 1 tablet (40 mg) by mouth every morning Add to 20 mg daily 90 tablet 1     salmeterol (SEREVENT) 50 MCG/DOSE inhaler Inhale 1 puff into the lungs 2 times daily 60 each 5     senna-docusate (SENOKOT-S/PERICOLACE) 8.6-50 MG tablet Take 1 tablet by mouth 2 times daily as needed for constipation 30 tablet 0     evolocumab (REPATHA) 140 MG/ML prefilled autoinjector Inject 1 mL (140 mg) Subcutaneous every 14 days 6 mL 3     ROS:  10 point ROS of systems including Constitutional, Eyes, Respiratory, Cardiovascular, Gastroenterology, Genitourinary, Integumentary, Musculoskeletal, Psychiatric were all negative except for pertinent positives noted in my HPI.    Vitals:  /58   Pulse 60   Temp 98.5  F (36.9  C)   Resp 18   Wt 66.7 kg (147 lb)   SpO2 95%   BMI 22.35 kg/m    Exam:  GENERAL APPEARANCE:  in no distress, cooperative  ENT:  Mouth and posterior oropharynx normal, moist mucous membranes, oral mucosa moist, no lesion noted.   EYES:  EOMI, Pupil rounded and equal.  RESP:  lungs clear to auscultation but diminished over left base.   CV:  S1S2 audible, regular HR, no murmur appreciated.   ABDOMEN:  soft, NT/ND, BS audible. no mass appreciated on palpation.   M/S:   DIP nodules.   SKIN:  Healing surgical scar over sternum, and over right side of the abdomen, the left one with some slight erythema and slight biofilm. Donor site  over left leg is healing covered with steri-strep, extensive bruise over medial surface of right thigh.   NEURO:   No NFD appreciated on observation. Hand  5/5 b/l  PSYCH:  normal insight, judgement and memory, affect and mood normal    Lab/Diagnostic data: Reviewed in the chart and EHR.        ASSESSMENT/PLAN:  ----------------------------  S/P CABG x 4  Anemia due to blood loss, acute  Benign essential hypertension  Coronary artery disease involving native coronary artery of native heart without angina pectoris  Aortic arch aneurysm (H)  Thoracic aortic aneurysm without rupture (H)  Physical deconditioning  -  ( LIMA-LAD, SVG-OM, SVG-PDA, SVG-JORDON; endoscopic harvest right greater saphenous)  - on  mg and coreg, lisinopril. Continue.   - .sternotomy and healing sites healing well. Small wound over left abdomen with slight erythema and biofilm, will apply wound change daily with 4/4 and secure in place.   - analgesia optimal.   - will start rehab program today.     Centrilobular emphysema (H)  Acute hypoxia:  - came to facility with O2,   Given hx of copd, will adjust O2 to keep SpO2% b/w 88-92%.   - continue home meds.     Severe episode of recurrent major depressive disorder, without psychotic features (H)  -Patient has been on Paxil for many years, currently on 60 mg, was on different meds in the past possibly Prozac. Has been followed by PCP.   - we spoke at length about current dose being above max dose which is 40 mg, and discussed the AE of Paxil and the alternatives for instance zoloft, with very slow GDR (gradual drug reduction). Pt found it strange to discuss his depression while he is here for cardiac rehab. 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 Pt agreed to forward this discussion to his PCP for further discussion down the road once done dealing with his cardiac rehab.        Orders written  by provider at facility and transcribed by : Francisco Guadalupe  1. Adjust O2 level to keep SPO2% between 88-92% (COPD  2. Check H/H in one week on the lab day  3. Abdominal wound dressing changed daily with dry 4x4, until closed    Total time spent with patient visit at the [facility] nursing [facility] was 65 minutes including patient visit, review of past records and completing the documentation, reviewing medications list and admissions orders. Greater than 50% of total time spent with coordinating care with nurse manager, wound nurse, and rehab team, counseling Pt on the management plan while at the facility, counseling at length on his depression and medications.     Electronically signed by:  Yvette Mcarthur MD

## 2019-09-10 NOTE — LETTER
9/10/2019        RE: Adolfo Rendon  [address] [facility] [place] [address]        [facility]  PRIMARY CARE PROVIDER AND CLINIC:  ANTONIO Carranza CNP, [address]  / [place] [address]  Chief Complaint   Patient presents with     Hospital F/U     [place] Medical Record Number:  [id number]  Place of Service where encounter took place:  [facility] ([facility]) [[id number]]    Adolfo Rendon  is a 73 year old  (1945), admitted to the above facility from  [facility]. Hospital stay 9/3/19 through 9/9/19..  Admitted to this facility for  rehab, medical management and nursing care.    HPI:    HPI information obtained from: facility staff, patient report and [facility] chart review.   Brief Summary of Hospital Course:   Patient with medical history notable for hyperlipidemia, AAA/TAA, CAD, CABG in 2015,  underwent CABG x4, started on lisinopril, Protonix 40 mg, oxycodone and Robaxin as finished to 325 mg, and Coreg, to 12.5 mg daily, ibuprofen  And Imdur stopped.   - hospitalization complicated with volume overload txed with IV diuretics and no po required on discharge time, and acute blood loss anemia and transient hyperglycemia managed with insulin infusion then SSI.     Updates on Status Since Skilled nursing Admission:   -Patient seen and examined today in his room.  Reports that he started physical therapy yet.  Reports aching pain over the chest surgical site with changing position, transient and bearable. Reports slight dyspnea with exertion, but no palpitation, had some dry cough but no phlegm. Reports sent to this facility on O2, and does not like it.     CODE STATUS/ADVANCE DIRECTIVES DISCUSSION:   CPR/Full code   Patient's living condition: lives with spouse  ALLERGIES: Amlodipine; Claritin; Hctz [hydrochlorothiazide]; Lisinopril; Metoprolol; Pravastatin; Remeron [mirtazapine]; and Wellbutrin [bupropion hcl]  PAST MEDICAL HISTORY:  has a past medical  history of Coronary artery disease, Depressive disorder, not elsewhere classified, Hypertrophy (benign) of prostate, Impotence of organic origin, Other and unspecified hyperlipidemia, Other anxiety states, and Tobacco use disorder.  PAST SURGICAL HISTORY:   has a past surgical history that includes Hydrocelectomy scrotal (01/2004); suprapubic prostatectomy; Herniorrhaphy inguinal (7/14/2011); colonoscopy (3/1/2005); appendectomy (1966); Colonoscopy (N/A, 5/9/2019); Left Heart Cath (N/A, 8/8/2019); and Bypass graft artery coronary (N/A, 9/3/2019).  FAMILY HISTORY: family history includes Aneurysm in his sister; Arthritis in his sister; Cancer in his brother and sister; Cerebrovascular Disease in his mother; Dementia in his brother; Hypertension in his mother; Other - See Comments in his brother; Pacemaker in his sister; Prostate Cancer in his brother.  SOCIAL HISTORY:   reports that he quit smoking about 2 years ago. His smoking use included cigarettes. He smoked 1.00 pack per day. He quit smokeless tobacco use about 2 years ago. He reports that he does not drink alcohol or use drugs.    Post Discharge Medication Reconciliation Status: discharge medications reconciled and changed, per note/orders (see AVS)  Current Outpatient Medications   Medication Sig Dispense Refill     acetaminophen (TYLENOL) 325 MG tablet Take 2 tablets (650 mg) by mouth every 4 hours as needed for other (multimodal surgical pain management along with NSAIDS and opioid medication as indicated based on pain control and physical function.)       aspirin (ASA) 325 MG tablet ONE DAILY 30 tablet 3     carvedilol (COREG) 12.5 MG tablet Take 1 tablet (12.5 mg) by mouth 2 times daily (with meals) 60 tablet 3     ezetimibe (ZETIA) 10 MG tablet Take 1 tablet (10 mg) by mouth daily 30 tablet 11     ipratropium (ATROVENT HFA) 17 MCG/ACT inhaler Inhale 2 puffs into the lungs every 6 hours 12.9 g 5     lisinopril (PRINIVIL/ZESTRIL) 5 MG tablet Take 1 tablet  (5 mg) by mouth daily       methocarbamol (ROBAXIN) 500 MG tablet Take 1 tablet (500 mg) by mouth 4 times daily as needed for muscle spasms 20 tablet 0     nicotine polacrilex (NICORETTE) 4 MG gum Place 4 mg inside cheek every hour as needed for smoking cessation       oxyCODONE (ROXICODONE) 5 MG tablet Take 1-2 tablets (5-10 mg) by mouth every 4 hours as needed for moderate to severe pain 20 tablet 0     pantoprazole (PROTONIX) 40 MG EC tablet Take 1 tablet (40 mg) by mouth every morning (before breakfast) 14 tablet 0     PARoxetine (PAXIL) 20 MG tablet Take 1 tablet (20 mg) by mouth every morning Add to 40 mg daily 90 tablet 1     PARoxetine (PAXIL) 40 MG tablet Take 1 tablet (40 mg) by mouth every morning Add to 20 mg daily 90 tablet 1     salmeterol (SEREVENT) 50 MCG/DOSE inhaler Inhale 1 puff into the lungs 2 times daily 60 each 5     senna-docusate (SENOKOT-S/PERICOLACE) 8.6-50 MG tablet Take 1 tablet by mouth 2 times daily as needed for constipation 30 tablet 0     evolocumab (REPATHA) 140 MG/ML prefilled autoinjector Inject 1 mL (140 mg) Subcutaneous every 14 days 6 mL 3     ROS:  10 point ROS of systems including Constitutional, Eyes, Respiratory, Cardiovascular, Gastroenterology, Genitourinary, Integumentary, Musculoskeletal, Psychiatric were all negative except for pertinent positives noted in my HPI.    Vitals:  /58   Pulse 60   Temp 98.5  F (36.9  C)   Resp 18   Wt 66.7 kg (147 lb)   SpO2 95%   BMI 22.35 kg/m     Exam:  GENERAL APPEARANCE:  in no distress, cooperative  ENT:  Mouth and posterior oropharynx normal, moist mucous membranes, oral mucosa moist, no lesion noted.   EYES:  EOMI, Pupil rounded and equal.  RESP:  lungs clear to auscultation but diminished over left base.   CV:  S1S2 audible, regular HR, no murmur appreciated.   ABDOMEN:  soft, NT/ND, BS audible. no mass appreciated on palpation.   M/S:   DIP nodules.   SKIN:  Healing surgical scar over sternum, and over right side of  the abdomen, the left one with some slight erythema and slight biofilm. Donor site over left leg is healing covered with steri-strep, extensive bruise over medial surface of right thigh.   NEURO:   No NFD appreciated on observation. Hand  5/5 b/l  PSYCH:  normal insight, judgement and memory, affect and mood normal    Lab/Diagnostic data: Reviewed in the chart and EHR.        ASSESSMENT/PLAN:  ----------------------------  S/P CABG x 4  Anemia due to blood loss, acute  Benign essential hypertension  Coronary artery disease involving native coronary artery of native heart without angina pectoris  Aortic arch aneurysm (H)  Thoracic aortic aneurysm without rupture (H)  Physical deconditioning  -  ( LIMA-LAD, SVG-OM, SVG-PDA, SVG-JORDON; endoscopic harvest right greater saphenous)  - on  mg and coreg, lisinopril. Continue.   - .sternotomy and healing sites healing well. Small wound over left abdomen with slight erythema and biofilm, will apply wound change daily with 4/4 and secure in place.   - analgesia optimal.   - will start rehab program today.     Centrilobular emphysema (H)  Acute hypoxia:  - came to facility with O2,   Given hx of copd, will adjust O2 to keep SpO2% b/w 88-92%.   - continue home meds.     Severe episode of recurrent major depressive disorder, without psychotic features (H)  -Patient has been on Paxil for many years, currently on 60 mg, was on different meds in the past possibly Prozac. Has been followed by PCP.   - we spoke at length about current dose being above max dose which is 40 mg, and discussed the AE of Paxil and the alternatives for instance zoloft, with very slow GDR (gradual drug reduction). Pt found it strange to discuss his depression while he is here for cardiac rehab. 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 Pt agreed to forward this discussion to his PCP for further  discussion down the road once done dealing with his cardiac rehab.        Orders written by provider at facility and transcribed by : Francisco Guadalupe  1. Adjust O2 level to keep SPO2% between 88-92% (COPD  2. Check H/H in one week on the lab day  3. Abdominal wound dressing changed daily with dry 4x4, until closed    Total time spent with patient visit at the [facility] nursing [facility] was 65 minutes including patient visit, review of past records and completing the documentation, reviewing medications list and admissions orders. Greater than 50% of total time spent with coordinating care with nurse manager, wound nurse, and rehab team, counseling Pt on the management plan while at the facility, counseling at length on his depression and medications.     Electronically signed by:  Yvette Mcarthur MD       Sincerely,        Yvette Mcarthur MD

## 2019-09-16 ENCOUNTER — OFFICE VISIT (OUTPATIENT)
Dept: CARDIOLOGY | Facility: CLINIC | Age: 74
End: 2019-09-16
Payer: COMMERCIAL

## 2019-09-16 ENCOUNTER — HOSPITAL LABORATORY (OUTPATIENT)
Facility: OTHER | Age: 74
End: 2019-09-16

## 2019-09-16 VITALS
HEIGHT: 68 IN | HEART RATE: 76 BPM | WEIGHT: 149.4 LBS | BODY MASS INDEX: 22.64 KG/M2 | SYSTOLIC BLOOD PRESSURE: 104 MMHG | DIASTOLIC BLOOD PRESSURE: 66 MMHG

## 2019-09-16 DIAGNOSIS — Z95.1 S/P CABG X 4: Primary | ICD-10-CM

## 2019-09-16 LAB
HCT VFR BLD AUTO: 29.5 % (ref 40–53)
HGB BLD-MCNC: 9.3 G/DL (ref 13.3–17.7)

## 2019-09-16 ASSESSMENT — MIFFLIN-ST. JEOR: SCORE: 1397.17

## 2019-09-16 NOTE — PROGRESS NOTES
CARDIOTHORACIC SURGERY FOLLOW-UP VISIT     Adolfo Rendon   1945   [id number]      Reason for visit: Post-Op CABG x4 (LIMA to LAD, SVG to OM, SVG to PDA, SVG to JORDON) with Dr. Mchugh on 09/03/2019    HPI: Adolfo Rendon is a 73 year old year old male seen in clinic for a routine follow-up appointment after surgery. Patient has past medical history of CAD with prior stent, COPD, tobacco abuse, dyslipidemia, and depression/anxiety. Hospital course was unremarkable.    Patient has been doing well since discharge and now returns to clinic for postop visit.    Patient remains at TCU in [place] currently. He notes his strength and stamina are slowly increasing since hospital discharge. His breathing continues to improve. He does have exertional dyspnea, but his is improving. He is having right leg swelling (vein harvest leg) but none on the right. No orthopnea or PND. No sternal drainage or erythema. One chest tube site leaks rarely. No popping or clinic of sternum. Denies fever or chills. Regular BMs.     Has not been attending cardiac rehabilitation, but that will start hopefully once he discharges from TCU.       PAST MEDICAL HISTORY:  Past Medical History:   Diagnosis Date     Coronary artery disease      Depressive disorder, not elsewhere classified      Hypertrophy (benign) of prostate      Impotence of organic origin      Other and unspecified hyperlipidemia      Other anxiety states      Tobacco use disorder        PAST SURGICAL HISTORY:  Past Surgical History:   Procedure Laterality Date     APPENDECTOMY  1966     BYPASS GRAFT ARTERY CORONARY N/A 9/3/2019    Procedure: CORONARY ARTERY BYPASS GRAFT X 4 - LIMA TO LAD, SV TO PL, OM, PDA ; ON PUMP WITH TERRENCE; ENDOVEIN HARVEST RIGHT LEG;  Surgeon: Lai Mchugh MD;  Location: [facility] OR     COLONOSCOPY  3/1/2005     COLONOSCOPY N/A 5/9/2019    Procedure: COLONOSCOPY;  Surgeon: Camilo Beard MD;  Location: [facility]     CV LEFT HEART CATH N/A 8/8/2019     "Procedure: Left Heart Cath--also measure LVEDP;  Surgeon: Krystle Barrera MD;  Location:  HEART CARDIAC CATH LAB     HERNIORRHAPHY INGUINAL  7/14/2011    Procedure:HERNIORRHAPHY INGUINAL; Open Repair Right Inguinal Hernia Repair        HYDROCELECTOMY SCROTAL  01/2004    left hydrocele repair     SUPRAPUBIC PROSTATECTOMY         CURRENT MEDICATIONS:   Current Outpatient Medications   Medication     acetaminophen (TYLENOL) 325 MG tablet     aspirin (ASA) 325 MG tablet     carvedilol (COREG) 12.5 MG tablet     evolocumab (REPATHA) 140 MG/ML prefilled autoinjector     ezetimibe (ZETIA) 10 MG tablet     ipratropium (ATROVENT HFA) 17 MCG/ACT inhaler     lisinopril (PRINIVIL/ZESTRIL) 5 MG tablet     methocarbamol (ROBAXIN) 500 MG tablet     nicotine polacrilex (NICORETTE) 4 MG gum     oxyCODONE (ROXICODONE) 5 MG tablet     pantoprazole (PROTONIX) 40 MG EC tablet     PARoxetine (PAXIL) 20 MG tablet     PARoxetine (PAXIL) 40 MG tablet     salmeterol (SEREVENT) 50 MCG/DOSE inhaler     senna-docusate (SENOKOT-S/PERICOLACE) 8.6-50 MG tablet     No current facility-administered medications for this visit.        ALLERGIES:      Allergies   Allergen Reactions     Amlodipine Fatigue     \"felt like zombie\"     Claritin      Mood , irritablity      Hctz [Hydrochlorothiazide] Fatigue     \"felt like zombie\"     Lisinopril Fatigue     \"felt like zombie\"     Metoprolol Fatigue     \"felt like zombie\"     Pravastatin Fatigue     \"felt like zombie\"     Remeron [Mirtazapine]      Agitation        Wellbutrin [Bupropion Hcl]      Sig mood issues            ROS:  Review of symptoms otherwise negative unless commented about in HPI.     LABS:  Last Basic Metabolic Panel:  Lab Results   Component Value Date     09/08/2019      Lab Results   Component Value Date    POTASSIUM 3.6 09/09/2019     Lab Results   Component Value Date    CHLORIDE 106 09/08/2019     Lab Results   Component Value Date    KARL 8.5 09/08/2019     Lab Results " "  Component Value Date    CO2 29 09/08/2019     Lab Results   Component Value Date    BUN 16 09/08/2019     Lab Results   Component Value Date    CR 0.84 09/08/2019     Lab Results   Component Value Date     09/08/2019       Last CBC:   Lab Results   Component Value Date    WBC 5.3 09/08/2019     Lab Results   Component Value Date    RBC 3.70 09/08/2019     Lab Results   Component Value Date    HGB 9.3 09/16/2019     Lab Results   Component Value Date    HCT 29.5 09/16/2019     No components found for: MCT  Lab Results   Component Value Date    MCV 83 09/08/2019     Lab Results   Component Value Date    MCH 28.1 09/08/2019     Lab Results   Component Value Date    MCHC 33.8 09/08/2019     Lab Results   Component Value Date    RDW 14.2 09/08/2019     Lab Results   Component Value Date     09/08/2019       INR:  Lab Results   Component Value Date    INR 1.30 09/03/2019    INR 1.47 09/03/2019    INR 0.96 08/08/2019    INR 0.94 06/09/2015         IMAGING: None    PHYSICAL EXAM:   /66   Pulse 76   Ht 1.727 m (5' 8\")   Wt 67.8 kg (149 lb 6.4 oz)   BMI 22.72 kg/m      Gen: NAD  CV: RRR, S1S2 normal, no murmurs, rubs, or gallops. JVP not elevated  Pulm: clear to auscultation bilaterally, no rhonchi or wheezes  Abd: soft, non-tender, no guarding, +BS  Ext: trace right LE edema with bruising, no lower extremity edema on left  Incisions: clean, dry, intact, no erythema  Neuro: grossly normal  Psych: calm, cooperative       PROCEDURES: None       ASSESSMENT/PLAN:  Adolfo Rendon is a 73 year old year old male status post CABG who returns to clinic for postop visit.     1. Surgically doing well overall.  Incisions are healing well with no signs of infection. Increasing activity and strength overall.   2. Hemodynamics are stable. No medication changes were needed today.  3. Follow up with your cardiologist, Dr Espino, as scheduled on 11/05/19.  4. Start outpatient Cardiac Rehab after discharge from TCU. "   5. Continue sternal precautions for 12 weeks from surgery date.   6. No driving for 4 weeks from surgery date or as long as you are on pain medications      The total time spent with the patient was 25 minutes, > 50% of which was spent in counseling.    Evelia Batista PA-C on 9/16/2019 at 3:37 PM     NAHUM Mercado

## 2019-09-16 NOTE — PATIENT INSTRUCTIONS
- Keep on the good work! Please call if you need any help with quitting smoking.    - Follow up with cardiology as previously scheduled

## 2019-09-17 ENCOUNTER — NURSING HOME VISIT (OUTPATIENT)
Dept: GERIATRICS | Facility: CLINIC | Age: 74
End: 2019-09-17
Payer: COMMERCIAL

## 2019-09-17 VITALS
BODY MASS INDEX: 22.56 KG/M2 | DIASTOLIC BLOOD PRESSURE: 71 MMHG | OXYGEN SATURATION: 93 % | HEART RATE: 83 BPM | SYSTOLIC BLOOD PRESSURE: 140 MMHG | RESPIRATION RATE: 16 BRPM | TEMPERATURE: 97.6 F | WEIGHT: 148.4 LBS

## 2019-09-17 DIAGNOSIS — F33.2 SEVERE EPISODE OF RECURRENT MAJOR DEPRESSIVE DISORDER, WITHOUT PSYCHOTIC FEATURES (H): ICD-10-CM

## 2019-09-17 DIAGNOSIS — F51.02 ADJUSTMENT INSOMNIA: Primary | ICD-10-CM

## 2019-09-17 PROCEDURE — 99308 SBSQ NF CARE LOW MDM 20: CPT | Performed by: NURSE PRACTITIONER

## 2019-09-17 NOTE — PROGRESS NOTES
[place] [facility]  [place] Medical Record Number:  [id number]  Place of Service where encounter took place:  [facility] (S) [[id number]]  Chief Complaint   Patient presents with     Nursing Home Acute       HPI:    Adolfo Rendon  is a 73 year old (1945), who is being seen today for an episodic care visit.  HPI information obtained from: facility chart records, facility staff and patient report. Today's concern is:     Adjustment insomnia  Severe episode of recurrent major depressive disorder, without psychotic features (H)   Patient originally requested nurse practitioner visit due to requesting something to help him sleep.  Additionally, received note from PCP regarding GDR of Paxil.    Past Medical and Surgical History reviewed in Epic today.    MEDICATIONS:  Current Outpatient Medications   Medication Sig Dispense Refill     acetaminophen (TYLENOL) 325 MG tablet Take 2 tablets (650 mg) by mouth every 4 hours as needed for other (multimodal surgical pain management along with NSAIDS and opioid medication as indicated based on pain control and physical function.)       aspirin (ASA) 325 MG tablet ONE DAILY 30 tablet 3     carvedilol (COREG) 12.5 MG tablet Take 1 tablet (12.5 mg) by mouth 2 times daily (with meals) 60 tablet 3     evolocumab (REPATHA) 140 MG/ML prefilled autoinjector Inject 1 mL (140 mg) Subcutaneous every 14 days 6 mL 3     ezetimibe (ZETIA) 10 MG tablet Take 1 tablet (10 mg) by mouth daily 30 tablet 11     ipratropium (ATROVENT HFA) 17 MCG/ACT inhaler Inhale 2 puffs into the lungs every 6 hours 12.9 g 5     lisinopril (PRINIVIL/ZESTRIL) 5 MG tablet Take 1 tablet (5 mg) by mouth daily       methocarbamol (ROBAXIN) 500 MG tablet Take 1 tablet (500 mg) by mouth 4 times daily as needed for muscle spasms 20 tablet 0     nicotine polacrilex (NICORETTE) 4 MG gum Place 4 mg inside cheek every hour as needed for smoking cessation       oxyCODONE (ROXICODONE) 5 MG tablet Take 1-2  tablets (5-10 mg) by mouth every 4 hours as needed for moderate to severe pain 20 tablet 0     pantoprazole (PROTONIX) 40 MG EC tablet Take 1 tablet (40 mg) by mouth every morning (before breakfast) 14 tablet 0     PARoxetine (PAXIL) 20 MG tablet Take 1 tablet (20 mg) by mouth every morning Add to 40 mg daily 90 tablet 1     PARoxetine (PAXIL) 40 MG tablet Take 1 tablet (40 mg) by mouth every morning Add to 20 mg daily 90 tablet 1     salmeterol (SEREVENT) 50 MCG/DOSE inhaler Inhale 1 puff into the lungs 2 times daily 60 each 5     senna-docusate (SENOKOT-S/PERICOLACE) 8.6-50 MG tablet Take 1 tablet by mouth 2 times daily as needed for constipation 30 tablet 0         REVIEW OF SYSTEMS:  4 point ROS including Respiratory, CV, GI and , other than that noted in the HPI,  is negative    Objective:  BP (!) 140/71   Pulse 83   Temp 97.6  F (36.4  C)   Resp 16   Wt 67.3 kg (148 lb 6.4 oz)   SpO2 93%   BMI 22.56 kg/m    Exam:  GENERAL APPEARANCE:  Alert, in no distress, cooperative  RESP:  lungs clear to auscultation , no respiratory distress  CV:  regular rate and rhythm, no murmur, rub, or gallop.  Surgical wounds healing well.  Clean, dry, intact.  No erythema or drainage  PSYCH:  normal insight, judgement and memory, affect and mood normal      ASSESSMENT/PLAN:  Adjustment insomnia  Patient indicates that insomnia actually is improving.  Suspect related to hospitalization, TCU stay, surgery.  After much discussion, patient indicating he would rather continue to try nonpharmacological measures.  Discussed relaxation techniques, foods that can help induce relaxation and sleep.    Severe episode of recurrent major depressive disorder, without psychotic features (H)  Patient's PCP indicated agreement to begin GDR of Paxil while patient in TCU.    Discussion with clinical pharmacist indicated Best procedure would be to reduce to 40 mg daily x2 weeks, then 30 mg daily x2 weeks, then 10 mg x 2 weeks then 5 mg for 2  weeks to discontinue.  However goal is not necessary to discontinue but to get down to therapeutic dose.  After much discussion, patient indicates that he would rather stay on his current dose of Paxil for now and continue to discuss with PCP after TCU discharge.      Orders written by provider at facility and transcribed by : Francisco Guadalupe  1. No new orders      Electronically signed by:  ANTONIO Villalobos CNP

## 2019-09-17 NOTE — LETTER
9/17/2019        RE: Adolfo Rendon  [address] [facility] [address]        [place]  [place] [facility] Record Number:  [id number]  Place of Service where encounter took place:  [facility] (S) [[id number]]  Chief Complaint   Patient presents with     Nursing Home Acute       HPI:    Adolfo Rendon  is a 73 year old (1945), who is being seen today for an episodic care visit.  HPI information obtained from: facility chart records, facility staff and patient report. Today's concern is:     Adjustment insomnia  Severe episode of recurrent major depressive disorder, without psychotic features (H)   Patient originally requested nurse practitioner visit due to requesting something to help him sleep.  Additionally, received note from PCP regarding GDR of Paxil.    Past Medical and Surgical History reviewed in Epic today.    MEDICATIONS:  Current Outpatient Medications   Medication Sig Dispense Refill     acetaminophen (TYLENOL) 325 MG tablet Take 2 tablets (650 mg) by mouth every 4 hours as needed for other (multimodal surgical pain management along with NSAIDS and opioid medication as indicated based on pain control and physical function.)       aspirin (ASA) 325 MG tablet ONE DAILY 30 tablet 3     carvedilol (COREG) 12.5 MG tablet Take 1 tablet (12.5 mg) by mouth 2 times daily (with meals) 60 tablet 3     evolocumab (REPATHA) 140 MG/ML prefilled autoinjector Inject 1 mL (140 mg) Subcutaneous every 14 days 6 mL 3     ezetimibe (ZETIA) 10 MG tablet Take 1 tablet (10 mg) by mouth daily 30 tablet 11     ipratropium (ATROVENT HFA) 17 MCG/ACT inhaler Inhale 2 puffs into the lungs every 6 hours 12.9 g 5     lisinopril (PRINIVIL/ZESTRIL) 5 MG tablet Take 1 tablet (5 mg) by mouth daily       methocarbamol (ROBAXIN) 500 MG tablet Take 1 tablet (500 mg) by mouth 4 times daily as needed for muscle spasms 20 tablet 0     nicotine polacrilex (NICORETTE) 4 MG gum Place 4 mg inside cheek  every hour as needed for smoking cessation       oxyCODONE (ROXICODONE) 5 MG tablet Take 1-2 tablets (5-10 mg) by mouth every 4 hours as needed for moderate to severe pain 20 tablet 0     pantoprazole (PROTONIX) 40 MG EC tablet Take 1 tablet (40 mg) by mouth every morning (before breakfast) 14 tablet 0     PARoxetine (PAXIL) 20 MG tablet Take 1 tablet (20 mg) by mouth every morning Add to 40 mg daily 90 tablet 1     PARoxetine (PAXIL) 40 MG tablet Take 1 tablet (40 mg) by mouth every morning Add to 20 mg daily 90 tablet 1     salmeterol (SEREVENT) 50 MCG/DOSE inhaler Inhale 1 puff into the lungs 2 times daily 60 each 5     senna-docusate (SENOKOT-S/PERICOLACE) 8.6-50 MG tablet Take 1 tablet by mouth 2 times daily as needed for constipation 30 tablet 0         REVIEW OF SYSTEMS:  4 point ROS including Respiratory, CV, GI and , other than that noted in the HPI,  is negative    Objective:  BP (!) 140/71   Pulse 83   Temp 97.6  F (36.4  C)   Resp 16   Wt 67.3 kg (148 lb 6.4 oz)   SpO2 93%   BMI 22.56 kg/m     Exam:  GENERAL APPEARANCE:  Alert, in no distress, cooperative  RESP:  lungs clear to auscultation , no respiratory distress  CV:  regular rate and rhythm, no murmur, rub, or gallop.  Surgical wounds healing well.  Clean, dry, intact.  No erythema or drainage  PSYCH:  normal insight, judgement and memory, affect and mood normal      ASSESSMENT/PLAN:  Adjustment insomnia  Patient indicates that insomnia actually is improving.  Suspect related to hospitalization, TCU stay, surgery.  After much discussion, patient indicating he would rather continue to try nonpharmacological measures.  Discussed relaxation techniques, foods that can help induce relaxation and sleep.    Severe episode of recurrent major depressive disorder, without psychotic features (H)  Patient's PCP indicated agreement to begin GDR of Paxil while patient in TCU.    Discussion with clinical pharmacist indicated Best procedure would be to  reduce to 40 mg daily x2 weeks, then 30 mg daily x2 weeks, then 10 mg x 2 weeks then 5 mg for 2 weeks to discontinue.  However goal is not necessary to discontinue but to get down to therapeutic dose.  After much discussion, patient indicates that he would rather stay on his current dose of Paxil for now and continue to discuss with PCP after TCU discharge.      Orders written by provider at facility and transcribed by : Francisco Guadalupe  1. No new orders      Electronically signed by:  ANTONIO Villalobos CNP               Sincerely,        ANTONIO Villalobos CNP

## 2019-09-19 VITALS
RESPIRATION RATE: 18 BRPM | OXYGEN SATURATION: 94 % | DIASTOLIC BLOOD PRESSURE: 94 MMHG | TEMPERATURE: 97.9 F | WEIGHT: 145 LBS | HEART RATE: 80 BPM | BODY MASS INDEX: 22.05 KG/M2 | SYSTOLIC BLOOD PRESSURE: 140 MMHG

## 2019-09-19 NOTE — PROGRESS NOTES
[place] DISCHARGE SUMMARY  PATIENT'S NAME: Adolfo Rendon  YOB: 1945  MEDICAL RECORD NUMBER:  [id number]  Place of Service where encounter took place:  [facility] ([facility] [[id number]]    PRIMARY CARE PROVIDER AND CLINIC RESPONSIBLE AFTER TRANSFER:   ANTONIO Carranza CNP, [address] [place] [address] [place] [address]    [facility] Provider     Transferring providers: ANTONIO Villalobos CNP, Yvette Mcarthur MD  Recent Hospitalization/ED:  [facility]. Hospital stay 9/3/19 through 9/9/19.  Date of SNF Admission: September / 09 / 2019  Date of SNF (anticipated) Discharge: September / 21 / 2019  Discharged to: previous independent home  Cognitive Scores: SLUMS: 28/30  Physical Function: Tinetti Balance Assessment: 38/40 and walks 500 feet independent, no AD  DME: none    CODE STATUS/ADVANCE DIRECTIVES DISCUSSION:  Full Code   ALLERGIES: Amlodipine; Claritin; Hctz [hydrochlorothiazide]; Lisinopril; Metoprolol; Pravastatin; Remeron [mirtazapine]; and Wellbutrin [bupropion hcl]    DISCHARGE DIAGNOSIS/NURSING FACILITY COURSE:       S/P CABG x 4  Anemia due to blood loss, acute    Coronary artery disease involving native coronary artery of native heart without angina pectoris  Aortic arch aneurysm (H)  Thoracic aortic aneurysm without rupture (H)  Physical deconditioning  -  ( LIMA-LAD, SVG-OM, SVG-PDA, SVG-JORDON; endoscopic harvest right greater saphenous)  - continues on on  mg and coreg, lisinopril.   - .sternotomy and healing sites healing well. No s/s infection  - analgesia optimal with Tylenol. No oxycodone used.  D/c'd  -Hgb stable 10.4- 9.3  PCP and cardiology to follow  Cardiac rehab     Benign essential hypertension  Blood pressures variable.  Generally withing acceptable range.  Continues with coreg 12.5 mg  BID, lisinopril 5mg daily    Centrilobular emphysema (H)  - came to facility with O2,  has weaned off.   O2 sats in low 90's  - continue atrovent, serevent       Severe episode of recurrent major depressive disorder, without psychotic features (H)  Adjustment Insomnia  Patient has been on paxil for many years.  Currently on 60mg which exceeds the recommended dosage for this age group.  Discussed gradual dose reduction.  Patient verbalizes understanding but prefers to wait and address with PCP.   Insomnia resolving    Past Medical History:  has a past medical history of Coronary artery disease, Depressive disorder, not elsewhere classified, Hypertrophy (benign) of prostate, Impotence of organic origin, Other and unspecified hyperlipidemia, Other anxiety states, and Tobacco use disorder.    Discharge Medications:  Current Outpatient Medications   Medication Sig Dispense Refill     acetaminophen (TYLENOL) 325 MG tablet Take 2 tablets (650 mg) by mouth every 4 hours as needed for other (multimodal surgical pain management along with NSAIDS and opioid medication as indicated based on pain control and physical function.)       aspirin (ASA) 325 MG tablet ONE DAILY 30 tablet 3     carvedilol (COREG) 12.5 MG tablet Take 1 tablet (12.5 mg) by mouth 2 times daily (with meals) 60 tablet 3     ezetimibe (ZETIA) 10 MG tablet Take 1 tablet (10 mg) by mouth daily 30 tablet 11     ipratropium (ATROVENT HFA) 17 MCG/ACT inhaler Inhale 2 puffs into the lungs every 6 hours 12.9 g 5     lisinopril (PRINIVIL/ZESTRIL) 5 MG tablet Take 1 tablet (5 mg) by mouth daily       methocarbamol (ROBAXIN) 500 MG tablet Take 1 tablet (500 mg) by mouth 4 times daily as needed for muscle spasms 20 tablet 0     nicotine polacrilex (NICORETTE) 4 MG gum Place 4 mg inside cheek every hour as needed for smoking cessation       oxyCODONE (ROXICODONE) 5 MG tablet Take 1-2 tablets (5-10 mg) by mouth every 4 hours as needed for moderate to severe pain 20 tablet 0     pantoprazole (PROTONIX) 40 MG EC tablet Take 1 tablet (40 mg) by mouth every morning (before breakfast) 14 tablet 0     PARoxetine (PAXIL) 20 MG  tablet Take 1 tablet (20 mg) by mouth every morning Add to 40 mg daily 90 tablet 1     PARoxetine (PAXIL) 40 MG tablet Take 1 tablet (40 mg) by mouth every morning Add to 20 mg daily 90 tablet 1     salmeterol (SEREVENT) 50 MCG/DOSE inhaler Inhale 1 puff into the lungs 2 times daily 60 each 5     senna-docusate (SENOKOT-S/PERICOLACE) 8.6-50 MG tablet Take 1 tablet by mouth 2 times daily as needed for constipation 30 tablet 0     evolocumab (REPATHA) 140 MG/ML prefilled autoinjector Inject 1 mL (140 mg) Subcutaneous every 14 days 6 mL 3     Medication Changes/Rationale:     none    Controlled medications sent with patient:   not applicable/none     ROS:   4 point ROS including Respiratory, CV, GI and , other than that noted in the HPI,  is negative    Physical Exam:   Vitals: BP (!) 140/94   Pulse 80   Temp 97.9  F (36.6  C)   Resp 18   Wt 65.8 kg (145 lb)   SpO2 94%   BMI 22.05 kg/m    BMI= Body mass index is 22.05 kg/m .  GENERAL APPEARANCE:  Alert, in no distress, cooperative  RESP:  lungs clear to auscultation , no respiratory distress  CV:  regular rate and rhythm, no murmur, rub, or gallop  SKIN:  surgical wounds healing well. clean, dry, intact, no erythema or drainage  PSYCH:  oriented X 3, normal insight, judgement and memory, affect and mood normal     SNF labs: Recent labs in [facility] reviewed by me today.  and   Most Recent 3 CBC's:  Recent Labs   Lab Test 09/16/19  0845 09/08/19  0815 09/06/19  0645 09/05/19  0631   WBC  --  5.3 6.3 8.6   HGB 9.3* 10.4* 10.0* 10.2*   MCV  --  83 85 86   PLT  --  171 105* 107*     Most Recent 3 BMP's:  Recent Labs   Lab Test 09/09/19  1024 09/08/19  0815 09/07/19  0906 09/06/19  0645   NA  --  138 139 139   POTASSIUM 3.6 3.8 3.3* 4.0   CHLORIDE  --  106 105 108   CO2  --  29 27 28   BUN  --  16 15 20   CR  --  0.84 0.81 0.92   ANIONGAP  --  3 7 3   KARL  --  8.5 8.7 8.4*   GLC  --  103* 110* 104*         DISCHARGE PLAN:    Follow up labs: PCP to follow. b    Medical  Follow Up:      Follow up with primary care provider in 1-2 weeks  Follow up with cardiology 11-5      [facility] scheduled appointments:  Next 5 appointments (look out 90 days)    Nov 05, 2019 11:00 AM CST  Return Visit with Lucy Espino MD  [facility] ([facility]) [address] [facility] [address]  [phone]           Discharge Services: Out Patient:  Cardiac rehab-[facility]      Discharge Orders:  1. Discharge Oxycodone   2. Ok to Discharge Home   3. Please call/fax script to [facility] pharmacy so med's ready for  on the way home    TOTAL DISCHARGE TIME:   Greater than 30 minutes  Electronically signed by:  ANTONIO Villalobos CNP

## 2019-09-20 ENCOUNTER — DISCHARGE SUMMARY NURSING HOME (OUTPATIENT)
Dept: GERIATRICS | Facility: CLINIC | Age: 74
End: 2019-09-20
Payer: COMMERCIAL

## 2019-09-20 ENCOUNTER — MEDICAL CORRESPONDENCE (OUTPATIENT)
Dept: HEALTH INFORMATION MANAGEMENT | Facility: CLINIC | Age: 74
End: 2019-09-20

## 2019-09-20 DIAGNOSIS — I71.20 THORACIC AORTIC ANEURYSM WITHOUT RUPTURE (H): ICD-10-CM

## 2019-09-20 DIAGNOSIS — I25.10 CORONARY ARTERY DISEASE INVOLVING NATIVE CORONARY ARTERY OF NATIVE HEART WITHOUT ANGINA PECTORIS: ICD-10-CM

## 2019-09-20 DIAGNOSIS — J43.2 CENTRILOBULAR EMPHYSEMA (H): ICD-10-CM

## 2019-09-20 DIAGNOSIS — Z95.1 S/P CABG X 4: Primary | ICD-10-CM

## 2019-09-20 DIAGNOSIS — I71.22 AORTIC ARCH ANEURYSM (H): ICD-10-CM

## 2019-09-20 DIAGNOSIS — I10 BENIGN ESSENTIAL HYPERTENSION: ICD-10-CM

## 2019-09-20 DIAGNOSIS — F51.02 ADJUSTMENT INSOMNIA: ICD-10-CM

## 2019-09-20 DIAGNOSIS — F33.2 SEVERE EPISODE OF RECURRENT MAJOR DEPRESSIVE DISORDER, WITHOUT PSYCHOTIC FEATURES (H): ICD-10-CM

## 2019-09-20 DIAGNOSIS — D62 ANEMIA DUE TO BLOOD LOSS, ACUTE: ICD-10-CM

## 2019-09-20 PROCEDURE — 99316 NF DSCHRG MGMT 30 MIN+: CPT | Performed by: NURSE PRACTITIONER

## 2019-09-20 NOTE — LETTER
9/20/2019        RE: Adolfo Rendon  [address] [facility] [place] [address]        [place] [facility] DISCHARGE SUMMARY  PATIENT'S NAME: Adolfo Rendon  YOB: 1945  MEDICAL RECORD NUMBER:  [id number]  Place of Service where encounter took place:  [facility] ([facility] [[id number]]    PRIMARY CARE PROVIDER AND CLINIC RESPONSIBLE AFTER TRANSFER:   ANTONIO Carranza CNP, [address] [place] [id number]    [facility] Provider     Transferring providers: ANTONIO Villalobos CNP, Yvette Mcarthur MD  Recent Hospitalization/ED:  [facility]. Hospital stay 9/3/19 through 9/9/19.  Date of SNF Admission: September / 09 / 2019  Date of SNF (anticipated) Discharge: September / 21 / 2019  Discharged to: previous independent home  Cognitive Scores: SLUMS: 28/30  Physical Function: Tinetti Balance Assessment: 38/40 and walks 500 feet independent, no AD  DME: none    CODE STATUS/ADVANCE DIRECTIVES DISCUSSION:  Full Code   ALLERGIES: Amlodipine; Claritin; Hctz [hydrochlorothiazide]; Lisinopril; Metoprolol; Pravastatin; Remeron [mirtazapine]; and Wellbutrin [bupropion hcl]    DISCHARGE DIAGNOSIS/NURSING FACILITY COURSE:       S/P CABG x 4  Anemia due to blood loss, acute    Coronary artery disease involving native coronary artery of native heart without angina pectoris  Aortic arch aneurysm (H)  Thoracic aortic aneurysm without rupture (H)  Physical deconditioning  -  ( LIMA-LAD, SVG-OM, SVG-PDA, SVG-JORDON; endoscopic harvest right greater saphenous)  - continues on on  mg and coreg, lisinopril.   - .sternotomy and healing sites healing well. No s/s infection  - analgesia optimal with Tylenol. No oxycodone used.  D/c'd  -Hgb stable 10.4- 9.3  PCP and cardiology to follow  Cardiac rehab     Benign essential hypertension  Blood pressures variable.  Generally withing acceptable range.  Continues with coreg 12.5 mg  BID, lisinopril 5mg daily    Centrilobular emphysema (H)  - came to  facility with O2,   has weaned off.   O2 sats in low 90's  - continue atrovent, serevent      Severe episode of recurrent major depressive disorder, without psychotic features (H)  Adjustment Insomnia  Patient has been on paxil for many years.  Currently on 60mg which exceeds the recommended dosage for this age group.  Discussed gradual dose reduction.  Patient verbalizes understanding but prefers to wait and address with PCP.   Insomnia resolving    Past Medical History:  has a past medical history of Coronary artery disease, Depressive disorder, not elsewhere classified, Hypertrophy (benign) of prostate, Impotence of organic origin, Other and unspecified hyperlipidemia, Other anxiety states, and Tobacco use disorder.    Discharge Medications:  Current Outpatient Medications   Medication Sig Dispense Refill     acetaminophen (TYLENOL) 325 MG tablet Take 2 tablets (650 mg) by mouth every 4 hours as needed for other (multimodal surgical pain management along with NSAIDS and opioid medication as indicated based on pain control and physical function.)       aspirin (ASA) 325 MG tablet ONE DAILY 30 tablet 3     carvedilol (COREG) 12.5 MG tablet Take 1 tablet (12.5 mg) by mouth 2 times daily (with meals) 60 tablet 3     ezetimibe (ZETIA) 10 MG tablet Take 1 tablet (10 mg) by mouth daily 30 tablet 11     ipratropium (ATROVENT HFA) 17 MCG/ACT inhaler Inhale 2 puffs into the lungs every 6 hours 12.9 g 5     lisinopril (PRINIVIL/ZESTRIL) 5 MG tablet Take 1 tablet (5 mg) by mouth daily       methocarbamol (ROBAXIN) 500 MG tablet Take 1 tablet (500 mg) by mouth 4 times daily as needed for muscle spasms 20 tablet 0     nicotine polacrilex (NICORETTE) 4 MG gum Place 4 mg inside cheek every hour as needed for smoking cessation       oxyCODONE (ROXICODONE) 5 MG tablet Take 1-2 tablets (5-10 mg) by mouth every 4 hours as needed for moderate to severe pain 20 tablet 0     pantoprazole (PROTONIX) 40 MG EC tablet Take 1 tablet (40 mg)  by mouth every morning (before breakfast) 14 tablet 0     PARoxetine (PAXIL) 20 MG tablet Take 1 tablet (20 mg) by mouth every morning Add to 40 mg daily 90 tablet 1     PARoxetine (PAXIL) 40 MG tablet Take 1 tablet (40 mg) by mouth every morning Add to 20 mg daily 90 tablet 1     salmeterol (SEREVENT) 50 MCG/DOSE inhaler Inhale 1 puff into the lungs 2 times daily 60 each 5     senna-docusate (SENOKOT-S/PERICOLACE) 8.6-50 MG tablet Take 1 tablet by mouth 2 times daily as needed for constipation 30 tablet 0     evolocumab (REPATHA) 140 MG/ML prefilled autoinjector Inject 1 mL (140 mg) Subcutaneous every 14 days 6 mL 3     Medication Changes/Rationale:     none    Controlled medications sent with patient:   not applicable/none     ROS:   4 point ROS including Respiratory, CV, GI and , other than that noted in the HPI,  is negative    Physical Exam:   Vitals: BP (!) 140/94   Pulse 80   Temp 97.9  F (36.6  C)   Resp 18   Wt 65.8 kg (145 lb)   SpO2 94%   BMI 22.05 kg/m     BMI= Body mass index is 22.05 kg/m .  GENERAL APPEARANCE:  Alert, in no distress, cooperative  RESP:  lungs clear to auscultation , no respiratory distress  CV:  regular rate and rhythm, no murmur, rub, or gallop  SKIN:  surgical wounds healing well. clean, dry, intact, no erythema or drainage  PSYCH:  oriented X 3, normal insight, judgement and memory, affect and mood normal     SNF labs: [facility] labs in [facility] reviewed by me today.  and   Most Recent 3 CBC's:  Recent Labs   Lab Test 09/16/19  0845 09/08/19  0815 09/06/19  0645 09/05/19  0631   WBC  --  5.3 6.3 8.6   HGB 9.3* 10.4* 10.0* 10.2*   MCV  --  83 85 86   PLT  --  171 105* 107*     Most Recent 3 BMP's:  Recent Labs   Lab Test 09/09/19  1024 09/08/19  0815 09/07/19  0906 09/06/19  0645   NA  --  138 139 139   POTASSIUM 3.6 3.8 3.3* 4.0   CHLORIDE  --  106 105 108   CO2  --  29 27 28   BUN  --  16 15 20   CR  --  0.84 0.81 0.92   ANIONGAP  --  3 7 3   KARL  --  8.5 8.7 8.4*   GLC  --  103*  110* 104*         DISCHARGE PLAN:    Follow up labs: PCP to follow. hgb    Medical Follow Up:      Follow up with primary care provider in 1-2 weeks  Follow up with cardiology 11-5      [facility] scheduled appointments:  Next 5 appointments (look out 90 days)    Nov 05, 2019 11:00 AM CST  Return Visit with Lucy Espino MD  [facility] ([facility]) [address] [facility] [address]  [phone]           Discharge Services: Out Patient:  Cardiac rehab-[facility]      Discharge Orders:  1. Discharge Oxycodone   2. Ok to Discharge Home   3. Please call/fax script to [facility] pharmacy so med's ready for  on the way home    TOTAL DISCHARGE TIME:   Greater than 30 minutes  Electronically signed by:  ANTONIO Villalobos CNP                 Sincerely,        ANTONIO Villalobos CNP

## 2019-09-23 ENCOUNTER — ALLIED HEALTH/NURSE VISIT (OUTPATIENT)
Dept: FAMILY MEDICINE | Facility: CLINIC | Age: 74
End: 2019-09-23
Payer: COMMERCIAL

## 2019-09-23 VITALS — HEART RATE: 66 BPM | DIASTOLIC BLOOD PRESSURE: 66 MMHG | SYSTOLIC BLOOD PRESSURE: 104 MMHG

## 2019-09-23 DIAGNOSIS — I10 HTN, GOAL BELOW 140/90: Primary | ICD-10-CM

## 2019-09-23 PROCEDURE — 99207 ZZC NO CHARGE NURSE ONLY: CPT | Performed by: NURSE PRACTITIONER

## 2019-09-23 NOTE — PROGRESS NOTES
Panel Management Review      Patient has the following on his problem list: None      Composite cancer screening  Chart review shows that this patient is due/due soon for the following None  Summary:    Patient is due/failing the following:   BP CHECK    Action needed:   Patient needs referral for RN HTN Program. Routed to provider for referral.    Type of outreach:    bp abstracted     Questions for provider review:    None                                                                                                                                    Telma Cannon, [facility]      Chart routed to Care Team .

## 2019-09-24 ENCOUNTER — TELEPHONE (OUTPATIENT)
Dept: CARDIOLOGY | Facility: CLINIC | Age: 74
End: 2019-09-24

## 2019-09-24 NOTE — TELEPHONE ENCOUNTER
RN called pt in response to a voicemail message re: Repatha. Call went to voicemail-asked pt to call RN back

## 2019-09-30 ENCOUNTER — PATIENT OUTREACH (OUTPATIENT)
Dept: CARE COORDINATION | Facility: CLINIC | Age: 74
End: 2019-09-30

## 2019-09-30 DIAGNOSIS — Z76.89 HEALTH CARE HOME: Primary | ICD-10-CM

## 2019-09-30 NOTE — PROGRESS NOTES
Clinic Care Coordination Contact  [facility]/Voicemail    Referral Source: IP Report  Clinical Data: Care Coordinator Outreach  Outreach attempted x 1.  Left message on patient's voicemail with call back information and requested return call.  Plan: Care Coordinator will send care coordination introduction letter with care coordinator contact information and explanation of care coordination services via mail. Care Coordinator will try to reach patient again in 1-2 business days.  To Engel RN  Primary Care Clinic RN Care Coordinator  [facility]   [phone]

## 2019-09-30 NOTE — LETTER
Health Care Home - Access Care Plan    About Me:    Patient Name:  Adolfo Rendon    YOB: 1945  Age:                      73 year old   Karen MRN:     [id number] Telephone Information:   Home Phone [phone]   Mobile Not on file.       Address:  [place] [place] [facility] [address] Email address:  No e-mail address on record      Emergency Contact(s)   Name Relationship Lgl Grd Work Phone Home Phone Mobile Phone   1. DORINA HARRELL Sister   [phone]    2. AUGIE HARRELL Sister   [phone]    3. SHANI PARKS* Relative    [phone]             Health Maintenance: Routine Health maintenance Reviewed: Due/Overdue   Health Maintenance Due   Topic Date Due     ZOSTER IMMUNIZATION (2 of 3) 08/31/2009     DTAP/TDAP/TD IMMUNIZATION (2 - Td) 07/07/2012     ADVANCE CARE PLANNING  04/20/2016     PHQ-9  08/22/2018     INFLUENZA VACCINE (1) 09/01/2019       My Access Plan  Medical Emergency 911   Questions or concerns during clinic hours Primary Clinic Line, I will call the clinic directly: [facility] - [phone]   24 Hour Appointment Line [phone] or  [phone]) (toll free)   24 Hour Nurse Line [phone] (toll free)   Questions or concerns outside clinic hours 24 Hour Appointment Line, I will call the after-hours on-call line:   [facility] [phone] or [phone]) (toll-free)   Preferred Urgent Care [facility] [phone]   Preferred [facility] [place], [place]  [phone]   Preferred Pharmacy [id number] - [place], [place] - [address]      Behavioral Health Crisis Line The National Suicide Prevention Lifeline at 1-723.594.2213 or 911                     My Care Team Members  Patient Care Team       Relationship Specialty Notifications Start End    Danielle Mercado, ANTONIO CNP PCP - General Family Practice All results, Admissions 5/16/16     Phone:  [phone] Fax: [phone]         [address] [facility] [place] [address]    Ynes Rowland, APRN CNP Assigned PCP   9/22/19     Phone: [phone] Fax: [phone]         [address] [place] [address] [place] [address]    Tevin Engel, RN Clinic Care Coordinator Primary Care - CC  9/30/19     Phone: [phone] Fax: [phone]         [address] [facility] [address] [place] [address]           My Medical and Care Information  Problem List   Patient Active Problem List   Diagnosis     MIXED HYPERLIPIDEMIA     Benign neoplasm of epididymis     Malignant neoplasm of prostate (H)     Major depressive disorder, recurrent episode, severe (H)     HL (hearing loss)     Hyperlipidemia LDL goal <70     Muscle pain     Tobacco abuse     SANTIAGO (dyspnea on exertion)     Advanced directives, counseling/discussion     Generalized anxiety disorder     Right inguinal hernia     Health Care Home     HTN, goal below 140/90     COPD (chronic obstructive pulmonary disease) (H)     Thoracic aortic aneurysm without rupture (H)     Centrilobular emphysema (H)     Aortic arch aneurysm (H)     Unilateral atherosclerotic renal artery stenosis (H)     Pulmonary nodules     Diverticulosis of large intestine     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

## 2019-09-30 NOTE — LETTER
[place] CARE COORDINATION  [facility]  [address] [address]  [phone]    September 30, 2019    Adolfo Rendon  [address] [facility] [address]      Dear Adolfo,    I am a clinic care coordinator who works with ANTONIO Carranza CNP at [facility]. I recently tried to call and was unable to reach you. I wanted to introduce myself and provide you with my contact information so that you can call me with questions or concerns about your health care. Below is a description of clinic care coordination and how I can further assist you.     The clinic care coordinator is a registered nurse and/or  who understand the health care system. The goal of clinic care coordination is to help you manage your health and improve access to the [facility] system in the most efficient manner. The registered nurse can assist you in meeting your health care goals by providing education, coordinating services, and strengthening the communication among your providers. The  can assist you with financial, behavioral, psychosocial, chemical dependency, counseling, and/or psychiatric resources.    Please feel free to contact me at [phone], with any questions or concerns. We at [facility] are focused on providing you with the highest-quality healthcare experience possible and that all starts with you.     Sincerely,     To Engel RN  Clinic Care Coordinator    Enclosed: I have enclosed a copy of a 24 Hour Access Plan. This has helpful phone numbers for you to call when needed. Please keep this in an easy to access place to use as needed.

## 2019-10-01 ENCOUNTER — HOSPITAL ENCOUNTER (OUTPATIENT)
Dept: CARDIAC REHAB | Facility: CLINIC | Age: 74
End: 2019-10-01
Attending: PHYSICIAN ASSISTANT
Payer: COMMERCIAL

## 2019-10-01 VITALS — WEIGHT: 140.69 LBS | BODY MASS INDEX: 21.32 KG/M2 | HEIGHT: 68 IN

## 2019-10-01 PROCEDURE — 40000575 ZZH STATISTIC OP CARDIAC VISIT #2

## 2019-10-01 PROCEDURE — 93797 PHYS/QHP OP CAR RHAB WO ECG: CPT | Mod: 59

## 2019-10-01 PROCEDURE — 93798 PHYS/QHP OP CAR RHAB W/ECG: CPT

## 2019-10-01 PROCEDURE — 40000116 ZZH STATISTIC OP CR VISIT

## 2019-10-01 ASSESSMENT — 6 MINUTE WALK TEST (6MWT)
PREDICTED: 1713.76
MALE CALC: 1703.38
FEMALE CALC: 1518.66
GENDER SELECTION: MALE
TOTAL DISTANCE WALKED (FT): 1045

## 2019-10-01 ASSESSMENT — MIFFLIN-ST. JEOR: SCORE: 1357.55

## 2019-10-01 NOTE — PROGRESS NOTES
Clinic Care Coordination Contact  [facility]/Voicemail    Referral Source: IP Report  Clinical Data: Care Coordinator Outreach  Outreach attempted x 2.  Left message on patient's voicemail with call back information and requested return call.  Plan: Care Coordinator sent care coordination introduction letter on 9-30 via mail. Care Coordinator will do no further outreaches at this time.  To Engel RN  Primary Care Clinic RN Care Coordinator  [facility]   [phone]

## 2019-10-01 NOTE — PROGRESS NOTES
10/01/19 0900   Session   Session Initial Evaluation and Exercise Prescription   Certified through this date 10/30/19   Cardiac Rehab Assessment   Cardiac Rehab Assessment 10/1: Pt is 1 month post CABG x4. Pts goals include decreasing salt intake and starting home exercise. Pt is 45-50% EF and is being medically treated for anxiety and depression; managing on Paxil.   General Information   Treatment Diagnosis Coronary Artery Bypass Surgery   Date of Treatment Diagnosis 09/03/19   Significant Past CV History None   Comorbidities Pulmonary Disease   Other Medical History COPD, Emphysema, HLD, HTN, SANTIAGO, Tobacco Use, Depression, Anxiety, Anemia   Lead up symptoms Weakness, SOB, SANTIAGO, decreased stamina   Hospital Location [facility] Discharge Date 09/09/19   Signs and Symptoms Post Hospital Discharge SOB;Appetite   Outpatient Cardiac Rehab Start Date 10/01/19   Primary Physician Danielle Mercado MD   Primary Physician Follow Up NA   Surgeon Lucy Espino MD   Surgeon Follow Up Scheduled   Cardiologist Lucy Espino MD   Cardiologist Follow Up Scheduled   Ejection Fraction 45-50%   Risk Stratification High   Summary of Cath Report   Summary of Cath Report Available   Date Performed 09/03/19   Cath Report Comments CABG x4: LIMA-LAD, SVG-OM, SVG-PDA, SVG-JORDON   Living and Work Status    Living Arrangements and Social Status house   Support System Live alone   Return to Employment Retired   Fall Risk Screen   Fall screen completed by Cardiac Rehab   Have you fallen 2 or more times in the past year? No   Have you fallen and had an injury in the past year? No   Timed Up and Go score (seconds) N/A   Is patient a fall risk? No   Abuse Screen (yes response referral indicated)   Feels Unsafe at Home or Work/School no   Feels Threatened by Someone no   Does Anyone Try to Keep You From Having Contact with Others or Doing Things Outside Your Home? no   Physical Signs of Abuse Present no   Pain   Patient Currently in  "Pain No   Physical Assessments   Incisions WNL   Edema None   Right Lung Sounds not assessed   Left Lung Sounds not assessed   Limitations No limitations   Individualized Treatment Plan   Monitored Sessions Scheduled 24   Monitored Sessions Attended 1   Oxygen   Supplemental Oxygen needed No   Nutrition Management - Weight Management   Assessment Initial Assessment   Age 73   Weight 63.8 kg (140 lb 11.1 oz)   Height 1.727 m (5' 7.99\")   BMI (Calculated) 21.4   Initial Rate Your Plate Score. Dietary tool to assess eating patterns. Scores range from 24 to 72. The higher the score the healthier the eating pattern. 48   Nutrition Management - Lipids   Lipids Labs Available   Date 07/25/19   Total Cholesterol 227   Triglycerides 101   HDL 57      Nutrition Management - Diabetes   Diabetes No   Nutrition Management Summary   Dietary Recommendations None   Stages of Change for Diet Compliance NA   Interventions Planned Attend Nutrition Education Class(es);Complete Food Diary;Instruct on Label Reading   Patient Goals Goal #1   Goal #1 Description 10/1: Pt will decrease sodium to 2000mg daily by logging food intake and restricting salt usage.   Goal #1 Target Date 12/01/19   Nutrition Summary Comments 10/1: Pts diet consists of, cereal, eggs and ramirez; fried pork chops and hotdishes. Lean proteins and fried meats.  or chinese food   Psychosocial Management   Psychosocial Assessment Initial   Is there history of clinical depression or increased risk of depression? History of clinical depression   Current Level of Stress per Patient Report Mild   Current Coping Skills Is on Medication for Depression/Anxiety;Uses Stress Management/Relaxation Techniques   Initial Patient Health Questionnaire -9 Score (PHQ-9) for depression. 5-9 Minimal symptoms, 10-14 Minor depression, 15-19 Major depression, moderately severe, > 20 Major depression, severe  3   Initial [facility] Survey score.  Quality of Life:   If " total score > 25 review individual areas where patient rated a 4 or 5.  Consider patients current medical condition and what role that plays on the score.   Adjust treatment protocol to improve areas of concern.  Consider the following:  PHQ9 score, DASI, and re-assessment within the next 30 days to assist with developing treatments.  26   Stages of Change Action   Interventions Planned Reassess PHQ-9 and/or [facility] COOP Surveys if outside of defined limits   Interventions In Progress or Completed Patient verbalizes understanding of behavioral assessment scores;Patient continues to practice/follow through with strategies to reduce stress and/or anxiety level;Pt recognizes signs and symptoms of depression   Psychosocial Comments 10/1: Pt is on Paxil for anxiety and depression and managing well he reports. Pt also uses nature and time with his dog to deal with stress.   Other Core Components - Hypertension   History of or Diagnosis of Hypertension Yes   Currently taking Anti-Hypertensives Yes;Ace Inhibitor   Other Core Components - Tobacco   History of Tobacco Use Yes   Quit Date or Planned Quit Date 08/27/19   Tobacco Use Status Recent (Quit < 6 mo ago)   Tobacco Habit Cigarettes   Tobacco Use per Day (average) 1   Years of Tobacco Use 30   Stages of Change Action   Tobacco Comments Nicorette Gum   Other Core Components Summary   Interventions Planned Educate on importance of maintaining low sodium diet   Activity/Exercise History   Activity/Exercise Assessment Initial   Activity/Exercise Status prior to event? Sedentary   Number of Days Currently participating in Moderate Physical Activity? 0   Number of Days Currently performing  Aerobic Exercise (including rehab)? 0   Number of Minutes per Session Currently of Aerobic Exercise (average)? 3-5 minutes of walking to and from mailbox   Current Stage of Change (Physical Activity) Preparation   Current Stage of Change (Aerobic Exercise) Preparation   Patient Goals  Goal #2   Goal #2 Description 10/1: Pt will maintain walking at home 3days/wk for 20minutes; working up to 20 minutes   Goal #2 Target Date 12/01/19   Activity/Exercise Comments Pt has a treadmill    Exercise Assessment   6 Minute Walk Predicted - Gender Selection Male   6 Minute Walk Predicted (Male) 1703.38   Initial 6 Minute Walk Distance (Feet) 1045 ft   Resting HR 68 bpm   Exercise HR 85 bpm   Post Exercise HR 87 bpm   Resting /78   Exercise /80   Post Exercise /78   Effort Rating 5/10   Current MET Level 2.5   MET Level Goal 4-5   ECG Rhythm Normal sinus rhythm   Ectopy None   Current Symptoms Denies symptoms   Limitations/Restrictions Sternal Precautions   Exercise Prescription   Mode Treadmill;Nustep;Weights   Duration/Time 15-30 min   Frequency 2 days/week   THR (85% of age predicted max HR) 124.95   OMNI Effort Rating (0-10 Scale) 4-6/10   Progression Total exercise time of 20-30 minutes;Intermittent bouts;Aerobic exercise to OMNI rating of 6 or below and at or below THR;Progress peak intensity by 1/4 MET per week   Comments 10/1: No arms on Nustep until 10/15/19    Recommended Home Exercise   Type of Exercise Walking   Frequency (days per week) 3days/week   Duration (minutes per session) 15-30 min   Effort Rating Recommended 4-6/10   30 Day Exercise Plan 10/1: Increase walking bouts to be able to tolerate 20minutes of continuous walking   Current Home Exercise   Type of Exercise None   Learning Assessment   Learner Patient   Patient Education   Education recommended Blood Pressure;Nutrition     The patient's history and clinical status including hemodynamics and ECG were evaluated. The patient was assessed to be stable and appropriate to begin exercise.   The patient's functional capacity and exercise prescription were determined by the completion of the 6 minute walk test. The patient was oriented to the program.  Risk factor profile was completed. Goals and objectives were discussed.  CV response was WNL. No symptoms, complaints or pain were reported. Good prognosis for reaching goals below. Skilled therapy is necessary in order to monitor CV response to exercise >3METs, to provide education on risk factors, sodium intake, blood pressure, and nutrition, and behavior change counseling needed to achieve patient's goals.  Plan to progress to 30-40 minutes of exercise prior to discharge from cardiac rehab.  Initial THR of 20-30 beats above RHR; Effort rating of 4-6. Initiate muscle conditioning as appropriate. Provide risk factor education and behavior change counseling.     I have established, reviewed and made necessary changes to the individualized treatment plan and exercise prescription for this patient.      Physician Signature: ___________________________________________  Date: ______________

## 2019-10-03 ENCOUNTER — HOSPITAL ENCOUNTER (OUTPATIENT)
Dept: CARDIAC REHAB | Facility: CLINIC | Age: 74
End: 2019-10-03
Attending: PHYSICIAN ASSISTANT
Payer: COMMERCIAL

## 2019-10-03 PROCEDURE — 93798 PHYS/QHP OP CAR RHAB W/ECG: CPT | Performed by: REHABILITATION PRACTITIONER

## 2019-10-03 PROCEDURE — 40000116 ZZH STATISTIC OP CR VISIT: Performed by: REHABILITATION PRACTITIONER

## 2019-10-08 ENCOUNTER — HOSPITAL ENCOUNTER (OUTPATIENT)
Dept: CARDIAC REHAB | Facility: CLINIC | Age: 74
End: 2019-10-08
Attending: PHYSICIAN ASSISTANT
Payer: COMMERCIAL

## 2019-10-08 PROCEDURE — 40000116 ZZH STATISTIC OP CR VISIT: Performed by: REHABILITATION PRACTITIONER

## 2019-10-08 PROCEDURE — 93798 PHYS/QHP OP CAR RHAB W/ECG: CPT | Performed by: REHABILITATION PRACTITIONER

## 2019-10-10 ENCOUNTER — HOSPITAL ENCOUNTER (OUTPATIENT)
Dept: CARDIAC REHAB | Facility: CLINIC | Age: 74
End: 2019-10-10
Attending: PHYSICIAN ASSISTANT
Payer: COMMERCIAL

## 2019-10-10 PROCEDURE — 40000116 ZZH STATISTIC OP CR VISIT: Performed by: REHABILITATION PRACTITIONER

## 2019-10-10 PROCEDURE — 93798 PHYS/QHP OP CAR RHAB W/ECG: CPT | Performed by: REHABILITATION PRACTITIONER

## 2019-10-15 ENCOUNTER — HOSPITAL ENCOUNTER (OUTPATIENT)
Dept: CARDIAC REHAB | Facility: CLINIC | Age: 74
End: 2019-10-15
Attending: PHYSICIAN ASSISTANT
Payer: COMMERCIAL

## 2019-10-15 ENCOUNTER — IMMUNIZATION (OUTPATIENT)
Dept: FAMILY MEDICINE | Facility: CLINIC | Age: 74
End: 2019-10-15
Payer: COMMERCIAL

## 2019-10-15 PROCEDURE — 90662 IIV NO PRSV INCREASED AG IM: CPT

## 2019-10-15 PROCEDURE — G0008 ADMIN INFLUENZA VIRUS VAC: HCPCS

## 2019-10-15 PROCEDURE — 40000116 ZZH STATISTIC OP CR VISIT

## 2019-10-15 PROCEDURE — 93798 PHYS/QHP OP CAR RHAB W/ECG: CPT

## 2019-10-17 ENCOUNTER — HOSPITAL ENCOUNTER (OUTPATIENT)
Dept: CARDIAC REHAB | Facility: CLINIC | Age: 74
End: 2019-10-17
Attending: PHYSICIAN ASSISTANT
Payer: COMMERCIAL

## 2019-10-17 PROCEDURE — 93798 PHYS/QHP OP CAR RHAB W/ECG: CPT

## 2019-10-17 PROCEDURE — 40000116 ZZH STATISTIC OP CR VISIT

## 2019-10-18 ENCOUNTER — ALLIED HEALTH/NURSE VISIT (OUTPATIENT)
Dept: FAMILY MEDICINE | Facility: CLINIC | Age: 74
End: 2019-10-18
Payer: COMMERCIAL

## 2019-10-18 VITALS — SYSTOLIC BLOOD PRESSURE: 148 MMHG | HEART RATE: 68 BPM | DIASTOLIC BLOOD PRESSURE: 90 MMHG

## 2019-10-18 DIAGNOSIS — I10 BENIGN ESSENTIAL HYPERTENSION: Primary | ICD-10-CM

## 2019-10-18 PROCEDURE — 99207 ZZC NO CHARGE NURSE ONLY: CPT | Performed by: NURSE PRACTITIONER

## 2019-10-18 NOTE — PROGRESS NOTES
Adolfo KIARA Rendon was evaluated at [facility] on October 18, 2019 at which time his blood pressure was:    BP Readings from Last 3 Encounters:   10/18/19 (!) 148/90   09/23/19 104/66   09/19/19 (!) 140/94     Pulse Readings from Last 3 Encounters:   10/18/19 68   09/23/19 66   09/19/19 80       Reviewed lifestyle modifications for blood pressure control and reduction: including making healthy food choices, managing weight, getting regular exercise, smoking cessation, reducing alcohol consumption, monitoring blood pressure regularly.     Symptoms: None    BP Goal:< 140/90 mmHg    BP Assessment:  BP too high    Potential Reasons for BP too high: Unknown/Other: unknown    BP Follow-Up Plan: Recheck BP in 30 days at pharmacy    Recommendation to Provider: Patient has been running a little high recently.  Will try to recheck at pharmacy in a month    Note completed by: Rupesh Nur [facility]  [phone]

## 2019-10-22 ENCOUNTER — HOSPITAL ENCOUNTER (OUTPATIENT)
Dept: CARDIAC REHAB | Facility: CLINIC | Age: 74
End: 2019-10-22
Attending: PHYSICIAN ASSISTANT
Payer: COMMERCIAL

## 2019-10-22 PROCEDURE — 93798 PHYS/QHP OP CAR RHAB W/ECG: CPT

## 2019-10-22 PROCEDURE — 40000116 ZZH STATISTIC OP CR VISIT

## 2019-10-24 ENCOUNTER — HOSPITAL ENCOUNTER (OUTPATIENT)
Dept: CARDIAC REHAB | Facility: CLINIC | Age: 74
End: 2019-10-24
Attending: PHYSICIAN ASSISTANT
Payer: COMMERCIAL

## 2019-10-24 PROCEDURE — 93798 PHYS/QHP OP CAR RHAB W/ECG: CPT | Performed by: REHABILITATION PRACTITIONER

## 2019-10-24 PROCEDURE — 93797 PHYS/QHP OP CAR RHAB WO ECG: CPT | Mod: 59 | Performed by: REHABILITATION PRACTITIONER

## 2019-10-24 PROCEDURE — 40000116 ZZH STATISTIC OP CR VISIT: Performed by: REHABILITATION PRACTITIONER

## 2019-10-24 PROCEDURE — 40000575 ZZH STATISTIC OP CARDIAC VISIT #2: Performed by: REHABILITATION PRACTITIONER

## 2019-10-29 ENCOUNTER — HOSPITAL ENCOUNTER (OUTPATIENT)
Dept: CARDIAC REHAB | Facility: CLINIC | Age: 74
End: 2019-10-29
Attending: PHYSICIAN ASSISTANT
Payer: COMMERCIAL

## 2019-10-29 VITALS — BODY MASS INDEX: 20.7 KG/M2 | WEIGHT: 136.6 LBS | HEIGHT: 68 IN

## 2019-10-29 PROCEDURE — 40000116 ZZH STATISTIC OP CR VISIT: Performed by: REHABILITATION PRACTITIONER

## 2019-10-29 PROCEDURE — 93798 PHYS/QHP OP CAR RHAB W/ECG: CPT | Performed by: REHABILITATION PRACTITIONER

## 2019-10-29 ASSESSMENT — MIFFLIN-ST. JEOR: SCORE: 1333.98

## 2019-10-29 ASSESSMENT — 6 MINUTE WALK TEST (6MWT)
TOTAL DISTANCE WALKED (FT): 1045
FEMALE CALC: 1532.64
MALE CALC: 1714.12
PREDICTED: 1724.57
GENDER SELECTION: MALE

## 2019-10-30 NOTE — PROGRESS NOTES
10/29/19 2000   Session   Session 30 Day Individualized Treatment Plan   Certified through this date 11/27/19   Cardiac Rehab Assessment   Cardiac Rehab Assessment Patient attended 9 sessions thus far. Overall is doing well. Progressing to peak of 3 METs and feels ready to advance. Pt is close to 8 weeks post surgery thus will be able to advance lifting restrictions. Pt is exercising at home consistently. Pt does report a low appetite. Is using boost/ensure to supplement. Continues to be smoke free, is eating low sodium diet. Skilled services necessary to monitor CV response while advancing workloads closer to 4 METs, provide education on risk factors and continue to support smoke free lifestyle and low sodium eating.   General Information   Treatment Diagnosis Coronary Artery Bypass Surgery   Date of Treatment Diagnosis 09/03/19   Significant Past CV History None   Comorbidities Pulmonary Disease   Other Medical History COPD, Emphysema, HLD, HTN, SANTIAGO, Tobacco Use, Depression, Anxiety, Anemia   Lead up symptoms Weakness, SOB, SANTIAGO, decreased stamina   Hospital Location [facility]   Hospital Discharge Date 09/09/19   Signs and Symptoms Post Hospital Discharge SOB;Appetite   Outpatient Cardiac Rehab Start Date 10/01/19   Primary Physician Danielle Mercado MD   Primary Physician Follow Up NA   Surgeon Lucy Espino MD   Surgeon Follow Up Scheduled   Cardiologist Lucy Espino MD   Cardiologist Follow Up Scheduled   Ejection Fraction 45-50%   Risk Stratification High   Summary of Cath Report   Summary of Cath Report Available   Date Performed 09/03/19   Cath Report Comments CABG x4: LIMA-LAD, SVG-OM, SVG-PDA, SVG-JORDON   Living and Work Status    Living Arrangements and Social Status house   Support System Live alone   Return to Employment Retired   Fall Risk Screen   Fall screen completed by Cardiac Rehab   Have you fallen 2 or more times in the past year? No   Have you fallen and had an injury in the past year?  "No   Timed Up and Go score (seconds) N/A   Is patient a fall risk? No   Abuse Screen (yes response referral indicated)   Feels Threatened by Someone no   Does Anyone Try to Keep You From Having Contact with Others or Doing Things Outside Your Home? no   Physical Signs of Abuse Present no   Pain   Patient Currently in Pain No   Physical Assessments   Incisions WNL   Edema None   Right Lung Sounds not assessed   Left Lung Sounds not assessed   Limitations No limitations   Individualized Treatment Plan   Monitored Sessions Scheduled 24   Monitored Sessions Attended 9   Oxygen   Supplemental Oxygen needed No   Nutrition Management - Weight Management   Assessment Re-assessment   Age 73   Weight 62 kg (136 lb 9.6 oz)   Height 1.727 m (5' 7.99\")   BMI (Calculated) 20.77   Goal Weight 70.3 kg (155 lb)   Initial Rate Your Plate Score. Dietary tool to assess eating patterns. Scores range from 24 to 72. The higher the score the healthier the eating pattern. 48   Weight Management Comments 10/29: Pt feels he is losing weight, prefers to not lose any additional weight. Pt does report he doesn't have a big appetite. Is drinking ensure/boost with breakfast. Does occ skip meals and was encourage to replace any meals skipped with boost/ensure.    Nutrition Management - Lipids   Lipids Labs Available   Date 07/25/19   Total Cholesterol 227   Triglycerides 101   HDL 57      Nutrition Management - Diabetes   Diabetes No   Nutrition Management Summary   Dietary Recommendations None   Stages of Change for Diet Compliance NA   Interventions Planned Attend Nutrition Education Class(es);Complete Food Diary;Instruct on Label Reading   Patient Goals Goal #1   Goal #1 Description 10/1: Pt will decrease sodium to 2000mg daily by    Goal #1 Target Date 12/01/19   Goal #1 Progress Towards Goal 10/29: Pt is watching food labels closely.  Pt doesn't eat a lot of processed foods. Does eat pizza/chinese maybe 2x/week on average. Pt would be " interested in low sodium food handouts.   Nutrition Summary Comments 10/1: Pts diet consists of, cereal, eggs and ramirez; fried pork chops and hotdishes. Lean proteins and fried meats.  or chinese food   Nutrition Target Outcome Total Chol < 150, HDL > 40 (M), HDL > 50 (W), LDL < 70, Trig < 150   Psychosocial Management   Psychosocial Assessment Re-assessment   Is there history of clinical depression or increased risk of depression? History of clinical depression   Current Level of Stress per Patient Report Mild   Current Coping Skills Is on Medication for Depression/Anxiety;Uses Stress Management/Relaxation Techniques   Initial Patient Health Questionnaire -9 Score (PHQ-9) for depression. 5-9 Minimal symptoms, 10-14 Minor depression, 15-19 Major depression, moderately severe, > 20 Major depression, severe  3   Initial [employer] COOP Survey score.  Quality of Life:   If total score > 25 review individual areas where patient rated a 4 or 5.  Consider patients current medical condition and what role that plays on the score.   Adjust treatment protocol to improve areas of concern.  Consider the following:  PHQ9 score, DASI, and re-assessment within the next 30 days to assist with developing treatments.  26   Stages of Change Action   Interventions Planned Reassess PHQ-9 and/or [employer] COOP Surveys if outside of defined limits   Interventions In Progress or Completed Patient verbalizes understanding of behavioral assessment scores;Patient continues to practice/follow through with strategies to reduce stress and/or anxiety level;Pt recognizes signs and symptoms of depression   Psychosocial Comments 10/1: Pt is on Paxil for anxiety and depression and managing well he reports. Pt also uses nature and time with his dog to deal with stress.   Other Core Components - Hypertension   History of or Diagnosis of Hypertension Yes   Currently taking Anti-Hypertensives Yes;Ace Inhibitor   Other Core Components -  Tobacco   History of Tobacco Use Yes   Quit Date or Planned Quit Date 08/27/19   Tobacco Use Status Recent (Quit < 6 mo ago)   Tobacco Habit Cigarettes   Tobacco Use per Day (average) 1   Years of Tobacco Use 30   Stages of Change Action   Tobacco Comments 10/29: Pt reports he is out of nicorette gum. Is considering purchasing more. Pt originally had prescription of this but hadn't filled it. Pt states he gets cravings once in a while but hasn't had concerns he will return to smoking. 10/1: Nicorette Gum   Other Core Components Summary   Interventions Planned Educate on importance of maintaining low sodium diet;Attend education class on Blood Pressure   Patient Goals No   Other Core Components Target Outcome BP < 140/90 or < 130/80 with DM or CKD;Complete Smoking Cessation   Activity/Exercise History   Activity/Exercise Assessment Re-assessment   Activity/Exercise Status prior to event? Sedentary   Number of Days Currently participating in Moderate Physical Activity? 3   Number of Days Currently performing  Aerobic Exercise (including rehab)? 5   Number of Minutes per Session Currently of Aerobic Exercise (average)? 40   Current Stage of Change (Physical Activity) Action   Current Stage of Change (Aerobic Exercise) Action   Patient Goals Goal #2   Goal #2 Description 10/1: Pt will maintain walking at home 3days/wlk for 20minutes; working up to 20 minutes   Goal #2 Target Date 12/01/19   Goal #2 Progress Towards Goal 10/29: Pt is walking outside for 1-2 miles. He has treadmill and will use when weather gets cooler.   Activity/Exercise Comments Pt has a treadmill    Activity/Exercise Target Outcome An Accumulation of 150  Minutes of Aerobic Activity per Week   Exercise Assessment   6 Minute Walk Predicted - Gender Selection Male   6 Minute Walk Predicted (Male) 1714.12   6 Minute Walk Predicted (Female) 1532.64   Initial 6 Minute Walk Distance (Feet) 1045 ft   Resting HR 68 bpm   Exercise HR 94 bpm   Post Exercise HR  72 bpm   Resting /90   Exercise /84   Post Exercise /76   Effort Rating 5/10   Current MET Level 3.0   MET Level Goal 4-5   ECG Rhythm Normal sinus rhythm   Ectopy None   Current Symptoms Denies symptoms   Limitations/Restrictions Sternal Precautions   Exercise Prescription   Mode Treadmill;Nustep;Weights;Recumbant bike   Duration/Time 15-30 min   Frequency 2 days/week   THR (85% of age predicted max HR) 124.95   OMNI Effort Rating (0-10 Scale) 4-6/10   Progression Total exercise time of 20-30 minutes;Intermittent bouts;Aerobic exercise to OMNI rating of 6 or below and at or below THR;Progress peak intensity by 1/4 MET per week   Comments 10/1: No arms on Nustep until 10/15/19    Recommended Home Exercise   Type of Exercise Walking   Frequency (days per week) 3days/week   Duration (minutes per session) 15-30 min   Effort Rating Recommended 4-6/10   30 Day Exercise Plan 10/29: PT is walking 1/2 to 1 mile distance. 10/1: Increase walking bouts to be able to tolerate 20minutes of continious walking   Current Home Exercise   Type of Exercise Walking   Frequency (days per week) 2-3d   Duration (minutes per session) 20-30 min   Follow-up/On-going Support   Provider follow-up needed on the following No follow-up needed   Learning Assessment   Learner Patient   Primary Language English   Patient Education   Education recommended Blood Pressure;Nutrition   Education classes attended Anatomy and Physiology of the Heart     Physician cosignature/electronic signature indicates approval of this ITP document. I have established, reviewed and made necessary changes to the individualized treatment plan and exercise prescription for this patient.

## 2019-10-31 ENCOUNTER — HOSPITAL ENCOUNTER (OUTPATIENT)
Dept: CARDIAC REHAB | Facility: CLINIC | Age: 74
End: 2019-10-31
Attending: PHYSICIAN ASSISTANT
Payer: COMMERCIAL

## 2019-10-31 PROCEDURE — 40000575 ZZH STATISTIC OP CARDIAC VISIT #2: Performed by: REHABILITATION PRACTITIONER

## 2019-10-31 PROCEDURE — 40000116 ZZH STATISTIC OP CR VISIT: Performed by: REHABILITATION PRACTITIONER

## 2019-10-31 PROCEDURE — 93797 PHYS/QHP OP CAR RHAB WO ECG: CPT | Performed by: REHABILITATION PRACTITIONER

## 2019-10-31 PROCEDURE — 93798 PHYS/QHP OP CAR RHAB W/ECG: CPT | Performed by: REHABILITATION PRACTITIONER

## 2019-11-05 ENCOUNTER — OFFICE VISIT (OUTPATIENT)
Dept: CARDIOLOGY | Facility: CLINIC | Age: 74
End: 2019-11-05
Payer: COMMERCIAL

## 2019-11-05 VITALS
BODY MASS INDEX: 20.87 KG/M2 | SYSTOLIC BLOOD PRESSURE: 130 MMHG | OXYGEN SATURATION: 98 % | HEART RATE: 65 BPM | DIASTOLIC BLOOD PRESSURE: 81 MMHG | WEIGHT: 137.2 LBS

## 2019-11-05 DIAGNOSIS — I50.22 CHRONIC SYSTOLIC HEART FAILURE (H): ICD-10-CM

## 2019-11-05 DIAGNOSIS — I25.810 CORONARY ARTERY DISEASE INVOLVING AUTOLOGOUS ARTERY CORONARY BYPASS GRAFT WITHOUT ANGINA PECTORIS: Primary | ICD-10-CM

## 2019-11-05 PROCEDURE — 99214 OFFICE O/P EST MOD 30 MIN: CPT | Performed by: INTERNAL MEDICINE

## 2019-11-05 NOTE — LETTER
11/5/2019    Danielle Mercado, ANTONIO CNP  [address] [facility] [address]    RE: Adolfo Rendon       Dear Colleague,    I had the pleasure of seeing Adolfo Rendon in the [facility].    HPI and Plan:   See dictation [id number]    Orders Placed This Encounter   Procedures     Basic metabolic panel     Lipid Profile     ALT     AST     N terminal pro BNP outpatient     Follow-Up with Cardiologist     Echocardiogram Complete     Orders Placed This Encounter   Medications     evolocumab (REPATHA) 140 MG/ML prefilled autoinjector     Sig: Inject 140 mg Subcutaneous every 14 days     There are no discontinued medications.      Encounter Diagnoses   Name Primary?     Coronary artery disease involving autologous artery coronary bypass graft without angina pectoris Yes     Chronic systolic heart failure (H)        CURRENT MEDICATIONS:  Current Outpatient Medications   Medication Sig Dispense Refill     acetaminophen (TYLENOL) 325 MG tablet Take 2 tablets (650 mg) by mouth every 4 hours as needed for other (multimodal surgical pain management along with NSAIDS and opioid medication as indicated based on pain control and physical function.)       aspirin (ASA) 325 MG tablet ONE DAILY 30 tablet 3     carvedilol (COREG) 12.5 MG tablet Take 1 tablet (12.5 mg) by mouth 2 times daily (with meals) 60 tablet 3     evolocumab (REPATHA) 140 MG/ML prefilled autoinjector Inject 140 mg Subcutaneous every 14 days       ezetimibe (ZETIA) 10 MG tablet Take 1 tablet (10 mg) by mouth daily 30 tablet 11     ipratropium (ATROVENT HFA) 17 MCG/ACT inhaler Inhale 2 puffs into the lungs every 6 hours 12.9 g 5     lisinopril (PRINIVIL/ZESTRIL) 5 MG tablet Take 1 tablet (5 mg) by mouth daily       methocarbamol (ROBAXIN) 500 MG tablet Take 1 tablet (500 mg) by mouth 4 times daily as needed for muscle spasms 20 tablet 0     nicotine polacrilex (NICORETTE) 4 MG gum Place 4 mg inside cheek every hour as needed for  "smoking cessation       pantoprazole (PROTONIX) 40 MG EC tablet Take 1 tablet (40 mg) by mouth every morning (before breakfast) 14 tablet 0     PARoxetine (PAXIL) 20 MG tablet Take 1 tablet (20 mg) by mouth every morning Add to 40 mg daily 90 tablet 1     PARoxetine (PAXIL) 40 MG tablet Take 1 tablet (40 mg) by mouth every morning Add to 20 mg daily 90 tablet 1     salmeterol (SEREVENT) 50 MCG/DOSE inhaler Inhale 1 puff into the lungs 2 times daily 60 each 5     senna-docusate (SENOKOT-S/PERICOLACE) 8.6-50 MG tablet Take 1 tablet by mouth 2 times daily as needed for constipation 30 tablet 0       ALLERGIES     Allergies   Allergen Reactions     Amlodipine Fatigue     \"felt like zombie\"     Claritin      Mood , irritablity      Hctz [Hydrochlorothiazide] Fatigue     \"felt like zombie\"     Lisinopril Fatigue     \"felt like zombie\"     Metoprolol Fatigue     \"felt like zombie\"     Pravastatin Fatigue     \"felt like zombie\"     Remeron [Mirtazapine]      Agitation        Wellbutrin [Bupropion Hcl]      Sig mood issues          PAST MEDICAL HISTORY:  Past Medical History:   Diagnosis Date     Coronary artery disease      Depressive disorder, not elsewhere classified      Hypertrophy (benign) of prostate      Impotence of organic origin      Other and unspecified hyperlipidemia      Other anxiety states      Tobacco use disorder        PAST SURGICAL HISTORY:  Past Surgical History:   Procedure Laterality Date     APPENDECTOMY  1966     BYPASS GRAFT ARTERY CORONARY N/A 9/3/2019    Procedure: CORONARY ARTERY BYPASS GRAFT X 4 - LIMA TO LAD, SV TO PL, OM, PDA ; ON PUMP WITH TERRENCE; ENDOVEIN HARVEST RIGHT LEG;  Surgeon: Lai Mchugh MD;  Location:  OR     COLONOSCOPY  3/1/2005     COLONOSCOPY N/A 5/9/2019    Procedure: COLONOSCOPY;  Surgeon: Camilo Beard MD;  Location: [facility]     CV LEFT HEART CATH N/A 8/8/2019    Procedure: Left Heart Cath--also measure LVEDP;  Surgeon: Krystle Barrera MD;  Location: [facility]" HEART CARDIAC CATH LAB     HERNIORRHAPHY INGUINAL  2011    Procedure:HERNIORRHAPHY INGUINAL; Open Repair Right Inguinal Hernia Repair        HYDROCELECTOMY SCROTAL  2004    left hydrocele repair     SUPRAPUBIC PROSTATECTOMY         FAMILY HISTORY:  Family History   Problem Relation Age of Onset     Cerebrovascular Disease Mother      Hypertension Mother      Prostate Cancer Brother      Dementia Brother      Arthritis Sister         rhematoid     Cancer Brother         Lung cancer, lymphoma     Other - See Comments Brother         HIV     Cancer Sister         skin cancer on nose     Pacemaker Sister      Aneurysm Sister         heart       SOCIAL HISTORY:  Social History     Socioeconomic History     Marital status:      Spouse name: None     Number of children: None     Years of education: None     Highest education level: None   Occupational History     None   Social Needs     Financial resource strain: None     Food insecurity:     Worry: None     Inability: None     Transportation needs:     Medical: None     Non-medical: None   Tobacco Use     Smoking status: Former Smoker     Packs/day: 1.00     Types: Cigarettes     Last attempt to quit: 2017     Years since quittin.8     Smokeless tobacco: Former User     Quit date: 2017   Substance and Sexual Activity     Alcohol use: No     Alcohol/week: 0.0 standard drinks     Comment: quit      Drug use: No     Sexual activity: Not Currently   Lifestyle     Physical activity:     Days per week: None     Minutes per session: None     Stress: None   Relationships     Social connections:     Talks on phone: None     Gets together: None     Attends [employer] service: None     Active member of club or organization: None     Attends meetings of clubs or organizations: None     Relationship status: None     Intimate partner violence:     Fear of current or ex partner: None     Emotionally abused: None     Physically abused: None     Forced  sexual activity: None   Other Topics Concern     Parent/sibling w/ CABG, MI or angioplasty before 65F 55M? Not Asked      Service Not Asked     Blood Transfusions Not Asked     Caffeine Concern No     Comment: 2-3 cups of coffee in the morning     Occupational Exposure Not Asked     Hobby Hazards Not Asked     Sleep Concern No     Comment: falls asleep easy but doesn't sleep long     Stress Concern No     Weight Concern No     Special Diet No     Back Care Not Asked     Exercise No     Comment: work: hard labor     Bike Helmet Not Asked     Seat Belt Yes     Self-Exams Not Asked   Social History Narrative     None       Review of Systems:  Skin:  Negative     Eyes:  Positive for glasses  ENT:  Positive for hearing loss;nasal congestion;tinnitus  Respiratory:  Positive for dyspnea on exertion  Cardiovascular:    Positive for;lightheadedness  Gastroenterology: Negative    Genitourinary:  Negative    Musculoskeletal:  Negative    Neurologic:  Positive for seizures  Psychiatric:  Positive for sleep disturbances;anxiety;depression  Heme/Lymph/Imm:  Negative    Endocrine:  Negative      Physical Exam:  Vitals: /81 (BP Location: Right arm, Patient Position: Sitting, Cuff Size: Adult Regular)   Pulse 65   Wt 62.2 kg (137 lb 3.2 oz)   SpO2 98%   BMI 20.87 kg/m       Recent Lab Results:  LIPID RESULTS:  Lab Results   Component Value Date    CHOL 227 (H) 07/25/2019    HDL 57 07/25/2019     (H) 07/25/2019    TRIG 101 07/25/2019    CHOLHDLRATIO 4.1 03/24/2015       LIVER ENZYME RESULTS:  Lab Results   Component Value Date    AST 26 09/04/2019    ALT 14 09/04/2019       CBC RESULTS:  Lab Results   Component Value Date    WBC 5.3 09/08/2019    RBC 3.70 (L) 09/08/2019    HGB 9.3 (L) 09/16/2019    HCT 29.5 (L) 09/16/2019    MCV 83 09/08/2019    MCH 28.1 09/08/2019    MCHC 33.8 09/08/2019    RDW 14.2 09/08/2019     09/08/2019       BMP RESULTS:  Lab Results   Component Value Date     09/08/2019     POTASSIUM 3.6 09/09/2019    CHLORIDE 106 09/08/2019    CO2 29 09/08/2019    ANIONGAP 3 09/08/2019     (H) 09/08/2019    BUN 16 09/08/2019    CR 0.84 09/08/2019    GFRESTIMATED 86 09/08/2019    GFRESTBLACK >90 09/08/2019    KARL 8.5 09/08/2019        A1C RESULTS:  Lab Results   Component Value Date    A1C 5.7 (H) 08/08/2019       INR RESULTS:  Lab Results   Component Value Date    INR 1.30 (H) 09/03/2019    INR 1.47 (H) 09/03/2019           CC  No referring provider defined for this encounter.                    Thank you for allowing me to participate in the care of your patient.      Sincerely,     Lucy Espino MD     [facility]    cc:   No referring provider defined for this encounter.

## 2019-11-05 NOTE — LETTER
11/5/2019      Danielle Weller Rogelio, APRN CNP  [address] [facility] [address]      RE: Adolfo Rendon       Dear Colleague,    I had the pleasure of seeing Adolfo Rendon in the [facility].    Service Date: 11/05/2019      CARDIOLOGY CONSULTATION       REASON FOR VISIT:  Bypass surgery.      HISTORY OF PRESENTING ILLNESS:  This is a very pleasant 74-year-old gentleman who had initially seen me in 08/2019 after he transferred care from Dr. Cardona to me.  The patient has a history of coronary disease including cardiac catheterization in 2015 which showed a chronic  of his right coronary artery with left-to-right collaterals and 50% proximal LAD stenosis.  When he had seen me, he had complained of marked intolerance to bunch of medications that are on his allergy list, as documented in my initial consultation note from 08/01/2019.  No tolerance to statins as well including pravastatin.  In any case, when he had seen me, he complained of exertional symptoms concerning for angina for which he underwent coronary angiography and an echocardiogram.  He had uncontrolled hyperlipidemia and hypertension and in that setting, he was noted to have severe multivessel coronary disease for which he was seen by Dr. Simone Mchugh and underwent 4-vessel bypass surgery with a LIMA to LAD, vein graft to his OM, PDA and JORDON.  His EF prior to surgery on his echocardiogram dated 08/06/2019 was 45%-50% and mildly dilated ascending aorta.  His LDLs have ranged in the 150-160 range and he was hypertensive prior to all of this and after discharge, he has been maintained on 325 of aspirin, carvedilol 12.5 b.i.d., 10 mg of ezetimibe which we had started when I had seen him in clinic in August, lisinopril 5 mg daily and Repatha for which he has only taken 1 injection of 140 mg prior to his bypass surgery.  Then he was in a TCU and TCU does not do Repatha injections.  As such, he has not taken any further  since.       Today, he is recovering well from surgery, overall doing well.  No major cardiovascular symptoms.  He has been exercising at cardiac rehab and feels well.  Blood pressure is good.      PHYSICAL EXAMINATION:   VITAL SIGNS:  Blood pressure 130/81, pulse is 65.   GENERAL:  Alert and oriented x3, in no acute distress.   NECK:  Supple.  JVP is normal.   LUNGS:  Clear.   CARDIAC:  Sounds are regular.  No murmurs, rubs or gallops.   EXTREMITIES:  Warm without edema.   PSYCHIATRIC:  Appropriate affect.   HEENT:  No icterus, pallor.   ABDOMEN:  Nontender.      ASSESSMENT AND PLAN:  Rachael is doing well from a cardiovascular standpoint.  At this point, I recommend continuing aspirin 325 mg for about 6 months.  After that, we can back it down to 81 mg daily.  Continue ezetimibe 10 mg daily.  I have told him to restart Repatha at 140 every 2 weeks.  He is going to continue taking lisinopril and carvedilol for mid-range LV dysfunction but mainly for his hypertension.  I need to get his LDL down to less than 70.  We will get him to see us in clinic in the next 3 months with an echocardiogram and lipid panel.  If anything changes in the interim, do not hesitate to contact us.  It was a pleasure seeing Rachael.       cc:   ANTONIO Carran[place] [address]         MAMIE SMITH MD             D: 2019   T: 2019   MT: RAOUL      Name:     RACHAEL COVARRUBIAS   MRN:      [id number]        Account:      [id number]   :      1945           Service Date: 2019      Document: [id number]         Outpatient Encounter Medications as of 2019   Medication Sig Dispense Refill     acetaminophen (TYLENOL) 325 MG tablet Take 2 tablets (650 mg) by mouth every 4 hours as needed for other (multimodal surgical pain management along with NSAIDS and opioid medication as indicated based on pain control and physical function.)       aspirin  (ASA) 325 MG tablet ONE DAILY 30 tablet 3     carvedilol (COREG) 12.5 MG tablet Take 1 tablet (12.5 mg) by mouth 2 times daily (with meals) 60 tablet 3     evolocumab (REPATHA) 140 MG/ML prefilled autoinjector Inject 140 mg Subcutaneous every 14 days       ezetimibe (ZETIA) 10 MG tablet Take 1 tablet (10 mg) by mouth daily 30 tablet 11     ipratropium (ATROVENT HFA) 17 MCG/ACT inhaler Inhale 2 puffs into the lungs every 6 hours 12.9 g 5     lisinopril (PRINIVIL/ZESTRIL) 5 MG tablet Take 1 tablet (5 mg) by mouth daily       methocarbamol (ROBAXIN) 500 MG tablet Take 1 tablet (500 mg) by mouth 4 times daily as needed for muscle spasms 20 tablet 0     nicotine polacrilex (NICORETTE) 4 MG gum Place 4 mg inside cheek every hour as needed for smoking cessation       pantoprazole (PROTONIX) 40 MG EC tablet Take 1 tablet (40 mg) by mouth every morning (before breakfast) 14 tablet 0     PARoxetine (PAXIL) 20 MG tablet Take 1 tablet (20 mg) by mouth every morning Add to 40 mg daily 90 tablet 1     PARoxetine (PAXIL) 40 MG tablet Take 1 tablet (40 mg) by mouth every morning Add to 20 mg daily 90 tablet 1     salmeterol (SEREVENT) 50 MCG/DOSE inhaler Inhale 1 puff into the lungs 2 times daily 60 each 5     senna-docusate (SENOKOT-S/PERICOLACE) 8.6-50 MG tablet Take 1 tablet by mouth 2 times daily as needed for constipation 30 tablet 0     No facility-administered encounter medications on file as of 11/5/2019.        Again, thank you for allowing me to participate in the care of your patient.      Sincerely,    Lucy Espino MD     [facility]

## 2019-11-05 NOTE — PROGRESS NOTES
Service Date: 11/05/2019      CARDIOLOGY CONSULTATION       REASON FOR VISIT:  Bypass surgery.      HISTORY OF PRESENTING ILLNESS:  This is a very pleasant 74-year-old gentleman who had initially seen me in 08/2019 after he transferred care from Dr. Cardona to me.  The patient has a history of coronary disease including cardiac catheterization in 2015 which showed a chronic  of his right coronary artery with left-to-right collaterals and 50% proximal LAD stenosis.  When he had seen me, he had complained of marked intolerance to bunch of medications that are on his allergy list, as documented in my initial consultation note from 08/01/2019.  No tolerance to statins as well including pravastatin.  In any case, when he had seen me, he complained of exertional symptoms concerning for angina for which he underwent coronary angiography and an echocardiogram.  He had uncontrolled hyperlipidemia and hypertension and in that setting, he was noted to have severe multivessel coronary disease for which he was seen by Dr. Simone Mchugh and underwent 4-vessel bypass surgery with a LIMA to LAD, vein graft to his OM, PDA and JORDON.  His EF prior to surgery on his echocardiogram dated 08/06/2019 was 45%-50% and mildly dilated ascending aorta.  His LDLs have ranged in the 150-160 range and he was hypertensive prior to all of this and after discharge, he has been maintained on 325 of aspirin, carvedilol 12.5 b.i.d., 10 mg of ezetimibe which we had started when I had seen him in clinic in August, lisinopril 5 mg daily and Repatha for which he has only taken 1 injection of 140 mg prior to his bypass surgery.  Then he was in a TCU and TCU does not do Repatha injections.  As such, he has not taken any further since.       Today, he is recovering well from surgery, overall doing well.  No major cardiovascular symptoms.  He has been exercising at cardiac rehab and feels well.  Blood pressure is good.      PHYSICAL EXAMINATION:   VITAL SIGNS:   Blood pressure 130/81, pulse is 65.   GENERAL:  Alert and oriented x3, in no acute distress.   NECK:  Supple.  JVP is normal.   LUNGS:  Clear.   CARDIAC:  Sounds are regular.  No murmurs, rubs or gallops.   EXTREMITIES:  Warm without edema.   PSYCHIATRIC:  Appropriate affect.   HEENT:  No icterus, pallor.   ABDOMEN:  Nontender.      ASSESSMENT AND PLAN:  Rachael is doing well from a cardiovascular standpoint.  At this point, I recommend continuing aspirin 325 mg for about 6 months.  After that, we can back it down to 81 mg daily.  Continue ezetimibe 10 mg daily.  I have told him to restart Repatha at 140 every 2 weeks.  He is going to continue taking lisinopril and carvedilol for mid-range LV dysfunction but mainly for his hypertension.  I need to get his LDL down to less than 70.  We will get him to see us in clinic in the next 3 months with an echocardiogram and lipid panel.  If anything changes in the interim, do not hesitate to contact us.  It was a pleasure seeing Rachael.       cc:   ANTONIO [place] [address]         MAMIE SMITH MD             D: 2019   T: 2019   MT: RAOUL      Name:     RACHAEL COVARRUBIAS   MRN:      [id number]        Account:      [id number]   :      1945           Service Date: 2019      Document: [id number]

## 2019-11-05 NOTE — PROGRESS NOTES
HPI and Plan:   See dictation [id number]    Orders Placed This Encounter   Procedures     Basic metabolic panel     Lipid Profile     ALT     AST     N terminal pro BNP outpatient     Follow-Up with Cardiologist     Echocardiogram Complete     Orders Placed This Encounter   Medications     evolocumab (REPATHA) 140 MG/ML prefilled autoinjector     Sig: Inject 140 mg Subcutaneous every 14 days     There are no discontinued medications.      Encounter Diagnoses   Name Primary?     Coronary artery disease involving autologous artery coronary bypass graft without angina pectoris Yes     Chronic systolic heart failure (H)        CURRENT MEDICATIONS:  Current Outpatient Medications   Medication Sig Dispense Refill     acetaminophen (TYLENOL) 325 MG tablet Take 2 tablets (650 mg) by mouth every 4 hours as needed for other (multimodal surgical pain management along with NSAIDS and opioid medication as indicated based on pain control and physical function.)       aspirin (ASA) 325 MG tablet ONE DAILY 30 tablet 3     carvedilol (COREG) 12.5 MG tablet Take 1 tablet (12.5 mg) by mouth 2 times daily (with meals) 60 tablet 3     evolocumab (REPATHA) 140 MG/ML prefilled autoinjector Inject 140 mg Subcutaneous every 14 days       ezetimibe (ZETIA) 10 MG tablet Take 1 tablet (10 mg) by mouth daily 30 tablet 11     ipratropium (ATROVENT HFA) 17 MCG/ACT inhaler Inhale 2 puffs into the lungs every 6 hours 12.9 g 5     lisinopril (PRINIVIL/ZESTRIL) 5 MG tablet Take 1 tablet (5 mg) by mouth daily       methocarbamol (ROBAXIN) 500 MG tablet Take 1 tablet (500 mg) by mouth 4 times daily as needed for muscle spasms 20 tablet 0     nicotine polacrilex (NICORETTE) 4 MG gum Place 4 mg inside cheek every hour as needed for smoking cessation       pantoprazole (PROTONIX) 40 MG EC tablet Take 1 tablet (40 mg) by mouth every morning (before breakfast) 14 tablet 0     PARoxetine (PAXIL) 20 MG tablet Take 1 tablet (20 mg) by mouth every morning Add to 40  "mg daily 90 tablet 1     PARoxetine (PAXIL) 40 MG tablet Take 1 tablet (40 mg) by mouth every morning Add to 20 mg daily 90 tablet 1     salmeterol (SEREVENT) 50 MCG/DOSE inhaler Inhale 1 puff into the lungs 2 times daily 60 each 5     senna-docusate (SENOKOT-S/PERICOLACE) 8.6-50 MG tablet Take 1 tablet by mouth 2 times daily as needed for constipation 30 tablet 0       ALLERGIES     Allergies   Allergen Reactions     Amlodipine Fatigue     \"felt like zombie\"     Claritin      Mood , irritablity      Hctz [Hydrochlorothiazide] Fatigue     \"felt like zombie\"     Lisinopril Fatigue     \"felt like zombie\"     Metoprolol Fatigue     \"felt like zombie\"     Pravastatin Fatigue     \"felt like zombie\"     Remeron [Mirtazapine]      Agitation        Wellbutrin [Bupropion Hcl]      Sig mood issues          PAST MEDICAL HISTORY:  Past Medical History:   Diagnosis Date     Coronary artery disease      Depressive disorder, not elsewhere classified      Hypertrophy (benign) of prostate      Impotence of organic origin      Other and unspecified hyperlipidemia      Other anxiety states      Tobacco use disorder        PAST SURGICAL HISTORY:  Past Surgical History:   Procedure Laterality Date     APPENDECTOMY  1966     BYPASS GRAFT ARTERY CORONARY N/A 9/3/2019    Procedure: CORONARY ARTERY BYPASS GRAFT X 4 - LIMA TO LAD, SV TO PL, OM, PDA ; ON PUMP WITH TERRENCE; ENDOVEIN HARVEST RIGHT LEG;  Surgeon: Lai Mchugh MD;  Location:  OR     COLONOSCOPY  3/1/2005     COLONOSCOPY N/A 5/9/2019    Procedure: COLONOSCOPY;  Surgeon: Camilo Beard MD;  Location: [facility]     CV LEFT HEART CATH N/A 8/8/2019    Procedure: Left Heart Cath--also measure LVEDP;  Surgeon: Krystle Barrera MD;  Location:  HEART CARDIAC CATH LAB     HERNIORRHAPHY INGUINAL  7/14/2011    Procedure:HERNIORRHAPHY INGUINAL; Open Repair Right Inguinal Hernia Repair        HYDROCELECTOMY SCROTAL  01/2004    left hydrocele repair     SUPRAPUBIC PROSTATECTOMY   "       FAMILY HISTORY:  Family History   Problem Relation Age of Onset     Cerebrovascular Disease Mother      Hypertension Mother      Prostate Cancer Brother      Dementia Brother      Arthritis Sister         rhematoid     Cancer Brother         Lung cancer, lymphoma     Other - See Comments Brother         HIV     Cancer Sister         skin cancer on nose     Pacemaker Sister      Aneurysm Sister         heart       SOCIAL HISTORY:  Social History     Socioeconomic History     Marital status:      Spouse name: None     Number of children: None     Years of education: None     Highest education level: None   Occupational History     None   Social Needs     Financial resource strain: None     Food insecurity:     Worry: None     Inability: None     Transportation needs:     Medical: None     Non-medical: None   Tobacco Use     Smoking status: Former Smoker     Packs/day: 1.00     Types: Cigarettes     Last attempt to quit: 2017     Years since quittin.8     Smokeless tobacco: Former User     Quit date: 2017   Substance and Sexual Activity     Alcohol use: No     Alcohol/week: 0.0 standard drinks     Comment: quit      Drug use: No     Sexual activity: Not Currently   Lifestyle     Physical activity:     Days per week: None     Minutes per session: None     Stress: None   Relationships     Social connections:     Talks on phone: None     Gets together: None     Attends [employer] service: None     Active member of club or organization: None     Attends meetings of clubs or organizations: None     Relationship status: None     Intimate partner violence:     Fear of current or ex partner: None     Emotionally abused: None     Physically abused: None     Forced sexual activity: None   Other Topics Concern     Parent/sibling w/ CABG, MI or angioplasty before 65F 55M? Not Asked      Service Not Asked     Blood Transfusions Not Asked     Caffeine Concern No     Comment: 2-3 cups of coffee in  the morning     Occupational Exposure Not Asked     Hobby Hazards Not Asked     Sleep Concern No     Comment: falls asleep easy but doesn't sleep long     Stress Concern No     Weight Concern No     Special Diet No     Back Care Not Asked     Exercise No     Comment: work: hard labor     Bike Helmet Not Asked     Seat Belt Yes     Self-Exams Not Asked   Social History Narrative     None       Review of Systems:  Skin:  Negative     Eyes:  Positive for glasses  ENT:  Positive for hearing loss;nasal congestion;tinnitus  Respiratory:  Positive for dyspnea on exertion  Cardiovascular:    Positive for;lightheadedness  Gastroenterology: Negative    Genitourinary:  Negative    Musculoskeletal:  Negative    Neurologic:  Positive for seizures  Psychiatric:  Positive for sleep disturbances;anxiety;depression  Heme/Lymph/Imm:  Negative    Endocrine:  Negative      Physical Exam:  Vitals: /81 (BP Location: Right arm, Patient Position: Sitting, Cuff Size: Adult Regular)   Pulse 65   Wt 62.2 kg (137 lb 3.2 oz)   SpO2 98%   BMI 20.87 kg/m      Recent Lab Results:  LIPID RESULTS:  Lab Results   Component Value Date    CHOL 227 (H) 07/25/2019    HDL 57 07/25/2019     (H) 07/25/2019    TRIG 101 07/25/2019    CHOLHDLRATIO 4.1 03/24/2015       LIVER ENZYME RESULTS:  Lab Results   Component Value Date    AST 26 09/04/2019    ALT 14 09/04/2019       CBC RESULTS:  Lab Results   Component Value Date    WBC 5.3 09/08/2019    RBC 3.70 (L) 09/08/2019    HGB 9.3 (L) 09/16/2019    HCT 29.5 (L) 09/16/2019    MCV 83 09/08/2019    MCH 28.1 09/08/2019    MCHC 33.8 09/08/2019    RDW 14.2 09/08/2019     09/08/2019       BMP RESULTS:  Lab Results   Component Value Date     09/08/2019    POTASSIUM 3.6 09/09/2019    CHLORIDE 106 09/08/2019    CO2 29 09/08/2019    ANIONGAP 3 09/08/2019     (H) 09/08/2019    BUN 16 09/08/2019    CR 0.84 09/08/2019    GFRESTIMATED 86 09/08/2019    GFRESTBLACK >90 09/08/2019    KARL 8.5  09/08/2019        A1C RESULTS:  Lab Results   Component Value Date    A1C 5.7 (H) 08/08/2019       INR RESULTS:  Lab Results   Component Value Date    INR 1.30 (H) 09/03/2019    INR 1.47 (H) 09/03/2019           CC  No referring provider defined for this encounter.

## 2019-11-08 ENCOUNTER — HOSPITAL ENCOUNTER (OUTPATIENT)
Dept: CARDIAC REHAB | Facility: CLINIC | Age: 74
End: 2019-11-08
Attending: PHYSICIAN ASSISTANT
Payer: COMMERCIAL

## 2019-11-08 PROCEDURE — 40000116 ZZH STATISTIC OP CR VISIT

## 2019-11-08 PROCEDURE — 93798 PHYS/QHP OP CAR RHAB W/ECG: CPT

## 2019-11-12 ENCOUNTER — HOSPITAL ENCOUNTER (OUTPATIENT)
Dept: CARDIAC REHAB | Facility: CLINIC | Age: 74
End: 2019-11-12
Attending: PHYSICIAN ASSISTANT
Payer: COMMERCIAL

## 2019-11-12 PROCEDURE — 93798 PHYS/QHP OP CAR RHAB W/ECG: CPT | Performed by: REHABILITATION PRACTITIONER

## 2019-11-12 PROCEDURE — 40000116 ZZH STATISTIC OP CR VISIT: Performed by: REHABILITATION PRACTITIONER

## 2019-11-14 ENCOUNTER — HOSPITAL ENCOUNTER (OUTPATIENT)
Dept: CARDIAC REHAB | Facility: CLINIC | Age: 74
End: 2019-11-14
Attending: PHYSICIAN ASSISTANT
Payer: COMMERCIAL

## 2019-11-14 PROCEDURE — 40000575 ZZH STATISTIC OP CARDIAC VISIT #2: Performed by: REHABILITATION PRACTITIONER

## 2019-11-14 PROCEDURE — 93798 PHYS/QHP OP CAR RHAB W/ECG: CPT | Performed by: REHABILITATION PRACTITIONER

## 2019-11-14 PROCEDURE — 40000116 ZZH STATISTIC OP CR VISIT: Performed by: REHABILITATION PRACTITIONER

## 2019-11-14 PROCEDURE — 93797 PHYS/QHP OP CAR RHAB WO ECG: CPT | Performed by: REHABILITATION PRACTITIONER

## 2019-11-19 ENCOUNTER — HOSPITAL ENCOUNTER (OUTPATIENT)
Dept: CARDIAC REHAB | Facility: CLINIC | Age: 74
End: 2019-11-19
Attending: PHYSICIAN ASSISTANT
Payer: COMMERCIAL

## 2019-11-19 PROCEDURE — 40000116 ZZH STATISTIC OP CR VISIT

## 2019-11-19 PROCEDURE — 93798 PHYS/QHP OP CAR RHAB W/ECG: CPT

## 2019-11-21 ENCOUNTER — ALLIED HEALTH/NURSE VISIT (OUTPATIENT)
Dept: FAMILY MEDICINE | Facility: CLINIC | Age: 74
End: 2019-11-21
Payer: COMMERCIAL

## 2019-11-21 VITALS — DIASTOLIC BLOOD PRESSURE: 92 MMHG | HEART RATE: 60 BPM | SYSTOLIC BLOOD PRESSURE: 152 MMHG

## 2019-11-21 DIAGNOSIS — Z95.1 S/P CABG X 4: ICD-10-CM

## 2019-11-21 DIAGNOSIS — I10 BENIGN ESSENTIAL HYPERTENSION: Primary | ICD-10-CM

## 2019-11-21 PROCEDURE — 99207 ZZC NO CHARGE NURSE ONLY: CPT | Performed by: NURSE PRACTITIONER

## 2019-11-21 RX ORDER — LISINOPRIL 5 MG/1
5 TABLET ORAL DAILY
Qty: 90 TABLET | Refills: 1 | Status: SHIPPED | OUTPATIENT
Start: 2019-11-21 | End: 2020-07-23

## 2019-11-21 RX ORDER — AMOXICILLIN 250 MG
1 CAPSULE ORAL 2 TIMES DAILY PRN
Qty: 90 TABLET | Refills: 1 | Status: SHIPPED | OUTPATIENT
Start: 2019-11-21 | End: 2020-03-31

## 2019-11-21 NOTE — PROGRESS NOTES
Adolfo KIARA Rendon was evaluated at [facility] on November 21, 2019 at which time his blood pressure was:    BP Readings from Last 3 Encounters:   11/21/19 (!) 152/92   11/05/19 130/81   10/18/19 (!) 148/90     Pulse Readings from Last 3 Encounters:   11/21/19 60   11/05/19 65   10/18/19 68       Reviewed lifestyle modifications for blood pressure control and reduction: including making healthy food choices, managing weight, getting regular exercise, smoking cessation, reducing alcohol consumption, monitoring blood pressure regularly.     Symptoms: None    BP Goal:< 140/90 mmHg    BP Assessment:  BP too high    Potential Reasons for BP too high: Unknown/Other: unknown    BP Follow-Up Plan: Recheck BP in 30 days at pharmacy    Recommendation to Provider: BP has been high when check at pharmacy recently--recheck in 1 month    Note completed by: Rupesh Nur [facility]  [phone]

## 2019-11-26 ENCOUNTER — HOSPITAL ENCOUNTER (OUTPATIENT)
Dept: CARDIAC REHAB | Facility: CLINIC | Age: 74
End: 2019-11-26
Attending: PHYSICIAN ASSISTANT
Payer: COMMERCIAL

## 2019-11-26 VITALS — BODY MASS INDEX: 21.07 KG/M2 | HEIGHT: 68 IN | WEIGHT: 139 LBS

## 2019-11-26 PROCEDURE — 93798 PHYS/QHP OP CAR RHAB W/ECG: CPT

## 2019-11-26 PROCEDURE — 40000116 ZZH STATISTIC OP CR VISIT

## 2019-11-26 ASSESSMENT — 6 MINUTE WALK TEST (6MWT)
TOTAL DISTANCE WALKED (FT): 1045
GENDER SELECTION: MALE
MALE CALC: 1707.82
PREDICTED: 1718.23
FEMALE CALC: 1524.45

## 2019-11-26 ASSESSMENT — MIFFLIN-ST. JEOR: SCORE: 1344.87

## 2019-11-26 NOTE — PROGRESS NOTES
11/26/19 1600   Session   Session 60 Day Individualized Treatment Plan   Certified through this date 12/25/19   Cardiac Rehab Assessment   Cardiac Rehab Assessment Pt has attended 15 monitored exercise sessions thus far.  He has increased form 2.5 MET's on his 3rd exercise session to 4.1 MET's at peak workload.  Pt states he does not feel as though his SANTIAGO has improved since his initial evaluation.  Explained, demonstrated and practiced PLB and DM, patient verbalized understanding.  Pt plans to practice during his home exercise.  Pt used PLB on modalites today requiring few cues.  Skilled services necessary to monitor CV response while advancing workloads closer to 4.5 to 5  METs, provide education on risk factors and continue to support smoke free lifestyle and low sodium eating.   General Information   Treatment Diagnosis Coronary Artery Bypass Surgery   Date of Treatment Diagnosis 09/03/19   Significant Past CV History None   Comorbidities Pulmonary Disease   Other Medical History COPD, Emphysema, HLD, HTN, SANTIAGO, Tobacco Use, Depression, Anxiety, Anemia   Lead up symptoms Weakness, SOB, SANTIAGO, decreased stamina   Hospital [facility]   Hospital Discharge Date 09/09/19   Signs and Symptoms Post Hospital Discharge SOB;Appetite   Outpatient Cardiac Rehab Start Date 10/01/19   Primary Physician Danielle Mercado MD   Primary Physician Follow Up NA   Surgeon Lucy Espino MD   Surgeon Follow Up Scheduled   Cardiologist Lucy Espino MD   Cardiologist Follow Up Scheduled   Ejection Fraction 45-50%   Risk Stratification High   Summary of Cath Report   Summary of Cath Report Available   Date Performed 09/03/19   Cath Report Comments CABG x4: LIMA-LAD, SVG-OM, SVG-PDA, SVG-JORDON   Living and Work Status    Living Arrangements and Social Status house   Support System Live alone   Return to Employment Retired   Fall Risk Screen   Fall screen completed by Cardiac Rehab   Have you fallen 2 or more times in the past  "year? No   Have you fallen and had an injury in the past year? No   Timed Up and Go score (seconds) N/A   Is patient a fall risk? No   Abuse Screen (yes response referral indicated)   Feels Threatened by Someone no   Does Anyone Try to Keep You From Having Contact with Others or Doing Things Outside Your Home? no   Physical Signs of Abuse Present no   Pain   Patient Currently in Pain No   Physical Assessments   Incisions WNL   Edema None   Right Lung Sounds not assessed   Left Lung Sounds not assessed   Limitations No limitations   Individualized Treatment Plan   Monitored Sessions Scheduled 24   Monitored Sessions Attended 15   Oxygen   Supplemental Oxygen needed No   Nutrition Management - Weight Management   Assessment Re-assessment   Age 73   Weight 63 kg (139 lb)   Height 1.727 m (5' 7.99\")   BMI (Calculated) 21.14   Goal Weight 70.3 kg (155 lb)   Initial Rate Your Plate Score. Dietary tool to assess eating patterns. Scores range from 24 to 72. The higher the score the healthier the eating pattern. 48   Weight Management Comments 11/26: Pt states he has lost 2 more lbs, however he is currently down 1 lb from his initial evaluation.  10/29: Pt feels he is losing weight, prefers to not lose any additional weight. Pt does report he doesn't have a big appetite. Is drinking ensure/boost with breakfast. Does occ skip meals and was encourage to replace any meals skipped with boost/ensure.    Nutrition Management - Lipids   Lipids Labs Available   Date 07/25/19   Total Cholesterol 227   Triglycerides 101   HDL 57      Nutrition Management - Diabetes   Diabetes No   Nutrition Management Summary   Dietary Recommendations None   Stages of Change for Diet Compliance NA   Interventions Planned Attend Nutrition Education Class(es);Complete Food Diary;Instruct on Label Reading   Patient Goals Goal #1   Goal #1 Description 10/1: Pt will decrease sodium to 2000 mg daily by    Goal #1 Target Date 12/01/19   Goal #1 " Progress Towards Goal 11/26: Unchanged since last update.  10/29: Pt is watching food labels closely.  Pt doesn't eat a lot of processed foods. Does eat pizza/chinese maybe 2x/week on average. Pt would be interested in low sodium food handouts.   Nutrition Summary Comments 10/1: Pt's diet consists of, cereal, eggs and ramirez; fried pork chops and hot dishes. Lean proteins and fried meats.  or chinese food   Nutrition Target Outcome Total Chol < 150, HDL > 40 (M), HDL > 50 (W), LDL < 70, Trig < 150   Psychosocial Management   Psychosocial Assessment Re-assessment   Is there history of clinical depression or increased risk of depression? History of clinical depression   Current Level of Stress per Patient Report Mild   Current Coping Skills Is on Medication for Depression/Anxiety;Uses Stress Management/Relaxation Techniques   Initial Patient Health Questionnaire -9 Score (PHQ-9) for depression. 5-9 Minimal symptoms, 10-14 Minor depression, 15-19 Major depression, moderately severe, > 20 Major depression, severe  3   Initial [employer] COOP Survey score.  Quality of Life:   If total score > 25 review individual areas where patient rated a 4 or 5.  Consider patients current medical condition and what role that plays on the score.   Adjust treatment protocol to improve areas of concern.  Consider the following:  PHQ9 score, DASI, and re-assessment within the next 30 days to assist with developing treatments.  26   Stages of Change Action   Interventions Planned Reassess PHQ-9 and/or [employer] COOP Surveys if outside of defined limits   Interventions In Progress or Completed Patient verbalizes understanding of behavioral assessment scores;Patient continues to practice/follow through with strategies to reduce stress and/or anxiety level;Pt recognizes signs and symptoms of depression   Psychosocial Comments 11/26: Unchanged since last update.  10/1: Pt is on Paxil for anxiety and depression and managing well he  reports. Pt also uses nature and time with his dog to deal with stress.   Other Core Components - Hypertension   History of or Diagnosis of Hypertension Yes   Currently taking Anti-Hypertensive's Yes;Ace Inhibitor   Other Core Components - Tobacco   History of Tobacco Use Yes   Quit Date or Planned Quit Date 08/27/19   Tobacco Use Status Recent (Quit < 6 mo ago)   Tobacco Habit Cigarettes   Tobacco Use per Day (average) 1   Years of Tobacco Use 30   Stages of Change Action   Tobacco Comments 11/26: Pt is no longer on Nicotine patch and has remained nicotine free.  Pt feels that he does not require further intervention at this point.  10/29: Pt reports he is out of nicorette gum. Is considering purchasing more. Pt originally had prescription of this but hadn't filled it. Pt states he gets cravings once in a while but hasn't had concerns he will return to smoking. 10/1: Nicorette Gum   Other Core Components Summary   Interventions Planned Educate on importance of maintaining low sodium diet;Attend education class on Blood Pressure   Interventions In Progress or Completed Attended education class on Blood Pressure   Patient Goals No   Other Core Components Target Outcome BP < 140/90 or < 130/80 with DM or CKD;Complete Smoking Cessation   Activity/Exercise History   Activity/Exercise Assessment Re-assessment   Activity/Exercise Status prior to event? Sedentary   Number of Days Currently participating in Moderate Physical Activity? 3   Number of Days Currently performing  Aerobic Exercise (including rehab)? 5   Number of Minutes per Session Currently of Aerobic Exercise (average)? 40   Current Stage of Change (Physical Activity) Action   Current Stage of Change (Aerobic Exercise) Action   Patient Goals Goal #2   Goal #2 Description 10/1: Pt will maintain walking at home 3days/walk for 20 minutes; working up to 20 minutes   Goal #2 Target Date 12/01/19   Goal #2 Progress Towards Goal 11/26: Pt has been walking 2-2.5 miles  1-2 times per week on non rehab days.  10/29: Pt is walking outside for 1-2 miles. He has treadmill and will use when weather gets cooler.   Activity/Exercise Comments Pt has a treadmill    Activity/Exercise Target Outcome An Accumulation of 150  Minutes of Aerobic Activity per Week   Exercise Assessment   6 Minute Walk Predicted - Gender Selection Male   6 Minute Walk Predicted (Male) 1707.82   6 Minute Walk Predicted (Female) 1524.45   Initial 6 Minute Walk Distance (Feet) 1045 ft   Resting HR 68 bpm   Exercise HR 96 bpm   Post Exercise HR 80 bpm   Resting /86   Exercise /92   Post Exercise BP 98/70   Effort Rating 6/10   Current MET Level 4.1   MET Level Goal 4-5   ECG Rhythm Normal sinus rhythm   Ectopy None   Current Symptoms Denies symptoms   Limitations/Restrictions Sternal Precautions   Exercise Prescription   Mode Treadmill;Nustep;Weights;Recumbent bike   Duration/Time 15-30 min   Frequency 2 days/week   THR (85% of age predicted max HR) 124.95   OMNI Effort Rating (0-10 Scale) 4-6/10   Progression Total exercise time of 20-30 minutes;Intermittent bouts;Aerobic exercise to OMNI rating of 6 or below and at or below THR;Progress peak intensity by 1/4 MET per week   Comments 10/1: No arms on Nustep until 10/15/19    Recommended Home Exercise   Type of Exercise Walking   Frequency (days per week) 3days/week   Duration (minutes per session) 15-30 min   Effort Rating Recommended 4-6/10   30 Day Exercise Plan 11/16 Pt is currently walking 2-2.5 miles 2-3 times per week on non rehab days.  Plan to have patient continue increasing bouts with by increasing the time it takes him to complete 2 miles or increase distance within the same time frame.  Pt verbalized understanding.  10/29: PT is walking 1/2 to 1 mile distance. 10/1: Increase walking bouts to be able to tolerate 20 minutes of continuous walking   Current Home Exercise   Type of Exercise Walking   Frequency (days per week) 2-3d   Duration (minutes  per session) 20-30 min   Follow-up/On-going Support   Provider follow-up needed on the following No follow-up needed   Learning Assessment   Learner Patient   Primary Language English   Patient Education   Education recommended Blood Pressure;Nutrition   Education classes attended Anatomy and Physiology of the Heart;Blood Pressure;Exercise Principles;Smoking Cessation   I have established, reviewed and made necessary changes to the individualized treatment plan and exercise prescription for this patient.        Physician Signature: ___________________________________________Date__________

## 2019-12-03 ENCOUNTER — HOSPITAL ENCOUNTER (OUTPATIENT)
Dept: CARDIAC REHAB | Facility: CLINIC | Age: 74
End: 2019-12-03
Attending: PHYSICIAN ASSISTANT
Payer: COMMERCIAL

## 2019-12-03 PROCEDURE — 40000116 ZZH STATISTIC OP CR VISIT

## 2019-12-03 PROCEDURE — 93798 PHYS/QHP OP CAR RHAB W/ECG: CPT

## 2019-12-10 ENCOUNTER — HOSPITAL ENCOUNTER (OUTPATIENT)
Dept: CARDIAC REHAB | Facility: CLINIC | Age: 74
End: 2019-12-10
Attending: PHYSICIAN ASSISTANT
Payer: COMMERCIAL

## 2019-12-10 PROCEDURE — 93798 PHYS/QHP OP CAR RHAB W/ECG: CPT | Performed by: REHABILITATION PRACTITIONER

## 2019-12-10 PROCEDURE — 40000116 ZZH STATISTIC OP CR VISIT: Performed by: REHABILITATION PRACTITIONER

## 2019-12-12 ENCOUNTER — HOSPITAL ENCOUNTER (OUTPATIENT)
Dept: CARDIAC REHAB | Facility: CLINIC | Age: 74
End: 2019-12-12
Attending: PHYSICIAN ASSISTANT
Payer: COMMERCIAL

## 2019-12-12 PROCEDURE — 40000116 ZZH STATISTIC OP CR VISIT: Performed by: REHABILITATION PRACTITIONER

## 2019-12-12 PROCEDURE — 93798 PHYS/QHP OP CAR RHAB W/ECG: CPT | Performed by: REHABILITATION PRACTITIONER

## 2019-12-17 ENCOUNTER — HOSPITAL ENCOUNTER (OUTPATIENT)
Dept: CARDIAC REHAB | Facility: CLINIC | Age: 74
End: 2019-12-17
Attending: PHYSICIAN ASSISTANT
Payer: COMMERCIAL

## 2019-12-17 VITALS — BODY MASS INDEX: 21.26 KG/M2 | WEIGHT: 140.28 LBS | HEIGHT: 68 IN

## 2019-12-17 PROCEDURE — 40000116 ZZH STATISTIC OP CR VISIT

## 2019-12-17 PROCEDURE — 93798 PHYS/QHP OP CAR RHAB W/ECG: CPT

## 2019-12-17 ASSESSMENT — 6 MINUTE WALK TEST (6MWT)
GENDER SELECTION: MALE
FEMALE CALC: 1520.08
TOTAL DISTANCE WALKED (FT): 1045
PREDICTED: 1714.86
MALE CALC: 1704.46

## 2019-12-17 ASSESSMENT — MIFFLIN-ST. JEOR: SCORE: 1350.67

## 2019-12-19 ENCOUNTER — HOSPITAL ENCOUNTER (OUTPATIENT)
Dept: CARDIAC REHAB | Facility: CLINIC | Age: 74
End: 2019-12-19
Attending: PHYSICIAN ASSISTANT
Payer: COMMERCIAL

## 2019-12-19 PROCEDURE — 93798 PHYS/QHP OP CAR RHAB W/ECG: CPT | Performed by: REHABILITATION PRACTITIONER

## 2019-12-19 PROCEDURE — 40000116 ZZH STATISTIC OP CR VISIT: Performed by: REHABILITATION PRACTITIONER

## 2019-12-20 DIAGNOSIS — F41.1 GAD (GENERALIZED ANXIETY DISORDER): ICD-10-CM

## 2019-12-20 DIAGNOSIS — F32.0 MILD MAJOR DEPRESSION (H): ICD-10-CM

## 2019-12-23 RX ORDER — PAROXETINE 20 MG/1
TABLET, FILM COATED ORAL
Qty: 90 TABLET | Refills: 4 | Status: SHIPPED | OUTPATIENT
Start: 2019-12-23 | End: 2020-02-14

## 2019-12-23 ASSESSMENT — PATIENT HEALTH QUESTIONNAIRE - PHQ9: SUM OF ALL RESPONSES TO PHQ QUESTIONS 1-9: 3

## 2019-12-24 ENCOUNTER — HOSPITAL ENCOUNTER (OUTPATIENT)
Dept: CARDIAC REHAB | Facility: CLINIC | Age: 74
End: 2019-12-24
Attending: PHYSICIAN ASSISTANT
Payer: COMMERCIAL

## 2019-12-24 PROCEDURE — 40000116 ZZH STATISTIC OP CR VISIT

## 2019-12-24 PROCEDURE — 93798 PHYS/QHP OP CAR RHAB W/ECG: CPT

## 2019-12-26 ENCOUNTER — HOSPITAL ENCOUNTER (OUTPATIENT)
Dept: CARDIAC REHAB | Facility: CLINIC | Age: 74
End: 2019-12-26
Attending: PHYSICIAN ASSISTANT
Payer: COMMERCIAL

## 2019-12-26 PROCEDURE — 40000116 ZZH STATISTIC OP CR VISIT

## 2019-12-26 PROCEDURE — 93798 PHYS/QHP OP CAR RHAB W/ECG: CPT

## 2019-12-31 ENCOUNTER — HOSPITAL ENCOUNTER (OUTPATIENT)
Dept: CARDIAC REHAB | Facility: CLINIC | Age: 74
End: 2019-12-31
Attending: PHYSICIAN ASSISTANT
Payer: COMMERCIAL

## 2019-12-31 PROCEDURE — 40000116 ZZH STATISTIC OP CR VISIT

## 2019-12-31 PROCEDURE — 93798 PHYS/QHP OP CAR RHAB W/ECG: CPT

## 2020-01-02 ENCOUNTER — HOSPITAL ENCOUNTER (OUTPATIENT)
Dept: CARDIAC REHAB | Facility: CLINIC | Age: 75
End: 2020-01-02
Attending: PHYSICIAN ASSISTANT
Payer: COMMERCIAL

## 2020-01-02 PROCEDURE — 40000116 ZZH STATISTIC OP CR VISIT: Performed by: REHABILITATION PRACTITIONER

## 2020-01-02 PROCEDURE — 93798 PHYS/QHP OP CAR RHAB W/ECG: CPT | Performed by: REHABILITATION PRACTITIONER

## 2020-01-03 DIAGNOSIS — E78.5 HYPERLIPIDEMIA LDL GOAL <100: Primary | ICD-10-CM

## 2020-01-03 RX ORDER — EZETIMIBE 10 MG/1
10 TABLET ORAL DAILY
Qty: 90 TABLET | Refills: 0 | Status: SHIPPED | OUTPATIENT
Start: 2020-01-03 | End: 2020-05-21

## 2020-01-03 NOTE — TELEPHONE ENCOUNTER
"Requested Prescriptions   Pending Prescriptions Disp Refills     ezetimibe (ZETIA) 10 MG tablet 30 tablet 11     Sig: Take 1 tablet (10 mg) by mouth daily       Antihyperlipidemic agents Passed - 1/3/2020 12:56 PM        Passed - Lipid panel on file in past 12 mos     Recent Labs   Lab Test 07/25/19  0855  03/24/15  1459   CHOL 227*   < > 215*   TRIG 101   < > 125   HDL 57   < > 52   *   < > 138*   NHDL 170*   < >  --    VLDL  --   --  25   CHOLHDLRATIO  --   --  4.1    < > = values in this interval not displayed.               Passed - Normal serum ALT on record in past 12 mos     Recent Labs   Lab Test 09/04/19  0345   ALT 14             Passed - Recent (12 mo) or future (30 days) visit within the authorizing provider's specialty     Patient has had an office visit with the authorizing provider or a provider within the authorizing providers department within the previous 12 mos or has a future within next 30 days. See \"Patient Info\" tab in inbasket, or \"Choose Columns\" in Meds & Orders section of the refill encounter.              Passed - Medication is active on med list        Passed - Patient is age 18 years or older        Last Written Prescription Date:  8/16/19  Last Fill Quantity: 30,  # refills:     Last office visit: 10/15/2019 with prescribing provider:  4/12/19  KIARA GRIMM   Future Office Visit:   Next 5 appointments (look out 90 days)    Feb 10, 2020 10:00 AM CST  Return Visit with Lucy Espino MD  [facility] ([facility]) [address] [place] [id number] [phone]           "

## 2020-01-07 ENCOUNTER — HOSPITAL ENCOUNTER (OUTPATIENT)
Dept: CARDIAC REHAB | Facility: CLINIC | Age: 75
End: 2020-01-07
Attending: PHYSICIAN ASSISTANT
Payer: COMMERCIAL

## 2020-01-07 PROCEDURE — 40000116 ZZH STATISTIC OP CR VISIT: Performed by: REHABILITATION PRACTITIONER

## 2020-01-07 PROCEDURE — 93798 PHYS/QHP OP CAR RHAB W/ECG: CPT | Performed by: REHABILITATION PRACTITIONER

## 2020-01-08 ENCOUNTER — ALLIED HEALTH/NURSE VISIT (OUTPATIENT)
Dept: FAMILY MEDICINE | Facility: CLINIC | Age: 75
End: 2020-01-08
Payer: COMMERCIAL

## 2020-01-08 VITALS — HEART RATE: 60 BPM | DIASTOLIC BLOOD PRESSURE: 104 MMHG | SYSTOLIC BLOOD PRESSURE: 162 MMHG

## 2020-01-08 DIAGNOSIS — I10 BENIGN ESSENTIAL HYPERTENSION: Primary | ICD-10-CM

## 2020-01-08 DIAGNOSIS — J44.9 CHRONIC OBSTRUCTIVE PULMONARY DISEASE, UNSPECIFIED COPD TYPE (H): ICD-10-CM

## 2020-01-08 PROCEDURE — 99207 ZZC NO CHARGE NURSE ONLY: CPT | Performed by: NURSE PRACTITIONER

## 2020-01-08 NOTE — PROGRESS NOTES
Adolfo KIARA Rendon was evaluated at [facility] on January 8, 2020 at which time his blood pressure was:    BP Readings from Last 3 Encounters:   01/08/20 (!) 162/104   11/21/19 (!) 152/92   11/05/19 130/81     Pulse Readings from Last 3 Encounters:   01/08/20 60   11/21/19 60   11/05/19 65       Reviewed lifestyle modifications for blood pressure control and reduction: including making healthy food choices, managing weight, getting regular exercise, smoking cessation, reducing alcohol consumption, monitoring blood pressure regularly.     Symptoms: None    BP Goal:< 140/90 mmHg    BP Assessment:  BP too high    Potential Reasons for BP too high: Unknown/Other: unknown    BP Follow-Up Plan: Recheck BP in 30 days at pharmacy    Recommendation to Provider: Pt BP seems to be high at the pharmacy and at home    Note completed by: Rupesh Nur [facility]  [phone]

## 2020-01-09 ENCOUNTER — HOSPITAL ENCOUNTER (OUTPATIENT)
Dept: CARDIAC REHAB | Facility: CLINIC | Age: 75
End: 2020-01-09
Attending: PHYSICIAN ASSISTANT
Payer: COMMERCIAL

## 2020-01-09 PROCEDURE — 40000575 ZZH STATISTIC OP CARDIAC VISIT #2: Performed by: REHABILITATION PRACTITIONER

## 2020-01-09 PROCEDURE — 93797 PHYS/QHP OP CAR RHAB WO ECG: CPT | Performed by: REHABILITATION PRACTITIONER

## 2020-01-09 PROCEDURE — 40000116 ZZH STATISTIC OP CR VISIT: Performed by: REHABILITATION PRACTITIONER

## 2020-01-09 PROCEDURE — 93798 PHYS/QHP OP CAR RHAB W/ECG: CPT | Performed by: REHABILITATION PRACTITIONER

## 2020-01-11 NOTE — PROGRESS NOTES
BP remains elevated.  Please contact him and ask him to follow up here or with his cardiology team to address this further  BP Readings from Last 3 Encounters:   01/08/20 (!) 162/104   11/21/19 (!) 152/92   11/05/19 130/81     Thanks Danielle Mercado [facility]

## 2020-01-14 ENCOUNTER — HOSPITAL ENCOUNTER (OUTPATIENT)
Dept: CARDIAC REHAB | Facility: CLINIC | Age: 75
End: 2020-01-14
Attending: PHYSICIAN ASSISTANT
Payer: COMMERCIAL

## 2020-01-14 VITALS — HEIGHT: 68 IN | BODY MASS INDEX: 21.28 KG/M2 | WEIGHT: 140.4 LBS

## 2020-01-14 PROCEDURE — 93798 PHYS/QHP OP CAR RHAB W/ECG: CPT

## 2020-01-14 PROCEDURE — 40000116 ZZH STATISTIC OP CR VISIT

## 2020-01-14 ASSESSMENT — 6 MINUTE WALK TEST (6MWT)
GENDER SELECTION: MALE
PREDICTED: 1714.54
MALE CALC: 1704.15
FEMALE CALC: 1519.67
TOTAL DISTANCE WALKED (FT): 1045

## 2020-01-14 ASSESSMENT — MIFFLIN-ST. JEOR: SCORE: 1351.22

## 2020-01-14 NOTE — PROGRESS NOTES
"   01/14/20 1300   Session   Session 120 Day Individualized Treatment Plan   Certified through this date 02/12/20   Cardiac Rehab Assessment   Cardiac Rehab Assessment Pt has attended 27 monitored exercise sessions.  Pt has progressed from 2.5 MET's at session 3 to 4.8 MET's.  Pt has noted some ongoing vertigo.  Discussed having patient go to PT, patient declined at this point and plans to go \"if symptoms get worse\".  Plan to have patient discharge next sessions.  Skilled services necessary to monitor CV response while advancing workloads closer to 6  METs, provide education on risk factors and continue to support smoke free lifestyle and low sodium eating.   General Information   Treatment Diagnosis Coronary Artery Bypass Surgery   Date of Treatment Diagnosis 09/03/19   Significant Past CV History None   Comorbidities Pulmonary Disease   Other Medical History COPD, Emphysema, HLD, HTN, SANTIAGO, Tobacco Use, Depression, Anxiety, Anemia   Lead up symptoms Weakness, SOB, SANTIAGO, decreased stamina   Hospital Location [facility] Discharge Date 09/09/19   Signs and Symptoms Post Hospital Discharge SOB;Appetite   Outpatient Cardiac Rehab Start Date 10/01/19   Primary Physician Danielle Mercado MD   Primary Physician Follow Up NA   Surgeon Lucy Espino MD   Surgeon Follow Up Scheduled   Cardiologist Lucy Espino MD   Cardiologist Follow Up Scheduled   Ejection Fraction 45-50%   Risk Stratification High   Summary of Cath Report   Summary of Cath Report Available   Date Performed 09/03/19   Cath Report Comments CABG x4: LIMA-LAD, SVG-OM, SVG-PDA, SVG-JORDON   Living and Work Status    Living Arrangements and Social Status house   Support System Live alone   Return to Employment Retired   Fall Risk Screen   Fall screen completed by Cardiac Rehab   Have you fallen 2 or more times in the past year? No   Have you fallen and had an injury in the past year? No   Timed Up and Go score (seconds) N/A   Is patient a fall risk? " "No   Abuse Screen (yes response referral indicated)   Feels Threatened by Someone no   Does Anyone Try to Keep You From Having Contact with Others or Doing Things Outside Your Home? no   Physical Signs of Abuse Present no   Pain   Patient Currently in Pain No   Physical Assessments   Incisions WNL   Edema None   Right Lung Sounds not assessed   Left Lung Sounds not assessed   Limitations No limitations   Individualized Treatment Plan   Monitored Sessions Scheduled 24   Monitored Sessions Attended 27   Oxygen   Supplemental Oxygen needed No   Nutrition Management - Weight Management   Assessment Re-assessment   Age 73   Weight 63.7 kg (140 lb 6.4 oz)   Height 1.727 m (5' 7.99\")   BMI (Calculated) 21.35   Goal Weight 70.3 kg (155 lb)   Initial Rate Your Plate Score. Dietary tool to assess eating patterns. Scores range from 24 to 72. The higher the score the healthier the eating pattern. 48   Discharge Rate Your Plate Score 50   Weight Management Comments 1/14: Weight has remained stable.  12/17: Pt is still trying to gain weight he reports. He would like to be 150#. 11/26: Pt states he has lost 2 more lbs, however he is currently down 1 lb from his initial evaluation.  10/29: Pt feels he is losing weight, prefers to not lose any additional weight. Pt does report he doesn't have a big appetite. Is drinking ensure/boost with breakfast. Does occ skip meals and was encourage to replace any meals skipped with boost/ensure.    Nutrition Management - Lipids   Lipids Labs Available   Date 07/25/19   Total Cholesterol 227   Triglycerides 101   HDL 57      Nutrition Management - Diabetes   Diabetes No   Nutrition Management Summary   Dietary Recommendations None   Stages of Change for Diet Compliance NA   Interventions Planned Attend Nutrition Education Class(es);Complete Food Diary;Instruct on Label Reading   Patient Goals Goal #1   Goal #1 Description 10/1: Pt will decrease sodium to 2000 mg daily by    Goal #1 Target " "Date 12/01/19   Goal #1 Progress Towards Goal 1/14: Pt states he continues to work on reducing sodium, he does not salt his foods, however he does not check labels to monitor his sodium intake.  Discussed checking labels.  12/17: Pt reported he has decreased his pizza/chinese consumption to 1x/week instead of 2. Pt is not using the salt shaker, but states this is hard due to being a smoker and not having \"much of a taste for foods\". 11/26: Unchanged since last update.  10/29: Pt is watching food labels closely.  Pt doesn't eat a lot of processed foods. Does eat pizza/chinese maybe 2x/week on average. Pt would be interested in low sodium food handouts.   Nutrition Summary Comments 10/1: Pt's diet consists of, cereal, eggs and ramirez; fried pork chops and hot dishes. Lean proteins and fried meats.  or chinese food   Nutrition Target Outcome Total Chol < 150, HDL > 40 (M), HDL > 50 (W), LDL < 70, Trig < 150   Psychosocial Management   Psychosocial Assessment Re-assessment   Is there history of clinical depression or increased risk of depression? History of clinical depression   Current Level of Stress per Patient Report Mild   Current Coping Skills Is on Medication for Depression/Anxiety;Uses Stress Management/Relaxation Techniques   Initial Patient Health Questionnaire -9 Score (PHQ-9) for depression. 5-9 Minimal symptoms, 10-14 Minor depression, 15-19 Major depression, moderately severe, > 20 Major depression, severe  3   Discharge PHQ-9 Score for Depression 4   Initial [employer] COOP Survey score.  Quality of Life:   If total score > 25 review individual areas where patient rated a 4 or 5.  Consider patients current medical condition and what role that plays on the score.   Adjust treatment protocol to improve areas of concern.  Consider the following:  PHQ9 score, DASI, and re-assessment within the next 30 days to assist with developing treatments.  26   Discharge [employer] COOP Survey Score 21 "   Stages of Change Action   Interventions Planned Reassess PHQ-9 and/or [facility] COOP Surveys if outside of defined limits   Interventions In Progress or Completed Patient verbalizes understanding of behavioral assessment scores;Patient continues to practice/follow through with strategies to reduce stress and/or anxiety level;Pt recognizes signs and symptoms of depression   Psychosocial Comments 1/14: Pt has been having ongoing problems with vertigo which have caused him to have increased stress levels at this point.  Discussed having patient f/u with PT, patient states he will contact his PMD if issues get worse.  12/17: This has improved Pt reports. He is unsure if it has to do with increased activity or taking Vitamin D, but he is feeling better recently. 11/26: Unchanged since last update.  10/1: Pt is on Paxil for anxiety and depression and managing well he reports. Pt also uses nature and time with his dog to deal with stress.   Other Core Components - Hypertension   History of or Diagnosis of Hypertension Yes   Currently taking Anti-Hypertensive's Yes;Ace Inhibitor   Other Core Components - Tobacco   History of Tobacco Use Yes   Quit Date or Planned Quit Date 08/27/19   Tobacco Use Status Recent (Quit < 6 mo ago)   Tobacco Habit Cigarettes   Tobacco Use per Day (average) 1   Years of Tobacco Use 30   Stages of Change Action   Tobacco Comments 11/26: Pt is no longer on Nicotine patch and has remained nicotine free.  Pt feels that he does not require further intervention at this point.  10/29: Pt reports he is out of nicorette gum. Is considering purchasing more. Pt originally had prescription of this but hadn't filled it. Pt states he gets cravings once in a while but hasn't had concerns he will return to smoking. 10/1: Nicorette Gum   Other Core Components Summary   Interventions Planned Educate on importance of maintaining low sodium diet;Attend education class on Blood Pressure   Interventions In Progress  or Completed Attended education class on Blood Pressure   Patient Goals No   Other Core Components Target Outcome BP < 140/90 or < 130/80 with DM or CKD;Complete Smoking Cessation   Activity/Exercise History   Activity/Exercise Assessment Re-assessment   Activity/Exercise Status prior to event? Sedentary   Number of Days Currently participating in Moderate Physical Activity? 3   Number of Days Currently performing  Aerobic Exercise (including rehab)? 5   Number of Minutes per Session Currently of Aerobic Exercise (average)? 40   Current Stage of Change (Physical Activity) Action   Current Stage of Change (Aerobic Exercise) Action   Patient Goals Goal #2   Goal #2 Description 10/1: Pt will maintain walking at home 3days/wlk for 20 minutes; working up to 20 minutes   Goal #2 Target Date 12/01/19   Goal #2 Progress Towards Goal 12/17: Per good weather Pt has been walking 1-2x/week but has not set up his TDM yet. 11/26: Pt has been walking 2-2.5 miles 1-2 times per week on non rehab days.  10/29: Pt is walking outside for 1-2 miles. He has treadmill and will use when weather gets cooler.   Activity/Exercise Comments Pt has a treadmill    Activity/Exercise Target Outcome An Accumulation of 150  Minutes of Aerobic Activity per Week   Exercise Assessment   6 Minute Walk Predicted - Gender Selection Male   6 Minute Walk Predicted (Male) 1704.15   6 Minute Walk Predicted (Female) 1519.67   Initial 6 Minute Walk Distance (Feet) 1045 ft   Resting HR 71 bpm   Exercise HR 95 bpm   Post Exercise HR 80 bpm   Resting /80   Exercise /90   Post Exercise /82   Effort Rating 7/10   Current MET Level 4.8   MET Level Goal 4-5   ECG Rhythm Normal sinus rhythm   Ectopy None   Current Symptoms Denies symptoms   Limitations/Restrictions Sternal Precautions   Exercise Prescription   Mode Treadmill;Nustep;Weights;Recumbent bike   Duration/Time 15-30 min   Frequency 2 days/week   THR (85% of age predicted max HR) 124.95   OMNI  "Effort Rating (0-10 Scale) 4-6/10   Progression Total exercise time of 20-30 minutes;Intermittent bouts;Aerobic exercise to OMNI rating of 6 or below and at or below THR;Progress peak intensity by 1/4 MET per week   Comments 10/1: No arms on Nustep until 10/15/19    Recommended Home Exercise   Type of Exercise Walking   Frequency (days per week) 3days/week   Duration (minutes per session) 15-30 min   Effort Rating Recommended 4-6/10   30 Day Exercise Plan 12/17: Pt is trying to walk 1-2x/week outside if the weather permits. Pt reports he has been \"more active at home\". 11/16 Pt is currently walking 2-2.5 miles 2-3 times per week on non rehab days.  Plan to have patient continue increasing bouts with by increasing the time it takes him to complete 2 miles or increase distance within the same time frame.  Pt verbalized understanding.  10/29: PT is walking 1/2 to 1 mile distance. 10/1: Increase walking bouts to be able to tolerate 20 minutes of continuous walking   Current Home Exercise   Type of Exercise Walking   Frequency (days per week) 2-3d   Duration (minutes per session) 20-30 min   Follow-up/On-going Support   Provider follow-up needed on the following No follow-up needed   Learning Assessment   Learner Patient   Primary Language English   Patient Education   Education recommended Blood Pressure;Nutrition   Education classes attended Anatomy and Physiology of the Heart;Blood Pressure;Exercise Principles;Medication Overview;Smoking Cessation   I have established, reviewed and made necessary changes to the individualized treatment plan and exercise prescription for this patient.        Physician Signature: ___________________________________________Date:________  "

## 2020-01-16 ENCOUNTER — HOSPITAL ENCOUNTER (OUTPATIENT)
Dept: CARDIAC REHAB | Facility: CLINIC | Age: 75
End: 2020-01-16
Attending: PHYSICIAN ASSISTANT
Payer: COMMERCIAL

## 2020-01-16 ENCOUNTER — TELEPHONE (OUTPATIENT)
Dept: FAMILY MEDICINE | Facility: CLINIC | Age: 75
End: 2020-01-16

## 2020-01-16 VITALS — HEIGHT: 68 IN | WEIGHT: 141.8 LBS | BODY MASS INDEX: 21.49 KG/M2

## 2020-01-16 DIAGNOSIS — J44.9 CHRONIC OBSTRUCTIVE PULMONARY DISEASE, UNSPECIFIED COPD TYPE (H): Primary | ICD-10-CM

## 2020-01-16 PROCEDURE — 93797 PHYS/QHP OP CAR RHAB WO ECG: CPT | Performed by: REHABILITATION PRACTITIONER

## 2020-01-16 PROCEDURE — 40000575 ZZH STATISTIC OP CARDIAC VISIT #2: Performed by: REHABILITATION PRACTITIONER

## 2020-01-16 PROCEDURE — 93798 PHYS/QHP OP CAR RHAB W/ECG: CPT | Performed by: REHABILITATION PRACTITIONER

## 2020-01-16 PROCEDURE — 40000116 ZZH STATISTIC OP CR VISIT: Performed by: REHABILITATION PRACTITIONER

## 2020-01-16 ASSESSMENT — 6 MINUTE WALK TEST (6MWT)
TOTAL DISTANCE WALKED (FT): 1418
GENDER SELECTION: MALE
PREDICTED: 1710.84
FEMALE CALC: 1514.89
MALE CALC: 1700.47
TOTAL DISTANCE WALKED (FT): 1045

## 2020-01-16 ASSESSMENT — MIFFLIN-ST. JEOR: SCORE: 1357.57

## 2020-01-16 NOTE — TELEPHONE ENCOUNTER
RX changed as requested.   [facility]. Refilled x 1 yr.  Thanks Danielle Mercado [facility]

## 2020-01-16 NOTE — TELEPHONE ENCOUNTER
Pt is currently using Serevent Disc. 50 mcg- Insurance is recommending  Alternative:   STRIVERDI RESPIMAT Inhaler  Pt received a letter with this information.  Please Advise.

## 2020-01-16 NOTE — PROGRESS NOTES
01/16/20 1400   Session   Session Discharge Note   Cardiac Rehab Assessment   Cardiac Rehab Assessment Patient is discharged from phase 2 cardiac rehab after attending 28 sessions. Overall, patient did well having reached peak METs of 5.Patient increased 6 MWT distance by 373 feet, an improvement of 36%. Patient met most of his goals. Main concern from patient is higher than usual BP readings. His BP here in rehab seems okay (range 130s-140/80s with occasional ones 110 s-120/80) but when he takes his BP at home using his automatic cuff he gets high readings (170s-190s SBP). Pt has FU appt with cardiology coming up and this will be discussed at that appointment. Pt understands the role sodium and structured regular exercise has on blood pressure. Pt has [employer] membership and is considering joining health club in [place]. Currently patient is walking outdoors and this  is weather permitting. Pt has remained smoke-free and is confident he can continue this!  Quality of life surveys pre to post program improved as well.   General Information   Treatment Diagnosis Coronary Artery Bypass Surgery   Date of Treatment Diagnosis 09/03/19   Significant Past CV History None   Comorbidities Pulmonary Disease   Other Medical History COPD, Emphysema, HLD, HTN, SANTIAGO, Tobacco Use, Depression, Anxiety, Anemia   Lead up symptoms Weakness, SOB, SANTIAGO, decreased stamina   Hospital Location [facility]   Hospital Discharge Date 09/09/19   Signs and Symptoms Post Hospital Discharge SOB;Appetite   Outpatient Cardiac Rehab Start Date 10/01/19   Primary Physician Danielle Mercado MD   Primary Physician Follow Up NA   Surgeon Lucy Espino MD   Surgeon Follow Up Scheduled   Cardiologist Lucy Espino MD   Cardiologist Follow Up Scheduled   Ejection Fraction 45-50%   Risk Stratification High   Summary of Cath Report   Summary of Cath Report Available   Date Performed 09/03/19   Cath Report Comments CABG x4: LIMA-LAD, SVG-OM,  "SVG-PDA, SVG-JORDON   Living and Work Status    Living Arrangements and Social Status house   Support System Live alone   Return to Employment Retired   Fall Risk Screen   Fall screen completed by Cardiac Rehab   Have you fallen 2 or more times in the past year? No   Have you fallen and had an injury in the past year? No   Timed Up and Go score (seconds) N/A   Is patient a fall risk? No   Abuse Screen (yes response referral indicated)   Feels Threatened by Someone no   Does Anyone Try to Keep You From Having Contact with Others or Doing Things Outside Your Home? no   Physical Signs of Abuse Present no   Pain   Patient Currently in Pain No   Physical Assessments   Incisions WNL   Edema None   Right Lung Sounds not assessed   Left Lung Sounds not assessed   Limitations No limitations   Individualized Treatment Plan   Monitored Sessions Scheduled 24   Monitored Sessions Attended 28   Oxygen   Supplemental Oxygen needed No   Nutrition Management - Weight Management   Assessment Discharge   Age 73   Weight 64.3 kg (141 lb 12.8 oz)   Height 1.727 m (5' 7.99\")   BMI (Calculated) 21.57   Goal Weight 70.3 kg (155 lb)   Initial Rate Your Plate Score. Dietary tool to assess eating patterns. Scores range from 24 to 72. The higher the score the healthier the eating pattern. 48   Discharge Rate Your Plate Score 50   Weight Management Comments 1/14: Weight has remained stable.  12/17: Pt is still trying to gain weight he reports. He would like to be 150#. 11/26: Pt states he has lost 2 more lbs, however he is currently down 1 lb from his initial evaluation.  10/29: Pt feels he is losing weight, prefers to not lose any additional weight. Pt does report he doesn't have a big appetite. Is drinking ensure/boost with breakfast. Does occ skip meals and was encourage to replace any meals skipped with boost/ensure.    Nutrition Management - Lipids   Lipids Labs Available   Date 07/25/19   Total Cholesterol 227   Triglycerides 101   HDL 57 " "     Lipid Comments 1/16: Lipids to be checked at upcoming cardiology visit.   Nutrition Management - Diabetes   Diabetes No   Nutrition Management Summary   Dietary Recommendations None   Stages of Change for Diet Compliance NA   Interventions Planned Attend Nutrition Education Class(es);Complete Food Diary;Instruct on Label Reading   Patient Goals Goal #1   Goal #1 Description 10/1: Pt will decrease sodium to 2000mg daily by    Goal #1 Target Date 12/01/19   Goal #1 Progress Towards Goal 1/16: Pt states he monitors, cut down on how often he eats out, does still eat pizza because it is convenient. We discussed making own pizza and pt would look into but didn't seem really enthusiatic. 1/14: Pt states he continues to work on reducing sodium, he does not salt his foods, however he does not check labels to monitor his sodium intake.  Discussed checking labels.  12/17: Pt reported he has decreased his pizza/chinese consumption to 1x/week instead of 2. Pt is not using the salt shaker, but states this is hard due to being a smoker and not having \"much of a taste for foods\". 11/26: Unchaged since last update.  10/29: Pt is watching food labels closely.  Pt doesn't eat a lot of processed foods. Does eat pizza/chinese maybe 2x/week on average. Pt would be interested in low sodium food handouts.   Nutrition Summary Comments 10/1: Pts diet consists of, cereal, eggs and ramirez; fried pork chops and hotdishes. Lean proteins and fried meats.  or chinese food   Nutrition Target Outcome Total Chol < 150, HDL > 40 (M), HDL > 50 (W), LDL < 70, Trig < 150   Psychosocial Management   Psychosocial Assessment Discharge   Is there history of clinical depression or increased risk of depression? History of clinical depression   Current Level of Stress per Patient Report Mild   Current Coping Skills Is on Medication for Depression/Anxiety;Uses Stress Management/Relaxation Techniques   Initial Patient Health Questionnaire " -9 Score (PHQ-9) for depression. 5-9 Minimal symptoms, 10-14 Minor depression, 15-19 Major depression, moderately severe, > 20 Major depression, severe  3   Discharge PHQ-9 Score for Depression 4   Initial [place] COOP Survey score.  Quality of Life:   If total score > 25 review individual areas where patient rated a 4 or 5.  Consider patients current medical condition and what role that plays on the score.   Adjust treatment protocol to improve areas of concern.  Consider the following:  PHQ9 score, DASI, and re-assessment within the next 30 days to assist with developing treatments.  26   Discharge [place] COOP Survey Score 21   Stages of Change Action   Interventions Planned Reassess PHQ-9 and/or [place] COOP Surveys if outside of defined limits   Interventions In Progress or Completed Patient verbalizes understanding of behavioral assessment scores;Patient continues to practice/follow through with strategies to reduce stress and/or anxiety level;Pt recognizes signs and symptoms of depression   Psychosocial Comments 1/14: Pt has been having ongoing problems with vertigo which have caused him to have increased stress levels at this point.  Discussed having patient f/u with PT, patient states he will contact his PMD if issues get worse.  12/17: This has improved Pt reports. He is unsure if it has to do with increased activity or takign Vitamin D, but he is feeling better recently. 11/26: Unchaged since last update.  10/1: Pt is on Paxil for anxiety and depression and managing well he reports. Pt also uses nature and time with his dog to deal with stress.   Other Core Components - Hypertension   History of or Diagnosis of Hypertension Yes   Currently taking Anti-Hypertensives Yes;Ace Inhibitor   Other Core Components - Tobacco   History of Tobacco Use Yes   Quit Date or Planned Quit Date 08/27/19   Tobacco Use Status Recent (Quit < 6 mo ago)   Tobacco Habit Cigarettes   Tobacco Use per Day (average) 1   Years  of Tobacco Use 30   Stages of Change Action   Tobacco Comments 1/16: Pt continues smoke-free; no nicotine replacement or gum needed.   Other Core Components Summary   Interventions Planned Educate on importance of maintaining low sodium diet;Attend education class on Blood Pressure   Interventions In Progress or Completed Attended education class on Blood Pressure;Educated on importance of maintaining low sodium diet   Patient Goals No   Other Core Components Target Outcome BP < 140/90 or < 130/80 with DM or CKD;Complete Smoking Cessation   Activity/Exercise History   Activity/Exercise Assessment Discharge   Activity/Exercise Status prior to event? Sedentary   Number of Days Currently participating in Moderate Physical Activity? 3   Number of Days Currently performing  Aerobic Exercise (including rehab)? 3   Number of Minutes per Session Currently of Aerobic Exercise (average)? 40   Current Stage of Change (Physical Activity) Action   Current Stage of Change (Aerobic Exercise) Action   Patient Goals Goal #2   Goal #2 Description 10/1: Pt will maintain walking at home 3days/wlk for 20minutes; working up to 20 minutes   Goal #2 Target Date 12/01/19   Goal #2 Date Met 01/16/20   Goal #2 Progress Towards Goal 1/16: Pt added 1d/week into usual routine plus the additonal 2 days he attends rehab.   Activity/Exercise Comments Pt has a treadmill. Pt states he is considering looking into fitness center in [place] as he has [email or web address] membership available to him.   Activity/Exercise Target Outcome An Accumulation of 150  Minutes of Aerobic Activity per Week   Exercise Assessment   6 Minute Walk Predicted - Gender Selection Male   6 Minute Walk Predicted (Male) 1700.47   6 Minute Walk Predicted (Female) 1514.89   Initial 6 Minute Walk Distance (Feet) 1045 ft   Discharge 6 Minute Walk Distance (Feet) 1418   Resting HR 65 bpm   Exercise HR 93 bpm   Post Exercise HR 82 bpm   Resting /70   Exercise /88   Post  "Exercise /74   Effort Rating 6/10   Current MET Level 5.0   MET Level Goal 4-5   ECG Rhythm Normal sinus rhythm   Ectopy None   Current Symptoms Denies symptoms   Limitations/Restrictions None   Exercise Prescription   Mode Treadmill;Nustep;Weights;Recumbant bike;Arm Ergometer   Duration/Time 15-30 min   Frequency 2 days/week   THR (85% of age predicted max HR) 124.95   OMNI Effort Rating (0-10 Scale) 4-6/10   Progression Total exercise time of 20-30 minutes;Intermittent bouts;Aerobic exercise to OMNI rating of 6 or below and at or below THR;Progress peak intensity by 1/4 MET per week   Recommended Home Exercise   Type of Exercise Walking   Frequency (days per week) 3days/week   Duration (minutes per session) 15-30 min   Effort Rating Recommended 4-6/10   30 Day Exercise Plan 12/17: Pt is trying to walk 1-2x/week outside if the weather permits. Pt reports he has been \"more active at home\". 11/16 Pt is currently walkig 2-2.5 miles 2-3 times per week on non rehab days.  Plan to have patient continue increasing bouts with by increasing the time it takes him to complete 2 miles or increase distance within the same time frame.  Pt verbalized understanding.  10/29: PT is walking 1/2 to 1 mile distance. 10/1: Increase walking bouts to be able to tolerate 20minutes of continious walking   Current Home Exercise   Type of Exercise Walking   Frequency (days per week) 1-2d/week   Duration (minutes per session) 20 minutes   Follow-up/On-going Support   Provider follow-up needed on the following Blood Pressure   Comments 1/16: Pt has upcoming cardiology appointment when he will discuss his BP. Care team contacted patient recently due to high reading when pt was in to see pharmacy and had BP taken.   Learning Assessment   Learner Patient   Primary Language English   Patient Education   Education recommended Blood Pressure;Nutrition   Education classes attended Anatomy and Physiology of the Heart;Blood Pressure;Exercise " Principles;Medication Overview;Smoking Cessation     Physician cosignature/electronic signature indicates agreements with the ITP document and approval of discharge.

## 2020-01-21 NOTE — PATIENT INSTRUCTIONS
"[facility] HEART CARE  [facility]~[address]~[place], [place]~[address]  Thank you for your  Heart Care visit today. If you have questions regarding your visit, please contact your cardiology RN' Melba Rodriguez, at [phone]. Your provider has recommended the following:  Medication Changes:  1. START ezetimibe 10 mg daily (already prescribed by primary doctor)  2. START isosorbide 30 mg daily  3. START carvedilol 3.125 mg twice daily  Recommendations:  1. Echocardiogram  2. Coronary angiogram to be done at [facility] on Thursday August 8, 2019. Please arrive at 9:00am. If you need to contact [facility] for any reason, please call [phone] option #2.  Follow-up:  See Garima Clemons NP for cardiology follow up at [facility] 1 week after angiogram    To schedule a future appointment, we kindly ask that you call cardiology scheduling at [phone] three months prior to requested revisit date.  [facility] cardiology clinic is staffed with \"Advance Practice Providers\". These are our cardiology Physician Assistants and Nurse Practitioners.   Please call cardiology scheduling if you feel you need clinical evaluation with them at any time for any cardiac reason.   Reminder:  For your safety, we ask that you bring in your current medication(s) or an updated list of your medications with you to EACH office visit. Include the medication name, dose of pill on bottle and how you are taking it. Include over-the-counter medications or supplements. Your provider will review this at each visit and plan your care based on your current information.   ~~~~~~~~~~~~~~~~~~~~~~~~~~~~~~~~~~~~~~~  \"[facility]\" campus telephone numbers for reference:  Cardiology Scheduling~[phone]  Diagnostic Imaging Scheduling~[phone]  Lab Scheduling~[phone]  Anticoagulation Clinic~[phone]  Cardiac Rehabilitation~[phone]  [facility] RN's~[phone] (at " [facility]. "[place])  Cardiology Clinic RN~[phone] (Melba Rodriguez RN)  ~~~~~~~~~~~~~~~~~~~~~~~~~~~~~~~~~~~~~~~~    ANGIOGRAM  [facility]   [place], [place]   Contact [facility] if needed at [phone].      1. In preparation for your procedure, we require that you do the following:  a. Nothing to eat or drink after midnight if your procedure is before 12 noon.  b. Take your usual morning medications with a small sip of water immediately upon arising on the morning of your procedure unless outlined below:    Aspirin:  o If you currently take Aspirin, continue taking your same dose.  2. You will be unable to drive after your procedure; please arrange to have someone drive you home. Make sure that there is a responsible adult with you for 24 hours after your procedure. Your procedure will be cancelled if you do not have transportation home or someone staying with you for 24 hrs.   3.  Your procedure will be done at [facility]. Please park in the  [address]amp  on the west side of [facility] on [address]. Take the skyway over [facility] to the hospital. Please check in on the first floor, \"Skyway Welcome Desk\" which is one floor down from the skyway level.   4. If you have any questions about your upcoming procedure, please contact nursing at [facility] at [phone] or at [facility] at [phone].    "

## 2020-02-06 ENCOUNTER — HOSPITAL ENCOUNTER (OUTPATIENT)
Dept: CARDIOLOGY | Facility: CLINIC | Age: 75
Discharge: HOME | End: 2020-02-06
Attending: INTERNAL MEDICINE | Admitting: INTERNAL MEDICINE
Payer: COMMERCIAL

## 2020-02-06 DIAGNOSIS — I25.810 CORONARY ARTERY DISEASE INVOLVING AUTOLOGOUS ARTERY CORONARY BYPASS GRAFT WITHOUT ANGINA PECTORIS: ICD-10-CM

## 2020-02-06 DIAGNOSIS — I50.22 CHRONIC SYSTOLIC HEART FAILURE (H): ICD-10-CM

## 2020-02-06 LAB
ALT SERPL W P-5'-P-CCNC: 17 U/L (ref 0–70)
ANION GAP SERPL CALCULATED.3IONS-SCNC: 4 MMOL/L (ref 3–14)
AST SERPL W P-5'-P-CCNC: 12 U/L (ref 0–45)
BUN SERPL-MCNC: 22 MG/DL (ref 7–30)
CALCIUM SERPL-MCNC: 9.5 MG/DL (ref 8.5–10.1)
CHLORIDE SERPL-SCNC: 108 MMOL/L (ref 94–109)
CHOLEST SERPL-MCNC: 145 MG/DL
CO2 SERPL-SCNC: 30 MMOL/L (ref 20–32)
CREAT SERPL-MCNC: 1.24 MG/DL (ref 0.66–1.25)
GFR SERPL CREATININE-BSD FRML MDRD: 57 ML/MIN/{1.73_M2}
GLUCOSE SERPL-MCNC: 92 MG/DL (ref 70–99)
HDLC SERPL-MCNC: 64 MG/DL
LDLC SERPL CALC-MCNC: 66 MG/DL
NONHDLC SERPL-MCNC: 81 MG/DL
NT-PROBNP SERPL-MCNC: 775 PG/ML (ref 0–125)
POTASSIUM SERPL-SCNC: 4.4 MMOL/L (ref 3.4–5.3)
SODIUM SERPL-SCNC: 142 MMOL/L (ref 133–144)
TRIGL SERPL-MCNC: 75 MG/DL

## 2020-02-06 PROCEDURE — 84450 TRANSFERASE (AST) (SGOT): CPT | Performed by: INTERNAL MEDICINE

## 2020-02-06 PROCEDURE — 80048 BASIC METABOLIC PNL TOTAL CA: CPT | Performed by: INTERNAL MEDICINE

## 2020-02-06 PROCEDURE — 93306 TTE W/DOPPLER COMPLETE: CPT | Mod: 26 | Performed by: INTERNAL MEDICINE

## 2020-02-06 PROCEDURE — 93306 TTE W/DOPPLER COMPLETE: CPT

## 2020-02-06 PROCEDURE — 83880 ASSAY OF NATRIURETIC PEPTIDE: CPT | Performed by: INTERNAL MEDICINE

## 2020-02-06 PROCEDURE — 80061 LIPID PANEL: CPT | Performed by: INTERNAL MEDICINE

## 2020-02-06 PROCEDURE — 36415 COLL VENOUS BLD VENIPUNCTURE: CPT | Performed by: INTERNAL MEDICINE

## 2020-02-06 PROCEDURE — 84460 ALANINE AMINO (ALT) (SGPT): CPT | Performed by: INTERNAL MEDICINE

## 2020-02-06 NOTE — RESULT ENCOUNTER NOTE
Results noted: EF 55-60%; WMAs as described. To be discussed at OV with Garima Clemons NP on 2/14/20

## 2020-02-06 NOTE — RESULT ENCOUNTER NOTE
Results noted: AST, ALT, electrolytes and kidney function WNL; lipids much improved and at goal;  BNP elevated but not to level of CHF. To be discussed at OV with Garima Clemons NP on 2/14/20

## 2020-02-13 PROBLEM — I25.810 CORONARY ARTERY DISEASE INVOLVING CORONARY BYPASS GRAFT OF NATIVE HEART WITHOUT ANGINA PECTORIS: Status: ACTIVE | Noted: 2020-02-13

## 2020-02-13 PROBLEM — Z86.79 H/O CARDIOMYOPATHY: Status: ACTIVE | Noted: 2020-02-13

## 2020-02-13 NOTE — PROGRESS NOTES
Cardiology Clinic Progress Note  Adolfo Rendon MRN# [id number]   YOB: 1945 Age: 73 year old     Primary Cardiologist:   Dr. Espino          History of Presenting Illness:    Adolfo Rendon is a pleasant 73 year old patient with a past cardiac history significant for   CAD with CABG (LIMA to LAD, SVG to OM, SVG to PDA, SVG to PL) 9/2019  H/o ischemic cardiomyopathy with normalized EF   hypertension,   Hyperlipidemia  mild ascending aortic dilatation  Past medical history significant for ongoing tobacco abuse and COPD.    He has a history of multiple medication intolerances including amlodipine, hydrochlorothiazide, high-intensity statin, metoprolol, and pravastatin. He has discontinued all antihypertensive medications in the past.   He continued taking his aspirin daily but no other cardiac medications. Coronary angiogram in 2015 showed chronically occluded RCA with left-to-right collaterals along with 50% pLAD stenosis.    In August 2019 he complained of shortness of breath, fatigue, and chest heaviness.  He was noted to have severe multivessel disease and eventually underwent CABG x4 in September 2019.  EF prior to surgery was 45 to 50% with mildly dilated ascending aorta.  Lipids were elevated and he was started on Repatha and Zetia.    Patient was last seen by Dr. Espino in November 2019.  After the patient's surgery, he was in TCU and they did not do Repatha injections.  He was now at home recovering and restarting Repatha injections was recommended.  He was doing well without any anginal symptoms and was participating in cardiac rehab.  He recommended continuing aspirin 325 mg daily for 6 months and then decrease to 81 mg daily.     Pt presents today for 3 month follow-up.  Lipid profile 2/6/2020 showing improved lipids with total cholesterol 145 HDL 64 LDL 66 and triglycerides 75.  ALT was WNL.  BNP was 775.  BMP showing normal electrolytes and creatinine/BUN, with GFR 57.  Echocardiogram  2/6/2020 showing normalized EF 55 to 60%, wall motion abnormalities as described, RV normal size and function, moderate aortic sclerosis without stenosis.  These results were reviewed with him today.    He has restarted the Repatha injections without difficulty.  He mentions that this is expensive and he was not able to proceed with the financial assistance but is able to afford it currently.  Blood pressure today is well controlled but he reports elevated blood pressures at home.  I have asked him to have his home blood pressure monitor checked for accuracy.  Unfortunately, after finishing cardiac rehab he has not continued with any routine exercise.  I stressed the importance of this.  He is going look into [employer] but otherwise does routine exercise during the warmer months.  He denies any anginal symptoms.  He continues with chronic dyspnea on exertion when going up a flight of stairs which is unchanged.  He continues to abstain from nicotine which I congratulated him on. Patient reports no chest pain, PND, orthopnea, presyncope, syncope, edema, heart racing, or palpitations.    Current Cardiac Medications   Aspirin 325 mg daily   Carvedilol 12.5 mg twice daily  Repatha 140 mg every 14 days  Zetia 10 mg daily  Lisinopril 5 mg daily                     Assessment and Plan:     Plan  Can return to Asprin 81 mg in May 2020    Recommendations:  1. Call with questions   2. Check blood pressure at least 1 hour after medications. Call the clinic if your blood pressure is consistently greater than 130/80.   3. Have your blood pressure monitor checked for accuracy    Follow-up:  See Dr. Espino for cardiology follow up at [facility]: Aug.       1. CAD    CABG (LIMA to LAD, SVG to OM, SVG to PDA, SVG to PL) 9/2019    No angina     continue aspirin, ACE inhibitor, beta blocker      2. H/o ischemic cardiomyopathy    EF normalized 2/2020, LVEF 45-50% 8/2019 (prior to bypass)    No signs of heart failure    Continue  beta blocker, ACE inhibitor    Check daily weights and call the clinic if your weight has increased more than 2 lbs in one day or 5 lbs in one week.     2000 mg Na diet       3. hypertension    Controlled    Moderate Right renal artery stenosis on CT scan 2018 and 2019 stable - no intervention recommended by vascular surgery. Med manage HTN     Continue carvedilol, lisinopril    Check blood pressure at least 1 hour after medications. Call the clinic if your blood pressure is consistently greater than 130/80.       4. Hyperlipidemia    Last LDL 66 on 2/2020    Continue Forest Ortiz      5. Thoracic aortic aneurysm    Mildly dilated ascending aorta 3.8 cm echo 8/2019 and CTa 5/2019, normal on echo 2/2020    Follow with echo          Thank you for allowing me to participate in this delightful patient's care.      This note was completed in part using Dragon voice recognition software. Although reviewed after completion, some word and grammatical errors may occur.    Garima Thornton, APRN, CNP           Data:   All laboratory data reviewed        HPI and Plan:   See dictation    Orders Placed This Encounter   Procedures     Follow-Up with Cardiologist       Orders Placed This Encounter   Medications     Vitamin D, Cholecalciferol, 25 MCG (1000 UT) TABS       Medications Discontinued During This Encounter   Medication Reason     acetaminophen (TYLENOL) 325 MG tablet      methocarbamol (ROBAXIN) 500 MG tablet      PARoxetine (PAXIL) 20 MG tablet          Encounter Diagnoses   Name Primary?     H/O cardiomyopathy Yes     Coronary artery disease involving coronary bypass graft of native heart without angina pectoris      Benign essential hypertension      Hyperlipidemia LDL goal <70        CURRENT MEDICATIONS:  Current Outpatient Medications   Medication Sig Dispense Refill     aspirin (ASA) 325 MG tablet ONE DAILY 30 tablet 3     carvedilol (COREG) 12.5 MG tablet Take 1 tablet (12.5 mg) by mouth 2 times  "daily (with meals) 60 tablet 3     evolocumab (REPATHA) 140 MG/ML prefilled autoinjector Inject 140 mg Subcutaneous every 14 days       ezetimibe (ZETIA) 10 MG tablet Take 1 tablet (10 mg) by mouth daily 90 tablet 0     lisinopril (PRINIVIL/ZESTRIL) 5 MG tablet Take 1 tablet (5 mg) by mouth daily 90 tablet 1     nicotine polacrilex (NICORETTE) 4 MG gum Place 4 mg inside cheek every hour as needed for smoking cessation       pantoprazole (PROTONIX) 40 MG EC tablet Take 1 tablet (40 mg) by mouth every morning (before breakfast) 14 tablet 0     PARoxetine (PAXIL) 40 MG tablet Take 1 tablet (40 mg) by mouth every morning Add to 20 mg daily 90 tablet 1     senna-docusate (SENOKOT-S/PERICOLACE) 8.6-50 MG tablet Take 1 tablet by mouth 2 times daily as needed for constipation 90 tablet 1     Vitamin D, Cholecalciferol, 25 MCG (1000 UT) TABS        ipratropium (ATROVENT HFA) 17 MCG/ACT inhaler Inhale 2 puffs into the lungs every 6 hours (Patient not taking: Reported on 2/14/2020) 12.9 g 5     Olodaterol HCl 2.5 MCG/ACT AERS Inhale 2 puffs into the lungs daily (Patient not taking: Reported on 2/14/2020) 12 g 3       ALLERGIES     Allergies   Allergen Reactions     Amlodipine Fatigue     \"felt like zombie\"     Claritin      Mood , irritablity      Hctz [Hydrochlorothiazide] Fatigue     \"felt like zombie\"     Lisinopril Fatigue     \"felt like zombie\"     Metoprolol Fatigue     \"felt like zombie\"     Pravastatin Fatigue     \"felt like zombie\"     Remeron [Mirtazapine]      Agitation        Wellbutrin [Bupropion Hcl]      Sig mood issues          PAST MEDICAL HISTORY:  Past Medical History:   Diagnosis Date     Coronary artery disease      Depressive disorder, not elsewhere classified      Hypertrophy (benign) of prostate      Impotence of organic origin      Other and unspecified hyperlipidemia      Other anxiety states      Tobacco use disorder        PAST SURGICAL HISTORY:  Past Surgical History:   Procedure Laterality Date "     APPENDECTOMY  1966     BYPASS GRAFT ARTERY CORONARY N/A 9/3/2019    Procedure: CORONARY ARTERY BYPASS GRAFT X 4 - LIMA TO LAD, SV TO PL, OM, PDA ; ON PUMP WITH TERRENCE; ENDOVEIN HARVEST RIGHT LEG;  Surgeon: Lai Mchugh MD;  Location:  OR     COLONOSCOPY  3/1/2005     COLONOSCOPY N/A 5/9/2019    Procedure: COLONOSCOPY;  Surgeon: Camilo Beard MD;  Location: [facility]     CV LEFT HEART CATH N/A 8/8/2019    Procedure: Left Heart Cath--also measure LVEDP;  Surgeon: Krystle Barrera MD;  Location:  HEART CARDIAC CATH LAB     HERNIORRHAPHY INGUINAL  7/14/2011    Procedure:HERNIORRHAPHY INGUINAL; Open Repair Right Inguinal Hernia Repair        HYDROCELECTOMY SCROTAL  01/2004    left hydrocele repair     SUPRAPUBIC PROSTATECTOMY         FAMILY HISTORY:  Family History   Problem Relation Age of Onset     Cerebrovascular Disease Mother      Hypertension Mother      Prostate Cancer Brother      Dementia Brother      Arthritis Sister         rhematoid     Cancer Brother         Lung cancer, lymphoma     Other - See Comments Brother         HIV     Cancer Sister         skin cancer on nose     Pacemaker Sister      Aneurysm Sister         heart       SOCIAL HISTORY:  Social History     Socioeconomic History     Marital status:      Spouse name: None     Number of children: None     Years of education: None     Highest education level: None   Occupational History     None   Social Needs     Financial resource strain: None     Food insecurity:     Worry: None     Inability: None     Transportation needs:     Medical: None     Non-medical: None   Tobacco Use     Smoking status: Former Smoker     Packs/day: 1.00     Types: Cigarettes     Last attempt to quit: 1/14/2017     Years since quitting: 3.0     Smokeless tobacco: Former User     Quit date: 9/1/2017   Substance and Sexual Activity     Alcohol use: No     Alcohol/week: 0.0 standard drinks     Comment: quit 1989     Drug use: No     Sexual activity:  Not Currently   Lifestyle     Physical activity:     Days per week: None     Minutes per session: None     Stress: None   Relationships     Social connections:     Talks on phone: None     Gets together: None     Attends [employer] service: None     Active member of club or organization: None     Attends meetings of clubs or organizations: None     Relationship status: None     Intimate partner violence:     Fear of current or ex partner: None     Emotionally abused: None     Physically abused: None     Forced sexual activity: None   Other Topics Concern     Parent/sibling w/ CABG, MI or angioplasty before 65F 55M? Not Asked      Service Not Asked     Blood Transfusions Not Asked     Caffeine Concern No     Comment: 2-3 cups of coffee in the morning     Occupational Exposure Not Asked     Hobby Hazards Not Asked     Sleep Concern No     Comment: falls asleep easy but doesn't sleep long     Stress Concern No     Weight Concern No     Special Diet No     Back Care Not Asked     Exercise No     Comment: work: hard labor     Bike Helmet Not Asked     Seat Belt Yes     Self-Exams Not Asked   Social History Narrative     None       Review of Systems:  Skin:  Negative       Eyes:  Positive for glasses reading  ENT:  Positive for hearing loss;nasal congestion;tinnitus    Respiratory:  Positive for dyspnea on exertion COPD   Cardiovascular:  Negative      Gastroenterology: Negative      Genitourinary:  Negative      Musculoskeletal:  Negative      Neurologic:  Positive for seizures    Psychiatric:  Positive for sleep disturbances;anxiety;depression    Heme/Lymph/Imm:  Negative      Endocrine:  Negative        Physical Exam:  Vitals: /68   Pulse 55   Wt 64.9 kg (143 lb)   SpO2 98%   BMI 21.75 kg/m      Constitutional:  cooperative;in no acute distress thin      Skin:  warm and dry to the touch          Head:  normocephalic        Eyes:  sclera white        Lymph:      ENT:  no pallor or cyanosis        Neck:   JVP normal        Respiratory:  clear to auscultation;normal symmetry         Cardiac: regular rhythm;normal S1 and S2                pulses full and equal                                        GI:  abdomen soft        Extremities and Muscular Skeletal:  no edema              Neurological:  no gross motor deficits;affect appropriate        Psych:  affect appropriate, oriented to time, person and place        CC  Lucy Espino MD  [address]  [place] [id number]

## 2020-02-13 NOTE — PATIENT INSTRUCTIONS
Medication Changes:  Can return to Asprin 81 mg in May 2020    Recommendations:  1. Call with questions   2. Check blood pressure at least 1 hour after medications. Call the clinic if your blood pressure is consistently greater than 130/80.   3. Have your blood pressure monitor checked for accuracy    Follow-up:  See Dr. Espino for cardiology follow up at [facility]: Aug. Call in May to schedule.     Cardiology Scheduling~[phone]  Cardiology Clinic RNs~[phone] (Sowmya Ortega RN and Melba Rodriguez RN)

## 2020-02-14 ENCOUNTER — OFFICE VISIT (OUTPATIENT)
Dept: CARDIOLOGY | Facility: CLINIC | Age: 75
End: 2020-02-14
Attending: INTERNAL MEDICINE
Payer: COMMERCIAL

## 2020-02-14 VITALS
SYSTOLIC BLOOD PRESSURE: 118 MMHG | BODY MASS INDEX: 21.75 KG/M2 | OXYGEN SATURATION: 98 % | WEIGHT: 143 LBS | DIASTOLIC BLOOD PRESSURE: 68 MMHG | HEART RATE: 55 BPM

## 2020-02-14 DIAGNOSIS — E78.5 HYPERLIPIDEMIA LDL GOAL <70: ICD-10-CM

## 2020-02-14 DIAGNOSIS — I25.810 CORONARY ARTERY DISEASE INVOLVING CORONARY BYPASS GRAFT OF NATIVE HEART WITHOUT ANGINA PECTORIS: ICD-10-CM

## 2020-02-14 DIAGNOSIS — I10 BENIGN ESSENTIAL HYPERTENSION: ICD-10-CM

## 2020-02-14 DIAGNOSIS — Z86.79 H/O CARDIOMYOPATHY: Primary | ICD-10-CM

## 2020-02-14 PROCEDURE — 99214 OFFICE O/P EST MOD 30 MIN: CPT | Performed by: NURSE PRACTITIONER

## 2020-02-14 RX ORDER — MULTIVIT-MIN/IRON/FOLIC ACID/K 18-600-40
1000 CAPSULE ORAL DAILY
COMMUNITY

## 2020-02-14 NOTE — LETTER
2/14/2020    Danielle Mercado, APRN CNP  [address] [facility] [address]    RE: Adolfo Rendon       Dear Colleague,    I had the pleasure of seeing Adolfo Rendon in the [facility].    Cardiology Clinic Progress Note  Adolfo Rendon MRN# [id number]   YOB: 1945 Age: 73 year old     Primary Cardiologist:   Dr. Espino          History of Presenting Illness:    Adolfo Rendon is a pleasant 73 year old patient with a past cardiac history significant for   CAD with CABG (LIMA to LAD, SVG to OM, SVG to PDA, SVG to PL) 9/2019  H/o ischemic cardiomyopathy with normalized EF   hypertension,   Hyperlipidemia  mild ascending aortic dilatation  Past medical history significant for ongoing tobacco abuse and COPD.    He has a history of multiple medication intolerances including amlodipine, hydrochlorothiazide, high-intensity statin, metoprolol, and pravastatin. He has discontinued all antihypertensive medications in the past.   He continued taking his aspirin daily but no other cardiac medications. Coronary angiogram in 2015 showed chronically occluded RCA with left-to-right collaterals along with 50% pLAD stenosis.    In August 2019 he complained of shortness of breath, fatigue, and chest heaviness.  He was noted to have severe multivessel disease and eventually underwent CABG x4 in September 2019.  EF prior to surgery was 45 to 50% with mildly dilated ascending aorta.  Lipids were elevated and he was started on Repatha and Zetia.    Patient was last seen by Dr. Espino in November 2019.  After the patient's surgery, he was in TCU and they did not do Repatha injections.  He was now at home recovering and restarting Repatha injections was recommended.  He was doing well without any anginal symptoms and was participating in cardiac rehab.  He recommended continuing aspirin 325 mg daily for 6 months and then decrease to 81 mg daily.     Pt presents today for 3 month  follow-up.  Lipid profile 2/6/2020 showing improved lipids with total cholesterol 145 HDL 64 LDL 66 and triglycerides 75.  ALT was WNL.  BNP was 775.  BMP showing normal electrolytes and creatinine/BUN, with GFR 57.  Echocardiogram 2/6/2020 showing normalized EF 55 to 60%, wall motion abnormalities as described, RV normal size and function, moderate aortic sclerosis without stenosis.  These results were reviewed with him today.    He has restarted the Repatha injections without difficulty.  He mentions that this is expensive and he was not able to proceed with the financial assistance but is able to afford it currently.  Blood pressure today is well controlled but he reports elevated blood pressures at home.  I have asked him to have his home blood pressure monitor checked for accuracy.  Unfortunately, after finishing cardiac rehab he has not continued with any routine exercise.  I stressed the importance of this.  He is going look into Silver sneakers but otherwise does routine exercise during the warmer months.  He denies any anginal symptoms.  He continues with chronic dyspnea on exertion when going up a flight of stairs which is unchanged.  He continues to abstain from nicotine which I congratulated him on. Patient reports no chest pain, PND, orthopnea, presyncope, syncope, edema, heart racing, or palpitations.    Current Cardiac Medications   Aspirin 325 mg daily   Carvedilol 12.5 mg twice daily  Repatha 140 mg every 14 days  Zetia 10 mg daily  Lisinopril 5 mg daily                     Assessment and Plan:     Plan  Can return to Asprin 81 mg in May 2020    Recommendations:  1. Call with questions   2. Check blood pressure at least 1 hour after medications. Call the clinic if your blood pressure is consistently greater than 130/80.   3. Have your blood pressure monitor checked for accuracy    Follow-up:  See Dr. Espino for cardiology follow up at [facility]: Aug.       1. CAD    CABG (LIMA to LAD, SVG to OM,  SVG to PDA, SVG to PL) 9/2019    No angina     continue aspirin, ACE inhibitor, beta blocker      2. H/o ischemic cardiomyopathy    EF normalized 2/2020, LVEF 45-50% 8/2019 (prior to bypass)    No signs of heart failure    Continue beta blocker, ACE inhibitor    Check daily weights and call the clinic if your weight has increased more than 2 lbs in one day or 5 lbs in one week.     2000 mg Na diet       3. hypertension    Controlled    Moderate Right renal artery stenosis on CT scan 2018 and 2019 stable - no intervention recommended by vascular surgery. Med manage HTN     Continue carvedilol, lisinopril    Check blood pressure at least 1 hour after medications. Call the clinic if your blood pressure is consistently greater than 130/80.       4. Hyperlipidemia    Last LDL 66 on 2/2020    Continue Repatha Zetia      5. Thoracic aortic aneurysm    Mildly dilated ascending aorta 3.8 cm echo 8/2019 and CTa 5/2019, normal on echo 2/2020    Follow with echo          Thank you for allowing me to participate in this delightful patient's care.      This note was completed in part using Dragon voice recognition software. Although reviewed after completion, some word and grammatical errors may occur.    Garima Thornton, ANTONIO, CNP           Data:   All laboratory data reviewed        HPI and Plan:   See dictation    Orders Placed This Encounter   Procedures     Follow-Up with Cardiologist       Orders Placed This Encounter   Medications     Vitamin D, Cholecalciferol, 25 MCG (1000 UT) TABS       Medications Discontinued During This Encounter   Medication Reason     acetaminophen (TYLENOL) 325 MG tablet      methocarbamol (ROBAXIN) 500 MG tablet      PARoxetine (PAXIL) 20 MG tablet          Encounter Diagnoses   Name Primary?     H/O cardiomyopathy Yes     Coronary artery disease involving coronary bypass graft of native heart without angina pectoris      Benign essential hypertension      Hyperlipidemia LDL goal  "<70        CURRENT MEDICATIONS:  Current Outpatient Medications   Medication Sig Dispense Refill     aspirin (ASA) 325 MG tablet ONE DAILY 30 tablet 3     carvedilol (COREG) 12.5 MG tablet Take 1 tablet (12.5 mg) by mouth 2 times daily (with meals) 60 tablet 3     evolocumab (REPATHA) 140 MG/ML prefilled autoinjector Inject 140 mg Subcutaneous every 14 days       ezetimibe (ZETIA) 10 MG tablet Take 1 tablet (10 mg) by mouth daily 90 tablet 0     lisinopril (PRINIVIL/ZESTRIL) 5 MG tablet Take 1 tablet (5 mg) by mouth daily 90 tablet 1     nicotine polacrilex (NICORETTE) 4 MG gum Place 4 mg inside cheek every hour as needed for smoking cessation       pantoprazole (PROTONIX) 40 MG EC tablet Take 1 tablet (40 mg) by mouth every morning (before breakfast) 14 tablet 0     PARoxetine (PAXIL) 40 MG tablet Take 1 tablet (40 mg) by mouth every morning Add to 20 mg daily 90 tablet 1     senna-docusate (SENOKOT-S/PERICOLACE) 8.6-50 MG tablet Take 1 tablet by mouth 2 times daily as needed for constipation 90 tablet 1     Vitamin D, Cholecalciferol, 25 MCG (1000 UT) TABS        ipratropium (ATROVENT HFA) 17 MCG/ACT inhaler Inhale 2 puffs into the lungs every 6 hours (Patient not taking: Reported on 2/14/2020) 12.9 g 5     Olodaterol HCl 2.5 MCG/ACT AERS Inhale 2 puffs into the lungs daily (Patient not taking: Reported on 2/14/2020) 12 g 3       ALLERGIES     Allergies   Allergen Reactions     Amlodipine Fatigue     \"felt like zombie\"     Claritin      Mood , irritablity      Hctz [Hydrochlorothiazide] Fatigue     \"felt like zombie\"     Lisinopril Fatigue     \"felt like zombie\"     Metoprolol Fatigue     \"felt like zombie\"     Pravastatin Fatigue     \"felt like zombie\"     Remeron [Mirtazapine]      Agitation        Wellbutrin [Bupropion Hcl]      Sig mood issues          PAST MEDICAL HISTORY:  Past Medical History:   Diagnosis Date     Coronary artery disease      Depressive disorder, not elsewhere classified      Hypertrophy " (benign) of prostate      Impotence of organic origin      Other and unspecified hyperlipidemia      Other anxiety states      Tobacco use disorder        PAST SURGICAL HISTORY:  Past Surgical History:   Procedure Laterality Date     APPENDECTOMY  1966     BYPASS GRAFT ARTERY CORONARY N/A 9/3/2019    Procedure: CORONARY ARTERY BYPASS GRAFT X 4 - LIMA TO LAD, SV TO PL, OM, PDA ; ON PUMP WITH TERRENCE; ENDOVEIN HARVEST RIGHT LEG;  Surgeon: Lai Mchugh MD;  Location:  OR     COLONOSCOPY  3/1/2005     COLONOSCOPY N/A 5/9/2019    Procedure: COLONOSCOPY;  Surgeon: Camilo Beard MD;  Location: [facility]     CV LEFT HEART CATH N/A 8/8/2019    Procedure: Left Heart Cath--also measure LVEDP;  Surgeon: Krystle Barrera MD;  Location:  HEART CARDIAC CATH LAB     HERNIORRHAPHY INGUINAL  7/14/2011    Procedure:HERNIORRHAPHY INGUINAL; Open Repair Right Inguinal Hernia Repair        HYDROCELECTOMY SCROTAL  01/2004    left hydrocele repair     SUPRAPUBIC PROSTATECTOMY         FAMILY HISTORY:  Family History   Problem Relation Age of Onset     Cerebrovascular Disease Mother      Hypertension Mother      Prostate Cancer Brother      Dementia Brother      Arthritis Sister         rhematoid     Cancer Brother         Lung cancer, lymphoma     Other - See Comments Brother         HIV     Cancer Sister         skin cancer on nose     Pacemaker Sister      Aneurysm Sister         heart       SOCIAL HISTORY:  Social History     Socioeconomic History     Marital status:      Spouse name: None     Number of children: None     Years of education: None     Highest education level: None   Occupational History     None   Social Needs     Financial resource strain: None     Food insecurity:     Worry: None     Inability: None     Transportation needs:     Medical: None     Non-medical: None   Tobacco Use     Smoking status: Former Smoker     Packs/day: 1.00     Types: Cigarettes     Last attempt to quit: 1/14/2017     Years  since quitting: 3.0     Smokeless tobacco: Former User     Quit date: 9/1/2017   Substance and Sexual Activity     Alcohol use: No     Alcohol/week: 0.0 standard drinks     Comment: quit 1989     Drug use: No     Sexual activity: Not Currently   Lifestyle     Physical activity:     Days per week: None     Minutes per session: None     Stress: None   Relationships     Social connections:     Talks on phone: None     Gets together: None     Attends Buddhist service: None     Active member of club or organization: None     Attends meetings of clubs or organizations: None     Relationship status: None     Intimate partner violence:     Fear of current or ex partner: None     Emotionally abused: None     Physically abused: None     Forced sexual activity: None   Other Topics Concern     Parent/sibling w/ CABG, MI or angioplasty before 65F 55M? Not Asked      Service Not Asked     Blood Transfusions Not Asked     Caffeine Concern No     Comment: 2-3 cups of coffee in the morning     Occupational Exposure Not Asked     Hobby Hazards Not Asked     Sleep Concern No     Comment: falls asleep easy but doesn't sleep long     Stress Concern No     Weight Concern No     Special Diet No     Back Care Not Asked     Exercise No     Comment: work: hard labor     Bike Helmet Not Asked     Seat Belt Yes     Self-Exams Not Asked   Social History Narrative     None       Review of Systems:  Skin:  Negative       Eyes:  Positive for glasses reading  ENT:  Positive for hearing loss;nasal congestion;tinnitus    Respiratory:  Positive for dyspnea on exertion COPD   Cardiovascular:  Negative      Gastroenterology: Negative      Genitourinary:  Negative      Musculoskeletal:  Negative      Neurologic:  Positive for seizures    Psychiatric:  Positive for sleep disturbances;anxiety;depression    Heme/Lymph/Imm:  Negative      Endocrine:  Negative        Physical Exam:  Vitals: /68   Pulse 55   Wt 64.9 kg (143 lb)   SpO2 98%    BMI 21.75 kg/m       Constitutional:  cooperative;in no acute distress thin      Skin:  warm and dry to the touch          Head:  normocephalic        Eyes:  sclera white        Lymph:      ENT:  no pallor or cyanosis        Neck:  JVP normal        Respiratory:  clear to auscultation;normal symmetry         Cardiac: regular rhythm;normal S1 and S2                pulses full and equal                                        GI:  abdomen soft        Extremities and Muscular Skeletal:  no edema              Neurological:  no gross motor deficits;affect appropriate        Psych:  affect appropriate, oriented to time, person and place        CC  Lucy Espino MD  [address]  [place] [address]            Thank you for allowing me to participate in the care of your patient.    Sincerely,     ANTONIO Gusman [facility]

## 2020-03-30 DIAGNOSIS — Z95.1 S/P CABG X 4: ICD-10-CM

## 2020-03-31 RX ORDER — SENNOSIDES AND DOCUSATE SODIUM 8.6; 5 MG/1; MG/1
TABLET ORAL
Qty: 90 TABLET | Refills: 0 | Status: SHIPPED | OUTPATIENT
Start: 2020-03-31 | End: 2020-07-23

## 2020-04-06 DIAGNOSIS — F32.0 MILD MAJOR DEPRESSION (H): ICD-10-CM

## 2020-04-06 DIAGNOSIS — F41.1 GAD (GENERALIZED ANXIETY DISORDER): ICD-10-CM

## 2020-04-06 RX ORDER — PAROXETINE 40 MG/1
TABLET, FILM COATED ORAL
Qty: 90 TABLET | Refills: 1 | Status: SHIPPED | OUTPATIENT
Start: 2020-04-06 | End: 2020-09-28

## 2020-04-17 DIAGNOSIS — I25.10 CORONARY ARTERY DISEASE INVOLVING NATIVE CORONARY ARTERY OF NATIVE HEART WITHOUT ANGINA PECTORIS: Primary | ICD-10-CM

## 2020-04-17 DIAGNOSIS — I71.20 THORACIC AORTIC ANEURYSM WITHOUT RUPTURE (H): ICD-10-CM

## 2020-05-20 DIAGNOSIS — E78.5 HYPERLIPIDEMIA LDL GOAL <100: ICD-10-CM

## 2020-05-21 RX ORDER — EZETIMIBE 10 MG/1
TABLET ORAL
Qty: 90 TABLET | Refills: 0 | Status: SHIPPED | OUTPATIENT
Start: 2020-05-21 | End: 2020-08-28

## 2020-05-21 NOTE — TELEPHONE ENCOUNTER
Routing refill request to provider for review/approval because:  Patient needs to be seen because it has been more than 1 year since last office visit.    Lalitha MONTGOMERY RN, BSN

## 2020-07-23 DIAGNOSIS — Z95.1 S/P CABG X 4: ICD-10-CM

## 2020-07-23 RX ORDER — DOCUSATE SODIUM AND SENNOSIDES 50; 8.6 MG/1; MG/1
TABLET ORAL
Qty: 90 TABLET | Refills: 0 | Status: SHIPPED | OUTPATIENT
Start: 2020-07-23 | End: 2020-11-18

## 2020-07-23 RX ORDER — LISINOPRIL 5 MG/1
TABLET ORAL
Qty: 90 TABLET | Refills: 1 | Status: SHIPPED | OUTPATIENT
Start: 2020-07-23 | End: 2021-03-11

## 2020-07-28 ENCOUNTER — OFFICE VISIT (OUTPATIENT)
Dept: VASCULAR SURGERY | Facility: CLINIC | Age: 75
End: 2020-07-28
Attending: SURGERY
Payer: COMMERCIAL

## 2020-07-28 ENCOUNTER — HOSPITAL ENCOUNTER (OUTPATIENT)
Dept: CT IMAGING | Facility: CLINIC | Age: 75
Discharge: HOME | End: 2020-07-28
Attending: SURGERY | Admitting: SURGERY
Payer: COMMERCIAL

## 2020-07-28 VITALS — HEART RATE: 76 BPM | SYSTOLIC BLOOD PRESSURE: 147 MMHG | RESPIRATION RATE: 18 BRPM | DIASTOLIC BLOOD PRESSURE: 100 MMHG

## 2020-07-28 DIAGNOSIS — I71.20 THORACIC AORTIC ANEURYSM WITHOUT RUPTURE (H): Primary | ICD-10-CM

## 2020-07-28 DIAGNOSIS — I71.20 THORACIC AORTIC ANEURYSM WITHOUT RUPTURE (H): ICD-10-CM

## 2020-07-28 LAB
CREAT BLD-MCNC: 1.3 MG/DL (ref 0.66–1.25)
GFR SERPL CREATININE-BSD FRML MDRD: 54 ML/MIN/{1.73_M2}

## 2020-07-28 PROCEDURE — 99213 OFFICE O/P EST LOW 20 MIN: CPT | Performed by: SURGERY

## 2020-07-28 PROCEDURE — 82565 ASSAY OF CREATININE: CPT

## 2020-07-28 PROCEDURE — 25000125 ZZHC RX 250: Performed by: SURGERY

## 2020-07-28 PROCEDURE — 71275 CT ANGIOGRAPHY CHEST: CPT

## 2020-07-28 PROCEDURE — 25000128 H RX IP 250 OP 636: Performed by: SURGERY

## 2020-07-28 RX ORDER — IOPAMIDOL 755 MG/ML
80 INJECTION, SOLUTION INTRAVASCULAR ONCE
Status: COMPLETED | OUTPATIENT
Start: 2020-07-28 | End: 2020-07-28

## 2020-07-28 RX ADMIN — SODIUM CHLORIDE 100 ML: 9 INJECTION, SOLUTION INTRAVENOUS at 08:48

## 2020-07-28 RX ADMIN — IOPAMIDOL 80 ML: 755 INJECTION, SOLUTION INTRAVENOUS at 08:48

## 2020-07-28 NOTE — NURSING NOTE
"Initial BP (!) 147/100 (BP Location: Right arm, Patient Position: Chair, Cuff Size: Adult Regular)   Pulse 76   Resp 18  Estimated body mass index is 21.75 kg/m  as calculated from the following:    Height as of 1/16/20: 1.727 m (5' 7.99\").    Weight as of 2/14/20: 64.9 kg (143 lb). .    Patient is here for results.  nilson lópez LPN    "

## 2020-07-28 NOTE — PATIENT INSTRUCTIONS
Dr Jorge Ruiz MD, Vascular Surgery  Phone: [phone]  Fax: [phone]  RN Case Manager: Donovan PEACE    Please call and schedule a telephone appt in July 2021.call the # above.  nilson lópez LPN

## 2020-07-28 NOTE — PROGRESS NOTES
VASCULAR SURGERY CLINIC CONSULTATION    VASCULAR SURGEON: Aleksander Ruiz MD, RPVI     LOCATION:  [facility]     Adolfo Rendon   Medical Record #:  [id number]  YOB: 1945  Age:  74 year old     Date of Service: 7/28/2020    PRIMARY CARE PROVIDER: Danielle Mercado      Reason for visit: Surveillance visit    IMPRESSION: Aneurysmal dilatation of the ascending aorta and ectasia of the descending thoracic aorta.  Known right renal artery stenosis.  Essential hypertension, on 1 medication    RECOMMENDATION/RISKS: Referral to primary care physician for better blood pressure control.    HPI:  Adolfo Rendon is a 74 year old male who was seen today for surveillance.  Patient denies any new symptoms.    REVIEW OF SYSTEMS:    A 12 point ROS was reviewed and is negative    PHH:    Past Medical History:   Diagnosis Date     Coronary artery disease      Depressive disorder, not elsewhere classified      Hypertrophy (benign) of prostate      Impotence of organic origin      Other and unspecified hyperlipidemia      Other anxiety states      Tobacco use disorder           Past Surgical History:   Procedure Laterality Date     APPENDECTOMY  1966     BYPASS GRAFT ARTERY CORONARY N/A 9/3/2019    Procedure: CORONARY ARTERY BYPASS GRAFT X 4 - LIMA TO LAD, SV TO PL, OM, PDA ; ON PUMP WITH TERRENCE; ENDOVEIN HARVEST RIGHT LEG;  Surgeon: Lai Mchugh MD;  Location:  OR     COLONOSCOPY  3/1/2005     COLONOSCOPY N/A 5/9/2019    Procedure: COLONOSCOPY;  Surgeon: Camilo Beard MD;  Location: [facility] LEFT HEART CATH N/A 8/8/2019    Procedure: Left Heart Cath--also measure LVEDP;  Surgeon: Krystle Barrera MD;  Location:  HEART CARDIAC CATH LAB     HERNIORRHAPHY INGUINAL  7/14/2011    Procedure:HERNIORRHAPHY INGUINAL; Open Repair Right Inguinal Hernia Repair        HYDROCELECTOMY SCROTAL  01/2004    left hydrocele repair     SUPRAPUBIC PROSTATECTOMY         ALLERGIES:   Amlodipine; Claritin; Hctz [hydrochlorothiazide]; Lisinopril; Metoprolol; Pravastatin; Remeron [mirtazapine]; and Wellbutrin [bupropion hcl]    MEDS:    Current Outpatient Medications:      aspirin (ASA) 325 MG tablet, ONE DAILY, Disp: 30 tablet, Rfl: 3     carvedilol (COREG) 12.5 MG tablet, Take 1 tablet (12.5 mg) by mouth 2 times daily (with meals), Disp: 60 tablet, Rfl: 3     evolocumab (REPATHA) 140 MG/ML prefilled autoinjector, Inject 140 mg Subcutaneous every 14 days, Disp: , Rfl:      ezetimibe (ZETIA) 10 MG tablet, TAKE ONE TABLET BY MOUTH ONCE DAILY, Disp: 90 tablet, Rfl: 0     ipratropium (ATROVENT HFA) 17 MCG/ACT inhaler, Inhale 2 puffs into the lungs every 6 hours, Disp: 12.9 g, Rfl: 5     lisinopril (ZESTRIL) 5 MG tablet, TAKE ONE TABLET BY MOUTH ONCE DAILY, Disp: 90 tablet, Rfl: 1     Olodaterol HCl 2.5 MCG/ACT AERS, Inhale 2 puffs into the lungs daily, Disp: 12 g, Rfl: 3     PARoxetine (PAXIL) 40 MG tablet, TAKE 1 TABLET EVERY MORNING. ADD TO 20 MG DAILY., Disp: 90 tablet, Rfl: 1     Vitamin D, Cholecalciferol, 25 MCG (1000 UT) TABS, , Disp: , Rfl:      nicotine polacrilex (NICORETTE) 4 MG gum, Place 4 mg inside cheek every hour as needed for smoking cessation, Disp: , Rfl:      pantoprazole (PROTONIX) 40 MG EC tablet, Take 1 tablet (40 mg) by mouth every morning (before breakfast) (Patient not taking: Reported on 7/28/2020), Disp: 14 tablet, Rfl: 0     SM STOOL SOFTENER 8.6-50 MG tablet, TAKE ONE TABLET BY MOUTH TWICE A DAY AS NEEDED FOR CONSTIPATION, Disp: 90 tablet, Rfl: 0  No current facility-administered medications for this visit.     SOCIAL HABITS:    History   Smoking Status     Former Smoker     Packs/day: 1.00     Types: Cigarettes     Quit date: 1/14/2017   Smokeless Tobacco     Former User     Quit date: 9/1/2017     Social History    Substance and Sexual Activity      Alcohol use: No        Alcohol/week: 0.0 standard drinks        Comment: quit 1989      History   Drug Use No       FAMILY  HISTORY:    Family History   Problem Relation Age of Onset     Cerebrovascular Disease Mother      Hypertension Mother      Prostate Cancer Brother      Dementia Brother      Arthritis Sister         rhematoid     Cancer Brother         Lung cancer, lymphoma     Other - See Comments Brother         HIV     Cancer Sister         skin cancer on nose     Pacemaker Sister      Aneurysm Sister         heart       PE:  BP (!) 147/100 (BP Location: Right arm, Patient Position: Chair, Cuff Size: Adult Regular)   Pulse 76   Resp 18   Wt Readings from Last 1 Encounters:   02/14/20 64.9 kg (143 lb)     There is no height or weight on file to calculate BMI.    EXAM:  GENERAL: This is a well-developed 74 year old male who appears his stated age  EYES: Grossly normal.  MOUTH: Buccal mucosa normal   CARDIAC: Normal   CHEST/LUNG: Clear to auscultation bilaterally  GASTROINTESINAL soft nontender nondistended  MUSCULOSKELETAL: Grossly normal and both lower extremities are intact.  HEME/LYMPH: No lymphedema  NEUROLOGIC: Focally intact, Alert and oriented x 3.   PSYCH: appropriate affect  INTEGUMENT: No open lesions or ulcers  Pulse Exam: Palpable pulses in all 4 extremities          DIAGNOSTIC STUDIES:     Images:  Cta Chest With Contrast    Result Date: 7/28/2020  CTA CHEST WITH CONTRAST July 28, 2020 9:10 AM INDICATION: History of thoracic aortic aneurysm. Thoracic aortic aneurysm without rupture (H). COMPARISON: CT of the chest from 5/7/2019. TECHNIQUE: Multiplanar, multiformatted CTA images obtained from the lung apices through the lung bases after the uneventful administration of Isovue 370 intravenous contrast given for a total of 80 mL. Radiation dose for this scan was reduced using automated exposure control, adjustment of the mA and/or kV according to patient size, or iterative reconstruction technique. Three-D reformatted images created at a separate workstation. FINDINGS: Vascular findings: The thoracic aorta measures 3.1  cm at the sinotubular junction, unchanged from prior exam given differences in measuring technique. The mid ascending thoracic aorta continues to have fusiform dilation measuring 3.9 x 3.8 cm in axial dimension at the level of the right main pulmonary artery as compared to 3.8 x 3.8 cm previously. The arch tapers tapers to 3.2 cm in diameter at the level of the mid arch. Widely patent three-vessel origin at the aortic arch. The proximal descending thoracic aorta measures 3.3 x 3.6 cm in diameter with minimal noncalcified atherosclerotic plaque. The upper abdominal aorta is normal in caliber with mild noncalcified atherosclerotic plaque. The SMA, celiac, and left main renal arteries are widely patent. Persistent moderate to severe stenosis of the right renal artery ostium with mild poststenotic dilation. Main pulmonary artery is normal in caliber. No central pulmonary arterial filling defects. Postsurgical changes of prior CABG surgery. The heart is normal in size. No pericardial effusion. Nonvascular findings: The thyroid and supraclavicular regions are normal. No axillary lymphadenopathy. Trachea and central airways are widely patent. Stable to slightly decreased size of the right hilar 1.9 x 1.6 cm lymph node. Known left hilar lymphadenopathy. Sternotomy wires are present. There is no pleural effusion or pneumothorax. Minimal atelectasis. No consolidation. No new or suspicious lung nodules. Upper abdominal organs are unchanged in appearance without acute findings. No acute osseous findings.     IMPRESSION: 1. Stable to minimal increase in size of the fusiform dilation of the mid ascending thoracic aorta measuring 3.9 x 3.8 cm, previously 3.8 x 3.8 cm. The mid aortic arch measures 3.2 cm diameter, unchanged from prior exam using similar technique. The proximal descending thoracic aorta measures 3.3 x 3.6 cm. 2. Stable prominent right hilar lymph node is 1.9 x 1.6 cm. 3. Moderate to severe stenosis in the proximal  right renal artery with poststenotic dilatation, unchanged. RADHA MOSER MD      I personally reviewed the images and my interpretation is CTA of the chest which is stable with no relative growth of the aneurysm.    LABS:      Sodium   Date Value Ref Range Status   02/06/2020 142 133 - 144 mmol/L Final   09/08/2019 138 133 - 144 mmol/L Final   09/07/2019 139 133 - 144 mmol/L Final     Urea Nitrogen   Date Value Ref Range Status   02/06/2020 22 7 - 30 mg/dL Final   09/08/2019 16 7 - 30 mg/dL Final   09/07/2019 15 7 - 30 mg/dL Final     Hemoglobin   Date Value Ref Range Status   09/16/2019 9.3 (L) 13.3 - 17.7 g/dL Final   09/08/2019 10.4 (L) 13.3 - 17.7 g/dL Final   09/06/2019 10.0 (L) 13.3 - 17.7 g/dL Final     Platelet Count   Date Value Ref Range Status   09/08/2019 171 150 - 450 10e9/L Final   09/06/2019 105 (L) 150 - 450 10e9/L Final   09/05/2019 107 (L) 150 - 450 10e9/L Final     INR   Date Value Ref Range Status   09/03/2019 1.30 (H) 0.86 - 1.14 Final   09/03/2019 1.47 (H) 0.86 - 1.14 Final   08/08/2019 0.96 0.86 - 1.14 Final       Total time [id number] face to face with patient with more than 50% time spent in counseling and coordination of care.    Aleksander Ruiz MD, MD, [facility]  VASCULAR SURGERY

## 2020-08-28 DIAGNOSIS — Z95.1 S/P CABG X 4: ICD-10-CM

## 2020-08-28 DIAGNOSIS — E78.5 HYPERLIPIDEMIA LDL GOAL <100: ICD-10-CM

## 2020-08-28 DIAGNOSIS — I25.10 CORONARY ARTERY DISEASE INVOLVING NATIVE CORONARY ARTERY OF NATIVE HEART WITHOUT ANGINA PECTORIS: ICD-10-CM

## 2020-08-28 DIAGNOSIS — E78.5 HYPERLIPIDEMIA LDL GOAL <70: Primary | ICD-10-CM

## 2020-08-28 DIAGNOSIS — Z78.9 STATIN INTOLERANCE: ICD-10-CM

## 2020-08-28 RX ORDER — EZETIMIBE 10 MG/1
TABLET ORAL
Qty: 90 TABLET | Refills: 0 | Status: SHIPPED | OUTPATIENT
Start: 2020-08-28 | End: 2020-11-18

## 2020-09-17 DIAGNOSIS — I25.118 CORONARY ARTERY DISEASE OF NATIVE ARTERY OF NATIVE HEART WITH STABLE ANGINA PECTORIS (H): ICD-10-CM

## 2020-09-17 RX ORDER — CARVEDILOL 12.5 MG/1
12.5 TABLET ORAL 2 TIMES DAILY WITH MEALS
Qty: 180 TABLET | Refills: 3 | Status: SHIPPED | OUTPATIENT
Start: 2020-09-17 | End: 2021-03-23

## 2020-09-23 ENCOUNTER — TELEPHONE (OUTPATIENT)
Dept: CARDIOLOGY | Facility: CLINIC | Age: 75
End: 2020-09-23

## 2020-09-23 NOTE — TELEPHONE ENCOUNTER
Pharmacist from [facility] called to verify dose of carvedilol. Refill sent out 9/17/20 for 12,5 mg tabs BID. Pharmacist stated they have been filling 6.25 mg tablets since 8/2019. Appears that carvedilol was increased on discharge from hospital on 9/9/19. Unclear if pt has been taking correct dose as pharmacist states his fill history is inconsistent. LM for patient to call back to discuss what dose carvedilol he is taking and to schedule him for overdue follow up with Garima Clemons NP. Melba Rodriguez RN Cardiology September 23, 2020, 11:28 AM    ADDENDUM: Will close encounter as pt has never returned call. Melba Rodriguez RN Cardiology October 19, 2020, 8:54 AM

## 2020-09-28 DIAGNOSIS — F32.0 MILD MAJOR DEPRESSION (H): ICD-10-CM

## 2020-09-28 DIAGNOSIS — F41.1 GAD (GENERALIZED ANXIETY DISORDER): ICD-10-CM

## 2020-09-28 RX ORDER — PAROXETINE 40 MG/1
TABLET, FILM COATED ORAL
Qty: 90 TABLET | Refills: 0 | Status: SHIPPED | OUTPATIENT
Start: 2020-09-28 | End: 2021-01-05

## 2020-10-22 ENCOUNTER — ALLIED HEALTH/NURSE VISIT (OUTPATIENT)
Dept: FAMILY MEDICINE | Facility: CLINIC | Age: 75
End: 2020-10-22
Payer: COMMERCIAL

## 2020-10-22 VITALS — HEART RATE: 62 BPM | SYSTOLIC BLOOD PRESSURE: 168 MMHG | DIASTOLIC BLOOD PRESSURE: 92 MMHG

## 2020-10-22 DIAGNOSIS — I10 BENIGN ESSENTIAL HYPERTENSION: Primary | ICD-10-CM

## 2020-10-22 PROCEDURE — 99207 PR NO CHARGE NURSE ONLY: CPT | Performed by: NURSE PRACTITIONER

## 2020-10-22 NOTE — PROGRESS NOTES
Adolfo Rendon was evaluated at [facility] on October 22, 2020 at which time his blood pressure was:    BP Readings from Last 3 Encounters:   10/22/20 (!) 168/92   07/28/20 (!) 147/100   02/14/20 118/68     Pulse Readings from Last 3 Encounters:   10/22/20 62   07/28/20 76   02/14/20 55       Reviewed lifestyle modifications for blood pressure control and reduction: including making healthy food choices, managing weight, getting regular exercise, smoking cessation, reducing alcohol consumption, monitoring blood pressure regularly.     Symptoms: None    BP Goal:Other: <130/80    BP Assessment:  BP too high    Potential Reasons for BP too high: Anxiety; too low dose of BP medications     BP Follow-Up Plan: Referral to PCP    Recommendation to Provider: Adolfo came into the pharmacy and had a flu shot and a blood pressure check. It's possible that the flu shot gave him anxiety and caused BP to be higher. However, he said his home BP readings typically range 150-155/85-90 at any time of the day regardless of medication administration time. I confirmed with him he is taking carvedilol 12.5mg twice daily. He reports no symptoms or problems. He agrees that he should be seen for a follow-up on hypertension and cholesterol; possibly with cardiologist. Please reach out to the patient if needed.     Thank you,   Kathya Ramirez, Pharm.D.  [facility] Pharmacist, [facility]

## 2020-10-22 NOTE — Clinical Note
Routing message to PCP for review because BP checked at pharmacy is above goal. Recommended patient to follow-up with PCP.  PCP please close this encounter.

## 2020-10-23 NOTE — PROGRESS NOTES
Follow up of HTN recommended.   Team please reach out to him to encourage him to schedule follow up with myself or his cardiology team  Thanks Danielle QUIROS-BC

## 2020-11-16 DIAGNOSIS — E78.5 HYPERLIPIDEMIA LDL GOAL <100: ICD-10-CM

## 2020-11-16 DIAGNOSIS — Z95.1 S/P CABG X 4: ICD-10-CM

## 2020-11-17 NOTE — TELEPHONE ENCOUNTER
"Requested Prescriptions   Pending Prescriptions Disp Refills     SM STOOL SOFTENER 8.6-50 MG tablet [Pharmacy Med Name: SM SENNA-DOCUSATE 8.6-50MG] 90 tablet 0     Sig: TAKE ONE TABLET BY MOUTH TWICE A DAY AS NEEDED FOR CONSTIPATION       Laxatives Protocol Failed - 11/16/2020 11:32 AM        Failed - Recent (12 mo) or future (30 days) visit within the authorizing provider's specialty     Patient has had an office visit with the authorizing provider or a provider within the authorizing providers department within the previous 12 mos or has a future within next 30 days. See \"Patient Info\" tab in inbasket, or \"Choose Columns\" in Meds & Orders section of the refill encounter.              Passed - Patient is age 6 or older        Passed - Medication is active on med list           ezetimibe (ZETIA) 10 MG tablet [Pharmacy Med Name: EZETIMIBE 10MG TABS] 90 tablet 0     Sig: TAKE ONE TABLET BY MOUTH ONCE DAILY       Antihyperlipidemic agents Failed - 11/16/2020 11:32 AM        Failed - Recent (12 mo) or future (30 days) visit within the authorizing provider's specialty     Patient has had an office visit with the authorizing provider or a provider within the authorizing providers department within the previous 12 mos or has a future within next 30 days. See \"Patient Info\" tab in inbasket, or \"Choose Columns\" in Meds & Orders section of the refill encounter.              Passed - Lipid panel on file in past 12 mos     Recent Labs   Lab Test 02/06/20  0921 03/24/15  1459 03/24/15  1459   CHOL 145   < > 215*   TRIG 75   < > 125   HDL 64   < > 52   LDL 66   < > 138*   NHDL 81   < >  --    VLDL  --   --  25   CHOLHDLRATIO  --   --  4.1    < > = values in this interval not displayed.               Passed - Normal serum ALT on record in past 12 mos     Recent Labs   Lab Test 02/06/20 0921   ALT 17             Passed - Medication is active on med list        Passed - Patient is age 18 years or older             "

## 2020-11-18 RX ORDER — EZETIMIBE 10 MG/1
TABLET ORAL
Qty: 90 TABLET | Refills: 0 | Status: SHIPPED | OUTPATIENT
Start: 2020-11-18 | End: 2021-03-23

## 2020-11-18 RX ORDER — DOCUSATE SODIUM AND SENNOSIDES 50; 8.6 MG/1; MG/1
TABLET ORAL
Qty: 90 TABLET | Refills: 0 | Status: SHIPPED | OUTPATIENT
Start: 2020-11-18 | End: 2021-11-09

## 2021-02-09 ENCOUNTER — AMBULATORY - HEALTHEAST (OUTPATIENT)
Dept: NURSING | Facility: CLINIC | Age: 76
End: 2021-02-09

## 2021-03-02 ENCOUNTER — AMBULATORY - HEALTHEAST (OUTPATIENT)
Dept: NURSING | Facility: CLINIC | Age: 76
End: 2021-03-02

## 2021-03-10 DIAGNOSIS — Z95.1 S/P CABG X 4: ICD-10-CM

## 2021-03-11 RX ORDER — LISINOPRIL 5 MG/1
TABLET ORAL
Qty: 90 TABLET | Refills: 1 | Status: SHIPPED | OUTPATIENT
Start: 2021-03-11 | End: 2021-06-28

## 2021-03-11 NOTE — TELEPHONE ENCOUNTER
"Requested Prescriptions   Pending Prescriptions Disp Refills     lisinopril (ZESTRIL) 5 MG tablet [Pharmacy Med Name: LISINOPRIL 5MG TABS] 90 tablet 1     Sig: TAKE ONE TABLET BY MOUTH ONCE DAILY       ACE Inhibitors (Including Combos) Protocol Failed - 3/10/2021  1:59 PM        Failed - Blood pressure under 140/90 in past 12 months     BP Readings from Last 3 Encounters:   10/22/20 (!) 168/92   07/28/20 (!) 147/100   02/14/20 118/68                 Failed - Recent (12 mo) or future (30 days) visit within the authorizing provider's specialty     Patient has had an office visit with the authorizing provider or a provider within the authorizing providers department within the previous 12 mos or has a future within next 30 days. See \"Patient Info\" tab in inbasket, or \"Choose Columns\" in Meds & Orders section of the refill encounter.              Failed - Normal serum creatinine on file in past 12 months     Recent Labs   Lab Test 07/28/20  0852 02/06/20  0921   CR  --  1.24   CREAT 1.3*  --        Ok to refill medication if creatinine is low          Failed - Normal serum potassium on file in past 12 months     Recent Labs   Lab Test 02/06/20  0921   POTASSIUM 4.4             Passed - Medication is active on med list        Passed - Patient is age 18 or older           "

## 2021-03-14 DIAGNOSIS — J44.9 CHRONIC OBSTRUCTIVE PULMONARY DISEASE, UNSPECIFIED COPD TYPE (H): ICD-10-CM

## 2021-03-15 RX ORDER — OLODATEROL RESPIMAT INHALATION SPRAY 2.5 UG/1
SPRAY, METERED RESPIRATORY (INHALATION)
Qty: 12 G | Refills: 3 | Status: SHIPPED | OUTPATIENT
Start: 2021-03-15 | End: 2022-01-27

## 2021-03-17 ENCOUNTER — ALLIED HEALTH/NURSE VISIT (OUTPATIENT)
Dept: FAMILY MEDICINE | Facility: CLINIC | Age: 76
End: 2021-03-17
Payer: COMMERCIAL

## 2021-03-17 VITALS — SYSTOLIC BLOOD PRESSURE: 134 MMHG | DIASTOLIC BLOOD PRESSURE: 84 MMHG

## 2021-03-17 DIAGNOSIS — Z01.30 BP CHECK: Primary | ICD-10-CM

## 2021-03-17 PROCEDURE — 99207 PR NO CHARGE NURSE ONLY: CPT | Performed by: NURSE PRACTITIONER

## 2021-03-17 NOTE — PROGRESS NOTES
Adolfo CRAIG Justice was evaluated at [facility] on March 17, 2021 at which time his blood pressure was:    BP Readings from Last 3 Encounters:   03/17/21 134/84   10/22/20 (!) 168/92   07/28/20 (!) 147/100     Pulse Readings from Last 3 Encounters:   10/22/20 62   07/28/20 76   02/14/20 55       Reviewed lifestyle modifications for blood pressure control and reduction: including making healthy food choices, managing weight, getting regular exercise, smoking cessation, reducing alcohol consumption, monitoring blood pressure regularly.     Symptoms: None    BP Goal:< 140/90 mmHg    BP Assessment:  BP at goal    Potential Reasons for BP too high: NA - Not applicable    BP Follow-Up Plan: Referral to PCP    Recommendation to Provider: Adolfo has an appointment with his primary care provider next week to confirm blood pressure    Note completed by Sebastian Campbell [facility]  Pharmacist  [facility] pharmacist  [phone]

## 2021-03-18 DIAGNOSIS — F32.0 MILD MAJOR DEPRESSION (H): ICD-10-CM

## 2021-03-18 DIAGNOSIS — F41.1 GAD (GENERALIZED ANXIETY DISORDER): ICD-10-CM

## 2021-03-19 RX ORDER — PAROXETINE 40 MG/1
TABLET, FILM COATED ORAL
Qty: 30 TABLET | Refills: 0 | Status: SHIPPED | OUTPATIENT
Start: 2021-03-19 | End: 2021-03-23

## 2021-03-19 NOTE — TELEPHONE ENCOUNTER
Medication is being filled for 1 time refill only due to:  Has upcoming appt and needs updated PHQ 9   Brittany MONTGOMERY RN

## 2021-03-23 ENCOUNTER — OFFICE VISIT (OUTPATIENT)
Dept: FAMILY MEDICINE | Facility: CLINIC | Age: 76
End: 2021-03-23
Payer: COMMERCIAL

## 2021-03-23 VITALS
OXYGEN SATURATION: 98 % | HEIGHT: 67 IN | HEART RATE: 58 BPM | BODY MASS INDEX: 21.97 KG/M2 | TEMPERATURE: 97 F | RESPIRATION RATE: 18 BRPM | WEIGHT: 140 LBS | DIASTOLIC BLOOD PRESSURE: 100 MMHG | SYSTOLIC BLOOD PRESSURE: 158 MMHG

## 2021-03-23 DIAGNOSIS — Z95.1 S/P CABG X 4: ICD-10-CM

## 2021-03-23 DIAGNOSIS — E78.5 HYPERLIPIDEMIA LDL GOAL <70: ICD-10-CM

## 2021-03-23 DIAGNOSIS — R23.9 RECENT SKIN CHANGES: ICD-10-CM

## 2021-03-23 DIAGNOSIS — F41.1 GAD (GENERALIZED ANXIETY DISORDER): ICD-10-CM

## 2021-03-23 DIAGNOSIS — C61 MALIGNANT NEOPLASM OF PROSTATE (H): ICD-10-CM

## 2021-03-23 DIAGNOSIS — J44.9 CHRONIC OBSTRUCTIVE PULMONARY DISEASE, UNSPECIFIED COPD TYPE (H): ICD-10-CM

## 2021-03-23 DIAGNOSIS — I25.10 CORONARY ARTERY DISEASE INVOLVING NATIVE CORONARY ARTERY OF NATIVE HEART WITHOUT ANGINA PECTORIS: ICD-10-CM

## 2021-03-23 DIAGNOSIS — Z00.00 ENCOUNTER FOR MEDICARE ANNUAL WELLNESS EXAM: Primary | ICD-10-CM

## 2021-03-23 DIAGNOSIS — F32.0 MILD MAJOR DEPRESSION (H): ICD-10-CM

## 2021-03-23 DIAGNOSIS — Z78.9 STATIN INTOLERANCE: ICD-10-CM

## 2021-03-23 LAB
ALBUMIN SERPL-MCNC: 3.8 G/DL (ref 3.4–5)
ALP SERPL-CCNC: 64 U/L (ref 40–150)
ALT SERPL W P-5'-P-CCNC: 16 U/L (ref 0–70)
ANION GAP SERPL CALCULATED.3IONS-SCNC: 3 MMOL/L (ref 3–14)
AST SERPL W P-5'-P-CCNC: 11 U/L (ref 0–45)
BASOPHILS # BLD AUTO: 0 10E9/L (ref 0–0.2)
BASOPHILS NFR BLD AUTO: 0.9 %
BILIRUB SERPL-MCNC: 0.4 MG/DL (ref 0.2–1.3)
BUN SERPL-MCNC: 19 MG/DL (ref 7–30)
CALCIUM SERPL-MCNC: 9 MG/DL (ref 8.5–10.1)
CHLORIDE SERPL-SCNC: 103 MMOL/L (ref 94–109)
CHOLEST SERPL-MCNC: 127 MG/DL
CO2 SERPL-SCNC: 30 MMOL/L (ref 20–32)
CREAT SERPL-MCNC: 1.21 MG/DL (ref 0.66–1.25)
DIFFERENTIAL METHOD BLD: NORMAL
EOSINOPHIL # BLD AUTO: 0.2 10E9/L (ref 0–0.7)
EOSINOPHIL NFR BLD AUTO: 4.7 %
ERYTHROCYTE [DISTWIDTH] IN BLOOD BY AUTOMATED COUNT: 14 % (ref 10–15)
GFR SERPL CREATININE-BSD FRML MDRD: 58 ML/MIN/{1.73_M2}
GLUCOSE SERPL-MCNC: 87 MG/DL (ref 70–99)
HCT VFR BLD AUTO: 41 % (ref 40–53)
HDLC SERPL-MCNC: 62 MG/DL
HGB BLD-MCNC: 13.6 G/DL (ref 13.3–17.7)
LDLC SERPL CALC-MCNC: 47 MG/DL
LYMPHOCYTES # BLD AUTO: 1.2 10E9/L (ref 0.8–5.3)
LYMPHOCYTES NFR BLD AUTO: 26.4 %
MCH RBC QN AUTO: 27.6 PG (ref 26.5–33)
MCHC RBC AUTO-ENTMCNC: 33.2 G/DL (ref 31.5–36.5)
MCV RBC AUTO: 83 FL (ref 78–100)
MONOCYTES # BLD AUTO: 0.5 10E9/L (ref 0–1.3)
MONOCYTES NFR BLD AUTO: 10.9 %
NEUTROPHILS # BLD AUTO: 2.6 10E9/L (ref 1.6–8.3)
NEUTROPHILS NFR BLD AUTO: 57.1 %
NONHDLC SERPL-MCNC: 63 MG/DL
PLATELET # BLD AUTO: 177 10E9/L (ref 150–450)
POTASSIUM SERPL-SCNC: 4.4 MMOL/L (ref 3.4–5.3)
PROT SERPL-MCNC: 7 G/DL (ref 6.8–8.8)
RBC # BLD AUTO: 4.92 10E12/L (ref 4.4–5.9)
SODIUM SERPL-SCNC: 136 MMOL/L (ref 133–144)
TRIGL SERPL-MCNC: 76 MG/DL
TSH SERPL DL<=0.005 MIU/L-ACNC: 1.24 MU/L (ref 0.4–4)
WBC # BLD AUTO: 4.5 10E9/L (ref 4–11)

## 2021-03-23 PROCEDURE — G0103 PSA SCREENING: HCPCS | Performed by: NURSE PRACTITIONER

## 2021-03-23 PROCEDURE — 99397 PER PM REEVAL EST PAT 65+ YR: CPT | Performed by: NURSE PRACTITIONER

## 2021-03-23 PROCEDURE — 80053 COMPREHEN METABOLIC PANEL: CPT | Performed by: NURSE PRACTITIONER

## 2021-03-23 PROCEDURE — 84443 ASSAY THYROID STIM HORMONE: CPT | Performed by: NURSE PRACTITIONER

## 2021-03-23 PROCEDURE — 80061 LIPID PANEL: CPT | Performed by: NURSE PRACTITIONER

## 2021-03-23 PROCEDURE — 99214 OFFICE O/P EST MOD 30 MIN: CPT | Mod: 25 | Performed by: NURSE PRACTITIONER

## 2021-03-23 PROCEDURE — 85025 COMPLETE CBC W/AUTO DIFF WBC: CPT | Performed by: NURSE PRACTITIONER

## 2021-03-23 PROCEDURE — 36415 COLL VENOUS BLD VENIPUNCTURE: CPT | Performed by: NURSE PRACTITIONER

## 2021-03-23 RX ORDER — EZETIMIBE 10 MG/1
10 TABLET ORAL DAILY
Qty: 90 TABLET | Refills: 3 | Status: SHIPPED | OUTPATIENT
Start: 2021-03-23 | End: 2021-12-30

## 2021-03-23 RX ORDER — PAROXETINE 40 MG/1
TABLET, FILM COATED ORAL
Qty: 90 TABLET | Refills: 1 | Status: SHIPPED | OUTPATIENT
Start: 2021-03-23 | End: 2021-08-25

## 2021-03-23 RX ORDER — CARVEDILOL 12.5 MG/1
12.5 TABLET ORAL 2 TIMES DAILY WITH MEALS
Qty: 180 TABLET | Refills: 3 | Status: SHIPPED | OUTPATIENT
Start: 2021-03-23 | End: 2021-10-27

## 2021-03-23 ASSESSMENT — ENCOUNTER SYMPTOMS
DYSURIA: 0
SORE THROAT: 0
JOINT SWELLING: 0
HEMATOCHEZIA: 0
FREQUENCY: 0
ABDOMINAL PAIN: 0
HEMATURIA: 0
DIARRHEA: 0
ARTHRALGIAS: 0
HEARTBURN: 0
HEADACHES: 0
FEVER: 0
SHORTNESS OF BREATH: 1
PARESTHESIAS: 0
PALPITATIONS: 0
NERVOUS/ANXIOUS: 1
CHILLS: 0
WEAKNESS: 0
NAUSEA: 0
CONSTIPATION: 0
EYE PAIN: 0
DIZZINESS: 0
COUGH: 0
MYALGIAS: 0

## 2021-03-23 ASSESSMENT — ANXIETY QUESTIONNAIRES
7. FEELING AFRAID AS IF SOMETHING AWFUL MIGHT HAPPEN: NOT AT ALL
GAD7 TOTAL SCORE: 2
6. BECOMING EASILY ANNOYED OR IRRITABLE: SEVERAL DAYS
4. TROUBLE RELAXING: NOT AT ALL
3. WORRYING TOO MUCH ABOUT DIFFERENT THINGS: NOT AT ALL
GAD7 TOTAL SCORE: 2
1. FEELING NERVOUS, ANXIOUS, OR ON EDGE: SEVERAL DAYS
5. BEING SO RESTLESS THAT IT IS HARD TO SIT STILL: NOT AT ALL
2. NOT BEING ABLE TO STOP OR CONTROL WORRYING: NOT AT ALL
7. FEELING AFRAID AS IF SOMETHING AWFUL MIGHT HAPPEN: NOT AT ALL
GAD7 TOTAL SCORE: 2

## 2021-03-23 ASSESSMENT — MIFFLIN-ST. JEOR: SCORE: 1324.7

## 2021-03-23 ASSESSMENT — ACTIVITIES OF DAILY LIVING (ADL): CURRENT_FUNCTION: NO ASSISTANCE NEEDED

## 2021-03-23 ASSESSMENT — PATIENT HEALTH QUESTIONNAIRE - PHQ9
SUM OF ALL RESPONSES TO PHQ QUESTIONS 1-9: 1
SUM OF ALL RESPONSES TO PHQ QUESTIONS 1-9: 1

## 2021-03-23 NOTE — LETTER
March 25, 2021      Adolfo Rendon  [address] [facility] [address]        Dear ,    We are writing to inform you of your test results.    Normal prostate labs   Normal thyroid labs   Cholesterol is great   Kidney function is good   Normal blood sugar   Normal electrolytes   Normal liver function   Anemia resolved. Normal red and white blood cell counts   Nothing concerning on labs     Resulted Orders   CBC with platelets and differential   Result Value Ref Range    WBC 4.5 4.0 - 11.0 10e9/L    RBC Count 4.92 4.4 - 5.9 10e12/L    Hemoglobin 13.6 13.3 - 17.7 g/dL    Hematocrit 41.0 40.0 - 53.0 %    MCV 83 78 - 100 fl    MCH 27.6 26.5 - 33.0 pg    MCHC 33.2 31.5 - 36.5 g/dL    RDW 14.0 10.0 - 15.0 %    Platelet Count 177 150 - 450 10e9/L    % Neutrophils 57.1 %    % Lymphocytes 26.4 %    % Monocytes 10.9 %    % Eosinophils 4.7 %    % Basophils 0.9 %    Absolute Neutrophil 2.6 1.6 - 8.3 10e9/L    Absolute Lymphocytes 1.2 0.8 - 5.3 10e9/L    Absolute Monocytes 0.5 0.0 - 1.3 10e9/L    Absolute Eosinophils 0.2 0.0 - 0.7 10e9/L    Absolute Basophils 0.0 0.0 - 0.2 10e9/L    Diff Method Automated Method    Comprehensive metabolic panel (BMP + Alb, Alk Phos, ALT, AST, Total. Bili, TP)   Result Value Ref Range    Sodium 136 133 - 144 mmol/L    Potassium 4.4 3.4 - 5.3 mmol/L    Chloride 103 94 - 109 mmol/L    Carbon Dioxide 30 20 - 32 mmol/L    Anion Gap 3 3 - 14 mmol/L    Glucose 87 70 - 99 mg/dL      Comment:      Fasting specimen    Urea Nitrogen 19 7 - 30 mg/dL    Creatinine 1.21 0.66 - 1.25 mg/dL    GFR Estimate 58 (L) >60 mL/min/[1.73_m2]      Comment:      Non  GFR Calc  Starting 12/18/2018, serum creatinine based estimated GFR (eGFR) will be   calculated using the Chronic Kidney Disease Epidemiology Collaboration   (CKD-EPI) equation.      GFR Estimate If Black 67 >60 mL/min/[1.73_m2]      Comment:       GFR Calc  Starting 12/18/2018, serum creatinine based  estimated GFR (eGFR) will be   calculated using the Chronic Kidney Disease Epidemiology Collaboration   (CKD-EPI) equation.      Calcium 9.0 8.5 - 10.1 mg/dL    Bilirubin Total 0.4 0.2 - 1.3 mg/dL    Albumin 3.8 3.4 - 5.0 g/dL    Protein Total 7.0 6.8 - 8.8 g/dL    Alkaline Phosphatase 64 40 - 150 U/L    ALT 16 0 - 70 U/L    AST 11 0 - 45 U/L   Lipid panel reflex to direct LDL Fasting   Result Value Ref Range    Cholesterol 127 <200 mg/dL    Triglycerides 76 <150 mg/dL      Comment:      Fasting specimen    HDL Cholesterol 62 >39 mg/dL    LDL Cholesterol Calculated 47 <100 mg/dL      Comment:      Desirable:       <100 mg/dl    Non HDL Cholesterol 63 <130 mg/dL   TSH with free T4 reflex   Result Value Ref Range    TSH 1.24 0.40 - 4.00 mU/L   PSA, screen   Result Value Ref Range    PSA <0.01 0 - 4 ug/L      Comment:      Assay Method:  Chemiluminescence using Siemens Vista analyzer       If you have any questions or concerns, please call the clinic at the number listed above.       Take Care,      Danielle Mercado APRN CNP/dw

## 2021-03-23 NOTE — PATIENT INSTRUCTIONS
Patient Education   Personalized Prevention Plan  You are due for the preventive services outlined below.  Your care team is available to assist you in scheduling these services.  If you have already completed any of these items, please share that information with your care team to update in your medical record.  Health Maintenance Due   Topic Date Due     Zoster (Shingles) Vaccine (2 of 3) 08/31/2009     Diptheria Tetanus Pertussis (DTAP/TDAP/TD) Vaccine (2 - Td) 07/07/2012     Discuss Advance Care Planning  04/20/2016     Depression Assessment  06/20/2020     FALL RISK ASSESSMENT  08/12/2020       Talk with Vascular in July about the Right renal artery stenosis and intervention needed if any

## 2021-03-23 NOTE — PROGRESS NOTES
"SUBJECTIVE:   Adolfo Rendon is a 75 year old male who presents for Preventive Visit.      Patient has been advised of split billing requirements and indicates understanding: Yes   Are you in the first 12 months of your Medicare coverage?  No    Healthy Habits:     In general, how would you rate your overall health?  Fair    Frequency of exercise:  None    Do you usually eat at least 4 servings of fruit and vegetables a day, include whole grains    & fiber and avoid regularly eating high fat or \"junk\" foods?  No    Taking medications regularly:  Yes    Medication side effects:  None    Ability to successfully perform activities of daily living:  No assistance needed    Home Safety:  No safety concerns identified    Hearing Impairment:  Difficulty following a conversation in a noisy restaurant or crowded room, feel that people are mumbling or not speaking clearly, difficulty following dialogue in the theater, difficult to understand a speaker at a public meeting or [employer] service and need to ask people to speak up or repeat themselves    In the past 6 months, have you been bothered by leaking of urine? Yes    In general, how would you rate your overall mental or emotional health?  Good      PHQ-2 Total Score: 0    Additional concerns today:  Yes    HTN follow up. Running high  Taking meds as directed  CAD- CABG x 4 9/2019  Right renal artery stenosis seen on last CTA  BP remains elevated  Previous prostate cancer.   Emphysema  Thick mucus in the morning.   Skin lesion right forehead    Do you feel safe in your environment? Yes    Have you ever done Advance Care Planning? (For example, a Health Directive, POLST, or a discussion with a medical provider or your loved ones about your wishes): No, advance care planning information given to patient to review.  Patient plans to discuss their wishes with loved ones or provider.         Fall risk  Fallen 2 or more times in the past year?: No  Any fall with injury in the " past year?: No    Cognitive Screening   1) Repeat 3 items (Leader, Season, Table)    2) Clock draw: NORMAL  3) 3 item recall: Recalls 3 objects  Results: 3 items recalled: COGNITIVE IMPAIRMENT LESS LIKELY    Mini-CogTM Copyright S Jill. Licensed by the author for use in [facility]; reprinted with permission (viky@[facility]). All rights reserved.      Do you have sleep apnea, excessive snoring or daytime drowsiness?: no    Reviewed and updated as needed this visit by clinical staff  Tobacco  Allergies  Meds              Reviewed and updated as needed this visit by Provider                Social History     Tobacco Use     Smoking status: Former Smoker     Packs/day: 1.00     Types: Cigarettes     Quit date: 2017     Years since quittin.1     Smokeless tobacco: Former User     Quit date: 2017   Substance Use Topics     Alcohol use: No     Alcohol/week: 0.0 standard drinks     Comment: quit      If you drink alcohol do you typically have >3 drinks per day or >7 drinks per week? No    Alcohol Use 3/23/2021   Prescreen: >3 drinks/day or >7 drinks/week? Not Applicable   Prescreen: >3 drinks/day or >7 drinks/week? -   No flowsheet data found.        -------------------------------------    Current providers sharing in care for this patient include:     Patient Care Team:  Danielle Mercado APRN CNP as PCP - General (Family Practice)  Danielle Mercado APRN CNP as Assigned PCP  Aleksander Ruiz MD as Assigned Heart and Vascular Provider    The following health maintenance items are reviewed in Epic and correct as of today:  Health Maintenance   Topic Date Due     ZOSTER IMMUNIZATION (2 of 3) 2009     DTAP/TDAP/TD IMMUNIZATION (2 - Td) 2012     PHQ-9  2020     FALL RISK ASSESSMENT  2020     MEDICARE ANNUAL WELLNESS VISIT  2022     LIPID  2025     ADVANCE CARE PLANNING  2026     COLORECTAL CANCER SCREENING  2029      SPIROMETRY  Completed     HEPATITIS C SCREENING  Completed     COPD ACTION PLAN  Completed     DEPRESSION ACTION PLAN  Completed     INFLUENZA VACCINE  Completed     Pneumococcal Vaccine: Pediatrics (0 to 5 Years) and At-Risk Patients (6 to 64 Years)  Completed     Pneumococcal Vaccine: 65+ Years  Completed     AORTIC ANEURYSM SCREENING (SYSTEM ASSIGNED)  Completed     COVID-19 Vaccine  Completed     IPV IMMUNIZATION  Aged Out     MENINGITIS IMMUNIZATION  Aged Out     HEPATITIS B IMMUNIZATION  Aged Out     Lab work is in process  BP Readings from Last 3 Encounters:   03/23/21 (!) 158/100   03/17/21 134/84   10/22/20 (!) 168/92    Wt Readings from Last 3 Encounters:   03/23/21 63.5 kg (140 lb)   02/14/20 64.9 kg (143 lb)   01/16/20 64.3 kg (141 lb 12.8 oz)                  Patient Active Problem List   Diagnosis     MIXED HYPERLIPIDEMIA     Benign neoplasm of epididymis     Malignant neoplasm of prostate (H)     Major depressive disorder, recurrent episode, severe (H)     HL (hearing loss)     Hyperlipidemia LDL goal <70     Muscle pain     Tobacco abuse     SANTIAGO (dyspnea on exertion)     Advanced directives, counseling/discussion     Generalized anxiety disorder     Right inguinal hernia     Health Care Home     HTN, goal below 140/90     COPD (chronic obstructive pulmonary disease) (H)     Thoracic aortic aneurysm without rupture (H)     Centrilobular emphysema (H)     Aortic arch aneurysm (H)     Unilateral atherosclerotic renal artery stenosis (H)     Pulmonary nodules     Diverticulosis of large intestine     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)     Coronary artery disease involving native coronary artery of native heart without angina pectoris     Chest pain     Status post coronary angiogram     Benign essential hypertension     Ischemic cardiomyopathy     S/P CABG x 4     Fluid overload     Anemia due to blood loss, acute     Transient hyperglycemia post procedure     H/O cardiomyopathy      Coronary artery disease involving coronary bypass graft of native heart without angina pectoris     Past Surgical History:   Procedure Laterality Date     APPENDECTOMY  1966     BYPASS GRAFT ARTERY CORONARY N/A 9/3/2019    Procedure: CORONARY ARTERY BYPASS GRAFT X 4 - LIMA TO LAD, SV TO PL, OM, PDA ; ON PUMP WITH TERRENCE; ENDOVEIN HARVEST RIGHT LEG;  Surgeon: Lai Mchugh MD;  Location:  OR     COLONOSCOPY  3/1/2005     COLONOSCOPY N/A 2019    Procedure: COLONOSCOPY;  Surgeon: Camilo Beard MD;  Location: [facility]     CV LEFT HEART CATH N/A 2019    Procedure: Left Heart Cath--also measure LVEDP;  Surgeon: Krystle Barrera MD;  Location:  HEART CARDIAC CATH LAB     HERNIORRHAPHY INGUINAL  2011    Procedure:HERNIORRHAPHY INGUINAL; Open Repair Right Inguinal Hernia Repair        HYDROCELECTOMY SCROTAL  2004    left hydrocele repair     SUPRAPUBIC PROSTATECTOMY         Social History     Tobacco Use     Smoking status: Former Smoker     Packs/day: 1.00     Types: Cigarettes     Quit date: 2017     Years since quittin.1     Smokeless tobacco: Former User     Quit date: 2017   Substance Use Topics     Alcohol use: No     Alcohol/week: 0.0 standard drinks     Comment: quit      Family History   Problem Relation Age of Onset     Cerebrovascular Disease Mother      Hypertension Mother      Prostate Cancer Brother      Dementia Brother      Arthritis Sister         rhematoid     Cancer Brother         Lung cancer, lymphoma     Other - See Comments Brother         HIV     Cancer Sister         skin cancer on nose     Pacemaker Sister      Aneurysm Sister         heart         Current Outpatient Medications   Medication Sig Dispense Refill     aspirin (ASA) 325 MG tablet ONE DAILY 30 tablet 3     carvedilol (COREG) 12.5 MG tablet Take 1 tablet (12.5 mg) by mouth 2 times daily (with meals) 180 tablet 3     evolocumab (REPATHA) 140 MG/ML prefilled autoinjector Inject 1 mL (140  "mg) Subcutaneous every 14 days 6 mL 3     ezetimibe (ZETIA) 10 MG tablet Take 1 tablet (10 mg) by mouth daily 90 tablet 3     ipratropium (ATROVENT HFA) 17 MCG/ACT inhaler Inhale 2 puffs into the lungs every 6 hours 12.9 g 5     lisinopril (ZESTRIL) 5 MG tablet TAKE ONE TABLET BY MOUTH ONCE DAILY 90 tablet 1     nicotine polacrilex (NICORETTE) 4 MG gum Place 4 mg inside cheek every hour as needed for smoking cessation       PARoxetine (PAXIL) 40 MG tablet TAKE 1 TABLET EVERY MORNING. ADD TO 20 MG DAILY. 90 tablet 1     SM STOOL SOFTENER 8.6-50 MG tablet TAKE ONE TABLET BY MOUTH TWICE A DAY AS NEEDED FOR CONSTIPATION 90 tablet 0     STRIVERDI RESPIMAT 2.5 MCG/ACT AERS USE 2 INHALATIONS DAILY 12 g 3     Vitamin D, Cholecalciferol, 25 MCG (1000 UT) TABS        Allergies   Allergen Reactions     Amlodipine Fatigue     \"felt like zombie\"     Claritin      Mood , irritablity      Hctz [Hydrochlorothiazide] Fatigue     \"felt like zombie\"     Lisinopril Fatigue     \"felt like zombie\"     Metoprolol Fatigue     \"felt like zombie\"     Pravastatin Fatigue     \"felt like zombie\"     Remeron [Mirtazapine]      Agitation        Wellbutrin [Bupropion Hcl]      Sig mood issues            Review of Systems   Constitutional: Negative for chills and fever.   HENT: Positive for congestion. Negative for ear pain, hearing loss and sore throat.    Eyes: Negative for pain and visual disturbance.   Respiratory: Positive for shortness of breath. Negative for cough.    Cardiovascular: Negative for chest pain, palpitations and peripheral edema.   Gastrointestinal: Negative for abdominal pain, constipation, diarrhea, heartburn, hematochezia and nausea.   Genitourinary: Negative for dysuria, frequency, genital sores, hematuria and urgency.   Musculoskeletal: Negative for arthralgias, joint swelling and myalgias.   Skin: Negative for rash.   Neurological: Negative for dizziness, weakness, headaches and paresthesias.   Psychiatric/Behavioral: " "Positive for mood changes. The patient is nervous/anxious.          OBJECTIVE:   BP (!) 158/100   Pulse 58   Temp 97  F (36.1  C) (Tympanic)   Resp 18   Ht 1.695 m (5' 6.75\")   Wt 63.5 kg (140 lb)   SpO2 98%   BMI 22.09 kg/m   Estimated body mass index is 22.09 kg/m  as calculated from the following:    Height as of this encounter: 1.695 m (5' 6.75\").    Weight as of this encounter: 63.5 kg (140 lb).  Physical Exam  GENERAL: alert, no distress and pale  EYES: Eyes grossly normal to inspection, PERRL and conjunctivae and sclerae normal  HENT: ear canals and TM's normal, nose and mouth without ulcers or lesions  NECK: no adenopathy, no asymmetry, masses, or scars and thyroid normal to palpation  RESP: prolonged expiratory phase and decreased breath sounds throughout  CV: regular rate and rhythm, normal S1 S2, no S3 or S4, no murmur, click or rub, no peripheral edema and peripheral pulses strong  ABDOMEN: soft, nontender, no hepatosplenomegaly, no masses and bowel sounds normal  MS: no gross musculoskeletal defects noted, no edema  SKIN:scaling lesion right forehead, left cheek and forearms bilaterally   NEURO: Normal strength and tone, mentation intact and speech normal  PSYCH: mentation appears normal, affect normal/bright  LYMPH: no cervical, supraclavicular, axillary, or inguinal adenopathy    Diagnostic Test Results:  Labs reviewed in Epic    ASSESSMENT / PLAN:   Adolfo was seen today for physical.    Diagnoses and all orders for this visit:    Encounter for Medicare annual wellness exam    Hyperlipidemia LDL goal <70  CAD, RENAL ARTERY STENOSIS- S/P CABG x 4   -     evolocumab (REPATHA) 140 MG/ML prefilled autoinjector; Inject 1 mL (140 mg) Subcutaneous every 14 days  -     ezetimibe (ZETIA) 10 MG tablet; Take 1 tablet (10 mg) by mouth daily  -     carvedilol (COREG) 12.5 MG tablet; Take 1 tablet (12.5 mg) by mouth 2 times daily (with meals)  -     CBC with platelets and differential  -     Comprehensive " metabolic panel (BMP + Alb, Alk Phos, ALT, AST, Total. Bili, TP)  -     Lipid panel reflex to direct LDL Fasting    Coronary artery disease involving native coronary artery of native heart without angina pectoris  -     evolocumab (REPATHA) 140 MG/ML prefilled autoinjector; Inject 1 mL (140 mg) Subcutaneous every 14 days  Follow up with Cardiology regarding elevated BP     S/P CABG x 4  -     evolocumab (REPATHA) 140 MG/ML prefilled autoinjector; Inject 1 mL (140 mg) Subcutaneous every 14 days    Statin intolerance  -     evolocumab (REPATHA) 140 MG/ML prefilled autoinjector; Inject 1 mL (140 mg) Subcutaneous every 14 days  Continue zetia  Labs today to monitor    Chronic obstructive pulmonary disease, unspecified COPD type (H)  Increased mucus production  Trial mucinex 1200 mg bid  -     ipratropium (ATROVENT HFA) 17 MCG/ACT inhaler; Inhale 2 puffs into the lungs every 6 hours  Continue striverdi 2 puffs daily     GRETA (generalized anxiety disorder)  Improved on current paxil dose  Continue   -     PARoxetine (PAXIL) 40 MG tablet; TAKE 1 TABLET EVERY MORNING. ADD TO 20 MG DAILY.  -     TSH with free T4 reflex    Mild major depression (H)  -     PARoxetine (PAXIL) 40 MG tablet; TAKE 1 TABLET EVERY MORNING. ADD TO 20 MG DAILY.  Moods improved on current medication regime   To continue paxil 60 mg daily  -     TSH with free T4 reflex    Malignant neoplasm of prostate (H)  Due for follow up blood work  S/p prostatectomy   -     PSA, screen    Recent skin changes  Face and right forehead and forearms  Full body skin check recommended  Referral placed  -     DERMATOLOGY ADULT REFERRAL; Future    Renal Artery Stenosis.  Could be contributing to his elevated BP.  Labs today to look at renal function   Due for vascular follow up and CTA July.  Discuss with vascular               Patient has been advised of split billing requirements and indicates understanding: Yes  COUNSELING:  Reviewed preventive health counseling, as  "reflected in patient instructions    Estimated body mass index is 22.09 kg/m  as calculated from the following:    Height as of this encounter: 1.695 m (5' 6.75\").    Weight as of this encounter: 63.5 kg (140 lb).        He reports that he quit smoking about 4 years ago. His smoking use included cigarettes. He smoked 1.00 pack per day. He quit smokeless tobacco use about 3 years ago.      Appropriate preventive services were discussed with this patient, including applicable screening as appropriate for cardiovascular disease, diabetes, osteopenia/osteoporosis, and glaucoma.  As appropriate for age/gender, discussed screening for colorectal cancer, prostate cancer, breast cancer, and cervical cancer. Checklist reviewing preventive services available has been given to the patient.    Reviewed patients plan of care and provided an AVS. The Intermediate Care Plan ( asthma action plan, low back pain action plan, and migraine action plan) for Adolfo meets the Care Plan requirement. This Care Plan has been established and reviewed with the Patient.    Counseling Resources:  ATP IV Guidelines  Pooled Cohorts Equation Calculator  Breast Cancer Risk Calculator  Breast Cancer: Medication to Reduce Risk  FRAX Risk Assessment  ICSI Preventive Guidelines  Dietary Guidelines for Americans, 2010  [employer]'s MyPlate  ASA Prophylaxis  Lung CA Screening    Call or return to the clinic with any worsening of symptoms or no resolution. Patient/Parent verbalized understanding and is in agreement. Medication side effects reviewed.   Current Outpatient Medications   Medication Sig Dispense Refill     aspirin (ASA) 325 MG tablet ONE DAILY 30 tablet 3     carvedilol (COREG) 12.5 MG tablet Take 1 tablet (12.5 mg) by mouth 2 times daily (with meals) 180 tablet 3     evolocumab (REPATHA) 140 MG/ML prefilled autoinjector Inject 1 mL (140 mg) Subcutaneous every 14 days 6 mL 3     ezetimibe (ZETIA) 10 MG tablet Take 1 tablet (10 mg) by mouth daily 90 " tablet 3     ipratropium (ATROVENT HFA) 17 MCG/ACT inhaler Inhale 2 puffs into the lungs every 6 hours 12.9 g 5     lisinopril (ZESTRIL) 5 MG tablet TAKE ONE TABLET BY MOUTH ONCE DAILY 90 tablet 1     nicotine polacrilex (NICORETTE) 4 MG gum Place 4 mg inside cheek every hour as needed for smoking cessation       PARoxetine (PAXIL) 40 MG tablet TAKE 1 TABLET EVERY MORNING. ADD TO 20 MG DAILY. 90 tablet 1     SM STOOL SOFTENER 8.6-50 MG tablet TAKE ONE TABLET BY MOUTH TWICE A DAY AS NEEDED FOR CONSTIPATION 90 tablet 0     STRIVERDI RESPIMAT 2.5 MCG/ACT AERS USE 2 INHALATIONS DAILY 12 g 3     Vitamin D, Cholecalciferol, 25 MCG (1000 UT) TABS        Chart documentation with Dragon Voice recognition Software. Although reviewed after completion, some words and grammatical errors may remain.      ANTONIO Carranza CNP  [facility]    Identified Health Risks:  CAD, COPD, DEPRESSION          Answers for HPI/ROS submitted by the patient on 3/23/2021   Annual Exam:  PHQ9 TOTAL SCORE: 1  GRETA 7 TOTAL SCORE: 2

## 2021-03-23 NOTE — NURSING NOTE
"Chief Complaint   Patient presents with     Physical       Initial BP (!) 158/100   Pulse 58   Temp 97  F (36.1  C) (Tympanic)   Ht 1.695 m (5' 6.75\")   Wt 63.5 kg (140 lb)   BMI 22.09 kg/m   Estimated body mass index is 22.09 kg/m  as calculated from the following:    Height as of this encounter: 1.695 m (5' 6.75\").    Weight as of this encounter: 63.5 kg (140 lb).    Patient presents to the clinic using     Health Maintenance that is potentially due pending provider review:  Blood pressure eleved        Is there anyone who you would like to be able to receive your results?   If yes have patient fill out KYLER    "

## 2021-03-24 LAB — PSA SERPL-ACNC: <0.01 UG/L (ref 0–4)

## 2021-03-24 ASSESSMENT — ANXIETY QUESTIONNAIRES: GAD7 TOTAL SCORE: 2

## 2021-03-24 ASSESSMENT — PATIENT HEALTH QUESTIONNAIRE - PHQ9: SUM OF ALL RESPONSES TO PHQ QUESTIONS 1-9: 1

## 2021-06-28 DIAGNOSIS — Z95.1 S/P CABG X 4: ICD-10-CM

## 2021-06-30 RX ORDER — LISINOPRIL 5 MG/1
5 TABLET ORAL DAILY
Qty: 30 TABLET | Refills: 0 | Status: SHIPPED | OUTPATIENT
Start: 2021-06-30 | End: 2021-07-28

## 2021-07-28 ENCOUNTER — TELEPHONE (OUTPATIENT)
Dept: FAMILY MEDICINE | Facility: CLINIC | Age: 76
End: 2021-07-28

## 2021-07-28 ENCOUNTER — ALLIED HEALTH/NURSE VISIT (OUTPATIENT)
Dept: FAMILY MEDICINE | Facility: CLINIC | Age: 76
End: 2021-07-28
Payer: COMMERCIAL

## 2021-07-28 VITALS — DIASTOLIC BLOOD PRESSURE: 96 MMHG | SYSTOLIC BLOOD PRESSURE: 160 MMHG

## 2021-07-28 DIAGNOSIS — Z01.30 BP CHECK: Primary | ICD-10-CM

## 2021-07-28 DIAGNOSIS — Z95.1 S/P CABG X 4: ICD-10-CM

## 2021-07-28 PROCEDURE — 99207 PR NO CHARGE NURSE ONLY: CPT | Performed by: NURSE PRACTITIONER

## 2021-07-28 RX ORDER — LISINOPRIL 5 MG/1
5 TABLET ORAL DAILY
Qty: 90 TABLET | Refills: 0 | Status: SHIPPED | OUTPATIENT
Start: 2021-07-28 | End: 2021-08-10

## 2021-07-28 NOTE — PROGRESS NOTES
Adolfo Rendon was evaluated at [facility] on July 28, 2021 at which time his blood pressure was:    BP Readings from Last 3 Encounters:   07/28/21 (!) 160/96   03/23/21 (!) 158/100   03/17/21 134/84     Pulse Readings from Last 3 Encounters:   03/23/21 58   10/22/20 62   07/28/20 76       Reviewed lifestyle modifications for blood pressure control and reduction: including making healthy food choices, managing weight, getting regular exercise, smoking cessation, reducing alcohol consumption, monitoring blood pressure regularly.     Symptoms: None    BP Goal:< 140/90 mmHg    BP Assessment:  BP too high    Potential Reasons for BP too high: NA - Not applicable    BP Follow-Up Plan: Recheck BP in 30 days at pharmacy    Recommendation to Provider: Consider increasing dose of medication.    Note completed by:  Wagner Mercado, PharmD  [facility]

## 2021-08-02 DIAGNOSIS — Z95.1 S/P CABG X 4: ICD-10-CM

## 2021-08-03 NOTE — PROGRESS NOTES
BP elevated at pharmacy visit  RN please call Adolfo Rendon with recommendation to increase his lisinopril to 10 mg daily and recheck his BP and BMP in 2 weeks in clinic  Thank you Danielle Mercado DAVONTE-BC

## 2021-08-06 NOTE — TELEPHONE ENCOUNTER
Lisinopril is listed as one of pt allergies can the pharmacy fill this med?    Tammy Laguerre Sec

## 2021-08-10 RX ORDER — LISINOPRIL 10 MG/1
10 TABLET ORAL DAILY
Qty: 90 TABLET | Refills: 1 | Status: SHIPPED | OUTPATIENT
Start: 2021-08-10 | End: 2021-12-30

## 2021-08-10 RX ORDER — LISINOPRIL 20 MG/1
20 TABLET ORAL DAILY
Qty: 14 TABLET | Refills: 0 | Status: SHIPPED | OUTPATIENT
Start: 2021-08-10 | End: 2021-10-25

## 2021-08-25 ENCOUNTER — TELEPHONE (OUTPATIENT)
Dept: FAMILY MEDICINE | Facility: CLINIC | Age: 76
End: 2021-08-25

## 2021-08-25 DIAGNOSIS — I10 BENIGN ESSENTIAL HYPERTENSION: Primary | ICD-10-CM

## 2021-08-25 DIAGNOSIS — F32.0 MILD MAJOR DEPRESSION (H): ICD-10-CM

## 2021-08-25 DIAGNOSIS — F41.1 GAD (GENERALIZED ANXIETY DISORDER): ICD-10-CM

## 2021-08-25 RX ORDER — PAROXETINE 40 MG/1
TABLET, FILM COATED ORAL
Qty: 90 TABLET | Refills: 1 | Status: SHIPPED | OUTPATIENT
Start: 2021-08-25 | End: 2021-11-02 | Stop reason: ALTCHOICE

## 2021-08-25 NOTE — TELEPHONE ENCOUNTER
Patient called requesting Paxil refill.    Routing refill request to provider for review/approval because:  Last visit 3/23/21. Last PHQ 3/23/21

## 2021-08-25 NOTE — TELEPHONE ENCOUNTER
Received call from patient. He received a call from clinic asking him to schedule a nurse visit for b/p check and lab. Chart reviewed see allied nurse note 7/28.    Will order bmp.    Will ask patient to schedule nurse visit and lab.    Returned call to patient. He will call to schedule appointments.    Abby ALICEA RN

## 2021-09-11 DIAGNOSIS — E78.5 HYPERLIPIDEMIA LDL GOAL <70: ICD-10-CM

## 2021-09-11 DIAGNOSIS — Z78.9 STATIN INTOLERANCE: ICD-10-CM

## 2021-09-11 DIAGNOSIS — Z95.1 S/P CABG X 4: ICD-10-CM

## 2021-09-11 DIAGNOSIS — I25.10 CORONARY ARTERY DISEASE INVOLVING NATIVE CORONARY ARTERY OF NATIVE HEART WITHOUT ANGINA PECTORIS: ICD-10-CM

## 2021-10-25 ENCOUNTER — IMMUNIZATION (OUTPATIENT)
Dept: FAMILY MEDICINE | Facility: CLINIC | Age: 76
End: 2021-10-25
Payer: COMMERCIAL

## 2021-10-25 ENCOUNTER — TELEPHONE (OUTPATIENT)
Dept: FAMILY MEDICINE | Facility: CLINIC | Age: 76
End: 2021-10-25

## 2021-10-25 ENCOUNTER — ALLIED HEALTH/NURSE VISIT (OUTPATIENT)
Dept: FAMILY MEDICINE | Facility: CLINIC | Age: 76
End: 2021-10-25
Payer: COMMERCIAL

## 2021-10-25 ENCOUNTER — LAB (OUTPATIENT)
Dept: LAB | Facility: CLINIC | Age: 76
End: 2021-10-25
Payer: COMMERCIAL

## 2021-10-25 VITALS — HEART RATE: 52 BPM | DIASTOLIC BLOOD PRESSURE: 98 MMHG | RESPIRATION RATE: 18 BRPM | SYSTOLIC BLOOD PRESSURE: 177 MMHG

## 2021-10-25 DIAGNOSIS — I10 BENIGN ESSENTIAL HYPERTENSION: ICD-10-CM

## 2021-10-25 DIAGNOSIS — Z23 HIGH PRIORITY FOR 2019-NCOV VACCINE: ICD-10-CM

## 2021-10-25 DIAGNOSIS — Z01.30 BP CHECK: ICD-10-CM

## 2021-10-25 DIAGNOSIS — Z23 NEED FOR PROPHYLACTIC VACCINATION AND INOCULATION AGAINST INFLUENZA: ICD-10-CM

## 2021-10-25 DIAGNOSIS — F41.1 GAD (GENERALIZED ANXIETY DISORDER): Primary | ICD-10-CM

## 2021-10-25 LAB
ANION GAP SERPL CALCULATED.3IONS-SCNC: 2 MMOL/L (ref 3–14)
BUN SERPL-MCNC: 19 MG/DL (ref 7–30)
CALCIUM SERPL-MCNC: 8.8 MG/DL (ref 8.5–10.1)
CHLORIDE BLD-SCNC: 105 MMOL/L (ref 94–109)
CO2 SERPL-SCNC: 30 MMOL/L (ref 20–32)
CREAT SERPL-MCNC: 1.17 MG/DL (ref 0.66–1.25)
GFR SERPL CREATININE-BSD FRML MDRD: 60 ML/MIN/1.73M2
GLUCOSE BLD-MCNC: 96 MG/DL (ref 70–99)
POTASSIUM BLD-SCNC: 4.6 MMOL/L (ref 3.4–5.3)
SODIUM SERPL-SCNC: 137 MMOL/L (ref 133–144)

## 2021-10-25 PROCEDURE — 90662 IIV NO PRSV INCREASED AG IM: CPT

## 2021-10-25 PROCEDURE — 99207 PR NO CHARGE NURSE ONLY: CPT

## 2021-10-25 PROCEDURE — 80048 BASIC METABOLIC PNL TOTAL CA: CPT

## 2021-10-25 PROCEDURE — G0008 ADMIN INFLUENZA VIRUS VAC: HCPCS

## 2021-10-25 PROCEDURE — 0004A COVID-19,PF,PFIZER (12+ YRS): CPT

## 2021-10-25 PROCEDURE — 36415 COLL VENOUS BLD VENIPUNCTURE: CPT

## 2021-10-25 PROCEDURE — 91300 COVID-19,PF,PFIZER (12+ YRS): CPT

## 2021-10-25 RX ORDER — PAROXETINE 20 MG/1
20 TABLET, FILM COATED ORAL EVERY MORNING
Qty: 90 TABLET | Refills: 0 | Status: SHIPPED | OUTPATIENT
Start: 2021-10-25 | End: 2021-11-02 | Stop reason: ALTCHOICE

## 2021-10-25 NOTE — PROGRESS NOTES
Adolfo Rendon is a 76 year old year old patient who comes in today for a Blood Pressure check because of ongoing blood pressure monitoring.    Vital Signs as repeated by /98 and 177/98 pulse 52  Patient is taking medication as prescribed  Patient is tolerating medications well.  Patient is monitoring Blood Pressure at home.  Average readings if yes are average >150/90  Current complaints: none  Disposition:  Discussed this with Dr Padilla.  It was suggested to have him see MTM and cardiology for dose adjustments for BP.  Appointment made for MTM on 11/1/21.  He will call cardiology.    History of chest pain, COPD,HTN,aschemic cardiomyopathy, bypass graft , CV left heart cath.  CABG x4    Karen Griffith RN

## 2021-10-25 NOTE — TELEPHONE ENCOUNTER
Adolfo Rendon is a 76 year old year old patient who comes in today for a Blood Pressure check because of ongoing blood pressure monitoring.    Vital Signs as repeated by /98 and 177/98 pulse 52  Patient is taking medication as prescribed  Patient is tolerating medications well.  Patient is monitoring Blood Pressure at home.  Average readings if yes are average >150/90  Current complaints: none  Disposition:  Discussed this with Dr Padilla.  It was suggested to have him see MTM and cardiology for dose adjustments for BP.  Appointment made for MTM on 11/1/21.  He will call cardiology.    History of chest pain, COPD,HTN,aschemic cardiomyopathy, bypass graft , CV left heart cath.  CABG x 4    Please sign MTM order,  Danielle Mercado out of the office.    Karen Griffith RN

## 2021-10-27 ENCOUNTER — TELEPHONE (OUTPATIENT)
Dept: FAMILY MEDICINE | Facility: CLINIC | Age: 76
End: 2021-10-27

## 2021-10-27 DIAGNOSIS — I10 BENIGN ESSENTIAL HYPERTENSION: Primary | ICD-10-CM

## 2021-10-27 RX ORDER — CARVEDILOL 12.5 MG/1
12.5 TABLET ORAL 2 TIMES DAILY WITH MEALS
Qty: 180 TABLET | Refills: 1 | Status: SHIPPED | OUTPATIENT
Start: 2021-10-27 | End: 2021-12-30

## 2021-10-27 RX ORDER — CARVEDILOL 12.5 MG/1
12.5 TABLET ORAL 2 TIMES DAILY WITH MEALS
Qty: 60 TABLET | Refills: 0 | Status: SHIPPED | OUTPATIENT
Start: 2021-10-27 | End: 2021-11-01

## 2021-10-27 NOTE — TELEPHONE ENCOUNTER
Reason for Call:  Medication or medication refill:    Do you use a [facility]?  Name of the pharmacy and phone number for the current request: [facility] [address] [place] [address], [place] [address]  Or  [facility] - [address], but will run out soon    Name of the medication requested: carvedilol (COREG) 12.5 MG tablet    Other request: Pt called and is requesting a refill on this medication, [facility] he will run out before then and is wondering if a small prescription can be sent to a local pharmacy.     Can we leave a detailed message on this number? YES    Phone number patient can be reached at: Home number on file [phone] (home)    Best Time: anytime    Call taken on 10/27/2021 at 9:58 AM by Nicolle Palacios

## 2021-11-01 ENCOUNTER — LAB (OUTPATIENT)
Dept: LAB | Facility: CLINIC | Age: 76
End: 2021-11-01
Payer: COMMERCIAL

## 2021-11-01 ENCOUNTER — OFFICE VISIT (OUTPATIENT)
Dept: PHARMACY | Facility: CLINIC | Age: 76
End: 2021-11-01
Payer: COMMERCIAL

## 2021-11-01 VITALS
HEART RATE: 64 BPM | BODY MASS INDEX: 23.32 KG/M2 | DIASTOLIC BLOOD PRESSURE: 105 MMHG | SYSTOLIC BLOOD PRESSURE: 164 MMHG | WEIGHT: 147.8 LBS

## 2021-11-01 DIAGNOSIS — F33.2 SEVERE EPISODE OF RECURRENT MAJOR DEPRESSIVE DISORDER, WITHOUT PSYCHOTIC FEATURES (H): ICD-10-CM

## 2021-11-01 DIAGNOSIS — E55.9 VITAMIN D DEFICIENCY: ICD-10-CM

## 2021-11-01 DIAGNOSIS — E78.5 HYPERLIPIDEMIA LDL GOAL <70: ICD-10-CM

## 2021-11-01 DIAGNOSIS — F41.1 GENERALIZED ANXIETY DISORDER: ICD-10-CM

## 2021-11-01 DIAGNOSIS — I25.10 CORONARY ARTERY DISEASE INVOLVING NATIVE CORONARY ARTERY OF NATIVE HEART WITHOUT ANGINA PECTORIS: ICD-10-CM

## 2021-11-01 DIAGNOSIS — I10 BENIGN ESSENTIAL HYPERTENSION: Primary | ICD-10-CM

## 2021-11-01 DIAGNOSIS — Z72.0 TOBACCO ABUSE: ICD-10-CM

## 2021-11-01 DIAGNOSIS — Z78.9 TAKES DIETARY SUPPLEMENTS: ICD-10-CM

## 2021-11-01 DIAGNOSIS — K59.00 CONSTIPATION, UNSPECIFIED CONSTIPATION TYPE: ICD-10-CM

## 2021-11-01 DIAGNOSIS — I25.5 ISCHEMIC CARDIOMYOPATHY: ICD-10-CM

## 2021-11-01 DIAGNOSIS — J44.9 CHRONIC OBSTRUCTIVE PULMONARY DISEASE, UNSPECIFIED COPD TYPE (H): ICD-10-CM

## 2021-11-01 LAB — DEPRECATED CALCIDIOL+CALCIFEROL SERPL-MC: 46 UG/L (ref 20–75)

## 2021-11-01 PROCEDURE — 82306 VITAMIN D 25 HYDROXY: CPT

## 2021-11-01 PROCEDURE — 36415 COLL VENOUS BLD VENIPUNCTURE: CPT

## 2021-11-01 PROCEDURE — 99607 MTMS BY PHARM ADDL 15 MIN: CPT | Performed by: PHARMACIST

## 2021-11-01 PROCEDURE — 99605 MTMS BY PHARM NP 15 MIN: CPT | Performed by: PHARMACIST

## 2021-11-01 RX ORDER — PAROXETINE 30 MG/1
60 TABLET, FILM COATED ORAL DAILY
Qty: 180 TABLET | Refills: 1 | Status: SHIPPED | OUTPATIENT
Start: 2021-11-01 | End: 2022-04-18

## 2021-11-01 RX ORDER — ASPIRIN 81 MG/1
81 TABLET ORAL DAILY
COMMUNITY

## 2021-11-01 RX ORDER — AMLODIPINE BESYLATE 2.5 MG/1
2.5 TABLET ORAL DAILY
Qty: 90 TABLET | Refills: 0 | Status: SHIPPED | OUTPATIENT
Start: 2021-11-01 | End: 2021-11-24

## 2021-11-01 NOTE — PATIENT INSTRUCTIONS
Recommendations from today's MTM visit:                                                    MTM (medication therapy management) is a service provided by a clinical pharmacist designed to help you get the most of out of your medicines.   Today we reviewed what your medicines are for, how to know if they are working, that your medicines are safe and how to make your medicine regimen as easy as possible.      1. Please schedule appointment with vascular surgeon Dr. Kameron Guzman. You are due for follow-up on renal artery stenosis.    2. Start amlodipine 2.5mg daily. Pick this up at [facility] pharmacy.    3. I'll reorder Paxil as 30mg tablets. Hopefully this will be easier for refilling [facility].    4. Think about getting light box.    5. Recheck vitamin D lab today.    6. Can buy senna-s (contains sennosides 8.6mg and docusate 50mg) as an over-the counter product.    7. Can try Mucinex 12-hour (contains guaifenesin 600mg) 1-2 times daily for mucus issues.    Follow-up: Return in 1 week (on 11/8/2021) for Medication Therapy Management.    It was great to speak with you today.  I value your experience and would be very thankful for your time with providing feedback on our clinic survey. You may receive a survey via email or text message in the next few days.     To schedule another MTM appointment, please call the clinic directly or you may call the MTM scheduling line at [phone] or toll-free at [phone].     My Clinical Pharmacist's contact information:                                                      Please feel free to contact me with any questions or concerns you have.      Daily Deleon, PharmD, [facility]  Medication Therapy Management Pharmacist  Pager: [phone]

## 2021-11-01 NOTE — LETTER
"        Date: 2021    Adolfo Rendon  [address] [facility] [address]    Dear Mr. Rendon,    Thank you for talking with me on 2021 about your health and medications. Medicare s MTM (Medication Therapy Management) program helps you understand your medications and use them safely.      This letter includes an action plan (Medication Action Plan) and medication list (Personal Medication List). The action plan has steps you should take to help you get the best results from your medications. The medication list will help you keep track of your medications and how to use them the right way.       Have your action plan and medication list with you when you talk with your doctors, pharmacists, and other healthcare providers in your care team.     Ask your doctors, pharmacists, and other healthcare providers to update the action plan and medication list at every visit.     Take your medication list with you if you go to the hospital or emergency room.     Give a copy of the action plan and medication list to your family or caregivers.     If you want to talk about this letter or any of the papers with it, please call   [phone].We look forward to working with you, your doctors, and other healthcare providers to help you stay healthy through the [facility] MTM program.    Sincerely,  Daily Deleon [facility]    Enclosed: Medication Action Plan and Personal Medication List    MEDICATION ACTION PLAN FOR Adolfo Rendon,  1945     This action plan will help you get the best results from your medications if you:   1. Read \"What we talked about.\"   2. Take the steps listed in the \"What I need to do\" boxes.   3. Fill in \"What I did and when I did it.\"   4. Fill in \"My follow-up plan\" and \"Questions I want to ask.\"     Have this action plan with you when you talk with your doctors, pharmacists, and other healthcare providers in your care team. Share this with your family or caregivers too.  DATE PREPARED: " 2021  What we talked about: Blood pressure                                                  What I need to do: Start amlodipine 2.5mg daily. Schedule appointment with vascular surgeon Dr. Kameron Guzman. You are due for follow-up on renal artery stenosis.   What I did and when I did it:                                              What we talked about: Paroxetine refills                                                  What I need to do: Changed paroxetine to 30mg tablets - take 2 tablets (60mg) daily. Hopefully this is easier to obtain refills than as separate 20mg and 40mg tablets.   What I did and when I did it:                                               What we talked about: OTC mucus product                                                  What I need to do: Try Mucinex 12-hour (contains guifenesin 600mg) 1-2 times daily for mucus problems.   What I did and when I did it:                                               What we talked about: Vitamin D supplementation                                                  What I need to do: Checking your vitamin D lab in clinic today. What I did and when I did it:                                               My follow-up plan:                 Questions I want to ask:              If you have any questions about your action plan, call Daily Deleon [facility], Phone: [phone] , Monday-Friday 8-4:30pm.           PERSONAL MEDICATION LIST FOR Adolfo CRAIG Justice,  1945     This medication list was made for you after we talked. We also used information from your doctor's chart.      Use blank rows to add new medications. Then fill in the dates you started using them.    Cross out medications when you no longer use them. Then write the date and why you stopped using them.    Ask your doctors, pharmacists, and other healthcare providers to update this list at every visit. Keep this list up-to-date with:       Prescription medications    Over the counter drugs      Herbals    Vitamins    Minerals      If you go to the hospital or emergency room, take this list with you. Share this with your family or caregivers too.     DATE PREPARED: 11/2/2021  Allergies or side effects: Amlodipine, Claritin, Hctz [hydrochlorothiazide], Metoprolol, Pravastatin, Remeron [mirtazapine], and Wellbutrin [bupropion hcl]     Medication:  AMLODIPINE BESYLATE 2.5 MG PO TABS      How I use it:  Take 1 tablet (2.5 mg) by mouth daily      Why I use it:  Blood pressure    Prescriber:  ANTONIO Carranza CNP      Date I started using it:       Date I stopped using it:         Why I stopped using it:            Medication:  ASPIRIN EC 81 MG PO TBEC      How I use it:  Take 1 tablet (81 mg) by mouth daily      Why I use it:  Heart disease    Prescriber:  ANTONIO Carranza CNP      Date I started using it:       Date I stopped using it:         Why I stopped using it:            Medication:  CARVEDILOL 12.5 MG PO TABS      How I use it:  Take 1 tablet (12.5 mg) by mouth 2 times daily (with meals)      Why I use it: Blood pressure    Prescriber:  ANTONIO Carranza CNP      Date I started using it:       Date I stopped using it:         Why I stopped using it:            Medication:  EVOLOCUMAB 140 MG/ML SC SOAJ      How I use it:  Inject 1 mL (140 mg) under the skin every 14 days      Why I use it: Cholesterol    Prescriber:  ANTONIO Ruby CNP      Date I started using it:       Date I stopped using it:         Why I stopped using it:            Medication:  EZETIMIBE 10 MG PO TABS      How I use it:  Take 1 tablet (10 mg) by mouth daily      Why I use it:  Cholesterol    Prescriber:  ANTONIO Carranza CNP      Date I started using it:       Date I stopped using it:         Why I stopped using it:            Medication:  LISINOPRIL 10 MG PO TABS      How I use it:  Take 1 tablet (10 mg) by mouth daily      Why I use it: Blood pressure    Prescriber:   ANTONIO Carranza CNP      Date I started using it:       Date I stopped using it:         Why I stopped using it:            Medication:  NICOTINE POLACRILEX 4 MG MT GUM      How I use it:  Place 4 mg inside cheek every hour as needed for smoking cessation      Why I use it:  Smoking cessation    Prescriber:  Self      Date I started using it:       Date I stopped using it:         Why I stopped using it:            Medication:  PAROXETINE HCL 30 MG PO TABS      How I use it:  Take 2 tablets (60 mg) by mouth daily      Why I use it: Depression, anxiety    Prescriber:  ANTONIO Carranza CNP      Date I started using it:       Date I stopped using it:         Why I stopped using it:            Medication:  STOOL SOFTENER 8.6-50 MG PO TABS      How I use it:  Take 1 tablet by mouth daily      Why I use it: Constipation    Prescriber:  ANTONIO Carranza CNP      Date I started using it:       Date I stopped using it:         Why I stopped using it:            Medication:  STRIVERDI RESPIMAT 2.5 MCG/ACT IN AERS      How I use it:  Inhale 2 puffs by mouth once daily      Why I use it: Chronic obstructive pulmonary disease  (COPD)    Prescriber:  ANTONIO Carranza CNP      Date I started using it:       Date I stopped using it:         Why I stopped using it:            Medication:  VITAMIN D (CHOLECALCIFEROL) 25 MCG (1000 UT) PO TABS      How I use it:  Take 1 capsule (1,000 Units) by mouth daily       Why I use it:  General health    Prescriber:  Self      Date I started using it:       Date I stopped using it:         Why I stopped using it:            Medication:         How I use it:         Why I use it:      Prescriber:         Date I started using it:       Date I stopped using it:         Why I stopped using it:            Medication:         How I use it:         Why I use it:      Prescriber:         Date I started using it:       Date I stopped using it:         Why I  stopped using it:            Medication:         How I use it:         Why I use it:      Prescriber:         Date I started using it:       Date I stopped using it:         Why I stopped using it:              Other Information:     If you have any questions about your medication list, call Daily Deleon [facility], Phone: [phone] , Monday-Friday 8-4:30pm.    According to the Paperwork Reduction Act of 1995, no persons are required to respond to a collection of information unless it displays a valid OMB control number. The valid OMB number for this information collection is [id number]. The time required to complete this information collection is estimated to average 40 minutes per response, including the time to review instructions, searching existing data resources, gather the data needed, and complete and review the information collection. If you have any comments concerning the accuracy of the time estimate(s) or suggestions for improving this form, please write to: CMS, Attn: MICHELLE Reports Clearance Officer, [address] [place] [phone].

## 2021-11-01 NOTE — PROGRESS NOTES
Medication Therapy Management (MTM) Encounter    ASSESSMENT:                            Medication Adherence/Access: See below for considerations    Hypertension/CAD/Hx Cardiomyopathy: Patient is not meeting blood pressure goal of < 150/90mmHg. Patient would benefit from vascular surgery follow-up to determine if renal artery stenosis may be contributing to elevated BP. Patient is open to retrying any medication from his allergy list. I recommend retrying amlodipine to avoid any renal concerns. We will start at a low dose for better tolerability and follow-up closely.    Hyperlipidemia: Stable. LDL is well controlled (close to or less than 40mg/dL) on current regimen of Zetia plus Repatha.    COPD/Smoking Cessation: Patient may benefit from trying Mucinex 12-hour.    Depression/Anxiety:  Patient is aware that paroxetine is over the max dose of 50 mg/day. It seems reasonable to continue given he's finding effective and tolerating well. It would not be a good time to try a dose reduction with the winter soon approaching, but can reevaluate in the springtime.     Constipation: Stable.    Supplements: Recommend checking vitamin D level today for screening given his comorbid depression especially in the wintertime.    PLAN:                            1. Please schedule appointment with vascular surgeon Dr. Kameron Guzman. You are due for follow-up on renal artery stenosis.  2. Start amlodipine 2.5mg daily.  at [facility] pharmacy to start today.  3. Reordered paroxetine as 30mg tablets. This will be easier for patient to get refills. Verified with [facility] pharmacy no issues with insurance coverage.  4. Think about getting a light box.  5. Check vitamin D level today.  6. Can buy senna-s (contains sennosides 8.6mg and docusate 50mg) as an over-the counter product.  7. Can try Mucinex 12-hour (contains guaifenesin 600mg) 1-2 times daily for mucus issues.    Follow-up: Return in 1 week (on 11/8/2021) for Medication  "Therapy Management.    SUBJECTIVE/OBJECTIVE:                          Adolfo Rendon is a 76 year old male coming in for an initial visit. He was referred to me from Dr. Padilla. Current PCP is ANTONIO Najera CNP.     Reason for visit: Uncontrolled BP.    Allergies/ADRs: Reviewed in chart  Past Medical History: Reviewed in chart  Tobacco: He reports that he quit smoking about 4 years ago. His smoking use included cigarettes. He smoked 1.00 pack per day. He quit smokeless tobacco use about 4 years ago.  Alcohol: none - sober for 33 years.    Medication Adherence/Access:   Patient takes medications directly from bottles.  Patient takes medications 2 time(s) per day.   Per patient, misses medication 0 times per week.   Medication barriers: none.   The patient fills medications at [facility]: NO, fills medications at [facility].    Hypertension/CAD/Hx Cardiomyopathy: Current medications include carvedilol 12.5mg twice daily, lisinopril 10mg daily, and aspirin 81mg daily. Allergy list includes amlodipine, lisinopril, hydrochlorothiazide, and metoprolol (reaction fatigue - \"felt like zombie\" for all). Patient does not recall intolerances to these medications. He's tolerating his current regimen well (including lisinopril). 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 Patient believes his fatigue was caused by his heart condition, not these medications.     Patient does self-monitor blood pressure. Home BP monitoring in range of 140's systolic over 90's diastolic.  Patient reports no current medication side effects, bruises easily but no other bleeding signs. Follows with cardiologist Dr. Espino. History of moderate right renal artery stenosis, CT scan 2018 and 2019 stable. Overdue for vascular surgery follow-up and repeat CTA.  ECHO:  Date 2/6/20, EF 55-60%    BP Readings from Last 3 Encounters:   11/01/21 (!) 164/105   10/25/21 (!) 177/98   07/28/21 (!) 160/96 "     Hyperlipidemia: Current therapy includes Zetia 10mg daily and Repatha 140mg every 2 weeks. Patient knows he's tried statins in the past, but doesn't recall the details. Chart review reveals previously tried atorvastatin (1702-6514 stopped due to myalgias), then simvastatin (0991-1869 but unclear why stopped), then pravastatin (5714-3344 stopped due to fatigue). Patient reports no significant myalgias or other side effects.    Recent Labs   Lab Test 03/23/21  1158 02/06/20  0921 04/20/16  1057 03/24/15  1459   CHOL 127 145   < > 215*   HDL 62 64   < > 52   LDL 47 66   < > 138*   TRIG 76 75   < > 125   CHOLHDLRATIO  --   --   --  4.1    < > = values in this interval not displayed.     COPD/Smoking Cessation: Current medications: Striverdi Respimat 2 puffs once daily and nicotine 4mg gum as needed (occasional use helps with anxiety).   Patient is not experiencing side effects.   Patient reports the following symptoms: mucus, was told to try Mucinex but 4 times daily is too inconvenient.  Patient does have an COPD Action Plan on file.   Has spirometry been completed: Yes     Depression/Anxiety:  Current medications include: Paroxetine 60mg daily (as 40mg plus 20mg tabs). Has trouble with refills at [facility]. Wondering about changing Rx to 30mg x2 tabs to be more convenient. Last dose increase occurred in 4/2019. Feels mental health is good right now, but goes downhill in fall/winter. Wondering about getting a light box. Patient knows this is a high dose of paroxetine, however he's discussed with PCP and they decided to continue as is because it's been effective and tolerating well.     Chart review reveals previously tried citalopram (7602-0588 likely stopped due to worsening PHQ9), then bupropion (in 2010 stopped after 2 months due to mood issues with anger and insomnia), then sertraline (5264-3314 switched due to sleep issues), then mirtazapine (in 2015 stopped after 1 month due to ineffectiveness and side  effects - see details below), then escitalopram (7375-0733 stopped due to ineffectiveness).  Per 6/2/15 RN note: Remeron did not really help- out of it for a month or so- Remeron seemed to make me angry - bad dreams and just feel weak like I could faint.    PHQ-9 SCORE 2/22/2018 12/23/2019 3/23/2021   PHQ-9 Total Score - - -   PHQ-9 Total Score MyChart - - 1 (Minimal depression)   PHQ-9 Total Score 4 3 1     GRETA-7 SCORE 3/24/2015 2/22/2018 3/23/2021   Total Score 2 - -   Total Score - - 2 (minimal anxiety)   Total Score - 6 2     TSH   Date Value Ref Range Status   03/23/2021 1.24 0.40 - 4.00 mU/L Final     Constipation: Currently taking senna-s 1 tablet daily. BM's are satisfactory. Wondering if he can buy this OTC.    Supplements: Currently taking vitamin D 25mcg daily. No vitamin D level on file. No concerns today.    Today's Vitals: BP (!) 164/105   Pulse 64   Wt 147 lb 12.8 oz (67 kg)   BMI 23.32 kg/m       Last Comprehensive Metabolic Panel:  Sodium   Date Value Ref Range Status   10/25/2021 137 133 - 144 mmol/L Final   03/23/2021 136 133 - 144 mmol/L Final     Potassium   Date Value Ref Range Status   10/25/2021 4.6 3.4 - 5.3 mmol/L Final   03/23/2021 4.4 3.4 - 5.3 mmol/L Final     Chloride   Date Value Ref Range Status   10/25/2021 105 94 - 109 mmol/L Final   03/23/2021 103 94 - 109 mmol/L Final     Carbon Dioxide   Date Value Ref Range Status   03/23/2021 30 20 - 32 mmol/L Final     Carbon Dioxide (CO2)   Date Value Ref Range Status   10/25/2021 30 20 - 32 mmol/L Final     Anion Gap   Date Value Ref Range Status   10/25/2021 2 (L) 3 - 14 mmol/L Final   03/23/2021 3 3 - 14 mmol/L Final     Glucose   Date Value Ref Range Status   10/25/2021 96 70 - 99 mg/dL Final   03/23/2021 87 70 - 99 mg/dL Final     Comment:     Fasting specimen     Urea Nitrogen   Date Value Ref Range Status   10/25/2021 19 7 - 30 mg/dL Final   03/23/2021 19 7 - 30 mg/dL Final     Creatinine   Date Value Ref Range Status   10/25/2021  1.17 0.66 - 1.25 mg/dL Final   03/23/2021 1.21 0.66 - 1.25 mg/dL Final     GFR Estimate   Date Value Ref Range Status   10/25/2021 60 (L) >60 mL/min/1.73m2 Final     Comment:     As of July 11, 2021, eGFR is calculated by the CKD-EPI creatinine equation, without race adjustment. eGFR can be influenced by muscle mass, exercise, and diet. The reported eGFR is an estimation only and is only applicable if the renal function is stable.   03/23/2021 58 (L) >60 mL/min/[1.73_m2] Final     Comment:     Non  GFR Calc  Starting 12/18/2018, serum creatinine based estimated GFR (eGFR) will be   calculated using the Chronic Kidney Disease Epidemiology Collaboration   (CKD-EPI) equation.       Calcium   Date Value Ref Range Status   10/25/2021 8.8 8.5 - 10.1 mg/dL Final   03/23/2021 9.0 8.5 - 10.1 mg/dL Final     ----------------    I spent 60 minutes with this patient today (an extra 15 minutes was spent creating the Medication Action Plan). All changes were made via collaborative practice agreement with ANTONIO Carranza CNP. A copy of the visit note was provided to the patient's primary care provider.    The patient was given a summary of these recommendations.     Daily Deleon, PharmD, BCACP  Medication Therapy Management Pharmacist  Pager: [phone]     Medication Therapy Recommendations  Benign essential hypertension    Current Medication: lisinopril (ZESTRIL) 10 MG tablet   Rationale: Synergistic therapy - Needs additional medication therapy - Indication   Recommendation: Start Medication - amLODIPine 2.5 MG tablet - Take 1 tablet by mouth daily.   Status: Accepted per CPA         COPD (chronic obstructive pulmonary disease) (H)    Current Medication: STRIVERDI RESPIMAT 2.5 MCG/ACT AERS   Rationale: Synergistic therapy - Needs additional medication therapy - Indication   Recommendation: Start Medication - guaiFENesin 600 MG 12 hr tablet - Take 1 tablet by mouth 1-2 times daily.   Status:  Accepted - no CPA Needed         Major depressive disorder, recurrent episode, severe (H)    Current Medication: PARoxetine (PAXIL) 20 MG tablet (Discontinued)   Rationale: Medication product not available - Adherence - Adherence   Recommendation: Change Medication - PARoxetine 30 MG tablet - Take 2 tablets by mouth daily.   Status: Accepted per CPA         Takes dietary supplements    Current Medication: Vitamin D, Cholecalciferol, 25 MCG (1000 UT) TABS   Rationale: Medication requires monitoring - Needs additional monitoring - Effectiveness   Recommendation: Order Lab - Recheck vitamin D level.   Status: Accepted per CPA

## 2021-11-01 NOTE — Clinical Note
Oskar Santos - I met with Adolfo yesterday. His BP is still running elevated. I started amlodipine 2.5mg daily and seeing him again next week to recheck. Encouraged him to follow-up with vascular surgery regarding renal artery stenosis. Also just a couple other misc. items like switching paroxetine to 30mg tabs for easier refills and vitamin D level was normal. Let me know if you have any questions/concerns. Thanks!    Daily Deleon, PharmD, BCACP  Medication Therapy Management Pharmacist  Pager: [phone]

## 2021-11-05 ENCOUNTER — TELEPHONE (OUTPATIENT)
Dept: OTHER | Facility: CLINIC | Age: 76
End: 2021-11-05

## 2021-11-05 DIAGNOSIS — I71.20 THORACIC AORTIC ANEURYSM WITHOUT RUPTURE (H): Primary | ICD-10-CM

## 2021-11-05 NOTE — TELEPHONE ENCOUNTER
See encounter from 11/4/21.   Shruthi has forwarded this to [facility] team to schedule patient in [place].    Indira MILLIGAN, RN    [facility]  Vascular [facility]  Office: [phone]  Fax: [phone]

## 2021-11-05 NOTE — TELEPHONE ENCOUNTER
Adolfo is being referred to see Dr. Guzman from Daily Deleon, Medication Therapy Pharmacist or [facility].     Patient prefers to be seen in [place].  Patient tried to call Dr. Shelby office and they did not return his call.     Renal Artery Stenosis, High BP issues that seem to be escalating.    Please call Adolof [phone] to discuss. Ok to leave message at this number. DO NOT CALL MOBILE.    Oliva GUZMAN  Routing to [facility] SURGICAL TRIAGE

## 2021-11-07 NOTE — PROGRESS NOTES
"Medication Therapy Management (MTM) Encounter    ASSESSMENT:                            Medication Adherence/Access: No issues identified    Hypertension/CAD/Hx Cardiomyopathy: Patient is not meeting blood pressure goal of < 150/90mmHg. Patient would benefit from amlodipine dose increase today. Also removed amlodipine from allergy list as he's tolerating it well.    COPD/Smoking Cessation: Stable.    Supplements: Stable.    PLAN:                            1. Increase amlodipine to 5mg daily. Ok to take 2 tablets of 2.5mg for a total daily dose of 5mg daily.    Follow-up: Return in 15 days (on 11/23/2021) for Vascular Surgeon Dr. Ruiz.    SUBJECTIVE/OBJECTIVE:                          Adolfo Rendon is a 76 year old male coming in for a follow-up visit. He was referred to me from Dr. Padilla. Current PCP is ANTONIO Najera CNP.  Today's visit is a follow-up MTM visit from 11/1/21.     Reason for visit: Recheck blood pressure.    Allergies/ADRs: Reviewed in chart  Past Medical History: Reviewed in chart  Tobacco: He reports that he quit smoking about 4 years ago. His smoking use included cigarettes. He smoked 1.00 pack per day. He quit smokeless tobacco use about 4 years ago.  Alcohol: none    Medication Adherence/Access: no issues reported    Hypertension/CAD/Hx Cardiomyopathy: Current medications include amlodipine 2.5mg daily, carvedilol 12.5mg twice daily, lisinopril 10mg daily, and aspirin 81mg daily.  Patient does not recall intolerances to these medications. Patient does self-monitor blood pressure, reading was 170/100's mmHg at home today. States his home BP monitor isn't too old and he's previously had it's accuracy validated in clinic. Today reports no side effects or feeling any different since starting amlodipine at last MTM visit, denies any lightheadedness/dizziness or edema. Allergy list includes amlodipine, lisinopril, hydrochlorothiazide, and metoprolol (reaction fatigue - \"felt like zombie\" " for all). 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 Patient believes his fatigue was caused by his heart condition, not these medications.   Follows with cardiologist Dr. Espino. History of moderate right renal artery stenosis, CT scan 2018 and 2019 stable. Has vascular surgery follow-up and repeat CTA scheduled on 11/23.  ECHO:  Date 2/6/20, EF 55-60%    BP Readings from Last 3 Encounters:   11/08/21 (!) 152/97   11/01/21 (!) 164/105   10/25/21 (!) 177/98     COPD/Smoking Cessation: Current medications: Striverdi Respimat 2 puffs once daily and nicotine 4mg gum as needed (occasional use helps with anxiety). Started Mucinex 12-hour twice daily, finding not coughing as much.  Patient is not experiencing side effects.   Patient reports the following symptoms: none  Patient does have an COPD Action Plan on file.   Has spirometry been completed: Yes     Supplements: Currently taking vitamin D 25mcg daily. No concerns today.    Lab Results   Component Value Date    VITDT 46 11/01/2021     Today's Vitals: BP (!) 152/97   Pulse 57      Last Comprehensive Metabolic Panel:  Sodium   Date Value Ref Range Status   10/25/2021 137 133 - 144 mmol/L Final   03/23/2021 136 133 - 144 mmol/L Final     Potassium   Date Value Ref Range Status   10/25/2021 4.6 3.4 - 5.3 mmol/L Final   03/23/2021 4.4 3.4 - 5.3 mmol/L Final     Chloride   Date Value Ref Range Status   10/25/2021 105 94 - 109 mmol/L Final   03/23/2021 103 94 - 109 mmol/L Final     Carbon Dioxide   Date Value Ref Range Status   03/23/2021 30 20 - 32 mmol/L Final     Carbon Dioxide (CO2)   Date Value Ref Range Status   10/25/2021 30 20 - 32 mmol/L Final     Anion Gap   Date Value Ref Range Status   10/25/2021 2 (L) 3 - 14 mmol/L Final   03/23/2021 3 3 - 14 mmol/L Final     Glucose   Date Value Ref Range Status   10/25/2021 96 70 - 99 mg/dL Final   03/23/2021 87 70 - 99 mg/dL Final     Comment:     Fasting specimen     Urea Nitrogen    Date Value Ref Range Status   10/25/2021 19 7 - 30 mg/dL Final   03/23/2021 19 7 - 30 mg/dL Final     Creatinine   Date Value Ref Range Status   10/25/2021 1.17 0.66 - 1.25 mg/dL Final   03/23/2021 1.21 0.66 - 1.25 mg/dL Final     GFR Estimate   Date Value Ref Range Status   10/25/2021 60 (L) >60 mL/min/1.73m2 Final     Comment:     As of July 11, 2021, eGFR is calculated by the CKD-EPI creatinine equation, without race adjustment. eGFR can be influenced by muscle mass, exercise, and diet. The reported eGFR is an estimation only and is only applicable if the renal function is stable.   03/23/2021 58 (L) >60 mL/min/[1.73_m2] Final     Comment:     Non  GFR Calc  Starting 12/18/2018, serum creatinine based estimated GFR (eGFR) will be   calculated using the Chronic Kidney Disease Epidemiology Collaboration   (CKD-EPI) equation.       Calcium   Date Value Ref Range Status   10/25/2021 8.8 8.5 - 10.1 mg/dL Final   03/23/2021 9.0 8.5 - 10.1 mg/dL Final     ----------------    I spent 20 minutes with this patient today. All changes were made via collaborative practice agreement with ANTONIO Carranza CNP. A copy of the visit note was provided to the patient's primary care provider.    The patient was given a summary of these recommendations.     Daily Deleon, PharmD, BCACP  Medication Therapy Management Pharmacist  Pager: [phone]     Medication Therapy Recommendations  Benign essential hypertension    Current Medication: amLODIPine (NORVASC) 2.5 MG tablet   Rationale: Dose too low - Dosage too low - Effectiveness   Recommendation: Increase Dose - amLODIPine 5 MG tablet - Take 1 tablet by mouth daily.   Status: Accepted per CPA

## 2021-11-08 ENCOUNTER — OFFICE VISIT (OUTPATIENT)
Dept: PHARMACY | Facility: CLINIC | Age: 76
End: 2021-11-08
Payer: COMMERCIAL

## 2021-11-08 VITALS — DIASTOLIC BLOOD PRESSURE: 97 MMHG | SYSTOLIC BLOOD PRESSURE: 152 MMHG | HEART RATE: 57 BPM

## 2021-11-08 DIAGNOSIS — I10 BENIGN ESSENTIAL HYPERTENSION: Primary | ICD-10-CM

## 2021-11-08 DIAGNOSIS — I25.5 ISCHEMIC CARDIOMYOPATHY: ICD-10-CM

## 2021-11-08 DIAGNOSIS — J44.9 CHRONIC OBSTRUCTIVE PULMONARY DISEASE, UNSPECIFIED COPD TYPE (H): ICD-10-CM

## 2021-11-08 DIAGNOSIS — I25.10 CORONARY ARTERY DISEASE INVOLVING NATIVE CORONARY ARTERY OF NATIVE HEART WITHOUT ANGINA PECTORIS: ICD-10-CM

## 2021-11-08 DIAGNOSIS — Z95.1 S/P CABG X 4: ICD-10-CM

## 2021-11-08 DIAGNOSIS — Z78.9 TAKES DIETARY SUPPLEMENTS: ICD-10-CM

## 2021-11-08 DIAGNOSIS — Z72.0 TOBACCO ABUSE: ICD-10-CM

## 2021-11-08 PROCEDURE — 99606 MTMS BY PHARM EST 15 MIN: CPT | Performed by: PHARMACIST

## 2021-11-08 PROCEDURE — 99607 MTMS BY PHARM ADDL 15 MIN: CPT | Performed by: PHARMACIST

## 2021-11-08 NOTE — Clinical Note
Oskar Santos - Today Adolfo's BP is better since starting amlodipine and no side effects. Increased dose to 5mg daily. He's seeing vascular surgery on 11/23 for repeat CTA. Let me know if you have any questions. Thanks!    Daily Deleon, PharmD, [facility]  Medication Therapy Management Pharmacist  Pager: [phone]

## 2021-11-08 NOTE — PATIENT INSTRUCTIONS
Recommendations from today's MTM visit:                                                    MTM (medication therapy management) is a service provided by a clinical pharmacist designed to help you get the most of out of your medicines.   Today we reviewed what your medicines are for, how to know if they are working, that your medicines are safe and how to make your medicine regimen as easy as possible.      1. Increase amlodipine to 5mg daily. You can take 2 tablets of 2.5mg for a total daily dose of 5mg daily.    Follow-up: Return in 15 days (on 11/23/2021) for Vascular Surgeon Dr. Ruiz.    It was great to speak with you today.  I value your experience and would be very thankful for your time with providing feedback on our clinic survey. You may receive a survey via email or text message in the next few days.     To schedule another MTM appointment, please call the clinic directly or you may call the MTM scheduling line at [phone] or toll-free at [phone].     My Clinical Pharmacist's contact information:                                                      Please feel free to contact me with any questions or concerns you have.      Daily Deleon, PharmD, BCACP  Medication Therapy Management Pharmacist  Pager: [phone]

## 2021-11-09 RX ORDER — DOCUSATE SODIUM 50MG AND SENNOSIDES 8.6MG 8.6; 5 MG/1; MG/1
TABLET, FILM COATED ORAL
Qty: 180 TABLET | Refills: 3 | Status: SHIPPED | OUTPATIENT
Start: 2021-11-09 | End: 2022-10-27

## 2021-11-22 NOTE — PROGRESS NOTES
Patient is in clinic today to see MD Silvia Ruiz.      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    The patient does not smoke.    Pt is currently taking ASA.    The patient states he/she are NOT wearing compression .    Swelling is observed in N/A.    Pt rates pain 0/10.    Pts symptoms include pain with walking at a distance of 200yds. in Bilateral  extremity.    Patients condition is stable.    The provider has been notified that the patient Was requested to talk with you from the pharmacist in regards to his HTN..

## 2021-11-23 ENCOUNTER — OFFICE VISIT (OUTPATIENT)
Dept: VASCULAR SURGERY | Facility: CLINIC | Age: 76
End: 2021-11-23
Attending: SURGERY
Payer: COMMERCIAL

## 2021-11-23 ENCOUNTER — HOSPITAL ENCOUNTER (OUTPATIENT)
Dept: CT IMAGING | Facility: CLINIC | Age: 76
Discharge: HOME | End: 2021-11-23
Attending: SURGERY | Admitting: SURGERY
Payer: COMMERCIAL

## 2021-11-23 VITALS
SYSTOLIC BLOOD PRESSURE: 140 MMHG | HEART RATE: 59 BPM | OXYGEN SATURATION: 99 % | DIASTOLIC BLOOD PRESSURE: 79 MMHG | HEIGHT: 67 IN | WEIGHT: 147 LBS | BODY MASS INDEX: 23.07 KG/M2 | TEMPERATURE: 96.9 F

## 2021-11-23 DIAGNOSIS — I71.20 THORACIC AORTIC ANEURYSM WITHOUT RUPTURE (H): ICD-10-CM

## 2021-11-23 DIAGNOSIS — I71.20 THORACIC AORTIC ANEURYSM WITHOUT RUPTURE (H): Primary | ICD-10-CM

## 2021-11-23 PROCEDURE — 99214 OFFICE O/P EST MOD 30 MIN: CPT | Performed by: SURGERY

## 2021-11-23 PROCEDURE — 250N000009 HC RX 250: Performed by: RADIOLOGY

## 2021-11-23 PROCEDURE — 250N000011 HC RX IP 250 OP 636: Performed by: RADIOLOGY

## 2021-11-23 PROCEDURE — 71275 CT ANGIOGRAPHY CHEST: CPT

## 2021-11-23 RX ORDER — IOPAMIDOL 755 MG/ML
80 INJECTION, SOLUTION INTRAVASCULAR ONCE
Status: COMPLETED | OUTPATIENT
Start: 2021-11-23 | End: 2021-11-23

## 2021-11-23 RX ADMIN — IOPAMIDOL 80 ML: 755 INJECTION, SOLUTION INTRAVENOUS at 13:53

## 2021-11-23 RX ADMIN — SODIUM CHLORIDE 100 ML: 9 INJECTION, SOLUTION INTRAVENOUS at 13:54

## 2021-11-23 ASSESSMENT — MIFFLIN-ST. JEOR: SCORE: 1351.45

## 2021-11-23 ASSESSMENT — PAIN SCALES - GENERAL: PAINLEVEL: NO PAIN (0)

## 2021-11-23 NOTE — NURSING NOTE
"Initial BP (!) 140/79 (BP Location: Right arm, Patient Position: Sitting, Cuff Size: Adult Regular)   Pulse 59   Temp 96.9  F (36.1  C) (Tympanic)   Ht 1.695 m (5' 6.75\")   Wt 66.7 kg (147 lb)   BMI 23.20 kg/m   Estimated body mass index is 23.2 kg/m  as calculated from the following:    Height as of this encounter: 1.695 m (5' 6.75\").    Weight as of this encounter: 66.7 kg (147 lb). .        Lalitha Jasso LPN on 11/23/2021 at 2:13 PM    "

## 2021-11-23 NOTE — PROGRESS NOTES
There is VASCULAR SURGERY PROGRESS NOTE   VASCULAR SURGEON: Aleksander Ruiz MD, RPVI     LOCATION:  [facility]     Adolfo Rendon   Medical Record #:  [id number]  YOB: 1945  Age:  76 year old     Date of Service: 11/23/2021    PRIMARY CARE PROVIDER: Danielle Mercado      Reason for visit: Follow-up    IMPRESSION: 76-year-old male who is here for follow-up.  Patient has known history of small aneurysmal ascending aorta and ectatic descending thoracic segment.  It is stable on most recent CT scan.  No intervention is indicated.    RECOMMENDATION/RISKS: Recent CTA and patient will require any intervention in his lifetime.  Would recommend 2-year follow-up with repeat CT scan.  Stable patient will need as needed follow-up.  His blood pressure has been stable.  His bilateral renal artery disease relatively stable and not significant enough for any intervention.    HPI:  Adolfo Rendon is a 76 year old male who was seen today for follow-up.  Patient doing well in regards to denies any complaints    REVIEW OF SYSTEMS:    A 12 point ROS was reviewed and is negative     PHH:    Past Medical History:   Diagnosis Date     Coronary artery disease      Depressive disorder, not elsewhere classified      Hypertrophy (benign) of prostate      Impotence of organic origin      Other and unspecified hyperlipidemia      Other anxiety states      Tobacco use disorder           Past Surgical History:   Procedure Laterality Date     APPENDECTOMY  1966     BYPASS GRAFT ARTERY CORONARY N/A 9/3/2019    Procedure: CORONARY ARTERY BYPASS GRAFT X 4 - LIMA TO LAD, SV TO PL, OM, PDA ; ON PUMP WITH TERRENCE; ENDOVEIN HARVEST RIGHT LEG;  Surgeon: Lai Mchugh MD;  Location:  OR     COLONOSCOPY  3/1/2005     COLONOSCOPY N/A 5/9/2019    Procedure: COLONOSCOPY;  Surgeon: Camilo Beard MD;  Location: [facility]     CV LEFT HEART CATH N/A 8/8/2019    Procedure: Left Heart Cath--also measure LVEDP;   Surgeon: Krystle Barrera MD;  Location:  HEART CARDIAC CATH LAB     HERNIORRHAPHY INGUINAL  7/14/2011    Procedure:HERNIORRHAPHY INGUINAL; Open Repair Right Inguinal Hernia Repair        HYDROCELECTOMY SCROTAL  01/2004    left hydrocele repair     SUPRAPUBIC PROSTATECTOMY         ALLERGIES:  Claritin, Hctz [hydrochlorothiazide], Metoprolol, Pravastatin, Remeron [mirtazapine], and Wellbutrin [bupropion hcl]    MEDS:    Current Outpatient Medications:      amLODIPine (NORVASC) 2.5 MG tablet, Take 1 tablet (2.5 mg) by mouth daily, Disp: 90 tablet, Rfl: 0     aspirin 81 MG EC tablet, Take 81 mg by mouth daily, Disp: , Rfl:      carvedilol (COREG) 12.5 MG tablet, Take 1 tablet (12.5 mg) by mouth 2 times daily (with meals), Disp: 180 tablet, Rfl: 1     evolocumab (REPATHA) 140 MG/ML prefilled autoinjector, Inject 1 mL (140 mg) Subcutaneous every 14 days, Disp: 6 mL, Rfl: 0     ezetimibe (ZETIA) 10 MG tablet, Take 1 tablet (10 mg) by mouth daily, Disp: 90 tablet, Rfl: 3     lisinopril (ZESTRIL) 10 MG tablet, Take 1 tablet (10 mg) by mouth daily, Disp: 90 tablet, Rfl: 1     nicotine polacrilex (NICORETTE) 4 MG gum, Place 4 mg inside cheek every hour as needed for smoking cessation, Disp: , Rfl:      PARoxetine (PAXIL) 30 MG tablet, Take 2 tablets (60 mg) by mouth daily, Disp: 180 tablet, Rfl: 1     SENEXON-S 8.6-50 MG tablet, TAKE ONE TABLET BY MOUTH TWICE A DAY AS NEEDED FOR CONSTIPATION, Disp: 180 tablet, Rfl: 3     STRIVERDI RESPIMAT 2.5 MCG/ACT AERS, USE 2 INHALATIONS DAILY, Disp: 12 g, Rfl: 3     Vitamin D, Cholecalciferol, 25 MCG (1000 UT) TABS, Take 1,000 Units by mouth daily , Disp: , Rfl:   No current facility-administered medications for this visit.    SOCIAL HABITS:    History   Smoking Status     Former Smoker     Packs/day: 1.00     Types: Cigarettes     Quit date: 1/14/2017   Smokeless Tobacco     Former User     Quit date: 9/1/2017     Social History    Substance and Sexual Activity      Alcohol use:  "No        Alcohol/week: 0.0 standard drinks        Comment: quit 1989      History   Drug Use No       FAMILY HISTORY:    Family History   Problem Relation Age of Onset     Cerebrovascular Disease Mother      Hypertension Mother      Prostate Cancer Brother      Dementia Brother      Arthritis Sister         rhematoid     Cancer Brother         Lung cancer, lymphoma     Other - See Comments Brother         HIV     Cancer Sister         skin cancer on nose     Pacemaker Sister      Aneurysm Sister         heart       PE:  BP (!) 140/79 (BP Location: Right arm, Patient Position: Sitting, Cuff Size: Adult Regular)   Pulse 59   Temp 96.9  F (36.1  C) (Tympanic)   Ht 1.695 m (5' 6.75\")   Wt 66.7 kg (147 lb)   SpO2 99%   BMI 23.20 kg/m    Wt Readings from Last 1 Encounters:   11/23/21 66.7 kg (147 lb)     Body mass index is 23.2 kg/m .    EXAM:  GENERAL: This is a well-developed 76 year old male who appears his stated age  EYES: Grossly normal.  MOUTH: Buccal mucosa normal   CARDIAC: Normal   CHEST/LUNG: Clear to auscultation bilaterally  GASTROINTESINAL soft nontender nondistended  MUSCULOSKELETAL: Grossly normal and both lower extremities are intact.  HEME/LYMPH: No lymphedema  NEUROLOGIC: Focally intact, Alert and oriented x 3.   PSYCH: appropriate affect  INTEGUMENT: No open lesions or ulcers            DIAGNOSTIC STUDIES:     Images:  No results found.    I personally reviewed the images and my interpretation is stable CT scan    LABS:      Sodium   Date Value Ref Range Status   10/25/2021 137 133 - 144 mmol/L Final   03/23/2021 136 133 - 144 mmol/L Final   02/06/2020 142 133 - 144 mmol/L Final   09/08/2019 138 133 - 144 mmol/L Final     Urea Nitrogen   Date Value Ref Range Status   10/25/2021 19 7 - 30 mg/dL Final   03/23/2021 19 7 - 30 mg/dL Final   02/06/2020 22 7 - 30 mg/dL Final   09/08/2019 16 7 - 30 mg/dL Final     Hemoglobin   Date Value Ref Range Status   03/23/2021 13.6 13.3 - 17.7 g/dL Final "   09/16/2019 9.3 (L) 13.3 - 17.7 g/dL Final   09/08/2019 10.4 (L) 13.3 - 17.7 g/dL Final     Platelet Count   Date Value Ref Range Status   03/23/2021 177 150 - 450 10e9/L Final   09/08/2019 171 150 - 450 10e9/L Final   09/06/2019 105 (L) 150 - 450 10e9/L Final     INR   Date Value Ref Range Status   09/03/2019 1.30 (H) 0.86 - 1.14 Final   09/03/2019 1.47 (H) 0.86 - 1.14 Final   08/08/2019 0.96 0.86 - 1.14 Final       Total time spent 30minutes face to face with patient with more than 50% time spent in counseling and coordination of care.    Aleksander Ruiz MD, MD, [facility]  VASCULAR SURGERY

## 2021-11-24 ENCOUNTER — TELEPHONE (OUTPATIENT)
Dept: PHARMACY | Facility: CLINIC | Age: 76
End: 2021-11-24
Payer: COMMERCIAL

## 2021-11-24 DIAGNOSIS — I10 BENIGN ESSENTIAL HYPERTENSION: Primary | ICD-10-CM

## 2021-11-24 RX ORDER — AMLODIPINE BESYLATE 5 MG/1
5 TABLET ORAL DAILY
Qty: 90 TABLET | Refills: 1 | Status: SHIPPED | OUTPATIENT
Start: 2021-11-24 | End: 2021-12-30

## 2021-11-24 NOTE — TELEPHONE ENCOUNTER
I called patient to check in after his vascular visit yesterday. His thoracic aortic aneurysm and renal artery stenosis were found to be stable. Blood pressure was 140/79 mmHg, which is at his goal of <150/90 mmHg. Patient would benefit from continuing current regimen including amlodipine 5mg daily, carvedilol 12.5mg twice daily, and lisinopril 10mg daily. Patient denies any side effects. He's currently taking 2 tabs of amlodipine 2.5mg, will update prescription to 5mg tabs. Sent order to [facility] pharmacy per CPA with ANTONIO Najera CNP. Follow-up plan for PCP visit in March for general physician, but can call sooner if needed.    Daily Deleon, PharmD, BCACP  Medication Therapy Management Pharmacist  Pager: [phone]

## 2021-12-13 DIAGNOSIS — E78.5 HYPERLIPIDEMIA LDL GOAL <70: ICD-10-CM

## 2021-12-13 DIAGNOSIS — Z78.9 STATIN INTOLERANCE: ICD-10-CM

## 2021-12-13 DIAGNOSIS — Z95.1 S/P CABG X 4: ICD-10-CM

## 2021-12-13 DIAGNOSIS — I25.10 CORONARY ARTERY DISEASE INVOLVING NATIVE CORONARY ARTERY OF NATIVE HEART WITHOUT ANGINA PECTORIS: ICD-10-CM

## 2021-12-13 NOTE — TELEPHONE ENCOUNTER
[facility]     Repatha 140 mg/ml     Last OV 2/14/2020  Last lipids 3/23/2021  Last BMP 10/25/2021    Message sent to scheduling for appointment.  10:12 AM 12/13/21 RONNIE Ortiz CMA

## 2021-12-14 NOTE — TELEPHONE ENCOUNTER
LOV 2/14/20.  Appt scheduled for 12/30/21.  RN refilled rx per RN refill protocol.   Nicol Guerra RN on 12/14/2021 at 3:08 PM

## 2021-12-29 ENCOUNTER — TELEPHONE (OUTPATIENT)
Dept: FAMILY MEDICINE | Facility: CLINIC | Age: 76
End: 2021-12-29
Payer: COMMERCIAL

## 2021-12-29 NOTE — TELEPHONE ENCOUNTER
Spoke with Luis at the request of [facility] pharmacist. He is complaining that for the last several weeks he has had right foot pain with ambulating any distance, extremity is pale and cool to touch when he comes in from outside. He did just meet with his vascular provider last month and these symptoms were not present then. He does have an appointment with cardiology tomorrow and was advised to be sure and mention his symptoms.

## 2021-12-30 ENCOUNTER — OFFICE VISIT (OUTPATIENT)
Dept: CARDIOLOGY | Facility: CLINIC | Age: 76
End: 2021-12-30
Payer: COMMERCIAL

## 2021-12-30 VITALS
SYSTOLIC BLOOD PRESSURE: 130 MMHG | BODY MASS INDEX: 22.79 KG/M2 | DIASTOLIC BLOOD PRESSURE: 80 MMHG | HEART RATE: 60 BPM | WEIGHT: 150.4 LBS | HEIGHT: 68 IN | TEMPERATURE: 97.4 F

## 2021-12-30 DIAGNOSIS — E78.5 HYPERLIPIDEMIA LDL GOAL <70: ICD-10-CM

## 2021-12-30 DIAGNOSIS — Z86.79 H/O CARDIOMYOPATHY: ICD-10-CM

## 2021-12-30 DIAGNOSIS — I25.810 CORONARY ARTERY DISEASE INVOLVING CORONARY BYPASS GRAFT OF NATIVE HEART WITHOUT ANGINA PECTORIS: Primary | ICD-10-CM

## 2021-12-30 DIAGNOSIS — I10 BENIGN ESSENTIAL HYPERTENSION: ICD-10-CM

## 2021-12-30 DIAGNOSIS — M79.604 BILATERAL LEG PAIN: ICD-10-CM

## 2021-12-30 DIAGNOSIS — I73.9 CLAUDICATION (H): ICD-10-CM

## 2021-12-30 DIAGNOSIS — M79.605 BILATERAL LEG PAIN: ICD-10-CM

## 2021-12-30 DIAGNOSIS — Z95.1 S/P CABG X 4: ICD-10-CM

## 2021-12-30 PROCEDURE — 99215 OFFICE O/P EST HI 40 MIN: CPT | Performed by: NURSE PRACTITIONER

## 2021-12-30 RX ORDER — EZETIMIBE 10 MG/1
10 TABLET ORAL DAILY
Qty: 90 TABLET | Refills: 3 | Status: SHIPPED | OUTPATIENT
Start: 2021-12-30 | End: 2022-10-27

## 2021-12-30 RX ORDER — AMLODIPINE BESYLATE 5 MG/1
5 TABLET ORAL DAILY
Qty: 90 TABLET | Refills: 3 | Status: SHIPPED | OUTPATIENT
Start: 2021-12-30 | End: 2022-10-27

## 2021-12-30 RX ORDER — LISINOPRIL 10 MG/1
10 TABLET ORAL DAILY
Qty: 90 TABLET | Refills: 3 | Status: SHIPPED | OUTPATIENT
Start: 2021-12-30 | End: 2022-10-27

## 2021-12-30 RX ORDER — CARVEDILOL 12.5 MG/1
12.5 TABLET ORAL 2 TIMES DAILY WITH MEALS
Qty: 180 TABLET | Refills: 3 | Status: SHIPPED | OUTPATIENT
Start: 2021-12-30 | End: 2022-10-27

## 2021-12-30 RX ORDER — UBIDECARENONE 100 MG
100 CAPSULE ORAL DAILY
COMMUNITY

## 2021-12-30 ASSESSMENT — PAIN SCALES - GENERAL: PAINLEVEL: MILD PAIN (3)

## 2021-12-30 ASSESSMENT — MIFFLIN-ST. JEOR: SCORE: 1378.77

## 2021-12-30 NOTE — PATIENT INSTRUCTIONS
Medication Changes:  1. None     Recommendations:  1. Check blood pressure at least 1 hour after medications. Call the clinic if your blood pressure is consistently greater than 130/80.   2. Stay well hydrated to help with leg cramps overnight   3. Check with primary care about increased shortness of breath, they may change inhaler dose or try albuterol. If not helping, let us know and we can do a stress test.     Follow-up:  1. Exercise ABIs   2. See Garima GONZALEZ for cardiology follow up at [facility]: 1 month      Cardiology Scheduling~[phone]  Cardiology Clinic RN~[phone] (Melba Rodriguez, RN and Nicol GAVIRIA RN)

## 2021-12-30 NOTE — NURSING NOTE
"Initial /80 (BP Location: Right arm, Patient Position: Sitting, Cuff Size: Adult Regular)   Pulse 60   Temp 97.4  F (36.3  C) (Tympanic)   Ht 1.715 m (5' 7.5\")   Wt 68.2 kg (150 lb 6.4 oz)   BMI 23.21 kg/m   Estimated body mass index is 23.21 kg/m  as calculated from the following:    Height as of this encounter: 1.715 m (5' 7.5\").    Weight as of this encounter: 68.2 kg (150 lb 6.4 oz). .    Lalitha Jasso LPN on 12/30/2021 at 2:03 PM    "

## 2021-12-30 NOTE — LETTER
12/30/2021    Danielle Mercado, APRN CNP  [address] [place] [id number]    RE: Adolfo Rendon       Dear Colleague,     I had the pleasure of seeing Adolfo Rendon in the [facility].  Cardiology Clinic Progress Note  Adolfo Rendon MRN# [id number]   YOB: 1945 Age: 73 year old     Primary Cardiologist:   Dr. Espino          History of Presenting Illness:    Adolfo Rendon is a pleasant 73 year old patient with a past cardiac history significant for   1. CAD with CABG (LIMA to LAD, SVG to OM, SVG to PDA, SVG to PL) 9/2019  2. H/o ischemic cardiomyopathy with normalized EF   3. hypertension,   4. Hyperlipidemia  5. mild ascending aortic dilatation  6. Renal artery stenosis   Past medical history significant for ongoing tobacco abuse and COPD.    He has a history of multiple medication intolerances including amlodipine, hydrochlorothiazide, high-intensity statin, metoprolol, and pravastatin. He has discontinued all antihypertensive medications in the past.   He continued taking his aspirin daily but no other cardiac medications. Coronary angiogram in 2015 showed chronically occluded RCA with left-to-right collaterals along with 50% pLAD stenosis.    In August 2019 he complained of shortness of breath, fatigue, and chest heaviness.  He was noted to have severe multivessel disease and eventually underwent CABG x4 in September 2019.  EF prior to surgery was 45 to 50% with mildly dilated ascending aorta.  Lipids were elevated and he was started on Repatha and Zetia.    Patient was last seen in February 2020 for routine follow-up. Echocardiogram 2/6/2020 showing normalized EF 55 to 60%, wall motion abnormalities as described, RV normal size and function, moderate aortic sclerosis without stenosis.  He was tolerating Repatha injections. He continues with chronic dyspnea on exertion with climbing flight of stairs which was stable and had no further chest discomfort.    He  was seen by PCP in March 2021 noted to have elevated BP.  He had MTM visit November 2021 and was restarted on amlodipine 2.5 mg daily.  At follow-up he was tolerating and this was increased to 5 mg daily.  He was seen by vascular surgery on 11/23/2021 and they did not feel that his renal artery disease was causing his hypertension.    Pt presents today for overdue annual follow-up.  Lipid profile March 2021 controlled with total cholesterol 127 HDL 62 LDL 47 triglycerides 76. BMP October 2021 showing normal electrolytes, BUN, creatinine and borderline GFR at 60.  Results reviewed today.     Blood pressure today is controlled.  He denies any chest discomfort.  Historically, he has had chronic dyspnea on exertion which had been stable.  However, this year he feels that the dyspnea with exertion, has slightly increased.  We discussed stress testing but he prefers to check in with PCP regarding lung disease prior to proceeding with stress test.  He denies any heart failure and overall weights have been stable.  His main concern today is right foot and leg pain that began 3 weeks ago.  He stated that it started suddenly and now has been occurring since.  He develops pain and numbness that starts in the arch of his foot and moves up through the calf.  It is usually associated with a cold foot and occurs with exertion.  If he stops and rests, it takes approximately 15 minutes for symptoms to resolve.  This has been occurring in the right leg but he has started noticing some slight discomfort in the left leg also.  He is agreeable to exercise ABIs.  He has palpable pedal pulses bilaterally.  He has also noted increased leg cramps bilaterally at night and I encouraged him to stay well-hydrated. Patient reports no chest pain, PND, orthopnea, presyncope, syncope, edema, heart racing, or palpitations.    Current Cardiac Medications   Amlodipine 5 mg daily   Aspirin 81 mg daily   Carvedilol 12.5 mg twice daily  Repatha 140 mg  every 14 days  Zetia 10 mg daily  Lisinopril 10 mg daily                     Assessment and Plan:     Plan    Patient Instructions   Medication Changes:  1. None     Recommendations:  1. Check blood pressure at least 1 hour after medications. Call the clinic if your blood pressure is consistently greater than 130/80.   2. Stay well hydrated to help with leg cramps overnight   3. Check with primary care about increased shortness of breath, they may change inhaler dose or try albuterol. If not helping, let us know and we can do a stress test.     Follow-up:  1. Bilat Exercise ABIs   2. See Garima GONZALEZ for cardiology follow up at [place]: 1 month to review RAUL, reassess leg cramps and right leg pain    Cardiology Scheduling~[phone]  Cardiology Clinic RN~[phone] (Melba Rodriguez, IRAIDA and Nicol GAVIRIA RN)          1. CAD    CABG (LIMA to LAD, SVG to OM, SVG to PDA, SVG to PL) 9/2019    Slightly increased SANTIAGO-patient declined stress test at this time    continue aspirin, ACE inhibitor, beta blocker, CCB      2. H/o ischemic cardiomyopathy    EF normalized 2/2020, LVEF 45-50% 8/2019 (prior to bypass)    No signs of heart failure    Continue beta blocker, ACE inhibitor    Check daily weights and call the clinic if your weight has increased more than 2 lbs in one day or 5 lbs in one week.     2000 mg Na diet       3. hypertension    Controlled    Moderate Right renal artery stenosis on CT scan 2018 and 2019 stable - no intervention recommended by vascular surgery. Med manage HTN vascular surgery does not feel it is significant enough to contribute to his HTN.    Continue carvedilol, lisinopril, amlodipine       4. Hyperlipidemia    Last LDL 47 3/2021     Continue Repatha, Zetia      5. Thoracic aortic aneurysm    Mildly dilated ascending aorta 3.8 cm echo 8/2019 and CTa 5/2019, normal on echo 2/2020    Follow with echo     Follows with vascular surgery          Thank you for allowing me to participate in this  delightful patient's care.      This note was completed in part using Dragon voice recognition software. Although reviewed after completion, some word and grammatical errors may occur.    ANTONIO Gusman, CNP           Data:   All laboratory data reviewed        Total time spent today was 44 mins, reviewing labs, testing, notes, documenting notes, and seeing patient.       Constitutional:  cooperative, alert and oriented, well developed, well nourished, in no acute distress     Skin:  warm and dry to the touch         Head:  normocephalic       Eyes:  pupils equal and round       ENT:  no pallor or cyanosis       Neck:  no stiffness       Respiratory:  clear to auscultation; normal symmetry        Cardiac: regular rate and rhythm; normal S1 and S2                 pulses full and equal     GI:  abdomen soft, nondistended     Extremities and Muscular Skeletal:  no edema, bilateral pedal pulses present       Neurological:  affect appropriate; no gross motor deficits       Psych:  Alert and Oriented x 3 , appropriate affact          Thank you for allowing me to participate in the care of your patient.      Sincerely,     ANTONIO Gusman CNP     [facility]

## 2021-12-30 NOTE — PROGRESS NOTES
Cardiology Clinic Progress Note  Adolfo Rendon MRN# [id number]   YOB: 1945 Age: 73 year old     Primary Cardiologist:   Dr. Espino          History of Presenting Illness:    Adolfo Rendon is a pleasant 73 year old patient with a past cardiac history significant for   1. CAD with CABG (LIMA to LAD, SVG to OM, SVG to PDA, SVG to PL) 9/2019  2. H/o ischemic cardiomyopathy with normalized EF   3. hypertension,   4. Hyperlipidemia  5. mild ascending aortic dilatation  6. Renal artery stenosis   Past medical history significant for ongoing tobacco abuse and COPD.    He has a history of multiple medication intolerances including amlodipine, hydrochlorothiazide, high-intensity statin, metoprolol, and pravastatin. He has discontinued all antihypertensive medications in the past.   He continued taking his aspirin daily but no other cardiac medications. Coronary angiogram in 2015 showed chronically occluded RCA with left-to-right collaterals along with 50% pLAD stenosis.    In August 2019 he complained of shortness of breath, fatigue, and chest heaviness.  He was noted to have severe multivessel disease and eventually underwent CABG x4 in September 2019.  EF prior to surgery was 45 to 50% with mildly dilated ascending aorta.  Lipids were elevated and he was started on Repatha and Zetia.    Patient was last seen in February 2020 for routine follow-up. Echocardiogram 2/6/2020 showing normalized EF 55 to 60%, wall motion abnormalities as described, RV normal size and function, moderate aortic sclerosis without stenosis.  He was tolerating Repatha injections. He continues with chronic dyspnea on exertion with climbing flight of stairs which was stable and had no further chest discomfort.    He was seen by PCP in March 2021 noted to have elevated BP.  He had MTM visit November 2021 and was restarted on amlodipine 2.5 mg daily.  At follow-up he was tolerating and this was increased to 5 mg daily.  He was seen by  vascular surgery on 11/23/2021 and they did not feel that his renal artery disease was causing his hypertension.    Pt presents today for overdue annual follow-up.  Lipid profile March 2021 controlled with total cholesterol 127 HDL 62 LDL 47 triglycerides 76. BMP October 2021 showing normal electrolytes, BUN, creatinine and borderline GFR at 60.  Results reviewed today.     Blood pressure today is controlled.  He denies any chest discomfort.  Historically, he has had chronic dyspnea on exertion which had been stable.  However, this year he feels that the dyspnea with exertion, has slightly increased.  We discussed stress testing but he prefers to check in with PCP regarding lung disease prior to proceeding with stress test.  He denies any heart failure and overall weights have been stable.  His main concern today is right foot and leg pain that began 3 weeks ago.  He stated that it started suddenly and now has been occurring since.  He develops pain and numbness that starts in the arch of his foot and moves up through the calf.  It is usually associated with a cold foot and occurs with exertion.  If he stops and rests, it takes approximately 15 minutes for symptoms to resolve.  This has been occurring in the right leg but he has started noticing some slight discomfort in the left leg also.  He is agreeable to exercise ABIs.  He has palpable pedal pulses bilaterally.  He has also noted increased leg cramps bilaterally at night and I encouraged him to stay well-hydrated. Patient reports no chest pain, PND, orthopnea, presyncope, syncope, edema, heart racing, or palpitations.    Current Cardiac Medications   Amlodipine 5 mg daily   Aspirin 81 mg daily   Carvedilol 12.5 mg twice daily  Repatha 140 mg every 14 days  Zetia 10 mg daily  Lisinopril 10 mg daily                     Assessment and Plan:     Plan    Patient Instructions   Medication Changes:  1. None     Recommendations:  1. Check blood pressure at least 1 hour  after medications. Call the clinic if your blood pressure is consistently greater than 130/80.   2. Stay well hydrated to help with leg cramps overnight   3. Check with primary care about increased shortness of breath, they may change inhaler dose or try albuterol. If not helping, let us know and we can do a stress test.     Follow-up:  1. Bilat Exercise ABIs   2. See Garima GONZALEZ for cardiology follow up at [facility]: 1 month to review RAUL, reassess leg cramps and right leg pain    Cardiology Scheduling~[phone]  Cardiology Clinic RN~[phone] (Melba Rodriguez, RN and Nicol GAVIRIA RN)          1. CAD    CABG (LIMA to LAD, SVG to OM, SVG to PDA, SVG to PL) 9/2019    Slightly increased SANTIAGO-patient declined stress test at this time    continue aspirin, ACE inhibitor, beta blocker, CCB      2. H/o ischemic cardiomyopathy    EF normalized 2/2020, LVEF 45-50% 8/2019 (prior to bypass)    No signs of heart failure    Continue beta blocker, ACE inhibitor    Check daily weights and call the clinic if your weight has increased more than 2 lbs in one day or 5 lbs in one week.     2000 mg Na diet       3. hypertension    Controlled    Moderate Right renal artery stenosis on CT scan 2018 and 2019 stable - no intervention recommended by vascular surgery. Med manage HTN vascular surgery does not feel it is significant enough to contribute to his HTN.    Continue carvedilol, lisinopril, amlodipine       4. Hyperlipidemia    Last LDL 47 3/2021     Continue Repatha, Zetia      5. Thoracic aortic aneurysm    Mildly dilated ascending aorta 3.8 cm echo 8/2019 and CTa 5/2019, normal on echo 2/2020    Follow with echo     Follows with vascular surgery          Thank you for allowing me to participate in this delightful patient's care.      This note was completed in part using Dragon voice recognition software. Although reviewed after completion, some word and grammatical errors may occur.    ANTONIO Gusman,  CNP           Data:   All laboratory data reviewed        Total time spent today was 44 mins, reviewing labs, testing, notes, documenting notes, and seeing patient.       Constitutional:  cooperative, alert and oriented, well developed, well nourished, in no acute distress     Skin:  warm and dry to the touch         Head:  normocephalic       Eyes:  pupils equal and round       ENT:  no pallor or cyanosis       Neck:  no stiffness       Respiratory:  clear to auscultation; normal symmetry        Cardiac: regular rate and rhythm; normal S1 and S2                 pulses full and equal     GI:  abdomen soft, nondistended     Extremities and Muscular Skeletal:  no edema, bilateral pedal pulses present       Neurological:  affect appropriate; no gross motor deficits       Psych:  Alert and Oriented x 3 , appropriate affact

## 2022-01-07 ENCOUNTER — HOSPITAL ENCOUNTER (OUTPATIENT)
Dept: ULTRASOUND IMAGING | Facility: CLINIC | Age: 77
Discharge: HOME | End: 2022-01-07
Attending: NURSE PRACTITIONER | Admitting: NURSE PRACTITIONER
Payer: COMMERCIAL

## 2022-01-07 DIAGNOSIS — I73.9 SEVERE ARTERIAL INSUFFICIENCY OF RIGHT LOWER EXTREMITY (H): ICD-10-CM

## 2022-01-07 DIAGNOSIS — M79.604 BILATERAL LEG PAIN: ICD-10-CM

## 2022-01-07 DIAGNOSIS — I73.9 PVD (PERIPHERAL VASCULAR DISEASE) (H): Primary | ICD-10-CM

## 2022-01-07 DIAGNOSIS — I73.9 CLAUDICATION (H): ICD-10-CM

## 2022-01-07 DIAGNOSIS — M79.605 BILATERAL LEG PAIN: ICD-10-CM

## 2022-01-07 PROCEDURE — 93924 LWR XTR VASC STDY BILAT: CPT

## 2022-01-07 NOTE — RESULT ENCOUNTER NOTE
Severe arterial insufficiency RLE, suspect femoral popliteal occlusion; normal RAUL LLE. Follow up with Garima Clemons NP on 1/27/22. Will alert Garima to abn results

## 2022-01-07 NOTE — RESULT ENCOUNTER NOTE
Can you please review this Pt's RAUL and let me know what next steps should be? Does he need additional testing (CT) prior to seeing you?  Leg pain/coldness is intermittent and present for about 15 mins before resolving. He is aware that he should go to ED if pain and coldness does not resolve spontaneously.

## 2022-01-10 DIAGNOSIS — I73.9 PAD (PERIPHERAL ARTERY DISEASE) (H): Primary | ICD-10-CM

## 2022-01-25 ENCOUNTER — HOSPITAL ENCOUNTER (OUTPATIENT)
Dept: ULTRASOUND IMAGING | Facility: CLINIC | Age: 77
End: 2022-01-25
Attending: SURGERY
Payer: COMMERCIAL

## 2022-01-25 ENCOUNTER — HOSPITAL ENCOUNTER (OUTPATIENT)
Dept: CT IMAGING | Facility: CLINIC | Age: 77
End: 2022-01-25
Attending: SURGERY
Payer: COMMERCIAL

## 2022-01-25 ENCOUNTER — OFFICE VISIT (OUTPATIENT)
Dept: VASCULAR SURGERY | Facility: CLINIC | Age: 77
End: 2022-01-25
Attending: NURSE PRACTITIONER
Payer: COMMERCIAL

## 2022-01-25 VITALS
SYSTOLIC BLOOD PRESSURE: 153 MMHG | WEIGHT: 150 LBS | BODY MASS INDEX: 22.73 KG/M2 | DIASTOLIC BLOOD PRESSURE: 91 MMHG | OXYGEN SATURATION: 96 % | HEIGHT: 68 IN | TEMPERATURE: 96 F | HEART RATE: 65 BPM

## 2022-01-25 DIAGNOSIS — I73.9 PVD (PERIPHERAL VASCULAR DISEASE) (H): ICD-10-CM

## 2022-01-25 DIAGNOSIS — I73.9 PAD (PERIPHERAL ARTERY DISEASE) (H): ICD-10-CM

## 2022-01-25 DIAGNOSIS — I73.9 SEVERE ARTERIAL INSUFFICIENCY OF RIGHT LOWER EXTREMITY (H): ICD-10-CM

## 2022-01-25 DIAGNOSIS — I70.223 ATHEROSCLEROSIS OF NATIVE ARTERIES OF EXTREMITIES WITH REST PAIN, BILATERAL LEGS (H): ICD-10-CM

## 2022-01-25 DIAGNOSIS — Z01.818 ENCOUNTER FOR OTHER PREPROCEDURAL EXAMINATION: ICD-10-CM

## 2022-01-25 LAB
CREAT BLD-MCNC: 1.1 MG/DL (ref 0.7–1.3)
GFR SERPL CREATININE-BSD FRML MDRD: >60 ML/MIN/1.73M2

## 2022-01-25 PROCEDURE — 250N000011 HC RX IP 250 OP 636: Performed by: SURGERY

## 2022-01-25 PROCEDURE — 75635 CT ANGIO ABDOMINAL ARTERIES: CPT

## 2022-01-25 PROCEDURE — 99215 OFFICE O/P EST HI 40 MIN: CPT | Performed by: SURGERY

## 2022-01-25 PROCEDURE — 93926 LOWER EXTREMITY STUDY: CPT | Mod: RT

## 2022-01-25 PROCEDURE — 82565 ASSAY OF CREATININE: CPT

## 2022-01-25 PROCEDURE — 250N000009 HC RX 250: Performed by: SURGERY

## 2022-01-25 RX ORDER — IOPAMIDOL 755 MG/ML
100 INJECTION, SOLUTION INTRAVASCULAR ONCE
Status: COMPLETED | OUTPATIENT
Start: 2022-01-25 | End: 2022-01-25

## 2022-01-25 RX ADMIN — IOPAMIDOL 100 ML: 755 INJECTION, SOLUTION INTRAVENOUS at 10:53

## 2022-01-25 RX ADMIN — SODIUM CHLORIDE 100 ML: 9 INJECTION, SOLUTION INTRAVENOUS at 10:53

## 2022-01-25 ASSESSMENT — MIFFLIN-ST. JEOR: SCORE: 1376.96

## 2022-01-25 ASSESSMENT — PAIN SCALES - GENERAL: PAINLEVEL: MILD PAIN (3)

## 2022-01-25 NOTE — PROGRESS NOTES
Patient is in clinic today to see MD Silvia Ruiz.      Patient is here for a follow up to discuss PAD.    The patient does not smoke.    Pt is currently taking ASA.    The patient states he/she are NOT wearing compression .    Swelling is observed in N/A.    Pt rates pain 3/10 located at right leg when walking.    Pts symptoms include leg cramps and pain with walking at a distance of 150 ft in Right extremity.    Patients condition is worse.    The provider has been notified that the patient is complaining of right leg pain.

## 2022-01-25 NOTE — PATIENT INSTRUCTIONS
Adolfo Rendon,    Your visit to [facility] for your procedure is coming soon and we look forward to seeing you! This friendly reminder and pre-procedure checklist will help to ensure your procedure goes smoothly and meets your expectations. At [facility], our goal is to provide you with a great patient experience and to deliver genuine, professional care to every patient.     Please complete all the steps in advance of your visit. If you have any questions about the items listed below, please give our office a call. We can be reached at [phone] or visit our website at [email or web address] for more information.     Procedure: Bilateral Leg Angiogram    Procedure Date :  2/11    Procedure Time :  10:30    Arrival Time: 9 am    COVID PCR Test: 2/8 at 11:30    2 week Post Procedure Appointment with Dr Jorge Ruiz and also ultrasound: 3/1      Admission Type: Outpatient    Surgeon: MD Aleksander Ruiz    Procedure Location: [facility]:  [address] [place] [address] (phone: [phone], Fax: [phone])    If you take blood thinners: SEE SPECIFIC INSTRUCTIONS BELOW    PLEASE DO NOT STOP YOUR ASPIRIN OR PLAVIX UNLESS SPECIFICALLY DIRECTED BY THE VASCULAR SURGEON TO STOP!  - In most cases Vascular providers want you to continue these. This is different from most NON vascular surgeries and may not be well known by your Primary Care Provider    [] A Pre-op physical within 30 days of the procedure is required. You will need to set up an appointment with your primary care provider.    Prepare for the peripheral angiography as follows:    Do not eat or drink anything after midnight before your procedure. If your provider says to take your normal medicines, swallow them with only small sips of water.    Tell your healthcare provider about all medicines you take and any allergies you may have.    Arrange for a  family member or friend to drive you home.    If you are on insulin, please take only 1/2 the dose the night before and HOLD the day of the procedure.     If you are on Metformin, please HOLD for 48 hours after the procedure.     Peripheral Angiography    Peripheral angiography is an outpatient procedure that makes a  map  of the vessels (arteries) in your lower body, legs, and arms, using X-ray and dye.This map can show where blood flow may be blocked.    An angiogram is commonly performed under sedation with the use of local anesthesia.      The procedure usually starts with a needle put into the femoral (groin) artery. From one treatment site, areas all over the body can be treated.  After access is established, catheters (thin tubes) and wires are threaded through the arterial system to a specific area of interest or throughout the entire body.  As a contrast agent (iodine dye) is injected, X-ray images are taken to let your provider view the flow of the dye and identify blockages. The surgeon can then choose the best mode of therapy for you - whether during or following the angiogram. This decision depends on your symptoms and the severity and characteristics of the blockages.  Two common therapies that can be provided during the angiogram are balloon angioplasty and stent placement.                     Angioplasty can be used to open arterial blockages. Guided by X-ray, your provider navigates through the blockage with a wire and introduces a special device equipped with an inflatable balloon. After positioning the balloon device across the blocked portion of the artery, the provider inflates the balloon to expand the artery and compress the blockage. The balloon is then deflated and removed while keeping the wire in place across the area that has been treated. Next, contrast dye is injected to assess the result. Treatment is considered a success if blood flow is improved and less than 30% of the blockage  remains. If the vessel is still considerably narrowed, placing a stent may be the next step.      Stents are used to prop open an artery at the site of a narrowing. Stents are generally placed after balloon angioplasty when there is residual narrowing or insufficient blood flow in a treated vessel. Stents are considered a permanent implant and cannot be used if you have a metal allergy. Stents that are used in the leg are constructed of a nickel-titanium alloy (Nitinol), a memory-shaped metal. This alloy has a predetermined size and shape at body temperature and expands to this size and shape after being introduced through a catheter. These stents resist kinking and are flexible so that damage from activities that involve your legs is minimized.    Your procedure may require other techniques to address the problem or plaque.     If surgery is felt to be a better option, your vascular provider will obtain any additional X-ray images needed to plan a surgical bypass of the blocked vessel/s and will then conclude the angiogram.      During the procedure  Here is what to expect:  You may get medicine through an IV (intravenous) line to relax you. You re given an injection to numb the insertion site. Then, a tiny skin cut (incision) is made near an artery in your groin.    Your provider inserts a thin tube (catheter) through the incision. He or she then threads the catheter into an artery while looking at a video monitor.    Contrast  dye  is injected into the catheter to confirm position. You may feel warmth or pressure in your legs and back. You lie still as X-rays are taken. The catheter is then taken out.  After the procedure  You ll be taken to a recovery area. A healthcare provider will apply pressure to the site for about 10 minutes. Your healthcare provider will tell you how long to lie down and keep the insertion site still. Your healthcare provider will discuss the results with you soon after the  procedure.      Angiogram Procedure Discharge Instructions:     1. If you received sedation for your procedure: Do not drive or operate heavy machinery for the rest of the day.  2. Avoid strenuous activity for 72 hours (3 days):                        - Do not lift greater than 10 pounds.                        - Excessive exercise                        - Straining                        - Return to your normal activities as you tolerate after the 3 days restriction  3. Avoid tub baths, Jacuzzis, hot tubs and pools for 72 hours (3 days) or until puncture site is will healed.  4. You may shower beginning tomorrow. Do not scrub puncture site(s) until well healed, pat dry.  5. You can expect to return to work 1-2 days after your procedure - depending on the nature of your profession.  6. It is normal to have some tenderness and minimal swelling at puncture site. A small area of discoloration may be present. Tenderness typically subsides in 24-48 hours. A small knot may also be present at puncture site for 6-8 weeks, this can be a normal part of the healing process.       After the angiogram If you:      1. Experience any bleeding or active swelling from puncture site: Lie down, firmly apply pressure to puncture site and CALL 9-1-1  2. Fever greater than 101 degrees Fahrenheit.  3. Redness, swelling, warmth to touch, or purulent (yellow/green/foul smelling) drainage from the puncture site.  4. Increasing pain, tenderness or swelling at puncture site OR of arm/leg near puncture site.  5. Feeling weak or faint.  6. Change in color, temperature, or sensation of arm/leg where puncture was made.  7. You can t feel your thrill (pulse at your dialysis fistula site) or it feels weak (If you had fistulogram done).     Call us with any other questions or concerns after your procedure: [phone]      All invasive procedures can have complications. While the risk of an angiogram is low it is not zero. The most common  complications are related to the arterial access site.       Risks/ Complications     Bruising is Common  You will likely have bruising (ecchymosis) where the artery was entered.      Pain and Bleeding  Less commonly, patients experience pain and bleeding that may include blood collecting under the skin (hematoma).      Blockage and Leakage   In rare cases, the access artery can become blocked. Infrequently, patients experience persistent leakage of blood where the artery was entered, which can result in the formation of a pseudoaneurysm--a blood-filled sac--that may require further treatment.    Other complications related to an angiogram include:   Allergic reaction to the iodine contrast dye, which can lead to the development of kidney failure.  Very rarely during balloon angioplasty and/or stent placement, part of the arterial blockage can break off (embolism) and travel to more distant arteries. This can worsen blood flow.        Notify our office right away, if you have any changes in your health status, or if you develop a cold, flu, diarrhea, infection, fever or sore throat before your scheduled surgery date. We can be reached at [phone] Monday-Friday 8 am-4:30 pm if you have any questions.   Thank you for choosing [facility]

## 2022-01-25 NOTE — PROGRESS NOTES
VASCULAR SURGERY PROGRESS NOTE   VASCULAR SURGEON: Aleksander Ruiz MD, RPVI     LOCATION:  [facility]     Adolfo Rendon   Medical Record #:  [id number]  YOB: 1945  Age:  76 year old     Date of Service: 1/25/2022    PRIMARY CARE PROVIDER: Danielle Mercado      Reason for visit: Concern for right lower extremities arterial disease    IMPRESSION: 76-year-old male who is back to vascular surgery clinic for evaluation of lites limb claudication in the right lower extremity.  Patient states that he can walk for about 150 feet before stopping.  Patient has any pain at rest or nonhealing wounds.  ABIs 0.37 and revealed severe peripheral arterial disease.  CTA today showed aneurysmal degeneration of the infrarenal aorta, 4.6 cm.  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  The outflow is via the surgical artery given down the study is suboptimal.  Vascular surgery standpoint patient is not a candidate for intervention for infrarenal abdominal aneurysm due to size.  In regards of right lower extremity I explained to the patient that unfortunately he is not a candidate for endovascular intervention due to etiology of his symptoms which is popliteal artery aneurysm, thrombosed.  At this point I would recommend a bypass operation to improve his symptoms.    RECOMMENDATION/RISKS: We discussed his disease in length.  Patient agreed to undergo right extremity angiogram, diagnostic in order to better evaluate the runoff.  At same time we will do left lower extremity angiogram, diagnostic.  In the same time we will obtain vein mapping for better plan.  Patient does have history of CABG x4 with right great saphenous vein harvest.  Patient also will need CT scan of the abdomen pelvis in 1 year for AAA surveillance.  Patient would like to think about bypass operation.    HPI:  Adolfo Rendon is a 76 year old male who was seen today with a complaint of right  lower extremity claudication, lifestyle imaging.  Patient denies any pain at rest or nonhealing wound.  Patient states that he can walk only 150 feet before stopping.  REVIEW OF SYSTEMS:    A 12 point ROS was reviewed and is negative except for right lower extremity pain.    PHH:    Past Medical History:   Diagnosis Date     Coronary artery disease      Depressive disorder, not elsewhere classified      Hypertrophy (benign) of prostate      Impotence of organic origin      Other and unspecified hyperlipidemia      Other anxiety states      Tobacco use disorder           Past Surgical History:   Procedure Laterality Date     APPENDECTOMY  1966     BYPASS GRAFT ARTERY CORONARY N/A 9/3/2019    Procedure: CORONARY ARTERY BYPASS GRAFT X 4 - LIMA TO LAD, SV TO PL, OM, PDA ; ON PUMP WITH TERRENCE; ENDOVEIN HARVEST RIGHT LEG;  Surgeon: Lai Mchugh MD;  Location:  OR     COLONOSCOPY  3/1/2005     COLONOSCOPY N/A 5/9/2019    Procedure: COLONOSCOPY;  Surgeon: Camilo Berad MD;  Location: [facility]     CV LEFT HEART CATH N/A 8/8/2019    Procedure: Left Heart Cath--also measure LVEDP;  Surgeon: Krystle Barrera MD;  Location:  HEART CARDIAC CATH LAB     HERNIORRHAPHY INGUINAL  7/14/2011    Procedure:HERNIORRHAPHY INGUINAL; Open Repair Right Inguinal Hernia Repair        HYDROCELECTOMY SCROTAL  01/2004    left hydrocele repair     SUPRAPUBIC PROSTATECTOMY         ALLERGIES:  Claritin, Hctz [hydrochlorothiazide], Metoprolol, Pravastatin, Remeron [mirtazapine], and Wellbutrin [bupropion hcl]    MEDS:    Current Outpatient Medications:      amLODIPine (NORVASC) 5 MG tablet, Take 1 tablet (5 mg) by mouth daily, Disp: 90 tablet, Rfl: 3     aspirin 81 MG EC tablet, Take 81 mg by mouth daily, Disp: , Rfl:      carvedilol (COREG) 12.5 MG tablet, Take 1 tablet (12.5 mg) by mouth 2 times daily (with meals), Disp: 180 tablet, Rfl: 3     co-enzyme Q-10 100 MG CAPS capsule, Take 100 mg by mouth daily, Disp: , Rfl:       "evolocumab (REPATHA) 140 MG/ML prefilled autoinjector, Inject 1 mL (140 mg) Subcutaneous every 14 days No further refills until seen by cardiology, Disp: 6 mL, Rfl: 0     ezetimibe (ZETIA) 10 MG tablet, Take 1 tablet (10 mg) by mouth daily, Disp: 90 tablet, Rfl: 3     lisinopril (ZESTRIL) 10 MG tablet, Take 1 tablet (10 mg) by mouth daily, Disp: 90 tablet, Rfl: 3     nicotine polacrilex (NICORETTE) 4 MG gum, Place 4 mg inside cheek every hour as needed for smoking cessation, Disp: , Rfl:      PARoxetine (PAXIL) 30 MG tablet, Take 2 tablets (60 mg) by mouth daily, Disp: 180 tablet, Rfl: 1     SENEXON-S 8.6-50 MG tablet, TAKE ONE TABLET BY MOUTH TWICE A DAY AS NEEDED FOR CONSTIPATION, Disp: 180 tablet, Rfl: 3     STRIVERDI RESPIMAT 2.5 MCG/ACT AERS, USE 2 INHALATIONS DAILY, Disp: 12 g, Rfl: 3     Vitamin D, Cholecalciferol, 25 MCG (1000 UT) TABS, Take 1,000 Units by mouth daily , Disp: , Rfl:   No current facility-administered medications for this visit.    SOCIAL HABITS:    History   Smoking Status     Former Smoker     Packs/day: 1.00     Types: Cigarettes     Quit date: 1/14/2017   Smokeless Tobacco     Former User     Quit date: 9/1/2017     Social History    Substance and Sexual Activity      Alcohol use: No        Alcohol/week: 0.0 standard drinks        Comment: quit 1989      History   Drug Use No       FAMILY HISTORY:    Family History   Problem Relation Age of Onset     Cerebrovascular Disease Mother      Hypertension Mother      Prostate Cancer Brother      Dementia Brother      Arthritis Sister         rhematoid     Cancer Brother         Lung cancer, lymphoma     Other - See Comments Brother         HIV     Cancer Sister         skin cancer on nose     Pacemaker Sister      Aneurysm Sister         heart       PE:  BP (!) 153/91 (BP Location: Left arm, Patient Position: Sitting, Cuff Size: Adult Regular)   Pulse 65   Temp (!) 96  F (35.6  C) (Tympanic)   Ht 1.715 m (5' 7.5\")   Wt 68 kg (150 lb)   SpO2 " 96%   BMI 23.15 kg/m    Wt Readings from Last 1 Encounters:   01/25/22 68 kg (150 lb)     Body mass index is 23.15 kg/m .    EXAM:  GENERAL: This is a well-developed 76 year old male who appears his stated age  EYES: Grossly normal.  MOUTH: Buccal mucosa normal   CARDIAC: Normal   CHEST/LUNG: Clear to auscultation bilaterally  GASTROINTESINAL soft nontender nondistended  MUSCULOSKELETAL: Grossly normal and both lower extremities are intact.  HEME/LYMPH: No lymphedema  NEUROLOGIC: Focally intact, Alert and oriented x 3.   PSYCH: appropriate affect  INTEGUMENT: No open lesions or ulcers  Pulse Exam: Pulses not appreciated on the right.  Palpable pulse in the left.        DIAGNOSTIC STUDIES:     Images:  US RAUL Doppler with Exercise Bilateral    Result Date: 1/7/2022  US RAUL DOPPLER WITH EXERCISE BILATERAL   1/7/2022 11:41 AM HISTORY: Bilateral leg pain; Claudication (H). COMPARISON: None. FINDINGS: Right RAUL: PT: 60, index of 0.36. DP: Unable to obtain pressure or waveform. Left RAUL: PT: 160, index of 0.97. DP: 154, index of 0.93 Waveforms: Right common femoral arterial waveform is triphasic. Right popliteal and posterior tibial waveforms are monophasic. Unable to obtain dorsalis pedis waveform. The left common femoral, popliteal, and posterior tibial arterial waveforms are triphasic. Dorsalis pedis waveform is monophasic/biphasic. Exercise: The patient was exercised on a treadmill at 1.5 mph at a 10% incline for 5 minutes total. Right calf pain noted at 2 minutes, moving to the thigh at 3 minutes. Right exercise RAUL: 0.21. Left exercise RAUL: 0.98     IMPRESSION: 1. Severe arterial insufficiency in the right lower extremity. Unable to obtain blood flow in the anterior tibial arterial distribution at the dorsalis pedis artery. Also, suspect femoral popliteal occlusion. 2. Normal RAUL examination of the left lower extremity. Resting and postexercise RAUL values are normal. There may be localized disease to the left  anterior tibial/dorsalis pedis artery. RAUL CRITERIA: >1.4 NC 0.95-1.4 Normal 0.90 - 0.94 Mild 0.5 - 0.89 Moderate 0.2 - 0.49 Severe <0.2 Critical MELINA RAYMUNDO MD   SYSTEM ID:  [id number]    CTA Abdomen Pelvis Bilat Leg Runoff w Contr    Result Date: 1/25/2022  CTA ABDOMEN/PELVIS BILAT LEG RUNOFF W CONTRAST January 25, 2022 11:37 AM HISTORY: Claudication or leg ischemia. PAD (peripheral artery disease) (H). Right leg pain. TECHNIQUE: CTA abdomen and pelvis with runoff to the toes with 100 mL Isovue-370 IV. Radiation dose for this scan was reduced using automated exposure control, adjustment of the mA and/or kV according to patient size, or iterative reconstruction technique. 3-D images were created at an independent workstation under concurrent supervision at the request of the ordering provider. COMPARISON: RAUL: 1/7/2022. FINDINGS: Lung bases: Emphysematous changes are noted in the lung bases. Vasculature: The abdominal aorta is patent with aneurysmal dilatation of the juxtarenal abdominal aorta measuring 3.0 x 2.6 cm (AP by transverse). Aneurysmal dilatation of the infrarenal abdominal aorta measuring 4.6 x 4.6 cm. Aneurysmal dilatation of the right common iliac artery measuring 2.4 cm. Ectasia of the left common iliac artery measuring 1.8 cm. Common iliac, external iliac and internal iliac arteries are patent. The celiac access, splenic artery, and hepatic arteries are patent. Superior mesenteric artery is patent. Inferior mesenteric artery are patent. Right lower extremity: Right common femoral and profunda femoral arteries are patent. Mild multifocal stenosis throughout the superficial femoral artery. There is occlusion of the above-knee popliteal artery with a thrombosed bilobed popliteal artery aneurysm. The proximal aneurysm measures 1.9 x 1.9 cm. The distal aneurysm measures 1.8 x 1.7 cm. Short segment occlusion of the peroneal artery in the mid calf. Occlusion of the posterior tibial artery. The proximal  anterior tibial artery is patent and occludes at the level of the mid calf. Left lower extremity: Left common femoral and profunda femoral arteries are patent. Mild multifocal stenosis throughout the superficial femoral artery. Ectasia of the left popliteal artery measuring 1.1 cm. Popliteal artery is patent. Single vessel runoff via the peroneal artery. Abdomen: Evaluation of solid organ parenchyma is limited secondary to contrast bolus timing. There is reflux of contrast into the hepatic veins, suggesting right heart failure. Bilateral simple renal cysts are noted, no further follow-up needed. The spleen, adrenal glands, liver, gallbladder and pancreas show no focal abnormality. No intrahepatic or extrahepatic biliary dilatation. No intraperitoneal free air or free fluid. PELVIS: No dilated loops of small or large bowel. Diverticulosis without evidence of diverticulitis. Bladder is normal. No abdominal or pelvic lymphadenopathy. Bones: No suspicious bony lesions.     IMPRESSION: 1. Bilobed aneurysmal dilatation of the abdominal aorta. At the level of the renal arteries the aorta measures 3.0 x 2.6 cm. The infrarenal abdominal aortic aneurysms measure 4.6 x 4.6 cm. 2. Aneurysmal dilatation of the right common iliac artery measuring 2.4 cm. Ectasia of the left common iliac artery measuring 1.8 cm. 3. Right lower extremity: Occluded bilobed aneurysmal dilatation of the popliteal artery measuring up to 1.9 x 1.9 cm. Single vessel runoff via the peroneal artery which has a short segment occlusion of the mid graft. 4. Left lower extremity: Ectasia of the left popliteal artery measuring up to 1.1 cm. Popliteal artery is patent. Single vessel runoff via the peroneal artery. 5. Reflux of contrast into the hepatic veins, suggesting right heart failure. 6. Diverticulosis without evidence of diverticulitis.      I personally reviewed the images and my interpretation is occluded right popliteal artery aneurysm    LABS:       Sodium   Date Value Ref Range Status   10/25/2021 137 133 - 144 mmol/L Final   03/23/2021 136 133 - 144 mmol/L Final   02/06/2020 142 133 - 144 mmol/L Final   09/08/2019 138 133 - 144 mmol/L Final     Urea Nitrogen   Date Value Ref Range Status   10/25/2021 19 7 - 30 mg/dL Final   03/23/2021 19 7 - 30 mg/dL Final   02/06/2020 22 7 - 30 mg/dL Final   09/08/2019 16 7 - 30 mg/dL Final     Hemoglobin   Date Value Ref Range Status   03/23/2021 13.6 13.3 - 17.7 g/dL Final   09/16/2019 9.3 (L) 13.3 - 17.7 g/dL Final   09/08/2019 10.4 (L) 13.3 - 17.7 g/dL Final     Platelet Count   Date Value Ref Range Status   03/23/2021 177 150 - 450 10e9/L Final   09/08/2019 171 150 - 450 10e9/L Final   09/06/2019 105 (L) 150 - 450 10e9/L Final     INR   Date Value Ref Range Status   09/03/2019 1.30 (H) 0.86 - 1.14 Final   09/03/2019 1.47 (H) 0.86 - 1.14 Final   08/08/2019 0.96 0.86 - 1.14 Final       45 minutes spent on the day of encounter doing chart review, history and exam, documentation, and further activities as noted.         Aleksander Ruiz MD, MD, [facility]  VASCULAR SURGERY

## 2022-01-25 NOTE — NURSING NOTE
"Initial BP (!) 153/91 (BP Location: Left arm, Patient Position: Sitting, Cuff Size: Adult Regular)   Pulse 65   Temp (!) 96  F (35.6  C) (Tympanic)   Ht 1.715 m (5' 7.5\")   Wt 68 kg (150 lb)   SpO2 96%   BMI 23.15 kg/m   Estimated body mass index is 23.15 kg/m  as calculated from the following:    Height as of this encounter: 1.715 m (5' 7.5\").    Weight as of this encounter: 68 kg (150 lb). .    Lalitha Jasso LPN on 1/25/2022 at 12:27 PM\    "

## 2022-01-27 ENCOUNTER — OFFICE VISIT (OUTPATIENT)
Dept: CARDIOLOGY | Facility: CLINIC | Age: 77
End: 2022-01-27
Attending: NURSE PRACTITIONER
Payer: COMMERCIAL

## 2022-01-27 VITALS
OXYGEN SATURATION: 96 % | SYSTOLIC BLOOD PRESSURE: 111 MMHG | BODY MASS INDEX: 22.59 KG/M2 | DIASTOLIC BLOOD PRESSURE: 74 MMHG | HEART RATE: 66 BPM | WEIGHT: 146.4 LBS

## 2022-01-27 DIAGNOSIS — I25.708 CORONARY ARTERY DISEASE OF BYPASS GRAFT OF NATIVE HEART WITH STABLE ANGINA PECTORIS (H): ICD-10-CM

## 2022-01-27 DIAGNOSIS — Z95.1 S/P CABG X 4: ICD-10-CM

## 2022-01-27 DIAGNOSIS — M79.605 BILATERAL LEG PAIN: ICD-10-CM

## 2022-01-27 DIAGNOSIS — Z78.9 STATIN INTOLERANCE: ICD-10-CM

## 2022-01-27 DIAGNOSIS — I73.9 CLAUDICATION (H): ICD-10-CM

## 2022-01-27 DIAGNOSIS — E78.5 HYPERLIPIDEMIA LDL GOAL <70: ICD-10-CM

## 2022-01-27 DIAGNOSIS — J44.9 CHRONIC OBSTRUCTIVE PULMONARY DISEASE, UNSPECIFIED COPD TYPE (H): Primary | ICD-10-CM

## 2022-01-27 DIAGNOSIS — M79.604 BILATERAL LEG PAIN: ICD-10-CM

## 2022-01-27 DIAGNOSIS — I10 BENIGN ESSENTIAL HYPERTENSION: ICD-10-CM

## 2022-01-27 DIAGNOSIS — I70.209 OCCLUSION OF ARTERY OF LOWER EXTREMITY (H): Primary | ICD-10-CM

## 2022-01-27 DIAGNOSIS — Z86.79 H/O CARDIOMYOPATHY: ICD-10-CM

## 2022-01-27 PROCEDURE — 99214 OFFICE O/P EST MOD 30 MIN: CPT | Performed by: NURSE PRACTITIONER

## 2022-01-27 RX ORDER — OLODATEROL RESPIMAT INHALATION SPRAY 2.5 UG/1
2 SPRAY, METERED RESPIRATORY (INHALATION) DAILY
Qty: 12 G | Refills: 0 | Status: SHIPPED | OUTPATIENT
Start: 2022-01-27 | End: 2022-04-12

## 2022-01-27 NOTE — NURSING NOTE
"Initial /74 (BP Location: Left arm, Patient Position: Sitting, Cuff Size: Adult Regular)   Pulse 66   Wt 66.4 kg (146 lb 6.4 oz)   SpO2 96%   BMI 22.59 kg/m   Estimated body mass index is 22.59 kg/m  as calculated from the following:    Height as of 1/25/22: 1.715 m (5' 7.5\").    Weight as of this encounter: 66.4 kg (146 lb 6.4 oz).  Patient is accompanied to visit by: here alone  Assistive equipment used in clinic today: none    Lalitha CABRERA      "

## 2022-01-27 NOTE — LETTER
1/27/2022    Danielle Mercado, APRN CNP  [address] [address]    RE: Adolfo Rendon       Dear Colleague,     I had the pleasure of seeing Adolfo Rendon in the [facility].  Cardiology Clinic Progress Note  Aodlfo Rendon MRN# [id number]   YOB: 1945 Age: 73 year old     Primary Cardiologist:   Dr. Espino          History of Presenting Illness:    Adolfo Rendon is a pleasant 73 year old patient with a past cardiac history significant for   1. CAD with CABG (LIMA to LAD, SVG to OM, SVG to PDA, SVG to PL) 9/2019  2. H/o ischemic cardiomyopathy with normalized EF   3. hypertension,   4. Hyperlipidemia  5. mild ascending aortic dilatation  6. Renal artery stenosis   7. PAD with RLE arterial occlusion  Past medical history significant for AAA, ongoing tobacco abuse and COPD.    He has a history of multiple medication intolerances including amlodipine, hydrochlorothiazide, high-intensity statin, metoprolol, and pravastatin. He has discontinued all antihypertensive medications in the past.   He continued taking his aspirin daily but no other cardiac medications. Coronary angiogram in 2015 showed chronically occluded RCA with left-to-right collaterals along with 50% pLAD stenosis.    In August 2019 he complained of shortness of breath, fatigue, and chest heaviness.  He was noted to have severe multivessel disease and eventually underwent CABG x4 in September 2019.  EF prior to surgery was 45 to 50% with mildly dilated ascending aorta.  Lipids were elevated and he was started on Repatha and Zetia.  Postoperatively he continued with chronic dyspnea on exertion.    Echocardiogram 2/6/2020 showing normalized EF 55 to 60%, wall motion abnormalities as described, RV normal size and function, moderate aortic sclerosis without stenosis.     Patient was seen by me in December 2021 for routine follow-up.  He felt that his chronic dyspnea on exertion had slightly increased.   He prefers to check with PCP regarding lung disease prior to proceeding with stress test.  He denied any chest discomfort.  His main concern was right foot and leg pain that started abruptly 3 weeks prior, associated with cold extremity when exerting himself.  He also noted increased leg cramps bilaterally at night we discussed staying well-hydrated.    He underwent exercise ABIs 1/2022 which showed severe arterial insufficiency of the right lower extremity and normal left lower extremity. 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  He felt the patient would benefit from bypass planning to do diagnostic angiogram to look for runoff.  The patient would consider surgical option.  Patient also noted to have infrarenal AAA that was too small for repair and vascular surgery will monitor this annually for him.    Pt presents today for 1 month follow-up.  Today he notes that he is quite hesitant about proceeding with popliteal bypass.  He plans to monitor symptoms and if they are worsening will consider proceeding with surgery.  He plans to discuss this again with vascular surgery at follow-up.  He continues with dyspnea on exertion which has been stable since his last visit but overall again, has noted slight increase.  He is agreeable to stress testing at this time.  He has not followed up with PCP yet and actually states that his inhaler ran out and has not been taking it since he did not notice any improvement when he was taking it.  Blood pressure is well controlled. Patient reports no chest pain, PND, orthopnea, presyncope, syncope, edema, heart racing, or palpitations.    Current Cardiac Medications   Amlodipine 5 mg daily   Aspirin 81 mg daily   Carvedilol 12.5 mg twice daily  Repatha 140 mg every 14 days  Zetia 10 mg daily  Lisinopril 10 mg daily                     Assessment and Plan:      Plan    Patient Instructions   Medication Changes:  1. No changes     Recommendations:  1. Check blood pressure at least 1 hour after medications. Call the clinic if your blood pressure is consistently greater than 130/80.      Follow-up:  1. lexiscan nuclear stress test - we will call with results   2. See Dr. Espino for cardiology follow up at [facility]: Jan 2022. Call in Sept.  to schedule.     Cardiology Scheduling~[phone]  Cardiology Clinic RN~[phone] (Melba Rodriguez, RN and Nicol GAVIRIA RN)            1. CAD    CABG (LIMA to LAD, SVG to OM, SVG to PDA, SVG to PL) 9/2019    Slightly increased SANTIAGO    continue aspirin, ACE inhibitor, beta blocker, CCB      2. H/o ischemic cardiomyopathy    EF normalized 2/2020, LVEF 45-50% 8/2019 (prior to bypass)    No signs of heart failure    Continue beta blocker, ACE inhibitor    Check daily weights and call the clinic if your weight has increased more than 2 lbs in one day or 5 lbs in one week.     2000 mg Na diet       3. hypertension    Controlled    Moderate Right renal artery stenosis on CT scan 2018 and 2019 stable - no intervention recommended by vascular surgery. Med manage HTN. vascular surgery does not feel it is significant enough to contribute to his HTN.    Continue carvedilol, lisinopril, amlodipine       4. Hyperlipidemia    Last LDL 47 3/2021     Continue Repatha Zetia      5. Thoracic aortic aneurysm    Mildly dilated ascending aorta 3.8 cm echo 8/2019 and CTa 5/2019, normal on echo 2/2020    Follow with echo     Follows with vascular surgery       6.  Vascular disease    Likely occluded right popliteal artery    Infrarenal AAA too small for repair    Following with vascular surgery    Possibly needing popliteal bypass    They will monitor AAA annually    Continue Repatha, aspirin       Thank you for allowing me to participate in this delightful patient's care.      This note was completed in part using Dragon voice recognition software. Although  reviewed after completion, some word and grammatical errors may occur.    ANTONIO Gusman, CNP           Data:   All laboratory data reviewed        Total time spent today was 38 mins, reviewing labs, testing, notes, documenting notes, and seeing patient.       Constitutional:  cooperative, alert and oriented, well developed, well nourished, in no acute distress     Skin:  warm and dry to the touch         Head:  normocephalic       Eyes:  pupils equal and round       ENT:  no pallor or cyanosis       Neck:  no stiffness       Respiratory:  clear to auscultation; normal symmetry        Cardiac: regular rate and rhythm; normal S1 and S2                 pulses full and equal     GI:  abdomen soft, nondistended     Extremities and Muscular Skeletal:  no edema, bilateral pedal pulses present       Neurological:  affect appropriate; no gross motor deficits       Psych:  Alert and Oriented x 3 , appropriate affact    Thank you for allowing me to participate in the care of your patient.      Sincerely,     ANTONIO Gusman CNP     [facility]  cc:   ANTONIO Ruby CNP  [address] [facility] [address]  [place] [address]

## 2022-01-27 NOTE — PROGRESS NOTES
Cardiology Clinic Progress Note  Adolfo Rendon MRN# [id number]   YOB: 1945 Age: 73 year old     Primary Cardiologist:   Dr. Espino          History of Presenting Illness:    Adolfo Rendon is a pleasant 73 year old patient with a past cardiac history significant for   1. CAD with CABG (LIMA to LAD, SVG to OM, SVG to PDA, SVG to PL) 9/2019  2. H/o ischemic cardiomyopathy with normalized EF   3. hypertension,   4. Hyperlipidemia  5. mild ascending aortic dilatation  6. Renal artery stenosis   7. PAD with RLE arterial occlusion  Past medical history significant for AAA, ongoing tobacco abuse and COPD.    He has a history of multiple medication intolerances including amlodipine, hydrochlorothiazide, high-intensity statin, metoprolol, and pravastatin. He has discontinued all antihypertensive medications in the past.   He continued taking his aspirin daily but no other cardiac medications. Coronary angiogram in 2015 showed chronically occluded RCA with left-to-right collaterals along with 50% pLAD stenosis.    In August 2019 he complained of shortness of breath, fatigue, and chest heaviness.  He was noted to have severe multivessel disease and eventually underwent CABG x4 in September 2019.  EF prior to surgery was 45 to 50% with mildly dilated ascending aorta.  Lipids were elevated and he was started on Repatha and Zetia.  Postoperatively he continued with chronic dyspnea on exertion.    Echocardiogram 2/6/2020 showing normalized EF 55 to 60%, wall motion abnormalities as described, RV normal size and function, moderate aortic sclerosis without stenosis.     Patient was seen by me in December 2021 for routine follow-up.  He felt that his chronic dyspnea on exertion had slightly increased.  He prefers to check with PCP regarding lung disease prior to proceeding with stress test.  He denied any chest discomfort.  His main concern was right foot and leg pain that started abruptly 3 weeks prior, associated  with cold extremity when exerting himself.  He also noted increased leg cramps bilaterally at night we discussed staying well-hydrated.    He underwent exercise ABIs 1/2022 which showed severe arterial insufficiency of the right lower extremity and normal left lower extremity. 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  He felt the patient would benefit from bypass planning to do diagnostic angiogram to look for runoff.  The patient would consider surgical option.  Patient also noted to have infrarenal AAA that was too small for repair and vascular surgery will monitor this annually for him.    Pt presents today for 1 month follow-up.  Today he notes that he is quite hesitant about proceeding with popliteal bypass.  He plans to monitor symptoms and if they are worsening will consider proceeding with surgery.  He plans to discuss this again with vascular surgery at follow-up.  He continues with dyspnea on exertion which has been stable since his last visit but overall again, has noted slight increase.  He is agreeable to stress testing at this time.  He has not followed up with PCP yet and actually states that his inhaler ran out and has not been taking it since he did not notice any improvement when he was taking it.  Blood pressure is well controlled. Patient reports no chest pain, PND, orthopnea, presyncope, syncope, edema, heart racing, or palpitations.    Current Cardiac Medications   Amlodipine 5 mg daily   Aspirin 81 mg daily   Carvedilol 12.5 mg twice daily  Repatha 140 mg every 14 days  Zetia 10 mg daily  Lisinopril 10 mg daily                     Assessment and Plan:     Plan    Patient Instructions   Medication Changes:  1. No changes     Recommendations:  1. Check blood pressure at least 1 hour after medications. Call the clinic if your blood pressure is consistently greater  than 130/80.      Follow-up:  1. lexiscan nuclear stress test - we will call with results   2. See Dr. Espino for cardiology follow up at [facility]: Jan 2022. Call in Sept.  to schedule.     Cardiology Scheduling~[phone]  Cardiology Clinic RN~[phone] (Melba Rodriguez, RN and Nicol GAVIRIA RN)            1. CAD    CABG (LIMA to LAD, SVG to OM, SVG to PDA, SVG to PL) 9/2019    Slightly increased SANTIAGO    continue aspirin, ACE inhibitor, beta blocker, CCB      2. H/o ischemic cardiomyopathy    EF normalized 2/2020, LVEF 45-50% 8/2019 (prior to bypass)    No signs of heart failure    Continue beta blocker, ACE inhibitor    Check daily weights and call the clinic if your weight has increased more than 2 lbs in one day or 5 lbs in one week.     2000 mg Na diet       3. hypertension    Controlled    Moderate Right renal artery stenosis on CT scan 2018 and 2019 stable - no intervention recommended by vascular surgery. Med manage HTN. vascular surgery does not feel it is significant enough to contribute to his HTN.    Continue carvedilol, lisinopril, amlodipine       4. Hyperlipidemia    Last LDL 47 3/2021     Continue Repatha, Zetia      5. Thoracic aortic aneurysm    Mildly dilated ascending aorta 3.8 cm echo 8/2019 and CTa 5/2019, normal on echo 2/2020    Follow with echo     Follows with vascular surgery       6.  Vascular disease    Likely occluded right popliteal artery    Infrarenal AAA too small for repair    Following with vascular surgery    Possibly needing popliteal bypass    They will monitor AAA annually    Continue Repatha, aspirin       Thank you for allowing me to participate in this delightful patient's care.      This note was completed in part using Dragon voice recognition software. Although reviewed after completion, some word and grammatical errors may occur.    Garima Thornton, APRN, CNP           Data:   All laboratory data reviewed        Total time spent today was 38 mins, reviewing  labs, testing, notes, documenting notes, and seeing patient.       Constitutional:  cooperative, alert and oriented, well developed, well nourished, in no acute distress     Skin:  warm and dry to the touch         Head:  normocephalic       Eyes:  pupils equal and round       ENT:  no pallor or cyanosis       Neck:  no stiffness       Respiratory:  clear to auscultation; normal symmetry        Cardiac: regular rate and rhythm; normal S1 and S2                 pulses full and equal     GI:  abdomen soft, nondistended     Extremities and Muscular Skeletal:  no edema, bilateral pedal pulses present       Neurological:  affect appropriate; no gross motor deficits       Psych:  Alert and Oriented x 3 , appropriate affact                 2 seconds or less

## 2022-01-27 NOTE — PATIENT INSTRUCTIONS
Medication Changes:  1. No changes     Recommendations:  1. Check blood pressure at least 1 hour after medications. Call the clinic if your blood pressure is consistently greater than 130/80.      Follow-up:  1. lexiscan nuclear stress test - we will call with results   2. See Dr. Espino for cardiology follow up at [facility]: Jan 2022. Call in Sept.  to schedule.     Cardiology Scheduling~[phone]  Cardiology Clinic RN~[phone] (Melba Rodriguez, RN and Nicol GAVIRIA RN)

## 2022-01-28 NOTE — TELEPHONE ENCOUNTER
Pending Prescriptions:                       Disp   Refills    Olodaterol HCl (STRIVERDI RESPIMAT) 2.5 M*12 g   3          Prescription approved per [facility] Refill Protocol.  Tiff Paniagua RN

## 2022-01-31 DIAGNOSIS — Z11.59 ENCOUNTER FOR SCREENING FOR OTHER VIRAL DISEASES: Primary | ICD-10-CM

## 2022-02-08 ENCOUNTER — LAB (OUTPATIENT)
Dept: LAB | Facility: CLINIC | Age: 77
End: 2022-02-08
Payer: COMMERCIAL

## 2022-02-08 DIAGNOSIS — Z11.59 ENCOUNTER FOR SCREENING FOR OTHER VIRAL DISEASES: ICD-10-CM

## 2022-02-08 PROCEDURE — U0005 INFEC AGEN DETEC AMPLI PROBE: HCPCS

## 2022-02-08 PROCEDURE — U0003 INFECTIOUS AGENT DETECTION BY NUCLEIC ACID (DNA OR RNA); SEVERE ACUTE RESPIRATORY SYNDROME CORONAVIRUS 2 (SARS-COV-2) (CORONAVIRUS DISEASE [COVID-19]), AMPLIFIED PROBE TECHNIQUE, MAKING USE OF HIGH THROUGHPUT TECHNOLOGIES AS DESCRIBED BY CMS-2020-01-R: HCPCS

## 2022-02-09 ENCOUNTER — TELEPHONE (OUTPATIENT)
Dept: VASCULAR SURGERY | Facility: CLINIC | Age: 77
End: 2022-02-09
Payer: COMMERCIAL

## 2022-02-09 DIAGNOSIS — I71.40 AAA (ABDOMINAL AORTIC ANEURYSM) WITHOUT RUPTURE (H): Primary | ICD-10-CM

## 2022-02-09 LAB — SARS-COV-2 RNA RESP QL NAA+PROBE: NEGATIVE

## 2022-02-09 NOTE — TELEPHONE ENCOUNTER
"Patient requesting a call back from Dr. Ruiz's nurse. He states that \"the issue is resolving itself, and it seems to be getting better\", so he does not want to pursue bypass surgery. Patient is scheduled for angio and is wondering if he still needs to go forward with that.   Call home phone: [phone]  "

## 2022-02-09 NOTE — TELEPHONE ENCOUNTER
Pt has decided that he does not want surgery. We talked about not proceeding with the angio at this time since he will not do surgery right now. Pt does not have wounds, and no rest pain. We talked about walking program, things to watch for and if he changes his mind or things progress to call us anytime.     Otherwise plan to repeat studies in 1 year when he comes back for his AAA. Will mail out to pt.

## 2022-02-16 ENCOUNTER — HOSPITAL ENCOUNTER (OUTPATIENT)
Dept: CARDIOLOGY | Facility: CLINIC | Age: 77
End: 2022-02-16
Attending: NURSE PRACTITIONER
Payer: COMMERCIAL

## 2022-02-16 ENCOUNTER — HOSPITAL ENCOUNTER (OUTPATIENT)
Dept: NUCLEAR MEDICINE | Facility: CLINIC | Age: 77
Setting detail: NUCLEAR MEDICINE
End: 2022-02-16
Attending: NURSE PRACTITIONER
Payer: COMMERCIAL

## 2022-02-16 VITALS — BODY MASS INDEX: 22.91 KG/M2 | HEIGHT: 67 IN | WEIGHT: 146 LBS

## 2022-02-16 DIAGNOSIS — I25.708 CORONARY ARTERY DISEASE OF BYPASS GRAFT OF NATIVE HEART WITH STABLE ANGINA PECTORIS (H): ICD-10-CM

## 2022-02-16 LAB
CV STRESS MAX HR HE: 87
RATE PRESSURE PRODUCT: NORMAL
STRESS ECHO BASELINE DIASTOLIC HE: 76
STRESS ECHO BASELINE HR: 60 BPM
STRESS ECHO BASELINE SYSTOLIC BP: 132
STRESS ECHO CALCULATED PERCENT HR: 60 %
STRESS ECHO LAST STRESS DIASTOLIC BP: 64
STRESS ECHO LAST STRESS SYSTOLIC BP: 120
STRESS ECHO TARGET HR: 144

## 2022-02-16 PROCEDURE — A9502 TC99M TETROFOSMIN: HCPCS | Performed by: NURSE PRACTITIONER

## 2022-02-16 PROCEDURE — 78452 HT MUSCLE IMAGE SPECT MULT: CPT | Mod: 26 | Performed by: INTERNAL MEDICINE

## 2022-02-16 PROCEDURE — 93016 CV STRESS TEST SUPVJ ONLY: CPT | Performed by: INTERNAL MEDICINE

## 2022-02-16 PROCEDURE — 343N000001 HC RX 343: Performed by: NURSE PRACTITIONER

## 2022-02-16 PROCEDURE — 93017 CV STRESS TEST TRACING ONLY: CPT

## 2022-02-16 PROCEDURE — 78452 HT MUSCLE IMAGE SPECT MULT: CPT

## 2022-02-16 PROCEDURE — 250N000011 HC RX IP 250 OP 636: Performed by: NURSE PRACTITIONER

## 2022-02-16 RX ORDER — REGADENOSON 0.08 MG/ML
0.4 INJECTION, SOLUTION INTRAVENOUS ONCE
Status: COMPLETED | OUTPATIENT
Start: 2022-02-16 | End: 2022-02-16

## 2022-02-16 RX ADMIN — TETROFOSMIN 8.5 MCI.: 1.38 INJECTION, POWDER, LYOPHILIZED, FOR SOLUTION INTRAVENOUS at 12:52

## 2022-02-16 RX ADMIN — TETROFOSMIN 26 MCI.: 1.38 INJECTION, POWDER, LYOPHILIZED, FOR SOLUTION INTRAVENOUS at 13:57

## 2022-02-16 RX ADMIN — REGADENOSON 0.4 MG: 0.08 INJECTION, SOLUTION INTRAVENOUS at 13:52

## 2022-02-16 NOTE — RESULT ENCOUNTER NOTE
Med area of moderate infarction but no ischemia noted. No changes compared to 2018, per reader. Recommend follow-up with PCP for SANTIAGO as planned. Continue with cardiology follow-up Jan 2023 as planned

## 2022-02-17 ENCOUNTER — TELEPHONE (OUTPATIENT)
Dept: CARDIOLOGY | Facility: CLINIC | Age: 77
End: 2022-02-17
Payer: COMMERCIAL

## 2022-02-17 NOTE — TELEPHONE ENCOUNTER
----- Message from ANTONIO Ruby CNP sent at 2/16/2022  4:59 PM CST -----  Med area of moderate infarction but no ischemia noted. No changes compared to 2018, per reader. Recommend follow-up with PCP for SANTIAGO as planned. Continue with cardiology follow-up Jan 2023 as planned

## 2022-02-17 NOTE — TELEPHONE ENCOUNTER
Call placed to patient to review results of stress test.  Message left.    Nicol Guerra RN on 2/17/2022 at 2:58 PM

## 2022-03-15 DIAGNOSIS — I25.10 CORONARY ARTERY DISEASE INVOLVING NATIVE CORONARY ARTERY OF NATIVE HEART WITHOUT ANGINA PECTORIS: ICD-10-CM

## 2022-03-15 DIAGNOSIS — Z95.1 S/P CABG X 4: ICD-10-CM

## 2022-03-15 DIAGNOSIS — E78.5 HYPERLIPIDEMIA LDL GOAL <70: ICD-10-CM

## 2022-03-15 DIAGNOSIS — Z78.9 STATIN INTOLERANCE: ICD-10-CM

## 2022-04-11 DIAGNOSIS — F41.1 GAD (GENERALIZED ANXIETY DISORDER): ICD-10-CM

## 2022-04-11 DIAGNOSIS — J44.9 CHRONIC OBSTRUCTIVE PULMONARY DISEASE, UNSPECIFIED COPD TYPE (H): ICD-10-CM

## 2022-04-11 DIAGNOSIS — F32.0 MILD MAJOR DEPRESSION (H): ICD-10-CM

## 2022-04-12 RX ORDER — OLODATEROL RESPIMAT INHALATION SPRAY 2.5 UG/1
SPRAY, METERED RESPIRATORY (INHALATION)
Qty: 12 G | Refills: 3 | Status: SHIPPED | OUTPATIENT
Start: 2022-04-12 | End: 2023-08-10

## 2022-04-12 RX ORDER — PAROXETINE 40 MG/1
TABLET, FILM COATED ORAL
Qty: 90 TABLET | Refills: 1 | Status: SHIPPED | OUTPATIENT
Start: 2022-04-12 | End: 2022-04-18

## 2022-04-18 ENCOUNTER — TELEPHONE (OUTPATIENT)
Dept: FAMILY MEDICINE | Facility: CLINIC | Age: 77
End: 2022-04-18
Payer: COMMERCIAL

## 2022-04-18 DIAGNOSIS — F33.2 SEVERE EPISODE OF RECURRENT MAJOR DEPRESSIVE DISORDER, WITHOUT PSYCHOTIC FEATURES (H): ICD-10-CM

## 2022-04-18 RX ORDER — PAROXETINE 30 MG/1
60 TABLET, FILM COATED ORAL DAILY
Qty: 180 TABLET | Refills: 3 | Status: SHIPPED | OUTPATIENT
Start: 2022-04-18 | End: 2023-06-15

## 2022-04-18 NOTE — TELEPHONE ENCOUNTER
Reason for Call:  Other prescription    Detailed comments: Per Pharmacy questions on Pt's Paroxetine Scripts.- They received a renewal of [facility] and there records indicate that they have recently filled the same medication in a different strength.  Paroxetine 40 mg one every morning  & add to 20 mg & Paroxetine 30 mg take 2 tablets daily for total dose of 60 mg. Daily    Phone Number Patient can be reached at: Home number on file [phone] (home)    Best Time: Any Time      Can we leave a detailed message on this number? YES    Call taken on 4/18/2022 at 9:43 AM by Tammy Vázquez

## 2022-07-06 ENCOUNTER — TELEPHONE (OUTPATIENT)
Dept: PHARMACY | Facility: CLINIC | Age: 77
End: 2022-07-06

## 2022-07-06 NOTE — TELEPHONE ENCOUNTER
This patient is due for MTM follow-up. I called the patient to schedule an appointment and left a message with the clinic phone number for the patient to call to schedule.    Daily Deleon, PharmD, [facility]  Medication Therapy Management Pharmacist  Pager: [phone]

## 2022-08-19 ENCOUNTER — TELEPHONE (OUTPATIENT)
Dept: PHARMACY | Facility: CLINIC | Age: 77
End: 2022-08-19

## 2022-08-20 NOTE — TELEPHONE ENCOUNTER
We have been unable to reach this patient for MTM follow-up after several attempts. We will stop reaching out to the patient at this time. Please let us know if we can assist in this patient's care in the future.    Corby BatesD, [facility]  Medication Therapy Management Pharmacist  Pager: [phone]

## 2022-08-22 NOTE — PROGRESS NOTES
"   12/17/19 1100   Session   Session 90 Day Individualized Treatment Plan   Certified through this date 01/15/20   Cardiac Rehab Assessment   Cardiac Rehab Assessment Pt has attended 19 monitored exercise sessions thus far.  He has increased to 4.3METs on the TDM. Pt reports his anxiety/depression has improved and he has been \"more active\", while also trying to walk outside permitting on good weather. Skilled services necessary to monitor CV response while advancing workloads closer to 6  METs, provide education on risk factors and continue to support smoke free lifestyle and low sodium eating.   General Information   Treatment Diagnosis Coronary Artery Bypass Surgery   Date of Treatment Diagnosis 09/03/19   Significant Past CV History None   Comorbidities Pulmonary Disease   Other Medical History COPD, Emphysema, HLD, HTN, SANTIAGO, Tobacco Use, Depression, Anxiety, Anemia   Lead up symptoms Weakness, SOB, SANTIAGO, decreased stamina   Hospital Location [facility] Discharge Date 09/09/19   Signs and Symptoms Post Hospital Discharge SOB;Appetite   Outpatient Cardiac Rehab Start Date 10/01/19   Primary Physician Danielle Mercado MD   Primary Physician Follow Up NA   Surgeon Lucy Espino MD   Surgeon Follow Up Scheduled   Cardiologist Lucy Espino MD   Cardiologist Follow Up Scheduled   Ejection Fraction 45-50%   Risk Stratification High   Summary of Cath Report   Summary of Cath Report Available   Date Performed 09/03/19   Cath Report Comments CABG x4: LIMA-LAD, SVG-OM, SVG-PDA, SVG-JORDON   Living and Work Status    Living Arrangements and Social Status house   Support System Live alone   Return to Employment Retired   Fall Risk Screen   Fall screen completed by Cardiac Rehab   Have you fallen 2 or more times in the past year? No   Have you fallen and had an injury in the past year? No   Timed Up and Go score (seconds) N/A   Is patient a fall risk? No   Abuse Screen (yes response referral indicated)   Feels " "Threatened by Someone no   Does Anyone Try to Keep You From Having Contact with Others or Doing Things Outside Your Home? no   Physical Signs of Abuse Present no   Pain   Patient Currently in Pain No   Physical Assessments   Incisions WNL   Edema None   Right Lung Sounds not assessed   Left Lung Sounds not assessed   Limitations No limitations   Individualized Treatment Plan   Monitored Sessions Scheduled 24   Monitored Sessions Attended 19   Oxygen   Supplemental Oxygen needed No   Nutrition Management - Weight Management   Assessment Re-assessment   Age 73   Weight 63.6 kg (140 lb 4.5 oz)   Height 1.727 m (5' 7.99\")   BMI (Calculated) 21.33   Goal Weight 70.3 kg (155 lb)   Initial Rate Your Plate Score. Dietary tool to assess eating patterns. Scores range from 24 to 72. The higher the score the healthier the eating pattern. 48   Weight Management Comments 12/17: Pt is still trying to gain weight he reports. He would like to be 150#. 11/26: Pt states he has lost 2 more lbs, however he is currently down 1 lb from his initial evaluation.  10/29: Pt feels he is losing weight, prefers to not lose any additional weight. Pt does report he doesn't have a big appetite. Is drinking ensure/boost with breakfast. Does occ skip meals and was encourage to replace any meals skipped with boost/ensure.    Nutrition Management - Lipids   Lipids Labs Available   Date 07/25/19   Total Cholesterol 227   Triglycerides 101   HDL 57      Nutrition Management - Diabetes   Diabetes No   Nutrition Management Summary   Dietary Recommendations None   Stages of Change for Diet Compliance NA   Interventions Planned Attend Nutrition Education Class(es);Complete Food Diary;Instruct on Label Reading   Patient Goals Goal #1   Goal #1 Description 10/1: Pt will decrease sodium to 2000mg daily by    Goal #1 Target Date 12/01/19   Goal #1 Progress Towards Goal 12/17: Pt reported he has decreased his pizza/chinese consumption to 1x/week instead of " "2. Pt is not using the salt shaker, but states this is hard due to being a smoker and not having \"much of a taste for foods\". 11/26: Unchaged since last update.  10/29: Pt is watching food labels closely.  Pt doesn't eat a lot of processed foods. Does eat pizza/chinese maybe 2x/week on average. Pt would be interested in low sodium food handouts.   Nutrition Summary Comments 10/1: Pts diet consists of, cereal, eggs and ramirez; fried pork chops and hotdishes. Lean proteins and fried meats.  or chinese food   Nutrition Target Outcome Total Chol < 150, HDL > 40 (M), HDL > 50 (W), LDL < 70, Trig < 150   Psychosocial Management   Psychosocial Assessment Re-assessment   Is there history of clinical depression or increased risk of depression? History of clinical depression   Current Level of Stress per Patient Report Mild   Current Coping Skills Is on Medication for Depression/Anxiety;Uses Stress Management/Relaxation Techniques   Initial Patient Health Questionnaire -9 Score (PHQ-9) for depression. 5-9 Minimal symptoms, 10-14 Minor depression, 15-19 Major depression, moderately severe, > 20 Major depression, severe  3   Initial [employer] COOP Survey score.  Quality of Life:   If total score > 25 review individual areas where patient rated a 4 or 5.  Consider patients current medical condition and what role that plays on the score.   Adjust treatment protocol to improve areas of concern.  Consider the following:  PHQ9 score, DASI, and re-assessment within the next 30 days to assist with developing treatments.  26   Stages of Change Action   Interventions Planned Reassess PHQ-9 and/or [employer] COOP Surveys if outside of defined limits   Interventions In Progress or Completed Patient verbalizes understanding of behavioral assessment scores;Patient continues to practice/follow through with strategies to reduce stress and/or anxiety level;Pt recognizes signs and symptoms of depression   Psychosocial Comments " 12/17: This has improved Pt reports. He is unsure if it has to do with increased activity or takign Vitamin D, but he is feeling better recently. 11/26: Unchaged since last update.  10/1: Pt is on Paxil for anxiety and depression and managing well he reports. Pt also uses nature and time with his dog to deal with stress.   Other Core Components - Hypertension   History of or Diagnosis of Hypertension Yes   Currently taking Anti-Hypertensives Yes;Ace Inhibitor   Other Core Components - Tobacco   History of Tobacco Use Yes   Quit Date or Planned Quit Date 08/27/19   Tobacco Use Status Recent (Quit < 6 mo ago)   Tobacco Habit Cigarettes   Tobacco Use per Day (average) 1   Years of Tobacco Use 30   Stages of Change Action   Tobacco Comments 11/26: Pt is no longer on Nicotine patch and has remained nicotine free.  Pt feels that he does not require further intervention at this point.  10/29: Pt reports he is out of nicorette gum. Is considering purchasing more. Pt originally had prescription of this but hadn't filled it. Pt states he gets cravings once in a while but hasn't had concerns he will return to smoking. 10/1: Nicorette Gum   Other Core Components Summary   Interventions Planned Educate on importance of maintaining low sodium diet;Attend education class on Blood Pressure   Interventions In Progress or Completed Attended education class on Blood Pressure   Patient Goals No   Other Core Components Target Outcome BP < 140/90 or < 130/80 with DM or CKD;Complete Smoking Cessation   Activity/Exercise History   Activity/Exercise Assessment Re-assessment   Activity/Exercise Status prior to event? Sedentary   Number of Days Currently participating in Moderate Physical Activity? 3   Number of Days Currently performing  Aerobic Exercise (including rehab)? 5   Number of Minutes per Session Currently of Aerobic Exercise (average)? 40   Current Stage of Change (Physical Activity) Action   Current Stage of Change (Aerobic  "Exercise) Action   Patient Goals Goal #2   Goal #2 Description 10/1: Pt will maintain walking at home 3days/wlk for 20minutes; working up to 20 minutes   Goal #2 Target Date 12/01/19   Goal #2 Progress Towards Goal 12/17: Per good weather Pt has been walking 1-2x/week but has not set up his TDM yet. 11/26: Pt has been walking 2-2.5 miles 1-2 times per week on non rehab days.  10/29: Pt is walking outside for 1-2 miles. He has treadmill and will use when weather gets cooler.   Activity/Exercise Comments Pt has a treadmill    Activity/Exercise Target Outcome An Accumulation of 150  Minutes of Aerobic Activity per Week   Exercise Assessment   6 Minute Walk Predicted - Gender Selection Male   6 Minute Walk Predicted (Male) 1704.46   6 Minute Walk Predicted (Female) 1520.08   Initial 6 Minute Walk Distance (Feet) 1045 ft   Resting HR 68 bpm   Exercise HR 96 bpm   Post Exercise HR 80 bpm   Resting /86   Exercise /92   Post Exercise BP 98/70   Effort Rating 6/10   Current MET Level 4.1   MET Level Goal 4-5   ECG Rhythm Normal sinus rhythm   Ectopy None   Current Symptoms Denies symptoms   Limitations/Restrictions Sternal Precautions   Exercise Prescription   Mode Treadmill;Nustep;Weights;Recumbant bike   Duration/Time 15-30 min   Frequency 2 days/week   THR (85% of age predicted max HR) 124.95   OMNI Effort Rating (0-10 Scale) 4-6/10   Progression Total exercise time of 20-30 minutes;Intermittent bouts;Aerobic exercise to OMNI rating of 6 or below and at or below THR;Progress peak intensity by 1/4 MET per week   Comments 10/1: No arms on Nustep until 10/15/19    Recommended Home Exercise   Type of Exercise Walking   Frequency (days per week) 3days/week   Duration (minutes per session) 15-30 min   Effort Rating Recommended 4-6/10   30 Day Exercise Plan 12/17: Pt is trying to walk 1-2x/week outside if the weather permits. Pt reports he has been \"more active at home\". 11/16 Pt is currently walkig 2-2.5 miles 2-3 " times per week on non rehab days.  Plan to have patient continue increasing bouts with by increasing the time it takes him to complete 2 miles or increase distance within the same time frame.  Pt verbalized understanding.  10/29: PT is walking 1/2 to 1 mile distance. 10/1: Increase walking bouts to be able to tolerate 20minutes of continious walking   Current Home Exercise   Type of Exercise Walking   Frequency (days per week) 2-3d   Duration (minutes per session) 20-30 min   Follow-up/On-going Support   Provider follow-up needed on the following No follow-up needed   Learning Assessment   Learner Patient   Primary Language English   Patient Education   Education recommended Blood Pressure;Nutrition   Education classes attended Anatomy and Physiology of the Heart;Blood Pressure;Exercise Principles;Smoking Cessation   I have established, reviewed, and made necessary changes to the individualized  treatment plan  and exercise prescription for this patient.       Physician Signature: _______________________________   Date: ______________________   Speaking Coherently 98

## 2022-09-13 NOTE — TELEPHONE ENCOUNTER
"Requested Prescriptions   Pending Prescriptions Disp Refills     PARoxetine (PAXIL) 40 MG tablet [Pharmacy Med Name: PAROXETINE HCL TABS 40MG] 90 tablet 3     Sig: TAKE 1 TABLET EVERY MORNING. ADD TO 20 MG DAILY.       SSRIs Protocol Passed - 4/6/2020 12:03 PM        Passed - PHQ-9 score less than 5 in past 6 months     Please review last PHQ-9 score.           Passed - Medication is active on med list        Passed - Patient is age 18 or older        Passed - Recent (6 mo) or future (30 days) visit within the authorizing provider's specialty     Patient had office visit in the last 6 months or has a visit in the next 30 days with authorizing provider or within the authorizing provider's specialty.  See \"Patient Info\" tab in inbasket, or \"Choose Columns\" in Meds & Orders section of the refill encounter.               Last Written Prescription Date:  4/12/19  Last Fill Quantity: 90,  # refills: 1   Last office visit: 8/26//2019 with prescribing provider:  Danielle EMERY     Future Office Visit:      " on unit

## 2022-10-20 DIAGNOSIS — I25.10 CORONARY ARTERY DISEASE INVOLVING NATIVE CORONARY ARTERY OF NATIVE HEART WITHOUT ANGINA PECTORIS: ICD-10-CM

## 2022-10-20 DIAGNOSIS — E78.5 HYPERLIPIDEMIA LDL GOAL <70: ICD-10-CM

## 2022-10-20 DIAGNOSIS — Z78.9 STATIN INTOLERANCE: ICD-10-CM

## 2022-10-20 DIAGNOSIS — Z95.1 S/P CABG X 4: ICD-10-CM

## 2022-10-24 ENCOUNTER — ALLIED HEALTH/NURSE VISIT (OUTPATIENT)
Dept: FAMILY MEDICINE | Facility: CLINIC | Age: 77
End: 2022-10-24
Payer: COMMERCIAL

## 2022-10-24 VITALS — DIASTOLIC BLOOD PRESSURE: 80 MMHG | SYSTOLIC BLOOD PRESSURE: 138 MMHG

## 2022-10-24 DIAGNOSIS — I10 BENIGN ESSENTIAL HYPERTENSION: Primary | ICD-10-CM

## 2022-10-24 PROCEDURE — 99207 PR NO CHARGE NURSE ONLY: CPT | Performed by: NURSE PRACTITIONER

## 2022-10-24 NOTE — PROGRESS NOTES
Adolfo CRAIG Mariloudana was evaluated at [facility] on October 24, 2022 at which time his blood pressure was:    BP Readings from Last 3 Encounters:   10/24/22 138/80   01/27/22 111/74   01/25/22 (!) 153/91     Pulse Readings from Last 3 Encounters:   01/27/22 66   01/25/22 65   12/30/21 60       Reviewed lifestyle modifications for blood pressure control and reduction: including making healthy food choices, managing weight, getting regular exercise, smoking cessation, reducing alcohol consumption, monitoring blood pressure regularly.     Symptoms: None    BP Goal:< 140/90 mmHg    BP Assessment:  BP at goal    Potential Reasons for BP too high: NA - Not applicable    BP Follow-Up Plan: Recheck BP in 6 months at pharmacy    Recommendation to Provider: None at this time.    Note completed by: Thank you.    Kassi Burdick, PharmD  Staff Pharmacist  [facility]  [phone]

## 2022-10-27 ENCOUNTER — OFFICE VISIT (OUTPATIENT)
Dept: FAMILY MEDICINE | Facility: CLINIC | Age: 77
End: 2022-10-27
Payer: COMMERCIAL

## 2022-10-27 VITALS
OXYGEN SATURATION: 99 % | BODY MASS INDEX: 22.55 KG/M2 | RESPIRATION RATE: 14 BRPM | DIASTOLIC BLOOD PRESSURE: 78 MMHG | WEIGHT: 144 LBS | TEMPERATURE: 97.3 F | HEART RATE: 65 BPM | SYSTOLIC BLOOD PRESSURE: 132 MMHG

## 2022-10-27 DIAGNOSIS — I50.22 CHRONIC SYSTOLIC HEART FAILURE (H): ICD-10-CM

## 2022-10-27 DIAGNOSIS — M70.21 OLECRANON BURSITIS OF RIGHT ELBOW: Primary | ICD-10-CM

## 2022-10-27 DIAGNOSIS — J44.9 CHRONIC OBSTRUCTIVE PULMONARY DISEASE, UNSPECIFIED COPD TYPE (H): ICD-10-CM

## 2022-10-27 DIAGNOSIS — F33.2 SEVERE EPISODE OF RECURRENT MAJOR DEPRESSIVE DISORDER, WITHOUT PSYCHOTIC FEATURES (H): ICD-10-CM

## 2022-10-27 DIAGNOSIS — C61 MALIGNANT NEOPLASM OF PROSTATE (H): ICD-10-CM

## 2022-10-27 DIAGNOSIS — Z95.1 S/P CABG X 4: ICD-10-CM

## 2022-10-27 DIAGNOSIS — E78.5 HYPERLIPIDEMIA LDL GOAL <70: ICD-10-CM

## 2022-10-27 DIAGNOSIS — I25.708 CORONARY ARTERY DISEASE OF BYPASS GRAFT OF NATIVE HEART WITH STABLE ANGINA PECTORIS (H): ICD-10-CM

## 2022-10-27 DIAGNOSIS — Z85.46 PERSONAL HISTORY OF PROSTATE CANCER: ICD-10-CM

## 2022-10-27 DIAGNOSIS — I10 BENIGN ESSENTIAL HYPERTENSION: ICD-10-CM

## 2022-10-27 LAB
ALBUMIN SERPL BCG-MCNC: 4 G/DL (ref 3.5–5.2)
ALP SERPL-CCNC: 52 U/L (ref 40–129)
ALT SERPL W P-5'-P-CCNC: 8 U/L (ref 10–50)
ANION GAP SERPL CALCULATED.3IONS-SCNC: 10 MMOL/L (ref 7–15)
AST SERPL W P-5'-P-CCNC: 15 U/L (ref 10–50)
BASOPHILS # BLD AUTO: 0 10E3/UL (ref 0–0.2)
BASOPHILS NFR BLD AUTO: 1 %
BILIRUB SERPL-MCNC: 0.3 MG/DL
BUN SERPL-MCNC: 22.3 MG/DL (ref 8–23)
CALCIUM SERPL-MCNC: 9.5 MG/DL (ref 8.8–10.2)
CHLORIDE SERPL-SCNC: 104 MMOL/L (ref 98–107)
CHOLEST SERPL-MCNC: 122 MG/DL
CREAT SERPL-MCNC: 1.13 MG/DL (ref 0.67–1.17)
DEPRECATED HCO3 PLAS-SCNC: 25 MMOL/L (ref 22–29)
EOSINOPHIL # BLD AUTO: 0.4 10E3/UL (ref 0–0.7)
EOSINOPHIL NFR BLD AUTO: 9 %
ERYTHROCYTE [DISTWIDTH] IN BLOOD BY AUTOMATED COUNT: 13.2 % (ref 10–15)
ERYTHROCYTE [SEDIMENTATION RATE] IN BLOOD BY WESTERGREN METHOD: 5 MM/HR (ref 0–20)
GFR SERPL CREATININE-BSD FRML MDRD: 67 ML/MIN/1.73M2
GLUCOSE SERPL-MCNC: 92 MG/DL (ref 70–99)
HCT VFR BLD AUTO: 40.3 % (ref 40–53)
HDLC SERPL-MCNC: 53 MG/DL
HGB BLD-MCNC: 13.4 G/DL (ref 13.3–17.7)
IMM GRANULOCYTES # BLD: 0 10E3/UL
IMM GRANULOCYTES NFR BLD: 0 %
LDLC SERPL CALC-MCNC: 56 MG/DL
LYMPHOCYTES # BLD AUTO: 1.3 10E3/UL (ref 0.8–5.3)
LYMPHOCYTES NFR BLD AUTO: 27 %
MCH RBC QN AUTO: 27.9 PG (ref 26.5–33)
MCHC RBC AUTO-ENTMCNC: 33.3 G/DL (ref 31.5–36.5)
MCV RBC AUTO: 84 FL (ref 78–100)
MONOCYTES # BLD AUTO: 0.5 10E3/UL (ref 0–1.3)
MONOCYTES NFR BLD AUTO: 11 %
NEUTROPHILS # BLD AUTO: 2.5 10E3/UL (ref 1.6–8.3)
NEUTROPHILS NFR BLD AUTO: 52 %
NONHDLC SERPL-MCNC: 69 MG/DL
PLATELET # BLD AUTO: 187 10E3/UL (ref 150–450)
POTASSIUM SERPL-SCNC: 4.4 MMOL/L (ref 3.4–5.3)
PROT SERPL-MCNC: 6.7 G/DL (ref 6.4–8.3)
PSA SERPL-MCNC: <0.01 NG/ML (ref 0–6.5)
RBC # BLD AUTO: 4.8 10E6/UL (ref 4.4–5.9)
SODIUM SERPL-SCNC: 139 MMOL/L (ref 136–145)
TRIGL SERPL-MCNC: 67 MG/DL
TSH SERPL DL<=0.005 MIU/L-ACNC: 1.54 UIU/ML (ref 0.3–4.2)
WBC # BLD AUTO: 4.8 10E3/UL (ref 4–11)

## 2022-10-27 PROCEDURE — 80053 COMPREHEN METABOLIC PANEL: CPT | Performed by: NURSE PRACTITIONER

## 2022-10-27 PROCEDURE — 85025 COMPLETE CBC W/AUTO DIFF WBC: CPT | Performed by: NURSE PRACTITIONER

## 2022-10-27 PROCEDURE — 84443 ASSAY THYROID STIM HORMONE: CPT | Performed by: NURSE PRACTITIONER

## 2022-10-27 PROCEDURE — 85652 RBC SED RATE AUTOMATED: CPT | Performed by: NURSE PRACTITIONER

## 2022-10-27 PROCEDURE — 99214 OFFICE O/P EST MOD 30 MIN: CPT | Performed by: NURSE PRACTITIONER

## 2022-10-27 PROCEDURE — 36415 COLL VENOUS BLD VENIPUNCTURE: CPT | Performed by: NURSE PRACTITIONER

## 2022-10-27 PROCEDURE — 80061 LIPID PANEL: CPT | Performed by: NURSE PRACTITIONER

## 2022-10-27 PROCEDURE — G0103 PSA SCREENING: HCPCS | Performed by: NURSE PRACTITIONER

## 2022-10-27 RX ORDER — EZETIMIBE 10 MG/1
10 TABLET ORAL DAILY
Qty: 90 TABLET | Refills: 3 | Status: SHIPPED | OUTPATIENT
Start: 2022-10-27 | End: 2023-11-14

## 2022-10-27 RX ORDER — CEPHALEXIN 500 MG/1
500 CAPSULE ORAL 3 TIMES DAILY
Qty: 30 CAPSULE | Refills: 0 | Status: SHIPPED | OUTPATIENT
Start: 2022-10-27 | End: 2023-01-31

## 2022-10-27 RX ORDER — AMOXICILLIN 250 MG
CAPSULE ORAL
Qty: 180 TABLET | Refills: 3 | Status: SHIPPED | OUTPATIENT
Start: 2022-10-27 | End: 2023-11-14

## 2022-10-27 RX ORDER — LISINOPRIL 10 MG/1
10 TABLET ORAL DAILY
Qty: 90 TABLET | Refills: 3 | Status: SHIPPED | OUTPATIENT
Start: 2022-10-27 | End: 2022-10-27

## 2022-10-27 RX ORDER — CEPHALEXIN 500 MG/1
500 CAPSULE ORAL 3 TIMES DAILY
Qty: 30 CAPSULE | Refills: 0 | Status: SHIPPED | OUTPATIENT
Start: 2022-10-27 | End: 2022-10-27

## 2022-10-27 RX ORDER — CARVEDILOL 12.5 MG/1
12.5 TABLET ORAL 2 TIMES DAILY WITH MEALS
Qty: 180 TABLET | Refills: 3 | Status: SHIPPED | OUTPATIENT
Start: 2022-10-27 | End: 2023-11-10

## 2022-10-27 RX ORDER — AMLODIPINE BESYLATE 5 MG/1
5 TABLET ORAL DAILY
Qty: 90 TABLET | Refills: 3 | Status: SHIPPED | OUTPATIENT
Start: 2022-10-27 | End: 2023-11-14

## 2022-10-27 ASSESSMENT — PAIN SCALES - GENERAL: PAINLEVEL: NO PAIN (1)

## 2022-10-27 NOTE — LETTER
October 28, 2022      Adolfo Rendon  [address] [place] [address]        Dear ,    We are writing to inform you of your test results.    Cholesterol levels look great   Normal kidney function.  Normal liver function.  Normal blood sugar.   Normal prostate antigen screening   Normal thyroid function   Normal sed rate   Normal red and white blood cell count normal platelets   Nothing concerning on the labs       Resulted Orders   Lipid panel reflex to direct LDL Fasting   Result Value Ref Range    Cholesterol 122 <200 mg/dL    Triglycerides 67 <150 mg/dL    Direct Measure HDL 53 >=40 mg/dL    LDL Cholesterol Calculated 56 <=100 mg/dL    Non HDL Cholesterol 69 <130 mg/dL    Narrative    Cholesterol  Desirable:  <200 mg/dL    Triglycerides  Normal:  Less than 150 mg/dL  Borderline High:  150-199 mg/dL  High:  200-499 mg/dL  Very High:  Greater than or equal to 500 mg/dL    Direct Measure HDL  Female:  Greater than or equal to 50 mg/dL   Male:  Greater than or equal to 40 mg/dL    LDL Cholesterol  Desirable:  <100mg/dL  Above Desirable:  100-129 mg/dL   Borderline High:  130-159 mg/dL   High:  160-189 mg/dL   Very High:  >= 190 mg/dL    Non HDL Cholesterol  Desirable:  130 mg/dL  Above Desirable:  130-159 mg/dL  Borderline High:  160-189 mg/dL  High:  190-219 mg/dL  Very High:  Greater than or equal to 220 mg/dL   Comprehensive metabolic panel (BMP + Alb, Alk Phos, ALT, AST, Total. Bili, TP)   Result Value Ref Range    Sodium 139 136 - 145 mmol/L    Potassium 4.4 3.4 - 5.3 mmol/L    Chloride 104 98 - 107 mmol/L    Carbon Dioxide (CO2) 25 22 - 29 mmol/L    Anion Gap 10 7 - 15 mmol/L    Urea Nitrogen 22.3 8.0 - 23.0 mg/dL    Creatinine 1.13 0.67 - 1.17 mg/dL    Calcium 9.5 8.8 - 10.2 mg/dL    Glucose 92 70 - 99 mg/dL    Alkaline Phosphatase 52 40 - 129 U/L    AST 15 10 - 50 U/L    ALT 8 (L) 10 - 50 U/L    Protein Total 6.7 6.4 - 8.3 g/dL    Albumin 4.0 3.5 - 5.2 g/dL    Bilirubin Total 0.3 <=1.2  mg/dL    GFR Estimate 67 >60 mL/min/1.73m2      Comment:      Effective December 21, 2021 eGFRcr in adults is calculated using the 2021 CKD-EPI creatinine equation which includes age and gender (Kari et al., NE, DOI: 10.1056/NRVCwq5022986)   TSH with free T4 reflex   Result Value Ref Range    TSH 1.54 0.30 - 4.20 uIU/mL   ESR: Erythrocyte sedimentation rate   Result Value Ref Range    Erythrocyte Sedimentation Rate 5 0 - 20 mm/hr   PSA, screen   Result Value Ref Range    Prostate Specific Antigen Screen <0.01 0.00 - 6.50 ng/mL    Narrative    This result is obtained using the Roche Elecsys total PSA method on the fabiana [id number] immunoassay analyzer. Results obtained with different assay methods or kits cannot be used interchangeably.   CBC with platelets and differential   Result Value Ref Range    WBC Count 4.8 4.0 - 11.0 10e3/uL    RBC Count 4.80 4.40 - 5.90 10e6/uL    Hemoglobin 13.4 13.3 - 17.7 g/dL    Hematocrit 40.3 40.0 - 53.0 %    MCV 84 78 - 100 fL    MCH 27.9 26.5 - 33.0 pg    MCHC 33.3 31.5 - 36.5 g/dL    RDW 13.2 10.0 - 15.0 %    Platelet Count 187 150 - 450 10e3/uL    % Neutrophils 52 %    % Lymphocytes 27 %    % Monocytes 11 %    % Eosinophils 9 %    % Basophils 1 %    % Immature Granulocytes 0 %    Absolute Neutrophils 2.5 1.6 - 8.3 10e3/uL    Absolute Lymphocytes 1.3 0.8 - 5.3 10e3/uL    Absolute Monocytes 0.5 0.0 - 1.3 10e3/uL    Absolute Eosinophils 0.4 0.0 - 0.7 10e3/uL    Absolute Basophils 0.0 0.0 - 0.2 10e3/uL    Absolute Immature Granulocytes 0.0 <=0.4 10e3/uL       If you have any questions or concerns, please call the clinic at the number listed above.       Sincerely,      ANTONIO Carranza CNP

## 2022-10-27 NOTE — PROGRESS NOTES
Assessment & Plan     Olecranon bursitis of right elbow  Symptomatic care strategies are reviewed.  Avoid pressure.  - ESR: Erythrocyte sedimentation rate  - cephALEXin (KEFLEX) 500 MG capsule  Dispense: 30 capsule; Refill: 0      Benign essential hypertension  Well-controlled  Meds refilled  Labs for monitoring  Follow-up with cardiology as planned  - amLODIPine (NORVASC) 5 MG tablet  Dispense: 90 tablet; Refill: 3  - carvedilol (COREG) 12.5 MG tablet  Dispense: 180 tablet; Refill: 3    - Lipid panel reflex to direct LDL Fasting  - CBC with platelets and differential  - Comprehensive metabolic panel (BMP + Alb, Alk Phos, ALT, AST, Total. 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 Bili, TP)  - TSH with free T4 reflex    Chronic systolic heart failure (H)  Stable.  Weight stable since CABG    Chronic obstructive pulmonary disease, unspecified COPD type (H)  Chronic and ongoing.    Severe episode of recurrent major depressive disorder, without psychotic features (H)  Chronic and ongoing.  Continue Paxil    Personal history of prostate cancer  Malignant neoplasm of prostate (H)  Follow-up with oncology/urology as previously directed  Labs today for monitoring  - PSA, screen    Call or return to the clinic with any worsening of symptoms or no resolution. Patient/Parent verbalized understanding and is in agreement. Medication side effects reviewed.   Current Outpatient Medications   Medication Sig Dispense Refill     amLODIPine (NORVASC) 5 MG tablet Take 1 tablet (5 mg) by mouth daily 90  tablet 3     aspirin 81 MG EC tablet Take 81 mg by mouth daily       carvedilol (COREG) 12.5 MG tablet Take 1 tablet (12.5 mg) by mouth 2 times daily (with meals) 180 tablet 3     cephALEXin (KEFLEX) 500 MG capsule Take 1 capsule (500 mg) by mouth 3 times daily 30 capsule 0     co-enzyme Q-10 100 MG CAPS capsule Take 100 mg by mouth daily       evolocumab (REPATHA) 140 MG/ML prefilled autoinjector Inject 1 mL (140 mg) Subcutaneous every 14 days 6 mL 0     ezetimibe (ZETIA) 10 MG tablet Take 1 tablet (10 mg) by mouth daily 90 tablet 3     nicotine polacrilex (NICORETTE) 4 MG gum Place 4 mg inside cheek every hour as needed for smoking cessation       PARoxetine (PAXIL) 30 MG tablet Take 2 tablets (60 mg) by mouth daily 180 tablet 3     senna-docusate (SENEXON-S) 8.6-50 MG tablet TAKE ONE TABLET BY MOUTH TWICE A DAY AS NEEDED FOR CONSTIPATION Strength: 8.6-50 mg 180 tablet 3     STRIVERDI RESPIMAT 2.5 MCG/ACT AERS USE 2 INHALATIONS DAILY 12 g 3     Vitamin D, Cholecalciferol, 25 MCG (1000 UT) TABS Take 1,000 Units by mouth daily        Chart documentation with Dragon Voice recognition Software. Although reviewed after completion, some words and grammatical errors may remain.    ANTONIO Carranza CNP  [facility]    Elly Mata is a 77 year old, presenting for the following health issues:  Elbow Injury      HPI     Concern - RIGHT ELBOW   Onset: few months   Description: swelling   Intensity: moderate  Progression of Symptoms:  same  Accompanying Signs & Symptoms: swelling and tenderness   Previous history of similar problem: one       RASH   Chest area- welts   3 months     History of coronary artery disease.  Congestive heart failure.  Hyperlipidemia. CABG x 4  COPD has been stable.   History of prostate cancer. Due for PSA recheck        Review of Systems   Constitutional, HEENT, cardiovascular, pulmonary, GI, , musculoskeletal, neuro, skin, endocrine and psych systems  are negative, except as otherwise noted.      Objective    /78   Pulse 65   Temp 97.3  F (36.3  C) (Tympanic)   Resp 14   Wt 65.3 kg (144 lb)   SpO2 99%   BMI 22.55 kg/m    Body mass index is 22.55 kg/m .  Physical Exam   GENERAL: healthy, alert and no distress  EYES: Eyes grossly normal to inspection, PERRL and conjunctivae and sclerae normal  HENT: ear canals and TM's normal, nose and mouth without ulcers or lesions  NECK: no adenopathy, no asymmetry, masses, or scars and thyroid normal to palpation  RESP: lungs clear to auscultation - no rales, rhonchi or wheezes  CV: regular rate and rhythm, normal S1 S2, no S3 or S4, no murmur, click or rub, no peripheral edema and peripheral pulses strong  ABDOMEN: soft, nontender, no hepatosplenomegaly, no masses and bowel sounds normal  MS: decreased range of motion right elbow  and  edema to right elbow  SKIN: no suspicious lesions or rashes.  Intermittent welts per patient report  Recurrent symptoms journal to see if there is a correlation between that and his Repatha  NEURO: Normal strength and tone, mentation intact and speech normal  PSYCH: mentation appears normal, affect normal/bright    Results for orders placed or performed in visit on 10/27/22   Lipid panel reflex to direct LDL Fasting     Status: Normal   Result Value Ref Range    Cholesterol 122 <200 mg/dL    Triglycerides 67 <150 mg/dL    Direct Measure HDL 53 >=40 mg/dL    LDL Cholesterol Calculated 56 <=100 mg/dL    Non HDL Cholesterol 69 <130 mg/dL    Narrative    Cholesterol  Desirable:  <200 mg/dL    Triglycerides  Normal:  Less than 150 mg/dL  Borderline High:  150-199 mg/dL  High:  200-499 mg/dL  Very High:  Greater than or equal to 500 mg/dL    Direct Measure HDL  Female:  Greater than or equal to 50 mg/dL   Male:  Greater than or equal to 40 mg/dL    LDL Cholesterol  Desirable:  <100mg/dL  Above Desirable:  100-129 mg/dL   Borderline High:  130-159 mg/dL   High:  160-189 mg/dL   Very High:   >= 190 mg/dL    Non HDL Cholesterol  Desirable:  130 mg/dL  Above Desirable:  130-159 mg/dL  Borderline High:  160-189 mg/dL  High:  190-219 mg/dL  Very High:  Greater than or equal to 220 mg/dL   Comprehensive metabolic panel (BMP + Alb, Alk Phos, ALT, AST, Total. Bili, TP)     Status: Abnormal   Result Value Ref Range    Sodium 139 136 - 145 mmol/L    Potassium 4.4 3.4 - 5.3 mmol/L    Chloride 104 98 - 107 mmol/L    Carbon Dioxide (CO2) 25 22 - 29 mmol/L    Anion Gap 10 7 - 15 mmol/L    Urea Nitrogen 22.3 8.0 - 23.0 mg/dL    Creatinine 1.13 0.67 - 1.17 mg/dL    Calcium 9.5 8.8 - 10.2 mg/dL    Glucose 92 70 - 99 mg/dL    Alkaline Phosphatase 52 40 - 129 U/L    AST 15 10 - 50 U/L    ALT 8 (L) 10 - 50 U/L    Protein Total 6.7 6.4 - 8.3 g/dL    Albumin 4.0 3.5 - 5.2 g/dL    Bilirubin Total 0.3 <=1.2 mg/dL    GFR Estimate 67 >60 mL/min/1.73m2   TSH with free T4 reflex     Status: Normal   Result Value Ref Range    TSH 1.54 0.30 - 4.20 uIU/mL   ESR: Erythrocyte sedimentation rate     Status: Normal   Result Value Ref Range    Erythrocyte Sedimentation Rate 5 0 - 20 mm/hr   PSA, screen     Status: Normal   Result Value Ref Range    Prostate Specific Antigen Screen <0.01 0.00 - 6.50 ng/mL    Narrative    This result is obtained using the Roche Elecsys total PSA method on the fabiana [id number] immunoassay analyzer. Results obtained with different assay methods or kits cannot be used interchangeably.   CBC with platelets and differential     Status: None   Result Value Ref Range    WBC Count 4.8 4.0 - 11.0 10e3/uL    RBC Count 4.80 4.40 - 5.90 10e6/uL    Hemoglobin 13.4 13.3 - 17.7 g/dL    Hematocrit 40.3 40.0 - 53.0 %    MCV 84 78 - 100 fL    MCH 27.9 26.5 - 33.0 pg    MCHC 33.3 31.5 - 36.5 g/dL    RDW 13.2 10.0 - 15.0 %    Platelet Count 187 150 - 450 10e3/uL    % Neutrophils 52 %    % Lymphocytes 27 %    % Monocytes 11 %    % Eosinophils 9 %    % Basophils 1 %    % Immature Granulocytes 0 %    Absolute Neutrophils 2.5 1.6 - 8.3  10e3/uL    Absolute Lymphocytes 1.3 0.8 - 5.3 10e3/uL    Absolute Monocytes 0.5 0.0 - 1.3 10e3/uL    Absolute Eosinophils 0.4 0.0 - 0.7 10e3/uL    Absolute Basophils 0.0 0.0 - 0.2 10e3/uL    Absolute Immature Granulocytes 0.0 <=0.4 10e3/uL   CBC with platelets and differential     Status: None    Narrative    The following orders were created for panel order CBC with platelets and differential.  Procedure                               Abnormality         Status                     ---------                               -----------         ------                     CBC with platelets and d...[[id number]]                      Final result                 Please view results for these tests on the individual orders.

## 2022-11-14 PROBLEM — Z85.46 PERSONAL HISTORY OF PROSTATE CANCER: Status: ACTIVE | Noted: 2022-11-14

## 2023-01-24 ENCOUNTER — HOSPITAL ENCOUNTER (OUTPATIENT)
Dept: ULTRASOUND IMAGING | Facility: CLINIC | Age: 78
Discharge: HOME | End: 2023-01-24
Attending: SURGERY
Payer: COMMERCIAL

## 2023-01-24 DIAGNOSIS — I73.9 SEVERE ARTERIAL INSUFFICIENCY OF RIGHT LOWER EXTREMITY (H): ICD-10-CM

## 2023-01-24 DIAGNOSIS — I70.223 ATHEROSCLEROSIS OF NATIVE ARTERIES OF EXTREMITIES WITH REST PAIN, BILATERAL LEGS (H): ICD-10-CM

## 2023-01-24 DIAGNOSIS — I73.9 PVD (PERIPHERAL VASCULAR DISEASE) (H): ICD-10-CM

## 2023-01-24 DIAGNOSIS — I71.40 AAA (ABDOMINAL AORTIC ANEURYSM) WITHOUT RUPTURE (H): ICD-10-CM

## 2023-01-24 PROCEDURE — 93925 LOWER EXTREMITY STUDY: CPT

## 2023-01-24 PROCEDURE — 93978 VASCULAR STUDY: CPT

## 2023-01-24 PROCEDURE — 93922 UPR/L XTREMITY ART 2 LEVELS: CPT

## 2023-01-25 DIAGNOSIS — I71.20 THORACIC AORTIC ANEURYSM WITHOUT RUPTURE (H): ICD-10-CM

## 2023-01-25 DIAGNOSIS — I71.40 AAA (ABDOMINAL AORTIC ANEURYSM) WITHOUT RUPTURE (H): Primary | ICD-10-CM

## 2023-01-30 NOTE — PROGRESS NOTES
Patient is in clinic today to see MD Silvia Ruiz.      Patient is here for a follow up to discuss PAD.    The patient does not smoke.    Pt is currently taking ASA.    The patient states he/she are NOT wearing compression .    Swelling is observed in N/A.    Pt rates pain 0/10 located at .    Pts symptoms include none in  extremity.    Patients condition is improved.    The provider has been notified that the patient has no complaints.

## 2023-01-31 ENCOUNTER — OFFICE VISIT (OUTPATIENT)
Dept: VASCULAR SURGERY | Facility: CLINIC | Age: 78
End: 2023-01-31
Attending: SURGERY
Payer: COMMERCIAL

## 2023-01-31 ENCOUNTER — HOSPITAL ENCOUNTER (OUTPATIENT)
Dept: CT IMAGING | Facility: CLINIC | Age: 78
Discharge: HOME | End: 2023-01-31
Attending: SURGERY | Admitting: SURGERY
Payer: COMMERCIAL

## 2023-01-31 VITALS
OXYGEN SATURATION: 95 % | TEMPERATURE: 97.1 F | HEART RATE: 69 BPM | SYSTOLIC BLOOD PRESSURE: 168 MMHG | DIASTOLIC BLOOD PRESSURE: 102 MMHG

## 2023-01-31 DIAGNOSIS — I71.43 INFRARENAL ABDOMINAL AORTIC ANEURYSM (AAA) WITHOUT RUPTURE (H): Primary | ICD-10-CM

## 2023-01-31 DIAGNOSIS — I71.20 THORACIC AORTIC ANEURYSM WITHOUT RUPTURE (H): ICD-10-CM

## 2023-01-31 LAB
CREAT BLD-MCNC: 1.2 MG/DL (ref 0.7–1.3)
GFR SERPL CREATININE-BSD FRML MDRD: >60 ML/MIN/1.73M2

## 2023-01-31 PROCEDURE — 74174 CTA ABD&PLVS W/CONTRAST: CPT

## 2023-01-31 PROCEDURE — 250N000009 HC RX 250: Performed by: SURGERY

## 2023-01-31 PROCEDURE — 250N000011 HC RX IP 250 OP 636: Performed by: SURGERY

## 2023-01-31 PROCEDURE — 99214 OFFICE O/P EST MOD 30 MIN: CPT | Performed by: SURGERY

## 2023-01-31 PROCEDURE — 82565 ASSAY OF CREATININE: CPT

## 2023-01-31 RX ORDER — IOPAMIDOL 755 MG/ML
72 INJECTION, SOLUTION INTRAVASCULAR ONCE
Status: COMPLETED | OUTPATIENT
Start: 2023-01-31 | End: 2023-01-31

## 2023-01-31 RX ADMIN — IOPAMIDOL 72 ML: 755 INJECTION, SOLUTION INTRAVENOUS at 15:13

## 2023-01-31 RX ADMIN — SODIUM CHLORIDE 100 ML: 9 INJECTION, SOLUTION INTRAVENOUS at 15:13

## 2023-01-31 NOTE — NURSING NOTE
"Initial BP (!) 168/102   Pulse 69   Temp 97.1  F (36.2  C) (Tympanic)   SpO2 95%  Estimated body mass index is 22.55 kg/m  as calculated from the following:    Height as of 2/16/22: 1.702 m (5' 7\").    Weight as of 10/27/22: 65.3 kg (144 lb). .    Lalitha Jasso LPN on 1/31/2023 at 3:36 PM    "

## 2023-01-31 NOTE — PATIENT INSTRUCTIONS
Savi Mata,    Thank you for entrusting your care with us today. After your visit today with MD Aleksander Ruiz this is the plan that was discussed at your appointment.    Repeat CTA in 6 months    Follow up with Dr Ruiz after imaging      I am including additional information on these things and our contact information if you have any questions or concerns.   Please do not hesitate to reach out to us if you felt we did not answer your questions or you are unsure of the treatment plan after your visit today. Our number is [phone].Thank you for trusting us with your care.         Again thank you for your time.     Christiane Jimenez RN

## 2023-01-31 NOTE — PROGRESS NOTES
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  It is same time he was found to have 4.6 [place] AAA.  Patient was started on best medical management therapy and exercise walking program.  His symptoms had improved significantly, and now he does not complain of any major symptoms in right lower extremity.  Most recent CT scan did show growth of the AAA up to 5.3 cm.  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    RECOMMENDATION/RISKS: Continue optimal medical therapy.  We will see this patient back in 6 months with repeat CT scan    HPI:  Adolfo Rendon is a 77 year old male who was seen today for follow-up.  Patient denies any complaints*    REVIEW OF SYSTEMS:    A 12 point ROS was reviewed and is negative      PHH:    Past Medical History:   Diagnosis Date     Coronary artery disease      Depressive disorder, not elsewhere classified      Hypertrophy (benign) of prostate      Impotence of organic origin      Other and unspecified hyperlipidemia      Other anxiety states      Tobacco use disorder           Past Surgical History:   Procedure Laterality Date     APPENDECTOMY  1966     BYPASS GRAFT ARTERY CORONARY N/A 9/3/2019    Procedure: CORONARY ARTERY BYPASS GRAFT X 4 - LIMA TO LAD, SV TO PL, OM, PDA ; ON PUMP WITH TERRENCE; ENDOVEIN HARVEST RIGHT LEG;  Surgeon: Lai Mchugh MD;  Location: [facility] OR     COLONOSCOPY  3/1/2005     COLONOSCOPY N/A 5/9/2019    Procedure: COLONOSCOPY;  Surgeon: Chirag  Camilo CRAIG MD;  Location: [facility]     CV LEFT HEART CATH N/A 8/8/2019    Procedure: Left Heart Cath--also measure LVEDP;  Surgeon: Krystle Barrera MD;  Location:  HEART CARDIAC CATH LAB     HERNIORRHAPHY INGUINAL  7/14/2011    Procedure:HERNIORRHAPHY INGUINAL; Open Repair Right Inguinal Hernia Repair        HYDROCELECTOMY SCROTAL  01/2004    left hydrocele repair     SUPRAPUBIC PROSTATECTOMY         ALLERGIES:  Claritin, Hctz [hydrochlorothiazide], Metoprolol, Pravastatin, Remeron [mirtazapine], and Wellbutrin [bupropion hcl]    MEDS:    Current Outpatient Medications:      amLODIPine (NORVASC) 5 MG tablet, Take 1 tablet (5 mg) by mouth daily, Disp: 90 tablet, Rfl: 3     aspirin 81 MG EC tablet, Take 81 mg by mouth daily, Disp: , Rfl:      carvedilol (COREG) 12.5 MG tablet, Take 1 tablet (12.5 mg) by mouth 2 times daily (with meals), Disp: 180 tablet, Rfl: 3     co-enzyme Q-10 100 MG CAPS capsule, Take 100 mg by mouth daily, Disp: , Rfl:      evolocumab (REPATHA) 140 MG/ML prefilled autoinjector, Inject 1 mL (140 mg) Subcutaneous every 14 days, Disp: 6 mL, Rfl: 0     ezetimibe (ZETIA) 10 MG tablet, Take 1 tablet (10 mg) by mouth daily, Disp: 90 tablet, Rfl: 3     nicotine polacrilex (NICORETTE) 4 MG gum, Place 4 mg inside cheek every hour as needed for smoking cessation, Disp: , Rfl:      PARoxetine (PAXIL) 30 MG tablet, Take 2 tablets (60 mg) by mouth daily, Disp: 180 tablet, Rfl: 3     STRIVERDI RESPIMAT 2.5 MCG/ACT AERS, USE 2 INHALATIONS DAILY, Disp: 12 g, Rfl: 3     Vitamin D, Cholecalciferol, 25 MCG (1000 UT) TABS, Take 1,000 Units by mouth daily , Disp: , Rfl:      senna-docusate (SENEXON-S) 8.6-50 MG tablet, TAKE ONE TABLET BY MOUTH TWICE A DAY AS NEEDED FOR CONSTIPATION Strength: 8.6-50 mg (Patient not taking: Reported on 1/31/2023), Disp: 180 tablet, Rfl: 3  No current facility-administered medications for this visit.    SOCIAL HABITS:    History   Smoking Status     Former     Packs/day: 1.00      Types: Cigarettes     Quit date: 1/14/2017   Smokeless Tobacco     Former     Quit date: 9/1/2017     Social History    Substance and Sexual Activity      Alcohol use: No        Alcohol/week: 0.0 standard drinks        Comment: quit 1989      History   Drug Use No       FAMILY HISTORY:    Family History   Problem Relation Age of Onset     Cerebrovascular Disease Mother      Hypertension Mother      Prostate Cancer Brother      Dementia Brother      Arthritis Sister         rhematoid     Cancer Brother         Lung cancer, lymphoma     Other - See Comments Brother         HIV     Cancer Sister         skin cancer on nose     Pacemaker Sister      Aneurysm Sister         heart       PE:  BP (!) 168/102   Pulse 69   Temp 97.1  F (36.2  C) (Tympanic)   SpO2 95%   Wt Readings from Last 1 Encounters:   10/27/22 65.3 kg (144 lb)     There is no height or weight on file to calculate BMI.    EXAM:  GENERAL: This is a well-developed 77 year old male who appears his stated age  EYES: Grossly normal.  MOUTH: Buccal mucosa normal   CARDIAC: Normal   CHEST/LUNG: Clear to auscultation bilaterally  GASTROINTESINAL soft nontender nondistended  MUSCULOSKELETAL: Grossly normal and both lower extremities are intact.  HEME/LYMPH: No lymphedema  NEUROLOGIC: Focally intact, Alert and oriented x 3.   PSYCH: appropriate affect  INTEGUMENT: No open lesions or ulcers          DIAGNOSTIC STUDIES:     Images:  US RAUL with PPG wo Exercise Bilateral    Result Date: 1/24/2023  ULTRASOUND BILATERAL ANKLE-BRACHIAL INDEX WITH PPG WITHOUT EXERCISE 1/24/2023 9:40 AM HISTORY: PVD (peripheral vascular disease) (H). Severe arterial insufficiency of right lower extremity (H). Atherosclerosis of native arteries of extremities with rest pain, bilateral legs (H). COMPARISON: None. FINDINGS: Right RAUL: PT: 86, index of 0.55. DP: 73, index of 0.46. Left RAUL: PT: 132, index of 0.84. DP: 121, index of 0.77. Right Digital brachial index: 71, index of 0.45. Left  Digital Brachial index: 65, index of 0.41. Waveforms: Right posterior tibial arterial waveform is monophasic. Dorsalis pedis waveform appears absent. Digital waveform appears absent. The left posterior tibial arterial waveform is triphasic. The dorsalis pedis waveform is monophasic. Digital waveform is considerably reduced in amplitude and morphology.     IMPRESSION: 1. Severe arterial insufficiency in the right lower extremity with essentially absent dorsalis pedis and digital waveforms. 2. Moderate arterial insufficiency in the left lower extremity with significantly reduced digital waveform. RAUL CRITERIA: >1.4 NC 0.95-1.4 Normal 0.90 - 0.94 Mild 0.5 - 0.89 Moderate 0.2 - 0.49 Severe <0.2 Critical MELINA RAYMUNDO MD   SYSTEM ID:  [id number]    US Lower Extremity Arterial Duplex Bilateral    Result Date: 1/24/2023  ULTRASOUND BILATERAL LOWER EXTREMITY ARTERIAL DUPLEX January 24, 2023 at 1045 hours HISTORY: 77-year-old patient with history of peripheral arterial disease, severe in the right lower extremity. COMPARISON: January 25, 2022, evaluation of the right lower extremity. TECHNIQUE: Color Doppler and spectral waveform analysis performed throughout the arteries of both lower extremities. FINDINGS: Left lower extremity: The common femoral artery is 1 cm, velocity 47 cm/second, biphasic. Profunda femoral artery is 7.9 mm, 39/8 cm/second and biphasic. The proximal SFA is 81 cm/second, 4.2 mm. The mid SFA is 89 cm/second, 4.4 mm. Waveforms are biphasic. Distal SFA is 37 cm/second, measuring up to 1.1 cm. Mild scattered atherosclerotic plaque. The popliteal artery measures up to 1 cm and 40 cm/second with biphasic waveform. The peroneal, posterior tibial artery waveforms are biphasic. Distal anterior tibial waveform is retrograde. Right lower extremity: The common femoral artery is 8.5 mm, 36/6 cm/second. Profunda femoral artery is 4.2 mm and 57 cm/second. Proximal SFA is 53/7 cm/second and 6 mm with biphasic waveform.  Mid SFA is 63/9 cm/second, 4.7 mm, and triphasic waveform. Distal SFA is aneurysmal measuring up to 1.9 cm, and occluded. Popliteal artery measures up to 2.2 cm, and occluded. Trickle of blood flow noted in the proximal anterior tibial artery, though minimal blood flow on the runoff arteries.     IMPRESSION: 1. Aneurysmal distal right SFA and popliteal arteries, occluded, with variable thrombus on previous exam, though overall extent appears progressed, though this is difficult to compare with ultrasound imaging. Trickle of blood flow in the runoff arteries in the right lower extremity anterior tibial artery. Uncertain if any demonstrable blood flow in the right peroneal or posterior tibial arteries. 2. Patent arteries in the left lower extremity with ectatic distal superficial femoral and popliteal arteries, measuring up to 1-1.1 cm. Scattered disease in the runoff arteries, predominantly in the anterior tibial artery with reversed flow distally. MELINA RAYMUNDO MD   SYSTEM ID:  [id number]    US Aorta/Ivc/Iliac Duplex Complete    Result Date: 1/24/2023  ULTRASOUND AORTA/IVC/ILIAC DUPLEX COMPLETE January 4, 2023 at 1010 hours HISTORY: 77-year-old patient with history of abdominal aortic aneurysm. COMPARISON: CTA performed January 25, 2022 in the infrarenal abdominal aorta. TECHNIQUE: Color Doppler and spectral waveform analysis performed throughout the abdominal aorta and iliac arteries. FINDINGS: Proximal abdominal aorta is 2.6 x 3 cm, 71/14 cm/second. Mid abdominal aorta is 2.9 x 2.5 cm, 69/15 cm/second. The infrarenal abdominal aorta measures up to 5.1 cm AP by 4.7 cm transverse, previously 4.9 x 4.3 cm, though difference in modality may count at least in part, for difference in measurements. Moderate mural thrombus. The right common iliac artery measures 2.3 x 2.3 cm on today's exam, previous CTA measuring 2.6 cm. The left common iliac artery is 1.2 x 1.3 cm.     IMPRESSION: 1. Infrarenal abdominal aorta is  aneurysmal at 5.1 cm AP by 4.7 cm transverse, previously 4.9 x 4.3 cm. Somewhat difficult to compare different modalities measurements. 2. Right common iliac artery is 2.3 x 2.3 cm. MELINA RAYMUNDO MD   SYSTEM ID:  [id number]          LABS:      Sodium   Date Value Ref Range Status   10/27/2022 139 136 - 145 mmol/L Final   10/25/2021 137 133 - 144 mmol/L Final   03/23/2021 136 133 - 144 mmol/L Final   02/06/2020 142 133 - 144 mmol/L Final   09/08/2019 138 133 - 144 mmol/L Final     Urea Nitrogen   Date Value Ref Range Status   10/27/2022 22.3 8.0 - 23.0 mg/dL Final   10/25/2021 19 7 - 30 mg/dL Final   03/23/2021 19 7 - 30 mg/dL Final   02/06/2020 22 7 - 30 mg/dL Final   09/08/2019 16 7 - 30 mg/dL Final     Hemoglobin   Date Value Ref Range Status   10/27/2022 13.4 13.3 - 17.7 g/dL Final   03/23/2021 13.6 13.3 - 17.7 g/dL Final   09/16/2019 9.3 (L) 13.3 - 17.7 g/dL Final   09/08/2019 10.4 (L) 13.3 - 17.7 g/dL Final     Platelet Count   Date Value Ref Range Status   10/27/2022 187 150 - 450 10e3/uL Final   03/23/2021 177 150 - 450 10e9/L Final   09/08/2019 171 150 - 450 10e9/L Final   09/06/2019 105 (L) 150 - 450 10e9/L Final     INR   Date Value Ref Range Status   09/03/2019 1.30 (H) 0.86 - 1.14 Final   09/03/2019 1.47 (H) 0.86 - 1.14 Final   08/08/2019 0.96 0.86 - 1.14 Final       30 minutes spent on the day of encounter doing chart review, history and exam, documentation, and further activities as noted.         Aleksander Ruiz MD, [facility]  VASCULAR SURGERY

## 2023-04-17 DIAGNOSIS — E78.5 HYPERLIPIDEMIA LDL GOAL <70: ICD-10-CM

## 2023-04-17 DIAGNOSIS — Z78.9 STATIN INTOLERANCE: ICD-10-CM

## 2023-04-17 DIAGNOSIS — I25.10 CORONARY ARTERY DISEASE INVOLVING NATIVE CORONARY ARTERY OF NATIVE HEART WITHOUT ANGINA PECTORIS: ICD-10-CM

## 2023-04-17 DIAGNOSIS — Z95.1 S/P CABG X 4: ICD-10-CM

## 2023-04-17 NOTE — CONFIDENTIAL NOTE
[facility] Cardiology Refill Guideline reviewed.  Medication meets criteria for refill.     No further refills until seen by cardiology.    Phoebe Driscoll RN

## 2023-04-17 NOTE — TELEPHONE ENCOUNTER
LF: 2/28/23  Qty: 6  Last Cardiology Visit: 1/27/22   Next Scheduled Cardiology Visit: none scheduled

## 2023-04-25 ENCOUNTER — TELEPHONE (OUTPATIENT)
Dept: FAMILY MEDICINE | Facility: CLINIC | Age: 78
End: 2023-04-25
Payer: COMMERCIAL

## 2023-05-18 ENCOUNTER — TELEPHONE (OUTPATIENT)
Dept: VASCULAR SURGERY | Facility: CLINIC | Age: 78
End: 2023-05-18
Payer: COMMERCIAL

## 2023-05-23 NOTE — TELEPHONE ENCOUNTER
6 month follow up visit with Dr. Ruiz with imaging scheduled for July 25th in [place].   
Call clinic to schedule CTA and OV with ET in July  
Left message for pt to call and schedule 6 month follow up due end of July/begining of Aug with CTA      Message  Received: 3 months ago  Chrsitiane Jimenez RN  [facility]  6 months cta and [facility]       
resilient/elastic

## 2023-06-15 DIAGNOSIS — F33.2 SEVERE EPISODE OF RECURRENT MAJOR DEPRESSIVE DISORDER, WITHOUT PSYCHOTIC FEATURES (H): ICD-10-CM

## 2023-06-15 RX ORDER — PAROXETINE 30 MG/1
TABLET, FILM COATED ORAL
Qty: 180 TABLET | Refills: 0 | Status: SHIPPED | OUTPATIENT
Start: 2023-06-15 | End: 2023-08-29

## 2023-07-25 ENCOUNTER — OFFICE VISIT (OUTPATIENT)
Dept: VASCULAR SURGERY | Facility: CLINIC | Age: 78
End: 2023-07-25
Attending: SURGERY
Payer: COMMERCIAL

## 2023-07-25 ENCOUNTER — HOSPITAL ENCOUNTER (OUTPATIENT)
Dept: CT IMAGING | Facility: CLINIC | Age: 78
Discharge: HOME | End: 2023-07-25
Attending: SURGERY | Admitting: SURGERY
Payer: COMMERCIAL

## 2023-07-25 VITALS
DIASTOLIC BLOOD PRESSURE: 86 MMHG | BODY MASS INDEX: 23.1 KG/M2 | OXYGEN SATURATION: 96 % | SYSTOLIC BLOOD PRESSURE: 139 MMHG | TEMPERATURE: 96.8 F | WEIGHT: 147.2 LBS | HEIGHT: 67 IN | HEART RATE: 54 BPM

## 2023-07-25 DIAGNOSIS — I71.43 INFRARENAL ABDOMINAL AORTIC ANEURYSM (AAA) WITHOUT RUPTURE (H): Primary | ICD-10-CM

## 2023-07-25 DIAGNOSIS — I71.43 INFRARENAL ABDOMINAL AORTIC ANEURYSM (AAA) WITHOUT RUPTURE (H): ICD-10-CM

## 2023-07-25 LAB
CREAT BLD-MCNC: 1.1 MG/DL (ref 0.7–1.3)
GFR SERPL CREATININE-BSD FRML MDRD: >60 ML/MIN/1.73M2

## 2023-07-25 PROCEDURE — 250N000009 HC RX 250: Performed by: SURGERY

## 2023-07-25 PROCEDURE — 74174 CTA ABD&PLVS W/CONTRAST: CPT

## 2023-07-25 PROCEDURE — 99213 OFFICE O/P EST LOW 20 MIN: CPT | Performed by: SURGERY

## 2023-07-25 PROCEDURE — 250N000011 HC RX IP 250 OP 636: Performed by: SURGERY

## 2023-07-25 PROCEDURE — 82565 ASSAY OF CREATININE: CPT

## 2023-07-25 RX ORDER — IOPAMIDOL 755 MG/ML
80 INJECTION, SOLUTION INTRAVASCULAR ONCE
Status: COMPLETED | OUTPATIENT
Start: 2023-07-25 | End: 2023-07-25

## 2023-07-25 RX ADMIN — SODIUM CHLORIDE 100 ML: 9 INJECTION, SOLUTION INTRAVENOUS at 14:23

## 2023-07-25 RX ADMIN — IOPAMIDOL 80 ML: 755 INJECTION, SOLUTION INTRAVENOUS at 14:23

## 2023-07-25 ASSESSMENT — PAIN SCALES - GENERAL: PAINLEVEL: NO PAIN (0)

## 2023-07-25 NOTE — NURSING NOTE
"Initial /86   Pulse 54   Temp 96.8  F (36  C) (Tympanic)   Ht 1.702 m (5' 7\")   Wt 66.8 kg (147 lb 3.2 oz)   SpO2 96%   BMI 23.05 kg/m   Estimated body mass index is 23.05 kg/m  as calculated from the following:    Height as of this encounter: 1.702 m (5' 7\").    Weight as of this encounter: 66.8 kg (147 lb 3.2 oz). .    Lalitha Jasso LPN on 7/25/2023 at 2:54 PM    "

## 2023-07-25 NOTE — PROGRESS NOTES
Patient is in clinic today to see MD Silvia Ruiz.      Patient is here for a follow up to discuss AAA.    The patient does not smoke.    Pt is currently taking ASA.    The patient states he/she are NOT wearing compression .    Swelling is observed in N/A.    Pt rates pain 0/10 located at .    Pts symptoms include Rest Pain, leg cramps, and pain with walking at a distance of 500 yds  in Bilateral  extremity.    Patients condition is stable.    The provider has been notified that the patient is complaining of discuss surgery possibilities. .

## 2023-07-25 NOTE — PATIENT INSTRUCTIONS
Savi Mata,    Thank you for entrusting your care with us today. After your visit today with MD Aleksander Ruiz this is the plan that was discussed at your appointment.    We will contact you to schedule 6 month follow up with repeat CTA scan      I am including additional information on these things and our contact information if you have any questions or concerns.   Please do not hesitate to reach out to us if you felt we did not answer your questions or you are unsure of the treatment plan after your visit today. Our number is [phone].Thank you for trusting us with your care.         Again thank you for your time.

## 2023-07-25 NOTE — PROGRESS NOTES
VASCULAR SURGERY PROGRESS NOTE   VASCULAR SURGEON: Aleksander Ruiz MD, RPVI     LOCATION:  [facility]     Adolfo Rendon   Medical Record #:  [id number]  YOB: 1945  Age:  77 year old     Date of Service: 7/25/2023    PRIMARY CARE PROVIDER: Danielle Mercado      Reason for visit:  follow up infrarenal AAA    IMPRESSION:  77 year old male returns to vascular center Most recent CT scan shows stable AAA. Patient is asymptomatic and on best medical management.     RECOMMENDATION/RISKS:  follow up in 6 months with repeat CTA Abdomen & pelvis. Continue best medical management.    HPI:  Adolfo Rendon is a 77 year old male who was seen today for follow up.    REVIEW OF SYSTEMS:    A 12 point ROS was reviewed and is negative.     PHH:    Past Medical History:   Diagnosis Date    Coronary artery disease     Depressive disorder, not elsewhere classified     Hypertrophy (benign) of prostate     Impotence of organic origin     Other and unspecified hyperlipidemia     Other anxiety states     Tobacco use disorder           Past Surgical History:   Procedure Laterality Date    APPENDECTOMY  1966    BYPASS GRAFT ARTERY CORONARY N/A 9/3/2019    Procedure: CORONARY ARTERY BYPASS GRAFT X 4 - LIMA TO LAD, SV TO PL, OM, PDA ; ON PUMP WITH TERRENCE; ENDOVEIN HARVEST RIGHT LEG;  Surgeon: Lai Mchugh MD;  Location:  OR    COLONOSCOPY  3/1/2005    COLONOSCOPY N/A 5/9/2019    Procedure: COLONOSCOPY;  Surgeon: Camilo Beard MD;  Location: [facility] LEFT HEART CATH N/A 8/8/2019    Procedure: Left Heart Cath--also measure LVEDP;  Surgeon: Krystle Barrera MD;  Location:  HEART CARDIAC CATH LAB    HERNIORRHAPHY INGUINAL  7/14/2011    Procedure:HERNIORRHAPHY INGUINAL; Open Repair Right Inguinal Hernia Repair       HYDROCELECTOMY SCROTAL  01/2004    left hydrocele repair    SUPRAPUBIC PROSTATECTOMY         ALLERGIES:  Hctz [hydrochlorothiazide], Loratadine, Metoprolol, Pravastatin,  Remeron [mirtazapine], and Wellbutrin [bupropion hcl]    MEDS:    Current Outpatient Medications:     amLODIPine (NORVASC) 5 MG tablet, Take 1 tablet (5 mg) by mouth daily, Disp: 90 tablet, Rfl: 3    aspirin 81 MG EC tablet, Take 81 mg by mouth daily, Disp: , Rfl:     carvedilol (COREG) 12.5 MG tablet, Take 1 tablet (12.5 mg) by mouth 2 times daily (with meals), Disp: 180 tablet, Rfl: 3    co-enzyme Q-10 100 MG CAPS capsule, Take 100 mg by mouth daily, Disp: , Rfl:     evolocumab (REPATHA) 140 MG/ML prefilled autoinjector, Inject 1 mL (140 mg) Subcutaneous every 14 days No further refills until seen by cardiology--call [phone] to schedule., Disp: 6 mL, Rfl: 0    ezetimibe (ZETIA) 10 MG tablet, Take 1 tablet (10 mg) by mouth daily, Disp: 90 tablet, Rfl: 3    nicotine polacrilex (NICORETTE) 4 MG gum, Place 4 mg inside cheek every hour as needed for smoking cessation, Disp: , Rfl:     PARoxetine (PAXIL) 30 MG tablet, TAKE 2 TABLETS DAILY, Disp: 180 tablet, Rfl: 0    senna-docusate (SENEXON-S) 8.6-50 MG tablet, TAKE ONE TABLET BY MOUTH TWICE A DAY AS NEEDED FOR CONSTIPATION Strength: 8.6-50 mg (Patient not taking: Reported on 1/31/2023), Disp: 180 tablet, Rfl: 3    STRIVERDI RESPIMAT 2.5 MCG/ACT AERS, USE 2 INHALATIONS DAILY, Disp: 12 g, Rfl: 3    Vitamin D, Cholecalciferol, 25 MCG (1000 UT) TABS, Take 1,000 Units by mouth daily , Disp: , Rfl:   No current facility-administered medications for this visit.    SOCIAL HABITS:    History   Smoking Status    Former    Packs/day: 1.00    Types: Cigarettes    Quit date: 1/14/2017   Smokeless Tobacco    Former    Quit date: 9/1/2017     Social History    Substance and Sexual Activity      Alcohol use: No        Alcohol/week: 0.0 standard drinks of alcohol        Comment: quit 1989      History   Drug Use No       FAMILY HISTORY:    Family History   Problem Relation Age of Onset    Cerebrovascular Disease Mother     Hypertension Mother     Prostate Cancer Brother      Dementia Brother     Arthritis Sister         rhematoid    Cancer Brother         Lung cancer, lymphoma    Other - See Comments Brother         HIV    Cancer Sister         skin cancer on nose    Pacemaker Sister     Aneurysm Sister         heart       PE:  There were no vitals taken for this visit.  Wt Readings from Last 1 Encounters:   10/27/22 65.3 kg (144 lb)     There is no height or weight on file to calculate BMI.    EXAM:  GENERAL: This is a well-developed 77 year old male who appears his stated age  EYES: Grossly normal.  MOUTH: Buccal mucosa normal   CARDIAC: Normal   CHEST/LUNG: Clear to auscultation bilaterally  GASTROINTESINAL soft nontender nondistended  MUSCULOSKELETAL: Grossly normal and both lower extremities are intact.  HEME/LYMPH: No lymphedema  NEUROLOGIC: Focally intact, Alert and oriented x 3.   PSYCH: appropriate affect  INTEGUMENT: No open lesions or ulcers    DIAGNOSTIC STUDIES:     Images:  No results found.      LABS:      Sodium   Date Value Ref Range Status   10/27/2022 139 136 - 145 mmol/L Final   10/25/2021 137 133 - 144 mmol/L Final   03/23/2021 136 133 - 144 mmol/L Final   02/06/2020 142 133 - 144 mmol/L Final   09/08/2019 138 133 - 144 mmol/L Final     Urea Nitrogen   Date Value Ref Range Status   10/27/2022 22.3 8.0 - 23.0 mg/dL Final   10/25/2021 19 7 - 30 mg/dL Final   03/23/2021 19 7 - 30 mg/dL Final   02/06/2020 22 7 - 30 mg/dL Final   09/08/2019 16 7 - 30 mg/dL Final     Hemoglobin   Date Value Ref Range Status   10/27/2022 13.4 13.3 - 17.7 g/dL Final   03/23/2021 13.6 13.3 - 17.7 g/dL Final   09/16/2019 9.3 (L) 13.3 - 17.7 g/dL Final   09/08/2019 10.4 (L) 13.3 - 17.7 g/dL Final     Platelet Count   Date Value Ref Range Status   10/27/2022 187 150 - 450 10e3/uL Final   03/23/2021 177 150 - 450 10e9/L Final   09/08/2019 171 150 - 450 10e9/L Final   09/06/2019 105 (L) 150 - 450 10e9/L Final     INR   Date Value Ref Range Status   09/03/2019 1.30 (H) 0.86 - 1.14 Final    09/03/2019 1.47 (H) 0.86 - 1.14 Final   08/08/2019 0.96 0.86 - 1.14 Final       30 minutes spent on the day of encounter doing chart review, history and exam, documentation, and further activities as noted.     Aleksander Ruiz MD, [facility]    VASCULAR SURGERY

## 2023-08-10 DIAGNOSIS — J44.9 CHRONIC OBSTRUCTIVE PULMONARY DISEASE, UNSPECIFIED COPD TYPE (H): ICD-10-CM

## 2023-08-10 RX ORDER — OLODATEROL RESPIMAT INHALATION SPRAY 2.5 UG/1
SPRAY, METERED RESPIRATORY (INHALATION)
Qty: 12 G | Refills: 3 | Status: SHIPPED | OUTPATIENT
Start: 2023-08-10 | End: 2023-11-14

## 2023-08-10 NOTE — TELEPHONE ENCOUNTER
"Requested Prescriptions   Pending Prescriptions Disp Refills    STRIVERDI RESPIMAT 2.5 MCG/ACT AERS [Pharmacy Med Name: STRIVERDI RESPIMAT INH SPR4GM 2.5MCG] 12 g 3     Sig: USE 2 INHALATIONS DAILY       Long-Acting Beta Agonist Inhalers Protocol  Failed - 8/10/2023  9:45 AM        Failed - Order for Serevent, Striverdi, or Foradil and pt has steroid inhaler        Passed - Patient is age 12 or older        Passed - Recent (12 mo) or future (30 days) visit within the authorizing provider's specialty     Patient has had an office visit with the authorizing provider or a provider within the authorizing providers department within the previous 12 mos or has a future within next 30 days. See \"Patient Info\" tab in inbasket, or \"Choose Columns\" in Meds & Orders section of the refill encounter.              Passed - Medication is active on med list             "

## 2023-08-29 DIAGNOSIS — F33.2 SEVERE EPISODE OF RECURRENT MAJOR DEPRESSIVE DISORDER, WITHOUT PSYCHOTIC FEATURES (H): ICD-10-CM

## 2023-08-29 RX ORDER — PAROXETINE 30 MG/1
60 TABLET, FILM COATED ORAL EVERY MORNING
Qty: 60 TABLET | Refills: 0 | Status: SHIPPED | OUTPATIENT
Start: 2023-08-29 | End: 2023-10-24

## 2023-09-21 DIAGNOSIS — I25.10 CORONARY ARTERY DISEASE INVOLVING NATIVE CORONARY ARTERY OF NATIVE HEART WITHOUT ANGINA PECTORIS: ICD-10-CM

## 2023-09-21 DIAGNOSIS — Z86.79 H/O CARDIOMYOPATHY: ICD-10-CM

## 2023-09-21 DIAGNOSIS — E78.5 HYPERLIPIDEMIA LDL GOAL <70: Primary | ICD-10-CM

## 2023-09-25 DIAGNOSIS — E78.5 HYPERLIPIDEMIA LDL GOAL <70: ICD-10-CM

## 2023-09-25 DIAGNOSIS — I25.10 CORONARY ARTERY DISEASE INVOLVING NATIVE CORONARY ARTERY OF NATIVE HEART WITHOUT ANGINA PECTORIS: ICD-10-CM

## 2023-09-25 DIAGNOSIS — Z78.9 STATIN INTOLERANCE: ICD-10-CM

## 2023-09-25 DIAGNOSIS — Z95.1 S/P CABG X 4: ICD-10-CM

## 2023-09-25 NOTE — TELEPHONE ENCOUNTER
Pt scheduled for next visit 12/21/23.     Rx refilled to get pt to next visit     [facility] Cardiology Refill Guideline reviewed.  Medication meets criteria for refill.    Glory Leong RN

## 2023-09-25 NOTE — TELEPHONE ENCOUNTER
Last Office Visit: 01/27/22 SHE Clemons  Next Office Visit: 12/21/23 Dr. Espino  Last Fill Date: 08/10/23  Janee Mack MA Cardiology   9/25/2023 10:18 AM

## 2023-10-24 DIAGNOSIS — F33.2 SEVERE EPISODE OF RECURRENT MAJOR DEPRESSIVE DISORDER, WITHOUT PSYCHOTIC FEATURES (H): ICD-10-CM

## 2023-10-24 RX ORDER — PAROXETINE 30 MG/1
TABLET, FILM COATED ORAL
Qty: 60 TABLET | Refills: 0 | Status: SHIPPED | OUTPATIENT
Start: 2023-10-24 | End: 2023-11-14

## 2023-11-10 DIAGNOSIS — I10 BENIGN ESSENTIAL HYPERTENSION: ICD-10-CM

## 2023-11-10 RX ORDER — CARVEDILOL 12.5 MG/1
12.5 TABLET ORAL 2 TIMES DAILY WITH MEALS
Qty: 180 TABLET | Refills: 0 | Status: SHIPPED | OUTPATIENT
Start: 2023-11-10 | End: 2023-11-14

## 2023-11-10 NOTE — TELEPHONE ENCOUNTER
Britta refill given x 1, has appointment scheduled 11/14/23 with Danielle Mercado NP.   Prescription approved per [facility] Refill Protocol.  Julie Behrendt RN

## 2023-11-14 ENCOUNTER — OFFICE VISIT (OUTPATIENT)
Dept: FAMILY MEDICINE | Facility: CLINIC | Age: 78
End: 2023-11-14
Payer: COMMERCIAL

## 2023-11-14 VITALS
TEMPERATURE: 97 F | SYSTOLIC BLOOD PRESSURE: 138 MMHG | WEIGHT: 147 LBS | RESPIRATION RATE: 14 BRPM | HEART RATE: 66 BPM | OXYGEN SATURATION: 95 % | BODY MASS INDEX: 22.28 KG/M2 | DIASTOLIC BLOOD PRESSURE: 78 MMHG | HEIGHT: 68 IN

## 2023-11-14 DIAGNOSIS — Z85.46 PERSONAL HISTORY OF PROSTATE CANCER: ICD-10-CM

## 2023-11-14 DIAGNOSIS — F33.2 SEVERE EPISODE OF RECURRENT MAJOR DEPRESSIVE DISORDER, WITHOUT PSYCHOTIC FEATURES (H): ICD-10-CM

## 2023-11-14 DIAGNOSIS — R06.02 SOBOE (SHORTNESS OF BREATH ON EXERTION): ICD-10-CM

## 2023-11-14 DIAGNOSIS — I25.10 CORONARY ARTERY DISEASE INVOLVING NATIVE CORONARY ARTERY OF NATIVE HEART WITHOUT ANGINA PECTORIS: ICD-10-CM

## 2023-11-14 DIAGNOSIS — D62 ANEMIA DUE TO BLOOD LOSS, ACUTE: ICD-10-CM

## 2023-11-14 DIAGNOSIS — Z95.1 S/P CABG X 4: ICD-10-CM

## 2023-11-14 DIAGNOSIS — J44.9 CHRONIC OBSTRUCTIVE PULMONARY DISEASE, UNSPECIFIED COPD TYPE (H): ICD-10-CM

## 2023-11-14 DIAGNOSIS — E78.5 HYPERLIPIDEMIA LDL GOAL <70: ICD-10-CM

## 2023-11-14 DIAGNOSIS — I10 BENIGN ESSENTIAL HYPERTENSION: ICD-10-CM

## 2023-11-14 DIAGNOSIS — Z78.9 STATIN INTOLERANCE: ICD-10-CM

## 2023-11-14 DIAGNOSIS — L30.9 CHRONIC DERMATITIS: ICD-10-CM

## 2023-11-14 DIAGNOSIS — Z00.00 ENCOUNTER FOR MEDICARE ANNUAL WELLNESS EXAM: Primary | ICD-10-CM

## 2023-11-14 LAB
ALBUMIN SERPL BCG-MCNC: 4.3 G/DL (ref 3.5–5.2)
ALP SERPL-CCNC: 54 U/L (ref 40–150)
ALT SERPL W P-5'-P-CCNC: 9 U/L (ref 0–70)
ANION GAP SERPL CALCULATED.3IONS-SCNC: 8 MMOL/L (ref 7–15)
AST SERPL W P-5'-P-CCNC: 19 U/L (ref 0–45)
BASOPHILS # BLD AUTO: 0 10E3/UL (ref 0–0.2)
BASOPHILS NFR BLD AUTO: 0 %
BILIRUB SERPL-MCNC: 0.5 MG/DL
BUN SERPL-MCNC: 19.1 MG/DL (ref 8–23)
CALCIUM SERPL-MCNC: 9.4 MG/DL (ref 8.8–10.2)
CHLORIDE SERPL-SCNC: 103 MMOL/L (ref 98–107)
CHOLEST SERPL-MCNC: 137 MG/DL
CREAT SERPL-MCNC: 1.07 MG/DL (ref 0.67–1.17)
DEPRECATED HCO3 PLAS-SCNC: 29 MMOL/L (ref 22–29)
EGFRCR SERPLBLD CKD-EPI 2021: 71 ML/MIN/1.73M2
EOSINOPHIL # BLD AUTO: 0.2 10E3/UL (ref 0–0.7)
EOSINOPHIL NFR BLD AUTO: 5 %
ERYTHROCYTE [DISTWIDTH] IN BLOOD BY AUTOMATED COUNT: 13.3 % (ref 10–15)
GLUCOSE SERPL-MCNC: 97 MG/DL (ref 70–99)
HCT VFR BLD AUTO: 42.5 % (ref 40–53)
HDLC SERPL-MCNC: 62 MG/DL
HGB BLD-MCNC: 13.9 G/DL (ref 13.3–17.7)
IMM GRANULOCYTES # BLD: 0 10E3/UL
IMM GRANULOCYTES NFR BLD: 0 %
LDLC SERPL CALC-MCNC: 61 MG/DL
LYMPHOCYTES # BLD AUTO: 1.1 10E3/UL (ref 0.8–5.3)
LYMPHOCYTES NFR BLD AUTO: 23 %
MCH RBC QN AUTO: 27.5 PG (ref 26.5–33)
MCHC RBC AUTO-ENTMCNC: 32.7 G/DL (ref 31.5–36.5)
MCV RBC AUTO: 84 FL (ref 78–100)
MONOCYTES # BLD AUTO: 0.4 10E3/UL (ref 0–1.3)
MONOCYTES NFR BLD AUTO: 9 %
NEUTROPHILS # BLD AUTO: 3 10E3/UL (ref 1.6–8.3)
NEUTROPHILS NFR BLD AUTO: 63 %
NONHDLC SERPL-MCNC: 75 MG/DL
PLATELET # BLD AUTO: 151 10E3/UL (ref 150–450)
POTASSIUM SERPL-SCNC: 4.4 MMOL/L (ref 3.4–5.3)
PROT SERPL-MCNC: 7.1 G/DL (ref 6.4–8.3)
PSA SERPL DL<=0.01 NG/ML-MCNC: <0.01 NG/ML (ref 0–6.5)
RBC # BLD AUTO: 5.06 10E6/UL (ref 4.4–5.9)
SODIUM SERPL-SCNC: 140 MMOL/L (ref 135–145)
TRIGL SERPL-MCNC: 68 MG/DL
WBC # BLD AUTO: 4.8 10E3/UL (ref 4–11)

## 2023-11-14 PROCEDURE — 99214 OFFICE O/P EST MOD 30 MIN: CPT | Mod: 25 | Performed by: NURSE PRACTITIONER

## 2023-11-14 PROCEDURE — 80053 COMPREHEN METABOLIC PANEL: CPT | Performed by: NURSE PRACTITIONER

## 2023-11-14 PROCEDURE — 36415 COLL VENOUS BLD VENIPUNCTURE: CPT | Performed by: NURSE PRACTITIONER

## 2023-11-14 PROCEDURE — G0439 PPPS, SUBSEQ VISIT: HCPCS | Performed by: NURSE PRACTITIONER

## 2023-11-14 PROCEDURE — G0103 PSA SCREENING: HCPCS | Performed by: NURSE PRACTITIONER

## 2023-11-14 PROCEDURE — 85025 COMPLETE CBC W/AUTO DIFF WBC: CPT | Performed by: NURSE PRACTITIONER

## 2023-11-14 PROCEDURE — 80061 LIPID PANEL: CPT | Performed by: NURSE PRACTITIONER

## 2023-11-14 RX ORDER — ALBUTEROL SULFATE 90 UG/1
2 AEROSOL, METERED RESPIRATORY (INHALATION) EVERY 4 HOURS PRN
Qty: 18 G | Refills: 3 | Status: SHIPPED | OUTPATIENT
Start: 2023-11-14

## 2023-11-14 RX ORDER — OLODATEROL RESPIMAT INHALATION SPRAY 2.5 UG/1
2 SPRAY, METERED RESPIRATORY (INHALATION) DAILY
Qty: 12 G | Refills: 3 | Status: SHIPPED | OUTPATIENT
Start: 2023-11-14 | End: 2024-04-04

## 2023-11-14 RX ORDER — HYDROCORTISONE 2.5 %
CREAM (GRAM) TOPICAL 2 TIMES DAILY
Qty: 30 G | Refills: 0 | Status: SHIPPED | OUTPATIENT
Start: 2023-11-14 | End: 2023-11-28

## 2023-11-14 RX ORDER — AMOXICILLIN 250 MG
CAPSULE ORAL
Qty: 180 TABLET | Refills: 3 | Status: SHIPPED | OUTPATIENT
Start: 2023-11-14 | End: 2024-03-22

## 2023-11-14 RX ORDER — RESPIRATORY SYNCYTIAL VIRUS VACCINE 120MCG/0.5
0.5 KIT INTRAMUSCULAR ONCE
Qty: 1 EACH | Refills: 0 | Status: CANCELLED | OUTPATIENT
Start: 2023-11-14 | End: 2023-11-14

## 2023-11-14 RX ORDER — AMLODIPINE BESYLATE 5 MG/1
5 TABLET ORAL DAILY
Qty: 90 TABLET | Refills: 3 | Status: SHIPPED | OUTPATIENT
Start: 2023-11-14 | End: 2023-12-21

## 2023-11-14 RX ORDER — PAROXETINE 30 MG/1
TABLET, FILM COATED ORAL
Qty: 180 TABLET | Refills: 3 | Status: SHIPPED | OUTPATIENT
Start: 2023-11-14 | End: 2024-02-21

## 2023-11-14 RX ORDER — CARVEDILOL 12.5 MG/1
12.5 TABLET ORAL 2 TIMES DAILY WITH MEALS
Qty: 180 TABLET | Refills: 0 | Status: SHIPPED | OUTPATIENT
Start: 2023-11-14 | End: 2024-03-13

## 2023-11-14 RX ORDER — EZETIMIBE 10 MG/1
10 TABLET ORAL DAILY
Qty: 90 TABLET | Refills: 3 | Status: SHIPPED | OUTPATIENT
Start: 2023-11-14 | End: 2023-12-21

## 2023-11-14 ASSESSMENT — ENCOUNTER SYMPTOMS
MYALGIAS: 1
PALPITATIONS: 0
DYSURIA: 0
SHORTNESS OF BREATH: 1
CHILLS: 0
NERVOUS/ANXIOUS: 1
HEMATOCHEZIA: 0
WEAKNESS: 1
FEVER: 0
CONSTIPATION: 0
COUGH: 0
HEADACHES: 0
DIZZINESS: 0
NAUSEA: 0
EYE PAIN: 0
PARESTHESIAS: 0
HEARTBURN: 0
FREQUENCY: 0
ABDOMINAL PAIN: 0
DIARRHEA: 0
HEMATURIA: 0
ARTHRALGIAS: 0
SORE THROAT: 0
JOINT SWELLING: 0

## 2023-11-14 ASSESSMENT — PATIENT HEALTH QUESTIONNAIRE - PHQ9
SUM OF ALL RESPONSES TO PHQ QUESTIONS 1-9: 4
SUM OF ALL RESPONSES TO PHQ QUESTIONS 1-9: 4
10. IF YOU CHECKED OFF ANY PROBLEMS, HOW DIFFICULT HAVE THESE PROBLEMS MADE IT FOR YOU TO DO YOUR WORK, TAKE CARE OF THINGS AT HOME, OR GET ALONG WITH OTHER PEOPLE: SOMEWHAT DIFFICULT

## 2023-11-14 ASSESSMENT — ACTIVITIES OF DAILY LIVING (ADL): CURRENT_FUNCTION: NO ASSISTANCE NEEDED

## 2023-11-14 ASSESSMENT — PAIN SCALES - GENERAL: PAINLEVEL: NO PAIN (0)

## 2023-11-14 NOTE — LETTER
November 15, 2023      Adolfo Rendon  [address] [facility] [address]        Dear ,    We are writing to inform you of your test results.    Normal red and white blood cell count   Normal platelets   Normal electrolytes.  Normal blood sugar.  Normal kidney function.  Normal liver function.   Cholesterol levels are meeting goal     Resulted Orders   Lipid panel reflex to direct LDL Fasting   Result Value Ref Range    Cholesterol 137 <200 mg/dL    Triglycerides 68 <150 mg/dL    Direct Measure HDL 62 >=40 mg/dL    LDL Cholesterol Calculated 61 <=100 mg/dL    Non HDL Cholesterol 75 <130 mg/dL    Narrative    Cholesterol  Desirable:  <200 mg/dL    Triglycerides  Normal:  Less than 150 mg/dL  Borderline High:  150-199 mg/dL  High:  200-499 mg/dL  Very High:  Greater than or equal to 500 mg/dL    Direct Measure HDL  Female:  Greater than or equal to 50 mg/dL   Male:  Greater than or equal to 40 mg/dL    LDL Cholesterol  Desirable:  <100mg/dL  Above Desirable:  100-129 mg/dL   Borderline High:  130-159 mg/dL   High:  160-189 mg/dL   Very High:  >= 190 mg/dL    Non HDL Cholesterol  Desirable:  130 mg/dL  Above Desirable:  130-159 mg/dL  Borderline High:  160-189 mg/dL  High:  190-219 mg/dL  Very High:  Greater than or equal to 220 mg/dL   Comprehensive metabolic panel (BMP + Alb, Alk Phos, ALT, AST, Total. Bili, TP)   Result Value Ref Range    Sodium 140 135 - 145 mmol/L      Comment:      Reference intervals for this test were updated on 09/26/2023 to more accurately reflect our healthy population. There may be differences in the flagging of prior results with similar values performed with this method. Interpretation of those prior results can be made in the context of the updated reference intervals.     Potassium 4.4 3.4 - 5.3 mmol/L    Carbon Dioxide (CO2) 29 22 - 29 mmol/L    Anion Gap 8 7 - 15 mmol/L    Urea Nitrogen 19.1 8.0 - 23.0 mg/dL    Creatinine 1.07 0.67 - 1.17 mg/dL    GFR Estimate 71  >60 mL/min/1.73m2    Calcium 9.4 8.8 - 10.2 mg/dL    Chloride 103 98 - 107 mmol/L    Glucose 97 70 - 99 mg/dL    Alkaline Phosphatase 54 40 - 150 U/L      Comment:      Reference intervals for this test were updated on 11/14/2023 to more accurately reflect our healthy population. There may be differences in the flagging of prior results with similar values performed with this method. Interpretation of those prior results can be made in the context of the updated reference intervals.    AST 19 0 - 45 U/L      Comment:      Reference intervals for this test were updated on 6/12/2023 to more accurately reflect our healthy population. There may be differences in the flagging of prior results with similar values performed with this method. Interpretation of those prior results can be made in the context of the updated reference intervals.    ALT 9 0 - 70 U/L      Comment:      Reference intervals for this test were updated on 6/12/2023 to more accurately reflect our healthy population. There may be differences in the flagging of prior results with similar values performed with this method. Interpretation of those prior results can be made in the context of the updated reference intervals.      Protein Total 7.1 6.4 - 8.3 g/dL    Albumin 4.3 3.5 - 5.2 g/dL    Bilirubin Total 0.5 <=1.2 mg/dL   CBC with platelets and differential   Result Value Ref Range    WBC Count 4.8 4.0 - 11.0 10e3/uL    RBC Count 5.06 4.40 - 5.90 10e6/uL    Hemoglobin 13.9 13.3 - 17.7 g/dL    Hematocrit 42.5 40.0 - 53.0 %    MCV 84 78 - 100 fL    MCH 27.5 26.5 - 33.0 pg    MCHC 32.7 31.5 - 36.5 g/dL    RDW 13.3 10.0 - 15.0 %    Platelet Count 151 150 - 450 10e3/uL    % Neutrophils 63 %    % Lymphocytes 23 %    % Monocytes 9 %    % Eosinophils 5 %    % Basophils 0 %    % Immature Granulocytes 0 %    Absolute Neutrophils 3.0 1.6 - 8.3 10e3/uL    Absolute Lymphocytes 1.1 0.8 - 5.3 10e3/uL    Absolute Monocytes 0.4 0.0 - 1.3 10e3/uL    Absolute  Eosinophils 0.2 0.0 - 0.7 10e3/uL    Absolute Basophils 0.0 0.0 - 0.2 10e3/uL    Absolute Immature Granulocytes 0.0 <=0.4 10e3/uL       If you have any questions or concerns, please call the clinic at the number listed above.       Sincerely,      ANTONIO Carranza CNP

## 2023-11-14 NOTE — LETTER
November 16, 2023      Adolfo CRAIG Oddana  [address] [facility] [address]        Dear ,    We are writing to inform you of your test results.    Your test results fall within the expected range(s) or remain unchanged from previous results.  Please continue with current treatment plan.    Resulted Orders   PSA, screen   Result Value Ref Range    Prostate Specific Antigen Screen <0.01 0.00 - 6.50 ng/mL    Narrative    This result is obtained using the Roche Elecsys total PSA method on the fabiana [id number] immunoassay analyzer. Results obtained with different assay methods or kits cannot be used interchangeably.       If you have any questions or concerns, please call the clinic at the number listed above.       Sincerely,      ANTONIO Carranza CNP

## 2023-11-14 NOTE — PATIENT INSTRUCTIONS
Patient Education   Personalized Prevention Plan  You are due for the preventive services outlined below.  Your care team is available to assist you in scheduling these services.  If you have already completed any of these items, please share that information with your care team to update in your medical record.  Health Maintenance Due   Topic Date Due     Heart Failure Action Plan  Never done     Diptheria Tetanus Pertussis (DTAP/TDAP/TD) Vaccine (1 - Tdap) 07/08/2002     RSV VACCINE (Pregnancy & 60+) (1 - 1-dose 60+ series) Never done     Zoster (Shingles) Vaccine (2 of 3) 08/31/2009     Basic Metabolic Panel  04/27/2023     Liver Monitoring Lab  10/27/2023     Cholesterol Lab  10/27/2023     Complete Blood Count  10/27/2023     Your Health Risk Assessment indicates you feel you are not in good health    A healthy lifestyle helps keep the body fit and the mind alert. It helps protect you from disease, helps you fight disease, and helps prevent chronic disease (disease that doesn't go away) from getting worse. This is important as you get older and begin to notice twinges in muscles and joints and a decline in the strength and stamina you once took for granted. A healthy lifestyle includes good healthcare, good nutrition, weight control, recreation, and regular exercise. Avoid harmful substances and do what you can to keep safe. Another part of a healthy lifestyle is stay mentally active and socially involved.    Good healthcare   Have a wellness visit every year.   If you have new symptoms, let us know right away. Don't wait until the next checkup.   Take medicines exactly as prescribed and keep your medicines in a safe place. Tell us if your medicine causes problems.   Healthy diet and weight control   Eat 3 or 4 small, nutritious, low-fat, high-fiber meals a day. Include a variety of fruits, vegetables, and whole-grain foods.   Make sure you get enough calcium in your diet. Calcium, vitamin D, and exercise help  prevent osteoporosis (bone thinning).   If you live alone, try eating with others when you can. That way you get a good meal and have company while you eat it.   Try to keep a healthy weight. If you eat more calories than your body uses for energy, it will be stored as fat and you will gain weight.     Recreation   Recreation is not limited to sports and team events. It includes any activity that provides relaxation, interest, enjoyment, and exercise. Recreation provides an outlet for physical, mental, and social energy. It can give a sense of worth and achievement. It can help you stay healthy.    Mental Exercise and Social Involvement  Mental and emotional health is as important as physical health. Keep in touch with friends and family. Stay as active as possible. Continue to learn and challenge yourself.   Things you can do to stay mentally active are:  Learn something new, like a foreign language or musical instrument.   Play SCRABBLE or do crossword puzzles. If you cannot find people to play these games with you at home, you can play them with others on your computer through the Internet.   Join a games club--anything from card games to chess or checkers or lawn bowling.   Start a new hobby.   Go back to school.   Volunteer.   Read.   Keep up with world events.  Learning About Dietary Guidelines  What are the Dietary Guidelines for Americans?     Dietary Guidelines for Americans provide tips for eating well and staying healthy. This helps reduce the risk for long-term (chronic) diseases.  These guidelines recommend that you:  Eat and drink the right amount for you. The [place] government's food guide is called MyPlate. It can help you make your own well-balanced eating plan.  Try to balance your eating with your activity. This helps you stay at a healthy weight.  Drink alcohol in moderation, if at all.  Limit foods high in salt, saturated fat, trans fat, and added sugar.  These guidelines are from the [place]  "Department of Agriculture and the [place] Department of Health and Human Services. They are updated every 5 years.  What is MyPlate?  MyPlate is the [place] government's food guide. It can help you make your own well-balanced eating plan. A balanced eating plan means that you eat enough, but not too much, and that your food gives you the nutrients you need to stay healthy.  MyPlate focuses on eating plenty of whole grains, fruits, and vegetables, and on limiting fat and sugar. It is available online at www.ChooseMyPlate.gov.  How can you get started?  If you're trying to eat healthier, you can slowly change your eating habits over time. You don't have to make big changes all at once. Start by adding one or two healthy foods to your meals each day.  Grains  Choose whole-grain breads and cereals and whole-wheat pasta and whole-grain crackers.  Vegetables  Eat a variety of vegetables every day. They have lots of nutrients and are part of a heart-healthy diet.  Fruits  Eat a variety of fruits every day. Fruits contain lots of nutrients. Choose fresh fruit instead of fruit juice.  Protein foods  Choose fish and lean poultry more often. Eat red meat and fried meats less often. Dried beans, tofu, and nuts are also good sources of protein.  Dairy  Choose low-fat or fat-free products from this food group. If you have problems digesting milk, try eating cheese or yogurt instead.  Fats and oils  Limit fats and oils if you're trying to cut calories. Choose healthy fats when you cook. These include canola oil and olive oil.  Where can you learn more?  Go to https://www.[employer].net/patiented  Enter D676 in the search box to learn more about \"Learning About Dietary Guidelines.\"  Current as of: February 28, 2023               Content Version: 13.8    5202-7756 [employer].   Care instructions adapted under license by your healthcare professional. If you have questions about a medical condition or this instruction, always " ask your healthcare professional. [employer] disclaims any warranty or liability for your use of this information.      Hearing Loss: Care Instructions  Overview     Hearing loss is a sudden or slow decrease in how well you hear. It can range from slight to profound. Permanent hearing loss can occur with aging. It also can happen when you are exposed long-term to loud noise. Examples include listening to loud music, riding motorcycles, or being around other loud machines.  Hearing loss can affect your work and home life. It can make you feel lonely or depressed. You may feel that you have lost your independence. But hearing aids and other devices can help you hear better and feel connected to others.  Follow-up care is a key part of your treatment and safety. Be sure to make and go to all appointments, and call your doctor if you are having problems. It's also a good idea to know your test results and keep a list of the medicines you take.  How can you care for yourself at home?  Avoid loud noises whenever possible. This helps keep your hearing from getting worse.  Always wear hearing protection around loud noises.  Wear a hearing aid as directed.  A professional can help you pick a hearing aid that will work best for you.  You can also get hearing aids over the counter for mild to moderate hearing loss.  Have hearing tests as your doctor suggests. They can show whether your hearing has changed. Your hearing aid may need to be adjusted.  Use other devices as needed. These may include:  Telephone amplifiers and hearing aids that can connect to a television, stereo, radio, or microphone.  Devices that use lights or vibrations. These alert you to the doorbell, a ringing telephone, or a baby monitor.  Television closed-captioning. This shows the words at the bottom of the screen. Most new TVs can do this.  TTY (text telephone). This lets you type messages back and forth on the telephone instead of talking  "or listening. These devices are also called TDD. When messages are typed on the keyboard, they are sent over the phone line to a receiving TTY. The message is shown on a monitor.  Use text messaging, social media, and email if it is hard for you to communicate by telephone.  Try to learn a listening technique called speechreading. It is not lipreading. You pay attention to people's gestures, expressions, posture, and tone of voice. These clues can help you understand what a person is saying. Face the person you are talking to, and have them face you. Make sure the lighting is good. You need to see the other person's face clearly.  Think about counseling if you need help to adjust to your hearing loss.  When should you call for help?  Watch closely for changes in your health, and be sure to contact your doctor if:    You think your hearing is getting worse.     You have new symptoms, such as dizziness or nausea.   Where can you learn more?  Go to https://www.[employer].net/patiented  Enter R798 in the search box to learn more about \"Hearing Loss: Care Instructions.\"  Current as of: February 28, 2023               Content Version: 13.8    3515-6308 [employer].   Care instructions adapted under license by your healthcare professional. If you have questions about a medical condition or this instruction, always ask your healthcare professional. [employer] disclaims any warranty or liability for your use of this information.      Bladder Training: Care Instructions  Your Care Instructions     Bladder training is used to treat urge incontinence and stress incontinence. Urge incontinence means that the need to urinate comes on so fast that you can't get to a toilet in time. Stress incontinence means that you leak urine because of pressure on your bladder. For example, it may happen when you laugh, cough, or lift something heavy.  Bladder training can increase how long you can wait before you have " to urinate. It can also help your bladder hold more urine. And it can give you better control over the urge to urinate.  It is important to remember that bladder training takes a few weeks to a few months to make a difference. You may not see results right away, but don't give up.  Follow-up care is a key part of your treatment and safety. Be sure to make and go to all appointments, and call your doctor if you are having problems. It's also a good idea to know your test results and keep a list of the medicines you take.  How can you care for yourself at home?  Work with your doctor to come up with a bladder training program that is right for you. You may use one or more of the following methods.  Delayed urination  In the beginning, try to keep from urinating for 5 minutes after you first feel the need to go.  While you wait, take deep, slow breaths to relax. Kegel exercises can also help you delay the need to go to the bathroom.  After some practice, when you can easily wait 5 minutes to urinate, try to wait 10 minutes before you urinate.  Slowly increase the waiting period until you are able to control when you have to urinate.  Scheduled urination  Empty your bladder when you first wake up in the morning.  Schedule times throughout the day when you will urinate.  Start by going to the bathroom every hour, even if you don't need to go.  Slowly increase the time between trips to the bathroom.  When you have found a schedule that works well for you, keep doing it.  If you wake up during the night and have to urinate, do it. Apply your schedule to waking hours only.  Kegel exercises  These tighten and strengthen pelvic muscles, which can help you control the flow of urine. (If doing these exercises causes pain, stop doing them and talk with your doctor.) To do Kegel exercises:  Squeeze your muscles as if you were trying not to pass gas. Or squeeze your muscles as if you were stopping the flow of urine. Your belly,  "legs, and buttocks shouldn't move.  Hold the squeeze for 3 seconds, then relax for 5 to 10 seconds.  Start with 3 seconds, then add 1 second each week until you are able to squeeze for 10 seconds.  Repeat the exercise 10 times a session. Do 3 to 8 sessions a day.  When should you call for help?  Watch closely for changes in your health, and be sure to contact your doctor if:    Your incontinence is getting worse.     You do not get better as expected.   Where can you learn more?  Go to https://www.[employer].net/patiented  Enter V684 in the search box to learn more about \"Bladder Training: Care Instructions.\"  Current as of: February 28, 2023               Content Version: 13.8    3658-0990 [employer].   Care instructions adapted under license by your healthcare professional. If you have questions about a medical condition or this instruction, always ask your healthcare professional. [employer] disclaims any warranty or liability for your use of this information.      Your Health Risk Assessment indicates you feel you are not in good emotional health.    Recreation   Recreation is not limited to sports and team events. It includes any activity that provides relaxation, interest, enjoyment, and exercise. Recreation provides an outlet for physical, mental, and social energy. It can give a sense of worth and achievement. It can help you stay healthy.    Mental Exercise and Social Involvement  Mental and emotional health is as important as physical health. Keep in touch with friends and family. Stay as active as possible. Continue to learn and challenge yourself.   Things you can do to stay mentally active are:  Learn something new, like a foreign language or musical instrument.   Play SCRABBLE or do crossword puzzles. If you cannot find people to play these games with you at home, you can play them with others on your computer through the Internet.   Join a games club--anything from card " games to chess or checkers or lawn bowling.   Start a new hobby.   Go back to school.   Volunteer.   Read.   Keep up with world events.

## 2023-11-14 NOTE — PROGRESS NOTES
"SUBJECTIVE:   Adolfo is a 78 year old who presents for Preventive Visit.      11/14/2023    11:11 AM   Additional Questions   Roomed by Telma   Accompanied by self       Are you in the first 12 months of your Medicare coverage?  No    Healthy Habits:     In general, how would you rate your overall health?  Fair    Frequency of exercise:  4-5 days/week    Duration of exercise:  Less than 15 minutes    Do you usually eat at least 4 servings of fruit and vegetables a day, include whole grains    & fiber and avoid regularly eating high fat or \"junk\" foods?  No    Taking medications regularly:  Yes    Medication side effects:  Other    Ability to successfully perform activities of daily living:  No assistance needed    Home Safety:  No safety concerns identified    Hearing Impairment:  Difficulty following a conversation in a noisy restaurant or crowded room, feel that people are mumbling or not speaking clearly, difficult to understand a speaker at a public meeting or Congregational service, need to ask people to speak up or repeat themselves, difficulty understanding soft or whispered speech and difficulty understanding speech on the telephone    In the past 6 months, have you been bothered by leaking of urine? Yes    In general, how would you rate your overall mental or emotional health?  Fair    Additional concerns today:  No      Today's PHQ-9 Score:       11/14/2023    11:10 AM   PHQ-9 SCORE   PHQ-9 Total Score MyChart 4 (Minimal depression)   PHQ-9 Total Score 4       Have you ever done Advance Care Planning? (For example, a Health Directive, POLST, or a discussion with a medical provider or your loved ones about your wishes): Yes, advance care planning is on file.       Fall risk  Fallen 2 or more times in the past year?: No  Any fall with injury in the past year?: No    Cognitive Screening   1) Repeat 3 items (Leader, Season, Table)    2) Clock draw: NORMAL  3) 3 item recall: Recalls 1 object   Results: NORMAL clock, " 1-2 items recalled: COGNITIVE IMPAIRMENT LESS LIKELY    Mini-CogTM Copyright NANDINI Melchor. Licensed by the author for use in [facility]; reprinted with permission (viky@.[facility]). All rights reserved.      Do you have sleep apnea, excessive snoring or daytime drowsiness? : no    Reviewed and updated as needed this visit by clinical staff   Tobacco  Allergies  Meds              Reviewed and updated as needed this visit by Provider                 Social History     Tobacco Use    Smoking status: Former     Packs/day: 1     Types: Cigarettes     Quit date: 2017     Years since quittin.8    Smokeless tobacco: Former     Quit date: 2017   Substance Use Topics    Alcohol use: No     Alcohol/week: 0.0 standard drinks of alcohol     Comment: quit 2023    11:16 AM   Alcohol Use   Prescreen: >3 drinks/day or >7 drinks/week? No   Sober x35 years.  Active in AA    Do you have a current opioid prescription? No  Do you use any other controlled substances or medications that are not prescribed by a provider? None            Current providers sharing in care for this patient include:   Patient Care Team:  Danielle Mercado APRN CNP as PCP - General (Family Practice)  Danielle Mercado APRN CNP as Assigned PCP  Aleksander Ruiz MD as Assigned Heart and Vascular Provider    The following health maintenance items are reviewed in Epic and correct as of today:  Health Maintenance   Topic Date Due    HF ACTION PLAN  Never done    ANNUAL REVIEW OF HM ORDERS  Never done    DTAP/TDAP/TD IMMUNIZATION (1 - Tdap) 2002    RSV VACCINE (Pregnancy & 60+) (1 - 1-dose 60+ series) Never done    ZOSTER IMMUNIZATION (2 of 3) 2009    MEDICARE ANNUAL WELLNESS VISIT  2022    BMP  2023    ALT  10/27/2023    LIPID  10/27/2023    CBC  10/27/2023    LUNG CANCER SCREENING  2024    PHQ-9  2024    FALL RISK ASSESSMENT  2024    ADVANCE CARE PLANNING   03/23/2026    COLORECTAL CANCER SCREENING  05/09/2029    TSH W/FREE T4 REFLEX  Completed    SPIROMETRY  Completed    HEPATITIS C SCREENING  Completed    COPD ACTION PLAN  Completed    DEPRESSION ACTION PLAN  Completed    INFLUENZA VACCINE  Completed    Pneumococcal Vaccine: 65+ Years  Completed    COVID-19 Vaccine  Completed    IPV IMMUNIZATION  Aged Out    HPV IMMUNIZATION  Aged Out    MENINGITIS IMMUNIZATION  Aged Out    RSV MONOCLONAL ANTIBODY  Aged Out     Labs reviewed in EPIC  BP Readings from Last 3 Encounters:   11/14/23 138/78   07/25/23 139/86   01/31/23 (!) 168/102    Wt Readings from Last 3 Encounters:   11/14/23 66.7 kg (147 lb)   07/25/23 66.8 kg (147 lb 3.2 oz)   10/27/22 65.3 kg (144 lb)                  Patient Active Problem List   Diagnosis    MIXED HYPERLIPIDEMIA    Benign neoplasm of epididymis    Malignant neoplasm of prostate (H)    Major depressive disorder, recurrent episode, severe (H)    HL (hearing loss)    Hyperlipidemia LDL goal <70    Muscle pain    Tobacco abuse    SANTIAGO (dyspnea on exertion)    Advanced directives, counseling/discussion    Generalized anxiety disorder    Right inguinal hernia    Health Care Home    HTN, goal below 140/90    COPD (chronic obstructive pulmonary disease) (H)    Thoracic aortic aneurysm without rupture (H24)    Centrilobular emphysema (H)    Aortic arch aneurysm (H24)    Unilateral atherosclerotic renal artery stenosis (H24)    Pulmonary nodules    Diverticulosis of large intestine    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)    Coronary artery disease involving native coronary artery of native heart without angina pectoris    Chest pain    Status post coronary angiogram    Benign essential hypertension    Ischemic cardiomyopathy    S/P CABG x 4    Fluid overload    Anemia due to blood loss, acute    Transient hyperglycemia post procedure    H/O cardiomyopathy    Coronary artery disease of bypass graft of native heart with stable angina  pectoris (H24)    Chronic systolic heart failure (H)    Personal history of prostate cancer     Past Surgical History:   Procedure Laterality Date    APPENDECTOMY  1966    BYPASS GRAFT ARTERY CORONARY N/A 9/3/2019    Procedure: CORONARY ARTERY BYPASS GRAFT X 4 - LIMA TO LAD, SV TO PL, OM, PDA ; ON PUMP WITH TERRENCE; ENDOVEIN HARVEST RIGHT LEG;  Surgeon: Lai Mchugh MD;  Location:  OR    COLONOSCOPY  3/1/2005    COLONOSCOPY N/A 2019    Procedure: COLONOSCOPY;  Surgeon: Camilo Beard MD;  Location: [facility]    CV LEFT HEART CATH N/A 2019    Procedure: Left Heart Cath--also measure LVEDP;  Surgeon: Krystle Barrera MD;  Location:  HEART CARDIAC CATH LAB    HERNIORRHAPHY INGUINAL  2011    Procedure:HERNIORRHAPHY INGUINAL; Open Repair Right Inguinal Hernia Repair       HYDROCELECTOMY SCROTAL  2004    left hydrocele repair    SUPRAPUBIC PROSTATECTOMY         Social History     Tobacco Use    Smoking status: Former     Packs/day: 1     Types: Cigarettes     Quit date: 2017     Years since quittin.8    Smokeless tobacco: Former     Quit date: 2017   Substance Use Topics    Alcohol use: No     Alcohol/week: 0.0 standard drinks of alcohol     Comment: quit      Family History   Problem Relation Age of Onset    Cerebrovascular Disease Mother     Hypertension Mother     Prostate Cancer Brother     Dementia Brother     Arthritis Sister         rhematoid    Cancer Brother         Lung cancer, lymphoma    Other - See Comments Brother         HIV    Cancer Sister         skin cancer on nose    Pacemaker Sister     Aneurysm Sister         heart         Current Outpatient Medications   Medication Sig Dispense Refill    albuterol (PROAIR HFA/PROVENTIL HFA/VENTOLIN HFA) 108 (90 Base) MCG/ACT inhaler Inhale 2 puffs into the lungs every 4 hours as needed for shortness of breath, wheezing or cough 18 g 3    amLODIPine (NORVASC) 5 MG tablet Take 1 tablet (5 mg) by mouth daily 90 tablet 3     "aspirin 81 MG EC tablet Take 81 mg by mouth daily      carvedilol (COREG) 12.5 MG tablet Take 1 tablet (12.5 mg) by mouth 2 times daily (with meals) 180 tablet 0    co-enzyme Q-10 100 MG CAPS capsule Take 100 mg by mouth daily      evolocumab (REPATHA) 140 MG/ML prefilled autoinjector Inject 1 mL (140 mg) Subcutaneous every 14 days No further refills until seen by cardiology--call [phone] to schedule. 6 mL 0    ezetimibe (ZETIA) 10 MG tablet Take 1 tablet (10 mg) by mouth daily 90 tablet 3    hydrocortisone 2.5 % cream Apply topically 2 times daily for 14 days 30 g 0    nicotine polacrilex (NICORETTE) 4 MG gum Place 4 mg inside cheek every hour as needed for smoking cessation      Olodaterol HCl (STRIVERDI RESPIMAT) 2.5 MCG/ACT AERS Inhale 2 puffs into the lungs daily 12 g 3    PARoxetine (PAXIL) 30 MG tablet TAKE 2 TABLETS BY MOUTH once daily in the evening 180 tablet 3    senna-docusate (SENEXON-S) 8.6-50 MG tablet TAKE ONE TABLET BY MOUTH TWICE A DAY AS NEEDED FOR CONSTIPATION Strength: 8.6-50 mg 180 tablet 3    Vitamin D, Cholecalciferol, 25 MCG (1000 UT) TABS Take 1,000 Units by mouth daily        Allergies   Allergen Reactions    Hctz [Hydrochlorothiazide] Fatigue     \"felt like zombie\"    Loratadine      Mood , irritablity     Metoprolol Fatigue     \"felt like zombie\"    Pravastatin Fatigue     \"felt like zombie\"    Remeron [Mirtazapine]      Agitation       Wellbutrin [Bupropion Hcl]      Sig mood issues                Review of Systems   Constitutional:  Negative for chills and fever.   HENT:  Positive for ear pain and hearing loss. Negative for congestion and sore throat.    Eyes:  Positive for visual disturbance. Negative for pain.   Respiratory:  Positive for shortness of breath. Negative for cough.    Cardiovascular:  Negative for chest pain, palpitations and peripheral edema.   Gastrointestinal:  Negative for abdominal pain, constipation, diarrhea, heartburn, hematochezia and nausea. " "  Genitourinary:  Negative for dysuria, frequency, genital sores, hematuria and urgency.   Musculoskeletal:  Positive for myalgias. Negative for arthralgias and joint swelling.   Skin:  Negative for rash.   Neurological:  Positive for weakness. Negative for dizziness, headaches and paresthesias.   Psychiatric/Behavioral:  Positive for mood changes. The patient is nervous/anxious.      Shortness of breath on exertion.  Previous pulmonary function tests reviewed  COPD/asthma    OBJECTIVE:   /78   Pulse 66   Temp 97  F (36.1  C) (Tympanic)   Resp 14   Ht 1.715 m (5' 7.5\")   Wt 66.7 kg (147 lb)   SpO2 95%   BMI 22.68 kg/m   Estimated body mass index is 22.68 kg/m  as calculated from the following:    Height as of this encounter: 1.715 m (5' 7.5\").    Weight as of this encounter: 66.7 kg (147 lb).  Physical Exam  GENERAL: healthy, alert and no distress  EYES: Eyes grossly normal to inspection, PERRL and conjunctivae and sclerae normal  HENT: ear canals and TM's normal, nose and mouth without ulcers or lesions  NECK: no adenopathy, no asymmetry, masses, or scars and thyroid normal to palpation  RESP: decreased breath sounds throughout and slight expiratory wheeze   CV: regular rate and rhythm,no peripheral edema and peripheral pulses weak feet warm to touch.  ABDOMEN: soft, nontender, no hepatosplenomegaly, no masses and bowel sounds normal  MS: no gross musculoskeletal defects noted, no edema  SKIN: Facial redness nose cheeks, chin and jawline  NEURO: Normal strength and tone, mentation intact and speech normal  PSYCH: mentation appears normal, affect normal/bright    Diagnostic Test Results:  Labs reviewed in Epic  Results for orders placed or performed in visit on 11/14/23 (from the past 24 hour(s))   CBC with platelets and differential    Narrative    The following orders were created for panel order CBC with platelets and differential.  Procedure                               Abnormality         Status   "                   ---------                               -----------         ------                     CBC with platelets and d...[[id number]]                      Final result                 Please view results for these tests on the individual orders.   CBC with platelets and differential   Result Value Ref Range    WBC Count 4.8 4.0 - 11.0 10e3/uL    RBC Count 5.06 4.40 - 5.90 10e6/uL    Hemoglobin 13.9 13.3 - 17.7 g/dL    Hematocrit 42.5 40.0 - 53.0 %    MCV 84 78 - 100 fL    MCH 27.5 26.5 - 33.0 pg    MCHC 32.7 31.5 - 36.5 g/dL    RDW 13.3 10.0 - 15.0 %    Platelet Count 151 150 - 450 10e3/uL    % Neutrophils 63 %    % Lymphocytes 23 %    % Monocytes 9 %    % Eosinophils 5 %    % Basophils 0 %    % Immature Granulocytes 0 %    Absolute Neutrophils 3.0 1.6 - 8.3 10e3/uL    Absolute Lymphocytes 1.1 0.8 - 5.3 10e3/uL    Absolute Monocytes 0.4 0.0 - 1.3 10e3/uL    Absolute Eosinophils 0.2 0.0 - 0.7 10e3/uL    Absolute Basophils 0.0 0.0 - 0.2 10e3/uL    Absolute Immature Granulocytes 0.0 <=0.4 10e3/uL       ASSESSMENT / PLAN:   Adolfo was seen today for physical.    Diagnoses and all orders for this visit:    Encounter for Medicare annual wellness exam    Benign essential hypertension  S/P CABG x 4  Coronary artery disease involving native coronary artery of native heart without angina pectoris  -     carvedilol (COREG) 12.5 MG tablet; Take 1 tablet (12.5 mg) by mouth 2 times daily (with meals)  -     amLODIPine (NORVASC) 5 MG tablet; Take 1 tablet (5 mg) by mouth daily  -     Comprehensive metabolic panel (BMP + Alb, Alk Phos, ALT, AST, Total. Bili, TP); Future  -     Lipid panel reflex to direct LDL Fasting; Future  -     CBC with platelets and differential; Future  Continue current medications  Labs done today  Fasting.  Follow-up with cardiology as planned    Anemia due to blood loss, acute  -     CBC with Platelets; Future    Recheck labs-resolved    Severe episode of recurrent major depressive disorder,  without psychotic features (H)  Chronic and ongoing.  Continue paroxetine.  Continue light therapy.  Continue AA  -     PARoxetine (PAXIL) 30 MG tablet; TAKE 2 TABLETS BY MOUTH once daily in the evening    Hyperlipidemia LDL goal <70  Statin intolerance  -     ezetimibe (ZETIA) 10 MG tablet; Take 1 tablet (10 mg) by mouth daily  Continue Repatha  -     Lipid panel reflex to direct LDL Fasting  Comprehensive metabolic panel      Constipation  -     senna-docusate (SENEXON-S) 8.6-50 MG tablet; TAKE ONE TABLET BY MOUTH TWICE A DAY AS NEEDED FOR CONSTIPATION Strength: 8.6-50 mg as needed      Chronic obstructive pulmonary disease, unspecified COPD type (H)  Chronic and ongoing shortness of breath on exertion  Continue  -     Olodaterol HCl (STRIVERDI RESPIMAT) 2.5 MCG/ACT AERS; Inhale 2 puffs into the lungs daily    SOBOE (shortness of breath on exertion)  Add in for wheezing  -     albuterol (PROAIR HFA/PROVENTIL HFA/VENTOLIN HFA) 108 (90 Base) MCG/ACT inhaler; Inhale 2 puffs into the lungs every 4 hours as needed for shortness of breath, wheezing or cough  Consider pulmonology consultation-skin with ongoing symptoms    Chronic dermatitis  -     Adult Dermatology  Referral; Future  Trial of medication  -     hydrocortisone 2.5 % cream; Apply topically 2 times daily for 14 days      Personal history of prostate cancer  -     PSA, screen;     Other orders  -     REVIEW OF HEALTH MAINTENANCE PROTOCOL ORDERS  -     PRIMARY CARE FOLLOW-UP SCHEDULING; Future      Immunizations offered and declined, shingles, RSV, DTaP          COUNSELING:  Reviewed preventive health counseling, as reflected in patient instructions        He reports that he quit smoking about 6 years ago. His smoking use included cigarettes. He smoked an average of 1 pack per day. He quit smokeless tobacco use about 6 years ago.      Appropriate preventive services were discussed with this patient, including applicable screening as appropriate for  fall prevention, nutrition, physical activity, Tobacco-use cessation, weight loss and cognition.  Checklist reviewing preventive services available has been given to the patient.    Reviewed patients plan of care and provided an AVS. The Intermediate Care Plan ( asthma action plan, low back pain action plan, and migraine action plan) for Adolfo meets the Care Plan requirement. This Care Plan has been established and reviewed with the Patient.    Call or return to the clinic with any worsening of symptoms or no resolution. Patient/Parent verbalized understanding and is in agreement. Medication side effects reviewed.   Current Outpatient Medications   Medication Sig Dispense Refill    albuterol (PROAIR HFA/PROVENTIL HFA/VENTOLIN HFA) 108 (90 Base) MCG/ACT inhaler Inhale 2 puffs into the lungs every 4 hours as needed for shortness of breath, wheezing or cough 18 g 3    amLODIPine (NORVASC) 5 MG tablet Take 1 tablet (5 mg) by mouth daily 90 tablet 3    aspirin 81 MG EC tablet Take 81 mg by mouth daily      carvedilol (COREG) 12.5 MG tablet Take 1 tablet (12.5 mg) by mouth 2 times daily (with meals) 180 tablet 0    co-enzyme Q-10 100 MG CAPS capsule Take 100 mg by mouth daily      evolocumab (REPATHA) 140 MG/ML prefilled autoinjector Inject 1 mL (140 mg) Subcutaneous every 14 days No further refills until seen by cardiology--call [phone] to schedule. 6 mL 0    ezetimibe (ZETIA) 10 MG tablet Take 1 tablet (10 mg) by mouth daily 90 tablet 3    hydrocortisone 2.5 % cream Apply topically 2 times daily for 14 days 30 g 0    nicotine polacrilex (NICORETTE) 4 MG gum Place 4 mg inside cheek every hour as needed for smoking cessation      Olodaterol HCl (STRIVERDI RESPIMAT) 2.5 MCG/ACT AERS Inhale 2 puffs into the lungs daily 12 g 3    PARoxetine (PAXIL) 30 MG tablet TAKE 2 TABLETS BY MOUTH once daily in the evening 180 tablet 3    senna-docusate (SENEXON-S) 8.6-50 MG tablet TAKE ONE TABLET BY MOUTH TWICE A DAY AS NEEDED FOR  CONSTIPATION Strength: 8.6-50 mg 180 tablet 3    Vitamin D, Cholecalciferol, 25 MCG (1000 UT) TABS Take 1,000 Units by mouth daily        Chart documentation with Dragon Voice recognition Software. Although reviewed after completion, some words and grammatical errors may remain.      ANTONOI Carranza [facility]    Identified Health Risks:  I have reviewed Opioid Use Disorder and Substance Use Disorder risk factors and made any needed referrals.   Answers submitted by the patient for this visit:  Patient Health Questionnaire (Submitted on 11/14/2023)  If you checked off any problems, how difficult have these problems made it for you to do your work, take care of things at home, or get along with other people?: Somewhat difficult  PHQ9 TOTAL SCORE: 4  The patient was provided with suggestions to help him develop a healthy physical lifestyle.  The patient was counseled and encouraged to consider modifying their diet and eating habits. He was provided with information on recommended healthy diet options.  The patient was provided with written information regarding signs of hearing loss.  Information on urinary incontinence and treatment options given to patient.  The patient was provided with suggestions to help him develop a healthy emotional lifestyle.

## 2023-11-15 NOTE — RESULT ENCOUNTER NOTE
Lipids slowly creeping up over past 2 years but still at goal; other labs drawn by PCP. PCP sent letter addressing all labs.

## 2023-12-21 ENCOUNTER — OFFICE VISIT (OUTPATIENT)
Dept: CARDIOLOGY | Facility: CLINIC | Age: 78
End: 2023-12-21
Attending: INTERNAL MEDICINE
Payer: COMMERCIAL

## 2023-12-21 VITALS
DIASTOLIC BLOOD PRESSURE: 88 MMHG | HEART RATE: 65 BPM | SYSTOLIC BLOOD PRESSURE: 139 MMHG | OXYGEN SATURATION: 97 % | BODY MASS INDEX: 22.84 KG/M2 | WEIGHT: 148 LBS | RESPIRATION RATE: 16 BRPM

## 2023-12-21 DIAGNOSIS — I25.10 CORONARY ARTERY DISEASE INVOLVING NATIVE CORONARY ARTERY OF NATIVE HEART WITHOUT ANGINA PECTORIS: ICD-10-CM

## 2023-12-21 DIAGNOSIS — E78.5 HYPERLIPIDEMIA LDL GOAL <70: ICD-10-CM

## 2023-12-21 DIAGNOSIS — Z86.79 H/O CARDIOMYOPATHY: ICD-10-CM

## 2023-12-21 DIAGNOSIS — I10 BENIGN ESSENTIAL HYPERTENSION: ICD-10-CM

## 2023-12-21 PROCEDURE — 99215 OFFICE O/P EST HI 40 MIN: CPT | Performed by: INTERNAL MEDICINE

## 2023-12-21 RX ORDER — AMLODIPINE BESYLATE 10 MG/1
10 TABLET ORAL DAILY
Qty: 90 TABLET | Refills: 3 | Status: SHIPPED | OUTPATIENT
Start: 2023-12-21 | End: 2024-03-18

## 2023-12-21 NOTE — LETTER
12/21/2023    Danielle Mercado, APRN CNP  [address] [place] [id number]    RE: Adolfo Rendon       Dear Colleague,     I had the pleasure of seeing Adolfo Rendon in the [facility].  CARDIOLOGY CLINIC CONSULTATION    PRIMARY CARE PHYSICIAN:  Danielle Mercado    HISTORY OF PRESENT ILLNESS:  This is a very pleasant 78-year-old gentleman who is following up in the cardiology clinic at [facility].  He has been seeing me for the last few years.  He has the following set of complex medical issues.    Patient has a history of coronary artery disease since 2015 when he had an angiogram that showed chronically occluded right coronary artery with left-to-right collaterals and moderate proximal LAD stenosis.  Had progressive angina in August 2019.  Noted to have multivessel disease and mild ischemic cardiomyopathy with EF of 45 to 50%.  Uncontrolled cardiovascular risk factors due to medication intolerance at that time.  Underwent four-vessel bypass surgery with LIMA to LAD and vein graft to the obtuse marginal PDA and PL in 2019.  Heart failure with recovered ejection fraction  Significant vascular disease that includes renal artery stenosis PAD and AAA -currently not interested in vascular involvement or surgery  Ongoing tobacco abuse and COPD  Hypertension hyperlipidemia and intolerance to multiple medications including statins    Today the patient is here in cardiology clinic for follow-up.  He says his dyspnea on exertion has worsened over the last 1 year.  He said it never improved after surgery but has been getting worse over the last 1 year.  Denies edema syncope presyncope.  Denies any typical symptoms of chest discomfort.  Denies any bleeding problems.  He is complaining of fatigue.    His blood pressure at home runs in the 1 40-1 50 systolic range.  Currently he is on 12.5 twice daily of carvedilol and 5 mg of amlodipine.    In regards to pertinent data, his last  nuclear stress test showed moderate infarction without ischemia nearly 2 years ago.  He has not had a recent echocardiogram.  Lipids are at goal on a combination of ezetimibe and Repatha.    PAST MEDICAL HISTORY:  Past Medical History:   Diagnosis Date    Coronary artery disease     Depressive disorder, not elsewhere classified     Hypertrophy (benign) of prostate     Impotence of organic origin     Other and unspecified hyperlipidemia     Other anxiety states     Tobacco use disorder        MEDICATIONS:  Current Outpatient Medications   Medication    albuterol (PROAIR HFA/PROVENTIL HFA/VENTOLIN HFA) 108 (90 Base) MCG/ACT inhaler    amLODIPine (NORVASC) 5 MG tablet    aspirin 81 MG EC tablet    carvedilol (COREG) 12.5 MG tablet    co-enzyme Q-10 100 MG CAPS capsule    evolocumab (REPATHA) 140 MG/ML prefilled autoinjector    ezetimibe (ZETIA) 10 MG tablet    nicotine polacrilex (NICORETTE) 4 MG gum    Olodaterol HCl (STRIVERDI RESPIMAT) 2.5 MCG/ACT AERS    PARoxetine (PAXIL) 30 MG tablet    senna-docusate (SENEXON-S) 8.6-50 MG tablet    Vitamin D, Cholecalciferol, 25 MCG (1000 UT) TABS     No current facility-administered medications for this visit.       SOCIAL HISTORY:  I have reviewed this patient's social history and updated it with pertinent information if needed. Adolfo KIARA Rendon  reports that he quit smoking about 6 years ago. His smoking use included cigarettes. He smoked an average of 1 pack per day. He quit smokeless tobacco use about 6 years ago. He reports that he does not drink alcohol and does not use drugs.    PHYSICAL EXAM:  Pulse:  [65] 65  Resp:  [16] 16  BP: (139-143)/(87-88) 139/88  SpO2:  [97 %] 97 %  148 lbs 0 oz    Constitutional: alert, no distress  Respiratory: Good bilateral air entry  Cardiovascular: Regular heart sounds no murmur rubs or gallops no edema JVP is normal  GI: nondistended  Neuropsychiatric: appropriate affact    ASSESSMENT: Pertinent issues addressed/ reviewed during this  cardiology visit  Coronary artery disease status post bypass surgery  Worsening dyspnea on exertion  Medication intolerance  Uncontrolled hypertension    RECOMMENDATIONS:  The patient has multifactorial dyspnea.  Unclear if this is cardiac in etiology.  He has multiple reasons for dyspnea as enumerated above.  I first recommend optimization of blood pressure.  Recommend increasing amlodipine to 10 mg daily.  Continue current dose of beta-blocker.  Recommend getting an echocardiogram and a Lexiscan nuclear stress test.  Will discuss results and potential need for angiography depending on findings.  Given his fatigue, recommend stopping ezetimibe and seeing if it makes a difference.  His LDL is excellent on Repatha.  Continue aspirin and Repatha for the time being.  Follow-up with vascular for his vascular disease.    Will follow-up in a couple of months from now depending on results of echocardiography and stress testing.  Otherwise routine yearly follow-up in the cardiology clinic.    It was a pleasure seeing this patient in clinic today. Please do not hesitate to contact me with any future questions.     SHAHID Lee, [facility]  Cardiology - [facility]  December 21, 2023    Total visit time including chart review documentation and clinical care today was 42 minutes.    This note was completed in part using dictation via the Dragon voice recognition software. Some word and grammatical errors may occur and must be interpreted in the appropriate clinical context.  If there are any questions pertaining to this issue, please contact me for further clarification.      Thank you for allowing me to participate in the care of your patient.      Sincerely,     Lucy Espino MD     [facility]  cc:   Lucy Espino MD  [address] [facility] [address]  [place]  [place] [address]

## 2023-12-21 NOTE — PROGRESS NOTES
CARDIOLOGY CLINIC CONSULTATION    PRIMARY CARE PHYSICIAN:  Danielle Mercado    HISTORY OF PRESENT ILLNESS:  This is a very pleasant 78-year-old gentleman who is following up in the cardiology clinic at [facility].  He has been seeing me for the last few years.  He has the following set of complex medical issues.    Patient has a history of coronary artery disease since 2015 when he had an angiogram that showed chronically occluded right coronary artery with left-to-right collaterals and moderate proximal LAD stenosis.  Had progressive angina in August 2019.  Noted to have multivessel disease and mild ischemic cardiomyopathy with EF of 45 to 50%.  Uncontrolled cardiovascular risk factors due to medication intolerance at that time.  Underwent four-vessel bypass surgery with LIMA to LAD and vein graft to the obtuse marginal PDA and PL in 2019.  Heart failure with recovered ejection fraction  Significant vascular disease that includes renal artery stenosis PAD and AAA -currently not interested in vascular involvement or surgery  Ongoing tobacco abuse and COPD  Hypertension hyperlipidemia and intolerance to multiple medications including statins    Today the patient is here in cardiology clinic for follow-up.  He says his dyspnea on exertion has worsened over the last 1 year.  He said it never improved after surgery but has been getting worse over the last 1 year.  Denies edema syncope presyncope.  Denies any typical symptoms of chest discomfort.  Denies any bleeding problems.  He is complaining of fatigue.    His blood pressure at home runs in the 1 40-1 50 systolic range.  Currently he is on 12.5 twice daily of carvedilol and 5 mg of amlodipine.    In regards to pertinent data, his last nuclear stress test showed moderate infarction without ischemia nearly 2 years ago.  He has not had a recent echocardiogram.  Lipids are at goal on a combination of ezetimibe and Repatha.    PAST MEDICAL HISTORY:  Past  Medical History:   Diagnosis Date    Coronary artery disease     Depressive disorder, not elsewhere classified     Hypertrophy (benign) of prostate     Impotence of organic origin     Other and unspecified hyperlipidemia     Other anxiety states     Tobacco use disorder        MEDICATIONS:  Current Outpatient Medications   Medication    albuterol (PROAIR HFA/PROVENTIL HFA/VENTOLIN HFA) 108 (90 Base) MCG/ACT inhaler    amLODIPine (NORVASC) 5 MG tablet    aspirin 81 MG EC tablet    carvedilol (COREG) 12.5 MG tablet    co-enzyme Q-10 100 MG CAPS capsule    evolocumab (REPATHA) 140 MG/ML prefilled autoinjector    ezetimibe (ZETIA) 10 MG tablet    nicotine polacrilex (NICORETTE) 4 MG gum    Olodaterol HCl (STRIVERDI RESPIMAT) 2.5 MCG/ACT AERS    PARoxetine (PAXIL) 30 MG tablet    senna-docusate (SENEXON-S) 8.6-50 MG tablet    Vitamin D, Cholecalciferol, 25 MCG (1000 UT) TABS     No current facility-administered medications for this visit.       SOCIAL HISTORY:  I have reviewed this patient's social history and updated it with pertinent information if needed. Adolfo CRAIG Justice  reports that he quit smoking about 6 years ago. His smoking use included cigarettes. He smoked an average of 1 pack per day. He quit smokeless tobacco use about 6 years ago. He reports that he does not drink alcohol and does not use drugs.    PHYSICAL EXAM:  Pulse:  [65] 65  Resp:  [16] 16  BP: (139-143)/(87-88) 139/88  SpO2:  [97 %] 97 %  148 lbs 0 oz    Constitutional: alert, no distress  Respiratory: Good bilateral air entry  Cardiovascular: Regular heart sounds no murmur rubs or gallops no edema JVP is normal  GI: nondistended  Neuropsychiatric: appropriate affact    ASSESSMENT: Pertinent issues addressed/ reviewed during this cardiology visit  Coronary artery disease status post bypass surgery  Worsening dyspnea on exertion  Medication intolerance  Uncontrolled hypertension    RECOMMENDATIONS:  The patient has multifactorial dyspnea.  Unclear  if this is cardiac in etiology.  He has multiple reasons for dyspnea as enumerated above.  I first recommend optimization of blood pressure.  Recommend increasing amlodipine to 10 mg daily.  Continue current dose of beta-blocker.  Recommend getting an echocardiogram and a Lexiscan nuclear stress test.  Will discuss results and potential need for angiography depending on findings.  Given his fatigue, recommend stopping ezetimibe and seeing if it makes a difference.  His LDL is excellent on Repatha.  Continue aspirin and Repatha for the time being.  Follow-up with vascular for his vascular disease.    Will follow-up in a couple of months from now depending on results of echocardiography and stress testing.  Otherwise routine yearly follow-up in the cardiology clinic.    It was a pleasure seeing this patient in clinic today. Please do not hesitate to contact me with any future questions.     SHAHID Lee, [facility]  Cardiology - [facility]  December 21, 2023    Total visit time including chart review documentation and clinical care today was 42 minutes.    This note was completed in part using dictation via the Dragon voice recognition software. Some word and grammatical errors may occur and must be interpreted in the appropriate clinical context.  If there are any questions pertaining to this issue, please contact me for further clarification.

## 2023-12-21 NOTE — PATIENT INSTRUCTIONS
Stop Zetia  Increase amlodipine to 10 mg daily  Get echo and stress test  Watch BP at home  Follow up in 1-2 months

## 2024-01-03 DIAGNOSIS — Z95.1 S/P CABG X 4: ICD-10-CM

## 2024-01-03 DIAGNOSIS — I25.10 CORONARY ARTERY DISEASE INVOLVING NATIVE CORONARY ARTERY OF NATIVE HEART WITHOUT ANGINA PECTORIS: ICD-10-CM

## 2024-01-03 DIAGNOSIS — E78.5 HYPERLIPIDEMIA LDL GOAL <70: ICD-10-CM

## 2024-01-03 DIAGNOSIS — Z78.9 STATIN INTOLERANCE: ICD-10-CM

## 2024-01-08 ENCOUNTER — HOSPITAL ENCOUNTER (OUTPATIENT)
Dept: NUCLEAR MEDICINE | Facility: CLINIC | Age: 79
Setting detail: NUCLEAR MEDICINE
Discharge: HOME | End: 2024-01-08
Attending: INTERNAL MEDICINE
Payer: COMMERCIAL

## 2024-01-08 ENCOUNTER — HOSPITAL ENCOUNTER (OUTPATIENT)
Dept: CARDIOLOGY | Facility: CLINIC | Age: 79
Discharge: HOME | End: 2024-01-08
Attending: INTERNAL MEDICINE
Payer: COMMERCIAL

## 2024-01-08 VITALS — WEIGHT: 148 LBS | BODY MASS INDEX: 23.23 KG/M2 | HEIGHT: 67 IN

## 2024-01-08 DIAGNOSIS — I25.10 CORONARY ARTERY DISEASE INVOLVING NATIVE CORONARY ARTERY OF NATIVE HEART WITHOUT ANGINA PECTORIS: ICD-10-CM

## 2024-01-08 DIAGNOSIS — Z86.79 H/O CARDIOMYOPATHY: ICD-10-CM

## 2024-01-08 DIAGNOSIS — E78.5 HYPERLIPIDEMIA LDL GOAL <70: ICD-10-CM

## 2024-01-08 DIAGNOSIS — I10 BENIGN ESSENTIAL HYPERTENSION: ICD-10-CM

## 2024-01-08 LAB
CV STRESS MAX HR HE: 73
NUC STRESS EJECTION FRACTION: 69 %
RATE PRESSURE PRODUCT: 8760
STRESS ECHO BASELINE DIASTOLIC HE: 78
STRESS ECHO BASELINE HR: 60 BPM
STRESS ECHO BASELINE SYSTOLIC BP: 120
STRESS ECHO CALCULATED PERCENT HR: 51 %
STRESS ECHO LAST STRESS DIASTOLIC BP: 68
STRESS ECHO LAST STRESS SYSTOLIC BP: 120
STRESS ECHO TARGET HR: 142

## 2024-01-08 PROCEDURE — 78452 HT MUSCLE IMAGE SPECT MULT: CPT | Mod: 26 | Performed by: INTERNAL MEDICINE

## 2024-01-08 PROCEDURE — 93017 CV STRESS TEST TRACING ONLY: CPT

## 2024-01-08 PROCEDURE — 78452 HT MUSCLE IMAGE SPECT MULT: CPT

## 2024-01-08 PROCEDURE — 250N000011 HC RX IP 250 OP 636: Mod: JZ | Performed by: INTERNAL MEDICINE

## 2024-01-08 PROCEDURE — A9502 TC99M TETROFOSMIN: HCPCS | Performed by: INTERNAL MEDICINE

## 2024-01-08 PROCEDURE — 93018 CV STRESS TEST I&R ONLY: CPT | Performed by: INTERNAL MEDICINE

## 2024-01-08 PROCEDURE — 343N000001 HC RX 343: Performed by: INTERNAL MEDICINE

## 2024-01-08 PROCEDURE — 93016 CV STRESS TEST SUPVJ ONLY: CPT | Performed by: INTERNAL MEDICINE

## 2024-01-08 RX ORDER — REGADENOSON 0.08 MG/ML
0.4 INJECTION, SOLUTION INTRAVENOUS ONCE
Status: COMPLETED | OUTPATIENT
Start: 2024-01-08 | End: 2024-01-08

## 2024-01-08 RX ADMIN — TETROFOSMIN 11.4 MILLICURIE: 1.38 INJECTION, POWDER, LYOPHILIZED, FOR SOLUTION INTRAVENOUS at 12:00

## 2024-01-08 RX ADMIN — REGADENOSON 0.4 MG: 0.08 INJECTION, SOLUTION INTRAVENOUS at 13:19

## 2024-01-08 RX ADMIN — TETROFOSMIN 34.3 MILLICURIE: 1.38 INJECTION, POWDER, LYOPHILIZED, FOR SOLUTION INTRAVENOUS at 14:03

## 2024-01-09 ENCOUNTER — OFFICE VISIT (OUTPATIENT)
Dept: VASCULAR SURGERY | Facility: CLINIC | Age: 79
End: 2024-01-09
Attending: SURGERY
Payer: COMMERCIAL

## 2024-01-09 ENCOUNTER — HOSPITAL ENCOUNTER (OUTPATIENT)
Dept: CT IMAGING | Facility: CLINIC | Age: 79
Discharge: HOME | End: 2024-01-09
Attending: SURGERY | Admitting: SURGERY
Payer: COMMERCIAL

## 2024-01-09 VITALS
WEIGHT: 146 LBS | BODY MASS INDEX: 22.91 KG/M2 | OXYGEN SATURATION: 95 % | SYSTOLIC BLOOD PRESSURE: 121 MMHG | HEART RATE: 59 BPM | TEMPERATURE: 97.4 F | DIASTOLIC BLOOD PRESSURE: 77 MMHG | HEIGHT: 67 IN

## 2024-01-09 DIAGNOSIS — I71.43 INFRARENAL ABDOMINAL AORTIC ANEURYSM (AAA) WITHOUT RUPTURE (H): ICD-10-CM

## 2024-01-09 DIAGNOSIS — I71.43 INFRARENAL ABDOMINAL AORTIC ANEURYSM (AAA) WITHOUT RUPTURE (H): Primary | ICD-10-CM

## 2024-01-09 LAB
CREAT BLD-MCNC: 1.2 MG/DL (ref 0.7–1.3)
EGFRCR SERPLBLD CKD-EPI 2021: >60 ML/MIN/1.73M2

## 2024-01-09 PROCEDURE — 250N000011 HC RX IP 250 OP 636: Performed by: SURGERY

## 2024-01-09 PROCEDURE — 99213 OFFICE O/P EST LOW 20 MIN: CPT | Performed by: SURGERY

## 2024-01-09 PROCEDURE — 74174 CTA ABD&PLVS W/CONTRAST: CPT

## 2024-01-09 PROCEDURE — 82565 ASSAY OF CREATININE: CPT

## 2024-01-09 PROCEDURE — 250N000009 HC RX 250: Performed by: SURGERY

## 2024-01-09 RX ORDER — IOPAMIDOL 755 MG/ML
72 INJECTION, SOLUTION INTRAVASCULAR ONCE
Status: COMPLETED | OUTPATIENT
Start: 2024-01-09 | End: 2024-01-09

## 2024-01-09 RX ADMIN — SODIUM CHLORIDE 100 ML: 9 INJECTION, SOLUTION INTRAVENOUS at 12:52

## 2024-01-09 RX ADMIN — IOPAMIDOL 72 ML: 755 INJECTION, SOLUTION INTRAVENOUS at 12:51

## 2024-01-09 ASSESSMENT — PAIN SCALES - GENERAL: PAINLEVEL: NO PAIN (0)

## 2024-01-09 NOTE — PROGRESS NOTES
VASCULAR SURGERY PROGRESS NOTE   VASCULAR SURGEON: Aleksander Ruiz MD, RPVI     LOCATION:   [facility]     Adolfo Rendon   Medical Record #:  [id number]  YOB: 1945  Age:  78 year old     Date of Service: 1/9/2024    PRIMARY CARE PROVIDER: Danielle Mercado      Reason for visit: Follow-up    IMPRESSION: 78-year-old male with infrarenal abdominal aortic aneurysm.  Measured 5 and half centimeter.  Will wait for final read.  Patient is a candidate for endovascular repair and this is what he prefers.  In terms of right lower extremity bypass, patient prefers to wait and undergo any additional intervention.  He is stable from PT standpoint    RECOMMENDATION/RISKS: Follow-up in 6 months with repeat CTA.  If the interpretation comes back at 5.5 cm would recommend repair.    HPI:  Adolfo Rendon is a 78 year old male who was seen today for follow-up.  Patient is doing well  REVIEW OF SYSTEMS:    A 12 point ROS was reviewed and is negative      PHH:    Past Medical History:   Diagnosis Date    Coronary artery disease     Depressive disorder, not elsewhere classified     Hypertrophy (benign) of prostate     Impotence of organic origin     Other and unspecified hyperlipidemia     Other anxiety states     Tobacco use disorder           Past Surgical History:   Procedure Laterality Date    APPENDECTOMY  1966    BYPASS GRAFT ARTERY CORONARY N/A 9/3/2019    Procedure: CORONARY ARTERY BYPASS GRAFT X 4 - LIMA TO LAD, SV TO PL, OM, PDA ; ON PUMP WITH TERRENCE; ENDOVEIN HARVEST RIGHT LEG;  Surgeon: Lai Mchugh MD;  Location:  OR    COLONOSCOPY  3/1/2005    COLONOSCOPY N/A 5/9/2019    Procedure: COLONOSCOPY;  Surgeon: Camilo Beard MD;  Location: [facility]    CV LEFT HEART CATH N/A 8/8/2019    Procedure: Left Heart Cath--also measure LVEDP;  Surgeon: Krystle Barrera MD;  Location:  HEART CARDIAC CATH LAB    HERNIORRHAPHY INGUINAL  7/14/2011    Procedure:HERNIORRHAPHY INGUINAL; Open  Repair Right Inguinal Hernia Repair       HYDROCELECTOMY SCROTAL  01/2004    left hydrocele repair    SUPRAPUBIC PROSTATECTOMY         ALLERGIES:  Hctz [hydrochlorothiazide], Loratadine, Metoprolol, Pravastatin, Remeron [mirtazapine], and Wellbutrin [bupropion hcl]    MEDS:    Current Outpatient Medications:     albuterol (PROAIR HFA/PROVENTIL HFA/VENTOLIN HFA) 108 (90 Base) MCG/ACT inhaler, Inhale 2 puffs into the lungs every 4 hours as needed for shortness of breath, wheezing or cough, Disp: 18 g, Rfl: 3    amLODIPine (NORVASC) 10 MG tablet, Take 1 tablet (10 mg) by mouth daily, Disp: 90 tablet, Rfl: 3    aspirin 81 MG EC tablet, Take 81 mg by mouth daily, Disp: , Rfl:     carvedilol (COREG) 12.5 MG tablet, Take 1 tablet (12.5 mg) by mouth 2 times daily (with meals), Disp: 180 tablet, Rfl: 0    co-enzyme Q-10 100 MG CAPS capsule, Take 100 mg by mouth daily, Disp: , Rfl:     evolocumab (REPATHA) 140 MG/ML prefilled autoinjector, Inject 1 mL (140 mg) Subcutaneous every 14 days, Disp: 6 mL, Rfl: 3    nicotine polacrilex (NICORETTE) 4 MG gum, Place 4 mg inside cheek every hour as needed for smoking cessation, Disp: , Rfl:     Olodaterol HCl (STRIVERDI RESPIMAT) 2.5 MCG/ACT AERS, Inhale 2 puffs into the lungs daily, Disp: 12 g, Rfl: 3    PARoxetine (PAXIL) 30 MG tablet, TAKE 2 TABLETS BY MOUTH once daily in the evening, Disp: 180 tablet, Rfl: 3    senna-docusate (SENEXON-S) 8.6-50 MG tablet, TAKE ONE TABLET BY MOUTH TWICE A DAY AS NEEDED FOR CONSTIPATION Strength: 8.6-50 mg, Disp: 180 tablet, Rfl: 3    Vitamin D, Cholecalciferol, 25 MCG (1000 UT) TABS, Take 1,000 Units by mouth daily , Disp: , Rfl:   No current facility-administered medications for this visit.    SOCIAL HABITS:    History   Smoking Status    Former    Packs/day: 1.00    Types: Cigarettes    Quit date: 1/14/2017   Smokeless Tobacco    Former    Quit date: 9/1/2017     Social History    Substance and Sexual Activity      Alcohol use: No         "Alcohol/week: 0.0 standard drinks of alcohol        Comment: quit 1989      History   Drug Use No       FAMILY HISTORY:    Family History   Problem Relation Age of Onset    Cerebrovascular Disease Mother     Hypertension Mother     Prostate Cancer Brother     Dementia Brother     Arthritis Sister         rhematoid    Cancer Brother         Lung cancer, lymphoma    Other - See Comments Brother         HIV    Cancer Sister         skin cancer on nose    Pacemaker Sister     Aneurysm Sister         heart       PE:  /77   Pulse 59   Temp 97.4  F (36.3  C) (Tympanic)   Ht 1.702 m (5' 7\")   Wt 66.2 kg (146 lb)   SpO2 95%   BMI 22.87 kg/m    Wt Readings from Last 1 Encounters:   01/09/24 66.2 kg (146 lb)     Body mass index is 22.87 kg/m .    EXAM:  GENERAL: This is a well-developed 78 year old male who appears his stated age  EYES: Grossly normal.  MOUTH: Buccal mucosa normal   CARDIAC: Normal   CHEST/LUNG: Clear to auscultation bilaterally  GASTROINTESINAL soft nontender nondistended  MUSCULOSKELETAL: Grossly normal and both lower extremities are intact.  HEME/LYMPH: No lymphedema  NEUROLOGIC: Focally intact, Alert and oriented x 3.           DIAGNOSTIC STUDIES:     Images:  NM Lexiscan stress test    Result Date: 1/8/2024    The nuclear stress test is abnormal.   There is a medium sized area of transmural infarction in the mid to basal inferior and inferolateral segment(s) of the left ventricle.   Left ventricular function is normal. The left ventricular ejection fraction at stress is 69%.   A prior study was conducted on 2/16/2022.  This study has no change when compared with the prior study.           LABS:      Sodium   Date Value Ref Range Status   11/14/2023 140 135 - 145 mmol/L Final     Comment:     Reference intervals for this test were updated on 09/26/2023 to more accurately reflect our healthy population. There may be differences in the flagging of prior results with similar values performed with " this method. Interpretation of those prior results can be made in the context of the updated reference intervals.    10/27/2022 139 136 - 145 mmol/L Final   10/25/2021 137 133 - 144 mmol/L Final   03/23/2021 136 133 - 144 mmol/L Final   02/06/2020 142 133 - 144 mmol/L Final   09/08/2019 138 133 - 144 mmol/L Final     Urea Nitrogen   Date Value Ref Range Status   11/14/2023 19.1 8.0 - 23.0 mg/dL Final   10/27/2022 22.3 8.0 - 23.0 mg/dL Final   10/25/2021 19 7 - 30 mg/dL Final   03/23/2021 19 7 - 30 mg/dL Final   02/06/2020 22 7 - 30 mg/dL Final   09/08/2019 16 7 - 30 mg/dL Final     Hemoglobin   Date Value Ref Range Status   11/14/2023 13.9 13.3 - 17.7 g/dL Final   10/27/2022 13.4 13.3 - 17.7 g/dL Final   03/23/2021 13.6 13.3 - 17.7 g/dL Final   09/16/2019 9.3 (L) 13.3 - 17.7 g/dL Final   09/08/2019 10.4 (L) 13.3 - 17.7 g/dL Final     Platelet Count   Date Value Ref Range Status   11/14/2023 151 150 - 450 10e3/uL Final   10/27/2022 187 150 - 450 10e3/uL Final   03/23/2021 177 150 - 450 10e9/L Final   09/08/2019 171 150 - 450 10e9/L Final   09/06/2019 105 (L) 150 - 450 10e9/L Final     INR   Date Value Ref Range Status   09/03/2019 1.30 (H) 0.86 - 1.14 Final   09/03/2019 1.47 (H) 0.86 - 1.14 Final   08/08/2019 0.96 0.86 - 1.14 Final       30 minutes spent on the day of encounter doing chart review, history and exam, documentation, and further activities as noted.       Aleksander Ruiz MD, [facility]  VASCULAR SURGERY

## 2024-01-09 NOTE — PATIENT INSTRUCTIONS
This is the plan that was discussed at your appointment.    We will contact you to schedule your 6 month follow-up with imaging        I am including additional information on these things and our contact information if you have any questions or concerns.   Please do not hesitate to reach out to us if you felt we did not answer your questions or you are unsure of the treatment plan after your visit today. Our number is [phone].  Thank you for trusting us with your care.         Again thank you for your time.

## 2024-01-09 NOTE — PROGRESS NOTES
Patient is in clinic today to see MD Silvia Ruiz.      Patient is here for a follow up to discuss AAA.    The patient does not smoke.    Pt is currently taking ASA.    The patient states he/she are NOT wearing compression .    Swelling is observed in N/A.    Pt rates pain 0/10 located at .    Pts symptoms include Rest Pain, leg cramps, numbness or weakness, and discoloration of leg in Bilateral  extremity.    Patients condition is stable.    The provider has been notified that the patient is complaining of breathing, worse, leg has improved, hip pain .

## 2024-01-12 ENCOUNTER — HOSPITAL ENCOUNTER (OUTPATIENT)
Dept: CARDIOLOGY | Facility: CLINIC | Age: 79
Discharge: HOME | End: 2024-01-12
Attending: INTERNAL MEDICINE | Admitting: INTERNAL MEDICINE
Payer: COMMERCIAL

## 2024-01-12 DIAGNOSIS — I25.10 CORONARY ARTERY DISEASE INVOLVING NATIVE CORONARY ARTERY OF NATIVE HEART WITHOUT ANGINA PECTORIS: ICD-10-CM

## 2024-01-12 DIAGNOSIS — E78.5 HYPERLIPIDEMIA LDL GOAL <70: ICD-10-CM

## 2024-01-12 DIAGNOSIS — I10 BENIGN ESSENTIAL HYPERTENSION: ICD-10-CM

## 2024-01-12 DIAGNOSIS — Z86.79 H/O CARDIOMYOPATHY: ICD-10-CM

## 2024-01-12 LAB — LVEF ECHO: NORMAL

## 2024-01-12 PROCEDURE — 93306 TTE W/DOPPLER COMPLETE: CPT | Mod: 26 | Performed by: INTERNAL MEDICINE

## 2024-01-12 PROCEDURE — 93306 TTE W/DOPPLER COMPLETE: CPT

## 2024-01-15 ENCOUNTER — TELEPHONE (OUTPATIENT)
Dept: VASCULAR SURGERY | Facility: CLINIC | Age: 79
End: 2024-01-15
Payer: COMMERCIAL

## 2024-01-15 NOTE — TELEPHONE ENCOUNTER
Dr. Ruiz requested writer to call patient. He spoke to patient last week that patient needs an EVAR surgery. Patient said he wanted to think about it and called back.    Today writer called home phone and was unable to leave message.  Left message on mobile number asking for call back.

## 2024-01-23 NOTE — TELEPHONE ENCOUNTER
Spoke with patient. He would like to proceed with EVAR surgery. He has lexiscan on 1/8/24 which is abnormal and echocardiogram on 1/12/24. Cardiology nurse notes states:   Glory Leong RN  1/17/2024  9:43 AM CST       Echo stable as well as nuc stress test. No new WMA or sign of ischemia. EF stable. No significant valve disease.     Letter sent to pt  with results     Glory Leong RN       Will send message to Dr. Ruiz.

## 2024-01-24 ENCOUNTER — TELEPHONE (OUTPATIENT)
Dept: CARDIOLOGY | Facility: CLINIC | Age: 79
End: 2024-01-24
Payer: COMMERCIAL

## 2024-01-24 NOTE — TELEPHONE ENCOUNTER
Routing call to PA team -    Received fax from [facility] stating they need a new yearly prior auth for the medication Repatha.    Please complete new Prior auth for the year.    Glory Leong RN

## 2024-01-25 NOTE — TELEPHONE ENCOUNTER
Central Prior Authorization Team   Phone: [phone]    PA Initiation    Medication: Repatha SureClick 140MG/ML auto-injectors    Insurance Company: [employer] - Phone [phone] Fax [phone]  Pharm[facility]illing the Rx[facility] - [place], [place] - [address]  Filling Pharmacy Phone: [phone]  Filling Pharmacy Fax:    Start Date: 1/25/2024

## 2024-01-26 ENCOUNTER — PREP FOR PROCEDURE (OUTPATIENT)
Dept: SURGERY | Facility: CLINIC | Age: 79
End: 2024-01-26
Payer: COMMERCIAL

## 2024-01-26 DIAGNOSIS — I71.43 INFRARENAL ABDOMINAL AORTIC ANEURYSM (AAA) WITHOUT RUPTURE (H): Primary | ICD-10-CM

## 2024-01-26 RX ORDER — CEFAZOLIN SODIUM/WATER 2 G/20 ML
2 SYRINGE (ML) INTRAVENOUS SEE ADMIN INSTRUCTIONS
Status: CANCELLED | OUTPATIENT
Start: 2024-03-29

## 2024-01-26 RX ORDER — CEFAZOLIN SODIUM/WATER 2 G/20 ML
2 SYRINGE (ML) INTRAVENOUS
Status: CANCELLED | OUTPATIENT
Start: 2024-03-29

## 2024-01-29 NOTE — TELEPHONE ENCOUNTER
Prior Authorization Approval    Medication: REPATHA SURECLICK 140 MG/ML SC SOAJ  Authorization Effective Date: 1/25/2024  Authorization Expiration Date: 1/25/2025  Approved Dose/Quantity:   Reference #:     Insurance Company: [employer] - Phone [phone] Fax [phone]  Expected CoPay: $    CoPay Card Available:      Financial Assistance Needed:   Which Pharmacy is filling the prescription: [facility] - [place], [place] - [facility] [address]   Pharmacy Notified: YES  Patient Notified: **Instructed pharmacy to notify patient when script is ready to /ship.**

## 2024-01-30 ENCOUNTER — TELEPHONE (OUTPATIENT)
Dept: VASCULAR SURGERY | Facility: CLINIC | Age: 79
End: 2024-01-30
Payer: COMMERCIAL

## 2024-01-30 DIAGNOSIS — I71.43 INFRARENAL ABDOMINAL AORTIC ANEURYSM (AAA) WITHOUT RUPTURE (H): Primary | ICD-10-CM

## 2024-01-30 NOTE — TELEPHONE ENCOUNTER
"Writer attempted to reach pt via telephone.  Error message received when calling his Home #  ([phone]) and voicemail on mobile # ([phone] ) had voicemail inbox message for someone named \"Elizabeth\".      Pt's sister's phone number ([phone]) also has busy signal when call.    Writer was successful in reaching pt's sister Annabel.  She will be reaching out to pt to have him call clinic back to coordinate surgery.  "

## 2024-01-30 NOTE — LETTER
January 30, 2024      Adolfo Rendon  [address] [facility] [address]            Adolfo    Your visit to [facility] for your surgery is coming soon and we look forward to seeing you! This friendly reminder and pre-procedure checklist will help to ensure your surgery goes smoothly and meets your expectations. At [facility], our goal is to provide you with a great patient experience and to deliver genuine, professional care to every patient.     Please complete all the steps in advance of your visit. If you have any questions about the items listed below, please give our office a call. We can be reached at [phone] or visit our website at [email or web address]  for more information.     Procedure: Endovascular Aortic Aneurysm Repair    Procedure Date :  3/29/2024 (expect to arrive by 8:00 AM)    Arrival Time:  Tentative time and subject to change. The pre-admission nurse will call you 1-2 days before surgery to tell you time of arrival.     Procedure Location: [facility]:  [address] [place] [address] (phone: [phone], Fax: [phone])    Surgeon: Dr. Aleksander Ruiz    Admission Type: Inpatient    COVID-19 Test: is no longer required. If you are experiencing COVID symptoms or have tested positive for COVID-19 within 14 days of procedure date, reach out to the care team to reschedule please.     Post Operative Appointment with Dr Jorge Ruiz: The appointements below are at the specialty clinic located at [address] [place] [address]     Provider Department   4/29/2024 11:40 AM (Arrive by 11:25 AM) [facility]  1st floor [facility] - 1st floor   4/29/2024 12:15 PM (Arrive by 12:00 PM) Aleksander Ruiz MD [facility] - 2nd floor [address]         If you take blood thinners:   PLEASE DO NOT STOP YOUR ASPIRIN  OR PLAVIX UNLESS SPECIFICALLY DIRECTED BY THE VASCULAR SURGEON TO STOP!  In most cases Vascular surgeons want you to continue these. This is different from most NON vascular surgeries and may not be well known by your Primary Care Provider      If you take these diabetic medications, please discuss with your primary doctor and follow the hold instructions:   Hold seven (7) days prior for once weekly injectable doses [semaglutide (Ozempic, Wegovy), dulaglutide (Trulicity), exenatide ER (Bydureon), tirzepatide (Mounjaro)]  Hold the day before and day of for once daily injectable GLP-1 agonists [exenatide (Byetta), liraglutide (Saxenda, Victoza)]  Hold seven (7) days for oral semaglutide (Rybelsus)       Notify our office right away if you have any changes in your health status or if you develop a cold, flu, diarrhea, infection, fever or sore throat before your scheduled surgery date.   We can be reached at [phone] Monday-Friday 8 am-4:30 pm if you have any questions.       Complete the following checklist before your surgery    []  You will need a Pre-op Physical within 30 days before surgery with your primary care provider. This exam is required by the anesthesiologist to ensure a safe surgical experience.    Failure  to obtain your pre-op physical will lead to cancellation of your surgery  Call them right away to schedule this. Please ensure your Preoperative Physical is faxed to the Hospital if done outside of [facility] system.     [] Preoperative Medication Instructions  Contact your prescribing provider and/or vascular surgeon for instructions before discontinuing any medications especially anticoagulants. (Examples: Coumadin, Plavix, Xarelto, Eliquis, Pradaxa, Effient, Brilinta)   Hold Ibuprofen, Herbal Supplements and Vitamin E 7 days before  Stop Cialis, Levitra and Viagra 2-3 days before surgery    [] Fasting Requirements  Nothing to eat for 8 hours before surgery unless instructed differently by  the surgery nurse.   You may have clear liquids up to two hours before your arrival time (coffee, tea, water, or Gatorade. No cream or milk)  If your primary care provider has instructed you to continue oral medications, you may take them on the morning of surgery with a small sip of water.    No alcohol or smoking 24 hours before surgery.     [] Contact your insurance regarding coverage  If you would like a [place] Estimate for your upcoming procedure at [facility], contact Cost of Care Estimates   Advocates are available Monday through Friday 8am - 5pm   [phone]  You may also submit a request online through your [employer] account.   If your procedure is at [facility] please contact the numbers below for Cost of Care Estimates.   - Facility Charge: [phone]    Anesthesiology charge:  [phone]      [] DO NOT BRING FMLA WITH TO SURGERY.  These should be sent to the provider's office by fax to [phone].     [] Day of Surgery  Medications - Take as indicated with sips of water.   Wear comfortable loose fitting clothes. Wear your glasses-Not contacts. Do not wear jewelry including rings and body piercings.   You may have 1 family member wait in the lobby during your surgery. Visitor restrictions are subject to change. Please verify with the surgery nurse when they call.   If same day surgery-Have an adult  come with you to surgery that can help you understand the surgeon's instructions, drive you home and stay with you overnight the first night.    [] You will receive a call from a surgery nurse 1-3 days prior to surgery. They will go over more details with you.     [] If the hospital is at [facility] and does not have available inpatient beds, your procedure may need to be postponed. We will contact you if this happens.            Use Chlorhexidine Gluconate 4% soap to help prevent surgical site infections    You play an important part in helping to prevent a  surgical site infection. Let s review what you will need to do.    Chlorhexidine Gluconate 4% is a powerful antiseptic that will help make sure your skin is free of germs. It looks and feels just like liquid soap and should be used liked a shower gel.    **[place] patients can receive free Chlorhexidine Gluconate 4% soap for surgery at any outpatient [place] pharmacy. You can also buy this over the counter at any pharmacy.**    Do not shave within 12 inches of your incision (surgical cut) area for at least 3 days before surgery. Shaving can make small cuts in the skin. This puts you at a higher risk of infection.    Items you will need for each shower:   1 newly washed towel   4 ounces of one of the above soaps   Clean pajamas or clothes to change into    Follow these steps the evening before surgery and the morning of surgery.  Remove all body piercings and jewelry, and leave them off until after your surgery.  Wash your hair and body with your regular shampoo and soap. Make sure you rinse the shampoo and soap from your hair and body.  Using clean hands, apply about 2 ounces of soap gently on your skin from your ear lobes to your toes. You don t need to scrub your skin, but make sure you liberally wash the area where your surgery will be done. Use on your groin area last. Do not use this soap on your face or head. If you get any soap in your eyes, ears or mouth, rinse right away. It is very important to let the soap stay on your skin for at least 1 minute.  Repeat step 2.   Rinse well and dry off using a clean towel. If you feel any tingling, itching or other irritation, rinse right away. It is normal to feel some coolness on the skin after using the antiseptic soap. Your skin may feel a bit dry after the shower, but do not use any lotions, creams or moisturizers. Do not use hair spray or other products in your hair. Also, do not wash with your regular soap after using this.  Dress in freshly washed clothes or  jan. Use fresh pillowcases and sheets on your bed.  Repeat these steps the morning of surgery.    If you are allergic or sensitive to Chlorhexidine Gluconate, use an antibacterial soap instead, following the same steps.    If you cannot use the shower, wash yourself with this soap at the sink. Make sure the sink is clean before you begin, and do the best you can to clean your entire body.

## 2024-01-30 NOTE — TELEPHONE ENCOUNTER
Surgery Scheduled    Confirmed surgery date/time and basic instructions with pt.  Pt will be scheduling preop exam.  Sending instructions via Letter.    Surgery/Procedure: EVAR    Special Equipment: c-arm, nilo table, IR tech    Location: [facility]:  [address] [place] [address] (phone: [phone], Fax: [phone])    Date: 3/29/2024    Time: 10:00 AM    Admission Type: Inpatient    Surgeon: Dr. Ruiz    OR Confirmed & :  Yes with [facility] on 1/30/2024    Entered on provider calendar:  Yes    Post Op: see appt desk    Wound Vac Needed: No    Home Care Needed: No    Reps Contacted: TBD - message sent to DR. Ruiz regarding which rep is needed.    Blood Thinners Addressed: N/A    Stress Test Clearance: YES cleared by cardiology - see telephone note on 1/23/24.

## 2024-02-18 ENCOUNTER — APPOINTMENT (OUTPATIENT)
Dept: CT IMAGING | Facility: CLINIC | Age: 79
End: 2024-02-18
Attending: EMERGENCY MEDICINE
Payer: COMMERCIAL

## 2024-02-18 ENCOUNTER — HOSPITAL ENCOUNTER (EMERGENCY)
Facility: CLINIC | Age: 79
Discharge: HOME | End: 2024-02-18
Attending: EMERGENCY MEDICINE | Admitting: EMERGENCY MEDICINE
Payer: COMMERCIAL

## 2024-02-18 VITALS
TEMPERATURE: 98.8 F | RESPIRATION RATE: 10 BRPM | BODY MASS INDEX: 21.97 KG/M2 | SYSTOLIC BLOOD PRESSURE: 120 MMHG | WEIGHT: 140 LBS | OXYGEN SATURATION: 97 % | DIASTOLIC BLOOD PRESSURE: 80 MMHG | HEART RATE: 57 BPM | HEIGHT: 67 IN

## 2024-02-18 DIAGNOSIS — R10.9 RIGHT FLANK PAIN: ICD-10-CM

## 2024-02-18 LAB
ALBUMIN UR-MCNC: NEGATIVE MG/DL
ANION GAP SERPL CALCULATED.3IONS-SCNC: 10 MMOL/L (ref 7–15)
APPEARANCE UR: CLEAR
BASOPHILS # BLD AUTO: 0.1 10E3/UL (ref 0–0.2)
BASOPHILS NFR BLD AUTO: 1 %
BILIRUB UR QL STRIP: NEGATIVE
BUN SERPL-MCNC: 24.4 MG/DL (ref 8–23)
CALCIUM SERPL-MCNC: 9.2 MG/DL (ref 8.8–10.2)
CHLORIDE SERPL-SCNC: 103 MMOL/L (ref 98–107)
COLOR UR AUTO: YELLOW
CREAT SERPL-MCNC: 1.3 MG/DL (ref 0.67–1.17)
DEPRECATED HCO3 PLAS-SCNC: 27 MMOL/L (ref 22–29)
EGFRCR SERPLBLD CKD-EPI 2021: 56 ML/MIN/1.73M2
EOSINOPHIL # BLD AUTO: 0.3 10E3/UL (ref 0–0.7)
EOSINOPHIL NFR BLD AUTO: 4 %
ERYTHROCYTE [DISTWIDTH] IN BLOOD BY AUTOMATED COUNT: 13.4 % (ref 10–15)
GLUCOSE SERPL-MCNC: 145 MG/DL (ref 70–99)
GLUCOSE UR STRIP-MCNC: NEGATIVE MG/DL
HCT VFR BLD AUTO: 41.5 % (ref 40–53)
HGB BLD-MCNC: 13.9 G/DL (ref 13.3–17.7)
HGB UR QL STRIP: ABNORMAL
HOLD SPECIMEN: NORMAL
HOLD SPECIMEN: NORMAL
HYALINE CASTS: 3 /LPF
IMM GRANULOCYTES # BLD: 0 10E3/UL
IMM GRANULOCYTES NFR BLD: 0 %
KETONES UR STRIP-MCNC: 20 MG/DL
LEUKOCYTE ESTERASE UR QL STRIP: NEGATIVE
LYMPHOCYTES # BLD AUTO: 0.7 10E3/UL (ref 0.8–5.3)
LYMPHOCYTES NFR BLD AUTO: 10 %
MCH RBC QN AUTO: 27.8 PG (ref 26.5–33)
MCHC RBC AUTO-ENTMCNC: 33.5 G/DL (ref 31.5–36.5)
MCV RBC AUTO: 83 FL (ref 78–100)
MONOCYTES # BLD AUTO: 0.5 10E3/UL (ref 0–1.3)
MONOCYTES NFR BLD AUTO: 6 %
MUCOUS THREADS #/AREA URNS LPF: PRESENT /LPF
NEUTROPHILS # BLD AUTO: 5.9 10E3/UL (ref 1.6–8.3)
NEUTROPHILS NFR BLD AUTO: 79 %
NITRATE UR QL: NEGATIVE
NRBC # BLD AUTO: 0 10E3/UL
NRBC BLD AUTO-RTO: 0 /100
PH UR STRIP: 5 [PH] (ref 5–7)
PLATELET # BLD AUTO: 162 10E3/UL (ref 150–450)
POTASSIUM SERPL-SCNC: 3.9 MMOL/L (ref 3.4–5.3)
RBC # BLD AUTO: 5 10E6/UL (ref 4.4–5.9)
RBC URINE: 16 /HPF
SODIUM SERPL-SCNC: 140 MMOL/L (ref 135–145)
SP GR UR STRIP: 1.01 (ref 1–1.03)
SQUAMOUS EPITHELIAL: <1 /HPF
UROBILINOGEN UR STRIP-MCNC: NORMAL MG/DL
WBC # BLD AUTO: 7.4 10E3/UL (ref 4–11)
WBC URINE: 0 /HPF

## 2024-02-18 PROCEDURE — 80048 BASIC METABOLIC PNL TOTAL CA: CPT | Performed by: EMERGENCY MEDICINE

## 2024-02-18 PROCEDURE — 250N000011 HC RX IP 250 OP 636: Performed by: EMERGENCY MEDICINE

## 2024-02-18 PROCEDURE — 99285 EMERGENCY DEPT VISIT HI MDM: CPT | Mod: 25

## 2024-02-18 PROCEDURE — 36415 COLL VENOUS BLD VENIPUNCTURE: CPT | Performed by: EMERGENCY MEDICINE

## 2024-02-18 PROCEDURE — 85025 COMPLETE CBC W/AUTO DIFF WBC: CPT | Performed by: EMERGENCY MEDICINE

## 2024-02-18 PROCEDURE — 99284 EMERGENCY DEPT VISIT MOD MDM: CPT | Performed by: EMERGENCY MEDICINE

## 2024-02-18 PROCEDURE — 74177 CT ABD & PELVIS W/CONTRAST: CPT

## 2024-02-18 PROCEDURE — 250N000009 HC RX 250: Performed by: EMERGENCY MEDICINE

## 2024-02-18 PROCEDURE — 81001 URINALYSIS AUTO W/SCOPE: CPT | Performed by: EMERGENCY MEDICINE

## 2024-02-18 RX ORDER — ONDANSETRON 4 MG/1
4 TABLET, ORALLY DISINTEGRATING ORAL EVERY 8 HOURS PRN
Qty: 10 TABLET | Refills: 0 | Status: SHIPPED | OUTPATIENT
Start: 2024-02-18 | End: 2024-03-22

## 2024-02-18 RX ORDER — OXYCODONE HYDROCHLORIDE 5 MG/1
5 TABLET ORAL EVERY 6 HOURS PRN
Qty: 6 TABLET | Refills: 0 | Status: SHIPPED | OUTPATIENT
Start: 2024-02-18 | End: 2024-03-22

## 2024-02-18 RX ORDER — IOPAMIDOL 755 MG/ML
68 INJECTION, SOLUTION INTRAVASCULAR ONCE
Status: COMPLETED | OUTPATIENT
Start: 2024-02-18 | End: 2024-02-18

## 2024-02-18 RX ADMIN — SODIUM CHLORIDE 57 ML: 9 INJECTION, SOLUTION INTRAVENOUS at 20:08

## 2024-02-18 RX ADMIN — IOPAMIDOL 68 ML: 755 INJECTION, SOLUTION INTRAVENOUS at 20:08

## 2024-02-18 ASSESSMENT — ACTIVITIES OF DAILY LIVING (ADL): ADLS_ACUITY_SCORE: 38

## 2024-02-19 ENCOUNTER — TELEPHONE (OUTPATIENT)
Dept: FAMILY MEDICINE | Facility: CLINIC | Age: 79
End: 2024-02-19
Payer: COMMERCIAL

## 2024-02-19 NOTE — ED TRIAGE NOTES
Patient reports sudden onset right flank pain that radiates to right side of abdomen that started today after lunch. 50mcg fentanyl and 4mg zofran given by ems prior to arrival and patient reports 1/10 pain after fentanyl given.      Triage Assessment (Adult)       Row Name 02/18/24 1923          Triage Assessment    Airway WDL WDL        Respiratory WDL    Respiratory WDL WDL        Skin Circulation/Temperature WDL    Skin Circulation/Temperature WDL WDL        Cardiac WDL    Cardiac WDL WDL        Peripheral/Neurovascular WDL    Peripheral Neurovascular WDL WDL        Cognitive/Neuro/Behavioral WDL    Cognitive/Neuro/Behavioral WDL WDL                     
normal (ped)...

## 2024-02-19 NOTE — DISCHARGE INSTRUCTIONS
I believe your pain is likely related to a small kidney stone that is difficult to see on the CT scan.  Drink plenty of fluids.  Use acetaminophen and ibuprofen as needed for pain.  Use ondansetron as needed for nausea.  Return to the emergency department for fevers, uncontrolled pain, uncontrolled vomiting, or other concerns.  Otherwise follow-up in clinic.    Use ibuprofen and acetaminophen for your symptoms.  Use oxycodone as needed for pain that is not controlled by the prior two medications.    Oxycodone is an addictive opioid medication, please only take it when the pain is more than can be controlled by acetaminophen or ibuprofen alone. It will also make you lightheaded, nauseated, and constipated.  Do not drive, operate heavy machinery, or take care of young children while taking this medication. Do not mix it with other medications or drugs that will make you sleepy, such as alcohol.     Repeated studies have shown that the longer you use opioid pain medications, the longer it is until you return to normal function. It is our recommendation that you taper off opioids as quickly as possible with the goal of returning to normal function or near normal function. Long term use of opioids quickly results in growing tolerance to the medication (less of the benefits) and increased dependence (more of the bad side effects).     Pain is very difficult to treat and we can very rarely take away pain completely. If you are having difficulty with pain over several weeks after an injury, you may need to start different medications and therapies (such as physical therapy, graded exercise, massage, and acupuncture). Please talk about this with your regular doctor.     If you are interested in seeking free, confidential treatment referral and information service for individuals and families facing mental health and/or substance use disorders please call [phone] [phone])

## 2024-02-19 NOTE — ED PROVIDER NOTES
"  History     Chief Complaint   Patient presents with    Flank Pain     Coming by EMS with right flank pain. Known AAA, having surg in 2 weeks     HPI  Adolfo Rendon is a 78 year old male with history of prior AAA who presents for right flank pain.  Flank pain ongoing over the past several hours, severe pain wrapping around his side into his abdomen he felt sweaty and weak, he had nausea and vomiting.  No diarrhea.  No dysuria, hematuria, or frequency.  EMS report that they gave IV ondansetron and 50 mcg of fentanyl and the patient is feeling much better now.    I reviewed the patient's clinic visit from 1/9/2024 with vascular surgery for follow-up of his infrarenal abdominal aortic aneurysm and plan is for surgery in the near future.    Allergies:  Allergies   Allergen Reactions    Hctz [Hydrochlorothiazide] Fatigue     \"felt like zombie\"    Loratadine      Mood , irritablity     Metoprolol Fatigue     \"felt like zombie\"    Pravastatin Fatigue     \"felt like zombie\"    Remeron [Mirtazapine]      Agitation       Wellbutrin [Bupropion Hcl]      Sig mood issues          Problem List:    Patient Active Problem List    Diagnosis Date Noted    Personal history of prostate cancer 11/14/2022     Priority: Medium    Chronic systolic heart failure (H) 10/27/2022     Priority: Medium    H/O cardiomyopathy 02/13/2020     Priority: Medium    Coronary artery disease of bypass graft of native heart with stable angina pectoris (H24)      Priority: Medium    Fluid overload 09/09/2019     Priority: Medium    Anemia due to blood loss, acute 09/09/2019     Priority: Medium    Transient hyperglycemia post procedure 09/09/2019     Priority: Medium    S/P CABG x 4 09/03/2019     Priority: Medium    Benign essential hypertension 08/16/2019     Priority: Medium    Ischemic cardiomyopathy 08/16/2019     Priority: Medium    Status post coronary angiogram 08/08/2019     Priority: Medium    Coronary artery disease involving native coronary " artery of native heart without angina pectoris 08/01/2019     Priority: Medium     Added automatically from request for surgery [id number]      Chest pain 08/01/2019     Priority: Medium     Added automatically from request for surgery [id number]      Diverticulosis of large intestine 05/19/2019     Priority: Medium     Colonoscopy 5/2019      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) 05/19/2019     Priority: Medium    Unilateral atherosclerotic renal artery stenosis (H24) 03/13/2018     Priority: Medium     Greater than 50% stenosis of the proximal right renal artery.      Pulmonary nodules 03/13/2018     Priority: Medium     Groundglass nodule measures 6 mm in the right upper lobe.  Recommendations for an incidental lung nodule = or > 6 mm to 8 mm:    Low risk patients: Initial follow-up CT at 6-12 months, then  consider CT at 18-24 months if no change.    High risk patients: Initial follow-up CT at 6-12 months, then CT at  18-24 months if no change.     *Low Risk: Minimal or absent history of smoking or other known risk  factors.  *Nonsolid (ground-glass) or partly solid nodules may require longer  follow-up to exclude indolent adenocarcinoma.  *Recommendations based on Guidelines for the Management of Incidental  Pulmonary Nodules       Aortic arch aneurysm (H24) 03/06/2018     Priority: Medium    Thoracic aortic aneurysm without rupture (H24) 01/13/2017     Priority: Medium     4 cm  1/2017      Centrilobular emphysema (H) 01/13/2017     Priority: Medium     1/2017 CT chest      HTN, goal below 140/90 07/12/2013     Priority: Medium    Health Care Home 12/18/2012     Priority: Medium     Cora Patel RN-PHN  FPA / [facility]   [phone]    DX V65.8 REPLACED WITH [id number] (04/08/2013)      Generalized anxiety disorder 07/13/2011     Priority: Medium     Diagnosis updated by automated process. Provider to review and confirm.      Right inguinal hernia  07/13/2011     Priority: Medium    Muscle pain 04/20/2011     Priority: Medium     (Problem list name updated by automated process. Provider to review and confirm.)      Tobacco abuse 04/20/2011     Priority: Medium    SANTIAGO (dyspnea on exertion) 04/20/2011     Priority: Medium     More notable in last yr       Advanced directives, counseling/discussion 04/20/2011     Priority: Medium     Worksheet given and will bring copy in when complete, will call if has questions        COPD (chronic obstructive pulmonary disease) (H) 03/24/2011     Priority: Medium    Hyperlipidemia LDL goal <70 10/31/2010     Priority: Medium    Major depressive disorder, recurrent episode, severe (H) 07/07/2009     Priority: Medium     History as teen. On and off treated    Aa x 21 yrs as of 2009  Problem list name updated by automated process. Provider to review      HL (hearing loss) 07/07/2009     Priority: Medium    Malignant neoplasm of prostate (H) 06/01/2007     Priority: Medium    Benign neoplasm of epididymis 02/07/2007     Priority: Medium    MIXED HYPERLIPIDEMIA 10/24/2006     Priority: Medium        Past Medical History:    Past Medical History:   Diagnosis Date    Coronary artery disease     Depressive disorder, not elsewhere classified     Hypertrophy (benign) of prostate     Impotence of organic origin     Other and unspecified hyperlipidemia     Other anxiety states     Tobacco use disorder        Past Surgical History:    Past Surgical History:   Procedure Laterality Date    APPENDECTOMY  1966    BYPASS GRAFT ARTERY CORONARY N/A 9/3/2019    Procedure: CORONARY ARTERY BYPASS GRAFT X 4 - LIMA TO LAD, SV TO PL, OM, PDA ; ON PUMP WITH TERRENCE; ENDOVEIN HARVEST RIGHT LEG;  Surgeon: Lai Mchugh MD;  Location:  OR    COLONOSCOPY  3/1/2005    COLONOSCOPY N/A 5/9/2019    Procedure: COLONOSCOPY;  Surgeon: Camilo Beard MD;  Location: [facility]    CV LEFT HEART CATH N/A 8/8/2019    Procedure: Left Heart Cath--also measure LVEDP;   "Surgeon: Krytsle Barrera MD;  Location:  HEART CARDIAC CATH LAB    HERNIORRHAPHY INGUINAL  2011    Procedure:HERNIORRHAPHY INGUINAL; Open Repair Right Inguinal Hernia Repair       HYDROCELECTOMY SCROTAL  2004    left hydrocele repair    SUPRAPUBIC PROSTATECTOMY         Family History:    Family History   Problem Relation Age of Onset    Cerebrovascular Disease Mother     Hypertension Mother     Prostate Cancer Brother     Dementia Brother     Arthritis Sister         rhematoid    Cancer Brother         Lung cancer, lymphoma    Other - See Comments Brother         HIV    Cancer Sister         skin cancer on nose    Pacemaker Sister     Aneurysm Sister         heart       Social History:  Marital Status:   [5]  Social History     Tobacco Use    Smoking status: Former     Packs/day: 1     Types: Cigarettes     Quit date: 2017     Years since quittin.0    Smokeless tobacco: Former     Quit date: 2017   Substance Use Topics    Alcohol use: No     Alcohol/week: 0.0 standard drinks of alcohol     Comment: quit     Drug use: No        Medications:    ondansetron (ZOFRAN ODT) 4 MG ODT tab  oxyCODONE (ROXICODONE) 5 MG tablet  albuterol (PROAIR HFA/PROVENTIL HFA/VENTOLIN HFA) 108 (90 Base) MCG/ACT inhaler  amLODIPine (NORVASC) 10 MG tablet  aspirin 81 MG EC tablet  carvedilol (COREG) 12.5 MG tablet  co-enzyme Q-10 100 MG CAPS capsule  evolocumab (REPATHA) 140 MG/ML prefilled autoinjector  nicotine polacrilex (NICORETTE) 4 MG gum  Olodaterol HCl (STRIVERDI RESPIMAT) 2.5 MCG/ACT AERS  PARoxetine (PAXIL) 30 MG tablet  senna-docusate (SENEXON-S) 8.6-50 MG tablet  Vitamin D, Cholecalciferol, 25 MCG (1000 UT) TABS          Review of Systems    Physical Exam   BP: 126/77  Pulse: 60  Temp: 98.8  F (37.1  C)  Resp: 18  Height: 170.2 cm (5' 7\")  Weight: 63.5 kg (140 lb)  SpO2: 91 %      Physical Exam  Vitals and nursing note reviewed.   Constitutional:       General: He is in acute distress.      " Appearance: He is well-developed. He is diaphoretic.   HENT:      Head: Normocephalic and atraumatic.      Right Ear: External ear normal.      Left Ear: External ear normal.      Nose: Nose normal.   Eyes:      General: No scleral icterus.     Conjunctiva/sclera: Conjunctivae normal.   Cardiovascular:      Rate and Rhythm: Normal rate and regular rhythm.   Pulmonary:      Effort: Pulmonary effort is normal. No respiratory distress.      Breath sounds: No stridor.   Abdominal:      General: There is no distension.      Palpations: Abdomen is soft.      Tenderness: There is no abdominal tenderness. There is no right CVA tenderness, left CVA tenderness, guarding or rebound.   Musculoskeletal:      Cervical back: Normal range of motion.   Skin:     General: Skin is warm.   Neurological:      Mental Status: He is alert.   Psychiatric:         Behavior: Behavior normal.         ED Course                 Procedures              Critical Care time:  none               Results for orders placed or performed during the hospital encounter of 02/18/24 (from the past 24 hour(s))   Urine Macroscopic with reflex to Microscopic   Result Value Ref Range    Color Urine Yellow Colorless, Straw, Light Yellow, Yellow    Appearance Urine Clear Clear    Glucose Urine Negative Negative mg/dL    Bilirubin Urine Negative Negative    Ketones Urine 20 (A) Negative mg/dL    Specific Gravity Urine 1.012 1.003 - 1.035    Blood Urine Small (A) Negative    pH Urine 5.0 5.0 - 7.0    Protein Albumin Urine Negative Negative mg/dL    Urobilinogen Urine Normal Normal, 2.0 mg/dL    Nitrite Urine Negative Negative    Leukocyte Esterase Urine Negative Negative    RBC Urine 16 (H) <=2 /HPF    WBC Urine 0 <=5 /HPF    Squamous Epithelials Urine <1 <=1 /HPF    Mucus Urine Present (A) None Seen /LPF    Hyaline Casts Urine 3 (H) <=2 /LPF   Basic metabolic panel   Result Value Ref Range    Sodium 140 135 - 145 mmol/L    Potassium 3.9 3.4 - 5.3 mmol/L    Chloride  103 98 - 107 mmol/L    Carbon Dioxide (CO2) 27 22 - 29 mmol/L    Anion Gap 10 7 - 15 mmol/L    Urea Nitrogen 24.4 (H) 8.0 - 23.0 mg/dL    Creatinine 1.30 (H) 0.67 - 1.17 mg/dL    GFR Estimate 56 (L) >60 mL/min/1.73m2    Calcium 9.2 8.8 - 10.2 mg/dL    Glucose 145 (H) 70 - 99 mg/dL   CBC with Platelets & Differential    Narrative    The following orders were created for panel order CBC with Platelets & Differential.  Procedure                               Abnormality         Status                     ---------                               -----------         ------                     CBC with platelets and d...[[id number]]  Abnormal            Final result                 Please view results for these tests on the individual orders.   CBC with platelets and differential   Result Value Ref Range    WBC Count 7.4 4.0 - 11.0 10e3/uL    RBC Count 5.00 4.40 - 5.90 10e6/uL    Hemoglobin 13.9 13.3 - 17.7 g/dL    Hematocrit 41.5 40.0 - 53.0 %    MCV 83 78 - 100 fL    MCH 27.8 26.5 - 33.0 pg    MCHC 33.5 31.5 - 36.5 g/dL    RDW 13.4 10.0 - 15.0 %    Platelet Count 162 150 - 450 10e3/uL    % Neutrophils 79 %    % Lymphocytes 10 %    % Monocytes 6 %    % Eosinophils 4 %    % Basophils 1 %    % Immature Granulocytes 0 %    NRBCs per 100 WBC 0 <1 /100    Absolute Neutrophils 5.9 1.6 - 8.3 10e3/uL    Absolute Lymphocytes 0.7 (L) 0.8 - 5.3 10e3/uL    Absolute Monocytes 0.5 0.0 - 1.3 10e3/uL    Absolute Eosinophils 0.3 0.0 - 0.7 10e3/uL    Absolute Basophils 0.1 0.0 - 0.2 10e3/uL    Absolute Immature Granulocytes 0.0 <=0.4 10e3/uL    Absolute NRBCs 0.0 10e3/uL   [place] Draw    Narrative    The following orders were created for panel order [place] Draw.  Procedure                               Abnormality         Status                     ---------                               -----------         ------                     Extra Blue Top Tube[[id number]]                              Final result               Extra Red Top  Tube[[id number]]                               Final result                 Please view results for these tests on the individual orders.   Extra Blue Top Tube   Result Value Ref Range    Hold Specimen JIC    Extra Red Top Tube   Result Value Ref Range    Hold Specimen JIC    CT Abdomen Pelvis w Contrast    Narrative    EXAM: CT ABDOMEN PELVIS W CONTRAST  LOCATION: [facility]  DATE: 2/18/2024    INDICATION: Right flank pain.  COMPARISON: 01/09/2024.  TECHNIQUE: CT scan of the abdomen and pelvis was performed following injection of IV contrast. Multiplanar reformats were obtained. Dose reduction techniques were used.  CONTRAST: 68 mL Isovue 370.    FINDINGS:   LOWER CHEST: Advanced coronary artery calcification. Significant findings of centrilobular emphysema with some slight atelectasis/scarring.    HEPATOBILIARY: Tiny benign cyst in the left lobe of the liver. There is a cirrhotic configuration of the liver, but no secondary findings of portal venous hypertension seen. There is a benign slight area of vascular blush seen in the midportion of the   right lobe of the liver. The gallbladder, bile ducts, and portal veins are normal.    PANCREAS: Normal.    SPLEEN: Tiny accessory splenule.    ADRENAL GLANDS: Normal.    KIDNEYS/BLADDER: Minute benign cysts are seen in the kidneys, no follow-up recommended.    BOWEL: The appendix is not seen and likely surgically absent or atrophic. Moderate findings of diverticulosis with no evidence for diverticulitis.    LYMPH NODES: Normal.    VASCULATURE: Stable 5.5 cm infrarenal abdominal aortic aneurysm and enlargement of the common iliac arteries with the right measuring 2.6 cm in greatest radial dimension and the left 2.0 cm. Advanced vascular calcification. There is good flow extending   into both groins.    PELVIC ORGANS: Prostate gland is not seen and presumed surgically absent.    MUSCULOSKELETAL: Degenerative disc disease at L3 and L4 with  significant hypertrophic changes primarily at the L4 level.      Impression    IMPRESSION:   1.  Stable findings since 01/09/2024.    2.  Nothing seen to explain patient's right flank pain clinically.    3.  The infrarenal abdominal aorta measures 5.5 cm in greatest radial dimension and the common iliac arteries measure 2.6 cm on the right and 2.0 cm on the left.    4.  Minute cysts in the kidneys, no follow-up recommended.    5.  Cirrhotic configuration of the liver, but no secondary findings of portal venous hypertension seen.    6.  Advanced coronary artery calcification and centrilobular emphysema in the lung bases.       Medications   iopamidol (ISOVUE-370) solution 68 mL (68 mLs Intravenous $Given 2/18/24 2008)   sodium chloride 0.9 % bag 500mL for CT scan flush use (57 mLs Intravenous $Given 2/18/24 2008)       Assessments & Plan (with Medical Decision Making)   78-year-old male with known large infrarenal AAA who presents with right flank pain and diaphoresis.  The patient's urinalysis is positive for red blood cells.  White blood cell count is 7.4 and his hemoglobin is normal, no signs of anemia.  Electrolytes are within normal limits.  CT of the abdomen and pelvis obtained, images interpreted independently as well as radiology read reviewed, he has a stable infrarenal abdominal aortic aneurysm.  Radiology does not see any definite cause of the patient's symptoms however I believe there likely is a very small kidney stone on the right that is difficult to see on the CT scan.  He had pain that seems typical of kidney stones with this hematuria and is feeling much better now.  At this time he is safe to discharge with a short course of oxycodone and ondansetron and instructions to return if he has worsening of his symptoms or other concerns.  I did consider hospitalization of this patient given his risk with the age and his aneurysm however with him being so comfortable now I believe that he is safe going  home.  The patient is in agreement with this plan.    I have reviewed the nursing notes.    I have reviewed the findings, diagnosis, plan and need for follow up with the patient.           Discharge Medication List as of 2/18/2024  8:59 PM        START taking these medications    Details   ondansetron (ZOFRAN ODT) 4 MG ODT tab Take 1 tablet (4 mg) by mouth every 8 hours as needed for nausea, Disp-10 tablet, R-0, [facility]      oxyCODONE (ROXICODONE) 5 MG tablet Take 1 tablet (5 mg) by mouth every 6 hours as needed for severe pain, Disp-6 tablet, R-0, [facility]             Final diagnoses:   Right flank pain       2/18/2024   [facility] EMERGENCY DEPT       Reed Haney MD  02/18/24 [id number]

## 2024-02-21 DIAGNOSIS — F33.2 SEVERE EPISODE OF RECURRENT MAJOR DEPRESSIVE DISORDER, WITHOUT PSYCHOTIC FEATURES (H): ICD-10-CM

## 2024-02-21 RX ORDER — PAROXETINE 30 MG/1
TABLET, FILM COATED ORAL
Qty: 28 TABLET | Refills: 0 | Status: SHIPPED | OUTPATIENT
Start: 2024-02-21 | End: 2024-03-22

## 2024-02-21 NOTE — TELEPHONE ENCOUNTER
Pt's insurance is now using [facility] mail order for prescriptions and they told him it will be 2 weeks to process his refills.    He is going to need 2 weeks of his Paroxetine 30 mg (Takes 2 daily) called to [facility] in [place].  He is going out of town on Monday so please send in so he has to take with him.

## 2024-03-13 DIAGNOSIS — I10 BENIGN ESSENTIAL HYPERTENSION: ICD-10-CM

## 2024-03-14 RX ORDER — AMLODIPINE BESYLATE 10 MG/1
10 TABLET ORAL DAILY
Qty: 90 TABLET | Refills: 3 | Status: CANCELLED | OUTPATIENT
Start: 2024-03-14

## 2024-03-14 RX ORDER — CARVEDILOL 12.5 MG/1
12.5 TABLET ORAL 2 TIMES DAILY WITH MEALS
Qty: 180 TABLET | Refills: 0 | Status: SHIPPED | OUTPATIENT
Start: 2024-03-14 | End: 2024-03-18

## 2024-03-18 ENCOUNTER — TELEPHONE (OUTPATIENT)
Dept: CARDIOLOGY | Facility: CLINIC | Age: 79
End: 2024-03-18
Payer: COMMERCIAL

## 2024-03-18 DIAGNOSIS — I10 BENIGN ESSENTIAL HYPERTENSION: ICD-10-CM

## 2024-03-18 RX ORDER — AMLODIPINE BESYLATE 10 MG/1
10 TABLET ORAL DAILY
Qty: 90 TABLET | Refills: 2 | Status: SHIPPED | OUTPATIENT
Start: 2024-03-18

## 2024-03-18 RX ORDER — CARVEDILOL 12.5 MG/1
12.5 TABLET ORAL 2 TIMES DAILY WITH MEALS
Qty: 180 TABLET | Refills: 2 | Status: SHIPPED | OUTPATIENT
Start: 2024-03-18 | End: 2024-03-22

## 2024-03-18 NOTE — TELEPHONE ENCOUNTER
[facility] Cardiology Refill Guideline reviewed.  Medication meets criteria for refill.    Glory Leong RN

## 2024-03-18 NOTE — TELEPHONE ENCOUNTER
[facility] Call Center    Phone Message    May a detailed message be left on voicemail: yes     Reason for Call: Medication Question or concern regarding medication   Prescription Clarification  Name of Medication: amLODIPine (NORVASC) 10 MG tablet   Prescribing Provider: Dr. Espino   Pharmacy:  [id number] [facility] [place] [facility] [address]     What on the order needs clarification? Patient states medication needs to be sent over to above pharmacy, have a weeks worth left will need a refill.      Action Taken: Other: Cardiology    Travel Screening: Not Applicable    Thank you!  [facility]

## 2024-03-18 NOTE — TELEPHONE ENCOUNTER
[facility] Cardiology Refill Guideline reviewed.  Medication meets criteria for refill.    Glory Leong RN

## 2024-03-22 ENCOUNTER — OFFICE VISIT (OUTPATIENT)
Dept: FAMILY MEDICINE | Facility: CLINIC | Age: 79
End: 2024-03-22
Payer: COMMERCIAL

## 2024-03-22 VITALS
WEIGHT: 146 LBS | HEIGHT: 67 IN | DIASTOLIC BLOOD PRESSURE: 88 MMHG | BODY MASS INDEX: 22.91 KG/M2 | RESPIRATION RATE: 18 BRPM | OXYGEN SATURATION: 98 % | SYSTOLIC BLOOD PRESSURE: 138 MMHG | TEMPERATURE: 97.9 F | HEART RATE: 74 BPM

## 2024-03-22 DIAGNOSIS — F33.2 SEVERE EPISODE OF RECURRENT MAJOR DEPRESSIVE DISORDER, WITHOUT PSYCHOTIC FEATURES (H): ICD-10-CM

## 2024-03-22 DIAGNOSIS — I10 BENIGN ESSENTIAL HYPERTENSION: ICD-10-CM

## 2024-03-22 DIAGNOSIS — J44.9 CHRONIC OBSTRUCTIVE PULMONARY DISEASE, UNSPECIFIED COPD TYPE (H): ICD-10-CM

## 2024-03-22 DIAGNOSIS — Z01.818 PRE-OP EXAM: Primary | ICD-10-CM

## 2024-03-22 DIAGNOSIS — I71.40 ABDOMINAL AORTIC ANEURYSM (AAA) WITHOUT RUPTURE, UNSPECIFIED PART (H): ICD-10-CM

## 2024-03-22 PROBLEM — K51.20: Status: RESOLVED | Noted: 2019-05-19 | Resolved: 2024-03-22

## 2024-03-22 PROBLEM — I50.22 CHRONIC SYSTOLIC HEART FAILURE (H): Status: RESOLVED | Noted: 2022-10-27 | Resolved: 2024-03-22

## 2024-03-22 LAB
ANION GAP SERPL CALCULATED.3IONS-SCNC: 7 MMOL/L (ref 7–15)
BASOPHILS # BLD AUTO: 0 10E3/UL (ref 0–0.2)
BASOPHILS NFR BLD AUTO: 1 %
BUN SERPL-MCNC: 21.1 MG/DL (ref 8–23)
CALCIUM SERPL-MCNC: 9.3 MG/DL (ref 8.8–10.2)
CHLORIDE SERPL-SCNC: 102 MMOL/L (ref 98–107)
CREAT SERPL-MCNC: 1.12 MG/DL (ref 0.67–1.17)
DEPRECATED HCO3 PLAS-SCNC: 29 MMOL/L (ref 22–29)
EGFRCR SERPLBLD CKD-EPI 2021: 67 ML/MIN/1.73M2
EOSINOPHIL # BLD AUTO: 0.4 10E3/UL (ref 0–0.7)
EOSINOPHIL NFR BLD AUTO: 6 %
ERYTHROCYTE [DISTWIDTH] IN BLOOD BY AUTOMATED COUNT: 13.9 % (ref 10–15)
GLUCOSE SERPL-MCNC: 96 MG/DL (ref 70–99)
HCT VFR BLD AUTO: 41.8 % (ref 40–53)
HGB BLD-MCNC: 14 G/DL (ref 13.3–17.7)
IMM GRANULOCYTES # BLD: 0 10E3/UL
IMM GRANULOCYTES NFR BLD: 0 %
LYMPHOCYTES # BLD AUTO: 1 10E3/UL (ref 0.8–5.3)
LYMPHOCYTES NFR BLD AUTO: 17 %
MCH RBC QN AUTO: 28.1 PG (ref 26.5–33)
MCHC RBC AUTO-ENTMCNC: 33.5 G/DL (ref 31.5–36.5)
MCV RBC AUTO: 84 FL (ref 78–100)
MONOCYTES # BLD AUTO: 0.6 10E3/UL (ref 0–1.3)
MONOCYTES NFR BLD AUTO: 10 %
NEUTROPHILS # BLD AUTO: 3.7 10E3/UL (ref 1.6–8.3)
NEUTROPHILS NFR BLD AUTO: 66 %
PLATELET # BLD AUTO: 162 10E3/UL (ref 150–450)
POTASSIUM SERPL-SCNC: 4.7 MMOL/L (ref 3.4–5.3)
RBC # BLD AUTO: 4.99 10E6/UL (ref 4.4–5.9)
SODIUM SERPL-SCNC: 138 MMOL/L (ref 135–145)
WBC # BLD AUTO: 5.7 10E3/UL (ref 4–11)

## 2024-03-22 PROCEDURE — 80048 BASIC METABOLIC PNL TOTAL CA: CPT | Performed by: NURSE PRACTITIONER

## 2024-03-22 PROCEDURE — 99214 OFFICE O/P EST MOD 30 MIN: CPT | Performed by: NURSE PRACTITIONER

## 2024-03-22 PROCEDURE — 85025 COMPLETE CBC W/AUTO DIFF WBC: CPT | Performed by: NURSE PRACTITIONER

## 2024-03-22 PROCEDURE — 36415 COLL VENOUS BLD VENIPUNCTURE: CPT | Performed by: NURSE PRACTITIONER

## 2024-03-22 RX ORDER — CARVEDILOL 12.5 MG/1
12.5 TABLET ORAL 2 TIMES DAILY WITH MEALS
Qty: 180 TABLET | Refills: 2 | Status: SHIPPED | OUTPATIENT
Start: 2024-03-22

## 2024-03-22 RX ORDER — PAROXETINE 30 MG/1
TABLET, FILM COATED ORAL
Qty: 180 TABLET | Refills: 1 | Status: SHIPPED | OUTPATIENT
Start: 2024-03-22

## 2024-03-22 RX ORDER — RESPIRATORY SYNCYTIAL VIRUS VACCINE 120MCG/0.5
0.5 KIT INTRAMUSCULAR ONCE
Qty: 1 EACH | Refills: 0 | Status: CANCELLED | OUTPATIENT
Start: 2024-03-22 | End: 2024-03-22

## 2024-03-22 ASSESSMENT — PAIN SCALES - GENERAL: PAINLEVEL: NO PAIN (0)

## 2024-03-22 NOTE — LETTER
March 25, 2024      Adolfo Rendon  [address] [facility] [address]        Dear ,    We are writing to inform you of your test results.    Normal red and white blood cell count.  Normal platelets.  Normal kidney function.  Normal electrolytes.  Normal blood sugar.   Labs okay for surgery     Resulted Orders   Basic metabolic panel  (Ca, Cl, CO2, Creat, Gluc, K, Na, BUN)   Result Value Ref Range    Sodium 138 135 - 145 mmol/L      Comment:      Reference intervals for this test were updated on 09/26/2023 to more accurately reflect our healthy population. There may be differences in the flagging of prior results with similar values performed with this method. Interpretation of those prior results can be made in the context of the updated reference intervals.     Potassium 4.7 3.4 - 5.3 mmol/L    Chloride 102 98 - 107 mmol/L    Carbon Dioxide (CO2) 29 22 - 29 mmol/L    Anion Gap 7 7 - 15 mmol/L    Urea Nitrogen 21.1 8.0 - 23.0 mg/dL    Creatinine 1.12 0.67 - 1.17 mg/dL    GFR Estimate 67 >60 mL/min/1.73m2    Calcium 9.3 8.8 - 10.2 mg/dL    Glucose 96 70 - 99 mg/dL   CBC with platelets and differential   Result Value Ref Range    WBC Count 5.7 4.0 - 11.0 10e3/uL    RBC Count 4.99 4.40 - 5.90 10e6/uL    Hemoglobin 14.0 13.3 - 17.7 g/dL    Hematocrit 41.8 40.0 - 53.0 %    MCV 84 78 - 100 fL    MCH 28.1 26.5 - 33.0 pg    MCHC 33.5 31.5 - 36.5 g/dL    RDW 13.9 10.0 - 15.0 %    Platelet Count 162 150 - 450 10e3/uL    % Neutrophils 66 %    % Lymphocytes 17 %    % Monocytes 10 %    % Eosinophils 6 %    % Basophils 1 %    % Immature Granulocytes 0 %    Absolute Neutrophils 3.7 1.6 - 8.3 10e3/uL    Absolute Lymphocytes 1.0 0.8 - 5.3 10e3/uL    Absolute Monocytes 0.6 0.0 - 1.3 10e3/uL    Absolute Eosinophils 0.4 0.0 - 0.7 10e3/uL    Absolute Basophils 0.0 0.0 - 0.2 10e3/uL    Absolute Immature Granulocytes 0.0 <=0.4 10e3/uL       If you have any questions or concerns, please call the clinic at the number  listed above.       Sincerely,      ANTONIO Carranza CNP

## 2024-03-22 NOTE — PROGRESS NOTES
Preoperative Evaluation  [facility]  [address] [place] [address]  Phone: [phone]  Fax: [phone]  Primary Provider: Danielle Mercado  Pre-op Performing Provider: DANIELLE MERCADO  Mar 22, 2024       Adolfo is a 78 year old, presenting for the following:  Pre-Op Exam        11/14/2023    11:11 AM   Additional Questions   Roomed by Telma   Accompanied by self     Surgical Information  Surgery/Procedure: ESTABLISHMENT, VASCULAR ACCESS, FEMORAL APPROACH, FOR ENDOVASCULAR STENT INSERTION INTO ABDOMINAL AORTIC ANEURYSM   Surgery Location: [facility]   Surgeon: Sara   Surgery Date: 3/29/2024  Time of Surgery:   Where patient plans to recover: At home with family  Fax number for surgical facility: Note does not need to be faxed, will be available electronically in Epic.    Assessment & Plan     The proposed surgical procedure is considered INTERMEDIATE risk.    Pre-op exam  Abdominal aortic aneurysm (AAA) without rupture, unspecified part (H24)      Benign essential hypertension  Known CAD s/p CABG   Known PVD   Meeting goal  Continue current meds  Refills sent to pharmacy   - CBC with platelets and differential  - Basic metabolic panel  (Ca, Cl, CO2, Creat, Gluc, K, Na, BUN)  - carvedilol (COREG) 12.5 MG tablet  Dispense: 180 tablet; Refill: 2      Severe episode of recurrent major depressive disorder, without psychotic features (H)  Moods stable this winter  Continue   - PARoxetine (PAXIL) 30 MG tablet  Dispense: 180 tablet; Refill: 1    Chronic obstructive pulmonary disease, unspecified COPD type (H)  Chronic and ongoing. O2 sats 98% on RA  Intermittent use of albuterol         Risks and Recommendations  The patient has the following additional risks and recommendations for perioperative complications:  Cardiovascular:   - recent stress test and ECHO unchanged from previous 1/2024   Pulmonary:    - Incentive spirometry  post-op    Antiplatelet or Anticoagulation Medication Instructions   - aspirin: Discontinue aspirin 7-10 days prior to procedure to reduce bleeding risk. It should be resumed postoperatively.     Additional Medication Instructions   - Beta Blockers: Continue taking on the day of surgery.   - Calcium Channel Blockers: May be continued on the day of surgery.   - nicotine gum: Take up to two hours before surgery   - SSRIs, SNRIs, TCAs, Antipsychotics: Continue without modification.    - rescue Inhaler: Continue PRN. Bring to hospital on the day of surgery.    Recommendation  APPROVAL GIVEN to proceed with proposed procedure, without further diagnostic evaluation.      Call or return to the clinic with any worsening of symptoms or no resolution. Patient/Parent verbalized understanding and is in agreement. Medication side effects reviewed.   Current Outpatient Medications   Medication Sig Dispense Refill    albuterol (PROAIR HFA/PROVENTIL HFA/VENTOLIN HFA) 108 (90 Base) MCG/ACT inhaler Inhale 2 puffs into the lungs every 4 hours as needed for shortness of breath, wheezing or cough 18 g 3    amLODIPine (NORVASC) 10 MG tablet Take 1 tablet (10 mg) by mouth daily 90 tablet 2    aspirin 81 MG EC tablet Take 81 mg by mouth daily      carvedilol (COREG) 12.5 MG tablet Take 1 tablet (12.5 mg) by mouth 2 times daily (with meals) 180 tablet 2    co-enzyme Q-10 100 MG CAPS capsule Take 100 mg by mouth daily      evolocumab (REPATHA) 140 MG/ML prefilled autoinjector Inject 1 mL (140 mg) Subcutaneous every 14 days 6 mL 3    nicotine polacrilex (NICORETTE) 4 MG gum Place 4 mg inside cheek every hour as needed for smoking cessation      Olodaterol HCl (STRIVERDI RESPIMAT) 2.5 MCG/ACT AERS Inhale 2 puffs into the lungs daily 12 g 3    PARoxetine (PAXIL) 30 MG tablet TAKE 2 TABLETS BY MOUTH once daily in the evening 180 tablet 1    Vitamin D, Cholecalciferol, 25 MCG (1000 UT) TABS Take 1,000 Units by mouth daily        Chart  documentation with Dragon Voice recognition Software. Although reviewed after completion, some words and grammatical errors may remain.  Danielle Mercado MSN,FNP-BC  [facility]  [id number] [place] [id number] [phone]          Subjective       HPI related to upcoming procedure:   78-year-old male with infrarenal abdominal aortic aneurysm. Measured 5 and half centimeter.  Patient is a candidate for endovascular repair and this is what he prefers.     Patient is completley out of carvedilol so currently isnt taking that    [facility] pharmacy change at first of the year with insurance company.           3/22/2024    10:06 AM   Preop Questions   1. Have you ever had a heart attack or stroke? No   2. Have you ever had surgery on your heart or blood vessels, such as a stent placement, a coronary artery bypass, or surgery on an artery in your head, neck, heart, or legs? YES - bypass    3. Do you have chest pain with activity? No   4. Do you have a history of  heart failure? No   5. Do you currently have a cold, bronchitis or symptoms of other infection? No   6. Do you have a cough, shortness of breath, or wheezing? YES - shortness of breath, has COPD    7. Do you or anyone in your family have previous history of blood clots? No   8. Do you or does anyone in your family have a serious bleeding problem such as prolonged bleeding following surgeries or cuts? No   9. Have you ever had problems with anemia or been told to take iron pills? No   10. Have you had any abnormal blood loss such as black, tarry or bloody stools? No   11. Have you ever had a blood transfusion? YES - is unsure if he had one when he had his bypass    11a. Have you ever had a transfusion reaction? No   12. Are you willing to have a blood transfusion if it is medically needed before, during, or after your surgery? Yes   13. Have you or any of your relatives ever had problems with anesthesia? No   14. Do you have  sleep apnea, excessive snoring or daytime drowsiness? No   15. Do you have any artifical heart valves or other implanted medical devices like a pacemaker, defibrillator, or continuous glucose monitor? No   16. Do you have artificial joints? No   17. Are you allergic to latex? No       Health Care Directive  Patient has a Health Care Directive on file      Preoperative Review of    reviewed - no record of controlled substances prescribed.      Status of Chronic Conditions:  CAD - Patient has a longstanding history of moderate-severe CAD. Patient denies recent chest pain or NTG use, denies exercise induced dyspnea or PND. Last cardiac imaging 1/2024    CHF - Patient has a longstanding history of moderate-severe CHF. Exacerbating conditions include ischemic heart disease, hypertension, and COPD. Currently the patient's condition is same. Current treatment regimen includes Coreg, calcium channel blocker, and ASA. The patient denies chest pain, edema, orthopnea, SOB or recent weight gain. Last Echocardiogram 1/12/2024    COPD - Patient has a longstanding history of moderate-severe COPD . Patient has been doing well overall noting SANTIAGO and continues on medication regimen consisting of albuterol as needed without adverse reactions or side effects.    DEPRESSION - Patient has a long history of Depression of moderate severity requiring medication for control with recent symptoms being stable..Current symptoms of depression include diminished interest or pleasure in activities, fatigue.     HYPERLIPIDEMIA - Patient has a long history of significant Hyperlipidemia requiring medication for treatment with recent good control. Patient reports no problems or side effects with the medication.     HYPERTENSION - Patient has longstanding history of HTN , currently denies any symptoms referable to elevated blood pressure. Specifically denies chest pain, palpitations, dyspnea, orthopnea, PND or peripheral edema. Blood pressure  readings have been in normal range. Current medication regimen is as listed below. Patient denies any side effects of medication.     Patient Active Problem List    Diagnosis Date Noted    Personal history of prostate cancer 11/14/2022     Priority: Medium    H/O cardiomyopathy 02/13/2020     Priority: Medium    Fluid overload 09/09/2019     Priority: Medium    Anemia due to blood loss, acute 09/09/2019     Priority: Medium    Transient hyperglycemia post procedure 09/09/2019     Priority: Medium    S/P CABG x 4 09/03/2019     Priority: Medium    Benign essential hypertension 08/16/2019     Priority: Medium    Ischemic cardiomyopathy 08/16/2019     Priority: Medium    Status post coronary angiogram 08/08/2019     Priority: Medium    Coronary artery disease involving native coronary artery of native heart without angina pectoris 08/01/2019     Priority: Medium     Added automatically from request for surgery [id number]      Chest pain 08/01/2019     Priority: Medium     Added automatically from request for surgery [id number]      Diverticulosis of large intestine 05/19/2019     Priority: Medium     Colonoscopy 5/2019      Unilateral atherosclerotic renal artery stenosis (H24) 03/13/2018     Priority: Medium     Greater than 50% stenosis of the proximal right renal artery.      Pulmonary nodules 03/13/2018     Priority: Medium     Groundglass nodule measures 6 mm in the right upper lobe.  Recommendations for an incidental lung nodule = or > 6 mm to 8 mm:    Low risk patients: Initial follow-up CT at 6-12 months, then  consider CT at 18-24 months if no change.    High risk patients: Initial follow-up CT at 6-12 months, then CT at  18-24 months if no change.     *Low Risk: Minimal or absent history of smoking or other known risk  factors.  *Nonsolid (ground-glass) or partly solid nodules may require longer  follow-up to exclude indolent adenocarcinoma.  *Recommendations based on Guidelines for the Management of  Incidental  Pulmonary Nodules       Aortic arch aneurysm (H24) 03/06/2018     Priority: Medium    Thoracic aortic aneurysm without rupture (H24) 01/13/2017     Priority: Medium     4 cm  1/2017      Centrilobular emphysema (H) 01/13/2017     Priority: Medium     1/2017 CT chest      HTN, goal below 140/90 07/12/2013     Priority: Medium    Health Care Home 12/18/2012     Priority: Medium     Cora Patel RN-PHN  FPA / BETH [facility]   [phone]    DX V65.8 REPLACED WITH [id number] HEALTH CARE HOME (04/08/2013)      Generalized anxiety disorder 07/13/2011     Priority: Medium     Diagnosis updated by automated process. Provider to review and confirm.      Right inguinal hernia 07/13/2011     Priority: Medium    Muscle pain 04/20/2011     Priority: Medium     (Problem list name updated by automated process. Provider to review and confirm.)      Tobacco abuse 04/20/2011     Priority: Medium    SANTIAGO (dyspnea on exertion) 04/20/2011     Priority: Medium     More notable in last yr       Advanced directives, counseling/discussion 04/20/2011     Priority: Medium     Worksheet given and will bring copy in when complete, will call if has questions        COPD (chronic obstructive pulmonary disease) (H) 03/24/2011     Priority: Medium    Hyperlipidemia LDL goal <70 10/31/2010     Priority: Medium    Major depressive disorder, recurrent episode, severe (H) 07/07/2009     Priority: Medium     History as teen. On and off treated    Aa x 21 yrs as of 2009  Problem list name updated by automated process. Provider to review      HL (hearing loss) 07/07/2009     Priority: Medium    Benign neoplasm of epididymis 02/07/2007     Priority: Medium    MIXED HYPERLIPIDEMIA 10/24/2006     Priority: Medium      Past Medical History:   Diagnosis Date    Coronary artery disease     Depressive disorder, not elsewhere classified     Hypertrophy (benign) of prostate     Impotence of organic origin     Other and unspecified  hyperlipidemia     Other anxiety states     Tobacco use disorder      Past Surgical History:   Procedure Laterality Date    APPENDECTOMY  1966    BYPASS GRAFT ARTERY CORONARY N/A 9/3/2019    Procedure: CORONARY ARTERY BYPASS GRAFT X 4 - LIMA TO LAD, SV TO PL, OM, PDA ; ON PUMP WITH TERRENCE; ENDOVEIN HARVEST RIGHT LEG;  Surgeon: Lai Mchugh MD;  Location:  OR    COLONOSCOPY  3/1/2005    COLONOSCOPY N/A 5/9/2019    Procedure: COLONOSCOPY;  Surgeon: Camilo Beard MD;  Location: [facility]    CV LEFT HEART CATH N/A 8/8/2019    Procedure: Left Heart Cath--also measure LVEDP;  Surgeon: Krystle Barrera MD;  Location:  HEART CARDIAC CATH LAB    HERNIORRHAPHY INGUINAL  7/14/2011    Procedure:HERNIORRHAPHY INGUINAL; Open Repair Right Inguinal Hernia Repair       HYDROCELECTOMY SCROTAL  01/2004    left hydrocele repair    SUPRAPUBIC PROSTATECTOMY       Current Outpatient Medications   Medication Sig Dispense Refill    albuterol (PROAIR HFA/PROVENTIL HFA/VENTOLIN HFA) 108 (90 Base) MCG/ACT inhaler Inhale 2 puffs into the lungs every 4 hours as needed for shortness of breath, wheezing or cough 18 g 3    amLODIPine (NORVASC) 10 MG tablet Take 1 tablet (10 mg) by mouth daily 90 tablet 2    aspirin 81 MG EC tablet Take 81 mg by mouth daily      carvedilol (COREG) 12.5 MG tablet Take 1 tablet (12.5 mg) by mouth 2 times daily (with meals) 180 tablet 2    co-enzyme Q-10 100 MG CAPS capsule Take 100 mg by mouth daily      evolocumab (REPATHA) 140 MG/ML prefilled autoinjector Inject 1 mL (140 mg) Subcutaneous every 14 days 6 mL 3    nicotine polacrilex (NICORETTE) 4 MG gum Place 4 mg inside cheek every hour as needed for smoking cessation      Olodaterol HCl (STRIVERDI RESPIMAT) 2.5 MCG/ACT AERS Inhale 2 puffs into the lungs daily 12 g 3    PARoxetine (PAXIL) 30 MG tablet TAKE 2 TABLETS BY MOUTH once daily in the evening 180 tablet 1    Vitamin D, Cholecalciferol, 25 MCG (1000 UT) TABS Take 1,000 Units by mouth daily     "      Allergies   Allergen Reactions    Hctz [Hydrochlorothiazide] Fatigue     \"felt like zombie\"    Loratadine      Mood , irritablity     Metoprolol Fatigue     \"felt like zombie\"    Pravastatin Fatigue     \"felt like zombie\"    Remeron [Mirtazapine]      Agitation       Wellbutrin [Bupropion Hcl]      Sig mood issues           Social History     Tobacco Use    Smoking status: Former     Packs/day: 1     Types: Cigarettes     Quit date: 2017     Years since quittin.1    Smokeless tobacco: Former     Quit date: 2017   Substance Use Topics    Alcohol use: No     Alcohol/week: 0.0 standard drinks of alcohol     Comment: quit 1989       History   Drug Use No         Review of Systems    Review of Systems  Constitutional, neuro, ENT, endocrine, pulmonary, cardiac, gastrointestinal, genitourinary, musculoskeletal, integument and psychiatric systems are negative, except as otherwise noted.    Objective    /88   Pulse 74   Temp 97.9  F (36.6  C) (Tympanic)   Resp 18   Ht 1.702 m (5' 7\")   Wt 66.2 kg (146 lb)   SpO2 98%   BMI 22.87 kg/m     Estimated body mass index is 22.87 kg/m  as calculated from the following:    Height as of this encounter: 1.702 m (5' 7\").    Weight as of this encounter: 66.2 kg (146 lb).  Physical Exam  GENERAL: alert and no distress  EYES: Eyes grossly normal to inspection, PERRL and conjunctivae and sclerae normal  HENT: ear canals and TM's normal, nose and mouth without ulcers or lesions  NECK: no adenopathy, no asymmetry, masses, or scars  RESP: lungs distant breath sounds   CV: regular rate and rhythm, normal S1 S2, no S3 or S4, no murmur, click or rub, no peripheral edema  ABDOMEN: soft, nontender, no hepatosplenomegaly, no masses and bowel sounds normal  MS: no gross musculoskeletal defects noted, no edema  SKIN: no suspicious lesions or rashes  NEURO: Normal strength and tone, mentation intact and speech normal  PSYCH: mentation appears normal, affect " normal/bright    Recent Labs   Lab Test 02/18/24  1928 01/09/24  1228 11/14/23  1242   HGB 13.9  --  13.9     --  151     --  140   POTASSIUM 3.9  --  4.4   CR 1.30* 1.2 1.07        Diagnostics  Labs pending at this time.  Results will be reviewed when available.   No EKG this visit, completed in the last 90 days.             Signed Electronically by: ANTONIO Carranza CNP  Copy of this evaluation report is provided to requesting physician.

## 2024-03-22 NOTE — H&P (VIEW-ONLY)
Preoperative Evaluation  [facility]  [address] [place] [address]  Phone: [phone]  Fax: [phone]  Primary Provider: Danielle Mercado  Pre-op Performing Provider: DANIELLE MERCADO  Mar 22, 2024       Adolfo is a 78 year old, presenting for the following:  Pre-Op Exam        11/14/2023    11:11 AM   Additional Questions   Roomed by Telma   Accompanied by self     Surgical Information  Surgery/Procedure: ESTABLISHMENT, VASCULAR ACCESS, FEMORAL APPROACH, FOR ENDOVASCULAR STENT INSERTION INTO ABDOMINAL AORTIC ANEURYSM   Surgery Location: [facility]   Surgeon: Sara   Surgery Date: 3/29/2024  Time of Surgery:   Where patient plans to recover: At home with family  Fax number for surgical facility: Note does not need to be faxed, will be available electronically in Epic.    Assessment & Plan     The proposed surgical procedure is considered INTERMEDIATE risk.    Pre-op exam  Abdominal aortic aneurysm (AAA) without rupture, unspecified part (H24)      Benign essential hypertension  Known CAD s/p CABG   Known PVD   Meeting goal  Continue current meds  Refills sent to pharmacy   - CBC with platelets and differential  - Basic metabolic panel  (Ca, Cl, CO2, Creat, Gluc, K, Na, BUN)  - carvedilol (COREG) 12.5 MG tablet  Dispense: 180 tablet; Refill: 2      Severe episode of recurrent major depressive disorder, without psychotic features (H)  Moods stable this winter  Continue   - PARoxetine (PAXIL) 30 MG tablet  Dispense: 180 tablet; Refill: 1    Chronic obstructive pulmonary disease, unspecified COPD type (H)  Chronic and ongoing. O2 sats 98% on RA  Intermittent use of albuterol         Risks and Recommendations  The patient has the following additional risks and recommendations for perioperative complications:  Cardiovascular:   - recent stress test and ECHO unchanged from previous 1/2024   Pulmonary:    - Incentive spirometry  post-op    Antiplatelet or Anticoagulation Medication Instructions   - aspirin: Discontinue aspirin 7-10 days prior to procedure to reduce bleeding risk. It should be resumed postoperatively.     Additional Medication Instructions   - Beta Blockers: Continue taking on the day of surgery.   - Calcium Channel Blockers: May be continued on the day of surgery.   - nicotine gum: Take up to two hours before surgery   - SSRIs, SNRIs, TCAs, Antipsychotics: Continue without modification.    - rescue Inhaler: Continue PRN. Bring to hospital on the day of surgery.    Recommendation  APPROVAL GIVEN to proceed with proposed procedure, without further diagnostic evaluation.      Call or return to the clinic with any worsening of symptoms or no resolution. Patient/Parent verbalized understanding and is in agreement. Medication side effects reviewed.   Current Outpatient Medications   Medication Sig Dispense Refill    albuterol (PROAIR HFA/PROVENTIL HFA/VENTOLIN HFA) 108 (90 Base) MCG/ACT inhaler Inhale 2 puffs into the lungs every 4 hours as needed for shortness of breath, wheezing or cough 18 g 3    amLODIPine (NORVASC) 10 MG tablet Take 1 tablet (10 mg) by mouth daily 90 tablet 2    aspirin 81 MG EC tablet Take 81 mg by mouth daily      carvedilol (COREG) 12.5 MG tablet Take 1 tablet (12.5 mg) by mouth 2 times daily (with meals) 180 tablet 2    co-enzyme Q-10 100 MG CAPS capsule Take 100 mg by mouth daily      evolocumab (REPATHA) 140 MG/ML prefilled autoinjector Inject 1 mL (140 mg) Subcutaneous every 14 days 6 mL 3    nicotine polacrilex (NICORETTE) 4 MG gum Place 4 mg inside cheek every hour as needed for smoking cessation      Olodaterol HCl (STRIVERDI RESPIMAT) 2.5 MCG/ACT AERS Inhale 2 puffs into the lungs daily 12 g 3    PARoxetine (PAXIL) 30 MG tablet TAKE 2 TABLETS BY MOUTH once daily in the evening 180 tablet 1    Vitamin D, Cholecalciferol, 25 MCG (1000 UT) TABS Take 1,000 Units by mouth daily        Chart  documentation with Dragon Voice recognition Software. Although reviewed after completion, some words and grammatical errors may remain.  Danielle Mercado MSN,FNP-BC  [facility]  [address] [place] [id number] [phone]          Subjective       HPI related to upcoming procedure:   78-year-old male with infrarenal abdominal aortic aneurysm. Measured 5 and half centimeter.  Patient is a candidate for endovascular repair and this is what he prefers.     Patient is completley out of carvedilol so currently isnt taking that    [facility] pharmacy change at first of the year with insurance company.           3/22/2024    10:06 AM   Preop Questions   1. Have you ever had a heart attack or stroke? No   2. Have you ever had surgery on your heart or blood vessels, such as a stent placement, a coronary artery bypass, or surgery on an artery in your head, neck, heart, or legs? YES - bypass    3. Do you have chest pain with activity? No   4. Do you have a history of  heart failure? No   5. Do you currently have a cold, bronchitis or symptoms of other infection? No   6. Do you have a cough, shortness of breath, or wheezing? YES - shortness of breath, has COPD    7. Do you or anyone in your family have previous history of blood clots? No   8. Do you or does anyone in your family have a serious bleeding problem such as prolonged bleeding following surgeries or cuts? No   9. Have you ever had problems with anemia or been told to take iron pills? No   10. Have you had any abnormal blood loss such as black, tarry or bloody stools? No   11. Have you ever had a blood transfusion? YES - is unsure if he had one when he had his bypass    11a. Have you ever had a transfusion reaction? No   12. Are you willing to have a blood transfusion if it is medically needed before, during, or after your surgery? Yes   13. Have you or any of your relatives ever had problems with anesthesia? No   14. Do you have  sleep apnea, excessive snoring or daytime drowsiness? No   15. Do you have any artifical heart valves or other implanted medical devices like a pacemaker, defibrillator, or continuous glucose monitor? No   16. Do you have artificial joints? No   17. Are you allergic to latex? No       Health Care Directive  Patient has a Health Care Directive on file      Preoperative Review of    reviewed - no record of controlled substances prescribed.      Status of Chronic Conditions:  CAD - Patient has a longstanding history of moderate-severe CAD. Patient denies recent chest pain or NTG use, denies exercise induced dyspnea or PND. Last cardiac imaging 1/2024    CHF - Patient has a longstanding history of moderate-severe CHF. Exacerbating conditions include ischemic heart disease, hypertension, and COPD. Currently the patient's condition is same. Current treatment regimen includes Coreg, calcium channel blocker, and ASA. The patient denies chest pain, edema, orthopnea, SOB or recent weight gain. Last Echocardiogram 1/12/2024    COPD - Patient has a longstanding history of moderate-severe COPD . Patient has been doing well overall noting SANTIAGO and continues on medication regimen consisting of albuterol as needed without adverse reactions or side effects.    DEPRESSION - Patient has a long history of Depression of moderate severity requiring medication for control with recent symptoms being stable..Current symptoms of depression include diminished interest or pleasure in activities, fatigue.     HYPERLIPIDEMIA - Patient has a long history of significant Hyperlipidemia requiring medication for treatment with recent good control. Patient reports no problems or side effects with the medication.     HYPERTENSION - Patient has longstanding history of HTN , currently denies any symptoms referable to elevated blood pressure. Specifically denies chest pain, palpitations, dyspnea, orthopnea, PND or peripheral edema. Blood pressure  readings have been in normal range. Current medication regimen is as listed below. Patient denies any side effects of medication.     Patient Active Problem List    Diagnosis Date Noted    Personal history of prostate cancer 11/14/2022     Priority: Medium    H/O cardiomyopathy 02/13/2020     Priority: Medium    Fluid overload 09/09/2019     Priority: Medium    Anemia due to blood loss, acute 09/09/2019     Priority: Medium    Transient hyperglycemia post procedure 09/09/2019     Priority: Medium    S/P CABG x 4 09/03/2019     Priority: Medium    Benign essential hypertension 08/16/2019     Priority: Medium    Ischemic cardiomyopathy 08/16/2019     Priority: Medium    Status post coronary angiogram 08/08/2019     Priority: Medium    Coronary artery disease involving native coronary artery of native heart without angina pectoris 08/01/2019     Priority: Medium     Added automatically from request for surgery [id number]      Chest pain 08/01/2019     Priority: Medium     Added automatically from request for surgery [id number]      Diverticulosis of large intestine 05/19/2019     Priority: Medium     Colonoscopy 5/2019      Unilateral atherosclerotic renal artery stenosis (H24) 03/13/2018     Priority: Medium     Greater than 50% stenosis of the proximal right renal artery.      Pulmonary nodules 03/13/2018     Priority: Medium     Groundglass nodule measures 6 mm in the right upper lobe.  Recommendations for an incidental lung nodule = or > 6 mm to 8 mm:    Low risk patients: Initial follow-up CT at 6-12 months, then  consider CT at 18-24 months if no change.    High risk patients: Initial follow-up CT at 6-12 months, then CT at  18-24 months if no change.     *Low Risk: Minimal or absent history of smoking or other known risk  factors.  *Nonsolid (ground-glass) or partly solid nodules may require longer  follow-up to exclude indolent adenocarcinoma.  *Recommendations based on Guidelines for the Management of  Incidental  Pulmonary Nodules       Aortic arch aneurysm (H24) 03/06/2018     Priority: Medium    Thoracic aortic aneurysm without rupture (H24) 01/13/2017     Priority: Medium     4 cm  1/2017      Centrilobular emphysema (H) 01/13/2017     Priority: Medium     1/2017 CT chest      HTN, goal below 140/90 07/12/2013     Priority: Medium    Health Care Home 12/18/2012     Priority: Medium     Cora Patel RN-PHN  FPA / BETH [facility]   [phone]    DX V65.8 REPLACED WITH [id number]ARE [facility] (04/08/2013)      Generalized anxiety disorder 07/13/2011     Priority: Medium     Diagnosis updated by automated process. Provider to review and confirm.      Right inguinal hernia 07/13/2011     Priority: Medium    Muscle pain 04/20/2011     Priority: Medium     (Problem list name updated by automated process. Provider to review and confirm.)      Tobacco abuse 04/20/2011     Priority: Medium    SANTIAGO (dyspnea on exertion) 04/20/2011     Priority: Medium     More notable in last yr       Advanced directives, counseling/discussion 04/20/2011     Priority: Medium     Worksheet given and will bring copy in when complete, will call if has questions        COPD (chronic obstructive pulmonary disease) (H) 03/24/2011     Priority: Medium    Hyperlipidemia LDL goal <70 10/31/2010     Priority: Medium    Major depressive disorder, recurrent episode, severe (H) 07/07/2009     Priority: Medium     History as teen. On and off treated    Aa x 21 yrs as of 2009  Problem list name updated by automated process. Provider to review      HL (hearing loss) 07/07/2009     Priority: Medium    Benign neoplasm of epididymis 02/07/2007     Priority: Medium    MIXED HYPERLIPIDEMIA 10/24/2006     Priority: Medium      Past Medical History:   Diagnosis Date    Coronary artery disease     Depressive disorder, not elsewhere classified     Hypertrophy (benign) of prostate     Impotence of organic origin     Other and unspecified  hyperlipidemia     Other anxiety states     Tobacco use disorder      Past Surgical History:   Procedure Laterality Date    APPENDECTOMY  1966    BYPASS GRAFT ARTERY CORONARY N/A 9/3/2019    Procedure: CORONARY ARTERY BYPASS GRAFT X 4 - LIMA TO LAD, SV TO PL, OM, PDA ; ON PUMP WITH TERRENCE; ENDOVEIN HARVEST RIGHT LEG;  Surgeon: Lai Mchugh MD;  Location:  OR    COLONOSCOPY  3/1/2005    COLONOSCOPY N/A 5/9/2019    Procedure: COLONOSCOPY;  Surgeon: Camilo Beard MD;  Location: [facility]    CV LEFT HEART CATH N/A 8/8/2019    Procedure: Left Heart Cath--also measure LVEDP;  Surgeon: Krystle Barrera MD;  Location:  HEART CARDIAC CATH LAB    HERNIORRHAPHY INGUINAL  7/14/2011    Procedure:HERNIORRHAPHY INGUINAL; Open Repair Right Inguinal Hernia Repair       HYDROCELECTOMY SCROTAL  01/2004    left hydrocele repair    SUPRAPUBIC PROSTATECTOMY       Current Outpatient Medications   Medication Sig Dispense Refill    albuterol (PROAIR HFA/PROVENTIL HFA/VENTOLIN HFA) 108 (90 Base) MCG/ACT inhaler Inhale 2 puffs into the lungs every 4 hours as needed for shortness of breath, wheezing or cough 18 g 3    amLODIPine (NORVASC) 10 MG tablet Take 1 tablet (10 mg) by mouth daily 90 tablet 2    aspirin 81 MG EC tablet Take 81 mg by mouth daily      carvedilol (COREG) 12.5 MG tablet Take 1 tablet (12.5 mg) by mouth 2 times daily (with meals) 180 tablet 2    co-enzyme Q-10 100 MG CAPS capsule Take 100 mg by mouth daily      evolocumab (REPATHA) 140 MG/ML prefilled autoinjector Inject 1 mL (140 mg) Subcutaneous every 14 days 6 mL 3    nicotine polacrilex (NICORETTE) 4 MG gum Place 4 mg inside cheek every hour as needed for smoking cessation      Olodaterol HCl (STRIVERDI RESPIMAT) 2.5 MCG/ACT AERS Inhale 2 puffs into the lungs daily 12 g 3    PARoxetine (PAXIL) 30 MG tablet TAKE 2 TABLETS BY MOUTH once daily in the evening 180 tablet 1    Vitamin D, Cholecalciferol, 25 MCG (1000 UT) TABS Take 1,000 Units by mouth daily     "      Allergies   Allergen Reactions    Hctz [Hydrochlorothiazide] Fatigue     \"felt like zombie\"    Loratadine      Mood , irritablity     Metoprolol Fatigue     \"felt like zombie\"    Pravastatin Fatigue     \"felt like zombie\"    Remeron [Mirtazapine]      Agitation       Wellbutrin [Bupropion Hcl]      Sig mood issues           Social History     Tobacco Use    Smoking status: Former     Packs/day: 1     Types: Cigarettes     Quit date: 2017     Years since quittin.1    Smokeless tobacco: Former     Quit date: 2017   Substance Use Topics    Alcohol use: No     Alcohol/week: 0.0 standard drinks of alcohol     Comment: quit 1989       History   Drug Use No         Review of Systems    Review of Systems  Constitutional, neuro, ENT, endocrine, pulmonary, cardiac, gastrointestinal, genitourinary, musculoskeletal, integument and psychiatric systems are negative, except as otherwise noted.    Objective    /88   Pulse 74   Temp 97.9  F (36.6  C) (Tympanic)   Resp 18   Ht 1.702 m (5' 7\")   Wt 66.2 kg (146 lb)   SpO2 98%   BMI 22.87 kg/m     Estimated body mass index is 22.87 kg/m  as calculated from the following:    Height as of this encounter: 1.702 m (5' 7\").    Weight as of this encounter: 66.2 kg (146 lb).  Physical Exam  GENERAL: alert and no distress  EYES: Eyes grossly normal to inspection, PERRL and conjunctivae and sclerae normal  HENT: ear canals and TM's normal, nose and mouth without ulcers or lesions  NECK: no adenopathy, no asymmetry, masses, or scars  RESP: lungs distant breath sounds   CV: regular rate and rhythm, normal S1 S2, no S3 or S4, no murmur, click or rub, no peripheral edema  ABDOMEN: soft, nontender, no hepatosplenomegaly, no masses and bowel sounds normal  MS: no gross musculoskeletal defects noted, no edema  SKIN: no suspicious lesions or rashes  NEURO: Normal strength and tone, mentation intact and speech normal  PSYCH: mentation appears normal, affect " normal/bright    Recent Labs   Lab Test 02/18/24  1928 01/09/24  1228 11/14/23  1242   HGB 13.9  --  13.9     --  151     --  140   POTASSIUM 3.9  --  4.4   CR 1.30* 1.2 1.07        Diagnostics  Labs pending at this time.  Results will be reviewed when available.   No EKG this visit, completed in the last 90 days.             Signed Electronically by: ANTONIO Carranza CNP  Copy of this evaluation report is provided to requesting physician.

## 2024-03-29 ENCOUNTER — ANESTHESIA (OUTPATIENT)
Dept: SURGERY | Facility: HOSPITAL | Age: 79
DRG: 269 | End: 2024-03-29
Payer: COMMERCIAL

## 2024-03-29 ENCOUNTER — HOSPITAL ENCOUNTER (OUTPATIENT)
Dept: INTERVENTIONAL RADIOLOGY/VASCULAR | Facility: HOSPITAL | Age: 79
Discharge: HOME | DRG: 269 | End: 2024-03-29
Attending: SURGERY | Admitting: SURGERY
Payer: COMMERCIAL

## 2024-03-29 ENCOUNTER — APPOINTMENT (OUTPATIENT)
Dept: RADIOLOGY | Facility: HOSPITAL | Age: 79
DRG: 269 | End: 2024-03-29
Attending: SURGERY
Payer: COMMERCIAL

## 2024-03-29 ENCOUNTER — HOSPITAL ENCOUNTER (INPATIENT)
Facility: HOSPITAL | Age: 79
LOS: 1 days | Discharge: HOME | DRG: 269 | End: 2024-03-30
Attending: SURGERY | Admitting: SURGERY
Payer: COMMERCIAL

## 2024-03-29 ENCOUNTER — ANESTHESIA EVENT (OUTPATIENT)
Dept: SURGERY | Facility: HOSPITAL | Age: 79
DRG: 269 | End: 2024-03-29
Payer: COMMERCIAL

## 2024-03-29 DIAGNOSIS — I71.43 INFRARENAL ABDOMINAL AORTIC ANEURYSM (AAA) WITHOUT RUPTURE (H): ICD-10-CM

## 2024-03-29 LAB
ABO/RH(D): NORMAL
ANTIBODY SCREEN: NEGATIVE
BASOPHILS # BLD AUTO: 0 10E3/UL (ref 0–0.2)
BASOPHILS NFR BLD AUTO: 1 %
CREAT SERPL-MCNC: 1.26 MG/DL (ref 0.67–1.17)
EGFRCR SERPLBLD CKD-EPI 2021: 58 ML/MIN/1.73M2
EOSINOPHIL # BLD AUTO: 0.5 10E3/UL (ref 0–0.7)
EOSINOPHIL NFR BLD AUTO: 9 %
ERYTHROCYTE [DISTWIDTH] IN BLOOD BY AUTOMATED COUNT: 13.6 % (ref 10–15)
GLUCOSE BLDC GLUCOMTR-MCNC: 104 MG/DL (ref 70–99)
GLUCOSE SERPL-MCNC: 112 MG/DL (ref 70–99)
HCT VFR BLD AUTO: 41.8 % (ref 40–53)
HGB BLD-MCNC: 13.5 G/DL (ref 13.3–17.7)
IMM GRANULOCYTES # BLD: 0 10E3/UL
IMM GRANULOCYTES NFR BLD: 0 %
LYMPHOCYTES # BLD AUTO: 1.1 10E3/UL (ref 0.8–5.3)
LYMPHOCYTES NFR BLD AUTO: 19 %
MCH RBC QN AUTO: 27.3 PG (ref 26.5–33)
MCHC RBC AUTO-ENTMCNC: 32.3 G/DL (ref 31.5–36.5)
MCV RBC AUTO: 85 FL (ref 78–100)
MONOCYTES # BLD AUTO: 0.6 10E3/UL (ref 0–1.3)
MONOCYTES NFR BLD AUTO: 10 %
NEUTROPHILS # BLD AUTO: 3.5 10E3/UL (ref 1.6–8.3)
NEUTROPHILS NFR BLD AUTO: 61 %
NRBC # BLD AUTO: 0 10E3/UL
NRBC BLD AUTO-RTO: 0 /100
PLATELET # BLD AUTO: 172 10E3/UL (ref 150–450)
POTASSIUM SERPL-SCNC: 4.5 MMOL/L (ref 3.4–5.3)
RBC # BLD AUTO: 4.94 10E6/UL (ref 4.4–5.9)
SPECIMEN EXPIRATION DATE: NORMAL
WBC # BLD AUTO: 5.7 10E3/UL (ref 4–11)

## 2024-03-29 PROCEDURE — 34705 EVAC RPR A-BIILIAC NDGFT: CPT | Mod: GC | Performed by: SURGERY

## 2024-03-29 PROCEDURE — 255N000002 HC RX 255 OP 636: Performed by: SURGERY

## 2024-03-29 PROCEDURE — 34713 PERQ ACCESS & CLSR FEM ART: CPT | Mod: RT | Performed by: SURGERY

## 2024-03-29 PROCEDURE — C1768 GRAFT, VASCULAR: HCPCS | Performed by: SURGERY

## 2024-03-29 PROCEDURE — 250N000013 HC RX MED GY IP 250 OP 250 PS 637

## 2024-03-29 PROCEDURE — 360N000084 HC SURGERY LEVEL 4 W/ FLUORO, PER MIN: Performed by: SURGERY

## 2024-03-29 PROCEDURE — 999N000141 HC STATISTIC PRE-PROCEDURE NURSING ASSESSMENT: Performed by: SURGERY

## 2024-03-29 PROCEDURE — 250N000009 HC RX 250: Performed by: NURSE ANESTHETIST, CERTIFIED REGISTERED

## 2024-03-29 PROCEDURE — 120N000001 HC R&B MED SURG/OB

## 2024-03-29 PROCEDURE — 272N000001 HC OR GENERAL SUPPLY STERILE: Performed by: SURGERY

## 2024-03-29 PROCEDURE — C1760 CLOSURE DEV, VASC: HCPCS | Performed by: SURGERY

## 2024-03-29 PROCEDURE — 250N000011 HC RX IP 250 OP 636

## 2024-03-29 PROCEDURE — 250N000011 HC RX IP 250 OP 636: Performed by: NURSE ANESTHETIST, CERTIFIED REGISTERED

## 2024-03-29 PROCEDURE — 85004 AUTOMATED DIFF WBC COUNT: CPT | Performed by: SURGERY

## 2024-03-29 PROCEDURE — 82947 ASSAY GLUCOSE BLOOD QUANT: CPT | Performed by: SURGERY

## 2024-03-29 PROCEDURE — 250N000025 HC SEVOFLURANE, PER MIN: Performed by: SURGERY

## 2024-03-29 PROCEDURE — 258N000003 HC RX IP 258 OP 636: Performed by: STUDENT IN AN ORGANIZED HEALTH CARE EDUCATION/TRAINING PROGRAM

## 2024-03-29 PROCEDURE — 250N000011 HC RX IP 250 OP 636: Performed by: SURGERY

## 2024-03-29 PROCEDURE — 370N000017 HC ANESTHESIA TECHNICAL FEE, PER MIN: Performed by: SURGERY

## 2024-03-29 PROCEDURE — 999N000180 XR SURGERY CARM FLUORO LESS THAN 5 MIN: Mod: TC

## 2024-03-29 PROCEDURE — 86900 BLOOD TYPING SEROLOGIC ABO: CPT | Performed by: SURGERY

## 2024-03-29 PROCEDURE — C1894 INTRO/SHEATH, NON-LASER: HCPCS | Performed by: SURGERY

## 2024-03-29 PROCEDURE — 84132 ASSAY OF SERUM POTASSIUM: CPT | Performed by: SURGERY

## 2024-03-29 PROCEDURE — C1769 GUIDE WIRE: HCPCS | Performed by: SURGERY

## 2024-03-29 PROCEDURE — 272N000002 HC OR SUPPLY OTHER OPNP: Performed by: SURGERY

## 2024-03-29 PROCEDURE — C1725 CATH, TRANSLUMIN NON-LASER: HCPCS | Performed by: SURGERY

## 2024-03-29 PROCEDURE — 75716 ARTERY X-RAYS ARMS/LEGS: CPT | Mod: TC

## 2024-03-29 PROCEDURE — 710N000009 HC RECOVERY PHASE 1, LEVEL 1, PER MIN: Performed by: SURGERY

## 2024-03-29 PROCEDURE — 36415 COLL VENOUS BLD VENIPUNCTURE: CPT | Performed by: SURGERY

## 2024-03-29 PROCEDURE — 258N000001 HC RX 258: Performed by: SURGERY

## 2024-03-29 PROCEDURE — 82565 ASSAY OF CREATININE: CPT | Performed by: SURGERY

## 2024-03-29 PROCEDURE — C1887 CATHETER, GUIDING: HCPCS | Performed by: SURGERY

## 2024-03-29 PROCEDURE — 258N000003 HC RX IP 258 OP 636: Performed by: NURSE ANESTHETIST, CERTIFIED REGISTERED

## 2024-03-29 PROCEDURE — 04V03DZ RESTRICTION OF ABDOMINAL AORTA WITH INTRALUMINAL DEVICE, PERCUTANEOUS APPROACH: ICD-10-PCS | Performed by: SURGERY

## 2024-03-29 DEVICE — IMPLANTABLE DEVICE: Type: IMPLANTABLE DEVICE | Site: ILIAC/FEMORALS | Status: FUNCTIONAL

## 2024-03-29 DEVICE — GRAFT STENT ENDURANT II AAA 28MM X 14MM: Type: IMPLANTABLE DEVICE | Site: AORTA | Status: FUNCTIONAL

## 2024-03-29 RX ORDER — SODIUM CHLORIDE, SODIUM LACTATE, POTASSIUM CHLORIDE, CALCIUM CHLORIDE 600; 310; 30; 20 MG/100ML; MG/100ML; MG/100ML; MG/100ML
INJECTION, SOLUTION INTRAVENOUS CONTINUOUS
Status: DISCONTINUED | OUTPATIENT
Start: 2024-03-29 | End: 2024-03-29 | Stop reason: HOSPADM

## 2024-03-29 RX ORDER — LIDOCAINE HYDROCHLORIDE 10 MG/ML
INJECTION, SOLUTION INFILTRATION; PERINEURAL PRN
Status: DISCONTINUED | OUTPATIENT
Start: 2024-03-29 | End: 2024-03-29

## 2024-03-29 RX ORDER — ONDANSETRON 4 MG/1
4 TABLET, ORALLY DISINTEGRATING ORAL EVERY 6 HOURS PRN
Status: DISCONTINUED | OUTPATIENT
Start: 2024-03-29 | End: 2024-03-30 | Stop reason: HOSPADM

## 2024-03-29 RX ORDER — CEFAZOLIN SODIUM/WATER 2 G/20 ML
2 SYRINGE (ML) INTRAVENOUS
Status: COMPLETED | OUTPATIENT
Start: 2024-03-29 | End: 2024-03-29

## 2024-03-29 RX ORDER — NALOXONE HYDROCHLORIDE 0.4 MG/ML
0.1 INJECTION, SOLUTION INTRAMUSCULAR; INTRAVENOUS; SUBCUTANEOUS
Status: CANCELLED | OUTPATIENT
Start: 2024-03-29

## 2024-03-29 RX ORDER — AMLODIPINE BESYLATE 5 MG/1
10 TABLET ORAL DAILY
Status: DISCONTINUED | OUTPATIENT
Start: 2024-03-30 | End: 2024-03-30 | Stop reason: HOSPADM

## 2024-03-29 RX ORDER — ONDANSETRON 2 MG/ML
INJECTION INTRAMUSCULAR; INTRAVENOUS PRN
Status: DISCONTINUED | OUTPATIENT
Start: 2024-03-29 | End: 2024-03-29

## 2024-03-29 RX ORDER — FENTANYL CITRATE 50 UG/ML
INJECTION, SOLUTION INTRAMUSCULAR; INTRAVENOUS PRN
Status: DISCONTINUED | OUTPATIENT
Start: 2024-03-29 | End: 2024-03-29

## 2024-03-29 RX ORDER — ONDANSETRON 4 MG/1
4 TABLET, ORALLY DISINTEGRATING ORAL EVERY 30 MIN PRN
Status: DISCONTINUED | OUTPATIENT
Start: 2024-03-29 | End: 2024-03-29 | Stop reason: HOSPADM

## 2024-03-29 RX ORDER — NALOXONE HYDROCHLORIDE 0.4 MG/ML
0.2 INJECTION, SOLUTION INTRAMUSCULAR; INTRAVENOUS; SUBCUTANEOUS
Status: DISCONTINUED | OUTPATIENT
Start: 2024-03-29 | End: 2024-03-30 | Stop reason: HOSPADM

## 2024-03-29 RX ORDER — PROPOFOL 10 MG/ML
INJECTION, EMULSION INTRAVENOUS PRN
Status: DISCONTINUED | OUTPATIENT
Start: 2024-03-29 | End: 2024-03-29

## 2024-03-29 RX ORDER — ASPIRIN 81 MG/1
81 TABLET ORAL DAILY
Status: DISCONTINUED | OUTPATIENT
Start: 2024-03-30 | End: 2024-03-30 | Stop reason: HOSPADM

## 2024-03-29 RX ORDER — CEFAZOLIN SODIUM 1 G/3ML
1 INJECTION, POWDER, FOR SOLUTION INTRAMUSCULAR; INTRAVENOUS EVERY 8 HOURS
Qty: 10 ML | Refills: 0 | Status: COMPLETED | OUTPATIENT
Start: 2024-03-29 | End: 2024-03-30

## 2024-03-29 RX ORDER — DEXAMETHASONE SODIUM PHOSPHATE 10 MG/ML
INJECTION, SOLUTION INTRAMUSCULAR; INTRAVENOUS PRN
Status: DISCONTINUED | OUTPATIENT
Start: 2024-03-29 | End: 2024-03-29

## 2024-03-29 RX ORDER — HEPARIN SODIUM 1000 [USP'U]/ML
INJECTION, SOLUTION INTRAVENOUS; SUBCUTANEOUS PRN
Status: DISCONTINUED | OUTPATIENT
Start: 2024-03-29 | End: 2024-03-29

## 2024-03-29 RX ORDER — ACETAMINOPHEN 325 MG/1
650 TABLET ORAL EVERY 4 HOURS PRN
Status: DISCONTINUED | OUTPATIENT
Start: 2024-04-01 | End: 2024-03-30 | Stop reason: HOSPADM

## 2024-03-29 RX ORDER — PHENYLEPHRINE HCL IN 0.9% NACL 50MG/250ML
PLASTIC BAG, INJECTION (ML) INTRAVENOUS
Status: DISCONTINUED
Start: 2024-03-29 | End: 2024-03-29 | Stop reason: HOSPADM

## 2024-03-29 RX ORDER — ONDANSETRON 2 MG/ML
4 INJECTION INTRAMUSCULAR; INTRAVENOUS EVERY 6 HOURS PRN
Status: DISCONTINUED | OUTPATIENT
Start: 2024-03-29 | End: 2024-03-30 | Stop reason: HOSPADM

## 2024-03-29 RX ORDER — NALOXONE HYDROCHLORIDE 0.4 MG/ML
0.4 INJECTION, SOLUTION INTRAMUSCULAR; INTRAVENOUS; SUBCUTANEOUS
Status: DISCONTINUED | OUTPATIENT
Start: 2024-03-29 | End: 2024-03-30 | Stop reason: HOSPADM

## 2024-03-29 RX ORDER — VASOPRESSIN IN 0.9 % NACL 2 UNIT/2ML
SYRINGE (ML) INTRAVENOUS PRN
Status: DISCONTINUED | OUTPATIENT
Start: 2024-03-29 | End: 2024-03-29

## 2024-03-29 RX ORDER — CARVEDILOL 12.5 MG/1
12.5 TABLET ORAL 2 TIMES DAILY WITH MEALS
Status: DISCONTINUED | OUTPATIENT
Start: 2024-03-29 | End: 2024-03-30 | Stop reason: HOSPADM

## 2024-03-29 RX ORDER — AMOXICILLIN 250 MG
1 CAPSULE ORAL 2 TIMES DAILY
Status: DISCONTINUED | OUTPATIENT
Start: 2024-03-29 | End: 2024-03-30 | Stop reason: HOSPADM

## 2024-03-29 RX ORDER — ALBUTEROL SULFATE 90 UG/1
2 AEROSOL, METERED RESPIRATORY (INHALATION) EVERY 4 HOURS PRN
Status: DISCONTINUED | OUTPATIENT
Start: 2024-03-29 | End: 2024-03-30 | Stop reason: HOSPADM

## 2024-03-29 RX ORDER — SODIUM CHLORIDE 9 MG/ML
INJECTION, SOLUTION INTRAVENOUS CONTINUOUS PRN
Status: DISCONTINUED | OUTPATIENT
Start: 2024-03-29 | End: 2024-03-29

## 2024-03-29 RX ORDER — BISACODYL 10 MG
10 SUPPOSITORY, RECTAL RECTAL DAILY PRN
Status: DISCONTINUED | OUTPATIENT
Start: 2024-04-01 | End: 2024-03-30 | Stop reason: HOSPADM

## 2024-03-29 RX ORDER — LIDOCAINE 40 MG/G
CREAM TOPICAL
Status: DISCONTINUED | OUTPATIENT
Start: 2024-03-29 | End: 2024-03-29 | Stop reason: HOSPADM

## 2024-03-29 RX ORDER — PAROXETINE 30 MG/1
30 TABLET, FILM COATED ORAL DAILY
Status: DISCONTINUED | OUTPATIENT
Start: 2024-03-29 | End: 2024-03-30 | Stop reason: HOSPADM

## 2024-03-29 RX ORDER — OXYCODONE HYDROCHLORIDE 5 MG/1
10 TABLET ORAL
Status: CANCELLED | OUTPATIENT
Start: 2024-03-29

## 2024-03-29 RX ORDER — NALOXONE HYDROCHLORIDE 0.4 MG/ML
0.1 INJECTION, SOLUTION INTRAMUSCULAR; INTRAVENOUS; SUBCUTANEOUS
Status: DISCONTINUED | OUTPATIENT
Start: 2024-03-29 | End: 2024-03-29 | Stop reason: HOSPADM

## 2024-03-29 RX ORDER — ONDANSETRON 4 MG/1
4 TABLET, ORALLY DISINTEGRATING ORAL EVERY 30 MIN PRN
Status: CANCELLED | OUTPATIENT
Start: 2024-03-29

## 2024-03-29 RX ORDER — ONDANSETRON 2 MG/ML
4 INJECTION INTRAMUSCULAR; INTRAVENOUS EVERY 30 MIN PRN
Status: CANCELLED | OUTPATIENT
Start: 2024-03-29

## 2024-03-29 RX ORDER — PROTAMINE SULFATE 10 MG/ML
INJECTION, SOLUTION INTRAVENOUS PRN
Status: DISCONTINUED | OUTPATIENT
Start: 2024-03-29 | End: 2024-03-29

## 2024-03-29 RX ORDER — HYDROMORPHONE HYDROCHLORIDE 1 MG/ML
0.4 INJECTION, SOLUTION INTRAMUSCULAR; INTRAVENOUS; SUBCUTANEOUS EVERY 5 MIN PRN
Status: DISCONTINUED | OUTPATIENT
Start: 2024-03-29 | End: 2024-03-29 | Stop reason: HOSPADM

## 2024-03-29 RX ORDER — OXYCODONE HYDROCHLORIDE 5 MG/1
5 TABLET ORAL
Status: CANCELLED | OUTPATIENT
Start: 2024-03-29

## 2024-03-29 RX ORDER — LIDOCAINE 40 MG/G
CREAM TOPICAL
Status: DISCONTINUED | OUTPATIENT
Start: 2024-03-29 | End: 2024-03-30 | Stop reason: HOSPADM

## 2024-03-29 RX ORDER — OXYCODONE HYDROCHLORIDE 5 MG/1
5 TABLET ORAL EVERY 4 HOURS PRN
Status: DISCONTINUED | OUTPATIENT
Start: 2024-03-29 | End: 2024-03-30

## 2024-03-29 RX ORDER — FENTANYL CITRATE 50 UG/ML
50 INJECTION, SOLUTION INTRAMUSCULAR; INTRAVENOUS EVERY 5 MIN PRN
Status: DISCONTINUED | OUTPATIENT
Start: 2024-03-29 | End: 2024-03-29 | Stop reason: HOSPADM

## 2024-03-29 RX ORDER — POLYETHYLENE GLYCOL 3350 17 G/17G
17 POWDER, FOR SOLUTION ORAL DAILY
Status: DISCONTINUED | OUTPATIENT
Start: 2024-03-30 | End: 2024-03-30 | Stop reason: HOSPADM

## 2024-03-29 RX ORDER — EPHEDRINE SULFATE 50 MG/ML
INJECTION, SOLUTION INTRAMUSCULAR; INTRAVENOUS; SUBCUTANEOUS PRN
Status: DISCONTINUED | OUTPATIENT
Start: 2024-03-29 | End: 2024-03-29

## 2024-03-29 RX ORDER — FENTANYL CITRATE 50 UG/ML
25 INJECTION, SOLUTION INTRAMUSCULAR; INTRAVENOUS EVERY 5 MIN PRN
Status: DISCONTINUED | OUTPATIENT
Start: 2024-03-29 | End: 2024-03-29 | Stop reason: HOSPADM

## 2024-03-29 RX ORDER — GUAIFENESIN 600 MG/1
1200 TABLET, EXTENDED RELEASE ORAL 2 TIMES DAILY
COMMUNITY

## 2024-03-29 RX ORDER — CEFAZOLIN SODIUM/WATER 2 G/20 ML
2 SYRINGE (ML) INTRAVENOUS SEE ADMIN INSTRUCTIONS
Status: DISCONTINUED | OUTPATIENT
Start: 2024-03-29 | End: 2024-03-29 | Stop reason: HOSPADM

## 2024-03-29 RX ORDER — ONDANSETRON 2 MG/ML
4 INJECTION INTRAMUSCULAR; INTRAVENOUS EVERY 30 MIN PRN
Status: DISCONTINUED | OUTPATIENT
Start: 2024-03-29 | End: 2024-03-29 | Stop reason: HOSPADM

## 2024-03-29 RX ORDER — ACETAMINOPHEN 325 MG/1
975 TABLET ORAL EVERY 8 HOURS
Qty: 27 TABLET | Refills: 0 | Status: DISCONTINUED | OUTPATIENT
Start: 2024-03-29 | End: 2024-03-30 | Stop reason: HOSPADM

## 2024-03-29 RX ORDER — PROCHLORPERAZINE MALEATE 5 MG
5 TABLET ORAL EVERY 6 HOURS PRN
Status: DISCONTINUED | OUTPATIENT
Start: 2024-03-29 | End: 2024-03-30 | Stop reason: HOSPADM

## 2024-03-29 RX ORDER — HYDROMORPHONE HYDROCHLORIDE 1 MG/ML
0.2 INJECTION, SOLUTION INTRAMUSCULAR; INTRAVENOUS; SUBCUTANEOUS EVERY 5 MIN PRN
Status: DISCONTINUED | OUTPATIENT
Start: 2024-03-29 | End: 2024-03-29 | Stop reason: HOSPADM

## 2024-03-29 RX ADMIN — ROCURONIUM BROMIDE 50 MG: 50 INJECTION, SOLUTION INTRAVENOUS at 10:39

## 2024-03-29 RX ADMIN — PROTAMINE SULFATE 30 MG: 10 INJECTION, SOLUTION INTRAVENOUS at 13:22

## 2024-03-29 RX ADMIN — SENNOSIDES AND DOCUSATE SODIUM 1 TABLET: 8.6; 5 TABLET ORAL at 21:50

## 2024-03-29 RX ADMIN — Medication 1 UNITS: at 10:47

## 2024-03-29 RX ADMIN — Medication 2 G: at 10:47

## 2024-03-29 RX ADMIN — Medication 10 MG: at 11:00

## 2024-03-29 RX ADMIN — PROPOFOL 140 MG: 10 INJECTION, EMULSION INTRAVENOUS at 10:39

## 2024-03-29 RX ADMIN — Medication 10 MG: at 11:27

## 2024-03-29 RX ADMIN — PHENYLEPHRINE HYDROCHLORIDE 0.5 MCG/KG/MIN: 10 INJECTION INTRAVENOUS at 10:52

## 2024-03-29 RX ADMIN — ROCURONIUM BROMIDE 30 MG: 50 INJECTION, SOLUTION INTRAVENOUS at 12:21

## 2024-03-29 RX ADMIN — PROPOFOL 30 MG: 10 INJECTION, EMULSION INTRAVENOUS at 12:22

## 2024-03-29 RX ADMIN — PHENYLEPHRINE HYDROCHLORIDE 150 MCG: 10 INJECTION INTRAVENOUS at 10:43

## 2024-03-29 RX ADMIN — HEPARIN SODIUM 8000 UNITS: 1000 INJECTION INTRAVENOUS; SUBCUTANEOUS at 11:59

## 2024-03-29 RX ADMIN — HEPARIN SODIUM 3000 UNITS: 1000 INJECTION INTRAVENOUS; SUBCUTANEOUS at 12:48

## 2024-03-29 RX ADMIN — SODIUM CHLORIDE, POTASSIUM CHLORIDE, SODIUM LACTATE AND CALCIUM CHLORIDE: 600; 310; 30; 20 INJECTION, SOLUTION INTRAVENOUS at 11:01

## 2024-03-29 RX ADMIN — SODIUM CHLORIDE, POTASSIUM CHLORIDE, SODIUM LACTATE AND CALCIUM CHLORIDE: 600; 310; 30; 20 INJECTION, SOLUTION INTRAVENOUS at 10:13

## 2024-03-29 RX ADMIN — SODIUM CHLORIDE, POTASSIUM CHLORIDE, SODIUM LACTATE AND CALCIUM CHLORIDE: 600; 310; 30; 20 INJECTION, SOLUTION INTRAVENOUS at 14:53

## 2024-03-29 RX ADMIN — ROCURONIUM BROMIDE 50 MG: 50 INJECTION, SOLUTION INTRAVENOUS at 11:13

## 2024-03-29 RX ADMIN — CARVEDILOL 12.5 MG: 12.5 TABLET, FILM COATED ORAL at 17:39

## 2024-03-29 RX ADMIN — CEFAZOLIN 1 G: 1 INJECTION, POWDER, FOR SOLUTION INTRAMUSCULAR; INTRAVENOUS at 19:33

## 2024-03-29 RX ADMIN — ACETAMINOPHEN 975 MG: 325 TABLET ORAL at 21:49

## 2024-03-29 RX ADMIN — SUGAMMADEX 200 MG: 100 INJECTION, SOLUTION INTRAVENOUS at 13:25

## 2024-03-29 RX ADMIN — Medication 5 MG: at 11:21

## 2024-03-29 RX ADMIN — FENTANYL CITRATE 100 MCG: 50 INJECTION INTRAMUSCULAR; INTRAVENOUS at 10:39

## 2024-03-29 RX ADMIN — PAROXETINE 30 MG: 30 TABLET, FILM COATED ORAL at 21:50

## 2024-03-29 RX ADMIN — PHENYLEPHRINE HYDROCHLORIDE 200 MCG: 10 INJECTION INTRAVENOUS at 10:50

## 2024-03-29 RX ADMIN — ONDANSETRON 4 MG: 2 INJECTION INTRAMUSCULAR; INTRAVENOUS at 13:12

## 2024-03-29 RX ADMIN — PHENYLEPHRINE HYDROCHLORIDE 150 MCG: 10 INJECTION INTRAVENOUS at 10:47

## 2024-03-29 RX ADMIN — SODIUM CHLORIDE: 9 INJECTION, SOLUTION INTRAVENOUS at 10:48

## 2024-03-29 RX ADMIN — LIDOCAINE HYDROCHLORIDE 5 ML: 10 INJECTION, SOLUTION INFILTRATION; PERINEURAL at 10:39

## 2024-03-29 RX ADMIN — PHENYLEPHRINE HYDROCHLORIDE 100 MCG: 10 INJECTION INTRAVENOUS at 12:31

## 2024-03-29 RX ADMIN — DEXAMETHASONE SODIUM PHOSPHATE 5 MG: 10 INJECTION, SOLUTION INTRAMUSCULAR; INTRAVENOUS at 11:05

## 2024-03-29 ASSESSMENT — ACTIVITIES OF DAILY LIVING (ADL)
ADLS_ACUITY_SCORE: 23
ADLS_ACUITY_SCORE: 38
ADLS_ACUITY_SCORE: 23
ADLS_ACUITY_SCORE: 36
ADLS_ACUITY_SCORE: 23

## 2024-03-29 ASSESSMENT — COPD QUESTIONNAIRES: COPD: 1

## 2024-03-29 NOTE — ANESTHESIA CARE TRANSFER NOTE
Patient: Adolfo Rendon    Procedure: Procedure(s):  ESTABLISHMENT, VASCULAR ACCESS, FEMORAL APPROACH, FOR ENDOVASCULAR STENT INSERTION INTO ABDOMINAL AORTIC ANEURYSM       Diagnosis: Infrarenal abdominal aortic aneurysm (AAA) without rupture (H24) [I71.43]  Diagnosis Additional Information: No value filed.    Anesthesia Type:   General     Note:    Oropharynx: oropharynx clear of all foreign objects  Level of Consciousness: awake and drowsy  Oxygen Supplementation: face mask  Level of Supplemental Oxygen (L/min / FiO2): 8      Vital Signs Stable: post-procedure vital signs reviewed and stable  Report to RN Given: handoff report given  Patient transferred to: PACU    Handoff Report: Identifed the Patient, Identified the Reponsible Provider, Reviewed the pertinent medical history, Discussed the surgical course, Reviewed Intra-OP anesthesia mangement and issues during anesthesia, Set expectations for post-procedure period and Allowed opportunity for questions and acknowledgement of understanding    Vitals:  Vitals Value Taken Time   /67 03/29/24 1348   Temp 36  C (96.8  F) 03/29/24 1343   Pulse 58 03/29/24 1352   Resp 23 03/29/24 1352   SpO2 97 % 03/29/24 1352   Vitals shown include unfiled device data.    Electronically Signed By: ANTONIO Lee CRNA  March 29, 2024  1:53 PM

## 2024-03-29 NOTE — ANESTHESIA POSTPROCEDURE EVALUATION
Patient: Adolfo Rendon    Procedure: Procedure(s):  ESTABLISHMENT, VASCULAR ACCESS, FEMORAL APPROACH, FOR ENDOVASCULAR STENT INSERTION INTO ABDOMINAL AORTIC ANEURYSM       Anesthesia Type:  General    Note:  Disposition: Outpatient   Postop Pain Control: Uneventful            Sign Out: Well controlled pain   PONV: No   Neuro/Psych: Uneventful            Sign Out: Acceptable/Baseline neuro status   Airway/Respiratory: Uneventful            Sign Out: Acceptable/Baseline resp. status   CV/Hemodynamics: Uneventful            Sign Out: Acceptable CV status; No obvious hypovolemia; No obvious fluid overload   Other NRE: NONE   DID A NON-ROUTINE EVENT OCCUR? No    Event details/Postop Comments:  Patient recovering comfortably.        Last vitals:  Vitals Value Taken Time   BP 95/55 03/29/24 1500   Temp 35.9  C (96.7  F) 03/29/24 1450   Pulse 57 03/29/24 1503   Resp 19 03/29/24 1500   SpO2 95 % 03/29/24 1503   Vitals shown include unfiled device data.    Electronically Signed By: Alberto Castro DO  March 29, 2024  3:04 PM

## 2024-03-29 NOTE — ANESTHESIA PREPROCEDURE EVALUATION
"Anesthesia Pre-Procedure Evaluation    Patient: Adolfo Rendon   MRN: [id number] : 1945        Procedure : Procedure(s):  ESTABLISHMENT, VASCULAR ACCESS, FEMORAL APPROACH, FOR ENDOVASCULAR STENT INSERTION INTO ABDOMINAL AORTIC ANEURYSM          Past Medical History:   Diagnosis Date     AAA (abdominal aortic aneurysm) without rupture (H24)      Congestive heart failure (H)      COPD (chronic obstructive pulmonary disease) (H)      Coronary artery disease      Depressive disorder, not elsewhere classified      HLD (hyperlipidemia)      Hypertension      Hypertrophy (benign) of prostate      Impotence of organic origin      Other and unspecified hyperlipidemia      Other anxiety states      PVD (peripheral vascular disease) (H24)      Tobacco use disorder       Past Surgical History:   Procedure Laterality Date     APPENDECTOMY  1966     BYPASS GRAFT ARTERY CORONARY N/A 9/3/2019    Procedure: CORONARY ARTERY BYPASS GRAFT X 4 - LIMA TO LAD, SV TO PL, OM, PDA ; ON PUMP WITH TERRENCE; ENDOVEIN HARVEST RIGHT LEG;  Surgeon: Lai Mchugh MD;  Location:  OR     COLONOSCOPY  3/1/2005     COLONOSCOPY N/A 2019    Procedure: COLONOSCOPY;  Surgeon: Camilo Beard MD;  Location: [facility]     CV LEFT HEART CATH N/A 2019    Procedure: Left Heart Cath--also measure LVEDP;  Surgeon: Krystle Barrera MD;  Location:  HEART CARDIAC CATH LAB     HERNIORRHAPHY INGUINAL  2011    Procedure:HERNIORRHAPHY INGUINAL; Open Repair Right Inguinal Hernia Repair        HYDROCELECTOMY SCROTAL  2004    left hydrocele repair     SUPRAPUBIC PROSTATECTOMY        Allergies   Allergen Reactions     Hctz [Hydrochlorothiazide] Fatigue     \"felt like zombie\"     Loratadine      Mood , irritablity      Metoprolol Fatigue     \"felt like zombie\"     Pravastatin Fatigue     \"felt like zombie\"     Remeron [Mirtazapine]      Agitation        Wellbutrin [Bupropion Hcl]      Sig mood issues         Social History     Tobacco Use "     Smoking status: Former     Packs/day: 1     Types: Cigarettes     Quit date: 2017     Years since quittin.2     Smokeless tobacco: Former     Quit date: 2017   Substance Use Topics     Alcohol use: No     Alcohol/week: 0.0 standard drinks of alcohol     Comment: quit       Wt Readings from Last 1 Encounters:   24 63 kg (138 lb 12.8 oz)        Anesthesia Evaluation            ROS/MED HX  ENT/Pulmonary:     (+)                          COPD,              Neurologic:       Cardiovascular: Comment: Interpretation Summary     Left ventricular systolic function is low normal.  The visual ejection fraction is 50-55%.  Basal inferior and inferolateral hypokinesis.  The right ventricle is normal in structure, function and size.  Mild mitral valve prolapse, anterior leaflet. Mild MR.  A bicuspid aortic valve cannot be excluded versus trileaflet sclerosis. No  hemodynamically significant aortic stenosis.  The inferior vena cava was normal in size with preserved respiratory  variability.  There is no pericardial effusion.  Aortic root is normal.     Findings similar to prior study dated 2020.      (+)  hypertension- -  CAD -  - -      CHF                                METS/Exercise Tolerance:     Hematologic:       Musculoskeletal:       GI/Hepatic:       Renal/Genitourinary:       Endo:       Psychiatric/Substance Use:       Infectious Disease:       Malignancy:       Other:            Physical Exam    Airway        Mallampati: II   TM distance: > 3 FB   Neck ROM: full     Respiratory Devices and Support         Dental           Cardiovascular   cardiovascular exam normal          Pulmonary   pulmonary exam normal            OUTSIDE LABS:  CBC:   Lab Results   Component Value Date    WBC 5.7 2024    WBC 7.4 2024    HGB 14.0 2024    HGB 13.9 2024    HCT 41.8 2024    HCT 41.5 2024     2024     2024     BMP:   Lab Results   Component  Value Date     03/22/2024     02/18/2024    POTASSIUM 4.7 03/22/2024    POTASSIUM 3.9 02/18/2024    CHLORIDE 102 03/22/2024    CHLORIDE 103 02/18/2024    CO2 29 03/22/2024    CO2 27 02/18/2024    BUN 21.1 03/22/2024    BUN 24.4 (H) 02/18/2024    CR 1.12 03/22/2024    CR 1.30 (H) 02/18/2024     (H) 03/29/2024    GLC 96 03/22/2024     COAGS:   Lab Results   Component Value Date    PTT 40 (H) 09/03/2019    INR 1.30 (H) 09/03/2019    FIBR 217 09/03/2019     POC:   Lab Results   Component Value Date     (H) 09/09/2019     HEPATIC:   Lab Results   Component Value Date    ALBUMIN 4.3 11/14/2023    PROTTOTAL 7.1 11/14/2023    ALT 9 11/14/2023    AST 19 11/14/2023    ALKPHOS 54 11/14/2023    BILITOTAL 0.5 11/14/2023     OTHER:   Lab Results   Component Value Date    PH 7.29 (L) 09/04/2019    LACT 0.8 09/03/2019    A1C 5.7 (H) 08/08/2019    KARL 9.3 03/22/2024    PHOS 3.9 09/04/2019    MAG 2.4 (H) 09/04/2019    TSH 1.54 10/27/2022    T4 1.10 04/20/2011    T3 125 04/20/2011    SED 5 10/27/2022       Anesthesia Plan    ASA Status:  3       Anesthesia Type: General.     - Airway: ETT         Techniques and Equipment:     - Lines/Monitors: 2nd IV, Arterial Line     Consents    Anesthesia Plan(s) and associated risks, benefits, and realistic alternatives discussed. Questions answered and patient/representative(s) expressed understanding.     - Discussed: Risks, Benefits and Alternatives for BOTH SEDATION and the PROCEDURE were discussed     - Discussed with:  Patient      - Extended Intubation/Ventilatory Support Discussed: No.      - Patient is DNR/DNI Status: No     Use of blood products discussed: No .     Postoperative Care    Pain management: IV analgesics, Oral pain medications.   PONV prophylaxis: Ondansetron (or other 5HT-3), Dexamethasone or Solumedrol     Comments:             Alberto Castro, DO    I have reviewed the pertinent notes and labs in the chart from the past 30 days and  (re)examined the patient.  Any updates or changes from those notes are reflected in this note.             # Drug Induced Platelet Defect: home medication list includes an antiplatelet medication

## 2024-03-29 NOTE — PROGRESS NOTES
Brief Vascular Surgery Note    Patient monophasic DP/PT signals bilatearlly.     Ihsan Bennett MD   Vascular Surgery PGY3

## 2024-03-29 NOTE — ANESTHESIA PROCEDURE NOTES
Airway       Patient location during procedure: OR       Procedure Start/Stop Times: 3/29/2024 10:42 AM  Staff -        CRNA: Eliseo Kulkarni APRN CRNA       Performed By: CRNA  Consent for Airway        Urgency: elective  Indications and Patient Condition       Indications for airway management: genaro-procedural       Induction type:intravenous       Mask difficulty assessment: 1 - vent by mask    Final Airway Details       Final airway type: endotracheal airway       Successful airway: ETT - single  Endotracheal Airway Details        ETT size (mm): 7.5       Cuffed: yes       Successful intubation technique: direct laryngoscopy       DL Blade Type: Sewell 2       Grade View of Cords: 1       Adjucts: stylet       Position: Right       Measured from: lips       Secured at (cm): 23       Bite block used: None    Post intubation assessment        Placement verified by: capnometry, equal breath sounds and chest rise        Number of attempts at approach: 1       Number of other approaches attempted: 0       Secured with: tape       Ease of procedure: easy       Dentition: Intact and Unchanged    Medication(s) Administered   Medication Administration Time: 3/29/2024 10:42 AM

## 2024-03-29 NOTE — PHARMACY-ADMISSION MEDICATION HISTORY
Pharmacist Admission Medication History    Admission medication history is complete. The information provided in this note is only as accurate as the sources available at the time of the update.    Information Source(s): Patient, Clinic records, and CareEverywhere/SureScripts via in-person    Allergies reviewed with patient and updates made in EHR: unable to assess    Medication History Completed By: Kati Pacheco RPH 3/29/2024 9:34 AM    PTA Med List   Medication Sig Last Dose    albuterol (PROAIR HFA/PROVENTIL HFA/VENTOLIN HFA) 108 (90 Base) MCG/ACT inhaler Inhale 2 puffs into the lungs every 4 hours as needed for shortness of breath, wheezing or cough 3/29/2024 at am    amLODIPine (NORVASC) 10 MG tablet Take 1 tablet (10 mg) by mouth daily 3/28/2024 at am    aspirin 81 MG EC tablet Take 81 mg by mouth daily 3/22/2024 at am    carvedilol (COREG) 12.5 MG tablet Take 1 tablet (12.5 mg) by mouth 2 times daily (with meals) 3/29/2024 at 0500    co-enzyme Q-10 100 MG CAPS capsule Take 100 mg by mouth daily 3/22/2024 at am    evolocumab (REPATHA) 140 MG/ML prefilled autoinjector Inject 1 mL (140 mg) Subcutaneous every 14 days Past Month at unknown    guaiFENesin (MUCINEX) 600 MG 12 hr tablet Take 1,200 mg by mouth 2 times daily 3/28/2024 at pm    nicotine polacrilex (NICORETTE) 4 MG gum Place 4 mg inside cheek every hour as needed for smoking cessation Unknown at unknown    Olodaterol HCl (STRIVERDI RESPIMAT) 2.5 MCG/ACT AERS Inhale 2 puffs into the lungs daily 3/29/2024 at am    PARoxetine (PAXIL) 30 MG tablet TAKE 2 TABLETS BY MOUTH once daily in the evening 3/28/2024 at pm    Vitamin D, Cholecalciferol, 25 MCG (1000 UT) TABS Take 1,000 Units by mouth daily  3/28/2024 at am

## 2024-03-30 VITALS
TEMPERATURE: 98.1 F | SYSTOLIC BLOOD PRESSURE: 100 MMHG | DIASTOLIC BLOOD PRESSURE: 63 MMHG | HEIGHT: 68 IN | RESPIRATION RATE: 20 BRPM | HEART RATE: 65 BPM | WEIGHT: 138.8 LBS | BODY MASS INDEX: 21.04 KG/M2 | OXYGEN SATURATION: 97 %

## 2024-03-30 LAB
ANION GAP SERPL CALCULATED.3IONS-SCNC: 8 MMOL/L (ref 7–15)
BASOPHILS # BLD AUTO: 0 10E3/UL (ref 0–0.2)
BASOPHILS NFR BLD AUTO: 0 %
BUN SERPL-MCNC: 16.2 MG/DL (ref 8–23)
CALCIUM SERPL-MCNC: 8.3 MG/DL (ref 8.8–10.2)
CHLORIDE SERPL-SCNC: 103 MMOL/L (ref 98–107)
CREAT SERPL-MCNC: 1.12 MG/DL (ref 0.67–1.17)
DEPRECATED HCO3 PLAS-SCNC: 27 MMOL/L (ref 22–29)
EGFRCR SERPLBLD CKD-EPI 2021: 67 ML/MIN/1.73M2
EOSINOPHIL # BLD AUTO: 0 10E3/UL (ref 0–0.7)
EOSINOPHIL NFR BLD AUTO: 0 %
ERYTHROCYTE [DISTWIDTH] IN BLOOD BY AUTOMATED COUNT: 13.7 % (ref 10–15)
GLUCOSE SERPL-MCNC: 115 MG/DL (ref 70–99)
GLUCOSE SERPL-MCNC: 117 MG/DL (ref 70–99)
HCT VFR BLD AUTO: 35.2 % (ref 40–53)
HGB BLD-MCNC: 11.7 G/DL (ref 13.3–17.7)
IMM GRANULOCYTES # BLD: 0 10E3/UL
IMM GRANULOCYTES NFR BLD: 1 %
INR PPP: 1.14 (ref 0.85–1.15)
LYMPHOCYTES # BLD AUTO: 0.7 10E3/UL (ref 0.8–5.3)
LYMPHOCYTES NFR BLD AUTO: 8 %
MCH RBC QN AUTO: 27.9 PG (ref 26.5–33)
MCHC RBC AUTO-ENTMCNC: 33.2 G/DL (ref 31.5–36.5)
MCV RBC AUTO: 84 FL (ref 78–100)
MONOCYTES # BLD AUTO: 0.8 10E3/UL (ref 0–1.3)
MONOCYTES NFR BLD AUTO: 10 %
NEUTROPHILS # BLD AUTO: 7.1 10E3/UL (ref 1.6–8.3)
NEUTROPHILS NFR BLD AUTO: 81 %
NRBC # BLD AUTO: 0.1 10E3/UL
NRBC BLD AUTO-RTO: 1 /100
PLATELET # BLD AUTO: 126 10E3/UL (ref 150–450)
POTASSIUM SERPL-SCNC: 4.5 MMOL/L (ref 3.4–5.3)
POTASSIUM SERPL-SCNC: 4.6 MMOL/L (ref 3.4–5.3)
RBC # BLD AUTO: 4.19 10E6/UL (ref 4.4–5.9)
SODIUM SERPL-SCNC: 138 MMOL/L (ref 135–145)
WBC # BLD AUTO: 8.7 10E3/UL (ref 4–11)

## 2024-03-30 PROCEDURE — 250N000013 HC RX MED GY IP 250 OP 250 PS 637

## 2024-03-30 PROCEDURE — 36415 COLL VENOUS BLD VENIPUNCTURE: CPT

## 2024-03-30 PROCEDURE — 82947 ASSAY GLUCOSE BLOOD QUANT: CPT | Performed by: SURGERY

## 2024-03-30 PROCEDURE — 250N000011 HC RX IP 250 OP 636

## 2024-03-30 PROCEDURE — 85004 AUTOMATED DIFF WBC COUNT: CPT

## 2024-03-30 PROCEDURE — 85610 PROTHROMBIN TIME: CPT

## 2024-03-30 PROCEDURE — 80048 BASIC METABOLIC PNL TOTAL CA: CPT

## 2024-03-30 PROCEDURE — 84132 ASSAY OF SERUM POTASSIUM: CPT

## 2024-03-30 RX ADMIN — ASPIRIN 81 MG: 81 TABLET, COATED ORAL at 08:05

## 2024-03-30 RX ADMIN — ACETAMINOPHEN 975 MG: 325 TABLET ORAL at 13:46

## 2024-03-30 RX ADMIN — POLYETHYLENE GLYCOL 3350 17 G: 17 POWDER, FOR SOLUTION ORAL at 08:04

## 2024-03-30 RX ADMIN — CEFAZOLIN 1 G: 1 INJECTION, POWDER, FOR SOLUTION INTRAMUSCULAR; INTRAVENOUS at 03:25

## 2024-03-30 RX ADMIN — CARVEDILOL 12.5 MG: 12.5 TABLET, FILM COATED ORAL at 08:05

## 2024-03-30 RX ADMIN — ACETAMINOPHEN 975 MG: 325 TABLET ORAL at 05:40

## 2024-03-30 RX ADMIN — AMLODIPINE BESYLATE 10 MG: 5 TABLET ORAL at 08:05

## 2024-03-30 RX ADMIN — SENNOSIDES AND DOCUSATE SODIUM 1 TABLET: 8.6; 5 TABLET ORAL at 08:05

## 2024-03-30 ASSESSMENT — ACTIVITIES OF DAILY LIVING (ADL)
ADLS_ACUITY_SCORE: 23
ADLS_ACUITY_SCORE: 22
ADLS_ACUITY_SCORE: 23
ADLS_ACUITY_SCORE: 24
ADLS_ACUITY_SCORE: 23
DEPENDENT_IADLS:: INDEPENDENT
ADLS_ACUITY_SCORE: 24
ADLS_ACUITY_SCORE: 23
ADLS_ACUITY_SCORE: 23
ADLS_ACUITY_SCORE: 24
ADLS_ACUITY_SCORE: 23

## 2024-03-30 NOTE — ADDENDUM NOTE
Addendum  created 03/30/24 0907 by Alberto Castro, DO    Child order released for a procedure order, Clinical Note Signed, Intraprocedure Blocks edited, SmartForm saved

## 2024-03-30 NOTE — DISCHARGE SUMMARY
[facility]  Discharge Summary  Vascular Surgery     Adolfo Rendon MRN# [id number]   YOB: 1945 Age: 78 year old     Date of Admission:  3/29/2024  Date of Discharge::  3/30/2024  3:32 PM  Admitting Physician:  Aleksander Ruiz MD  Discharge Physician:  Aleksander Ruiz MD  Primary Care Physician:        Danielle Mercado          Admission Diagnoses:   Infrarenal abdominal aortic aneurysm (AAA) without rupture (H24) [I71.43]            Discharge Diagnosis:     Same as above         Procedures:     Procedure(s):  ESTABLISHMENT, VASCULAR ACCESS, FEMORAL APPROACH, FOR ENDOVASCULAR STENT INSERTION INTO ABDOMINAL AORTIC ANEURYSM          Consultations:   CARE MANAGEMENT / SOCIAL WORK IP CONSULT          Brief History of Illness:   Pleasant 78-year-old male with history of 5.5 cm infrarenal AAA.  Admitted for planned endovascular aneurysm repair on 3/29/24.              Hospital Course:   The patient underwent evar on 3/29, which he tolerated well without immediate complications. He was transferred to the PACU and then floor for routine postoperative care. The remainder of his postoperative course was unremarkable. Mo was removed on POD# 1. He had return and his diet was slowly advanced. On the day of discharge, he was tolerating regular diet, his pain was well controlled with oral pain medications, he was voiding spontaneously, and ambulating independently. Patient with follow up with  in vascular surgery clinic in 4  weeks with CTA at that time.   Patient currently not on statin therapy, does take repatha. Discussed recommendation to initiate high intensity statin.  Patient previously with lethargy while on pravastatin, he is hesitant to try another.  He is willing to consider trialing another kind but would like to discuss with his primary care doctor.  This is reasonable, recommend he do so.            Imaging Studies:     Results for orders  placed or performed during the hospital encounter of 03/29/24   XR Surgery JOYCELYN Fluoro L/T 5 Min    Narrative    This exam was marked as non-reportable because it will not be read by a   radiologist or a [facility] non-radiologist provider.                    Medications Prior to Admission:     Medications Prior to Admission   Medication Sig Dispense Refill Last Dose    albuterol (PROAIR HFA/PROVENTIL HFA/VENTOLIN HFA) 108 (90 Base) MCG/ACT inhaler Inhale 2 puffs into the lungs every 4 hours as needed for shortness of breath, wheezing or cough 18 g 3 3/29/2024 at am    amLODIPine (NORVASC) 10 MG tablet Take 1 tablet (10 mg) by mouth daily 90 tablet 2 3/28/2024 at am    aspirin 81 MG EC tablet Take 81 mg by mouth daily   3/22/2024 at am    carvedilol (COREG) 12.5 MG tablet Take 1 tablet (12.5 mg) by mouth 2 times daily (with meals) 180 tablet 2 3/29/2024 at 0500    co-enzyme Q-10 100 MG CAPS capsule Take 100 mg by mouth daily   3/22/2024 at am    evolocumab (REPATHA) 140 MG/ML prefilled autoinjector Inject 1 mL (140 mg) Subcutaneous every 14 days 6 mL 3 Past Month at unknown    guaiFENesin (MUCINEX) 600 MG 12 hr tablet Take 1,200 mg by mouth 2 times daily   3/28/2024 at pm    nicotine polacrilex (NICORETTE) 4 MG gum Place 4 mg inside cheek every hour as needed for smoking cessation   Unknown at unknown    Olodaterol HCl (STRIVERDI RESPIMAT) 2.5 MCG/ACT AERS Inhale 2 puffs into the lungs daily 12 g 3 3/29/2024 at am    PARoxetine (PAXIL) 30 MG tablet TAKE 2 TABLETS BY MOUTH once daily in the evening 180 tablet 1 3/28/2024 at pm    Vitamin D, Cholecalciferol, 25 MCG (1000 UT) TABS Take 1,000 Units by mouth daily    3/28/2024 at am              Discharge Medications:     Current Discharge Medication List        CONTINUE these medications which have NOT CHANGED    Details   albuterol (PROAIR HFA/PROVENTIL HFA/VENTOLIN HFA) 108 (90 Base) MCG/ACT inhaler Inhale 2 puffs into the lungs every 4 hours as needed for shortness of  breath, wheezing or cough  Qty: 18 g, Refills: 3    Comments: Pharmacy may dispense brand covered by insurance (Proair, or proventil or ventolin or generic albuterol inhaler)  Associated Diagnoses: SOBOE (shortness of breath on exertion)      amLODIPine (NORVASC) 10 MG tablet Take 1 tablet (10 mg) by mouth daily  Qty: 90 tablet, Refills: 2    Associated Diagnoses: Benign essential hypertension      aspirin 81 MG EC tablet Take 81 mg by mouth daily      carvedilol (COREG) 12.5 MG tablet Take 1 tablet (12.5 mg) by mouth 2 times daily (with meals)  Qty: 180 tablet, Refills: 2    Associated Diagnoses: Benign essential hypertension      co-enzyme Q-10 100 MG CAPS capsule Take 100 mg by mouth daily      evolocumab (REPATHA) 140 MG/ML prefilled autoinjector Inject 1 mL (140 mg) Subcutaneous every 14 days  Qty: 6 mL, Refills: 3    Associated Diagnoses: Hyperlipidemia LDL goal <70; Coronary artery disease involving native coronary artery of native heart without angina pectoris; S/P CABG x 4; Statin intolerance      guaiFENesin (MUCINEX) 600 MG 12 hr tablet Take 1,200 mg by mouth 2 times daily      nicotine polacrilex (NICORETTE) 4 MG gum Place 4 mg inside cheek every hour as needed for smoking cessation      Olodaterol HCl (STRIVERDI RESPIMAT) 2.5 MCG/ACT AERS Inhale 2 puffs into the lungs daily  Qty: 12 g, Refills: 3    Associated Diagnoses: Chronic obstructive pulmonary disease, unspecified COPD type (H)      PARoxetine (PAXIL) 30 MG tablet TAKE 2 TABLETS BY MOUTH once daily in the evening  Qty: 180 tablet, Refills: 1    Comments: Profile Rx: patient will contact pharmacy when needed  Associated Diagnoses: Severe episode of recurrent major depressive disorder, without psychotic features (H)      Vitamin D, Cholecalciferol, 25 MCG (1000 UT) TABS Take 1,000 Units by mouth daily                      Medications Discontinued or Adjusted During This Hospitalization:   No change           Antibiotics Prescribed at Discharge:    None prescribed           Day of Discharge Physical Exam:   Temp:  [96.7  F (35.9  C)-98.1  F (36.7  C)] 98.1  F (36.7  C)  Pulse:  [54-65] 65  Resp:  [17-25] 20  BP: ()/(54-67) 100/63  MAP:  [70 mmHg-86 mmHg] 73 mmHg  Arterial Line BP: (110-138)/(52-64) 110/56  SpO2:  [87 %-99 %] 97 %    General: awake, alert, no acute distress, laying comfortably in bed   CV: warm, well perfused   Pulm: breathing comfortably on room air       Extremities: no edema, moving all extremities spontaneously and without apparent deficit  Bilateral groins soft compressible no erythema no hematoma.    Incision: Clean dry intact, no erythema no hematoma         Final Pathology Result:   Not applicable           Discharge Instructions and Follow-Up:   No discharge procedures on file.           Home Health Care:     Not needed           Discharge Disposition:     Discharged to home      Condition at discharge: Good    Patient was discussed with staff, Dr. Ruiz, on the day of discharge.    Ihsan Bennett MD  Surgery Resident

## 2024-03-30 NOTE — ANESTHESIA PROCEDURE NOTES
Arterial Line Procedure Note    Pre-Procedure   Staff -        Anesthesiologist:  Alberto Castro DO       Performed By: anesthesiologist       Location: OR       Pre-Anesthestic Checklist: patient identified, IV checked, risks and benefits discussed, informed consent, monitors and equipment checked, pre-op evaluation and at physician/surgeon's request  Timeout:       Correct Patient: Yes        Correct Procedure: Yes        Correct Site: Yes        Correct Position: Yes   Line Placement:   This line was placed Post Induction  Procedure   Procedure: arterial line       Laterality: left       Insertion Site: radial.  Sterile Prep        Standard elements of sterile barrier followed       Skin prep: Chloraprep  Insertion/Injection        Technique: Seldinger Technique        Catheter Type/Size: 20 G, 12 cm  Narrative        Tegaderm dressing used.       Complications: None apparent,        Arterial waveform: Yes        IBP within 10% of NIBP: Yes

## 2024-03-30 NOTE — CONSULTS
Care Management Initial Consult    General Information  Assessment completed with: Patient,    Type of CM/SW Visit: Initial Assessment    Primary Care Provider verified and updated as needed: Yes   Readmission within the last 30 days: no previous admission in last 30 days      Reason for Consult: discharge planning  Advance Care Planning: Advance Care Planning Reviewed: no concerns identified          Communication Assessment  Patient's communication style: spoken language (English or Bilingual)    Hearing Difficulty or Deaf: no   Wear Glasses or Blind: yes    Cognitive  Cognitive/Neuro/Behavioral: WDL  Level of Consciousness: alert  Arousal Level: opens eyes spontaneously  Orientation: oriented x 4  Mood/Behavior: calm, cooperative          Living Environment:   People in home: alone     Current living Arrangements: house      Able to return to prior arrangements:         Family/Social Support:  Care provided by: self  Provides care for: no one  Marital Status:   Sibling(s)          Description of Support System:           Current Resources:   Patient receiving home care services: No     Community Resources: None  Equipment currently used at home: none  Supplies currently used at home: None    Employment/Financial:  Employment Status: retired        Financial Concerns: none   Referral to Financial Worker: No       Does the patient's insurance plan have a 3 day qualifying hospital stay waiver?  Yes     Which insurance plan 3 day waiver is available? Alternative insurance waiver    Will the waiver be used for post-acute placement? Undetermined at this time    Lifestyle & Psychosocial Needs:  Social Determinants of Health     Food Insecurity: Low Risk  (11/14/2023)    Food Insecurity     Within the past 12 months, did you worry that your food would run out before you got money to buy more?: No     Within the past 12 months, did the food you bought just not last and you didn t have money to get more?: No    Depression: Not at risk (11/14/2023)    PHQ-2     PHQ-2 Score: 2   Housing Stability: Low Risk  (11/14/2023)    Housing Stability     Do you have housing? : Yes     Are you worried about losing your housing?: No   Tobacco Use: Medium Risk (3/22/2024)    Patient History     Smoking Tobacco Use: Former     Smokeless Tobacco Use: Former     Passive Exposure: Not on file   Financial Resource Strain: Low Risk  (11/14/2023)    Financial Resource Strain     Within the past 12 months, have you or your family members you live with been unable to get utilities (heat, electricity) when it was really needed?: No   Alcohol Use: Not on file   Transportation Needs: Low Risk  (11/14/2023)    Transportation Needs     Within the past 12 months, has lack of transportation kept you from medical appointments, getting your medicines, non-medical meetings or appointments, work, or from getting things that you need?: No   Physical Activity: Not on file   Interpersonal Safety: Low Risk  (11/14/2023)    Interpersonal Safety     Do you feel physically and emotionally safe where you currently live?: Yes     Within the past 12 months, have you been hit, slapped, kicked or otherwise physically hurt by someone?: No     Within the past 12 months, have you been humiliated or emotionally abused in other ways by your partner or ex-partner?: No   Stress: Not on file   Social Connections: Not on file       Functional Status:  Prior to admission patient needed assistance:   Dependent ADLs:: Independent  Dependent IADLs:: Independent       Mental Health Status:          Chemical Dependency Status:                Values/Beliefs:  Spiritual, Cultural Beliefs, Mandaen Practices, Values that affect care:                 Additional Information:  Assessed. Pt lives alone in a house. He is independent with ADLs and IADLs. No services or equipment. Sister to transport at discharge.    RNCM to follow for medical progression, recommendations, and final  discharge plan.      Care Management Discharge Note    Discharge Date: 03/30/2024       Discharge Disposition:  Home     Discharge Services:  none    Discharge DME:  none    Discharge Transportation: family or friend will provide    Private pay costs discussed: Not applicable    Does the patient's insurance plan have a 3 day qualifying hospital stay waiver?  No    PAS Confirmation Code:    Patient/family educated on Medicare website which has current facility and service quality ratings:      Education Provided on the Discharge Plan:    Persons Notified of Discharge Plans: yes  Patient/Family in Agreement with the Plan:      Handoff Referral Completed: Yes    Additional Information:  Pt adequate for discharge no CM needs identified. CM will sign off.    Stephanie Bates RN

## 2024-03-30 NOTE — PLAN OF CARE
Problem: Adult Inpatient Plan of Care  Goal: Absence of Hospital-Acquired Illness or Injury  Intervention: Identify and Manage Fall Risk  Recent Flowsheet Documentation  Taken 3/30/2024 0800 by Elana Guerrero RN  Safety Promotion/Fall Prevention: clutter free environment maintained     Problem: Adult Inpatient Plan of Care  Goal: Absence of Hospital-Acquired Illness or Injury  Intervention: Prevent Skin Injury  Recent Flowsheet Documentation  Taken 3/30/2024 0800 by Elana Guerrero RN  Body Position: position changed independently     Problem: Adult Inpatient Plan of Care  Goal: Absence of Hospital-Acquired Illness or Injury  Intervention: Prevent Infection  Recent Flowsheet Documentation  Taken 3/30/2024 0800 by Elana Guerrero RN  Infection Prevention:   hand hygiene promoted   rest/sleep promoted     Problem: Risk for Delirium  Goal: Optimal Coping  Intervention: Optimize Psychosocial Adjustment to Delirium  Recent Flowsheet Documentation  Taken 3/30/2024 0800 by Elana Guerrero RN  Supportive Measures:   active listening utilized   self-care encouraged   Goal Outcome Evaluation:       Patient denied any pain. Bilateral groin sites clean and dry, open to air. He voided, PVR 80. Discharge information have been given to patient, no questions or concerns. Awaiting family to pick him up.

## 2024-03-30 NOTE — PLAN OF CARE
"  Problem: Adult Inpatient Plan of Care  Goal: Patient-Specific Goal (Individualized)  Description: You can add care plan individualizations to a care plan. Examples of Individualization might be:  \"Parent requests to be called daily at 9am for status\", \"I have a hard time hearing out of my right ear\", or \"Do not touch me to wake me up as it startles  me\".  Outcome: Progressing  Goal: Absence of Hospital-Acquired Illness or Injury  Intervention: Identify and Manage Fall Risk  Recent Flowsheet Documentation  Taken 3/29/2024 1620 by Laurie Zaldivar, RN  Safety Promotion/Fall Prevention:   clutter free environment maintained   lighting adjusted   nonskid shoes/slippers when out of bed  Intervention: Prevent and Manage VTE (Venous Thromboembolism) Risk  Recent Flowsheet Documentation  Taken 3/29/2024 1620 by Laurie Zaldivar, RN  VTE Prevention/Management: SCDs (sequential compression devices) on  Intervention: Prevent Infection  Recent Flowsheet Documentation  Taken 3/29/2024 1620 by Lauire Zaldivar, RN  Infection Prevention: hand hygiene promoted     Problem: Risk for Delirium  Goal: Optimal Coping  Outcome: Progressing  Goal: Improved Behavioral Control  Intervention: Minimize Safety Risk  Recent Flowsheet Documentation  Taken 3/29/2024 1620 by Laurie Zaldivar, RN  Enhanced Safety Measures: pain management     Goal Outcome Evaluation:  Patient is alert and oriented x 4. Patient has patent ta to be removed POD 1 per order. Bilateral groin incisions soft, no drainage. Patient's dorsalis pedis and posterior tibial pulse heard with doppler.                       "

## 2024-03-30 NOTE — PLAN OF CARE
Problem: Gas Exchange Impaired  Goal: Optimal Gas Exchange  Outcome: Progressing   Goal Outcome Evaluation: VSS on 3L oxygen. Patient desatted during sleep. Increased oxygen to 3L=effective.     Problem: Adult Inpatient Plan of Care  Goal: Optimal Comfort and Wellbeing  Intervention: Monitor Pain and Promote Comfort  Recent Flowsheet Documentation  Taken 3/30/2024 0540 by Gudelia Medina RN  Pain Management Interventions: medication (see MAR)  Back pain controlled with scheduled Tylenol.    Bilateral groin incisions approximated with no bleeding. Mo removed about 0545. Bowel sounds good, passing flatus, diet advanced to regular.

## 2024-04-01 ENCOUNTER — PATIENT OUTREACH (OUTPATIENT)
Dept: FAMILY MEDICINE | Facility: CLINIC | Age: 79
End: 2024-04-01
Payer: COMMERCIAL

## 2024-04-01 ENCOUNTER — PATIENT OUTREACH (OUTPATIENT)
Dept: CARE COORDINATION | Facility: CLINIC | Age: 79
End: 2024-04-01
Payer: COMMERCIAL

## 2024-04-01 NOTE — TELEPHONE ENCOUNTER
"ED/Discharge Protocol    \"Hi, my name is Karen Griffith RN, a registered nurse, and I am calling on behalf of Danielle Mercado's office at [facility].  I am calling to follow up and see how things are going for you after your recent visit.\"    \"I see that you were in the (ER/UC/IP) on 3/29/24.    How are you doing now that you are home?\" Feel like truck ran me over,but doing OK.    Is patient experiencing symptoms that may require a hospital visit?  no    Discharge Instructions    \"Let's review your discharge instructions.  What is/are the follow-up recommendations?  Pt. Response: follow up    \"Were you instructed to make a follow-up appointment?\"  Pt. Response: Yes.  Has appointment been made?   Yes      \"When you see the provider, I would recommend that you bring your discharge instructions with you.    Medications    \"How many new medications are you on since your hospitalization/ED visit?\"    0-1  \"How many of your current medicines changed (dose, timing, name, etc.) while you were in the hospital/ED visit?\"   0-1  \"Do you have questions about your medications?\"   No  \"Were you newly diagnosed with heart failure, COPD, diabetes or did you have a heart attack?\"   No  For patients on insulin: \"Did you start on insulin in the hospital or did you have your insulin dose changed?\"   No  Post Discharge Medication Reconciliation Status: discharge medications reconciled, continue medications without change.    Was MTM referral placed (*Make sure to put transitions as reason for referral)?   No    Call Summary    \"Do you have any questions or concerns about your condition or care plan at the moment?\"    No.  Says he has company right now  Triage nurse advice given: call if questions    Patient was in ER 2 in the past year (assess appropriateness of ER visits.)      \"If you have questions or things don't continue to improve, we encourage you contact us through the main clinic number,  [phone].  Even if the clinic is " "not open, triage nurses are available 24/7 to help you.     We would like you to know that our clinic has extended hours (provide information).  We also have urgent care (provide details on closest location and hours/contact info)\"      \"Thank you for your time and take care!\"Karen Griffith RN      "

## 2024-04-01 NOTE — LETTER
[facility] CARE COORDINATION  [address] [facility] [address]    April 1, 2024    Adolfo CRAIG Justice  [address] [facility] [address]      Dear Adolfo,    I am a clinic care coordinator who works with ANTONIO Carranza CNP with the [facility]. I wanted to introduce myself and provide you with my contact information for you to be able to call me with any questions or concerns. I wanted to thank you for spending the time to talk with me.  Below is a description of clinic care coordination and how I can further assist you.       The clinic care coordination team is made up of a registered nurse, , financial resource worker and community health worker who understand the health care system. The goal of clinic care coordination is to help you manage your health and improve access to the health care system. Our team works alongside your provider to assist you in determining your health and social needs. We can help you obtain health care and community resources, providing you with necessary information and education. We can work with you through any barriers and develop a care plan that helps coordinate and strengthen the communication between you and your care team.  Our services are voluntary and are offered without charge to you personally.    Please feel free to contact me with any questions or concerns regarding care coordination and what we can offer.      We are focused on providing you with the highest-quality healthcare experience possible.    Sincerely,     Morteza Rojas MSN, RN, PHN, [facility]   Primary Care Clinical RN Care Coordinator  [facility]  4/1/2024   11:45 AM  [email or web address]  Office: [phone]

## 2024-04-01 NOTE — PROGRESS NOTES
Clinic Care Coordination Contact  [facility]: Post-Discharge Note  SITUATION                                                      Admission:    Admission Date: 03/29/24   Reason for Admission: placement of vascular access,  Infrarenal abdominal aortic aneurysm (AAA) without rupture (H24)  Discharge:   Discharge Date: 03/30/24  Discharge Diagnosis: Infrarenal abdominal aortic aneurysm (AAA) without rupture (H24), placement of stent    BACKGROUND                                                      Per hospital discharge summary and inpatient provider notes:  Infrarenal abdominal aortic aneurysm (AAA) without rupture (H24)     ASSESSMENT           Discharge Assessment  How are you doing now that you are home?: Doing fine from the procedure.  Complains of body aches.  RN CC reviewed with the patient to watch for signs of infection, with fever, redness, warmth, and discharge from the sites used.  How are your symptoms? (Red Flag symptoms escalate to triage hotline per guidelines): Other  Do you feel your condition is stable enough to be safe at home until your provider visit?: Yes  Does the patient have their discharge instructions? : Yes  Does the patient have questions regarding their discharge instructions? : No  Were you started on any new medications or were there changes to any of your previous medications? : No  Does the patient have all of their medications?: Yes  Do you have questions regarding any of your medications? : No  Do you have all of your needed medical supplies or equipment (DME)?  (i.e. oxygen tank, CPAP, cane, etc.): Yes  Discharge follow-up appointment scheduled within 14 calendar days? : No  Is patient agreeable to assistance with scheduling? : No         Post-op (Clinicians Only)  Fever: No  Chills: No  Eating & Drinking: eating and drinking without complaints/concerns  PO Intake: regular diet  Bowel Function: normal  Urinary Status: voiding without complaint/concerns        PLAN                                                       Outpatient Plan:  The patient declines primary care-care coordination.    Future Appointments   Date Time Provider Department Center   4/17/2024  3:00 PM Zack Alanis MD MDKSI [facility]   4/23/2024 10:45 AM Laura Vela PA-C [facility]   4/29/2024 11:40 [facility]   4/29/2024 12:15 PM [facility]iz [facility]         For any urgent concerns, please contact our 24 hour nurse triage line: [phone] ([phone])         Morteza Rojas MSN, RN, PHN, [facility]   Primary Care Clinical RN Care Coordinator  [facility]  4/1/2024   11:49 AM  [email or web address]  Office: [phone]

## 2024-04-04 DIAGNOSIS — J44.9 CHRONIC OBSTRUCTIVE PULMONARY DISEASE, UNSPECIFIED COPD TYPE (H): ICD-10-CM

## 2024-04-05 RX ORDER — OLODATEROL RESPIMAT INHALATION SPRAY 2.5 UG/1
2 SPRAY, METERED RESPIRATORY (INHALATION) DAILY
Qty: 12 G | Refills: 3 | Status: SHIPPED | OUTPATIENT
Start: 2024-04-05

## 2024-04-17 ENCOUNTER — NURSE TRIAGE (OUTPATIENT)
Dept: FAMILY MEDICINE | Facility: CLINIC | Age: 79
End: 2024-04-17

## 2024-04-17 NOTE — TELEPHONE ENCOUNTER
"Reason for Disposition    Major surgery in the past month    Additional Information    Negative: [1] Systolic BP < 80 AND [2] NOT dizzy, lightheaded or weak    Negative: Abdominal pain    Negative: Fever > 100.4 F (38.0 C)    Negative: Started suddenly after an allergic medicine, an allergic food, or bee sting    Negative: Shock suspected (e.g., cold/pale/clammy skin, too weak to stand, low BP, rapid pulse)    Negative: Difficult to awaken or acting confused (e.g., disoriented, slurred speech)    Negative: Fainted    Negative: [1] Systolic BP < 90 AND [2] dizzy, lightheaded, or weak    Negative: Chest pain    Negative: Bleeding (e.g., vomiting blood, rectal bleeding or tarry stools, severe vaginal bleeding)(Exception: Fainted from sight of small amount of blood; small cut or abrasion.)    Negative: Extra heartbeats, irregular heart beating, or heart is beating very fast  (i.e., \"palpitations\")    Negative: Sounds like a life-threatening emergency to the triager    Answer Assessment - Initial Assessment Questions  1. BLOOD PRESSURE: \"What is the blood pressure?\" \"Did you take at least two measurements 5 minutes apart?\"      Yes, pt says BP is 97/66, pulse 92.  Rechecked while on the phone with me and said BP \"same.\"  2. ONSET: \"When did you take your blood pressure?\"      Low BP and feels like \"not very alert\"    3. HOW: \"How did you obtain the blood pressure?\" (e.g., visiting nurse, automatic home BP monitor)      Home BP monitor  4. HISTORY: \"Do you have a history of low blood pressure?\" \"What is your blood pressure normally?\"      No, tended to run high  5. MEDICINES: \"Are you taking any medications for blood pressure?\" If Yes, ask: \"Have they been changed recently?\"         6. PULSE RATE: \"Do you know what your pulse rate is?\"       92  7. OTHER SYMPTOMS: \"Have you been sick recently?\" \"Have you had a recent injury?\"      Yes,  AAA repair at [facility] 16 days ago.  Felt great after surgery but got sick about a " "week ago with \"colicky stomach\" and loose stools off and on.  Pt reports that if he eats anything, it tends to go right through him and have one loose stool per day.    Pt says he is drinking fluids but does wonder if he is not drinking enough fluids.  Pt feels \"dragged out and tired\" and like he is \"not very alert.\"  Feels weak.  Pt says he carried in 2 bags of dog food today and it really wore him out.  Sat down to rest afterward and did regain energy.  Denies irregular heart beat or palpitations.  Denies back or belly pain.  Denies visible blood in stools.  Denies feeling like he might faint.  Denies nausea, vomiting, or burping.  Denies chest pain or chest pressure.  Denies light headed or dizzy.  Denies shortness of breath or breathing difficulties.    8. PREGNANCY: \"Is there any chance you are pregnant?\" \"When was your last menstrual period?\"    Protocols used: Blood Pressure - Low-A-AH    "

## 2024-04-17 NOTE — TELEPHONE ENCOUNTER
"Advised go to ED now.  Pt refused stating \"I really can't do that.\"  Pt asked to be seen in clinic next week?    Reiterated that my advice is that pt be seen in ED now due to low blood pressure reading, feeling weak and fatigued, \"not very alert,\" and recent major surgery 16 days ago of AAA repair.    Pt says he called a friend who is willing to be on alert in case pt needs him.      Pt agrees to keep phone nearby and to call 911 if worsens in any way.  Advised important to be seen in ED tonight for further evaluation.    Pt says he will call in the morning for appt if still not feeling well.    Routed to PCP nurse nurse pool to make welfare call on pt in the morning.    Tiff Paniagua RN     "

## 2024-04-18 NOTE — TELEPHONE ENCOUNTER
"I spoke with pt for update.  Pt says that he \"slept 9 hours last night and I feel like a new man!\"  Pt states that he increased his fluids last night and  believes that he might have been a bit dehydrated.    Pt checked his BP today and it is 122/83, O2sat is 94%.      Head is not \"foggy\" any more.    Pt is drinking fluids and plans to eat something bland now.     Pt will call back if further questions or concerns.    Tiff Paniagua RN     "

## 2024-04-21 NOTE — TELEPHONE ENCOUNTER
Patient Quality Outreach    Patient is due for the following:   Hypertension -  BP check    Next Steps:   Schedule a Annual Wellness Visit    Type of outreach:    Sent [employer] message.      Questions for provider review:    None     Telma Cannon, CMA           Yes

## 2024-04-23 ENCOUNTER — ALLIED HEALTH/NURSE VISIT (OUTPATIENT)
Dept: FAMILY MEDICINE | Facility: CLINIC | Age: 79
End: 2024-04-23
Payer: COMMERCIAL

## 2024-04-23 ENCOUNTER — OFFICE VISIT (OUTPATIENT)
Dept: DERMATOLOGY | Facility: CLINIC | Age: 79
End: 2024-04-23
Payer: COMMERCIAL

## 2024-04-23 ENCOUNTER — OFFICE VISIT (OUTPATIENT)
Dept: DERMATOLOGY | Facility: CLINIC | Age: 79
End: 2024-04-23
Attending: NURSE PRACTITIONER
Payer: COMMERCIAL

## 2024-04-23 ENCOUNTER — TELEPHONE (OUTPATIENT)
Dept: DERMATOLOGY | Facility: CLINIC | Age: 79
End: 2024-04-23

## 2024-04-23 ENCOUNTER — TELEPHONE (OUTPATIENT)
Dept: FAMILY MEDICINE | Facility: CLINIC | Age: 79
End: 2024-04-23

## 2024-04-23 VITALS — OXYGEN SATURATION: 98 % | HEART RATE: 64 BPM | DIASTOLIC BLOOD PRESSURE: 68 MMHG | SYSTOLIC BLOOD PRESSURE: 104 MMHG

## 2024-04-23 DIAGNOSIS — D48.5 NEOPLASM OF UNCERTAIN BEHAVIOR OF SKIN: ICD-10-CM

## 2024-04-23 DIAGNOSIS — L82.1 SEBORRHEIC KERATOSES: ICD-10-CM

## 2024-04-23 DIAGNOSIS — D18.01 CHERRY ANGIOMA: ICD-10-CM

## 2024-04-23 DIAGNOSIS — C44.319 BASAL CELL CARCINOMA (BCC) OF FOREHEAD: Primary | ICD-10-CM

## 2024-04-23 DIAGNOSIS — C44.310 BCC (BASAL CELL CARCINOMA), FACE: Primary | ICD-10-CM

## 2024-04-23 DIAGNOSIS — L57.0 ACTINIC KERATOSIS: ICD-10-CM

## 2024-04-23 DIAGNOSIS — D22.9 MULTIPLE BENIGN NEVI: ICD-10-CM

## 2024-04-23 DIAGNOSIS — L81.4 LENTIGO: ICD-10-CM

## 2024-04-23 DIAGNOSIS — I10 HTN, GOAL BELOW 140/90: Primary | ICD-10-CM

## 2024-04-23 DIAGNOSIS — L21.9 DERMATITIS, SEBORRHEIC: Primary | ICD-10-CM

## 2024-04-23 PROCEDURE — 88331 PATH CONSLTJ SURG 1 BLK 1SPC: CPT | Performed by: DERMATOLOGY

## 2024-04-23 PROCEDURE — 17000 DESTRUCT PREMALG LESION: CPT | Mod: 59 | Performed by: PHYSICIAN ASSISTANT

## 2024-04-23 PROCEDURE — 99203 OFFICE O/P NEW LOW 30 MIN: CPT | Mod: 25 | Performed by: PHYSICIAN ASSISTANT

## 2024-04-23 PROCEDURE — 99207 PR NO CHARGE NURSE ONLY: CPT

## 2024-04-23 PROCEDURE — 11102 TANGNTL BX SKIN SINGLE LES: CPT | Performed by: PHYSICIAN ASSISTANT

## 2024-04-23 PROCEDURE — 17003 DESTRUCT PREMALG LES 2-14: CPT | Performed by: PHYSICIAN ASSISTANT

## 2024-04-23 RX ORDER — CICLOPIROX OLAMINE 7.7 MG/G
CREAM TOPICAL
Qty: 30 G | Refills: 4 | Status: SHIPPED | OUTPATIENT
Start: 2024-04-23

## 2024-04-23 RX ORDER — DESONIDE 0.5 MG/G
CREAM TOPICAL
Qty: 15 G | Refills: 4 | Status: SHIPPED | OUTPATIENT
Start: 2024-04-23

## 2024-04-23 ASSESSMENT — PAIN SCALES - GENERAL: PAINLEVEL: NO PAIN (0)

## 2024-04-23 NOTE — TELEPHONE ENCOUNTER
----- Message from Rik Thurston MD sent at 4/23/2024 11:27 AM CDT -----  R mid forehead basal cell carcinoma schedule excision

## 2024-04-23 NOTE — PROGRESS NOTES
R mid forehead:Orthokeratosis of epidermis with a proliferation of nests of basaloid cells, with peripheral palisading and a haphazard arrangement in the center extending into the dermis, forming infiltrative strands.  The tumor cells have hyperchromatic nuclei. Poor cytoplasm and intercellular bridging.      R mid forehead basal cell carcinoma schedule excision

## 2024-04-23 NOTE — LETTER
4/23/2024         RE: Adolfo Rendon  [address] [place] [facility] [address]        Dear Colleague,    Thank you for referring your patient, Adolfo Rendon, to the [facility]. Please see a copy of my visit note below.    R mid forehead:Orthokeratosis of epidermis with a proliferation of nests of basaloid cells, with peripheral palisading and a haphazard arrangement in the center extending into the dermis, forming infiltrative strands.  The tumor cells have hyperchromatic nuclei. Poor cytoplasm and intercellular bridging.      R mid forehead basal cell carcinoma schedule excision       Again, thank you for allowing me to participate in the care of your patient.        Sincerely,        Rik Thurston MD

## 2024-04-23 NOTE — PROGRESS NOTES
Adolfo Rendon is an extremely pleasant 78 year old year old male patient here today for skin and rash on face. He notes using moisturizers and cortisone which is helping. He notes new brown spots on back. Patient has no other skin complaints today.  Remainder of the HPI, Meds, PMH, Allergies, FH, and SH was reviewed in chart.    Pertinent Hx:   No personal history of skin cancer.   Past Medical History:   Diagnosis Date    AAA (abdominal aortic aneurysm) without rupture (H24)     Congestive heart failure (H)     COPD (chronic obstructive pulmonary disease) (H)     Coronary artery disease     Depressive disorder, not elsewhere classified     HLD (hyperlipidemia)     Hypertension     Hypertrophy (benign) of prostate     Impotence of organic origin     Other and unspecified hyperlipidemia     Other anxiety states     PVD (peripheral vascular disease) (H24)     Tobacco use disorder        Past Surgical History:   Procedure Laterality Date    APPENDECTOMY  1966    BYPASS GRAFT ARTERY CORONARY N/A 9/3/2019    Procedure: CORONARY ARTERY BYPASS GRAFT X 4 - LIMA TO LAD, SV TO PL, OM, PDA ; ON PUMP WITH TERRENCE; ENDOVEIN HARVEST RIGHT LEG;  Surgeon: Lai Mchugh MD;  Location:  OR    COLONOSCOPY  3/1/2005    COLONOSCOPY N/A 5/9/2019    Procedure: COLONOSCOPY;  Surgeon: Camilo Beard MD;  Location: [facility]    CV LEFT HEART CATH N/A 8/8/2019    Procedure: Left Heart Cath--also measure LVEDP;  Surgeon: Krystle Barrera MD;  Location:  HEART CARDIAC CATH LAB    ESTABLISHMENT, VASCULAR ACCESS, FEM APPROACH, FOR ENDOVASC STENT INSERTION INTO ABD AORTIC ANEURYSM Bilateral 3/29/2024    Procedure: BILATERAL ESTABLISHMENT, VASCULAR ACCESS, FEMORAL APPROACH, FOR ENDOVASCULAR STENT INSERTION INTO ABDOMINAL AORTIC ANEURYSM;  Surgeon: Aleksander Ruiz MD;  Location: [facility] OR    HERNIORRHAPHY INGUINAL  7/14/2011    Procedure:HERNIORRHAPHY INGUINAL; Open Repair Right Inguinal Hernia Repair        HYDROCELECTOMY SCROTAL  2004    left hydrocele repair    IR LOWER EXTREMITY ANGIOGRAM BILATERAL  3/29/2024    SUPRAPUBIC PROSTATECTOMY          Family History   Problem Relation Age of Onset    Cerebrovascular Disease Mother     Hypertension Mother     Prostate Cancer Brother     Dementia Brother     Arthritis Sister         rhematoid    Cancer Brother         Lung cancer, lymphoma    Other - See Comments Brother         HIV    Cancer Sister         skin cancer on nose    Pacemaker Sister     Aneurysm Sister         heart       Social History     Socioeconomic History    Marital status:      Spouse name: Not on file    Number of children: Not on file    Years of education: Not on file    Highest education level: Not on file   Occupational History    Not on file   Tobacco Use    Smoking status: Former     Current packs/day: 0.00     Types: Cigarettes     Quit date: 2017     Years since quittin.2    Smokeless tobacco: Former     Quit date: 2017   Vaping Use    Vaping status: Not on file   Substance and Sexual Activity    Alcohol use: No     Alcohol/week: 0.0 standard drinks of alcohol     Comment: quit     Drug use: No    Sexual activity: Not Currently   Other Topics Concern    Parent/sibling w/ CABG, MI or angioplasty before 65F 55M? Not Asked     Service Not Asked    Blood Transfusions Not Asked    Caffeine Concern No     Comment: 2-3 cups of coffee in the morning    Occupational Exposure Not Asked    Hobby Hazards Not Asked    Sleep Concern No     Comment: falls asleep easy but doesn't sleep long    Stress Concern No    Weight Concern No    Special Diet No    Back Care Not Asked    Exercise No     Comment: work: hard labor    Bike Helmet Not Asked    Seat Belt Yes    Self-Exams Not Asked   Social History Narrative    Not on file     Social Determinants of Health     Financial Resource Strain: Low Risk  (2023)    Financial Resource Strain     Within the past 12 months, have  you or your family members you live with been unable to get utilities (heat, electricity) when it was really needed?: No   Food Insecurity: Low Risk  (11/14/2023)    Food Insecurity     Within the past 12 months, did you worry that your food would run out before you got money to buy more?: No     Within the past 12 months, did the food you bought just not last and you didn t have money to get more?: No   Transportation Needs: Low Risk  (11/14/2023)    Transportation Needs     Within the past 12 months, has lack of transportation kept you from medical appointments, getting your medicines, non-medical meetings or appointments, work, or from getting things that you need?: No   Physical Activity: Not on file   Stress: Not on file   Social Connections: Not on file   Interpersonal Safety: Low Risk  (11/14/2023)    Interpersonal Safety     Do you feel physically and emotionally safe where you currently live?: Yes     Within the past 12 months, have you been hit, slapped, kicked or otherwise physically hurt by someone?: No     Within the past 12 months, have you been humiliated or emotionally abused in other ways by your partner or ex-partner?: No   Housing Stability: Low Risk  (11/14/2023)    Housing Stability     Do you have housing? : Yes     Are you worried about losing your housing?: No       Outpatient Encounter Medications as of 4/23/2024   Medication Sig Dispense Refill    ciclopirox (LOPROX) 0.77 % cream Apply daily to areas on brows, nose, cheeks. 30 g 4    desonide (DESOWEN) 0.05 % external cream Apply at bedtime if itchy, inflamed to brows, cheek, nose. 15 g 4    albuterol (PROAIR HFA/PROVENTIL HFA/VENTOLIN HFA) 108 (90 Base) MCG/ACT inhaler Inhale 2 puffs into the lungs every 4 hours as needed for shortness of breath, wheezing or cough 18 g 3    amLODIPine (NORVASC) 10 MG tablet Take 1 tablet (10 mg) by mouth daily 90 tablet 2    aspirin 81 MG EC tablet Take 81 mg by mouth daily      carvedilol (COREG) 12.5 MG  tablet Take 1 tablet (12.5 mg) by mouth 2 times daily (with meals) 180 tablet 2    co-enzyme Q-10 100 MG CAPS capsule Take 100 mg by mouth daily      evolocumab (REPATHA) 140 MG/ML prefilled autoinjector Inject 1 mL (140 mg) Subcutaneous every 14 days 6 mL 3    guaiFENesin (MUCINEX) 600 MG 12 hr tablet Take 1,200 mg by mouth 2 times daily      nicotine polacrilex (NICORETTE) 4 MG gum Place 4 mg inside cheek every hour as needed for smoking cessation      Olodaterol HCl (STRIVERDI RESPIMAT) 2.5 MCG/ACT AERS Inhale 2 puffs into the lungs daily 12 g 3    PARoxetine (PAXIL) 30 MG tablet TAKE 2 TABLETS BY MOUTH once daily in the evening 180 tablet 1    Vitamin D, Cholecalciferol, 25 MCG (1000 UT) TABS Take 1,000 Units by mouth daily        No facility-administered encounter medications on file as of 4/23/2024.             O:   NAD, WDWN, Alert & Oriented, Mood & Affect wnl, Vitals stable   Here today alone   There were no vitals taken for this visit.   General appearance normal   Vitals stable   Alert, oriented and in no acute distress      0.6 cm pink pearly papule on right mid forehead   Stuck on papules and brown macules on trunk and ext   Red papules on trunk  Brown papules and macules with regular pigment network and borders on torso and extremities  Gritty papule on right temple and vertex scalp x 2  Scaly around brows and cheeks, nose    The remainder of skin exam is normal      Eyes: Conjunctivae/lids:Normal     ENT: Lips: normal    MSK:Normal    Cardiovascular: peripheral edema none    Pulm: Breathing Normal    Neuro/Psych: Orientation:Alert and Orientedx3 ; Mood/Affect:normal     A/P:  R/O BCC on right mid forehead   TANGENTIAL BIOPSY SENT OUT:  After consent, anesthesia with LEC and prep, tangential excision performed and specimen sent out for permanent section histology.  No complications and routine wound care. Patient told to call our office in 1-2 weeks for result.      2. Actinic keratosis on vertex scalp  and right temple x 2  LN2:  Treated with LN2 for 5s for 1-2 cycles. Warned risks of blistering, pain, pigment change, scarring, and incomplete resolution.  Advised patient to return if lesions do not completely resolve.  Wound care sheet given.  3. Seborrheic dermatitis   Apply ciclopirox cream in the morning.   Use desonide cream daily if needed inflamed or red.   4. Seborrheic keratosis, lentigo, angioma, benign nevi   It was a pleasure speaking to Adolfo Rendon today.  BENIGN LESIONS DISCUSSED WITH PATIENT:  I discussed the specifics of tumor, prognosis, and genetics of benign lesions.  I explained that treatment of these lesions would be purely cosmetic and not medically neccessary.  I discussed with patient different removal options including excision, cautery and /or laser.      Nature and genetics of benign skin lesions dicussed with patient.  Signs and Symptoms of skin cancer discussed with patient.  ABCDEs of melanoma reviewed with patient.  Patient encouraged to perform monthly skin exams.  UV precautions reviewed with patient.  Risks of non-melanoma skin cancer discussed with patient   Return to clinic pending biopsy results.

## 2024-04-23 NOTE — TELEPHONE ENCOUNTER
Adolfo Rendon is a 78 year old year old patient who comes in today for a Blood Pressure check because of ongoing blood pressure monitoring.     Vital Signs as repeated by /68 p 64     Patient is taking medication as prescribed    Patient is tolerating medications well.    Patient is monitoring Blood Pressure at home.  Average readings if yes are getting lower but unsure if his home bp cuff is reliable. Does not have any bp's from home    Current complaints: 3/31 pt states developed a stomach flu and since then has been feeling slightly weak and when he lifts his arms over his head he gets lightheaded. Bp's at home have been trending lower so wanted a bp check in clinic.   Denies orthostatic hypotension but states has beetles in house and when reaches arms up to get them gets dizzy and needs to sit down. Recent AAA surgery. Denies cp but states has chronic sob.      Pt seeing surgeon on the 29th for follow up     Would you like any follow up with pt?              Disposition:  patient to continue with the same medication and await provider response.        Chang Paredes RN

## 2024-04-23 NOTE — PATIENT INSTRUCTIONS
Wound Care Instructions     FOR SUPERFICIAL WOUNDS     [facility] [phone]    [facility] [phone]                       AFTER 24 HOURS YOU SHOULD REMOVE THE BANDAGE AND BEGIN DAILY DRESSING CHANGES AS FOLLOWS:     1) Remove Dressing.     2) Clean and dry the area with tap water using a Q-tip or sterile gauze pad.     3) Apply Vaseline, Aquaphor, Polysporin ointment or Bacitracin ointment over entire wound.  Do NOT use Neosporin ointment.     4) Cover the wound with a band-aid, or a sterile non-stick gauze pad and micropore paper tape      REPEAT THESE INSTRUCTIONS AT LEAST ONCE A DAY UNTIL THE WOUND HAS COMPLETELY HEALED.    It is an old wives tale that a wound heals better when it is exposed to air and allowed to dry out. The wound will heal faster with a better cosmetic result if it is kept moist with ointment and covered with a bandage.    **Do not let the wound dry out.**      Supplies Needed:      *Cotton tipped applicators (Q-tips)    *Polysporin Ointment or Bacitracin Ointment (NOT NEOSPORIN)    *Band-aids or non-stick gauze pads and micropore paper tape.      PATIENT INFORMATION:    During the healing process you will notice a number of changes. All wounds develop a small halo of redness surrounding the wound.  This means healing is occurring. Severe itching with extensive redness usually indicates sensitivity to the ointment or bandage tape used to dress the wound.  You should call our office if this develops.      Swelling  and/or discoloration around your surgical site is common, particularly when performed around the eye.    All wounds normally drain.  The larger the wound the more drainage there will be.  After 7-10 days, you will notice the wound beginning to shrink and new skin will begin to grow.  The wound is healed when you can see skin has formed over the entire area.  A healed wound has a healthy, shiny look to the surface and is red to dark pink in color  to normalize.  Wounds may take approximately 4-6 weeks to heal.  Larger wounds may take 6-8 weeks.  After the wound is healed you may discontinue dressing changes.    You may experience a sensation of tightness as your wound heals. This is normal and will gradually subside.    Your healed wound may be sensitive to temperature changes. This sensitivity improves with time, but if you re having a lot of discomfort, try to avoid temperature extremes.    Patients frequently experience itching after their wound appears to have healed because of the continue healing under the skin.  Plain Vaseline will help relieve the itching.        POSSIBLE COMPLICATIONS    BLEEDING:    Leave the bandage in place.  Use tightly rolled up gauze or a cloth to apply direct pressure over the bandage for 30  minutes.  Reapply pressure for an additional 30 minutes if necessary  Use additional gauze and tape to maintain pressure once the bleeding has stopped.   WOUND CARE INSTRUCTIONS   FOR CRYOSURGERY   This area treated with liquid nitrogen should form a blister (areas treated may or may not blister-skin may just turn dark and slough off). You do not need to bandage the area unless a blister forms and breaks (which may be a few days). When the blister breaks, begin daily dressing changes as follows:  1) Clean and dry the area with tap water using clean Q-tip or sterile gauze pad.   2) Apply Polysporin ointment or Bacitracin ointment over entire wound. Do NOT use Neosporin ointment.   3) Cover the wound with a band-aid or sterile non-stick gauze pad and micropore paper tape.   REPEAT THESE INSTRUCTIONS AT LEAST ONCE A DAY UNTIL THE WOUND HAS COMPLETELY HEALED.   It is an old wives tale that a wound heals better when it is exposed to air and allowed to dry out. The wound will heal faster with a better cosmetic result if it is kept moist with ointment and covered with a bandage.   Do not let the wound dry out.   IMPORTANT INFORMATION ON REVERSE  SIDE   Supplies Needed:   *Cotton tipped applicators (Q-tips)   *Polysporin ointment or Bacitracin ointment (NOT NEOSPORIN)   *Band-aids, or non stick gauze pads and micropore paper tape   PATIENT INFORMATION   During the healing process you will notice a number of changes. All wounds develop a small halo of redness surrounding the wound. This means healing is occurring. Severe itching with extensive redness usually indicates sensitivity to the ointment or bandage tape used to dress the wound. You should call our office if this develops.   Swelling and/or discoloration around your surgical site is common, particularly when performed around the eye.   All wounds normally drain. The larger the wound the more drainage there will be. After 7-10 days, you will notice the wound beginning to shrink and new skin will begin to grow. The wound is healed when you can see skin has formed over the entire area. A healed wound has a healthy, shiny look to the surface and is red to dark pink in color to normalize. Wounds may take approximately 4-6 weeks to heal. Larger wounds may take 6-8 weeks. After the wound is healed you may discontinue dressing changes.   You may experience a sensation of tightness as your wound heals. This is normal and will gradually subside.   Your healed wound may be sensitive to temperature changes. This sensitivity improves with time, but if you re having a lot of discomfort, try to avoid temperature extremes.   Patients frequently experience itching after their wound appears to have healed because of the continue healing under the skin. Plain Vaseline will help relieve the itching.

## 2024-04-23 NOTE — LETTER
4/23/2024         RE: Adolfo Rendon  [address] [facility] [address]        Dear Colleague,    Thank you for referring your patient, Adolfo Rendon, to the [facility]. Please see a copy of my visit note below.    Adolfo Rendon is an extremely pleasant 78 year old year old male patient here today for skin and rash on face. He notes using moisturizers and cortisone which is helping. He notes new brown spots on back. Patient has no other skin complaints today.  Remainder of the HPI, Meds, PMH, Allergies, FH, and SH was reviewed in chart.    Pertinent Hx:   No personal history of skin cancer.   Past Medical History:   Diagnosis Date     AAA (abdominal aortic aneurysm) without rupture (H24)      Congestive heart failure (H)      COPD (chronic obstructive pulmonary disease) (H)      Coronary artery disease      Depressive disorder, not elsewhere classified      HLD (hyperlipidemia)      Hypertension      Hypertrophy (benign) of prostate      Impotence of organic origin      Other and unspecified hyperlipidemia      Other anxiety states      PVD (peripheral vascular disease) (H24)      Tobacco use disorder        Past Surgical History:   Procedure Laterality Date     APPENDECTOMY  1966     BYPASS GRAFT ARTERY CORONARY N/A 9/3/2019    Procedure: CORONARY ARTERY BYPASS GRAFT X 4 - LIMA TO LAD, SV TO PL, OM, PDA ; ON PUMP WITH TERRENCE; ENDOVEIN HARVEST RIGHT LEG;  Surgeon: Lai Mchugh MD;  Location:  OR     COLONOSCOPY  3/1/2005     COLONOSCOPY N/A 5/9/2019    Procedure: COLONOSCOPY;  Surgeon: Camilo Beard MD;  Location: [facility]     CV LEFT HEART CATH N/A 8/8/2019    Procedure: Left Heart Cath--also measure LVEDP;  Surgeon: Krystle Barrera MD;  Location:  HEART CARDIAC CATH LAB     ESTABLISHMENT, VASCULAR ACCESS, FEM APPROACH, FOR ENDOVASC STENT INSERTION INTO ABD AORTIC ANEURYSM Bilateral 3/29/2024    Procedure: BILATERAL ESTABLISHMENT, VASCULAR ACCESS, FEMORAL  APPROACH, FOR ENDOVASCULAR STENT INSERTION INTO ABDOMINAL AORTIC ANEURYSM;  Surgeon: Aleksander Ruiz MD;  [place] [facility]     HERNIORRHAPHY INGUINAL  2011    Procedure:HERNIORRHAPHY INGUINAL; Open Repair Right Inguinal Hernia Repair        HYDROCELECTOMY SCROTAL  2004    left hydrocele repair     IR LOWER EXTREMITY ANGIOGRAM BILATERAL  3/29/2024     SUPRAPUBIC PROSTATECTOMY          Family History   Problem Relation Age of Onset     Cerebrovascular Disease Mother      Hypertension Mother      Prostate Cancer Brother      Dementia Brother      Arthritis Sister         rhematoid     Cancer Brother         Lung cancer, lymphoma     Other - See Comments Brother         HIV     Cancer Sister         skin cancer on nose     Pacemaker Sister      Aneurysm Sister         heart       Social History     Socioeconomic History     Marital status:      Spouse name: Not on file     Number of children: Not on file     Years of education: Not on file     Highest education level: Not on file   Occupational History     Not on file   Tobacco Use     Smoking status: Former     Current packs/day: 0.00     Types: Cigarettes     Quit date: 2017     Years since quittin.2     Smokeless tobacco: Former     Quit date: 2017   Vaping Use     Vaping status: Not on file   Substance and Sexual Activity     Alcohol use: No     Alcohol/week: 0.0 standard drinks of alcohol     Comment: quit      Drug use: No     Sexual activity: Not Currently   Other Topics Concern     Parent/sibling w/ CABG, MI or angioplasty before 65F 55M? Not Asked      Service Not Asked     Blood Transfusions Not Asked     Caffeine Concern No     Comment: 2-3 cups of coffee in the morning     Occupational Exposure Not Asked     Hobby Hazards Not Asked     Sleep Concern No     Comment: falls asleep easy but doesn't sleep long     Stress Concern No     Weight Concern No     Special Diet No     Back Care Not Asked      Exercise No     Comment: work: hard labor     Bike Helmet Not Asked     Seat Belt Yes     Self-Exams Not Asked   Social History Narrative     Not on file     Social Determinants of Health     Financial Resource Strain: Low Risk  (11/14/2023)    Financial Resource Strain      Within the past 12 months, have you or your family members you live with been unable to get utilities (heat, electricity) when it was really needed?: No   Food Insecurity: Low Risk  (11/14/2023)    Food Insecurity      Within the past 12 months, did you worry that your food would run out before you got money to buy more?: No      Within the past 12 months, did the food you bought just not last and you didn t have money to get more?: No   Transportation Needs: Low Risk  (11/14/2023)    Transportation Needs      Within the past 12 months, has lack of transportation kept you from medical appointments, getting your medicines, non-medical meetings or appointments, work, or from getting things that you need?: No   Physical Activity: Not on file   Stress: Not on file   Social Connections: Not on file   Interpersonal Safety: Low Risk  (11/14/2023)    Interpersonal Safety      Do you feel physically and emotionally safe where you currently live?: Yes      Within the past 12 months, have you been hit, slapped, kicked or otherwise physically hurt by someone?: No      Within the past 12 months, have you been humiliated or emotionally abused in other ways by your partner or ex-partner?: No   Housing Stability: Low Risk  (11/14/2023)    Housing Stability      Do you have housing? : Yes      Are you worried about losing your housing?: No       Outpatient Encounter Medications as of 4/23/2024   Medication Sig Dispense Refill     ciclopirox (LOPROX) 0.77 % cream Apply daily to areas on brows, nose, cheeks. 30 g 4     desonide (DESOWEN) 0.05 % external cream Apply at bedtime if itchy, inflamed to brows, cheek, nose. 15 g 4     albuterol (PROAIR HFA/PROVENTIL  HFA/VENTOLIN HFA) 108 (90 Base) MCG/ACT inhaler Inhale 2 puffs into the lungs every 4 hours as needed for shortness of breath, wheezing or cough 18 g 3     amLODIPine (NORVASC) 10 MG tablet Take 1 tablet (10 mg) by mouth daily 90 tablet 2     aspirin 81 MG EC tablet Take 81 mg by mouth daily       carvedilol (COREG) 12.5 MG tablet Take 1 tablet (12.5 mg) by mouth 2 times daily (with meals) 180 tablet 2     co-enzyme Q-10 100 MG CAPS capsule Take 100 mg by mouth daily       evolocumab (REPATHA) 140 MG/ML prefilled autoinjector Inject 1 mL (140 mg) Subcutaneous every 14 days 6 mL 3     guaiFENesin (MUCINEX) 600 MG 12 hr tablet Take 1,200 mg by mouth 2 times daily       nicotine polacrilex (NICORETTE) 4 MG gum Place 4 mg inside cheek every hour as needed for smoking cessation       Olodaterol HCl (STRIVERDI RESPIMAT) 2.5 MCG/ACT AERS Inhale 2 puffs into the lungs daily 12 g 3     PARoxetine (PAXIL) 30 MG tablet TAKE 2 TABLETS BY MOUTH once daily in the evening 180 tablet 1     Vitamin D, Cholecalciferol, 25 MCG (1000 UT) TABS Take 1,000 Units by mouth daily        No facility-administered encounter medications on file as of 4/23/2024.             O:   NAD, WDWN, Alert & Oriented, Mood & Affect wnl, Vitals stable   Here today alone   There were no vitals taken for this visit.   General appearance normal   Vitals stable   Alert, oriented and in no acute distress      0.6 cm pink pearly papule on right mid forehead   Stuck on papules and brown macules on trunk and ext   Red papules on trunk  Brown papules and macules with regular pigment network and borders on torso and extremities  Gritty papule on right temple and vertex scalp x 2  Scaly around brows and cheeks, nose    The remainder of skin exam is normal      Eyes: Conjunctivae/lids:Normal     ENT: Lips: normal    MSK:Normal    Cardiovascular: peripheral edema none    Pulm: Breathing Normal    Neuro/Psych: Orientation:Alert and Orientedx3 ; Mood/Affect:normal      A/P:  R/O BCC on right mid forehead   TANGENTIAL BIOPSY SENT OUT:  After consent, anesthesia with LEC and prep, tangential excision performed and specimen sent out for permanent section histology.  No complications and routine wound care. Patient told to call our office in 1-2 weeks for result.      2. Actinic keratosis on vertex scalp and right temple x 2  LN2:  Treated with LN2 for 5s for 1-2 cycles. Warned risks of blistering, pain, pigment change, scarring, and incomplete resolution.  Advised patient to return if lesions do not completely resolve.  Wound care sheet given.  3. Seborrheic dermatitis   Apply ciclopirox cream in the morning.   Use desonide cream daily if needed inflamed or red.   4. Seborrheic keratosis, lentigo, angioma, benign nevi   It was a pleasure speaking to Adolfo Rendon today.  BENIGN LESIONS DISCUSSED WITH PATIENT:  I discussed the specifics of tumor, prognosis, and genetics of benign lesions.  I explained that treatment of these lesions would be purely cosmetic and not medically neccessary.  I discussed with patient different removal options including excision, cautery and /or laser.      Nature and genetics of benign skin lesions dicussed with patient.  Signs and Symptoms of skin cancer discussed with patient.  ABCDEs of melanoma reviewed with patient.  Patient encouraged to perform monthly skin exams.  UV precautions reviewed with patient.  Risks of non-melanoma skin cancer discussed with patient   Return to clinic pending biopsy results.       Again, thank you for allowing me to participate in the care of your patient.        Sincerely,        Laura Donahue PA-C

## 2024-04-23 NOTE — NURSING NOTE
Chief Complaint   Patient presents with    Skin Check     Mole check & dermatitis on face        There were no vitals filed for this visit.  Wt Readings from Last 1 Encounters:   03/29/24 63 kg (138 lb 12.8 oz)       Mamta Agarwal LPN .................4/23/2024

## 2024-04-23 NOTE — TELEPHONE ENCOUNTER
Patient Contact    Attempt # 1    Was call answered?  No  Left message for pt to call clinic.      Vika Villarreal MA  [facility]   [phone]

## 2024-04-23 NOTE — LETTER
"April 25, 2024    Adolfo Rendon  [address] [facility] [address]        Dear Adolfo,     You are scheduled for Mohs Surgery on:     Monday May 14 th at 7:45 am.     Please check in at Dermatology Clinic \"[facility]\".   (2nd Floor, last  Clinic on right up staircase or elevator -past OB/GYN clinic)    You don't need to arrive more than 5-10 minutes prior to your appointment time.     Be sure to eat a good breakfast and bathe and wash your hair prior to Surgery.    If you are taking any anti-coagulants that are prescribed by your Doctor (such as Coumadin/warfarin, Plavix, Aspirin, Ibuprofen), please continue taking them.     However, If you are taking anti-coagulants over the counter without  a Doctor's order for a Medical condition, please discontinue them 10 days prior to Surgery.      Please wear loose comfortable clothing as it could possibly be 4-6 hours until your surgery is completed depending upon how many layers of tissue need to be removed.     Wi-fi access is available.     Sincerely,      Rik Thurston MD/ Honey Harris RN            "

## 2024-04-24 NOTE — TELEPHONE ENCOUNTER
Patient Contact    Attempt # 2    Was call answered?  No    Called patient. No answer. Left message to call back. Clinic number was provided.     Mamta Agarwal LPN   [facility]   [phone]

## 2024-04-25 NOTE — TELEPHONE ENCOUNTER
Patient Contact    Attempt # 3    Was call answered?  No.    Called patient. No answer. Left message to call back. Clinic number was provided.     Honey Harris RN    [facility]   [phone]

## 2024-04-26 NOTE — TELEPHONE ENCOUNTER
"Patient Contact    Attempt # 4    Was call answered?  No. And it is noted that Consent to communicate in chart states: \"NONE\" to share info with...    Called patient. No answer. Left message to call back. Clinic number was provided.     Honey Harris RN    [facility]   [phone]    "

## 2024-04-29 ENCOUNTER — OFFICE VISIT (OUTPATIENT)
Dept: VASCULAR SURGERY | Facility: CLINIC | Age: 79
End: 2024-04-29
Attending: SURGERY
Payer: COMMERCIAL

## 2024-04-29 ENCOUNTER — HOSPITAL ENCOUNTER (OUTPATIENT)
Dept: CT IMAGING | Facility: HOSPITAL | Age: 79
Discharge: HOME | End: 2024-04-29
Attending: SURGERY
Payer: COMMERCIAL

## 2024-04-29 VITALS
OXYGEN SATURATION: 96 % | SYSTOLIC BLOOD PRESSURE: 115 MMHG | DIASTOLIC BLOOD PRESSURE: 69 MMHG | HEART RATE: 53 BPM | TEMPERATURE: 97.4 F | RESPIRATION RATE: 12 BRPM

## 2024-04-29 DIAGNOSIS — I71.43 INFRARENAL ABDOMINAL AORTIC ANEURYSM (AAA) WITHOUT RUPTURE (H): Primary | ICD-10-CM

## 2024-04-29 DIAGNOSIS — I71.43 INFRARENAL ABDOMINAL AORTIC ANEURYSM (AAA) WITHOUT RUPTURE (H): ICD-10-CM

## 2024-04-29 PROCEDURE — 74174 CTA ABD&PLVS W/CONTRAST: CPT

## 2024-04-29 PROCEDURE — 250N000011 HC RX IP 250 OP 636: Performed by: SURGERY

## 2024-04-29 PROCEDURE — 250N000009 HC RX 250: Performed by: SURGERY

## 2024-04-29 PROCEDURE — G0463 HOSPITAL OUTPT CLINIC VISIT: HCPCS | Mod: 25 | Performed by: SURGERY

## 2024-04-29 PROCEDURE — 99024 POSTOP FOLLOW-UP VISIT: CPT | Performed by: SURGERY

## 2024-04-29 RX ORDER — IOPAMIDOL 755 MG/ML
90 INJECTION, SOLUTION INTRAVASCULAR ONCE
Status: COMPLETED | OUTPATIENT
Start: 2024-04-29 | End: 2024-04-29

## 2024-04-29 RX ADMIN — SODIUM CHLORIDE 90 ML: 9 INJECTION, SOLUTION INTRAVENOUS at 11:48

## 2024-04-29 RX ADMIN — IOPAMIDOL 90 ML: 755 INJECTION, SOLUTION INTRAVENOUS at 11:43

## 2024-04-29 ASSESSMENT — PAIN SCALES - GENERAL: PAINLEVEL: NO PAIN (0)

## 2024-04-29 NOTE — PATIENT INSTRUCTIONS
This is the plan that was discussed at your appointment.    We will contact you to scheduled your 6 month follow-up appointment with repeat CTA scan        I am including additional information on these things and our contact information if you have any questions or concerns.   Please do not hesitate to reach out to us if you felt we did not answer your questions or you are unsure of the treatment plan after your visit today. Our number is [phone].  Thank you for trusting us with your care.         Again thank you for your time.

## 2024-04-29 NOTE — PROGRESS NOTES
[facility]        Patient is here for a post-op to discuss EVAR 3/39/24. Pt stated he has no pain and had minimal discomfort. CT done prior to visit.     Pt is currently taking Aspirin.    /69   Pulse 53   Temp 97.4  F (36.3  C) (Oral)   Resp 12   SpO2 96%     The provider has been notified that the patient has no concerns.     Questions patient would like addressed today are: Pt would like to know if air was used during surgery.     Refills are needed: N/A    Has homecare services and agency name:  No

## 2024-04-29 NOTE — TELEPHONE ENCOUNTER
"Tried to reach patient again on listed cell, I was able to reach him at listed home number. He stated: \"I don't use my cell phone..\"    Scheduled for MOHS Surgery. Mohs brochure/Pre-op letter sent via US mail.     Honey Harris RN      "

## 2024-05-03 NOTE — PROGRESS NOTES
VASCULAR SURGERY PROGRESS NOTE   VASCULAR SURGEON: Aleksander Ruiz MD, RPVI     LOCATION:  [facility]     Adolfo Rendon   Medical Record #:  [id number]  YOB: 1945  Age:  78 year old     Date of Service: 4/29/2024    PRIMARY CARE PROVIDER: Danielle Mercado      Reason for visit: Follow-up    IMPRESSION: 78-year-old male who is here in vascular surgery clinic for postoperative follow-up.  Patient status post endovascular abdominal arctic aneurysm repair.  Doing well.  CT scan is unremarkable.    RECOMMENDATION/RISKS: Follow-up in 6 months with repeat CTA.    HPI:  Adolfo Rendon is a 78 year old male who was seen today for follow-up.  Patient is doing well and denies any complaints  REVIEW OF SYSTEMS:    A 12 point ROS was reviewed and is negative.     PHH:    Past Medical History:   Diagnosis Date    AAA (abdominal aortic aneurysm) without rupture (H24)     BCC (basal cell carcinoma), face     Congestive heart failure (H)     COPD (chronic obstructive pulmonary disease) (H)     Coronary artery disease     Depressive disorder, not elsewhere classified     HLD (hyperlipidemia)     Hypertension     Hypertrophy (benign) of prostate     Impotence of organic origin     Other and unspecified hyperlipidemia     Other anxiety states     PVD (peripheral vascular disease) (H24)     Tobacco use disorder           Past Surgical History:   Procedure Laterality Date    APPENDECTOMY  1966    BYPASS GRAFT ARTERY CORONARY N/A 9/3/2019    Procedure: CORONARY ARTERY BYPASS GRAFT X 4 - LIMA TO LAD, SV TO PL, OM, PDA ; ON PUMP WITH TERRENCE; ENDOVEIN HARVEST RIGHT LEG;  Surgeon: Lai Mchugh MD;  Location:  OR    COLONOSCOPY  3/1/2005    COLONOSCOPY N/A 5/9/2019    Procedure: COLONOSCOPY;  Surgeon: Camilo Beard MD;  Location: [facility]    CV LEFT HEART CATH N/A 8/8/2019    Procedure: Left Heart Cath--also measure LVEDP;  Surgeon: Krystle Barrera MD;  Location:  HEART CARDIAC CATH LAB     ESTABLISHMENT, VASCULAR ACCESS, FEM APPROACH, FOR ENDOVASC STENT INSERTION INTO ABD AORTIC ANEURYSM Bilateral 3/29/2024    Procedure: BILATERAL ESTABLISHMENT, VASCULAR ACCESS, FEMORAL APPROACH, FOR ENDOVASCULAR STENT INSERTION INTO ABDOMINAL AORTIC ANEURYSM;  Surgeon: Aleksander Ruiz MD;  Location: [facility] OR    HERNIORRHAPHY INGUINAL  7/14/2011    Procedure:HERNIORRHAPHY INGUINAL; Open Repair Right Inguinal Hernia Repair       HYDROCELECTOMY SCROTAL  01/2004    left hydrocele repair    IR LOWER EXTREMITY ANGIOGRAM BILATERAL  3/29/2024    SUPRAPUBIC PROSTATECTOMY         ALLERGIES:  Hctz [hydrochlorothiazide], Loratadine, Metoprolol, Pravastatin, Remeron [mirtazapine], and Wellbutrin [bupropion hcl]    MEDS:    Current Outpatient Medications:     albuterol (PROAIR HFA/PROVENTIL HFA/VENTOLIN HFA) 108 (90 Base) MCG/ACT inhaler, Inhale 2 puffs into the lungs every 4 hours as needed for shortness of breath, wheezing or cough, Disp: 18 g, Rfl: 3    amLODIPine (NORVASC) 10 MG tablet, Take 1 tablet (10 mg) by mouth daily, Disp: 90 tablet, Rfl: 2    aspirin 81 MG EC tablet, Take 81 mg by mouth daily, Disp: , Rfl:     carvedilol (COREG) 12.5 MG tablet, Take 1 tablet (12.5 mg) by mouth 2 times daily (with meals), Disp: 180 tablet, Rfl: 2    ciclopirox (LOPROX) 0.77 % cream, Apply daily to areas on brows, nose, cheeks., Disp: 30 g, Rfl: 4    co-enzyme Q-10 100 MG CAPS capsule, Take 100 mg by mouth daily, Disp: , Rfl:     desonide (DESOWEN) 0.05 % external cream, Apply at bedtime if itchy, inflamed to brows, cheek, nose., Disp: 15 g, Rfl: 4    evolocumab (REPATHA) 140 MG/ML prefilled autoinjector, Inject 1 mL (140 mg) Subcutaneous every 14 days, Disp: 6 mL, Rfl: 3    guaiFENesin (MUCINEX) 600 MG 12 hr tablet, Take 1,200 mg by mouth 2 times daily, Disp: , Rfl:     nicotine polacrilex (NICORETTE) 4 MG gum, Place 4 mg inside cheek every hour as needed for smoking cessation, Disp: , Rfl:     Olodaterol HCl  (STRIVERDI RESPIMAT) 2.5 MCG/ACT AERS, Inhale 2 puffs into the lungs daily, Disp: 12 g, Rfl: 3    PARoxetine (PAXIL) 30 MG tablet, TAKE 2 TABLETS BY MOUTH once daily in the evening, Disp: 180 tablet, Rfl: 1    Vitamin D, Cholecalciferol, 25 MCG (1000 UT) TABS, Take 1,000 Units by mouth daily , Disp: , Rfl:     SOCIAL HABITS:    History   Smoking Status    Former    Types: Cigarettes   Smokeless Tobacco    Former    Quit date: 9/1/2017     Social History    Substance and Sexual Activity      Alcohol use: No        Alcohol/week: 0.0 standard drinks of alcohol        Comment: quit 1989      History   Drug Use No       FAMILY HISTORY:    Family History   Problem Relation Age of Onset    Cerebrovascular Disease Mother     Hypertension Mother     Prostate Cancer Brother     Dementia Brother     Arthritis Sister         rhematoid    Cancer Brother         Lung cancer, lymphoma    Other - See Comments Brother         HIV    Cancer Sister         skin cancer on nose    Pacemaker Sister     Aneurysm Sister         heart       PE:  /69   Pulse 53   Temp 97.4  F (36.3  C) (Oral)   Resp 12   SpO2 96%   Wt Readings from Last 1 Encounters:   03/29/24 63 kg (138 lb 12.8 oz)     There is no height or weight on file to calculate BMI.    EXAM:  GENERAL: This is a well-developed 78 year old male who appears his stated age  EYES: Grossly normal.  MOUTH: Buccal mucosa normal   CARDIAC: Normal   CHEST/LUNG: Clear to auscultation bilaterally  GASTROINTESINAL soft nontender nondistended  MUSCULOSKELETAL: Grossly normal and both lower extremities are intact.  HEME/LYMPH: No lymphedema  NEUROLOGIC: Focally intact, Alert and oriented x 3.   PSYCH: appropriate affect            DIAGNOSTIC STUDIES:     Images:  CTA Abdomen Pelvis with Contrast    Result Date: 4/30/2024  EXAM: CTA ABDOMEN PELVIS WITH CONTRAST LOCATION: [facility] DATE: 4/29/2024 INDICATION:  Infrarenal abdominal aortic aneurysm (AAA) without  rupture (H24) COMPARISON: 2/18/2024 TECHNIQUE: CT angiogram abdomen pelvis during arterial phase of injection of IV contrast. 2D and 3D MIP reconstructions were performed by the CT technologist. Dose reduction techniques were used. CONTRAST: 90ml Isovue 370 FINDINGS: ANGIOGRAM ABDOMEN/PELVIS: There has been interval placement of a bifurcated infrarenal abdominal aortic endograft, iliac limbs terminate in the common iliac arteries bilaterally. Multiphasic imaging reveals no accumulating high attenuation within the sac  to suggest endoleak. Sac diameter is stable measuring 5.5 cm. Iliac limbs terminating the common iliac arteries bilaterally. On the right A2 0.5 cm common iliac aneurysm is excluded with no evidence of endoleak. External iliac and proximal femoral arteries widely patent bilaterally. LOWER CHEST: Emphysematous changes at the lung bases. HEPATOBILIARY: Lobulated/nodular contour to the liver continues suggesting cirrhosis. Stable cyst near the falciform ligament and small area of vascular shunting at the junction of segment 5-6 cm prior examination as well. No new worrisome hepatic lesions. PANCREAS: Normal. SPLEEN: Normal. ADRENAL GLANDS: Normal. KIDNEYS/BLADDER: No significant mass, stone, or hydronephrosis. Tiny cortical cysts requiring no follow-up. BOWEL: Diverticulosis of the colon. No acute inflammatory change. No obstruction. LYMPH NODES: Normal. PELVIC ORGANS: No pelvic masses. MUSCULOSKELETAL: Degenerative changes L4-5. Osseous structures intact.     IMPRESSION: 1.  Interval placement of bifurcated infrarenal abdominal aortic aneurysm. Successful exclusion of the aneurysm sac which is stable in size. Stable excluded right common iliac artery aneurysm. 2.   Other stable findings as cataloged above including cirrhotic contour to the liver and emphysematous change of the lung bases.        LABS:      Sodium   Date Value Ref Range Status   03/30/2024 138 135 - 145 mmol/L Final     Comment:      Reference intervals for this test were updated on 09/26/2023 to more accurately reflect our healthy population. There may be differences in the flagging of prior results with similar values performed with this method. Interpretation of those prior results can be made in the context of the updated reference intervals.    03/22/2024 138 135 - 145 mmol/L Final     Comment:     Reference intervals for this test were updated on 09/26/2023 to more accurately reflect our healthy population. There may be differences in the flagging of prior results with similar values performed with this method. Interpretation of those prior results can be made in the context of the updated reference intervals.    02/18/2024 140 135 - 145 mmol/L Final     Comment:     Reference intervals for this test were updated on 09/26/2023 to more accurately reflect our healthy population. There may be differences in the flagging of prior results with similar values performed with this method. Interpretation of those prior results can be made in the context of the updated reference intervals.    03/23/2021 136 133 - 144 mmol/L Final   02/06/2020 142 133 - 144 mmol/L Final   09/08/2019 138 133 - 144 mmol/L Final     Urea Nitrogen   Date Value Ref Range Status   03/30/2024 16.2 8.0 - 23.0 mg/dL Final   03/22/2024 21.1 8.0 - 23.0 mg/dL Final   02/18/2024 24.4 (H) 8.0 - 23.0 mg/dL Final   10/25/2021 19 7 - 30 mg/dL Final   03/23/2021 19 7 - 30 mg/dL Final   02/06/2020 22 7 - 30 mg/dL Final   09/08/2019 16 7 - 30 mg/dL Final     Hemoglobin   Date Value Ref Range Status   03/30/2024 11.7 (L) 13.3 - 17.7 g/dL Final   03/29/2024 13.5 13.3 - 17.7 g/dL Final   03/22/2024 14.0 13.3 - 17.7 g/dL Final   03/23/2021 13.6 13.3 - 17.7 g/dL Final   09/16/2019 9.3 (L) 13.3 - 17.7 g/dL Final   09/08/2019 10.4 (L) 13.3 - 17.7 g/dL Final     Platelet Count   Date Value Ref Range Status   03/30/2024 126 (L) 150 - 450 10e3/uL Final   03/29/2024 172 150 - 450 10e3/uL Final    03/22/2024 162 150 - 450 10e3/uL Final   03/23/2021 177 150 - 450 10e9/L Final   09/08/2019 171 150 - 450 10e9/L Final   09/06/2019 105 (L) 150 - 450 10e9/L Final     INR   Date Value Ref Range Status   03/30/2024 1.14 0.85 - 1.15 Final   09/03/2019 1.30 (H) 0.86 - 1.14 Final   09/03/2019 1.47 (H) 0.86 - 1.14 Final   08/08/2019 0.96 0.86 - 1.14 Final       30 minutes spent on the day of encounter doing chart review, history and exam, documentation, and further activities as noted.       Aleksander Ruiz MD, [facility]  VASCULAR SURGERY

## 2024-05-14 ENCOUNTER — OFFICE VISIT (OUTPATIENT)
Dept: DERMATOLOGY | Facility: CLINIC | Age: 79
End: 2024-05-14
Payer: COMMERCIAL

## 2024-05-14 DIAGNOSIS — C44.319 BASAL CELL CARCINOMA (BCC) OF FOREHEAD: Primary | ICD-10-CM

## 2024-05-14 PROCEDURE — 17311 MOHS 1 STAGE H/N/HF/G: CPT | Performed by: DERMATOLOGY

## 2024-05-14 PROCEDURE — 13132 CMPLX RPR F/C/C/M/N/AX/G/H/F: CPT | Performed by: DERMATOLOGY

## 2024-05-14 PROCEDURE — 17312 MOHS ADDL STAGE: CPT | Performed by: DERMATOLOGY

## 2024-05-14 ASSESSMENT — PAIN SCALES - GENERAL: PAINLEVEL: NO PAIN (0)

## 2024-05-14 NOTE — PROGRESS NOTES
Adolfo Rendon is an extremely pleasant 78 year old year old male patient here today for evaluation and managment of basal cell carcinoma on right forehead.  Patient has no other skin complaints today.  Remainder of the HPI, Meds, PMH, Allergies, FH, and SH was reviewed in chart.      Past Medical History:   Diagnosis Date    AAA (abdominal aortic aneurysm) without rupture (H24)     BCC (basal cell carcinoma), face     Congestive heart failure (H)     COPD (chronic obstructive pulmonary disease) (H)     Coronary artery disease     Depressive disorder, not elsewhere classified     HLD (hyperlipidemia)     Hypertension     Hypertrophy (benign) of prostate     Impotence of organic origin     Other and unspecified hyperlipidemia     Other anxiety states     PVD (peripheral vascular disease) (H24)     Tobacco use disorder        Past Surgical History:   Procedure Laterality Date    APPENDECTOMY  1966    BYPASS GRAFT ARTERY CORONARY N/A 9/3/2019    Procedure: CORONARY ARTERY BYPASS GRAFT X 4 - LIMA TO LAD, SV TO PL, OM, PDA ; ON PUMP WITH TERRENCE; ENDOVEIN HARVEST RIGHT LEG;  Surgeon: Lai Mchugh MD;  Location:  OR    COLONOSCOPY  3/1/2005    COLONOSCOPY N/A 5/9/2019    Procedure: COLONOSCOPY;  Surgeon: Camilo Beard MD;  Location: [facility]    CV LEFT HEART CATH N/A 8/8/2019    Procedure: Left Heart Cath--also measure LVEDP;  Surgeon: Krystle Barrera MD;  Location:  HEART CARDIAC CATH LAB    ESTABLISHMENT, VASCULAR ACCESS, FEM APPROACH, FOR ENDOVASC STENT INSERTION INTO ABD AORTIC ANEURYSM Bilateral 3/29/2024    Procedure: BILATERAL ESTABLISHMENT, VASCULAR ACCESS, FEMORAL APPROACH, FOR ENDOVASCULAR STENT INSERTION INTO ABDOMINAL AORTIC ANEURYSM;  Surgeon: Aleksander Ruiz MD;  Location: [facility] OR    HERNIORRHAPHY INGUINAL  7/14/2011    Procedure:HERNIORRHAPHY INGUINAL; Open Repair Right Inguinal Hernia Repair       HYDROCELECTOMY SCROTAL  01/2004    left hydrocele repair    IR LOWER  EXTREMITY ANGIOGRAM BILATERAL  3/29/2024    SUPRAPUBIC PROSTATECTOMY          Family History   Problem Relation Age of Onset    Cerebrovascular Disease Mother     Hypertension Mother     Prostate Cancer Brother     Dementia Brother     Arthritis Sister         rhematoid    Cancer Brother         Lung cancer, lymphoma    Other - See Comments Brother         HIV    Cancer Sister         skin cancer on nose    Pacemaker Sister     Aneurysm Sister         heart       Social History     Socioeconomic History    Marital status:      Spouse name: Not on file    Number of children: Not on file    Years of education: Not on file    Highest education level: Not on file   Occupational History    Not on file   Tobacco Use    Smoking status: Former     Current packs/day: 0.00     Types: Cigarettes     Quit date: 2017     Years since quittin.3    Smokeless tobacco: Former     Quit date: 2017   Vaping Use    Vaping status: Not on file   Substance and Sexual Activity    Alcohol use: No     Alcohol/week: 0.0 standard drinks of alcohol     Comment: quit     Drug use: No    Sexual activity: Not Currently   Other Topics Concern    Parent/sibling w/ CABG, MI or angioplasty before 65F 55M? Not Asked     Service Not Asked    Blood Transfusions Not Asked    Caffeine Concern No     Comment: 2-3 cups of coffee in the morning    Occupational Exposure Not Asked    Hobby Hazards Not Asked    Sleep Concern No     Comment: falls asleep easy but doesn't sleep long    Stress Concern No    Weight Concern No    Special Diet No    Back Care Not Asked    Exercise No     Comment: work: hard labor    Bike Helmet Not Asked    Seat Belt Yes    Self-Exams Not Asked   Social History Narrative    Not on file     Social Determinants of Health     Financial Resource Strain: Low Risk  (2023)    Financial Resource Strain     Within the past 12 months, have you or your family members you live with been unable to get utilities  (heat, electricity) when it was really needed?: No   Food Insecurity: Low Risk  (11/14/2023)    Food Insecurity     Within the past 12 months, did you worry that your food would run out before you got money to buy more?: No     Within the past 12 months, did the food you bought just not last and you didn t have money to get more?: No   Transportation Needs: Low Risk  (11/14/2023)    Transportation Needs     Within the past 12 months, has lack of transportation kept you from medical appointments, getting your medicines, non-medical meetings or appointments, work, or from getting things that you need?: No   Physical Activity: Not on file   Stress: Not on file   Social Connections: Not on file   Interpersonal Safety: Low Risk  (11/14/2023)    Interpersonal Safety     Do you feel physically and emotionally safe where you currently live?: Yes     Within the past 12 months, have you been hit, slapped, kicked or otherwise physically hurt by someone?: No     Within the past 12 months, have you been humiliated or emotionally abused in other ways by your partner or ex-partner?: No   Housing Stability: Low Risk  (11/14/2023)    Housing Stability     Do you have housing? : Yes     Are you worried about losing your housing?: No       Outpatient Encounter Medications as of 5/14/2024   Medication Sig Dispense Refill    albuterol (PROAIR HFA/PROVENTIL HFA/VENTOLIN HFA) 108 (90 Base) MCG/ACT inhaler Inhale 2 puffs into the lungs every 4 hours as needed for shortness of breath, wheezing or cough 18 g 3    amLODIPine (NORVASC) 10 MG tablet Take 1 tablet (10 mg) by mouth daily 90 tablet 2    aspirin 81 MG EC tablet Take 81 mg by mouth daily      carvedilol (COREG) 12.5 MG tablet Take 1 tablet (12.5 mg) by mouth 2 times daily (with meals) 180 tablet 2    ciclopirox (LOPROX) 0.77 % cream Apply daily to areas on brows, nose, cheeks. 30 g 4    co-enzyme Q-10 100 MG CAPS capsule Take 100 mg by mouth daily      desonide (DESOWEN) 0.05 %  external cream Apply at bedtime if itchy, inflamed to brows, cheek, nose. 15 g 4    evolocumab (REPATHA) 140 MG/ML prefilled autoinjector Inject 1 mL (140 mg) Subcutaneous every 14 days 6 mL 3    guaiFENesin (MUCINEX) 600 MG 12 hr tablet Take 1,200 mg by mouth 2 times daily      nicotine polacrilex (NICORETTE) 4 MG gum Place 4 mg inside cheek every hour as needed for smoking cessation      Olodaterol HCl (STRIVERDI RESPIMAT) 2.5 MCG/ACT AERS Inhale 2 puffs into the lungs daily 12 g 3    PARoxetine (PAXIL) 30 MG tablet TAKE 2 TABLETS BY MOUTH once daily in the evening 180 tablet 1    Vitamin D, Cholecalciferol, 25 MCG (1000 UT) TABS Take 1,000 Units by mouth daily        No facility-administered encounter medications on file as of 5/14/2024.             O:   NAD, WDWN, Alert & Oriented, Mood & Affect wnl, Vitals stable   General appearance normal   Vitals stable   Alert, oriented and in no acute distress     R forehead 6mm scaly papule       Eyes: Conjunctivae/lids:Normal     ENT: Lips, mucosa: normal    MSK:Normal    Cardiovascular: peripheral edema none    Pulm: Breathing Normal    Neuro/Psych: Orientation:Alert and Orientedx3 ; Mood/Affect:normal       A/P:  R forehead basal cell carcinoma   MOHS:   Location    The rationale for Mohs surgery was discussed with the patient and consent was obtained.  The risks and benefits as well as alternatives to therapy were discussed, in detail.  Specifically, the risks of infection, scarring, bleeding, prolonged wound healing, incomplete removal, allergy to anesthesia, nerve injury and recurrence were addressed.  Indication for Mohs was Location. Prior to the procedure, the treatment site was clearly identified and, if available, confirmed with previous photos and confirmed by the patient   All components of the Universal Protocol/PAUSE rule were completed.  The Mohs surgeon operated in two distinct and integrated capacities as the surgeon and pathologist.      The area was  prepped with Betasept.  A rim of normal appearing skin was marked circumferentially around the lesion.  The area was infiltrated with local anesthesia.  The tumor was first debulked to remove all clinically apparent tumor.  An incision following the standard Mohs approach was done and the specimen was oriented,mapped and placed in 2 block(s).  Each specimen was then chromacoded and processed in the Mohs laboratory using standard Mohs technique and submitted for frozen section histology.  Frozen section analysis showed  residual tumor but CLEAR MARGINS.    1st stage:Orthokeratosis of epidermis with a proliferation of nests of basaloid cells, with peripheral palisading and a haphazard arrangement in the center extending into the dermis, forming nodules.  The tumor cells have hyperchromatic nuclei. Poor cytoplasm and intercellular bridging.      The tumor was excised using standard Mohs technique in 2 stages(s).  CLEAR MARGINS OBTAINED and Final defect size was 1.1 x 1.4 cm.     We discussed the options for wound management in full with the patient including risks/benefits/ possible outcomes.      REPAIR COMPLEX: Because of the tightness of the surrounding skin and Because of the size and full thickness nature of the defect, Because of the tightness of the surrounding skin, To maintain form and function, In order to avoid distortion, and Because of the proximity to the brow, a complex closure was planned. After LE anesthesia and prep, Burow's triangles were excised in the relaxed skin tension lines. The wound edges were widely undermined greater than width of the defect on both sides by dissection in the subcutaneous plane until adequate tissue mobility was obtained. Hemostasis was obtained. The wound edges were closed in a layered fashion using Vicryl and Fast Absorbing Plain Gut sutures. Postoperative length was 4.2 cm.   EBL minimal; complications none; wound care routine.  The patient was discharged in good  condition and will return in one week for wound evaluation.      It was a pleasure speaking to Adolfo Rendon today.  Previous clinic notes and pertinent laboratory tests were reviewed prior to Adolfo Rendon's visit.  Signs and Symptoms of skin cancer discussed with patient.  Patient encouraged to perform monthly skin exams.  UV precautions reviewed with patient.  Risks of non-melanoma skin cancer discussed with patient   Return to clinic 6 months

## 2024-05-14 NOTE — PATIENT INSTRUCTIONS
Sutured Wound Care  forehead    [facility]: [phone]    [facility]: [phone]          No strenuous activity for 48 hours. Resume moderate activity in 48 hours. No heavy exercising until you are seen for follow up in one week.     Take Tylenol as needed for discomfort.                         Do not drink alcoholic beverages for 48 hours.     Keep the pressure bandage in place for 24 hours. If the bandage becomes blood tinged or loose, reinforce it with gauze and tape.        (Refer to the reverse side of this page for management of bleeding).    Remove pressure bandage in 24 hours     Leave the flat bandage in place until your follow up appointment.    Keep the bandage dry. Wash around it carefully.    If the tape becomes soiled or starts to come off, reinforce it with additional paper tape.    Do not smoke for 3 weeks; smoking is detrimental to wound healing.    It is normal to have swelling and bruising around the surgical site. The bruising will fade in approximately 10-14 days. Elevate the area to reduce swelling.    Numbness, itchiness and sensitivity to temperature changes can occur after surgery and may take up to 18 months to normalize.      POSSIBLE COMPLICATIONS    BLEEDING:    Leave the bandage in place.  Use tightly rolled up gauze or a cloth to apply direct pressure over the bandage for 20   minutes.  Reapply pressure for an additional 20 minutes if necessary  Call the office or go to the nearest emergency room if pressure fails to stop the bleeding.  Use additional gauze and tape to maintain pressure once the bleeding has stopped.        PAIN:    Post operative pain should slowly get better, never worse.  A severe increase in pain may indicate a problem. Call the office if this occurs.    In case of emergency phone:Dr Thurston [phone]

## 2024-05-14 NOTE — LETTER
5/14/2024         RE: Adolfo Rendon  [address] [facility] [address]        Dear Colleague,    Thank you for referring your patient, Adolfo Rendon, to the [facility]. Please see a copy of my visit note below.    Adolfo Rendon is an extremely pleasant 78 year old year old male patient here today for evaluation and managment of basal cell carcinoma on right forehead.  Patient has no other skin complaints today.  Remainder of the HPI, Meds, PMH, Allergies, FH, and SH was reviewed in chart.      Past Medical History:   Diagnosis Date     AAA (abdominal aortic aneurysm) without rupture (H24)      BCC (basal cell carcinoma), face      Congestive heart failure (H)      COPD (chronic obstructive pulmonary disease) (H)      Coronary artery disease      Depressive disorder, not elsewhere classified      HLD (hyperlipidemia)      Hypertension      Hypertrophy (benign) of prostate      Impotence of organic origin      Other and unspecified hyperlipidemia      Other anxiety states      PVD (peripheral vascular disease) (H24)      Tobacco use disorder        Past Surgical History:   Procedure Laterality Date     APPENDECTOMY  1966     BYPASS GRAFT ARTERY CORONARY N/A 9/3/2019    Procedure: CORONARY ARTERY BYPASS GRAFT X 4 - LIMA TO LAD, SV TO PL, OM, PDA ; ON PUMP WITH TERRENCE; ENDOVEIN HARVEST RIGHT LEG;  Surgeon: Lai Mchugh MD;  Location:  OR     COLONOSCOPY  3/1/2005     COLONOSCOPY N/A 5/9/2019    Procedure: COLONOSCOPY;  Surgeon: Camilo Beard MD;  Location: [facility]     CV LEFT HEART CATH N/A 8/8/2019    Procedure: Left Heart Cath--also measure LVEDP;  Surgeon: Krystle Barrera MD;  Location:  HEART CARDIAC CATH LAB     ESTABLISHMENT, VASCULAR ACCESS, FEM APPROACH, FOR ENDOVASC STENT INSERTION INTO ABD AORTIC ANEURYSM Bilateral 3/29/2024    Procedure: BILATERAL ESTABLISHMENT, VASCULAR ACCESS, FEMORAL APPROACH, FOR ENDOVASCULAR STENT INSERTION INTO ABDOMINAL  AORTIC ANEURYSM;  Surgeon: Aleksander Ruiz MD;  Location: [facility] OR     HERNIORRHAPHY INGUINAL  2011    Procedure:HERNIORRHAPHY INGUINAL; Open Repair Right Inguinal Hernia Repair        HYDROCELECTOMY SCROTAL  2004    left hydrocele repair     IR LOWER EXTREMITY ANGIOGRAM BILATERAL  3/29/2024     SUPRAPUBIC PROSTATECTOMY          Family History   Problem Relation Age of Onset     Cerebrovascular Disease Mother      Hypertension Mother      Prostate Cancer Brother      Dementia Brother      Arthritis Sister         rhematoid     Cancer Brother         Lung cancer, lymphoma     Other - See Comments Brother         HIV     Cancer Sister         skin cancer on nose     Pacemaker Sister      Aneurysm Sister         heart       Social History     Socioeconomic History     Marital status:      Spouse name: Not on file     Number of children: Not on file     Years of education: Not on file     Highest education level: Not on file   Occupational History     Not on file   Tobacco Use     Smoking status: Former     Current packs/day: 0.00     Types: Cigarettes     Quit date: 2017     Years since quittin.3     Smokeless tobacco: Former     Quit date: 2017   Vaping Use     Vaping status: Not on file   Substance and Sexual Activity     Alcohol use: No     Alcohol/week: 0.0 standard drinks of alcohol     Comment: quit      Drug use: No     Sexual activity: Not Currently   Other Topics Concern     Parent/sibling w/ CABG, MI or angioplasty before 65F 55M? Not Asked      Service Not Asked     Blood Transfusions Not Asked     Caffeine Concern No     Comment: 2-3 cups of coffee in the morning     Occupational Exposure Not Asked     Hobby Hazards Not Asked     Sleep Concern No     Comment: falls asleep easy but doesn't sleep long     Stress Concern No     Weight Concern No     Special Diet No     Back Care Not Asked     Exercise No     Comment: work: hard labor     Bike Helmet  Not Asked     Seat Belt Yes     Self-Exams Not Asked   Social History Narrative     Not on file     Social Determinants of Health     Financial Resource Strain: Low Risk  (11/14/2023)    Financial Resource Strain      Within the past 12 months, have you or your family members you live with been unable to get utilities (heat, electricity) when it was really needed?: No   Food Insecurity: Low Risk  (11/14/2023)    Food Insecurity      Within the past 12 months, did you worry that your food would run out before you got money to buy more?: No      Within the past 12 months, did the food you bought just not last and you didn t have money to get more?: No   Transportation Needs: Low Risk  (11/14/2023)    Transportation Needs      Within the past 12 months, has lack of transportation kept you from medical appointments, getting your medicines, non-medical meetings or appointments, work, or from getting things that you need?: No   Physical Activity: Not on file   Stress: Not on file   Social Connections: Not on file   Interpersonal Safety: Low Risk  (11/14/2023)    Interpersonal Safety      Do you feel physically and emotionally safe where you currently live?: Yes      Within the past 12 months, have you been hit, slapped, kicked or otherwise physically hurt by someone?: No      Within the past 12 months, have you been humiliated or emotionally abused in other ways by your partner or ex-partner?: No   Housing Stability: Low Risk  (11/14/2023)    Housing Stability      Do you have housing? : Yes      Are you worried about losing your housing?: No       Outpatient Encounter Medications as of 5/14/2024   Medication Sig Dispense Refill     albuterol (PROAIR HFA/PROVENTIL HFA/VENTOLIN HFA) 108 (90 Base) MCG/ACT inhaler Inhale 2 puffs into the lungs every 4 hours as needed for shortness of breath, wheezing or cough 18 g 3     amLODIPine (NORVASC) 10 MG tablet Take 1 tablet (10 mg) by mouth daily 90 tablet 2     aspirin 81 MG EC  tablet Take 81 mg by mouth daily       carvedilol (COREG) 12.5 MG tablet Take 1 tablet (12.5 mg) by mouth 2 times daily (with meals) 180 tablet 2     ciclopirox (LOPROX) 0.77 % cream Apply daily to areas on brows, nose, cheeks. 30 g 4     co-enzyme Q-10 100 MG CAPS capsule Take 100 mg by mouth daily       desonide (DESOWEN) 0.05 % external cream Apply at bedtime if itchy, inflamed to brows, cheek, nose. 15 g 4     evolocumab (REPATHA) 140 MG/ML prefilled autoinjector Inject 1 mL (140 mg) Subcutaneous every 14 days 6 mL 3     guaiFENesin (MUCINEX) 600 MG 12 hr tablet Take 1,200 mg by mouth 2 times daily       nicotine polacrilex (NICORETTE) 4 MG gum Place 4 mg inside cheek every hour as needed for smoking cessation       Olodaterol HCl (STRIVERDI RESPIMAT) 2.5 MCG/ACT AERS Inhale 2 puffs into the lungs daily 12 g 3     PARoxetine (PAXIL) 30 MG tablet TAKE 2 TABLETS BY MOUTH once daily in the evening 180 tablet 1     Vitamin D, Cholecalciferol, 25 MCG (1000 UT) TABS Take 1,000 Units by mouth daily        No facility-administered encounter medications on file as of 5/14/2024.             O:   NAD, WDWN, Alert & Oriented, Mood & Affect wnl, Vitals stable   General appearance normal   Vitals stable   Alert, oriented and in no acute distress     R forehead 6mm scaly papule       Eyes: Conjunctivae/lids:Normal     ENT: Lips, mucosa: normal    MSK:Normal    Cardiovascular: peripheral edema none    Pulm: Breathing Normal    Neuro/Psych: Orientation:Alert and Orientedx3 ; Mood/Affect:normal       A/P:  R forehead basal cell carcinoma   MOHS:   Location    The rationale for Mohs surgery was discussed with the patient and consent was obtained.  The risks and benefits as well as alternatives to therapy were discussed, in detail.  Specifically, the risks of infection, scarring, bleeding, prolonged wound healing, incomplete removal, allergy to anesthesia, nerve injury and recurrence were addressed.  Indication for Mohs was  Location. Prior to the procedure, the treatment site was clearly identified and, if available, confirmed with previous photos and confirmed by the patient   All components of the Universal Protocol/PAUSE rule were completed.  The Mohs surgeon operated in two distinct and integrated capacities as the surgeon and pathologist.      The area was prepped with Betasept.  A rim of normal appearing skin was marked circumferentially around the lesion.  The area was infiltrated with local anesthesia.  The tumor was first debulked to remove all clinically apparent tumor.  An incision following the standard Mohs approach was done and the specimen was oriented,mapped and placed in 2 block(s).  Each specimen was then chromacoded and processed in the Mohs laboratory using standard Mohs technique and submitted for frozen section histology.  Frozen section analysis showed  residual tumor but CLEAR MARGINS.    1st stage:Orthokeratosis of epidermis with a proliferation of nests of basaloid cells, with peripheral palisading and a haphazard arrangement in the center extending into the dermis, forming nodules.  The tumor cells have hyperchromatic nuclei. Poor cytoplasm and intercellular bridging.      The tumor was excised using standard Mohs technique in 2 stages(s).  CLEAR MARGINS OBTAINED and Final defect size was 1.1 x 1.4 cm.     We discussed the options for wound management in full with the patient including risks/benefits/ possible outcomes.      REPAIR COMPLEX: Because of the tightness of the surrounding skin and Because of the size and full thickness nature of the defect, Because of the tightness of the surrounding skin, To maintain form and function, In order to avoid distortion, and Because of the proximity to the brow, a complex closure was planned. After LE anesthesia and prep, Burow's triangles were excised in the relaxed skin tension lines. The wound edges were widely undermined greater than width of the defect on both  sides by dissection in the subcutaneous plane until adequate tissue mobility was obtained. Hemostasis was obtained. The wound edges were closed in a layered fashion using Vicryl and Fast Absorbing Plain Gut sutures. Postoperative length was 4.2 cm.   EBL minimal; complications none; wound care routine.  The patient was discharged in good condition and will return in one week for wound evaluation.      It was a pleasure speaking to Adolfo Rendon today.  Previous clinic notes and pertinent laboratory tests were reviewed prior to Adolfo Rendon's visit.  Signs and Symptoms of skin cancer discussed with patient.  Patient encouraged to perform monthly skin exams.  UV precautions reviewed with patient.  Risks of non-melanoma skin cancer discussed with patient   Return to clinic 6 months      Again, thank you for allowing me to participate in the care of your patient.        Sincerely,        Rik Thurston MD

## 2024-05-21 ENCOUNTER — ALLIED HEALTH/NURSE VISIT (OUTPATIENT)
Dept: DERMATOLOGY | Facility: CLINIC | Age: 79
End: 2024-05-21
Payer: COMMERCIAL

## 2024-05-21 DIAGNOSIS — Z48.01 ENCOUNTER FOR CHANGE OR REMOVAL OF SURGICAL WOUND DRESSING: Primary | ICD-10-CM

## 2024-05-21 PROCEDURE — 99207 PR NO CHARGE NURSE ONLY: CPT

## 2024-05-21 NOTE — PATIENT INSTRUCTIONS
WOUND CARE INSTRUCTIONS  for  ONE WEEK AFTER SURGERY          Leave flat bandage on your skin for one week after today s bandage change.  In one week when you remove the bandage, you may resume your regular skin care routine, including washing with mild soap and water, applying moisturizer, make-up and sunscreen.    If there are any open or bleeding areas at the incision/graft site you should begin to cover the area with a bandage daily as follows:    Clean and dry the area with plain tap water using a Q-tip or sterile gauze pad.  Apply Polysporin or Bacitracin ointment to the open area.  Cover the wound with a band-aid or a sterile non-stick gauze pad and micropore paper tape.         SIGNS OF INFECTION  - If you notice any of these signs of infection, call your doctor right away: expanding redness around the wound.  - Yellow or greenish-colored pus or cloudy wound drainage.    - Red streaking spreading from the wound.  - Increased swelling, tenderness, or pain around the wound.   - Fever.    Please remember that yellow and clear drainage from a wound can be normal and related to normal wound healing.  Isolated drainage from a wound without a combination of the above features does not indicate infection.       *Once the bandages are removed, the scar will be red and firm (especially in the lip/chin area). This is normal and will fade in time. It might take 6-12 months for this to happen.     *Massaging the area will help the scar soften and fade quicker. Begin to massage the area one month after the bandages have been removed. To massage apply pressure directly and firmly over the scar with the fingertips and move in a circular motion. Massage the area for a few minutes several times a day. Continue to massage the site for several months.    *Approximately 6-8 weeks after surgery it is not uncommon to see the formation of  tender pimple-like  bump along the scar. This is normal. As the scar continues to mature and  the stitches underneath the skin begin to dissolve, this might occur. Do not pick or squeeze, this will resolve on it s own. Should one break open producing a small amount of drainage, apply Polysporin or Bacitracin ointment a few times a day until the wound is completely healed.    *Numbness in the surgical area is expected. It might take 12-18 months for the feeling to return to normal. During this time sensations of itchiness, tingling and occasional sharp pains might be noted. These feelings are normal and will subside once the nerves have completely healed.         IN CASE OF EMERGENCY: Dr Thurston [phone]     If you were seen in [place] call: [phone]    If you were seen in [place] call: [phone]

## 2024-05-21 NOTE — PROGRESS NOTES
Adolfo Rendon comes into clinic today at the request of Dr. Thurston Ordering Provider for Wound Check Action taken: See Below.    This service provided today was under the supervising provider of the day Dr. Thurston, who was available if needed.    Pt returned to clinic for post surgery 1 week follow up bandage change. Pt has no complaints, denies pain. Bandage removed from right forehead, area cleansed with normal saline. Site is healing and wound edges approximating well. Reapplied new steri strips and paper tape.    Advised to watch for signs/sx of infection; spreading redness, drainage, odor, fever. Call or report promptly to clinic. Pt given written instructions and informed to rtc as needed. Patient verbalized understanding.     Melissa KHANNA,  CMA

## 2024-08-20 NOTE — PROCEDURES
EEG #:         This is a routine EEG on 04/10/2017      PATIENT INFORMATION:  Adolfo Covarrubias is a 71-year-old male who presents for paroxysmal spells.  EEG is being done to evaluate for seizures.  He is on lisinopril, Pravachol, Albuterol, Paxil and aspirin.  No antiepileptic medication.     TECHNICAL SUMMARY: This EEG procedure was performed with 23 scalp electrodes in 10-20 system placements, and additional scalp, precordial and other surface electrodes used for electrical referencing and artifact detection.        BACKGROUND:  The patient has a well-formed parietooccipital rhythm of 9-10 Hz, which is symmetric and reactive, well modulated.  Frontal durations of symmetric beta activity.  There is no focal slowing identified in the awake state.  In sleep, he has well-formed sleep architecture consisting of a mixture of delta-theta slowing, K complexes and vertex waves.  Sleep spindles were seen; he did have sleep spindles in the frontocentral region.      EPILEPTIFORM DISCHARGES:  None.      ICTAL:  None.      IMPRESSION:  This routine EEG is within normal limits.  No electrographic seizures or epileptiform discharges were seen.         MADONNA DENT MD             D: 2017 16:48   T: 2017 22:31   MT: LQ      Name:     ADOLFO COVARRUBIAS   MRN:      [id number]        Account:        [id number]   :      1945           Procedure Date: 04/10/2017      Document: [id number]      
No

## 2024-10-11 NOTE — PATIENT INSTRUCTIONS
Savi Mata,    Thank you for entrusting your care with us today. After your visit today with MD Aleksander Ruiz this is the plan that was discussed at your appointment.    Follow-up in one year with CTA prior.    We will call you closer to that date to schedule.      I am including additional information on these things and our contact information if you have any questions or concerns.   Please do not hesitate to reach out to us if you felt we did not answer your questions or you are unsure of the treatment plan after your visit today. Our number is [phone].Thank you for trusting us with your care.         Again thank you for your time.     Computed Tomography (CT) Scan: About This Test    What is it?  A computed tomography (CT) scan uses X-rays to make detailed pictures of parts of your body and the structures inside your body. During the test, you will lie on a table that is attached to the CT scanner. The CT scanner is a large doughnut-shaped machine.    Why is this test done?  Doctors use CT scans to study areas of the body, such as the brain, chest, belly, spine, bones, or joints. CT scans are also used to assist with or check on the success of a procedure or surgery.    How do you prepare for the test?  In general, there's nothing you have to do before this test, unless your doctor tells you to.    Tell your doctor if you get nervous in tight spaces. You may get a medicine to help you relax. If you think you'll get this medicine, be sure you have someone to take you home.    How is the test done?  Before the test  You may have to take off jewelry.  You will take off all or most of your clothes and change into a gown. If you do leave some clothes on, make sure you take everything out of your pockets.  You may have contrast material (dye) put into your arm through a tube called an IV.    During the test  You will lie on a table that is attached to the CT scanner.  The table slides into the round opening of  the scanner. The table will move during the scan. The scanner moves within the doughnut-shaped casing around your body.  You will be asked to hold still during the scan. You may be asked to hold your breath for short periods.  You may be alone in the scanning room. But a technologist will watch you through a window and talk with you during the test.    How does the test feel?  The test will not cause pain, but some people feel nervous inside the CT scanner.    If a medicine to help you relax (sedative) or dye is used, you may feel a quick sting or pinch when the IV is started. The dye may make you feel warm and flushed and give you a metallic taste in your mouth. Some people feel sick to their stomach or get a headache. Tell the technologist or your doctor how you are feeling.    How long does the test take?  The test will take about 30 to 60 minutes. Most of this time is spent getting ready for the scan. The actual test takes a few minutes.    What happens after the test?  You will probably be able to go home right away.  You can go back to your usual activities right away.  If dye was used, drink plenty of fluids for 24 hours after the test, unless your doctor tells you not to.  Follow-up care is a key part of your treatment and safety. Be sure to make and go to all appointments, and call your doctor if you are having problems. It's also a good idea to keep a list of the medicines you take. Ask your doctor when you can expect to have your test results.    Current as of: December 19, 2022  Author: [employer] Staff  Medical Review:Isaak Bower MD - Family Medicine & REKHA Pacheco MD - Internal Medicine & Eliseo Hunt MD - Family Medicine & Michoacano Bell MD - Family Medicine & Salvador Menon MD - Diagnostic Radiology

## 2024-10-29 ENCOUNTER — HOSPITAL ENCOUNTER (OUTPATIENT)
Dept: CT IMAGING | Facility: CLINIC | Age: 79
Discharge: HOME | End: 2024-10-29
Attending: SURGERY | Admitting: SURGERY
Payer: COMMERCIAL

## 2024-10-29 ENCOUNTER — OFFICE VISIT (OUTPATIENT)
Dept: VASCULAR SURGERY | Facility: CLINIC | Age: 79
End: 2024-10-29
Attending: SURGERY
Payer: COMMERCIAL

## 2024-10-29 VITALS
SYSTOLIC BLOOD PRESSURE: 145 MMHG | HEART RATE: 72 BPM | DIASTOLIC BLOOD PRESSURE: 81 MMHG | TEMPERATURE: 97.8 F | HEIGHT: 68 IN | OXYGEN SATURATION: 95 % | WEIGHT: 147 LBS | BODY MASS INDEX: 22.28 KG/M2

## 2024-10-29 DIAGNOSIS — I71.43 INFRARENAL ABDOMINAL AORTIC ANEURYSM (AAA) WITHOUT RUPTURE (H): Primary | ICD-10-CM

## 2024-10-29 DIAGNOSIS — I71.43 INFRARENAL ABDOMINAL AORTIC ANEURYSM (AAA) WITHOUT RUPTURE (H): ICD-10-CM

## 2024-10-29 LAB
CREAT BLD-MCNC: 1.2 MG/DL (ref 0.7–1.3)
EGFRCR SERPLBLD CKD-EPI 2021: >60 ML/MIN/1.73M2

## 2024-10-29 PROCEDURE — 82565 ASSAY OF CREATININE: CPT

## 2024-10-29 PROCEDURE — 250N000011 HC RX IP 250 OP 636: Performed by: RADIOLOGY

## 2024-10-29 PROCEDURE — 99213 OFFICE O/P EST LOW 20 MIN: CPT | Performed by: SURGERY

## 2024-10-29 PROCEDURE — 74174 CTA ABD&PLVS W/CONTRAST: CPT

## 2024-10-29 PROCEDURE — 250N000009 HC RX 250: Performed by: RADIOLOGY

## 2024-10-29 RX ORDER — IOPAMIDOL 755 MG/ML
72 INJECTION, SOLUTION INTRAVASCULAR ONCE
Status: DISCONTINUED | OUTPATIENT
Start: 2024-10-29 | End: 2024-10-29

## 2024-10-29 RX ORDER — IOPAMIDOL 755 MG/ML
72 INJECTION, SOLUTION INTRAVASCULAR ONCE
Status: COMPLETED | OUTPATIENT
Start: 2024-10-29 | End: 2024-10-29

## 2024-10-29 RX ADMIN — IOPAMIDOL 72 ML: 755 INJECTION, SOLUTION INTRAVENOUS at 13:38

## 2024-10-29 RX ADMIN — SODIUM CHLORIDE 100 ML: 9 INJECTION, SOLUTION INTRAVENOUS at 13:38

## 2024-10-29 NOTE — PROGRESS NOTES
VASCULAR SURGERY PROGRESS NOTE   VASCULAR SURGEON: Aleksander Ruiz MD, RPVI     LOCATION:  [facility]     Adolfo Rendon   Medical Record #:  [id number]  YOB: 1945  Age:  79 year old     Date of Service: 10/29/2024    PRIMARY CARE PROVIDER: Danielle Mercado      Reason for visit: Follow-up    IMPRESSION: 79-year-old male with history of recent infrarenal abdominal aneurysm repair via endovascular approach.  Patient has recovered well.  CT scan is unremarkable with no evidence of endoleak or endograft migration.      RECOMMENDATION/RISKS: Follow-up in 1 year with repeat CT scan.    HPI:  Adolfo Rendon is a 79 year old male who was seen today   REVIEW OF SYSTEMS:    A 12 point ROS was reviewed and is negative .     PHH:    Past Medical History:   Diagnosis Date    AAA (abdominal aortic aneurysm) without rupture (H)     BCC (basal cell carcinoma), face     Congestive heart failure (H)     COPD (chronic obstructive pulmonary disease) (H)     Coronary artery disease     Depressive disorder, not elsewhere classified     HLD (hyperlipidemia)     Hypertension     Hypertrophy (benign) of prostate     Impotence of organic origin     Other and unspecified hyperlipidemia     Other anxiety states     PVD (peripheral vascular disease) (H)     Tobacco use disorder           Past Surgical History:   Procedure Laterality Date    APPENDECTOMY  1966    BYPASS GRAFT ARTERY CORONARY N/A 9/3/2019    Procedure: CORONARY ARTERY BYPASS GRAFT X 4 - LIMA TO LAD, SV TO PL, OM, PDA ; ON PUMP WITH TERRENCE; ENDOVEIN HARVEST RIGHT LEG;  Surgeon: Lai Mchugh MD;  Location:  OR    COLONOSCOPY  3/1/2005    COLONOSCOPY N/A 5/9/2019    Procedure: COLONOSCOPY;  Surgeon: Camilo Beard MD;  Location: [facility]    CV LEFT HEART CATH N/A 8/8/2019    Procedure: Left Heart Cath--also measure LVEDP;  Surgeon: Krystle Barrera MD;  Location:  HEART CARDIAC CATH LAB    ESTABLISHMENT, VASCULAR ACCESS,  FEM APPROACH, FOR ENDOVASC STENT INSERTION INTO ABD AORTIC ANEURYSM Bilateral 3/29/2024    Procedure: BILATERAL ESTABLISHMENT, VASCULAR ACCESS, FEMORAL APPROACH, FOR ENDOVASCULAR STENT INSERTION INTO ABDOMINAL AORTIC ANEURYSM;  Surgeon: Aleksander Ruiz MD;  Location: [facility] OR    HERNIORRHAPHY INGUINAL  7/14/2011    Procedure:HERNIORRHAPHY INGUINAL; Open Repair Right Inguinal Hernia Repair       HYDROCELECTOMY SCROTAL  01/2004    left hydrocele repair    IR LOWER EXTREMITY ANGIOGRAM BILATERAL  3/29/2024    SUPRAPUBIC PROSTATECTOMY         ALLERGIES:  Coreg [carvedilol], Hctz [hydrochlorothiazide], Loratadine, Metoprolol, Pravastatin, Remeron [mirtazapine], and Wellbutrin [bupropion hcl]    MEDS:    Current Outpatient Medications:     albuterol (PROAIR HFA/PROVENTIL HFA/VENTOLIN HFA) 108 (90 Base) MCG/ACT inhaler, Inhale 2 puffs into the lungs every 4 hours as needed for shortness of breath, wheezing or cough, Disp: 18 g, Rfl: 3    amLODIPine (NORVASC) 10 MG tablet, Take 1 tablet (10 mg) by mouth daily, Disp: 90 tablet, Rfl: 2    aspirin 81 MG EC tablet, Take 81 mg by mouth daily, Disp: , Rfl:     ciclopirox (LOPROX) 0.77 % cream, Apply daily to areas on brows, nose, cheeks., Disp: 30 g, Rfl: 4    co-enzyme Q-10 100 MG CAPS capsule, Take 100 mg by mouth daily, Disp: , Rfl:     desonide (DESOWEN) 0.05 % external cream, Apply at bedtime if itchy, inflamed to brows, cheek, nose., Disp: 15 g, Rfl: 4    evolocumab (REPATHA) 140 MG/ML prefilled autoinjector, Inject 1 mL (140 mg) Subcutaneous every 14 days, Disp: 6 mL, Rfl: 3    guaiFENesin (MUCINEX) 600 MG 12 hr tablet, Take 1,200 mg by mouth 2 times daily, Disp: , Rfl:     nicotine polacrilex (NICORETTE) 4 MG gum, Place 4 mg inside cheek every hour as needed for smoking cessation, Disp: , Rfl:     Olodaterol HCl (STRIVERDI RESPIMAT) 2.5 MCG/ACT AERS, Inhale 2 puffs into the lungs daily, Disp: 12 g, Rfl: 3    PARoxetine (PAXIL) 30 MG tablet, TAKE 2  "TABLETS BY MOUTH once daily in the evening, Disp: 180 tablet, Rfl: 1    Vitamin D, Cholecalciferol, 25 MCG (1000 UT) TABS, Take 1,000 Units by mouth daily , Disp: , Rfl:   No current facility-administered medications for this visit.    SOCIAL HABITS:    History   Smoking Status    Former    Types: Cigarettes   Smokeless Tobacco    Former    Quit date: 9/1/2017     Social History    Substance and Sexual Activity      Alcohol use: No        Alcohol/week: 0.0 standard drinks of alcohol        Comment: quit 1989      History   Drug Use No       FAMILY HISTORY:    Family History   Problem Relation Age of Onset    Cerebrovascular Disease Mother     Hypertension Mother     Prostate Cancer Brother     Dementia Brother     Arthritis Sister         rhematoid    Cancer Brother         Lung cancer, lymphoma    Other - See Comments Brother         HIV    Cancer Sister         skin cancer on nose    Pacemaker Sister     Aneurysm Sister         heart       PE:  BP (!) 145/81   Pulse 72   Temp 97.8  F (36.6  C) (Tympanic)   Ht 1.715 m (5' 7.5\")   Wt 66.7 kg (147 lb)   SpO2 95%   BMI 22.68 kg/m    Wt Readings from Last 1 Encounters:   10/29/24 66.7 kg (147 lb)     Body mass index is 22.68 kg/m .    EXAM:  GENERAL: This is a well-developed 79 year old male who appears his stated age  EYES: Grossly normal.  MOUTH: Buccal mucosa normal   CARDIAC: Normal   CHEST/LUNG: Clear to auscultation bilaterally  GASTROINTESINAL soft nontender nondistended  MUSCULOSKELETAL: Grossly normal and both lower extremities are intact.  HEME/LYMPH: No lymphedema  NEUROLOGIC: Focally intact, Alert and oriented x 3.   PSYCH: appropriate affect            DIAGNOSTIC STUDIES:     Images:  CTA Abdomen Pelvis with Contrast    Result Date: 10/29/2024  CTA ABDOMEN AND PELVIS WITH CONTRAST  10/29/2024 2:03 PM HISTORY:  The patient is status post endovascular paracentral of an infrarenal abdominal aortic aneurysm. COMPARISON: CT angiogram dated 2/18/2024 " TECHNIQUE: CT angiogram of the abdomen and pelvis was obtained without and following the administration of 72 cc intravenous contrast. Images are viewed in multiple planes and 3-D reconstructions were also performed. Radiation dose for this scan was reduced using automated exposure control, adjustment of the mA and/or kV according to patient size, or iterative reconstruction technique. FINDINGS: VASCULAR EXAM: Abdominal aorta: There are new changes consistent with endovascular repair of an infrarenal abdominal aortic aneurysm utilizing a bifurcated endograft. The main body and limbs of the endograft are patent without significant stenoses. The iliac arteries and proximal femoral arteries distal to the limbs are patent without significant stenoses. The celiac trunk, superior mesenteric artery, and renal arteries are patent. No definite endoleak is identified. The excluded abdominal aortic aneurysm sac measures approximately 4.7 x 4.3 cm versus 5.5 x 5.2 cm on the prior exam. SOFT TISSUE EXAM: Linear atelectasis and/or scarring in the lower lobes of the lungs. Solid organs in the abdomen are grossly unremarkable. Enhancing focus in the right lobe of liver is unchanged. A few scattered colonic diverticula. No evidence for acute diverticulitis. No pathologic abdominal or pelvic lymphadenopathy.     IMPRESSION: New changes consistent with endovascular appear of an infrarenal abdominal aortic aneurysm. No definite endoleak. Excluded aneurysm sac measures smaller in size.         LABS:      Sodium   Date Value Ref Range Status   03/30/2024 138 135 - 145 mmol/L Final     Comment:     Reference intervals for this test were updated on 09/26/2023 to more accurately reflect our healthy population. There may be differences in the flagging of prior results with similar values performed with this method. Interpretation of those prior results can be made in the context of the updated reference intervals.    03/22/2024 138 135 - 145  mmol/L Final     Comment:     Reference intervals for this test were updated on 09/26/2023 to more accurately reflect our healthy population. There may be differences in the flagging of prior results with similar values performed with this method. Interpretation of those prior results can be made in the context of the updated reference intervals.    02/18/2024 140 135 - 145 mmol/L Final     Comment:     Reference intervals for this test were updated on 09/26/2023 to more accurately reflect our healthy population. There may be differences in the flagging of prior results with similar values performed with this method. Interpretation of those prior results can be made in the context of the updated reference intervals.    03/23/2021 136 133 - 144 mmol/L Final   02/06/2020 142 133 - 144 mmol/L Final   09/08/2019 138 133 - 144 mmol/L Final     Urea Nitrogen   Date Value Ref Range Status   03/30/2024 16.2 8.0 - 23.0 mg/dL Final   03/22/2024 21.1 8.0 - 23.0 mg/dL Final   02/18/2024 24.4 (H) 8.0 - 23.0 mg/dL Final   10/25/2021 19 7 - 30 mg/dL Final   03/23/2021 19 7 - 30 mg/dL Final   02/06/2020 22 7 - 30 mg/dL Final   09/08/2019 16 7 - 30 mg/dL Final     Hemoglobin   Date Value Ref Range Status   03/30/2024 11.7 (L) 13.3 - 17.7 g/dL Final   03/29/2024 13.5 13.3 - 17.7 g/dL Final   03/22/2024 14.0 13.3 - 17.7 g/dL Final   03/23/2021 13.6 13.3 - 17.7 g/dL Final   09/16/2019 9.3 (L) 13.3 - 17.7 g/dL Final   09/08/2019 10.4 (L) 13.3 - 17.7 g/dL Final     Platelet Count   Date Value Ref Range Status   03/30/2024 126 (L) 150 - 450 10e3/uL Final   03/29/2024 172 150 - 450 10e3/uL Final   03/22/2024 162 150 - 450 10e3/uL Final   03/23/2021 177 150 - 450 10e9/L Final   09/08/2019 171 150 - 450 10e9/L Final   09/06/2019 105 (L) 150 - 450 10e9/L Final     INR   Date Value Ref Range Status   03/30/2024 1.14 0.85 - 1.15 Final   09/03/2019 1.30 (H) 0.86 - 1.14 Final   09/03/2019 1.47 (H) 0.86 - 1.14 Final   08/08/2019 0.96 0.86 - 1.14  Final       30 minutes spent on the day of encounter doing chart review, history and exam, documentation, and further activities as noted.         Aleksander Ruiz MD, [facility]  VASCULAR SURGERY

## 2024-11-25 ENCOUNTER — HOSPITAL ENCOUNTER (EMERGENCY)
Facility: CLINIC | Age: 79
Discharge: HOME | End: 2024-11-25
Attending: EMERGENCY MEDICINE | Admitting: EMERGENCY MEDICINE
Payer: COMMERCIAL

## 2024-11-25 ENCOUNTER — APPOINTMENT (OUTPATIENT)
Dept: GENERAL RADIOLOGY | Facility: CLINIC | Age: 79
End: 2024-11-25
Attending: EMERGENCY MEDICINE
Payer: COMMERCIAL

## 2024-11-25 VITALS
HEIGHT: 68 IN | DIASTOLIC BLOOD PRESSURE: 87 MMHG | WEIGHT: 140 LBS | OXYGEN SATURATION: 94 % | TEMPERATURE: 97.8 F | SYSTOLIC BLOOD PRESSURE: 144 MMHG | BODY MASS INDEX: 21.22 KG/M2 | RESPIRATION RATE: 13 BRPM | HEART RATE: 77 BPM

## 2024-11-25 DIAGNOSIS — J44.1 COPD EXACERBATION (H): ICD-10-CM

## 2024-11-25 LAB
ANION GAP SERPL CALCULATED.3IONS-SCNC: 11 MMOL/L (ref 7–15)
ATRIAL RATE - MUSE: 68 BPM
BASOPHILS # BLD AUTO: 0.1 10E3/UL (ref 0–0.2)
BASOPHILS NFR BLD AUTO: 1 %
BUN SERPL-MCNC: 17 MG/DL (ref 8–23)
CALCIUM SERPL-MCNC: 9.5 MG/DL (ref 8.8–10.4)
CHLORIDE SERPL-SCNC: 102 MMOL/L (ref 98–107)
CREAT SERPL-MCNC: 1.03 MG/DL (ref 0.67–1.17)
DIASTOLIC BLOOD PRESSURE - MUSE: NORMAL MMHG
EGFRCR SERPLBLD CKD-EPI 2021: 74 ML/MIN/1.73M2
EOSINOPHIL # BLD AUTO: 0.7 10E3/UL (ref 0–0.7)
EOSINOPHIL NFR BLD AUTO: 10 %
ERYTHROCYTE [DISTWIDTH] IN BLOOD BY AUTOMATED COUNT: 13.9 % (ref 10–15)
GLUCOSE SERPL-MCNC: 96 MG/DL (ref 70–99)
HCO3 SERPL-SCNC: 28 MMOL/L (ref 22–29)
HCT VFR BLD AUTO: 43.5 % (ref 40–53)
HGB BLD-MCNC: 14.6 G/DL (ref 13.3–17.7)
IMM GRANULOCYTES # BLD: 0 10E3/UL
IMM GRANULOCYTES NFR BLD: 0 %
INTERPRETATION ECG - MUSE: NORMAL
LYMPHOCYTES # BLD AUTO: 0.8 10E3/UL (ref 0.8–5.3)
LYMPHOCYTES NFR BLD AUTO: 12 %
MCH RBC QN AUTO: 27.7 PG (ref 26.5–33)
MCHC RBC AUTO-ENTMCNC: 33.6 G/DL (ref 31.5–36.5)
MCV RBC AUTO: 82 FL (ref 78–100)
MONOCYTES # BLD AUTO: 0.5 10E3/UL (ref 0–1.3)
MONOCYTES NFR BLD AUTO: 7 %
NEUTROPHILS # BLD AUTO: 4.6 10E3/UL (ref 1.6–8.3)
NEUTROPHILS NFR BLD AUTO: 70 %
NRBC # BLD AUTO: 0 10E3/UL
NRBC BLD AUTO-RTO: 0 /100
NT-PROBNP SERPL-MCNC: 183 PG/ML (ref 0–1800)
P AXIS - MUSE: 77 DEGREES
PLATELET # BLD AUTO: 175 10E3/UL (ref 150–450)
POTASSIUM SERPL-SCNC: 4.5 MMOL/L (ref 3.4–5.3)
PR INTERVAL - MUSE: 166 MS
QRS DURATION - MUSE: 94 MS
QT - MUSE: 406 MS
QTC - MUSE: 431 MS
R AXIS - MUSE: 70 DEGREES
RBC # BLD AUTO: 5.28 10E6/UL (ref 4.4–5.9)
SODIUM SERPL-SCNC: 141 MMOL/L (ref 135–145)
SYSTOLIC BLOOD PRESSURE - MUSE: NORMAL MMHG
T AXIS - MUSE: 95 DEGREES
VENTRICULAR RATE- MUSE: 68 BPM
WBC # BLD AUTO: 6.6 10E3/UL (ref 4–11)

## 2024-11-25 PROCEDURE — 85025 COMPLETE CBC W/AUTO DIFF WBC: CPT | Performed by: EMERGENCY MEDICINE

## 2024-11-25 PROCEDURE — 76604 US EXAM CHEST: CPT | Mod: 26 | Performed by: EMERGENCY MEDICINE

## 2024-11-25 PROCEDURE — 76604 US EXAM CHEST: CPT

## 2024-11-25 PROCEDURE — 99285 EMERGENCY DEPT VISIT HI MDM: CPT | Mod: 25

## 2024-11-25 PROCEDURE — 93005 ELECTROCARDIOGRAM TRACING: CPT

## 2024-11-25 PROCEDURE — 82565 ASSAY OF CREATININE: CPT | Performed by: EMERGENCY MEDICINE

## 2024-11-25 PROCEDURE — 250N000012 HC RX MED GY IP 250 OP 636 PS 637: Performed by: EMERGENCY MEDICINE

## 2024-11-25 PROCEDURE — 99285 EMERGENCY DEPT VISIT HI MDM: CPT | Mod: 25 | Performed by: EMERGENCY MEDICINE

## 2024-11-25 PROCEDURE — 84520 ASSAY OF UREA NITROGEN: CPT | Performed by: EMERGENCY MEDICINE

## 2024-11-25 PROCEDURE — 71046 X-RAY EXAM CHEST 2 VIEWS: CPT

## 2024-11-25 PROCEDURE — 94640 AIRWAY INHALATION TREATMENT: CPT

## 2024-11-25 PROCEDURE — 250N000009 HC RX 250: Performed by: EMERGENCY MEDICINE

## 2024-11-25 PROCEDURE — 93308 TTE F-UP OR LMTD: CPT | Mod: 26 | Performed by: EMERGENCY MEDICINE

## 2024-11-25 PROCEDURE — 80048 BASIC METABOLIC PNL TOTAL CA: CPT | Performed by: EMERGENCY MEDICINE

## 2024-11-25 PROCEDURE — 83880 ASSAY OF NATRIURETIC PEPTIDE: CPT | Performed by: EMERGENCY MEDICINE

## 2024-11-25 PROCEDURE — 93308 TTE F-UP OR LMTD: CPT

## 2024-11-25 PROCEDURE — 36415 COLL VENOUS BLD VENIPUNCTURE: CPT | Performed by: EMERGENCY MEDICINE

## 2024-11-25 PROCEDURE — 93010 ELECTROCARDIOGRAM REPORT: CPT | Performed by: EMERGENCY MEDICINE

## 2024-11-25 RX ORDER — PREDNISONE 20 MG/1
60 TABLET ORAL ONCE
Status: COMPLETED | OUTPATIENT
Start: 2024-11-25 | End: 2024-11-25

## 2024-11-25 RX ORDER — INHALER, ASSIST DEVICES
1 SPACER (EA) MISCELLANEOUS ONCE
Status: COMPLETED | OUTPATIENT
Start: 2024-11-25 | End: 2024-11-25

## 2024-11-25 RX ORDER — IPRATROPIUM BROMIDE AND ALBUTEROL SULFATE 2.5; .5 MG/3ML; MG/3ML
3 SOLUTION RESPIRATORY (INHALATION) ONCE
Status: COMPLETED | OUTPATIENT
Start: 2024-11-25 | End: 2024-11-25

## 2024-11-25 RX ORDER — AZITHROMYCIN 250 MG/1
TABLET, FILM COATED ORAL
Qty: 6 TABLET | Refills: 0 | Status: SHIPPED | OUTPATIENT
Start: 2024-11-25 | End: 2024-11-30

## 2024-11-25 RX ORDER — PREDNISONE 20 MG/1
40 TABLET ORAL DAILY
Qty: 10 TABLET | Refills: 0 | Status: SHIPPED | OUTPATIENT
Start: 2024-11-25

## 2024-11-25 RX ADMIN — Medication 1 EACH: at 17:03

## 2024-11-25 RX ADMIN — PREDNISONE 60 MG: 20 TABLET ORAL at 14:50

## 2024-11-25 RX ADMIN — IPRATROPIUM BROMIDE AND ALBUTEROL SULFATE 3 ML: 2.5; .5 SOLUTION RESPIRATORY (INHALATION) at 14:50

## 2024-11-25 ASSESSMENT — COLUMBIA-SUICIDE SEVERITY RATING SCALE - C-SSRS
6. HAVE YOU EVER DONE ANYTHING, STARTED TO DO ANYTHING, OR PREPARED TO DO ANYTHING TO END YOUR LIFE?: NO
2. HAVE YOU ACTUALLY HAD ANY THOUGHTS OF KILLING YOURSELF IN THE PAST MONTH?: NO
1. IN THE PAST MONTH, HAVE YOU WISHED YOU WERE DEAD OR WISHED YOU COULD GO TO SLEEP AND NOT WAKE UP?: NO

## 2024-11-25 ASSESSMENT — ACTIVITIES OF DAILY LIVING (ADL)
ADLS_ACUITY_SCORE: 57

## 2024-11-25 NOTE — ED PROVIDER NOTES
"  History     Chief Complaint   Patient presents with    Shortness of Breath     SOA for past 2 days, hx of COPD and CABG      HPI  Adolfo Rendon is a 79 year old male with a history of COPD and coronary artery disease who presents for shortness of breath.  Increasing over the past 2 days.  Cough is nonproductive.  No hemoptysis.  No chest pain.  He feels very winded, is very difficult to lay back.  Needs to sit up straight to breathe.  Denies any nausea, vomiting, abdominal pain.    Allergies:  Allergies   Allergen Reactions    Coreg [Carvedilol] Other (See Comments) and Difficulty breathing     Depression, Couldn't think, felt miserable per patient.    Hctz [Hydrochlorothiazide] Fatigue     \"felt like zombie\"    Loratadine      Mood , irritablity     Metoprolol Fatigue     \"felt like zombie\"    Pravastatin Fatigue     \"felt like zombie\"    Remeron [Mirtazapine]      Agitation       Wellbutrin [Bupropion Hcl]      Sig mood issues          Problem List:    Patient Active Problem List    Diagnosis Date Noted    Infrarenal abdominal aortic aneurysm (AAA) without rupture (H) 03/29/2024     Priority: Medium    Personal history of prostate cancer 11/14/2022     Priority: Medium    H/O cardiomyopathy 02/13/2020     Priority: Medium    Fluid overload 09/09/2019     Priority: Medium    Anemia due to blood loss, acute 09/09/2019     Priority: Medium    Transient hyperglycemia post procedure 09/09/2019     Priority: Medium    S/P CABG x 4 09/03/2019     Priority: Medium    Benign essential hypertension 08/16/2019     Priority: Medium    Ischemic cardiomyopathy 08/16/2019     Priority: Medium    Status post coronary angiogram 08/08/2019     Priority: Medium    Coronary artery disease involving native coronary artery of native heart without angina pectoris 08/01/2019     Priority: Medium     Added automatically from request for surgery [id number]      Chest pain 08/01/2019     Priority: Medium     Added automatically from " request for surgery [id number]      Diverticulosis of large intestine 05/19/2019     Priority: Medium     Colonoscopy 5/2019      Unilateral atherosclerotic renal artery stenosis (H) 03/13/2018     Priority: Medium     Greater than 50% stenosis of the proximal right renal artery.      Pulmonary nodules 03/13/2018     Priority: Medium     Groundglass nodule measures 6 mm in the right upper lobe.  Recommendations for an incidental lung nodule = or > 6 mm to 8 mm:    Low risk patients: Initial follow-up CT at 6-12 months, then  consider CT at 18-24 months if no change.    High risk patients: Initial follow-up CT at 6-12 months, then CT at  18-24 months if no change.     *Low Risk: Minimal or absent history of smoking or other known risk  factors.  *Nonsolid (ground-glass) or partly solid nodules may require longer  follow-up to exclude indolent adenocarcinoma.  *Recommendations based on Guidelines for the Management of Incidental  Pulmonary Nodules       Aortic arch aneurysm (H) 03/06/2018     Priority: Medium    Thoracic aortic aneurysm without rupture (H) 01/13/2017     Priority: Medium     4 cm  1/2017      Centrilobular emphysema (H) 01/13/2017     Priority: Medium     1/2017 CT chest      HTN, goal below 140/90 07/12/2013     Priority: Medium    Generalized anxiety disorder 07/13/2011     Priority: Medium     Diagnosis updated by automated process. Provider to review and confirm.      Right inguinal hernia 07/13/2011     Priority: Medium    Muscle pain 04/20/2011     Priority: Medium     (Problem list name updated by automated process. Provider to review and confirm.)      Tobacco abuse 04/20/2011     Priority: Medium    SANTIAGO (dyspnea on exertion) 04/20/2011     Priority: Medium     More notable in last yr       COPD (chronic obstructive pulmonary disease) (H) 03/24/2011     Priority: Medium    Hyperlipidemia LDL goal <70 10/31/2010     Priority: Medium    Major depressive disorder, recurrent episode, severe (H)  07/07/2009     Priority: Medium     History as teen. On and off treated    Aa x 21 yrs as of 2009  Problem list name updated by automated process. Provider to review      HL (hearing loss) 07/07/2009     Priority: Medium    Benign neoplasm of epididymis 02/07/2007     Priority: Medium    MIXED HYPERLIPIDEMIA 10/24/2006     Priority: Medium        Past Medical History:    Past Medical History:   Diagnosis Date    AAA (abdominal aortic aneurysm) without rupture (H)     BCC (basal cell carcinoma), face     Congestive heart failure (H)     COPD (chronic obstructive pulmonary disease) (H)     Coronary artery disease     Depressive disorder, not elsewhere classified     HLD (hyperlipidemia)     Hypertension     Hypertrophy (benign) of prostate     Impotence of organic origin     Other and unspecified hyperlipidemia     Other anxiety states     PVD (peripheral vascular disease) (H)     Tobacco use disorder        Past Surgical History:    Past Surgical History:   Procedure Laterality Date    APPENDECTOMY  1966    BYPASS GRAFT ARTERY CORONARY N/A 9/3/2019    Procedure: CORONARY ARTERY BYPASS GRAFT X 4 - LIMA TO LAD, SV TO PL, OM, PDA ; ON PUMP WITH TERRENCE; ENDOVEIN HARVEST RIGHT LEG;  Surgeon: Lai Mchugh MD;  Location:  OR    COLONOSCOPY  3/1/2005    COLONOSCOPY N/A 5/9/2019    Procedure: COLONOSCOPY;  Surgeon: Camilo Beard MD;  Location: [facility]    CV LEFT HEART CATH N/A 8/8/2019    Procedure: Left Heart Cath--also measure LVEDP;  Surgeon: Krystle Barrera MD;  Location:  HEART CARDIAC CATH LAB    ESTABLISHMENT, VASCULAR ACCESS, FEM APPROACH, FOR ENDOVASC STENT INSERTION INTO ABD AORTIC ANEURYSM Bilateral 3/29/2024    Procedure: BILATERAL ESTABLISHMENT, VASCULAR ACCESS, FEMORAL APPROACH, FOR ENDOVASCULAR STENT INSERTION INTO ABDOMINAL AORTIC ANEURYSM;  Surgeon: Aleksander Ruiz MD;  Location: [facility]    HERNIORRHAPHY INGUINAL  7/14/2011    Procedure:HERNIORRHAPHY INGUINAL; Open  "Repair Right Inguinal Hernia Repair       HYDROCELECTOMY SCROTAL  2004    left hydrocele repair    IR LOWER EXTREMITY ANGIOGRAM BILATERAL  3/29/2024    SUPRAPUBIC PROSTATECTOMY         Family History:    Family History   Problem Relation Age of Onset    Cerebrovascular Disease Mother     Hypertension Mother     Prostate Cancer Brother     Dementia Brother     Arthritis Sister         rhematoid    Cancer Brother         Lung cancer, lymphoma    Other - See Comments Brother         HIV    Cancer Sister         skin cancer on nose    Pacemaker Sister     Aneurysm Sister         heart       Social History:  Marital Status:   [5]  Social History     Tobacco Use    Smoking status: Former     Current packs/day: 0.00     Types: Cigarettes     Quit date: 2017     Years since quittin.8    Smokeless tobacco: Former     Quit date: 2017   Substance Use Topics    Alcohol use: No     Alcohol/week: 0.0 standard drinks of alcohol     Comment: quit     Drug use: No        Medications:    azithromycin (ZITHROMAX) 250 MG tablet  predniSONE (DELTASONE) 20 MG tablet  albuterol (PROAIR HFA/PROVENTIL HFA/VENTOLIN HFA) 108 (90 Base) MCG/ACT inhaler  amLODIPine (NORVASC) 10 MG tablet  aspirin 81 MG EC tablet  ciclopirox (LOPROX) 0.77 % cream  co-enzyme Q-10 100 MG CAPS capsule  desonide (DESOWEN) 0.05 % external cream  evolocumab (REPATHA) 140 MG/ML prefilled autoinjector  guaiFENesin (MUCINEX) 600 MG 12 hr tablet  nicotine polacrilex (NICORETTE) 4 MG gum  Olodaterol HCl (STRIVERDI RESPIMAT) 2.5 MCG/ACT AERS  PARoxetine (PAXIL) 30 MG tablet  Vitamin D, Cholecalciferol, 25 MCG (1000 UT) TABS          Review of Systems    Physical Exam   BP: (!) 147/91  Pulse: 81  Temp: 97.8  F (36.6  C)  Resp: 28  Height: 172.7 cm (5' 8\")  Weight: 63.5 kg (140 lb)  SpO2: 93 %      Physical Exam  Constitutional:       General: He is in acute distress.      Appearance: He is well-developed.   HENT:      Head: Normocephalic and " atraumatic.   Cardiovascular:      Rate and Rhythm: Normal rate.   Pulmonary:      Effort: Tachypnea and respiratory distress present.      Breath sounds: No stridor. Decreased breath sounds present.   Skin:     General: Skin is warm and dry.   Neurological:      Mental Status: He is alert.         ED Course        Procedures    Results for orders placed during the hospital encounter of 11/25/24    POC US ECHO LIMITED    Impression  [facility] Procedure Note    Limited Bedside ED Cardiac Ultrasound:    PROCEDURE: PERFORMED BY: Dr. Reed Haney MD  INDICATIONS/SYMPTOM:  Shortness of Breath  PROBE: Cardiac phased array probe  BODY LOCATION: Chest  FINDINGS:  The ultrasound was performed utilizing the parasternal long axis and parasternal short axis views.  Cardiac contractility:  Present  Gross estimation of cardiac kinesis: normal  Pericardial Effusion:  None  RV:LV ratio: LV > RV  INTERPRETATION:    Chamber size and motion were grossly normal with LV > RV, normal cardiac kinesis.  No pericardial effusion was found.  IMAGE DOCUMENTATION: Images were archived to PACs system.    [facility] Procedure Note    Limited Bedside ED Ultrasound of Thorax:    PROCEDURE: PERFORMED BY: Dr. Reed Haney MD  INDICATIONS/SYMPTOM:  shortness of breath  PROBE: High frequency linear probe  BODY LOCATION: Chest  FINDINGS:  Images of both lung hemithoracies taken in 2D in multiple rib spaces    Right side:  Lung sliding artifact  Present  Comet tail artifacts  Present  Left side:  Lung sliding artifact  Present  Comet tail artifacts  Present  Hemothorax: Right side Absent  Left side Absent  Pleural effusion: Right side Absent  Left side Absent    INTERPRETATION: The exam was normal. There was no free fluid identified in the chest cavity. No evidence of pneumothorax, hemothorax or pleural effusion.  IMAGE DOCUMENTATION: Images were archived to PACs system.            EKG Interpretation:      Interpreted by  Reed Haney MD  Time reviewed: 1447  Symptoms at time of EKG: dyspnea   Rhythm: normal sinus   Rate: normal  Axis: normal  Ectopy: none  Conduction: normal  ST Segments/ T Waves: No ST-T wave changes  Q Waves: none  Comparison to prior: Unchanged    Clinical Impression: normal EKG      Critical Care time:  none              Results for orders placed or performed during the hospital encounter of 11/25/24 (from the past 24 hours)   POC US ECHO LIMITED    Impression    [facility] Procedure Note      Limited Bedside ED Cardiac Ultrasound:    PROCEDURE: PERFORMED BY: Dr. Reed Haney MD  INDICATIONS/SYMPTOM:  Shortness of Breath  PROBE: Cardiac phased array probe  BODY LOCATION: Chest  FINDINGS:   The ultrasound was performed utilizing the parasternal long axis and parasternal short axis views.  Cardiac contractility:  Present  Gross estimation of cardiac kinesis: normal  Pericardial Effusion:  None  RV:LV ratio: LV > RV  INTERPRETATION:    Chamber size and motion were grossly normal with LV > RV, normal cardiac kinesis.  No pericardial effusion was found.    IMAGE DOCUMENTATION: Images were archived to PACs system.       [facility] Procedure Note      Limited Bedside ED Ultrasound of Thorax:    PROCEDURE: PERFORMED BY: Dr. Reed Haney MD  INDICATIONS/SYMPTOM:  shortness of breath  PROBE: High frequency linear probe  BODY LOCATION: Chest  FINDINGS:  Images of both lung hemithoracies taken in 2D in multiple rib spaces        Right side:  Lung sliding artifact  Present     Comet tail artifacts  Present   Left side:  Lung sliding artifact  Present     Comet tail artifacts  Present   Hemothorax: Right side Absent     Left side Absent   Pleural effusion: Right side Absent      Left side Absent    INTERPRETATION: The exam was normal. There was no free fluid identified in the chest cavity. No evidence of pneumothorax, hemothorax or pleural effusion.  IMAGE DOCUMENTATION: Images were  archived to PACs system.        EKG 12-lead, tracing only   Result Value Ref Range    Systolic Blood Pressure  mmHg    Diastolic Blood Pressure  mmHg    Ventricular Rate 68 BPM    Atrial Rate 68 BPM    TN Interval 166 ms    QRS Duration 94 ms     ms    QTc 431 ms    P Axis 77 degrees    R AXIS 70 degrees    T Axis 95 degrees    Interpretation ECG       Sinus rhythm  Nonspecific T wave abnormality  Abnormal ECG  When compared with ECG of 07-Sep-2019 06:48,  Nonspecific T wave abnormality no longer evident in Inferior leads  Confirmed by SEE ED PROVIDER NOTE FOR, ECG INTERPRETATION ([id number]),  Kendra Garcia ([id number]) on 11/25/2024 2:56:15 PM     Nt probnp inpatient   Result Value Ref Range    N terminal Pro BNP Inpatient 183 0 - 1,800 pg/mL   Basic metabolic panel   Result Value Ref Range    Sodium 141 135 - 145 mmol/L    Potassium 4.5 3.4 - 5.3 mmol/L    Chloride 102 98 - 107 mmol/L    Carbon Dioxide (CO2) 28 22 - 29 mmol/L    Anion Gap 11 7 - 15 mmol/L    Urea Nitrogen 17.0 8.0 - 23.0 mg/dL    Creatinine 1.03 0.67 - 1.17 mg/dL    GFR Estimate 74 >60 mL/min/1.73m2    Calcium 9.5 8.8 - 10.4 mg/dL    Glucose 96 70 - 99 mg/dL   CBC with Platelets & Differential    Narrative    The following orders were created for panel order CBC with Platelets & Differential.  Procedure                               Abnormality         Status                     ---------                               -----------         ------                     CBC with platelets and d...[[id number]]                      Final result                 Please view results for these tests on the individual orders.   CBC with platelets and differential   Result Value Ref Range    WBC Count 6.6 4.0 - 11.0 10e3/uL    RBC Count 5.28 4.40 - 5.90 10e6/uL    Hemoglobin 14.6 13.3 - 17.7 g/dL    Hematocrit 43.5 40.0 - 53.0 %    MCV 82 78 - 100 fL    MCH 27.7 26.5 - 33.0 pg    MCHC 33.6 31.5 - 36.5 g/dL    RDW 13.9 10.0 - 15.0 %    Platelet Count 175 150 -  450 10e3/uL    % Neutrophils 70 %    % Lymphocytes 12 %    % Monocytes 7 %    % Eosinophils 10 %    % Basophils 1 %    % Immature Granulocytes 0 %    NRBCs per 100 WBC 0 <1 /100    Absolute Neutrophils 4.6 1.6 - 8.3 10e3/uL    Absolute Lymphocytes 0.8 0.8 - 5.3 10e3/uL    Absolute Monocytes 0.5 0.0 - 1.3 10e3/uL    Absolute Eosinophils 0.7 0.0 - 0.7 10e3/uL    Absolute Basophils 0.1 0.0 - 0.2 10e3/uL    Absolute Immature Granulocytes 0.0 <=0.4 10e3/uL    Absolute NRBCs 0.0 10e3/uL   XR Chest 2 Views    Narrative    CHEST TWO VIEWS November 25, 2024 3:43 PM     HISTORY: Shortness of breath.    COMPARISON: 9/9/2019.      Impression    IMPRESSION: No acute cardiopulmonary disease.    MYRNA WHALEN MD         SYSTEM ID:  [id number]       Medications   ipratropium - albuterol 0.5 mg/2.5 mg/3 mL (DUONEB) neb solution 3 mL (3 mLs Nebulization $Given 11/25/24 1450)   predniSONE (DELTASONE) tablet 60 mg (60 mg Oral $Given 11/25/24 1450)   ipratropium - albuterol 0.5 mg/2.5 mg/3 mL (DUONEB) neb solution 3 mL (3 mLs Nebulization Not Given 11/25/24 1658)   aerochamber with mouthpiece (NO mask) - > 5 years 1 each (1 each Inhalation $Given 11/25/24 1703)       Assessments & Plan (with Medical Decision Making)   79-year-old male with a history of COPD and coronary artery disease who presents for increasing shortness of breath and cough.  He has Rester distress on exam, is able to speak only short sentences, 2-3 words at a time.  He is tachypneic and has decreased breath sounds throughout his lungs.  Bedside ultrasound is negative for signs of acute pulmonary edema or heart failure.  No pericardial effusion.  He is given DuoNeb and prednisone.  EKG shows sinus rhythm without signs of acute ischemia.  NT-proBNP is normal, no signs of heart failure.  Electrolytes are within normal limits.  Hemoglobin is normal, no signs of anemia.  Chest x-ray obtained, images interpreted independently as well as radiology reviewed, no signs of  pneumonia.  On recheck the patient is feeling much better and breathing comfortably.  He has much better air movement.  He was observed in the emergency department for several hours and continued to do well here.  He is able to ambulate around the emergency department retaining oxygen saturations above 90%.  Hospitalization was strongly considered however with the patient feeling better now believe he is safe to discharge home and he would much rather go home.  He is given a prescription for prednisone and azithromycin, I have also given him a spacer to help with his inhaler medication delivery.  He is told to return if worse, otherwise follow-up in clinic.  The patient is in agreement to this plan.    I have reviewed the nursing notes.    I have reviewed the findings, diagnosis, plan and need for follow up with the patient.         Discharge Medication List as of 11/25/2024  4:44 PM        START taking these medications    Details   azithromycin (ZITHROMAX) 250 MG tablet Take 2 tablets (500 mg) by mouth daily for 1 day, THEN 1 tablet (250 mg) daily for 4 days., Disp-6 tablet, R-0, E-Prescribe      predniSONE (DELTASONE) 20 MG tablet Take 2 tablets (40 mg) by mouth daily., Disp-10 tablet, R-0, E-Prescribe             Final diagnoses:   COPD exacerbation (H)       11/25/2024   [facility] EMERGENCY DEPT       Reed Haney MD  11/25/24 [id number]

## 2024-11-25 NOTE — ED TRIAGE NOTES
Pt presents to ED with c/o SOA and some chest pain with deep breathing. SOA present for past 2 days. Hx of COPD and CABG.      Triage Assessment (Adult)       Row Name 11/25/24 1313          Triage Assessment    Airway WDL WDL        Respiratory WDL    Respiratory WDL X;rhythm/pattern     Rhythm/Pattern, Respiratory dyspnea upon exertion;shortness of breath        Cardiac WDL    Cardiac WDL X;chest pain        Chest Pain Assessment    Chest Pain Location midsternal     Chest Pain Intervention 12-lead ECG obtained;activity minimized        Cognitive/Neuro/Behavioral WDL    Cognitive/Neuro/Behavioral WDL WDL

## 2024-11-25 NOTE — ED NOTES
Patient ambulated well with his oxygen staying between 90% and 92%. His heart rate remained between 90 to 92 beats per minute during the same period of ambulation.

## 2024-11-25 NOTE — DISCHARGE INSTRUCTIONS
Use the spacer with your inhaler every time to make sure that the medicine actually gets to your lungs.  Take azithromycin and prednisone over the next 5 days to help with your symptoms.  Return if you have any worsening of your symptoms or other concerns.  If not feeling better over the next 3 to 4 days get rechecked.

## 2024-11-26 ENCOUNTER — PATIENT OUTREACH (OUTPATIENT)
Dept: CARE COORDINATION | Facility: CLINIC | Age: 79
End: 2024-11-26
Payer: COMMERCIAL

## 2024-11-26 NOTE — PROGRESS NOTES
[facility]  Community Health Worker Initial Outreach    CHW Initial Information Gathering:  Referral Source: ED Follow-Up  CHW Additional Questions  If ED/Hospital discharge, follow-up appointment scheduled as recommended?: No  Patient agreeable to assistance with scheduling?: No  Patient declined (specify): Patient declined to schedule ED follow-up at this time as he states he is feeling better  MyChart active?: No    Patient accepts CC: No, patient declined at this time. Pt states [facility]. Unable to send care coordination introduction letter since MyChart is not active.  Clinic Care Coordination services remain available via referral if needed.      Lisa Castro  Community Health Worker  [facility], [facility]    *Connected Care Resource Team does NOT follow patient ongoing. Referrals are identified based on internal discharge reports and the outreach is to ensure patient has an understanding of their discharge instructions.

## 2024-12-18 ENCOUNTER — TELEPHONE (OUTPATIENT)
Dept: FAMILY MEDICINE | Facility: CLINIC | Age: 79
End: 2024-12-18
Payer: COMMERCIAL

## 2024-12-18 DIAGNOSIS — J44.1 COPD EXACERBATION (H): Primary | ICD-10-CM

## 2024-12-18 NOTE — TELEPHONE ENCOUNTER
Danielle Mercado:    Patient called the clinic today, he is calling to see if he can get prednisone and azithromycin on hand if he gets a COPD flare up. Reports that on Monday he had difficulty breathing going from bedroom to living room, used his albuterol and that helped, but he is worried if he has a flare up in the future, had one last 11-25-24. Patient is breathing fine now, not in emergent state.     Danielle, can patient be worked in for a telephone visit tomorrow to discuss this?    IRAIDA Cash

## 2024-12-19 DIAGNOSIS — R06.02 SOBOE (SHORTNESS OF BREATH ON EXERTION): ICD-10-CM

## 2024-12-19 RX ORDER — PREDNISONE 20 MG/1
40 TABLET ORAL DAILY
Qty: 10 TABLET | Refills: 0 | Status: SHIPPED | OUTPATIENT
Start: 2024-12-19

## 2024-12-19 RX ORDER — AZITHROMYCIN 250 MG/1
TABLET, FILM COATED ORAL
Qty: 6 TABLET | Refills: 0 | Status: SHIPPED | OUTPATIENT
Start: 2024-12-19 | End: 2024-12-24

## 2024-12-19 RX ORDER — ALBUTEROL SULFATE 90 UG/1
INHALANT RESPIRATORY (INHALATION)
Qty: 17 G | Refills: 0 | Status: SHIPPED | OUTPATIENT
Start: 2024-12-19

## 2024-12-19 RX ORDER — AZITHROMYCIN 250 MG/1
TABLET, FILM COATED ORAL
Qty: 6 TABLET | Refills: 0 | Status: SHIPPED | OUTPATIENT
Start: 2024-12-19 | End: 2024-12-19

## 2024-12-19 RX ORDER — PREDNISONE 20 MG/1
40 TABLET ORAL DAILY
Qty: 10 TABLET | Refills: 0 | Status: SHIPPED | OUTPATIENT
Start: 2024-12-19 | End: 2024-12-19

## 2024-12-19 NOTE — TELEPHONE ENCOUNTER
Patient requesting azithromycin and prednisone sent to [facility] in [place] instead of Mail pharmacy. He states that [facility] is not very timely in their deliveries and he is anxious to get these on hand.      Pended for consideration.  Kennedi Correa RN on 12/19/2024 at 1:32 PM

## 2024-12-20 ENCOUNTER — APPOINTMENT (OUTPATIENT)
Dept: CT IMAGING | Facility: CLINIC | Age: 79
End: 2024-12-20
Attending: FAMILY MEDICINE
Payer: COMMERCIAL

## 2024-12-20 ENCOUNTER — HOSPITAL ENCOUNTER (INPATIENT)
Facility: CLINIC | Age: 79
LOS: 1 days | Discharge: HOME | End: 2024-12-24
Attending: FAMILY MEDICINE | Admitting: INTERNAL MEDICINE
Payer: COMMERCIAL

## 2024-12-20 DIAGNOSIS — K22.4 ESOPHAGEAL SPASM: ICD-10-CM

## 2024-12-20 DIAGNOSIS — Z87.891 PERSONAL HISTORY OF TOBACCO USE, PRESENTING HAZARDS TO HEALTH: Primary | ICD-10-CM

## 2024-12-20 DIAGNOSIS — J44.1 CHRONIC OBSTRUCTIVE PULMONARY DISEASE WITH ACUTE EXACERBATION (H): ICD-10-CM

## 2024-12-20 LAB
ALBUMIN SERPL BCG-MCNC: 4.2 G/DL (ref 3.5–5.2)
ALP SERPL-CCNC: 57 U/L (ref 40–150)
ALT SERPL W P-5'-P-CCNC: 12 U/L (ref 0–70)
ANION GAP SERPL CALCULATED.3IONS-SCNC: 11 MMOL/L (ref 7–15)
ANION GAP SERPL CALCULATED.3IONS-SCNC: 11 MMOL/L (ref 7–15)
AST SERPL W P-5'-P-CCNC: 18 U/L (ref 0–45)
BASOPHILS # BLD AUTO: 0.1 10E3/UL (ref 0–0.2)
BASOPHILS NFR BLD AUTO: 1 %
BILIRUB SERPL-MCNC: 0.3 MG/DL
BUN SERPL-MCNC: 19.4 MG/DL (ref 8–23)
BUN SERPL-MCNC: 19.4 MG/DL (ref 8–23)
CALCIUM SERPL-MCNC: 9.2 MG/DL (ref 8.8–10.4)
CALCIUM SERPL-MCNC: 9.2 MG/DL (ref 8.8–10.4)
CHLORIDE SERPL-SCNC: 101 MMOL/L (ref 98–107)
CHLORIDE SERPL-SCNC: 101 MMOL/L (ref 98–107)
CREAT SERPL-MCNC: 1.14 MG/DL (ref 0.67–1.17)
CREAT SERPL-MCNC: 1.14 MG/DL (ref 0.67–1.17)
D DIMER PPP FEU-MCNC: 2.47 UG/ML FEU (ref 0–0.5)
EGFRCR SERPLBLD CKD-EPI 2021: 65 ML/MIN/1.73M2
EGFRCR SERPLBLD CKD-EPI 2021: 65 ML/MIN/1.73M2
EOSINOPHIL # BLD AUTO: 1.4 10E3/UL (ref 0–0.7)
EOSINOPHIL NFR BLD AUTO: 20 %
ERYTHROCYTE [DISTWIDTH] IN BLOOD BY AUTOMATED COUNT: 13.4 % (ref 10–15)
FLUAV RNA SPEC QL NAA+PROBE: NEGATIVE
FLUBV RNA RESP QL NAA+PROBE: NEGATIVE
GLUCOSE SERPL-MCNC: 119 MG/DL (ref 70–99)
GLUCOSE SERPL-MCNC: 119 MG/DL (ref 70–99)
HCO3 SERPL-SCNC: 28 MMOL/L (ref 22–29)
HCO3 SERPL-SCNC: 28 MMOL/L (ref 22–29)
HCT VFR BLD AUTO: 44.1 % (ref 40–53)
HGB BLD-MCNC: 14.5 G/DL (ref 13.3–17.7)
HOLD SPECIMEN: NORMAL
IMM GRANULOCYTES # BLD: 0 10E3/UL
IMM GRANULOCYTES NFR BLD: 0 %
LYMPHOCYTES # BLD AUTO: 1.1 10E3/UL (ref 0.8–5.3)
LYMPHOCYTES NFR BLD AUTO: 15 %
MCH RBC QN AUTO: 27.5 PG (ref 26.5–33)
MCHC RBC AUTO-ENTMCNC: 32.9 G/DL (ref 31.5–36.5)
MCV RBC AUTO: 84 FL (ref 78–100)
MONOCYTES # BLD AUTO: 0.5 10E3/UL (ref 0–1.3)
MONOCYTES NFR BLD AUTO: 7 %
NEUTROPHILS # BLD AUTO: 3.9 10E3/UL (ref 1.6–8.3)
NEUTROPHILS NFR BLD AUTO: 56 %
NRBC # BLD AUTO: 0 10E3/UL
NRBC BLD AUTO-RTO: 0 /100
NT-PROBNP SERPL-MCNC: 176 PG/ML (ref 0–1800)
PLATELET # BLD AUTO: 195 10E3/UL (ref 150–450)
POTASSIUM SERPL-SCNC: 3.7 MMOL/L (ref 3.4–5.3)
POTASSIUM SERPL-SCNC: 3.7 MMOL/L (ref 3.4–5.3)
PROT SERPL-MCNC: 7.2 G/DL (ref 6.4–8.3)
RBC # BLD AUTO: 5.28 10E6/UL (ref 4.4–5.9)
RSV RNA SPEC NAA+PROBE: NEGATIVE
SARS-COV-2 RNA RESP QL NAA+PROBE: NEGATIVE
SODIUM SERPL-SCNC: 140 MMOL/L (ref 135–145)
SODIUM SERPL-SCNC: 140 MMOL/L (ref 135–145)
WBC # BLD AUTO: 7 10E3/UL (ref 4–11)

## 2024-12-20 PROCEDURE — 250N000011 HC RX IP 250 OP 636: Performed by: FAMILY MEDICINE

## 2024-12-20 PROCEDURE — 250N000009 HC RX 250: Performed by: FAMILY MEDICINE

## 2024-12-20 PROCEDURE — 99222 1ST HOSP IP/OBS MODERATE 55: CPT

## 2024-12-20 PROCEDURE — 99285 EMERGENCY DEPT VISIT HI MDM: CPT | Mod: 25 | Performed by: FAMILY MEDICINE

## 2024-12-20 PROCEDURE — G0378 HOSPITAL OBSERVATION PER HR: HCPCS

## 2024-12-20 PROCEDURE — 94640 AIRWAY INHALATION TREATMENT: CPT

## 2024-12-20 PROCEDURE — 87637 SARSCOV2&INF A&B&RSV AMP PRB: CPT | Performed by: FAMILY MEDICINE

## 2024-12-20 PROCEDURE — 83880 ASSAY OF NATRIURETIC PEPTIDE: CPT | Performed by: FAMILY MEDICINE

## 2024-12-20 PROCEDURE — 250N000009 HC RX 250

## 2024-12-20 PROCEDURE — 250N000013 HC RX MED GY IP 250 OP 250 PS 637

## 2024-12-20 PROCEDURE — 99223 1ST HOSP IP/OBS HIGH 75: CPT | Mod: AI | Performed by: INTERNAL MEDICINE

## 2024-12-20 PROCEDURE — 85004 AUTOMATED DIFF WBC COUNT: CPT | Performed by: FAMILY MEDICINE

## 2024-12-20 PROCEDURE — 250N000011 HC RX IP 250 OP 636

## 2024-12-20 PROCEDURE — 36415 COLL VENOUS BLD VENIPUNCTURE: CPT | Performed by: FAMILY MEDICINE

## 2024-12-20 PROCEDURE — 250N000012 HC RX MED GY IP 250 OP 636 PS 637

## 2024-12-20 PROCEDURE — 250N000013 HC RX MED GY IP 250 OP 250 PS 637: Performed by: FAMILY MEDICINE

## 2024-12-20 PROCEDURE — 82374 ASSAY BLOOD CARBON DIOXIDE: CPT | Performed by: FAMILY MEDICINE

## 2024-12-20 PROCEDURE — 71275 CT ANGIOGRAPHY CHEST: CPT

## 2024-12-20 PROCEDURE — 80053 COMPREHEN METABOLIC PANEL: CPT | Performed by: FAMILY MEDICINE

## 2024-12-20 PROCEDURE — 999N000157 HC STATISTIC RCP TIME EA 10 MIN

## 2024-12-20 PROCEDURE — 99285 EMERGENCY DEPT VISIT HI MDM: CPT | Performed by: FAMILY MEDICINE

## 2024-12-20 PROCEDURE — 85379 FIBRIN DEGRADATION QUANT: CPT | Performed by: FAMILY MEDICINE

## 2024-12-20 PROCEDURE — 82310 ASSAY OF CALCIUM: CPT | Performed by: FAMILY MEDICINE

## 2024-12-20 RX ORDER — AMOXICILLIN 250 MG
2 CAPSULE ORAL 2 TIMES DAILY PRN
Status: DISCONTINUED | OUTPATIENT
Start: 2024-12-20 | End: 2024-12-24 | Stop reason: HOSPADM

## 2024-12-20 RX ORDER — ONDANSETRON 2 MG/ML
4 INJECTION INTRAMUSCULAR; INTRAVENOUS EVERY 6 HOURS PRN
Status: DISCONTINUED | OUTPATIENT
Start: 2024-12-20 | End: 2024-12-24 | Stop reason: HOSPADM

## 2024-12-20 RX ORDER — NALOXONE HYDROCHLORIDE 0.4 MG/ML
0.4 INJECTION, SOLUTION INTRAMUSCULAR; INTRAVENOUS; SUBCUTANEOUS
Status: DISCONTINUED | OUTPATIENT
Start: 2024-12-20 | End: 2024-12-24 | Stop reason: HOSPADM

## 2024-12-20 RX ORDER — HYDROXYZINE HYDROCHLORIDE 50 MG/1
50 TABLET, FILM COATED ORAL EVERY 6 HOURS PRN
Status: DISCONTINUED | OUTPATIENT
Start: 2024-12-20 | End: 2024-12-20

## 2024-12-20 RX ORDER — ALBUTEROL SULFATE 0.83 MG/ML
2.5 SOLUTION RESPIRATORY (INHALATION)
Status: DISCONTINUED | OUTPATIENT
Start: 2024-12-20 | End: 2024-12-24 | Stop reason: HOSPADM

## 2024-12-20 RX ORDER — NALOXONE HYDROCHLORIDE 0.4 MG/ML
0.2 INJECTION, SOLUTION INTRAMUSCULAR; INTRAVENOUS; SUBCUTANEOUS
Status: DISCONTINUED | OUTPATIENT
Start: 2024-12-20 | End: 2024-12-24 | Stop reason: HOSPADM

## 2024-12-20 RX ORDER — PREDNISONE 20 MG/1
40 TABLET ORAL DAILY
Status: COMPLETED | OUTPATIENT
Start: 2024-12-20 | End: 2024-12-24

## 2024-12-20 RX ORDER — PROCHLORPERAZINE MALEATE 5 MG/1
5 TABLET ORAL EVERY 6 HOURS PRN
Status: DISCONTINUED | OUTPATIENT
Start: 2024-12-20 | End: 2024-12-24 | Stop reason: HOSPADM

## 2024-12-20 RX ORDER — IPRATROPIUM BROMIDE AND ALBUTEROL SULFATE 2.5; .5 MG/3ML; MG/3ML
3 SOLUTION RESPIRATORY (INHALATION)
Status: DISCONTINUED | OUTPATIENT
Start: 2024-12-20 | End: 2024-12-24 | Stop reason: HOSPADM

## 2024-12-20 RX ORDER — ASPIRIN 81 MG/1
81 TABLET ORAL EVERY EVENING
Status: DISCONTINUED | OUTPATIENT
Start: 2024-12-20 | End: 2024-12-24 | Stop reason: HOSPADM

## 2024-12-20 RX ORDER — CALCIUM CARBONATE 500 MG/1
1000 TABLET, CHEWABLE ORAL 4 TIMES DAILY PRN
Status: DISCONTINUED | OUTPATIENT
Start: 2024-12-20 | End: 2024-12-24 | Stop reason: HOSPADM

## 2024-12-20 RX ORDER — AMOXICILLIN 250 MG
1 CAPSULE ORAL 2 TIMES DAILY PRN
Status: DISCONTINUED | OUTPATIENT
Start: 2024-12-20 | End: 2024-12-24 | Stop reason: HOSPADM

## 2024-12-20 RX ORDER — AMLODIPINE BESYLATE 10 MG/1
10 TABLET ORAL DAILY
Status: DISCONTINUED | OUTPATIENT
Start: 2024-12-20 | End: 2024-12-24 | Stop reason: HOSPADM

## 2024-12-20 RX ORDER — PAROXETINE 30 MG/1
60 TABLET, FILM COATED ORAL EVERY EVENING
Status: DISCONTINUED | OUTPATIENT
Start: 2024-12-20 | End: 2024-12-24 | Stop reason: HOSPADM

## 2024-12-20 RX ORDER — GUAIFENESIN 600 MG/1
1200 TABLET, EXTENDED RELEASE ORAL 2 TIMES DAILY
Status: DISCONTINUED | OUTPATIENT
Start: 2024-12-20 | End: 2024-12-24 | Stop reason: HOSPADM

## 2024-12-20 RX ORDER — ACETAMINOPHEN 325 MG/1
650 TABLET ORAL EVERY 4 HOURS PRN
Status: DISCONTINUED | OUTPATIENT
Start: 2024-12-20 | End: 2024-12-24 | Stop reason: HOSPADM

## 2024-12-20 RX ORDER — ONDANSETRON 4 MG/1
4 TABLET, ORALLY DISINTEGRATING ORAL EVERY 6 HOURS PRN
Status: DISCONTINUED | OUTPATIENT
Start: 2024-12-20 | End: 2024-12-24 | Stop reason: HOSPADM

## 2024-12-20 RX ORDER — HYDROXYZINE HYDROCHLORIDE 25 MG/1
25 TABLET, FILM COATED ORAL EVERY 6 HOURS PRN
Status: DISCONTINUED | OUTPATIENT
Start: 2024-12-20 | End: 2024-12-20

## 2024-12-20 RX ORDER — IOPAMIDOL 755 MG/ML
62 INJECTION, SOLUTION INTRAVASCULAR ONCE
Status: COMPLETED | OUTPATIENT
Start: 2024-12-20 | End: 2024-12-20

## 2024-12-20 RX ADMIN — IPRATROPIUM BROMIDE AND ALBUTEROL SULFATE 3 ML: 2.5; .5 SOLUTION RESPIRATORY (INHALATION) at 16:42

## 2024-12-20 RX ADMIN — ONDANSETRON 4 MG: 4 TABLET, ORALLY DISINTEGRATING ORAL at 17:48

## 2024-12-20 RX ADMIN — PREDNISONE 40 MG: 20 TABLET ORAL at 16:20

## 2024-12-20 RX ADMIN — SODIUM CHLORIDE 94 ML: 9 INJECTION, SOLUTION INTRAVENOUS at 11:28

## 2024-12-20 RX ADMIN — IOPAMIDOL 62 ML: 755 INJECTION, SOLUTION INTRAVENOUS at 11:27

## 2024-12-20 RX ADMIN — ASPIRIN 81 MG: 81 TABLET, COATED ORAL at 20:49

## 2024-12-20 RX ADMIN — IPRATROPIUM BROMIDE AND ALBUTEROL SULFATE 3 ML: 2.5; .5 SOLUTION RESPIRATORY (INHALATION) at 20:00

## 2024-12-20 RX ADMIN — CALCIUM CARBONATE (ANTACID) CHEW TAB 500 MG 1000 MG: 500 CHEW TAB at 20:46

## 2024-12-20 ASSESSMENT — ACTIVITIES OF DAILY LIVING (ADL)
ADLS_ACUITY_SCORE: 48
ADLS_ACUITY_SCORE: 57
ADLS_ACUITY_SCORE: 48
ADLS_ACUITY_SCORE: 48
ADLS_ACUITY_SCORE: 57
ADLS_ACUITY_SCORE: 48
ADLS_ACUITY_SCORE: 57
ADLS_ACUITY_SCORE: 47
ADLS_ACUITY_SCORE: 57
ADLS_ACUITY_SCORE: 48
ADLS_ACUITY_SCORE: 47
ADLS_ACUITY_SCORE: 48

## 2024-12-20 NOTE — H&P
[facility]    History and Physical  Hospital Medicine       Date of Admission:  12/20/2024  Date of Service: 12/20/2024     Assessment & Plan   Adolfo Rendon is a 79 year old male with past medical history of hypertension, emphysema, coronary artery disease status post bypass, anxiety, hypertension, hyperlipidemia, abdominal aortic aneurysm, depression, former tobacco use now presents on 12/20/2024 with dyspnea. He is being admitted for a COPD exacerbation.     Acute respiratory distress  COPD with acute exacerbation    History of COPD manage PTA with Striverdi respimat inhaler & albuterol inhaler. As weather got colder his breathing has gotten worse. Steroid burst a few weeks ago briefly improved sxs. Now using albuterol inhaler every hour while awake. Dog has a seizure evening 12/19 which significantly exacerbated dyspnea. Repeat seizure AM 12/20 caused additional respiratory distress, and patient could not find his albuterol.     On presentation to ER, tachypneic but oxygenating ok on room air.  Wheezy. No leukocytosis. Covid, flu, RSV negative. D-dimer elevated, but CT PE negative for PE. Does show emphysema & right hilar adenopathy with prominent borderline enlarged mediastinal lymph nodes.     Overall, suspect COPD exacerbation, likely made worse by emotional stress & anxiety.   - Prednisone 40mg PO Qday x5adys  - Albuterol neb Q2hrs PRN  - Ipratropium-albuterol neb four times daily   - Oxygen available PRN to maintain SPO2 >88%  - Oxycodone 2.5mg Q6hrs PRN available for air starvation & anxiety component  - Mucinex 1200mg BID    Coronary artery disease S/P CABG x 4 2019  Ischemic cardiomyopathy  Follows with Dr. Espino, [place]. 2019 LIMA to LAD and vein graft to obtuse marginal PDA and PL.   ECHO Jan 2024 LVEF 50-55%, basal inferior & inferolateral hypokinesis.   - Continue PTA aspirin 81mg daily   - Not on statin outpatient     Benign essential  hypertension  Intermittently hypertensive since presentation. Probably secondary to respiratory distress & discomfort.   - Continue PTA amlodipine 10mg daily    Tobacco abuse  Stopped smoking few years ago. Still uses nicotine gum.   - Nicotine gum available during admission.     Generalized anxiety disorder  Acute anxiety related to health problems with his dog likely contributed to COPD exacerbation, above.   - Continue PTA paroxetine 60mg every evening    Aortic arch aneurysm   Infrarenal abdominal aortic aneurysm (AAA) without rupture s/p repair 3/29/2024  Follows with Dr. Ruiz of [facility]. Last visit 10/29/2024 was doing well and plan is for follow up CT in 1 year.       Clinically Significant Risk Factors Present on Admission                 # Drug Induced Platelet Defect: home medication list includes an antiplatelet medication   # Hypertension: Noted on problem list     # Acute Hypoxic Respiratory Failure: Documented O2 saturation < 90%. Continue supplemental oxygen as needed            # Financial/Environmental Concerns:     # History of CABG: noted on surgical history        Diet: Combination Diet 2 gm NA Diet; Low Saturated Fat Na <2400mg Diet    DVT Prophylaxis: Ambulate every shift  Mo Catheter: Not present  Code Status:  full code  Lines: PIV    Disposition Plan   Medically Ready for Discharge: Anticipated Tomorrow     The patient's care was discussed with the Attending Physician, Dr. Shar Nielsen, bedside RN, and the patient .    Barbara Angela PA-C        Primary Care Physician   Danielle Mercado [phone]    History is obtained from the patient, who is a decent historian, handoff from ER provider, and review of old records via the EMR.    History of Present Illness   Adolfo Rendon is a 79 year old male with past medical history of hypertension, emphysema, coronary artery disease status post bypass, anxiety, hypertension, hyperlipidemia, abdominal aortic  aneurysm, depression, former tobacco use now presents on 12/20/2024 with dyspnea.    Patient has chronic dyspnea for many years.  He felt it was related to his heart so he stopped his chronic carvedilol over the summer.  Says he was feeling really well and actually have the best summer he has had in a while.  As the weather got colder he noticed his dyspnea was getting worse.  He was spending more time inside and felt increasingly short of breath with activity.  No orthopnea.  Denies LE edema.  No PND.  Has mild chronic cough productive of intermittent small-volume sputum.  No hemoptysis.  He was seen in the emergency department about 2 weeks ago for dyspnea and was prescribed azithromycin and prednisone.  Says he improved briefly on the prednisone but then symptoms worsened again.  He has been using his albuterol inhaler about once every hour for at least the past few days.  Beginning 12/19 evening he became panicked on his dog had a seizure.  He had lost his albuterol inhaler and was unable to calm himself down.  A.m. 12/20 his dog had another seizure and he became dyspneic.  Without his inhaler his breathing became too much to handle and he called EMS.  Denies fevers, chills.    Upon arrival to ER patient received lab and imaging workup.     Review of Systems   Constitutional: denies fever, chills  Eyes: denies changes in vision  HENT: denies changes in hearing, sore throat  Respiratory: see HPI  Cardiovascular: see HPI  Gastroenterology: denies constipation, diarrhea, GERD symptoms  Genitourinary: denies dysuria  Integumentary: no new rashes or skin changes  Musculoskeletal: denies new muscle pain or joint trauma  Neuro: denies numbness, tingling, headaches, tremor  Psychiatric: denies significant changes to mood    Past Medical History    Past Medical History:   Diagnosis Date    AAA (abdominal aortic aneurysm) without rupture (H)     BCC (basal cell carcinoma), face     Congestive heart failure (H)     COPD  (chronic obstructive pulmonary disease) (H)     Coronary artery disease     Depressive disorder, not elsewhere classified     HLD (hyperlipidemia)     Hypertension     Hypertrophy (benign) of prostate     Impotence of organic origin     Other and unspecified hyperlipidemia     Other anxiety states     PVD (peripheral vascular disease) (H)     Tobacco use disorder      Past Surgical History   Past Surgical History:   Procedure Laterality Date    APPENDECTOMY  1966    BYPASS GRAFT ARTERY CORONARY N/A 9/3/2019    Procedure: CORONARY ARTERY BYPASS GRAFT X 4 - LIMA TO LAD, SV TO PL, OM, PDA ; ON PUMP WITH TERRENCE; ENDOVEIN HARVEST RIGHT LEG;  Surgeon: Lai Mchugh MD;  Location:  OR    COLONOSCOPY  3/1/2005    COLONOSCOPY N/A 5/9/2019    Procedure: COLONOSCOPY;  Surgeon: Camilo Beard MD;  Location: [facility]    CV LEFT HEART CATH N/A 8/8/2019    Procedure: Left Heart Cath--also measure LVEDP;  Surgeon: Krystle Barrera MD;  Location:  HEART CARDIAC CATH LAB    ESTABLISHMENT, VASCULAR ACCESS, FEM APPROACH, FOR ENDOVASC STENT INSERTION INTO ABD AORTIC ANEURYSM Bilateral 3/29/2024    Procedure: BILATERAL ESTABLISHMENT, VASCULAR ACCESS, FEMORAL APPROACH, FOR ENDOVASCULAR STENT INSERTION INTO ABDOMINAL AORTIC ANEURYSM;  Surgeon: Aleksander Ruiz MD;  Location: [facility] [place] [facility]    HERNIORRHAPHY INGUINAL  7/14/2011    Procedure:HERNIORRHAPHY INGUINAL; Open Repair Right Inguinal Hernia Repair       HYDROCELECTOMY SCROTAL  01/2004    left hydrocele repair    IR LOWER EXTREMITY ANGIOGRAM BILATERAL  3/29/2024    SUPRAPUBIC PROSTATECTOMY        Prior to Admission Medications   Prior to Admission Medications   Prescriptions Last Dose Informant Patient Reported? Taking?   Olodaterol HCl (STRIVERDI RESPIMAT) 2.5 MCG/ACT AERS   No No   Sig: Inhale 2 puffs into the lungs daily   PARoxetine (PAXIL) 30 MG tablet   No No   Sig: TAKE 2 TABLETS BY MOUTH once daily in the evening   Vitamin D, Cholecalciferol, 25 MCG  "(1000 UT) TABS   Yes No   Sig: Take 1,000 Units by mouth daily    albuterol (PROAIR HFA/PROVENTIL HFA/VENTOLIN HFA) 108 (90 Base) MCG/ACT inhaler   No No   Sig: USE 2 INHALATIONS BY MOUTH EVERY 4 HOURS AS NEEDED FOR SHORTNESS OF BREATH, WHEEZING OR COUGH   amLODIPine (NORVASC) 10 MG tablet   No No   Sig: Take 1 tablet (10 mg) by mouth daily   aspirin 81 MG EC tablet   Yes No   Sig: Take 81 mg by mouth daily   azithromycin (ZITHROMAX) 250 MG tablet   No No   Sig: Take 2 tablets (500 mg) by mouth daily for 1 day, THEN 1 tablet (250 mg) daily for 4 days.   ciclopirox (LOPROX) 0.77 % cream   No No   Sig: Apply daily to areas on brows, nose, cheeks.   co-enzyme Q-10 100 MG CAPS capsule   Yes No   Sig: Take 100 mg by mouth daily   desonide (DESOWEN) 0.05 % external cream   No No   Sig: Apply at bedtime if itchy, inflamed to brows, cheek, nose.   evolocumab (REPATHA) 140 MG/ML prefilled autoinjector   No No   Sig: Inject 1 mL (140 mg) Subcutaneous every 14 days   guaiFENesin (MUCINEX) 600 MG 12 hr tablet   Yes No   Sig: Take 1,200 mg by mouth 2 times daily   nicotine polacrilex (NICORETTE) 4 MG gum  Self Yes No   Sig: Place 4 mg inside cheek every hour as needed for smoking cessation   predniSONE (DELTASONE) 20 MG tablet   No No   Sig: Take 2 tablets (40 mg) by mouth daily.      Facility-Administered Medications: None     Allergies   Allergies   Allergen Reactions    Coreg [Carvedilol] Other (See Comments) and Difficulty breathing     Depression, Couldn't think, felt miserable per patient.    Hctz [Hydrochlorothiazide] Fatigue     \"felt like zombie\"    Loratadine      Mood , irritablity     Metoprolol Fatigue     \"felt like zombie\"    Pravastatin Fatigue     \"felt like zombie\"    Remeron [Mirtazapine]      Agitation       Wellbutrin [Bupropion Hcl]      Sig mood issues        Family History    Family History   Problem Relation Age of Onset    Cerebrovascular Disease Mother     Hypertension Mother     Prostate Cancer " "Brother     Dementia Brother     Arthritis Sister         rhematoid    Cancer Brother         Lung cancer, lymphoma    Other - See Comments Brother         HIV    Cancer Sister         skin cancer on nose    Pacemaker Sister     Aneurysm Sister         heart     Social History   Social History     Socioeconomic History    Marital status:      Physical Exam   BP (!) 147/89 (BP Location: Right arm)   Pulse 87   Temp 98.1  F (36.7  C) (Oral)   Resp 18   Ht 1.715 m (5' 7.5\")   Wt 63.5 kg (140 lb)   SpO2 91%   BMI 21.60 kg/m       Weight: 140 lbs 0 oz Body mass index is 21.6 kg/m .     Constitutional: Alert, oriented, cooperative, no apparent distress, appears nontoxic  Eyes: Eyes are clear  HENT: Oropharynx is clear and moist. No evidence of cranial trauma.  Cardiovascular: Regular rate and rhythm, normal S1 and S2, and no murmur noted. JVP is not obviously elevated. Good peripheral pulses in wrists bilaterally. No pitting lower extremity edema.  Respiratory: Unlabored on room air. Lung sounds diminished throughout with expiratory wheezing in all peripheral lung fields bilaterally.   GI: Soft, non-tender, normal bowel sounds  Genitourinary: Deferred  Musculoskeletal: Normal muscle bulk  Skin: Warm and dry, no rashes.   Neurologic: Neck supple.   is symmetric. Interacting appropriately. No tremor.     Data   Data reviewed today:     I have personally reviewed the following data over the past 24 hrs:    7.0  \   14.5   / 195     140; 140 101; 101 19.4; 19.4 /  119 (H); 119 (H)   3.7; 3.7 28; 28 1.14; 1.14 \     ALT: 12 AST: 18 AP: 57 TBILI: 0.3   ALB: 4.2 TOT PROTEIN: 7.2 LIPASE: N/A     Trop: N/A BNP: 176     INR:  N/A PTT:  N/A   D-dimer:  2.47 (H) Fibrinogen:  N/A       Recent Results (from the past 24 hours)   CT Chest Pulmonary Embolism w Contrast    Narrative    CT CHEST PULMONARY EMBOLISM WITH CONTRAST  12/20/2024 11:47 AM    CLINICAL HISTORY: Dyspnea. Elevated d-dimer.    TECHNIQUE: CT angiogram " chest during arterial phase injection IV  contrast. 2D and 3D MIP reconstructions were performed by the CT  technologist. Dose reduction techniques were used.   CONTRAST: 62 mL [id number]    COMPARISON: CTA of the chest, abdomen, and pelvis 1/31/2023.    FINDINGS:  ANGIOGRAM CHEST: Pulmonary arteries are normal caliber and negative  for pulmonary emboli. Dilatation of the ascending thoracic aorta  measures 3.8 cm. Thoracic aorta is negative for dissection. Mild  atherosclerotic calcification of the thoracic aorta.    LUNGS AND PLEURA: Moderate upper lung predominant changes of  centrilobular emphysema. There is mild bronchial wall thickening and  scattered mucus plugging in both lungs. Small calcified granulomas in  the left lung. No lung masses or consolidations.    MEDIASTINUM/AXILLAE: Right hilar adenopathy has increased. For  example, an enlarged right hilar lymph node measures 1.8 cm  (previously 1.3 cm) mildly prominent and borderline enlarged  mediastinal lymph nodes have also increased slightly. No pericardial  effusion.    CORONARY ARTERY CALCIFICATION: Previous intervention (CABG).    UPPER ABDOMEN: Postoperative changes of abdominal aortic aneurysm  repair are partially included on this exam.    MUSCULOSKELETAL: Unremarkable.      Impression    IMPRESSION:  1.  No evidence for pulmonary embolism.  2.  Dilatation of the ascending thoracic aorta measures 3.8 cm,  similar to previous.  3.  Emphysema.  4.  Right hilar adenopathy has increased. Mildly prominent and  borderline enlarged mediastinal lymph nodes have also increased.

## 2024-12-20 NOTE — ED PROVIDER NOTES
"  History     Chief Complaint   Patient presents with    Shortness of Breath     Acute on chronic COPD exacerbation; given duoneb and bipap by EMS with improved enough, pt arrives on mask; now room air 97%     HPI  Adolfo Rendon is a 79 year old male who with a history of COPD who presents with dyspnea.  He is somewhat vague historian.  It is not clear there was a definite infectious trigger.  Last evening his dog had a seizure and while he was attempting to comfort him he became short of breath.  The same thing occurred this morning where his dog had a seizure and he was comforting him and then became severely dyspneic and could not find his albuterol inhaler without that was unable to control his dyspnea.  Paramedics were called.  They gave him Solu-Medrol and a DuoNeb and put him on BiPAP and he improved.  Has not been having fevers.  He is using his long-acting inhaler.  He quit smoking in 2019 when he had heart surgery.  He has been having increased cough and dyspnea with whitish sputum production.    I reviewed the records of his ED visit 11/25/2024 where he was seen for a COPD exacerbation.  He was treated with azithromycin and prednisone.    Allergies:  Allergies   Allergen Reactions    Coreg [Carvedilol] Other (See Comments) and Difficulty breathing     Depression, Couldn't think, felt miserable per patient.    Hctz [Hydrochlorothiazide] Fatigue     \"felt like zombie\"    Loratadine      Mood , irritablity     Metoprolol Fatigue     \"felt like zombie\"    Pravastatin Fatigue     \"felt like zombie\"    Remeron [Mirtazapine]      Agitation       Wellbutrin [Bupropion Hcl]      Sig mood issues          Problem List:    Patient Active Problem List    Diagnosis Date Noted    Chronic obstructive pulmonary disease with acute exacerbation (H) 12/20/2024     Priority: Medium    Infrarenal abdominal aortic aneurysm (AAA) without rupture (H) 03/29/2024     Priority: Medium    Personal history of prostate cancer " 11/14/2022     Priority: Medium    H/O cardiomyopathy 02/13/2020     Priority: Medium    Fluid overload 09/09/2019     Priority: Medium    Anemia due to blood loss, acute 09/09/2019     Priority: Medium    Transient hyperglycemia post procedure 09/09/2019     Priority: Medium    S/P CABG x 4 09/03/2019     Priority: Medium    Benign essential hypertension 08/16/2019     Priority: Medium    Ischemic cardiomyopathy 08/16/2019     Priority: Medium    Status post coronary angiogram 08/08/2019     Priority: Medium    Coronary artery disease involving native coronary artery of native heart without angina pectoris 08/01/2019     Priority: Medium     Added automatically from request for surgery [id number]      Chest pain 08/01/2019     Priority: Medium     Added automatically from request for surgery [id number]      Diverticulosis of large intestine 05/19/2019     Priority: Medium     Colonoscopy 5/2019      Unilateral atherosclerotic renal artery stenosis (H) 03/13/2018     Priority: Medium     Greater than 50% stenosis of the proximal right renal artery.      Pulmonary nodules 03/13/2018     Priority: Medium     Groundglass nodule measures 6 mm in the right upper lobe.  Recommendations for an incidental lung nodule = or > 6 mm to 8 mm:    Low risk patients: Initial follow-up CT at 6-12 months, then  consider CT at 18-24 months if no change.    High risk patients: Initial follow-up CT at 6-12 months, then CT at  18-24 months if no change.     *Low Risk: Minimal or absent history of smoking or other known risk  factors.  *Nonsolid (ground-glass) or partly solid nodules may require longer  follow-up to exclude indolent adenocarcinoma.  *Recommendations based on Guidelines for the Management of Incidental  Pulmonary Nodules       Aortic arch aneurysm (H) 03/06/2018     Priority: Medium    Thoracic aortic aneurysm without rupture (H) 01/13/2017     Priority: Medium     4 cm  1/2017      Centrilobular emphysema (H) 01/13/2017      Priority: Medium     1/2017 CT chest      HTN, goal below 140/90 07/12/2013     Priority: Medium    Generalized anxiety disorder 07/13/2011     Priority: Medium     Diagnosis updated by automated process. Provider to review and confirm.      Right inguinal hernia 07/13/2011     Priority: Medium    Muscle pain 04/20/2011     Priority: Medium     (Problem list name updated by automated process. Provider to review and confirm.)      Tobacco abuse 04/20/2011     Priority: Medium    SANTIAGO (dyspnea on exertion) 04/20/2011     Priority: Medium     More notable in last yr       COPD (chronic obstructive pulmonary disease) (H) 03/24/2011     Priority: Medium    Hyperlipidemia LDL goal <70 10/31/2010     Priority: Medium    Major depressive disorder, recurrent episode, severe (H) 07/07/2009     Priority: Medium     History as teen. On and off treated    Aa x 21 yrs as of 2009  Problem list name updated by automated process. Provider to review      HL (hearing loss) 07/07/2009     Priority: Medium    Benign neoplasm of epididymis 02/07/2007     Priority: Medium    MIXED HYPERLIPIDEMIA 10/24/2006     Priority: Medium        Past Medical History:    Past Medical History:   Diagnosis Date    AAA (abdominal aortic aneurysm) without rupture (H)     BCC (basal cell carcinoma), face     Congestive heart failure (H)     COPD (chronic obstructive pulmonary disease) (H)     Coronary artery disease     Depressive disorder, not elsewhere classified     HLD (hyperlipidemia)     Hypertension     Hypertrophy (benign) of prostate     Impotence of organic origin     Other and unspecified hyperlipidemia     Other anxiety states     PVD (peripheral vascular disease) (H)     Tobacco use disorder        Past Surgical History:    Past Surgical History:   Procedure Laterality Date    APPENDECTOMY  1966    BYPASS GRAFT ARTERY CORONARY N/A 9/3/2019    Procedure: CORONARY ARTERY BYPASS GRAFT X 4 - LIMA TO LAD, SV TO PL, OM, PDA ; ON PUMP WITH TERRENCE;  ENDOVEIN HARVEST RIGHT LEG;  Surgeon: Lai Mchugh MD;  Location:  OR    COLONOSCOPY  3/1/2005    COLONOSCOPY N/A 2019    Procedure: COLONOSCOPY;  Surgeon: Camilo Beard MD;  Location: [facility]    CV LEFT HEART CATH N/A 2019    Procedure: Left Heart Cath--also measure LVEDP;  Surgeon: Krystle Barrera MD;  Location:  HEART CARDIAC CATH LAB    ESTABLISHMENT, VASCULAR ACCESS, FEM APPROACH, FOR ENDOVASC STENT INSERTION INTO ABD AORTIC ANEURYSM Bilateral 3/29/2024    Procedure: BILATERAL ESTABLISHMENT, VASCULAR ACCESS, FEMORAL APPROACH, FOR ENDOVASCULAR STENT INSERTION INTO ABDOMINAL AORTIC ANEURYSM;  Surgeon: Aleksander Ruiz MD;  Location: [facility] OR    HERNIORRHAPHY INGUINAL  2011    Procedure:HERNIORRHAPHY INGUINAL; Open Repair Right Inguinal Hernia Repair       HYDROCELECTOMY SCROTAL  2004    left hydrocele repair    IR LOWER EXTREMITY ANGIOGRAM BILATERAL  3/29/2024    SUPRAPUBIC PROSTATECTOMY         Family History:    Family History   Problem Relation Age of Onset    Cerebrovascular Disease Mother     Hypertension Mother     Prostate Cancer Brother     Dementia Brother     Arthritis Sister         rhematoid    Cancer Brother         Lung cancer, lymphoma    Other - See Comments Brother         HIV    Cancer Sister         skin cancer on nose    Pacemaker Sister     Aneurysm Sister         heart       Social History:  Marital Status:   [5]  Social History     Tobacco Use    Smoking status: Former     Current packs/day: 0.00     Types: Cigarettes     Quit date: 2017     Years since quittin.9    Smokeless tobacco: Former     Quit date: 2017   Substance Use Topics    Alcohol use: No     Alcohol/week: 0.0 standard drinks of alcohol     Comment: quit     Drug use: No        Medications:    albuterol (PROAIR HFA/PROVENTIL HFA/VENTOLIN HFA) 108 (90 Base) MCG/ACT inhaler  amLODIPine (NORVASC) 10 MG tablet  aspirin 81 MG EC tablet  azithromycin  "(ZITHROMAX) 250 MG tablet  ciclopirox (LOPROX) 0.77 % cream  co-enzyme Q-10 100 MG CAPS capsule  desonide (DESOWEN) 0.05 % external cream  evolocumab (REPATHA) 140 MG/ML prefilled autoinjector  guaiFENesin (MUCINEX) 600 MG 12 hr tablet  nicotine polacrilex (NICORETTE) 4 MG gum  Olodaterol HCl (STRIVERDI RESPIMAT) 2.5 MCG/ACT AERS  PARoxetine (PAXIL) 30 MG tablet  predniSONE (DELTASONE) 20 MG tablet  Vitamin D, Cholecalciferol, 25 MCG (1000 UT) TABS          Review of Systems  All other systems are reviewed and are negative    Physical Exam   BP: (!) 165/104  Pulse: 90  Temp: 97.9  F (36.6  C)  Resp: 20  Height: 171.5 cm (5' 7.5\")  Weight: 63.5 kg (140 lb)  SpO2: 95 %      Physical Exam    Nursing note and vitals were reviewed.  Constitutional: Awake and alert, malnourished and chronically ill appearing 79-year-old in moderate distress due to dyspnea who speaks breathlessly in single words due to dyspnea and who answers questions appropriately and cooperates with examination.  HEENT: Speech is fluent.  Voice quality is mildly hoarse.  PERRL.  EOMI.   Neck: Freely mobile.  Cardiovascular: Cardiac examination reveals normal heart rate and regular rhythm without murmur.  Pulmonary/Chest: Breathing is labored with tachypnea but no retractions with wheeze throughout the lung fields and marked prolongation of the expiratory phase.  No rales.  Abdomen: Soft, nontender, no HSM or masses rebound or guarding.  Musculoskeletal: Extremities are warm and well-perfused and without edema  Neurological: Alert, oriented, thought content logical, coherent   Skin: Warm, dry, no rashes.  Psychiatric: Affect broad and appropriate.    ED Course        Procedures              Critical Care time:  none              Results for orders placed or performed during the hospital encounter of 12/20/24 (from the past 24 hours)   CBC with platelets, differential    Narrative    The following orders were created for panel order CBC with platelets, " differential.  Procedure                               Abnormality         Status                     ---------                               -----------         ------                     CBC with platelets and d...[[id number]]  Abnormal            Final result                 Please view results for these tests on the individual orders.   Basic metabolic panel   Result Value Ref Range    Sodium 140 135 - 145 mmol/L    Potassium 3.7 3.4 - 5.3 mmol/L    Chloride 101 98 - 107 mmol/L    Carbon Dioxide (CO2) 28 22 - 29 mmol/L    Anion Gap 11 7 - 15 mmol/L    Urea Nitrogen 19.4 8.0 - 23.0 mg/dL    Creatinine 1.14 0.67 - 1.17 mg/dL    GFR Estimate 65 >60 mL/min/1.73m2    Calcium 9.2 8.8 - 10.4 mg/dL    Glucose 119 (H) 70 - 99 mg/dL   [place] Draw    Narrative    The following orders were created for panel order [place] Draw.  Procedure                               Abnormality         Status                     ---------                               -----------         ------                     Extra Blue Top Tube[[id number]]                              Final result               Extra Red Top Tube[[id number]]                               Final result               Extra Heparinized Syringe[[id number]]                        Final result                 Please view results for these tests on the individual orders.   CBC with platelets and differential   Result Value Ref Range    WBC Count 7.0 4.0 - 11.0 10e3/uL    RBC Count 5.28 4.40 - 5.90 10e6/uL    Hemoglobin 14.5 13.3 - 17.7 g/dL    Hematocrit 44.1 40.0 - 53.0 %    MCV 84 78 - 100 fL    MCH 27.5 26.5 - 33.0 pg    MCHC 32.9 31.5 - 36.5 g/dL    RDW 13.4 10.0 - 15.0 %    Platelet Count 195 150 - 450 10e3/uL    % Neutrophils 56 %    % Lymphocytes 15 %    % Monocytes 7 %    % Eosinophils 20 %    % Basophils 1 %    % Immature Granulocytes 0 %    NRBCs per 100 WBC 0 <1 /100    Absolute Neutrophils 3.9 1.6 - 8.3 10e3/uL    Absolute Lymphocytes 1.1 0.8 - 5.3 10e3/uL     Absolute Monocytes 0.5 0.0 - 1.3 10e3/uL    Absolute Eosinophils 1.4 (H) 0.0 - 0.7 10e3/uL    Absolute Basophils 0.1 0.0 - 0.2 10e3/uL    Absolute Immature Granulocytes 0.0 <=0.4 10e3/uL    Absolute NRBCs 0.0 10e3/uL   Extra Blue Top Tube   Result Value Ref Range    Hold Specimen JIC    Extra Red Top Tube   Result Value Ref Range    Hold Specimen JIC    Extra Heparinized Syringe   Result Value Ref Range    Hold Specimen JIC    D dimer quantitative   Result Value Ref Range    D-Dimer Quantitative 2.47 (H) 0.00 - 0.50 ug/mL FEU    Narrative    This D-dimer assay is intended for use in conjunction with a clinical pretest probability assessment model to exclude pulmonary embolism (PE) and deep venous thrombosis (DVT) in outpatients suspected of PE or DVT. The cut-off value is 0.50 ug/mL FEU.    For patients 50 years of age or older, the application of age-adjusted cut-off values for D-Dimer may increase the specificity without significant effect on sensitivity. The literature suggested calculation age adjusted cut-off in ug/L = age in years x 10 ug/L. The results in this laboratory are reported as ug/mL rather than ug/L. The calculation for age adjusted cut off in ug/mL= age in years x 0.01 ug/mL. For example, the cut off for a 76 year old male is 76 x 0.01 ug/mL = 0.76 ug/mL (760 ug/L).    M Jojo et al. Age adjusted D-dimer cut-off levels to rule out pulmonary embolism: The ADJUST-PE Study. BASIL 2014;311:4087-4098.; HJ Harvinder et al. Diagnostic accuracy of conventional or age adjusted D-dimer cutoff values in older patients with suspected venous thromboembolism. Systemic review and meta-analysis. BMJ 2013:346:f2492.   Comprehensive metabolic panel   Result Value Ref Range    Sodium 140 135 - 145 mmol/L    Potassium 3.7 3.4 - 5.3 mmol/L    Carbon Dioxide (CO2) 28 22 - 29 mmol/L    Anion Gap 11 7 - 15 mmol/L    Urea Nitrogen 19.4 8.0 - 23.0 mg/dL    Creatinine 1.14 0.67 - 1.17 mg/dL    GFR Estimate 65 >60  mL/min/1.73m2    Calcium 9.2 8.8 - 10.4 mg/dL    Chloride 101 98 - 107 mmol/L    Glucose 119 (H) 70 - 99 mg/dL    Alkaline Phosphatase 57 40 - 150 U/L    AST 18 0 - 45 U/L    ALT 12 0 - 70 U/L    Protein Total 7.2 6.4 - 8.3 g/dL    Albumin 4.2 3.5 - 5.2 g/dL    Bilirubin Total 0.3 <=1.2 mg/dL   Nt probnp inpatient (BNP)   Result Value Ref Range    N terminal Pro BNP Inpatient 176 0 - 1,800 pg/mL   Influenza A/B, RSV and SARS-CoV2 PCR (COVID-19) Nose    Specimen: Nose; Swab   Result Value Ref Range    Influenza A PCR Negative Negative    Influenza B PCR Negative Negative    RSV PCR Negative Negative    SARS CoV2 PCR Negative Negative    Narrative    Testing was performed using the Xpert Xpress CoV2/Flu/RSV Assay on the [employer] GeneXpert Instrument. This test should be ordered for the detection of SARS-CoV2, influenza, and RSV viruses in individuals with signs and symptoms of respiratory tract infection. This test is for in vitro diagnostic use under the [place] FDA for laboratories certified under CLIA to perform high or moderate complexity testing. This test has been [place] FDA cleared. A negative result does not rule out the presence of PCR inhibitors in the specimen or target RNA in concentration below the limit of detection for the assay. If only one viral target is positive but coinfection with multiple targets is suspected, the sample should be re-tested with another FDA cleared, approved, or authorized test, if coninfection would change clinical management. This test was validated by the [facility]. These laboratories are certified under the Clinical Laboratory Improvement Amendments of 1988 (CLIA-88) as qualified to perfom high complexity laboratory testing.   CT Chest Pulmonary Embolism w Contrast    Narrative    CT CHEST PULMONARY EMBOLISM WITH CONTRAST  12/20/2024 11:47 AM    CLINICAL HISTORY: Dyspnea. Elevated d-dimer.    TECHNIQUE: CT angiogram chest during arterial phase injection  IV  contrast. 2D and 3D MIP reconstructions were performed by the CT  technologist. Dose reduction techniques were used.   CONTRAST: 62 mL Isovue-370    COMPARISON: CTA of the chest, abdomen, and pelvis 1/31/2023.    FINDINGS:  ANGIOGRAM CHEST: Pulmonary arteries are normal caliber and negative  for pulmonary emboli. Dilatation of the ascending thoracic aorta  measures 3.8 cm. Thoracic aorta is negative for dissection. Mild  atherosclerotic calcification of the thoracic aorta.    LUNGS AND PLEURA: Moderate upper lung predominant changes of  centrilobular emphysema. There is mild bronchial wall thickening and  scattered mucus plugging in both lungs. Small calcified granulomas in  the left lung. No lung masses or consolidations.    MEDIASTINUM/AXILLAE: Right hilar adenopathy has increased. For  example, an enlarged right hilar lymph node measures 1.8 cm  (previously 1.3 cm) mildly prominent and borderline enlarged  mediastinal lymph nodes have also increased slightly. No pericardial  effusion.    CORONARY ARTERY CALCIFICATION: Previous intervention (CABG).    UPPER ABDOMEN: Postoperative changes of abdominal aortic aneurysm  repair are partially included on this exam.    MUSCULOSKELETAL: Unremarkable.      Impression    IMPRESSION:  1.  No evidence for pulmonary embolism.  2.  Dilatation of the ascending thoracic aorta measures 3.8 cm,  similar to previous.  3.  Emphysema.  4.  Right hilar adenopathy has increased. Mildly prominent and  borderline enlarged mediastinal lymph nodes have also increased.       Medications   iopamidol (ISOVUE-370) solution 62 mL (62 mLs Intravenous $Given 12/20/24 1127)   sodium chloride 0.9 % bag 500mL for CT scan flush use (94 mLs Intravenous $Given 12/20/24 1128)       Assessments & Plan (with Medical Decision Making)     79-year-old male with a history of COPD presented with severe dyspnea this morning and the circumstances described above.  He did improve rapidly in the emergency  department after BiPAP therapy, DuoNeb, steroid therapy.  However he remains uncomfortable with discharge home due to frequent episodes of severe dyspnea.  He has significant COPD and would take little stimulation to make him breathless.  We will admit him for observation and initiate steroid therapy and frequent bronchodilator treatment.  I will defer to the hospitalist service regarding antibiotics.  He has not purulent sputum.  His D-dimer was markedly elevated and he had a CT angio of the chest which showed emphysema but otherwise no cause for his dyspnea including no PE, pneumonia, pneumothorax. Laboratory studies were all reassuring with a normal CBC and blood chemistries and only be elevated D-dimer being abnormal.  Rapid testing for influenza, RSV, COVID were negative.  I discussed his case with Shar Nielsen MD, the hospitalist service and they will assume care and admission.    I have reviewed the nursing notes.    I have reviewed the findings, diagnosis, plan and need for follow up with the patient.         New Prescriptions    No medications on file       Final diagnoses:   Chronic obstructive pulmonary disease with acute exacerbation (H)       12/20/2024   [facility] EMERGENCY DEPT       Alton Walters MD  12/20/24 [id number]

## 2024-12-20 NOTE — ED NOTES
"[facility]   Admission Handoff    The patient is Adolfo Rendon, 79 year old who arrived in the ED by AMBULANCE from home with a complaint of Shortness of Breath (Acute on chronic COPD exacerbation; given duoneb and bipap by EMS with improved enough, pt arrives on mask; now room air 97%)  . The patient's current symptoms are an exacerbation of a chronic illness and during this time the symptoms have decreased. In the ED, patient was diagnosed with   Final diagnoses:   Chronic obstructive pulmonary disease with acute exacerbation (H)         Needed?: No    Allergies:    Allergies   Allergen Reactions    Coreg [Carvedilol] Other (See Comments) and Difficulty breathing     Depression, Couldn't think, felt miserable per patient.    Hctz [Hydrochlorothiazide] Fatigue     \"felt like zombie\"    Loratadine      Mood , irritablity     Metoprolol Fatigue     \"felt like zombie\"    Pravastatin Fatigue     \"felt like zombie\"    Remeron [Mirtazapine]      Agitation       Wellbutrin [Bupropion Hcl]      Sig mood issues          Past Medical Hx:   Past Medical History:   Diagnosis Date    AAA (abdominal aortic aneurysm) without rupture (H)     BCC (basal cell carcinoma), face     Congestive heart failure (H)     COPD (chronic obstructive pulmonary disease) (H)     Coronary artery disease     Depressive disorder, not elsewhere classified     HLD (hyperlipidemia)     Hypertension     Hypertrophy (benign) of prostate     Impotence of organic origin     Other and unspecified hyperlipidemia     Other anxiety states     PVD (peripheral vascular disease) (H)     Tobacco use disorder        Initial vitals were: BP: (!) 165/104  Pulse: 90  Temp: 97.9  F (36.6  C)  Resp: 20  Height: 171.5 cm (5' 7.5\")  Weight: 63.5 kg (140 lb)  SpO2: 95 %   Recent vital Signs: BP (!) 165/104 (BP Location: Right arm)   Pulse 90   Temp 97.9  F (36.6  C) (Oral)   Resp 20   Ht 1.715 m (5' 7.5\")   Wt 63.5 kg (140 lb)  "  SpO2 95%   BMI 21.60 kg/m      Elimination Status: Continent: Yes     Activity Level: SBA/Assist of 1    Fall Status: Reason for falls risk:  Mobility and Reason for falls risk: Elimination  bed/chair alarm on, nonskid shoes/slippers when out of bed, and patient and family education    Baseline Mental status: WDL  Current Mental Status changes: at basesline    Infection present or suspected this encounter: no  Sepsis suspected: No    Isolation type: N/A    Bariatric equipment needed?: No    In the ED these meds were given:   Medications   iopamidol (ISOVUE-370) solution 62 mL (62 mLs Intravenous $Given 12/20/24 1127)   sodium chloride 0.9 % bag 500mL for CT scan flush use (94 mLs Intravenous $Given 12/20/24 112)       Drips running?  No    Home pump  No    Current LDAs: Peripheral IV: Site Left upper arm/AC; Gauge 18  none     Results:   Labs/Imaging  Ordered and Resulted from Time of ED Arrival Up to the Time of Departure from the ED  Results for orders placed or performed during the hospital encounter of 12/20/24 (from the past 24 hours)   CBC with platelets, differential    Narrative    The following orders were created for panel order CBC with platelets, differential.  Procedure                               Abnormality         Status                     ---------                               -----------         ------                     CBC with platelets and d...[[id number]]  Abnormal            Final result                 Please view results for these tests on the individual orders.   Basic metabolic panel   Result Value Ref Range    Sodium 140 135 - 145 mmol/L    Potassium 3.7 3.4 - 5.3 mmol/L    Chloride 101 98 - 107 mmol/L    Carbon Dioxide (CO2) 28 22 - 29 mmol/L    Anion Gap 11 7 - 15 mmol/L    Urea Nitrogen 19.4 8.0 - 23.0 mg/dL    Creatinine 1.14 0.67 - 1.17 mg/dL    GFR Estimate 65 >60 mL/min/1.73m2    Calcium 9.2 8.8 - 10.4 mg/dL    Glucose 119 (H) 70 - 99 mg/dL   [place] Draw    Narrative     The following orders were created for panel order [place] Draw.  Procedure                               Abnormality         Status                     ---------                               -----------         ------                     Extra Blue Top Tube[[id number]]                              Final result               Extra Red Top Tube[[id number]]                               Final result               Extra Heparinized Syringe[[id number]]                        Final result                 Please view results for these tests on the individual orders.   CBC with platelets and differential   Result Value Ref Range    WBC Count 7.0 4.0 - 11.0 10e3/uL    RBC Count 5.28 4.40 - 5.90 10e6/uL    Hemoglobin 14.5 13.3 - 17.7 g/dL    Hematocrit 44.1 40.0 - 53.0 %    MCV 84 78 - 100 fL    MCH 27.5 26.5 - 33.0 pg    MCHC 32.9 31.5 - 36.5 g/dL    RDW 13.4 10.0 - 15.0 %    Platelet Count 195 150 - 450 10e3/uL    % Neutrophils 56 %    % Lymphocytes 15 %    % Monocytes 7 %    % Eosinophils 20 %    % Basophils 1 %    % Immature Granulocytes 0 %    NRBCs per 100 WBC 0 <1 /100    Absolute Neutrophils 3.9 1.6 - 8.3 10e3/uL    Absolute Lymphocytes 1.1 0.8 - 5.3 10e3/uL    Absolute Monocytes 0.5 0.0 - 1.3 10e3/uL    Absolute Eosinophils 1.4 (H) 0.0 - 0.7 10e3/uL    Absolute Basophils 0.1 0.0 - 0.2 10e3/uL    Absolute Immature Granulocytes 0.0 <=0.4 10e3/uL    Absolute NRBCs 0.0 10e3/uL   Extra Blue Top Tube   Result Value Ref Range    Hold Specimen JIC    Extra Red Top Tube   Result Value Ref Range    Hold Specimen JIC    Extra Heparinized Syringe   Result Value Ref Range    Hold Specimen JIC    D dimer quantitative   Result Value Ref Range    D-Dimer Quantitative 2.47 (H) 0.00 - 0.50 ug/mL FEU    Narrative    This D-dimer assay is intended for use in conjunction with a clinical pretest probability assessment model to exclude pulmonary embolism (PE) and deep venous thrombosis (DVT) in outpatients suspected of PE or DVT. The cut-off  value is 0.50 ug/mL FEU.    For patients 50 years of age or older, the application of age-adjusted cut-off values for D-Dimer may increase the specificity without significant effect on sensitivity. The literature suggested calculation age adjusted cut-off in ug/L = age in years x 10 ug/L. The results in this laboratory are reported as ug/mL rather than ug/L. The calculation for age adjusted cut off in ug/mL= age in years x 0.01 ug/mL. For example, the cut off for a 76 year old male is 76 x 0.01 ug/mL = 0.76 ug/mL (760 ug/L).    M Jojo et al. Age adjusted D-dimer cut-off levels to rule out pulmonary embolism: The ADJUST-PE Study. BASIL 2014;311:8786-4703.; HJ Harvidner et al. Diagnostic accuracy of conventional or age adjusted D-dimer cutoff values in older patients with suspected venous thromboembolism. Systemic review and meta-analysis. BMJ 2013:346:f2492.   Comprehensive metabolic panel   Result Value Ref Range    Sodium 140 135 - 145 mmol/L    Potassium 3.7 3.4 - 5.3 mmol/L    Carbon Dioxide (CO2) 28 22 - 29 mmol/L    Anion Gap 11 7 - 15 mmol/L    Urea Nitrogen 19.4 8.0 - 23.0 mg/dL    Creatinine 1.14 0.67 - 1.17 mg/dL    GFR Estimate 65 >60 mL/min/1.73m2    Calcium 9.2 8.8 - 10.4 mg/dL    Chloride 101 98 - 107 mmol/L    Glucose 119 (H) 70 - 99 mg/dL    Alkaline Phosphatase 57 40 - 150 U/L    AST 18 0 - 45 U/L    ALT 12 0 - 70 U/L    Protein Total 7.2 6.4 - 8.3 g/dL    Albumin 4.2 3.5 - 5.2 g/dL    Bilirubin Total 0.3 <=1.2 mg/dL   Nt probnp inpatient (BNP)   Result Value Ref Range    N terminal Pro BNP Inpatient 176 0 - 1,800 pg/mL   Influenza A/B, RSV and SARS-CoV2 PCR (COVID-19) Nose    Specimen: Nose; Swab   Result Value Ref Range    Influenza A PCR Negative Negative    Influenza B PCR Negative Negative    RSV PCR Negative Negative    SARS CoV2 PCR Negative Negative    Narrative    Testing was performed using the Xpert Xpress CoV2/Flu/RSV Assay on the Cepheid GeneXpert Instrument. This test should be ordered  for the detection of SARS-CoV2, influenza, and RSV viruses in individuals with signs and symptoms of respiratory tract infection. This test is for in vitro diagnostic use under the [place] FDA for laboratories certified under CLIA to perform high or moderate complexity testing. This test has been [place] FDA cleared. A negative result does not rule out the presence of PCR inhibitors in the specimen or target RNA in concentration below the limit of detection for the assay. If only one viral target is positive but coinfection with multiple targets is suspected, the sample should be re-tested with another FDA cleared, approved, or authorized test, if coninfection would change clinical management. This test was validated by the [facility]. These laboratories are certified under the Clinical Laboratory Improvement Amendments of 1988 (CLIA-88) as qualified to perfom high complexity laboratory testing.   CT Chest Pulmonary Embolism w Contrast    Narrative    CT CHEST PULMONARY EMBOLISM WITH CONTRAST  12/20/2024 11:47 AM    CLINICAL HISTORY: Dyspnea. Elevated d-dimer.    TECHNIQUE: CT angiogram chest during arterial phase injection IV  contrast. 2D and 3D MIP reconstructions were performed by the CT  technologist. Dose reduction techniques were used.   CONTRAST: 62 mL Isovue-370    COMPARISON: CTA of the chest, abdomen, and pelvis 1/31/2023.    FINDINGS:  ANGIOGRAM CHEST: Pulmonary arteries are normal caliber and negative  for pulmonary emboli. Dilatation of the ascending thoracic aorta  measures 3.8 cm. Thoracic aorta is negative for dissection. Mild  atherosclerotic calcification of the thoracic aorta.    LUNGS AND PLEURA: Moderate upper lung predominant changes of  centrilobular emphysema. There is mild bronchial wall thickening and  scattered mucus plugging in both lungs. Small calcified granulomas in  the left lung. No lung masses or consolidations.    MEDIASTINUM/AXILLAE: Right hilar adenopathy has  increased. For  example, an enlarged right hilar lymph node measures 1.8 cm  (previously 1.3 cm) mildly prominent and borderline enlarged  mediastinal lymph nodes have also increased slightly. No pericardial  effusion.    CORONARY ARTERY CALCIFICATION: Previous intervention (CABG).    UPPER ABDOMEN: Postoperative changes of abdominal aortic aneurysm  repair are partially included on this exam.    MUSCULOSKELETAL: Unremarkable.      Impression    IMPRESSION:  1.  No evidence for pulmonary embolism.  2.  Dilatation of the ascending thoracic aorta measures 3.8 cm,  similar to previous.  3.  Emphysema.  4.  Right hilar adenopathy has increased. Mildly prominent and  borderline enlarged mediastinal lymph nodes have also increased.       For the majority of the shift this patient's behavior was Green     Cardiac Rhythm: N/A  Pt needs tele? No  Skin/wound Issues: None    Code Status: upon discussion with admitting provider    Pain control: pt had none    Nausea control: pt had none    Abnormal labs/tests/findings requiring intervention:     Patient tested for COVID 19 prior to admission: YES     OBS brochure/video discussed/provided to patient/family: Yes     Family present during ED course? Yes    Family Comments/Social Situation comments: Lives at home with family/S.O.    Tasks needing completion: None    Yuki Plasencia RN

## 2024-12-20 NOTE — PROGRESS NOTES
"[place] [facility] ADMISSION NOTE    Patient admitted to room 2313 at approximately 1345 via cart from emergency room. Patient was accompanied by transport tech.     Verbal SBAR report received from IRAIDA Smith prior to patient arrival.     Patient ambulated to bed with one assist. Patient alert and oriented X 3. The patient is not having any pain.  . Admission vital signs: Blood pressure (!) 147/89, pulse 87, temperature 98.1  F (36.7  C), temperature source Oral, resp. rate 18, height 1.715 m (5' 7.5\"), weight 63.5 kg (140 lb), SpO2 91%. Patient was oriented to plan of care, call light, bed controls, tv, telephone, bathroom, and visiting hours.     Risk Assessment    The following safety risks were identified during admission: none. Yellow risk band applied: NO.     Skin Initial Assessment    This writer admitted this patient and completed a full skin assessment and Nadir score in the Adult PCS flowsheet.   Photo documentation of skin problem and/or wound competed via [employer] application (located under Media):  N/A    Appropriate interventions initiated as needed.     Secondary skin check completed by Angela KHOURY.         Education    Patient has a [place] to Observation order: Yes  Observation education completed and documented: Yes      Gabi Watters RN      "

## 2024-12-20 NOTE — ED TRIAGE NOTES
Acute on chronic COPD exacerbation; given duoneb and bipap by EMS with improved enough, pt arrives on mask; now room air 97%     Triage Assessment (Adult)       Row Name 12/20/24 0943          Triage Assessment    Airway WDL WDL        Respiratory WDL    Respiratory WDL X        Cardiac WDL    Cardiac WDL WDL        Cognitive/Neuro/Behavioral WDL    Cognitive/Neuro/Behavioral WDL WDL

## 2024-12-20 NOTE — MEDICATION SCRIBE - ADMISSION MEDICATION HISTORY
Medication Scribe Admission Medication History    Admission medication history is complete. The information provided in this note is only as accurate as the sources available at the time of the update.    Information Source(s): Patient via in-person    Pertinent Information: Patient did not take any medications today (used albuterol inhaler only). Due for Repatha injection in the next 1 to 2 days    Changes made to PTA medication list:  Added: None  Deleted: None  Changed: None    Allergies reviewed with patient and updates made in EHR: yes    Medication History Completed By: Vika Singh 12/20/2024 4:12 PM    PTA Med List   Medication Sig Note Last Dose/Taking    albuterol (PROAIR HFA/PROVENTIL HFA/VENTOLIN HFA) 108 (90 Base) MCG/ACT inhaler USE 2 INHALATIONS BY MOUTH EVERY 4 HOURS AS NEEDED FOR SHORTNESS OF BREATH, WHEEZING OR COUGH (Patient taking differently: Inhale 2 puffs into the lungs every 4 hours as needed for shortness of breath, wheezing or cough.)  12/20/2024 Morning    amLODIPine (NORVASC) 10 MG tablet Take 1 tablet (10 mg) by mouth daily  12/19/2024 Morning    aspirin 81 MG EC tablet Take 81 mg by mouth daily  12/19/2024 Evening    azithromycin (ZITHROMAX) 250 MG tablet Take 2 tablets (500 mg) by mouth daily for 1 day, THEN 1 tablet (250 mg) daily for 4 days. 12/20/2024: Not started yet Unknown    ciclopirox (LOPROX) 0.77 % cream Apply daily to areas on brows, nose, cheeks.  Past Month    co-enzyme Q-10 100 MG CAPS capsule Take 100 mg by mouth daily  12/19/2024 Morning    desonide (DESOWEN) 0.05 % external cream Apply at bedtime if itchy, inflamed to brows, cheek, nose.  Past Week    evolocumab (REPATHA) 140 MG/ML prefilled autoinjector Inject 1 mL (140 mg) Subcutaneous every 14 days  Past Month    guaiFENesin (MUCINEX) 600 MG 12 hr tablet Take 1,200 mg by mouth 2 times daily  12/19/2024 Evening    nicotine polacrilex (NICORETTE) 4 MG gum Place 4 mg inside cheek every hour as needed for smoking  cessation  12/19/2024 Evening    Olodaterol HCl (STRIVERDI RESPIMAT) 2.5 MCG/ACT AERS Inhale 2 puffs into the lungs daily  12/19/2024 Morning    PARoxetine (PAXIL) 30 MG tablet TAKE 2 TABLETS BY MOUTH once daily in the evening (Patient taking differently: Take 2 tablets by mouth every evening. TAKE 2 TABLETS BY MOUTH once daily in the evening)  12/19/2024 Evening    predniSONE (DELTASONE) 20 MG tablet Take 2 tablets (40 mg) by mouth daily. 12/20/2024: Not started Unknown    Vitamin D, Cholecalciferol, 25 MCG (1000 UT) TABS Take 1,000 Units by mouth daily   12/19/2024 Morning

## 2024-12-21 LAB
ALBUMIN SERPL BCG-MCNC: 4.1 G/DL (ref 3.5–5.2)
ALP SERPL-CCNC: 49 U/L (ref 40–150)
ALT SERPL W P-5'-P-CCNC: 11 U/L (ref 0–70)
ANION GAP SERPL CALCULATED.3IONS-SCNC: 10 MMOL/L (ref 7–15)
AST SERPL W P-5'-P-CCNC: 18 U/L (ref 0–45)
BILIRUB SERPL-MCNC: 0.2 MG/DL
BUN SERPL-MCNC: 26.1 MG/DL (ref 8–23)
CALCIUM SERPL-MCNC: 9.8 MG/DL (ref 8.8–10.4)
CHLORIDE SERPL-SCNC: 106 MMOL/L (ref 98–107)
CREAT SERPL-MCNC: 1.21 MG/DL (ref 0.67–1.17)
EGFRCR SERPLBLD CKD-EPI 2021: 61 ML/MIN/1.73M2
ERYTHROCYTE [DISTWIDTH] IN BLOOD BY AUTOMATED COUNT: 13.6 % (ref 10–15)
GLUCOSE SERPL-MCNC: 130 MG/DL (ref 70–99)
HCO3 SERPL-SCNC: 26 MMOL/L (ref 22–29)
HCT VFR BLD AUTO: 42.3 % (ref 40–53)
HGB BLD-MCNC: 13.7 G/DL (ref 13.3–17.7)
MCH RBC QN AUTO: 26.9 PG (ref 26.5–33)
MCHC RBC AUTO-ENTMCNC: 32.4 G/DL (ref 31.5–36.5)
MCV RBC AUTO: 83 FL (ref 78–100)
PLATELET # BLD AUTO: 223 10E3/UL (ref 150–450)
POTASSIUM SERPL-SCNC: 5.1 MMOL/L (ref 3.4–5.3)
PROT SERPL-MCNC: 6.7 G/DL (ref 6.4–8.3)
RBC # BLD AUTO: 5.1 10E6/UL (ref 4.4–5.9)
SODIUM SERPL-SCNC: 142 MMOL/L (ref 135–145)
WBC # BLD AUTO: 4.3 10E3/UL (ref 4–11)

## 2024-12-21 PROCEDURE — 80053 COMPREHEN METABOLIC PANEL: CPT

## 2024-12-21 PROCEDURE — G0378 HOSPITAL OBSERVATION PER HR: HCPCS

## 2024-12-21 PROCEDURE — 999N000157 HC STATISTIC RCP TIME EA 10 MIN

## 2024-12-21 PROCEDURE — 85027 COMPLETE CBC AUTOMATED: CPT

## 2024-12-21 PROCEDURE — 36415 COLL VENOUS BLD VENIPUNCTURE: CPT

## 2024-12-21 PROCEDURE — 99232 SBSQ HOSP IP/OBS MODERATE 35: CPT | Performed by: INTERNAL MEDICINE

## 2024-12-21 PROCEDURE — 250N000012 HC RX MED GY IP 250 OP 636 PS 637

## 2024-12-21 PROCEDURE — 250N000013 HC RX MED GY IP 250 OP 250 PS 637

## 2024-12-21 PROCEDURE — 250N000009 HC RX 250

## 2024-12-21 PROCEDURE — 85018 HEMOGLOBIN: CPT

## 2024-12-21 PROCEDURE — 94640 AIRWAY INHALATION TREATMENT: CPT

## 2024-12-21 RX ADMIN — PREDNISONE 40 MG: 20 TABLET ORAL at 07:55

## 2024-12-21 RX ADMIN — OXYCODONE HYDROCHLORIDE 2.5 MG: 5 TABLET ORAL at 15:38

## 2024-12-21 RX ADMIN — ASPIRIN 81 MG: 81 TABLET, COATED ORAL at 19:37

## 2024-12-21 RX ADMIN — IPRATROPIUM BROMIDE AND ALBUTEROL SULFATE 3 ML: 2.5; .5 SOLUTION RESPIRATORY (INHALATION) at 20:18

## 2024-12-21 RX ADMIN — IPRATROPIUM BROMIDE AND ALBUTEROL SULFATE 3 ML: 2.5; .5 SOLUTION RESPIRATORY (INHALATION) at 11:47

## 2024-12-21 RX ADMIN — SENNOSIDES AND DOCUSATE SODIUM 1 TABLET: 8.6; 5 TABLET ORAL at 19:43

## 2024-12-21 RX ADMIN — GUAIFENESIN 1200 MG: 600 TABLET ORAL at 19:37

## 2024-12-21 RX ADMIN — IPRATROPIUM BROMIDE AND ALBUTEROL SULFATE 3 ML: 2.5; .5 SOLUTION RESPIRATORY (INHALATION) at 15:30

## 2024-12-21 RX ADMIN — ACETAMINOPHEN 650 MG: 325 TABLET ORAL at 08:00

## 2024-12-21 RX ADMIN — GUAIFENESIN 1200 MG: 600 TABLET ORAL at 07:55

## 2024-12-21 RX ADMIN — PAROXETINE HYDROCHLORIDE 60 MG: 30 TABLET, FILM COATED ORAL at 19:37

## 2024-12-21 RX ADMIN — IPRATROPIUM BROMIDE AND ALBUTEROL SULFATE 3 ML: 2.5; .5 SOLUTION RESPIRATORY (INHALATION) at 08:04

## 2024-12-21 RX ADMIN — AMLODIPINE BESYLATE 10 MG: 10 TABLET ORAL at 08:50

## 2024-12-21 ASSESSMENT — ACTIVITIES OF DAILY LIVING (ADL)
ADLS_ACUITY_SCORE: 47
ADLS_ACUITY_SCORE: 50
ADLS_ACUITY_SCORE: 48
ADLS_ACUITY_SCORE: 50
ADLS_ACUITY_SCORE: 47
ADLS_ACUITY_SCORE: 50
ADLS_ACUITY_SCORE: 50
ADLS_ACUITY_SCORE: 47
ADLS_ACUITY_SCORE: 50
ADLS_ACUITY_SCORE: 47
ADLS_ACUITY_SCORE: 47
ADLS_ACUITY_SCORE: 50
ADLS_ACUITY_SCORE: 47
ADLS_ACUITY_SCORE: 47
ADLS_ACUITY_SCORE: 50
ADLS_ACUITY_SCORE: 47
ADLS_ACUITY_SCORE: 50

## 2024-12-21 NOTE — PLAN OF CARE
Goal Outcome Evaluation:       Patient is up independently in room, some shortness of air with activity.  Receiving Nebs and Prednisone, received Tylenol for headache.  Lungs with expiratory wheezes noted, oxygen saturation on room air of 90-93%.

## 2024-12-21 NOTE — PLAN OF CARE
Problem: Gas Exchange Impaired  Goal: Optimal Gas Exchange  Outcome: Not Progressing       Goal Outcome Evaluation:    Patient alert and oriented x4. Up independent in room. Frequent, productive cough, expiratory wheezes. Oxygen sats above 90% on room air. Patient vomiting phlegm this shift. Denies nausea, feels like it is gas build up. Has had similar episodes at home that typically resolve on their own. Zofran given x1 without improvement, patient declined compazine as he doesn't think that it is nausea. Patient declined PM medications due to vomiting. Denies pain.     Chen Rowell RN on 12/20/2024 at 11:02 PM

## 2024-12-22 PROCEDURE — 250N000013 HC RX MED GY IP 250 OP 250 PS 637

## 2024-12-22 PROCEDURE — 99232 SBSQ HOSP IP/OBS MODERATE 35: CPT | Performed by: INTERNAL MEDICINE

## 2024-12-22 PROCEDURE — 250N000013 HC RX MED GY IP 250 OP 250 PS 637: Performed by: FAMILY MEDICINE

## 2024-12-22 PROCEDURE — G0378 HOSPITAL OBSERVATION PER HR: HCPCS

## 2024-12-22 PROCEDURE — 250N000012 HC RX MED GY IP 250 OP 636 PS 637

## 2024-12-22 PROCEDURE — 94640 AIRWAY INHALATION TREATMENT: CPT | Mod: 76

## 2024-12-22 PROCEDURE — 250N000009 HC RX 250

## 2024-12-22 PROCEDURE — 250N000011 HC RX IP 250 OP 636

## 2024-12-22 PROCEDURE — 999N000157 HC STATISTIC RCP TIME EA 10 MIN

## 2024-12-22 RX ORDER — PANTOPRAZOLE SODIUM 40 MG/1
40 TABLET, DELAYED RELEASE ORAL
Status: DISCONTINUED | OUTPATIENT
Start: 2024-12-22 | End: 2024-12-24 | Stop reason: HOSPADM

## 2024-12-22 RX ADMIN — AMLODIPINE BESYLATE 10 MG: 10 TABLET ORAL at 08:34

## 2024-12-22 RX ADMIN — PAROXETINE HYDROCHLORIDE 60 MG: 30 TABLET, FILM COATED ORAL at 20:24

## 2024-12-22 RX ADMIN — GUAIFENESIN 1200 MG: 600 TABLET ORAL at 20:24

## 2024-12-22 RX ADMIN — PREDNISONE 40 MG: 20 TABLET ORAL at 08:34

## 2024-12-22 RX ADMIN — GUAIFENESIN 1200 MG: 600 TABLET ORAL at 08:34

## 2024-12-22 RX ADMIN — CALCIUM CARBONATE (ANTACID) CHEW TAB 500 MG 1000 MG: 500 CHEW TAB at 09:28

## 2024-12-22 RX ADMIN — CALCIUM CARBONATE (ANTACID) CHEW TAB 500 MG 1000 MG: 500 CHEW TAB at 13:30

## 2024-12-22 RX ADMIN — IPRATROPIUM BROMIDE AND ALBUTEROL SULFATE 3 ML: 2.5; .5 SOLUTION RESPIRATORY (INHALATION) at 16:26

## 2024-12-22 RX ADMIN — IPRATROPIUM BROMIDE AND ALBUTEROL SULFATE 3 ML: 2.5; .5 SOLUTION RESPIRATORY (INHALATION) at 20:45

## 2024-12-22 RX ADMIN — OXYCODONE HYDROCHLORIDE 2.5 MG: 5 TABLET ORAL at 03:11

## 2024-12-22 RX ADMIN — CALCIUM CARBONATE (ANTACID) CHEW TAB 500 MG 1000 MG: 500 CHEW TAB at 22:49

## 2024-12-22 RX ADMIN — IPRATROPIUM BROMIDE AND ALBUTEROL SULFATE 3 ML: 2.5; .5 SOLUTION RESPIRATORY (INHALATION) at 08:12

## 2024-12-22 RX ADMIN — OXYCODONE HYDROCHLORIDE 2.5 MG: 5 TABLET ORAL at 18:02

## 2024-12-22 RX ADMIN — SENNOSIDES AND DOCUSATE SODIUM 1 TABLET: 8.6; 5 TABLET ORAL at 20:23

## 2024-12-22 RX ADMIN — IPRATROPIUM BROMIDE AND ALBUTEROL SULFATE 3 ML: 2.5; .5 SOLUTION RESPIRATORY (INHALATION) at 11:09

## 2024-12-22 RX ADMIN — IPRATROPIUM BROMIDE AND ALBUTEROL SULFATE 3 ML: 2.5; .5 SOLUTION RESPIRATORY (INHALATION) at 03:24

## 2024-12-22 RX ADMIN — ASPIRIN 81 MG: 81 TABLET, COATED ORAL at 20:24

## 2024-12-22 RX ADMIN — ONDANSETRON 4 MG: 4 TABLET, ORALLY DISINTEGRATING ORAL at 09:29

## 2024-12-22 ASSESSMENT — ACTIVITIES OF DAILY LIVING (ADL)
ADLS_ACUITY_SCORE: 50

## 2024-12-22 NOTE — PROGRESS NOTES
[facility] Medicine Progress Note  Date of Service: 12/22/2024    Assessment & Plan   Adolfo Rendon is a 79 year old male with past medical history of hypertension, emphysema, coronary artery disease status post bypass, anxiety, hypertension, hyperlipidemia, abdominal aortic aneurysm, depression, former tobacco use now presents on 12/20/2024 with dyspnea. He is being admitted for a COPD exacerbation.     Acute respiratory distress  COPD with acute exacerbation    History of COPD manage PTA with Striverdi respimat inhaler & albuterol inhaler. As weather got colder his breathing has gotten worse. Steroid burst a few weeks ago briefly improved sxs. Now using albuterol inhaler every hour while awake. Dog has a seizure evening 12/19 which significantly exacerbated dyspnea. Repeat seizure AM 12/20 caused additional respiratory distress, and patient could not find his albuterol.     On presentation to ER, tachypneic but oxygenating ok on room air.  Wheezy. No leukocytosis. Covid, flu, RSV negative. D-dimer elevated, but CT PE negative for PE. Does show emphysema & right hilar adenopathy with prominent borderline enlarged mediastinal lymph nodes.     Overall, suspect COPD exacerbation, likely made worse by emotional stress & anxiety.     Patient afraid of having the severe dyspnea and anxiety again at home. He is also greiving his dog.     Patient still nervous about discharge. With ambulation, SpO2 down to 87% on RA with fairly rapid recovery. Discussed at length with him and sister at bedside. Sister is offering patient to stay with her and her  on discharge until he feels ready for home.    - Follow one more night, anticipate discharge tomorrow midday   - patient will trial oxycodone again for dyspnea    - Prednisone 40mg PO Qday x5d then taper  - Ipratropium-albuterol neb four times daily - patient needs DME for neb machine on discharge    - Referral to pulmonary rehab  on discharge   - Oxycodone 2.5mg Q6hrs PRN available for air hunger to prevent triggering anxiety and patient to try today  - Mucinex 1200mg BID    Suspect esophageal spasm     Patient with recurrent chest pain after eating breakfast and associated sometimes with reflux of food/drink, does not always occur.    - trial PPI   - consider outpatient EGD if ongoing symptoms    Coronary artery disease S/P CABG x 4 2019  Ischemic cardiomyopathy  Follows with Dr. Espino, [place] [facility]. 2019 LIMA to LAD and vein graft to obtuse marginal PDA and PL.   ECHO Jan 2024 LVEF 50-55%, basal inferior & inferolateral hypokinesis.   - Continue PTA aspirin 81mg daily   - Not on statin outpatient     Benign essential hypertension  Intermittently hypertensive since presentation. Probably secondary to respiratory distress & discomfort.   - Continue PTA amlodipine 10mg daily    Tobacco abuse  Stopped smoking few years ago. Still uses nicotine gum.   - Nicotine gum available during admission.     Generalized anxiety disorder  Acute anxiety related to health problems with his dog likely contributed to COPD exacerbation, above.   - Continue PTA paroxetine 60mg every evening    Aortic arch aneurysm   Infrarenal abdominal aortic aneurysm (AAA) without rupture s/p repair 3/29/2024  Follows with Dr. Ruiz of [place] [facility]. Last visit 10/29/2024 was doing well and plan is for follow up CT in 1 year.     Clinically Significant Risk Factors Present on Admission                 # Drug Induced Platelet Defect: home medication list includes an antiplatelet medication   # Hypertension: Noted on problem list               # Financial/Environmental Concerns:     # History of CABG: noted on surgical history       Diet: Combination Diet 2 gm NA Diet; Low Saturated Fat Na <2400mg Diet    DVT Prophylaxis: Low Risk/Ambulatory with no VTE prophylaxis indicated  Mo Catheter: Not present  Lines: None     Cardiac Monitoring: None  Code  Status: Full CodeFull    Labs/tests ordered for tomorrow AM  None    Discussion/Disposition: Patient still too uncomfortable with discharge and will follow one more night given he is symptomatic and mild hypoxemia with ambulation.     Medically Ready for Discharge: Anticipated Tomorrow         Medical Decision Making       45 MINUTES SPENT BY ME on the date of service doing chart review, history, exam, documentation & further activities per the note.        Shar Nielsen MD  [facility]  Securely message with [employer] (more info)  Text page via [employer] Paging/Directory       Interval History   Had chest pain with breakfast morning described as pressure that can be relieved with belching or throwing up/regurgitating.  Has had this at home but now has had 2 episodes in the hospital.  Does not happen every time he eats just occasionally.  He does also get some heartburn and reflux at other times.  Breathing is feeling little better though still has a sense of shortness of breath.  He did get relief with low-dose oxycodone yesterday that helped him sleep overnight, but he did not feel the dose he got this morning helped him.  The dose this morning however was given around the time of the epigastric/chest pain which may have masked its benefit for improving sensation of dyspnea.    Physical Exam   Temp:  [97  F (36.1  C)-99.7  F (37.6  C)] 99.7  F (37.6  C)  Pulse:  [83-90] 90  Resp:  [16-18] 18  BP: (126-133)/(77-91) 133/91  SpO2:  [90 %-91 %] 90 %    Weights:   Vitals:    12/20/24 0942   Weight: 63.5 kg (140 lb)    Body mass index is 21.6 kg/m .    Constitutional: alert and oriented, mild increased work of breathing with talking and sitting up. Thin and weak appearing but otherwise nontoxic and NAD.  CV: Regular, no edema  Respiratory: Decreased breath sounds throughout, there is some expiratory wheezes heard primarily anteriorly bilaterally, occasional rhonchi throughout the  posterior lung fields, prolonged expiratory phase but no wheezes posteriorly.  GI: Soft, non-tender, bowel sounds normal  Skin: Warm and dry    Data   Recent Labs   Lab 12/21/24  0518 12/20/24  0944   WBC 4.3 7.0   HGB 13.7 14.5   MCV 83 84    195    140  140   POTASSIUM 5.1 3.7  3.7   CHLORIDE 106 101  101   CO2 26 28  28   BUN 26.1* 19.4  19.4   CR 1.21* 1.14  1.14   ANIONGAP 10 11  11   KARL 9.8 9.2  9.2   * 119*  119*   ALBUMIN 4.1 4.2   PROTTOTAL 6.7 7.2   BILITOTAL 0.2 0.3   ALKPHOS 49 57   ALT 11 12   AST 18 18       Recent Labs   Lab 12/21/24  0518 12/20/24  0944   * 119*  119*        Unresulted Labs Ordered in the Past 30 Days of this Admission       No orders found from 11/20/2024 to 12/21/2024.             Imaging: No results found for this or any previous visit (from the past 24 hours).     I reviewed all new labs and imaging results over the last 24 hours. I personally reviewed no images or EKG's today.    Medications   Current Facility-Administered Medications   Medication Dose Route Frequency Provider Last Rate Last Admin     Current Facility-Administered Medications   Medication Dose Route Frequency Provider Last Rate Last Admin    amLODIPine (NORVASC) tablet 10 mg  10 mg Oral Daily Barbara Angela PA-C   10 mg at 12/22/24 0834    aspirin EC tablet 81 mg  81 mg Oral QPM Barbara Angela PA-C   81 mg at 12/21/24 1937    guaiFENesin (MUCINEX) 12 hr tablet 1,200 mg  1,200 mg Oral BID Barbara Angela PA-C   1,200 mg at 12/22/24 0834    ipratropium - albuterol 0.5 mg/2.5 mg/3 mL (DUONEB) neb solution 3 mL  3 mL Nebulization 4x daily Barbara Angela PA-C   3 mL at 12/22/24 1626    PARoxetine (PAXIL) tablet 60 mg  60 mg Oral QPM Barbara Angela PA-C   60 mg at 12/21/24 1937    predniSONE (DELTASONE) tablet 40 mg  40 mg Oral Daily Barbara Angela PA-C   40 mg at 12/22/24 0834    sodium chloride (PF) 0.9% PF flush 3 mL  3 mL Intracatheter Q8H  Gia, Shar Jolly MD   3 mL at 12/22/24 0835       Shar Nielsen MD  [facility]

## 2024-12-22 NOTE — PLAN OF CARE
Goal Outcome Evaluation:         Patient received Tums and Zofran for indigestion, heartburn and nausea.  Reports minimal relief, states he feels better when lying in bed.  Fatigued today and did not want to ambulate in halls.  Dyspneic with activity and somewhat anxious ,declined Oxycodone. Oxygen saturation on room air 90%.

## 2024-12-22 NOTE — PLAN OF CARE
Problem: Adult Inpatient Plan of Care  Goal: Optimal Comfort and Wellbeing  Outcome: Progressing     Problem: Gas Exchange Impaired  Goal: Optimal Gas Exchange  Outcome: Progressing       Goal Outcome Evaluation:    Patient alert and oriented x4. Up independent in room. Ambulated in seals this evening. Oxycodone 2.5 mg given for dyspnea per Dr. Nielsen recommendation, patient reporting improvement. Expiratory wheezes, oxygen sats above 90% on room air.     Chen Rowell RN on 12/21/2024 at 9:48 PM

## 2024-12-23 PROBLEM — Z87.891 PERSONAL HISTORY OF TOBACCO USE, PRESENTING HAZARDS TO HEALTH: Status: ACTIVE | Noted: 2024-12-23

## 2024-12-23 PROCEDURE — 250N000013 HC RX MED GY IP 250 OP 250 PS 637: Performed by: INTERNAL MEDICINE

## 2024-12-23 PROCEDURE — 250N000009 HC RX 250

## 2024-12-23 PROCEDURE — 999N000157 HC STATISTIC RCP TIME EA 10 MIN

## 2024-12-23 PROCEDURE — 94640 AIRWAY INHALATION TREATMENT: CPT | Mod: 76

## 2024-12-23 PROCEDURE — 120N000001 HC R&B MED SURG/OB

## 2024-12-23 PROCEDURE — G0378 HOSPITAL OBSERVATION PER HR: HCPCS

## 2024-12-23 PROCEDURE — 250N000012 HC RX MED GY IP 250 OP 636 PS 637

## 2024-12-23 PROCEDURE — 250N000013 HC RX MED GY IP 250 OP 250 PS 637

## 2024-12-23 PROCEDURE — 99232 SBSQ HOSP IP/OBS MODERATE 35: CPT | Performed by: INTERNAL MEDICINE

## 2024-12-23 RX ADMIN — IPRATROPIUM BROMIDE AND ALBUTEROL SULFATE 3 ML: 2.5; .5 SOLUTION RESPIRATORY (INHALATION) at 16:23

## 2024-12-23 RX ADMIN — IPRATROPIUM BROMIDE AND ALBUTEROL SULFATE 3 ML: 2.5; .5 SOLUTION RESPIRATORY (INHALATION) at 08:32

## 2024-12-23 RX ADMIN — AMLODIPINE BESYLATE 10 MG: 10 TABLET ORAL at 08:48

## 2024-12-23 RX ADMIN — PANTOPRAZOLE SODIUM 40 MG: 40 TABLET, DELAYED RELEASE ORAL at 06:04

## 2024-12-23 RX ADMIN — IPRATROPIUM BROMIDE AND ALBUTEROL SULFATE 3 ML: 2.5; .5 SOLUTION RESPIRATORY (INHALATION) at 20:00

## 2024-12-23 RX ADMIN — ASPIRIN 81 MG: 81 TABLET, COATED ORAL at 20:20

## 2024-12-23 RX ADMIN — PAROXETINE HYDROCHLORIDE 60 MG: 30 TABLET, FILM COATED ORAL at 20:20

## 2024-12-23 RX ADMIN — GUAIFENESIN 1200 MG: 600 TABLET ORAL at 20:20

## 2024-12-23 RX ADMIN — PREDNISONE 40 MG: 20 TABLET ORAL at 08:48

## 2024-12-23 RX ADMIN — GUAIFENESIN 1200 MG: 600 TABLET ORAL at 08:48

## 2024-12-23 RX ADMIN — IPRATROPIUM BROMIDE AND ALBUTEROL SULFATE 3 ML: 2.5; .5 SOLUTION RESPIRATORY (INHALATION) at 12:13

## 2024-12-23 ASSESSMENT — ACTIVITIES OF DAILY LIVING (ADL)
ADLS_ACUITY_SCORE: 49
ADLS_ACUITY_SCORE: 50
ADLS_ACUITY_SCORE: 50
ADLS_ACUITY_SCORE: 49
ADLS_ACUITY_SCORE: 50
ADLS_ACUITY_SCORE: 50
ADLS_ACUITY_SCORE: 49
ADLS_ACUITY_SCORE: 49
ADLS_ACUITY_SCORE: 50
ADLS_ACUITY_SCORE: 49
ADLS_ACUITY_SCORE: 49
ADLS_ACUITY_SCORE: 50
ADLS_ACUITY_SCORE: 50
ADLS_ACUITY_SCORE: 49
ADLS_ACUITY_SCORE: 50

## 2024-12-23 NOTE — PROGRESS NOTES
[facility]    Hospitalist Progress Note    Assessment & Plan   Adolfo Rendon is a 79 year old male with past medical history of hypertension, emphysema, coronary artery disease status post bypass, anxiety, hypertension, hyperlipidemia, abdominal aortic aneurysm, depression, former tobacco use now presents on 12/20/2024 with dyspnea. He is being admitted for a COPD exacerbation.      Acute respiratory distress  COPD with acute exacerbation    History of COPD manage PTA with Striverdi respimat inhaler & albuterol inhaler. As weather got colder his breathing has gotten worse. Steroid burst a few weeks ago briefly improved sxs. Now using albuterol inhaler every hour while awake. Dog has a seizure evening 12/19 which significantly exacerbated dyspnea. Repeat seizure AM 12/20 caused additional respiratory distress, and patient could not find his albuterol.     On presentation to ER, tachypneic but oxygenating ok on room air.  Wheezy. No leukocytosis. Covid, flu, RSV negative. D-dimer elevated, but CT PE negative for PE. Does show emphysema & right hilar adenopathy with prominent borderline enlarged mediastinal lymph nodes.     Overall, suspect COPD exacerbation, likely made worse by emotional stress & anxiety.     Patient afraid of having the severe dyspnea and anxiety again at home. He is also greiving his dog.     Patient still nervous about discharge. With ambulation, SpO2 down to 87% on RA with fairly rapid recovery. Discussed at length with him and sister at bedside. Sister is offering patient to stay with her and her  on discharge until he feels ready for home.   - patient will trial oxycodone again for dyspnea    - Prednisone 40mg PO Qday x5d then taper  - Ipratropium-albuterol neb four times daily - patient needs DME for neb machine on discharge   - Referral to pulmonary rehab on discharge   - Oxycodone 2.5mg Q6hrs PRN available for air hunger to prevent triggering anxiety and  patient to try today  - Mucinex 1200mg BID  - Patient reports increased dyspnea this morning, will reassess for discharge tomorrow     Suspect esophageal spasm     Patient with recurrent chest pain after eating breakfast and associated sometimes with reflux of food/drink, does not always occur.    - trial PPI   - consider outpatient EGD if ongoing symptoms     Coronary artery disease S/P CABG x 4 2019  Ischemic cardiomyopathy  Follows with Dr. Espino, [place] [facility]. 2019 LIMA to LAD and vein graft to obtuse marginal PDA and PL.   ECHO Jan 2024 LVEF 50-55%, basal inferior & inferolateral hypokinesis.   - Continue PTA aspirin 81mg daily   - Not on statin outpatient      Benign essential hypertension  Intermittently hypertensive since presentation. Probably secondary to respiratory distress & discomfort.   - Continue PTA amlodipine 10mg daily     Tobacco abuse  Stopped smoking few years ago. Still uses nicotine gum.   - Nicotine gum available during admission.      Generalized anxiety disorder  Acute anxiety related to health problems with his dog likely contributed to COPD exacerbation, above.   - Continue PTA paroxetine 60mg every evening     Aortic arch aneurysm   Infrarenal abdominal aortic aneurysm (AAA) without rupture s/p repair 3/29/2024  Follows with Dr. Ruiz of [place] [facility]. Last visit 10/29/2024 was doing well and plan is for follow up CT in 1 year.     Diet: Combination Diet 2 gm NA Diet; Low Saturated Fat Na <2400mg Diet    DVT Prophylaxis: Low Risk/Ambulatory with no VTE prophylaxis indicated  Mo Catheter: Not present  Lines: None     Cardiac Monitoring: None  Code Status: Full CodeFull     Clinically Significant Risk Factors Present on Admission              # Drug Induced Platelet Defect: home medication list includes an antiplatelet medication   # Hypertension: Noted on problem list               # Financial/Environmental Concerns:     # History of CABG: noted on surgical  history     Date of Service (when I saw the patient): 12/23/2024    Disposition: Medically Ready for Discharge: Anticipated Tomorrow    Deandre Alberto Lucia MD    Interval History   Patient states he is more short of breath today as compared to yesterday.  Unable to walk to bathroom and back without significant dyspnea.    -Data reviewed today: I reviewed all new labs and imaging results over the last 24 hours. I personally reviewed no ECG or imaging today.    No results found for this or any previous visit (from the past 24 hours).  Significant Results and Procedures     Physical Exam   Temp: 98.4  F (36.9  C) Temp src: Oral BP: 138/79 Pulse: 91   Resp: 16 SpO2: 92 % O2 Device: Nasal cannula Oxygen Delivery: 2 LPM  Vitals:    12/20/24 0942   Weight: 63.5 kg (140 lb)     Vital Signs with Ranges  Temp:  [97.7  F (36.5  C)-99.7  F (37.6  C)] 98.4  F (36.9  C)  Pulse:  [88-91] 91  Resp:  [16-18] 16  BP: (116-138)/(72-79) 138/79  SpO2:  [91 %-95 %] 92 %  No intake/output data recorded.    Gen: Well nourished, well developed, alert and oriented x 3, no acute distressed  HEENT: Atraumatic, normocephalic; sclera non-injected, anicterric; oral mucosa moist, no lesion, no exudate  Lungs: Biapical wheezes, scattered rhonchi, no rales  Heart: Regular rate, regular rhythm, no gallops, no rubs, no murmurs  GI: Bowel sound normal, no hepatosplenomegaly, no masses, non-tender, non-distended, no guarding, no rebound tenderness  Extremities: No rashes, no edema  Musculoskeletal: Normal muscle mass and tone, no arthritis    Medications   Current Facility-Administered Medications   Medication Dose Route Frequency Provider Last Rate Last Admin     Current Facility-Administered Medications   Medication Dose Route Frequency Provider Last Rate Last Admin    amLODIPine (NORVASC) tablet 10 mg  10 mg Oral Daily Barbara Angela PA-C   10 mg at 12/23/24 0848    aspirin EC tablet 81 mg  81 mg Oral QPM Barbara Angela PA-C   81 mg at  12/22/24 2024    guaiFENesin (MUCINEX) 12 hr tablet 1,200 mg  1,200 mg Oral BID Barbara Angela PA-C   1,200 mg at 12/23/24 0848    ipratropium - albuterol 0.5 mg/2.5 mg/3 mL (DUONEB) neb solution 3 mL  3 mL Nebulization 4x daily Barbara Angela PA-C   3 mL at 12/23/24 1213    pantoprazole (PROTONIX) EC tablet 40 mg  40 mg Oral QAM AC Shar Nielsen MD   40 mg at 12/23/24 0604    PARoxetine (PAXIL) tablet 60 mg  60 mg Oral QPM Barbara Angela PA-C   60 mg at 12/22/24 2024    predniSONE (DELTASONE) tablet 40 mg  40 mg Oral Daily Barbara Angela PA-C   40 mg at 12/23/24 0848    sodium chloride (PF) 0.9% PF flush 3 mL  3 mL Intracatheter Q8H Shar Nielsen MD   3 mL at 12/23/24 0848       Data   Recent Labs   Lab 12/21/24  0518 12/20/24  0944   WBC 4.3 7.0   HGB 13.7 14.5   MCV 83 84    195    140  140   POTASSIUM 5.1 3.7  3.7   CHLORIDE 106 101  101   CO2 26 28  28   BUN 26.1* 19.4  19.4   CR 1.21* 1.14  1.14   ANIONGAP 10 11  11   KARL 9.8 9.2  9.2   * 119*  119*   ALBUMIN 4.1 4.2   PROTTOTAL 6.7 7.2   BILITOTAL 0.2 0.3   ALKPHOS 49 57   ALT 11 12   AST 18 18

## 2024-12-23 NOTE — PROGRESS NOTES
"Adolfo Rendon has received all nebs per MD order.    Device: NC  FiO2: 1 LPM  Breath sounds: DIMINISHED/ WHEEZY    /67 (BP Location: Right arm)   Pulse 84   Temp 97.9  F (36.6  C) (Oral)   Resp 17   Ht 1.715 m (5' 7.5\")   Wt 63.5 kg (140 lb)   SpO2 94%   BMI 21.60 kg/m      RT will continue to treat and monitor.       Sully Dennis, RT on 12/23/2024 at 4:25 PM    "

## 2024-12-23 NOTE — PLAN OF CARE
Goal Outcome Evaluation:      Plan of Care Reviewed With: patient    Overall Patient Progress: improvingOverall Patient Progress: improving    Assumed care of patient [id number]. Alert and oriented. Independent in room. Ambulated in hallway at 0330 with walker. No shortness of breath noted. Infrequent coughing. Expiratory wheezes. On room air. Plan to discharge home today.

## 2024-12-23 NOTE — PROGRESS NOTES
Patient Sp02 found to be 84% on room air prior to morning nebulizer. Patients Sp02 improved with nebulizer(8 LPM flow). Placed on 2 LPM nasal cannula post treatment. Titrate as able.

## 2024-12-23 NOTE — PLAN OF CARE
Problem: Adult Inpatient Plan of Care  Goal: Optimal Comfort and Wellbeing  Outcome: Progressing     Problem: Gas Exchange Impaired  Goal: Optimal Gas Exchange  Outcome: Progressing       Goal Outcome Evaluation:    Patient alert and oriented x4. Up independent in room. Ambulated in seals this evening. Oxycodone 2.5 mg given for dyspnea this shift, patient reporting improvement. Expiratory wheezes, oxygen sats above 90% on room air.     Chen Rowell RN on 12/23/2024 at 12:04 AM

## 2024-12-24 VITALS
OXYGEN SATURATION: 94 % | SYSTOLIC BLOOD PRESSURE: 135 MMHG | TEMPERATURE: 97.2 F | HEIGHT: 68 IN | RESPIRATION RATE: 18 BRPM | HEART RATE: 77 BPM | WEIGHT: 140 LBS | BODY MASS INDEX: 21.22 KG/M2 | DIASTOLIC BLOOD PRESSURE: 86 MMHG

## 2024-12-24 LAB
ANION GAP SERPL CALCULATED.3IONS-SCNC: 9 MMOL/L (ref 7–15)
BUN SERPL-MCNC: 22 MG/DL (ref 8–23)
CALCIUM SERPL-MCNC: 9 MG/DL (ref 8.8–10.4)
CHLORIDE SERPL-SCNC: 103 MMOL/L (ref 98–107)
CREAT SERPL-MCNC: 1.04 MG/DL (ref 0.67–1.17)
EGFRCR SERPLBLD CKD-EPI 2021: 73 ML/MIN/1.73M2
GLUCOSE SERPL-MCNC: 91 MG/DL (ref 70–99)
HCO3 SERPL-SCNC: 27 MMOL/L (ref 22–29)
HOLD SPECIMEN: NORMAL
POTASSIUM SERPL-SCNC: 4 MMOL/L (ref 3.4–5.3)
SODIUM SERPL-SCNC: 139 MMOL/L (ref 135–145)

## 2024-12-24 PROCEDURE — 99239 HOSP IP/OBS DSCHRG MGMT >30: CPT | Performed by: INTERNAL MEDICINE

## 2024-12-24 PROCEDURE — 94640 AIRWAY INHALATION TREATMENT: CPT | Mod: 76

## 2024-12-24 PROCEDURE — 250N000013 HC RX MED GY IP 250 OP 250 PS 637

## 2024-12-24 PROCEDURE — 250N000012 HC RX MED GY IP 250 OP 636 PS 637

## 2024-12-24 PROCEDURE — 36415 COLL VENOUS BLD VENIPUNCTURE: CPT | Performed by: INTERNAL MEDICINE

## 2024-12-24 PROCEDURE — 250N000009 HC RX 250

## 2024-12-24 PROCEDURE — 94640 AIRWAY INHALATION TREATMENT: CPT

## 2024-12-24 PROCEDURE — 80048 BASIC METABOLIC PNL TOTAL CA: CPT | Performed by: INTERNAL MEDICINE

## 2024-12-24 PROCEDURE — 999N000157 HC STATISTIC RCP TIME EA 10 MIN

## 2024-12-24 PROCEDURE — 250N000013 HC RX MED GY IP 250 OP 250 PS 637: Performed by: INTERNAL MEDICINE

## 2024-12-24 RX ORDER — OXYCODONE HYDROCHLORIDE 5 MG/1
2.5 TABLET ORAL EVERY 6 HOURS PRN
Qty: 30 TABLET | Refills: 0 | Status: SHIPPED | OUTPATIENT
Start: 2024-12-24

## 2024-12-24 RX ORDER — PANTOPRAZOLE SODIUM 40 MG/1
40 TABLET, DELAYED RELEASE ORAL
Qty: 30 TABLET | Refills: 0 | Status: SHIPPED | OUTPATIENT
Start: 2024-12-25

## 2024-12-24 RX ORDER — GUAIFENESIN 600 MG/1
1200 TABLET, EXTENDED RELEASE ORAL 2 TIMES DAILY
COMMUNITY
Start: 2024-12-24

## 2024-12-24 RX ORDER — PREDNISONE 10 MG/1
TABLET ORAL
Qty: 30 TABLET | Refills: 0 | Status: SHIPPED | OUTPATIENT
Start: 2024-12-24

## 2024-12-24 RX ORDER — IPRATROPIUM BROMIDE AND ALBUTEROL SULFATE 2.5; .5 MG/3ML; MG/3ML
3 SOLUTION RESPIRATORY (INHALATION) 4 TIMES DAILY
Qty: 90 ML | Refills: 3 | Status: SHIPPED | OUTPATIENT
Start: 2024-12-24

## 2024-12-24 RX ADMIN — AMLODIPINE BESYLATE 10 MG: 10 TABLET ORAL at 07:56

## 2024-12-24 RX ADMIN — PREDNISONE 40 MG: 20 TABLET ORAL at 07:56

## 2024-12-24 RX ADMIN — PANTOPRAZOLE SODIUM 40 MG: 40 TABLET, DELAYED RELEASE ORAL at 06:46

## 2024-12-24 RX ADMIN — GUAIFENESIN 1200 MG: 600 TABLET ORAL at 07:56

## 2024-12-24 RX ADMIN — IPRATROPIUM BROMIDE AND ALBUTEROL SULFATE 3 ML: 2.5; .5 SOLUTION RESPIRATORY (INHALATION) at 11:50

## 2024-12-24 RX ADMIN — IPRATROPIUM BROMIDE AND ALBUTEROL SULFATE 3 ML: 2.5; .5 SOLUTION RESPIRATORY (INHALATION) at 07:47

## 2024-12-24 ASSESSMENT — ACTIVITIES OF DAILY LIVING (ADL)
ADLS_ACUITY_SCORE: 49

## 2024-12-24 NOTE — PLAN OF CARE
Problem: Adult Inpatient Plan of Care  Goal: Plan of Care Review  Description: The Plan of Care Review/Shift note should be completed every shift.  The Outcome Evaluation is a brief statement about your assessment that the patient is improving, declining, or no change.  This information will be displayed automatically on your shift  note.  Flowsheets (Taken 12/23/2024 1840)  Outcome Evaluation: Pt A/Ox4, reporting wheezing at beginning of shift, nebs given per RT successfully. pt wean off O2 to RA, denies dyspnea with ambulation. pt ambulating halls multiple times thorughout shift. pt's IV SL, denies pain. Will continue to monitor pt thoughout shift.  Plan of Care Reviewed With: patient  Overall Patient Progress: improving  Goal: Absence of Hospital-Acquired Illness or Injury  Intervention: Identify and Manage Fall Risk  Recent Flowsheet Documentation  Taken 12/23/2024 1713 by Eunice Bell RN  Safety Promotion/Fall Prevention:   clutter free environment maintained   lighting adjusted   nonskid shoes/slippers when out of bed   assistive device/personal items within reach   activity supervised   patient and family education   safety round/check completed  Taken 12/23/2024 0850 by Eunice Bell RN  Safety Promotion/Fall Prevention:   clutter free environment maintained   lighting adjusted   nonskid shoes/slippers when out of bed   assistive device/personal items within reach   activity supervised   patient and family education   safety round/check completed  Intervention: Prevent Skin Injury  Recent Flowsheet Documentation  Taken 12/23/2024 1713 by Eunice Bell RN  Body Position: position changed independently  Taken 12/23/2024 0850 by Eunice Bell RN  Body Position: position changed independently  Intervention: Prevent Infection  Recent Flowsheet Documentation  Taken 12/23/2024 1713 by Eunice Bell RN  Infection Prevention:   hand hygiene promoted   single patient room provided   rest/sleep  "promoted  Taken 12/23/2024 0850 by Eunice Bell, RN  Infection Prevention:   hand hygiene promoted   single patient room provided   rest/sleep promoted     Problem: Gas Exchange Impaired  Goal: Optimal Gas Exchange  Intervention: Optimize Oxygenation and Ventilation  Recent Flowsheet Documentation  Taken 12/23/2024 1713 by Eunice Bell, RN  Head of Bed (HOB) Positioning: HOB at 30-45 degrees  Taken 12/23/2024 0850 by Eunice Bell RN  Head of Bed (HOB) Positioning: HOB at 30-45 degrees   Goal Outcome Evaluation:      Plan of Care Reviewed With: patient    Overall Patient Progress: improvingOverall Patient Progress: improving    Outcome Evaluation: Pt A/Ox4, reporting wheezing at beginning of shift, nebs given per RT successfully. pt wean off O2 to RA, denies dyspnea with ambulation. pt ambulating halls multiple times thorughout shift. pt's IV SL, denies pain. Will continue to monitor pt thoughout shift.  /67 (BP Location: Right arm)   Pulse 84   Temp 97.9  F (36.6  C) (Oral)   Resp 17   Ht 1.715 m (5' 7.5\")   Wt 63.5 kg (140 lb)   SpO2 94%   BMI 21.60 kg/m    Eunice Bell RN on 12/23/2024 at 6:43 PM           "

## 2024-12-24 NOTE — PLAN OF CARE
"  Problem: Adult Inpatient Plan of Care  Goal: Plan of Care Review  Description: The Plan of Care Review/Shift note should be completed every shift.  The Outcome Evaluation is a brief statement about your assessment that the patient is improving, declining, or no change.  This information will be displayed automatically on your shift  note.  Outcome: Progressing  Goal: Patient-Specific Goal (Individualized)  Description: You can add care plan individualizations to a care plan. Examples of Individualization might be:  \"Parent requests to be called daily at 9am for status\", \"I have a hard time hearing out of my right ear\", or \"Do not touch me to wake me up as it startles  me\".  Outcome: Progressing  Goal: Absence of Hospital-Acquired Illness or Injury  Outcome: Progressing  Intervention: Identify and Manage Fall Risk  Recent Flowsheet Documentation  Taken 12/24/2024 0900 by Channing Cunningham RN  Safety Promotion/Fall Prevention: clutter free environment maintained  Intervention: Prevent Skin Injury  Recent Flowsheet Documentation  Taken 12/24/2024 0900 by Channing Cunningham RN  Body Position: position changed independently  Intervention: Prevent Infection  Recent Flowsheet Documentation  Taken 12/24/2024 0900 by Channing Cunningham RN  Infection Prevention: hand hygiene promoted  Goal: Optimal Comfort and Wellbeing  Outcome: Progressing  Intervention: Provide Person-Centered Care  Recent Flowsheet Documentation  Taken 12/24/2024 0900 by Channing Cunningham RN  Trust Relationship/Rapport:   care explained   choices provided  Goal: Readiness for Transition of Care  Outcome: Progressing     Problem: Gas Exchange Impaired  Goal: Optimal Gas Exchange  Outcome: Progressing   Goal Outcome Evaluation:         A&Ox4  Vitals stable on room air  Lung sounds assessed as wheezy, this assessment was done right after RT administered nebulizers  Writer did not use stethoscope to assess lung sounds, wheezes were audible in room at that time  Patient also " reports feeling wheezy this morning  Patient can become dyspneic when walking if talking too much  Has been walking in hallways and in room independently on room air  IV in left AC was leaking and patient reported uncomfortable, writer removed, patient does not currently have IV access at time of this note (0046)  Adalid Snell RN [facility]

## 2024-12-24 NOTE — PROGRESS NOTES
[facility] DISCHARGE NOTE    Patient discharged to home at 1:06 PM via wheel chair. Accompanied by sister and staff. Discharge instructions reviewed with patient, opportunity offered to ask questions. Prescriptions sent to patients preferred pharmacy. All belongings sent with patient.    Channing Cunningham RN

## 2024-12-24 NOTE — DISCHARGE SUMMARY
[facility]  Hospitalist Discharge Summary       Date of Admission:  12/20/2024  Date of Discharge:  December 24, 2024  Discharging Provider: Deandre Lucia MD      Discharge Diagnoses     Acute respiratory distress  COPD with acute exacerbation  Suspect esophageal spasm   Coronary artery disease S/P CABG x 4 2019  Ischemic cardiomyopathy  Benign essential hypertension  Tobacco abuse  Generalized anxiety disorder  Aortic arch aneurysm   Infrarenal abdominal aortic aneurysm (AAA) without rupture s/p repair 3/29/2024  Drug Induced Platelet Defect  Hypertension  History of CABG    Follow-ups Needed After Discharge   Follow-up Appointments       Follow-up and recommended labs and tests       Follow up with primary care provider, Danielle Mercado, within 7 days for hospital follow- up.  The following labs/tests are recommended: CBC and CMP.                Discharge Disposition   Discharged to home    Hospital Course   Adolfo Rendon is a 79 year old male with past medical history of hypertension, emphysema, coronary artery disease status post bypass, anxiety, hypertension, hyperlipidemia, abdominal aortic aneurysm, depression, former tobacco use now presents on 12/20/2024 with dyspnea. He is being admitted for a COPD exacerbation.      Acute respiratory distress  COPD with acute exacerbation    History of COPD manage PTA with Striverdi respimat inhaler & albuterol inhaler. As weather got colder his breathing has gotten worse. Steroid burst a few weeks ago briefly improved sxs. Now using albuterol inhaler every hour while awake. Dog has a seizure evening 12/19 which significantly exacerbated dyspnea. Repeat seizure AM 12/20 caused additional respiratory distress, and patient could not find his albuterol.     On presentation to ER, tachypneic but oxygenating ok on room air.  Wheezy. No leukocytosis. Covid, flu, RSV negative. D-dimer elevated, but CT PE negative for PE. Does show  emphysema & right hilar adenopathy with prominent borderline enlarged mediastinal lymph nodes.     Overall, suspect COPD exacerbation, likely made worse by emotional stress & anxiety.     Patient afraid of having the severe dyspnea and anxiety again at home. He is also greiving his dog.     Patient still nervous about discharge. With ambulation, SpO2 down to 87% on RA with fairly rapid recovery. Discussed at length with him and sister at bedside. Sister is offering patient to stay with her and her  on discharge until he feels ready for home.   - patient will trial oxycodone again for dyspnea    - Prednisone 40mg PO Qday x5d then taper  - Ipratropium-albuterol neb four times daily - patient needs DME for neb machine on discharge   - Referral to pulmonary rehab on discharge   - Oxycodone 2.5mg Q6hrs PRN available for air hunger to prevent triggering anxiety and patient to try today  - Mucinex 1200mg BID  - Patient stable for discharge, he is off supplemental oxygen     Suspect esophageal spasm     Patient with recurrent chest pain after eating breakfast and associated sometimes with reflux of food/drink, does not always occur.    - trial PPI   - consider outpatient EGD if ongoing symptoms     Coronary artery disease S/P CABG x 4 2019  Ischemic cardiomyopathy  Follows with Dr. Espino, [facility]. 2019 LIMA to LAD and vein graft to obtuse marginal PDA and PL.   ECHO Jan 2024 LVEF 50-55%, basal inferior & inferolateral hypokinesis.   - Continue PTA aspirin 81mg daily   - Not on statin outpatient      Benign essential hypertension  Intermittently hypertensive since presentation. Probably secondary to respiratory distress & discomfort.   - Continue PTA amlodipine 10mg daily     Tobacco abuse  Stopped smoking few years ago. Still uses nicotine gum.   - Nicotine gum available during admission.      Generalized anxiety disorder  Acute anxiety related to health problems with his dog likely contributed to COPD  exacerbation, above.   - Continue PTA paroxetine 60mg every evening     Aortic arch aneurysm   Infrarenal abdominal aortic aneurysm (AAA) without rupture s/p repair 3/29/2024  Follows with Dr. Ruiz of [facility]. Last visit 10/29/2024 was doing well and plan is for follow up CT in 1 year.     Clinically Significant Risk Factors Present on Admission   # Drug Induced Platelet Defect: home medication list includes an antiplatelet medication   # Hypertension: Noted on problem list               # Financial/Environmental Concerns:     # History of CABG: noted on surgical history     Consultations This Hospital Stay Code Status Full Code    35 MINUTES SPENT BY ME on the date of service doing chart review, history, exam, documentation & further activities per the note.     Deandre Lucia MD  [facility]  ______________________________________________________________________    Physical Exam   Vital Signs: Temp: 97.2  F (36.2  C) Temp src: Oral BP: 135/86 Pulse: 77   Resp: 18 SpO2: 94 % O2 Device: None (Room air) Oxygen Delivery: 1 LPM  Weight: 140 lbs 0 oz       Gen: Well nourished, well developed, alert and oriented x 3, no acute distressed  HEENT: Atraumatic, normocephalic; sclera non-injected, anicterric; oral mucosa moist, no lesion, no exudate  Lungs: Generalized decreased breath sounds bilaterally, no wheezes rhonchi rales  Heart: Regular rate, regular rhythm, no gallops, no rubs, no murmurs  GI: Bowel sound normal, no hepatosplenomegaly, no masses, non-tender, non-distended, no guarding, no rebound tenderness  Extremities: No rashes, no edema  Musculoskeletal: Normal muscle mass and tone, no arthritis    Primary Care Physician   Danielle Mercado    Discharge Orders      Pulmonary Rehab  Referral      Reason for your hospital stay    This is a 79-year-old male admitted with COPD exacerbation.     Follow-up and recommended labs and tests     Follow up  with primary care provider, Danielle Mercado, within 7 days for hospital follow- up.  The following labs/tests are recommended: CBC and CMP.     Activity    Your activity upon discharge: activity as tolerated     Full Code     Nebulizer and Nebulizer Supplies    Nebulizer Documentation  I attest that I have seen and evaluated Adolfo Rendon. He has a diagnosis of J44.1 - Chronic obstructive pulmonary disease with (acute) exacerbation and a nebulizer machine is needed to administer medication to improve breathing passages.     I, the undersigned, certify that the above prescribed supplies are medically necessary for this patient and is both reasonable and necessary in reference to accepted standards of medical and necessary in reference to accepted standards of medical practice in the treatment of this patient's condition and is not prescribed as a convenience.     Diet    Follow this diet upon discharge: Orders Placed This Encounter      Combination Diet 2 gm NA Diet; Low Saturated Fat Na <2400mg Diet         Significant Results and Procedures     Discharge Medications   Current Discharge Medication List        START taking these medications    Details   ipratropium - albuterol 0.5 mg/2.5 mg/3 mL (DUONEB) 0.5-2.5 (3) MG/3ML neb solution Take 1 vial (3 mLs) by nebulization 4 times daily.  Qty: 90 mL, Refills: 3    Associated Diagnoses: Chronic obstructive pulmonary disease with acute exacerbation (H)      oxyCODONE (ROXICODONE) 5 MG tablet Take 0.5 tablets (2.5 mg) by mouth every 6 hours as needed (dyspnea).  Qty: 30 tablet, Refills: 0    Associated Diagnoses: Chronic obstructive pulmonary disease with acute exacerbation (H)      pantoprazole (PROTONIX) 40 MG EC tablet Take 1 tablet (40 mg) by mouth every morning (before breakfast).  Qty: 30 tablet, Refills: 0    Associated Diagnoses: Esophageal spasm           CONTINUE these medications which have CHANGED    Details   guaiFENesin (MUCINEX) 600 MG 12 hr tablet  Take 2 tablets (1,200 mg) by mouth 2 times daily.    Associated Diagnoses: Chronic obstructive pulmonary disease with acute exacerbation (H)      predniSONE (DELTASONE) 10 MG tablet 4 tabs daily for 3 days, then 3 tabs daily for 3 days, then 2 tabs daily for 3 days, then 1 tab daily for 3 days, then stop  Qty: 30 tablet, Refills: 0    Associated Diagnoses: Chronic obstructive pulmonary disease with acute exacerbation (H)           CONTINUE these medications which have NOT CHANGED    Details   albuterol (PROAIR HFA/PROVENTIL HFA/VENTOLIN HFA) 108 (90 Base) MCG/ACT inhaler USE 2 INHALATIONS BY MOUTH EVERY 4 HOURS AS NEEDED FOR SHORTNESS OF BREATH, WHEEZING OR COUGH  Qty: 17 g, Refills: 0    Comments: Pharmacy may dispense brand covered by insurance (Proair, or proventil or ventolin or generic albuterol inhaler)  Associated Diagnoses: SOBOE (shortness of breath on exertion)      amLODIPine (NORVASC) 10 MG tablet Take 1 tablet (10 mg) by mouth daily  Qty: 90 tablet, Refills: 2    Associated Diagnoses: Benign essential hypertension      aspirin 81 MG EC tablet Take 81 mg by mouth daily      ciclopirox (LOPROX) 0.77 % cream Apply daily to areas on brows, nose, cheeks.  Qty: 30 g, Refills: 4    Associated Diagnoses: Dermatitis, seborrheic      co-enzyme Q-10 100 MG CAPS capsule Take 100 mg by mouth daily      desonide (DESOWEN) 0.05 % external cream Apply at bedtime if itchy, inflamed to brows, cheek, nose.  Qty: 15 g, Refills: 4    Associated Diagnoses: Dermatitis, seborrheic      evolocumab (REPATHA) 140 MG/ML prefilled autoinjector Inject 1 mL (140 mg) Subcutaneous every 14 days  Qty: 6 mL, Refills: 3    Associated Diagnoses: Hyperlipidemia LDL goal <70; Coronary artery disease involving native coronary artery of native heart without angina pectoris; S/P CABG x 4; Statin intolerance      nicotine polacrilex (NICORETTE) 4 MG gum Place 4 mg inside cheek every hour as needed for smoking cessation      Olodaterol HCl  "(STRIVERDI RESPIMAT) 2.5 MCG/ACT AERS Inhale 2 puffs into the lungs daily  Qty: 12 g, Refills: 3    Associated Diagnoses: Chronic obstructive pulmonary disease, unspecified COPD type (H)      PARoxetine (PAXIL) 30 MG tablet TAKE 2 TABLETS BY MOUTH once daily in the evening  Qty: 180 tablet, Refills: 1    Comments: Profile Rx: patient will contact pharmacy when needed  Associated Diagnoses: Severe episode of recurrent major depressive disorder, without psychotic features (H)      Vitamin D, Cholecalciferol, 25 MCG (1000 UT) TABS Take 1,000 Units by mouth daily            STOP taking these medications       azithromycin (ZITHROMAX) 250 MG tablet Comments:   Reason for Stopping:             Allergies   Allergies   Allergen Reactions    Coreg [Carvedilol] Other (See Comments) and Difficulty breathing     Depression, Couldn't think, felt miserable per patient.    Hctz [Hydrochlorothiazide] Fatigue     \"felt like zombie\"    Loratadine      Mood , irritablity     Metoprolol Fatigue     \"felt like zombie\"    Pravastatin Fatigue     \"felt like zombie\"    Remeron [Mirtazapine]      Agitation       Wellbutrin [Bupropion Hcl]      Sig mood issues        "

## 2024-12-24 NOTE — PLAN OF CARE
Alert and oriented, pleasant. Up independently, uses walker occasionally. VSS on room air. Does get SANTIAGO but recovers quickly. Lung sounds diminished. Denies pain. Probably discharge today.    Problem: Adult Inpatient Plan of Care  Goal: Plan of Care Review  Description: The Plan of Care Review/Shift note should be completed every shift.  The Outcome Evaluation is a brief statement about your assessment that the patient is improving, declining, or no change.  This information will be displayed automatically on your shift  note.  Outcome: Progressing

## 2024-12-26 ENCOUNTER — PATIENT OUTREACH (OUTPATIENT)
Dept: CARE COORDINATION | Facility: CLINIC | Age: 79
End: 2024-12-26
Payer: COMMERCIAL

## 2024-12-26 NOTE — PROGRESS NOTES
[facility]: Transitions of Care Outreach  Chief Complaint   Patient presents with    Clinic Care Coordination - Post Hospital       Most Recent Admission Date: 12/20/2024   Most Recent Admission Diagnosis: Chronic obstructive pulmonary disease with acute exacerbation (H) - J44.1     Most Recent Discharge Date: 12/24/2024   Most Recent Discharge Diagnosis: Chronic obstructive pulmonary disease with acute exacerbation (H) - J44.1  Personal history of tobacco use, presenting hazards to health - Z87.891  Esophageal spasm - K22.4     Transitions of Care Assessment    Discharge Assessment  How are you doing now that you are home?: Writer spoke to patient who states he is doing well. He has no questions or concerns.  How are your symptoms? (Red Flag symptoms escalate to triage hotline per guidelines): Improved  Do you know how to contact your clinic care team if you have future questions or changes to your health status? : Yes  Does the patient have their discharge instructions? : Yes  Does the patient have questions regarding their discharge instructions? : No  Were you started on any new medications or were there changes to any of your previous medications? : Yes  Does the patient have all of their medications?: Yes  Do you have questions regarding any of your medications? : No  Do you have all of your needed medical supplies or equipment (DME)?  (i.e. oxygen tank, CPAP, cane, etc.): Yes                  Follow up Plan   Follow-up and recommended labs and tests         Follow up with primary care provider, Danielle Mercado, within 7 days for hospital follow- up.  The following labs/tests are recommended: CBC and CMP.      Discharge Follow-Up  Discharge follow up appointment scheduled in alignment with recommended follow up timeframe or Transitions of Risk Category? (Low = within 30 days; Moderate= within 14 days; High= within 7 days): No  Patient's follow up appointment not scheduled: Patient  declined scheduling support. Education on the importance of transitions of care follow up. Provided scheduling phone number.    No future appointments.    Outpatient Plan as outlined on AVS reviewed with patient.    For any urgent concerns, please contact our 24 hour nurse triage line: [phone] ([phone])       SCOT [place]

## 2024-12-31 ENCOUNTER — TELEPHONE (OUTPATIENT)
Dept: FAMILY MEDICINE | Facility: CLINIC | Age: 79
End: 2024-12-31
Payer: COMMERCIAL

## 2024-12-31 NOTE — TELEPHONE ENCOUNTER
Medication Question or Refill    Contacts       Contact Date/Time Type Contact Phone/Fax    12/31/2024 09:05 AM CST Phone (Incoming) Adolfo Rendon (Self) [phone] (H)            What medication are you calling about (include dose and sig)?: Ipratropium-albuterol .5MG    Preferred Pharmacy:       [address] [address]  Phone: [phone] Fax: [phone]            Controlled Substance Agreement on file:   CSA -- Patient Level:    CSA: None found at the patient level.       Who prescribed the medication?: Dr.Mark Lucia    Do you need a refill? Yes    When did you use the medication last? Everyday    Patient offered an appointment? No    Do you have any questions or concerns?  Yes: getting low need refill. [facility] [place] [place]      Okay to leave a detailed message?: Yes at Home number on file [phone] (home)

## 2024-12-31 NOTE — TELEPHONE ENCOUNTER
Spoke with patient, notified he has refills on file for duoneb solution at [facility] pharmacy, patient will have [facility] to transfer his RX.     Julie Behrendt RN

## 2025-01-15 DIAGNOSIS — Z95.1 S/P CABG X 4: ICD-10-CM

## 2025-01-15 DIAGNOSIS — Z78.9 STATIN INTOLERANCE: ICD-10-CM

## 2025-01-15 DIAGNOSIS — I25.10 CORONARY ARTERY DISEASE INVOLVING NATIVE CORONARY ARTERY OF NATIVE HEART WITHOUT ANGINA PECTORIS: ICD-10-CM

## 2025-01-15 DIAGNOSIS — E78.5 HYPERLIPIDEMIA LDL GOAL <70: ICD-10-CM

## 2025-01-15 NOTE — TELEPHONE ENCOUNTER
[facility] Cardiology Refill Guideline reviewed.  Medication meets criteria for refill. Due for follow up. Melba Rodriguez RN Cardiology January 15, 2025, 11:04 AM

## 2025-01-15 NOTE — TELEPHONE ENCOUNTER
Last Office Visit: 12/21/23 [place]   Next Office Visit: None scheduled   Last Fill Date: 12/18/24    Ludivina Elmore CMA 1/15/2025 10:53 AM

## 2025-01-29 ENCOUNTER — OFFICE VISIT (OUTPATIENT)
Dept: FAMILY MEDICINE | Facility: CLINIC | Age: 80
End: 2025-01-29
Payer: COMMERCIAL

## 2025-01-29 VITALS
WEIGHT: 144 LBS | HEART RATE: 90 BPM | BODY MASS INDEX: 22.6 KG/M2 | OXYGEN SATURATION: 94 % | TEMPERATURE: 97.6 F | RESPIRATION RATE: 14 BRPM | HEIGHT: 67 IN | DIASTOLIC BLOOD PRESSURE: 86 MMHG | SYSTOLIC BLOOD PRESSURE: 134 MMHG

## 2025-01-29 DIAGNOSIS — J44.1 CHRONIC OBSTRUCTIVE PULMONARY DISEASE WITH ACUTE EXACERBATION (H): ICD-10-CM

## 2025-01-29 DIAGNOSIS — I25.10 CORONARY ARTERY DISEASE INVOLVING NATIVE CORONARY ARTERY OF NATIVE HEART WITHOUT ANGINA PECTORIS: ICD-10-CM

## 2025-01-29 DIAGNOSIS — Z09 HOSPITAL DISCHARGE FOLLOW-UP: ICD-10-CM

## 2025-01-29 DIAGNOSIS — Z00.00 ENCOUNTER FOR MEDICARE ANNUAL WELLNESS EXAM: Primary | ICD-10-CM

## 2025-01-29 LAB
ALBUMIN SERPL BCG-MCNC: 4.2 G/DL (ref 3.5–5.2)
ALP SERPL-CCNC: 56 U/L (ref 40–150)
ALT SERPL W P-5'-P-CCNC: 15 U/L (ref 0–70)
ANION GAP SERPL CALCULATED.3IONS-SCNC: 14 MMOL/L (ref 7–15)
AST SERPL W P-5'-P-CCNC: 23 U/L (ref 0–45)
BILIRUB SERPL-MCNC: 0.4 MG/DL
BUN SERPL-MCNC: 20.5 MG/DL (ref 8–23)
CALCIUM SERPL-MCNC: 9.4 MG/DL (ref 8.8–10.4)
CHLORIDE SERPL-SCNC: 100 MMOL/L (ref 98–107)
CHOLEST SERPL-MCNC: 170 MG/DL
CREAT SERPL-MCNC: 1.25 MG/DL (ref 0.67–1.17)
EGFRCR SERPLBLD CKD-EPI 2021: 59 ML/MIN/1.73M2
FASTING STATUS PATIENT QL REPORTED: YES
FASTING STATUS PATIENT QL REPORTED: YES
GLUCOSE SERPL-MCNC: 82 MG/DL (ref 70–99)
HCO3 SERPL-SCNC: 26 MMOL/L (ref 22–29)
HDLC SERPL-MCNC: 63 MG/DL
LDLC SERPL CALC-MCNC: 97 MG/DL
NONHDLC SERPL-MCNC: 107 MG/DL
POTASSIUM SERPL-SCNC: 4.4 MMOL/L (ref 3.4–5.3)
PROT SERPL-MCNC: 6.9 G/DL (ref 6.4–8.3)
SODIUM SERPL-SCNC: 140 MMOL/L (ref 135–145)
TRIGL SERPL-MCNC: 48 MG/DL

## 2025-01-29 PROCEDURE — 99214 OFFICE O/P EST MOD 30 MIN: CPT | Mod: 25 | Performed by: NURSE PRACTITIONER

## 2025-01-29 PROCEDURE — 80061 LIPID PANEL: CPT | Performed by: NURSE PRACTITIONER

## 2025-01-29 PROCEDURE — 80053 COMPREHEN METABOLIC PANEL: CPT | Performed by: NURSE PRACTITIONER

## 2025-01-29 PROCEDURE — 36415 COLL VENOUS BLD VENIPUNCTURE: CPT | Performed by: NURSE PRACTITIONER

## 2025-01-29 PROCEDURE — G0439 PPPS, SUBSEQ VISIT: HCPCS | Performed by: NURSE PRACTITIONER

## 2025-01-29 RX ORDER — IPRATROPIUM BROMIDE AND ALBUTEROL SULFATE 2.5; .5 MG/3ML; MG/3ML
3 SOLUTION RESPIRATORY (INHALATION) 4 TIMES DAILY
Qty: 360 ML | Refills: 5 | Status: SHIPPED | OUTPATIENT
Start: 2025-01-29

## 2025-01-29 ASSESSMENT — PAIN SCALES - GENERAL: PAINLEVEL_OUTOF10: NO PAIN (0)

## 2025-01-29 ASSESSMENT — PATIENT HEALTH QUESTIONNAIRE - PHQ9: SUM OF ALL RESPONSES TO PHQ QUESTIONS 1-9: 1

## 2025-01-29 NOTE — LETTER
January 30, 2025      Adolfo Rendon  [address] [place] [facility] [address]        Dear ,    We are writing to inform you of your test results.    Normal electrolytes   Mild decline in kidney function.  Recheck again in 3 months to monitor   Normal liver function   Normal blood sugar   Cholesterol levels are good   Continue current meds   Call or return to the clinic with any worsening of symptoms or no resolution     Resulted Orders   Lipid panel reflex to direct LDL Non-fasting   Result Value Ref Range    Cholesterol 170 <200 mg/dL    Triglycerides 48 <150 mg/dL    Direct Measure HDL 63 >=40 mg/dL    LDL Cholesterol Calculated 97 <100 mg/dL    Non HDL Cholesterol 107 <130 mg/dL    Patient Fasting > 8hrs? Yes     Narrative    Cholesterol  Desirable: < 200 mg/dL  Borderline High: 200 - 239 mg/dL  High: >= 240 mg/dL    Triglycerides  Normal: < 150 mg/dL  Borderline High: 150 - 199 mg/dL  High: 200-499 mg/dL  Very High: >= 500 mg/dL    Direct Measure HDL  Female: >= 50 mg/dL   Male: >= 40 mg/dL    LDL Cholesterol  Desirable: < 100 mg/dL  Above Desirable: 100 - 129 mg/dL   Borderline High: 130 - 159 mg/dL   High:  160 - 189 mg/dL   Very High: >= 190 mg/dL    Non HDL Cholesterol  Desirable: < 130 mg/dL  Above Desirable: 130 - 159 mg/dL  Borderline High: 160 - 189 mg/dL  High: 190 - 219 mg/dL  Very High: >= 220 mg/dL   Comprehensive metabolic panel (BMP + Alb, Alk Phos, ALT, AST, Total. Bili, TP)   Result Value Ref Range    Sodium 140 135 - 145 mmol/L    Potassium 4.4 3.4 - 5.3 mmol/L    Carbon Dioxide (CO2) 26 22 - 29 mmol/L    Anion Gap 14 7 - 15 mmol/L    Urea Nitrogen 20.5 8.0 - 23.0 mg/dL    Creatinine 1.25 (H) 0.67 - 1.17 mg/dL    GFR Estimate 59 (L) >60 mL/min/1.73m2      Comment:      eGFR calculated using 2021 CKD-EPI equation.    Calcium 9.4 8.8 - 10.4 mg/dL    Chloride 100 98 - 107 mmol/L    Glucose 82 70 - 99 mg/dL    Alkaline Phosphatase 56 40 - 150 U/L    AST 23 0 - 45 U/L    ALT 15 0 - 70  U/L    Protein Total 6.9 6.4 - 8.3 g/dL    Albumin 4.2 3.5 - 5.2 g/dL    Bilirubin Total 0.4 <=1.2 mg/dL    Patient Fasting > 8hrs? Yes        If you have any questions or concerns, please call the clinic at the number listed above.       Sincerely,      ANTONIO Carranza CNP    Electronically signed

## 2025-01-29 NOTE — PATIENT INSTRUCTIONS
Thank you for coming to see me today! Here are a couple of pieces of information about messages and lab communication practices.     If lab work was done today as part of your evaluation you will generally be contacted via [employer], mail, or phone with the results within 7-10 days.  If there is an alarming/concerning result we will contact you by phone. Lab results come back at varying times, I generally wait until all labs are resulted before making comments on results. Please note, labs are automatically released to [employer] once available, but it may take a couple of days for my interpretation note to appear.     I try very hard to respond to medical messages with 2 business days of receiving them. Occasionally it takes me longer if I am trying to figure out the best way to respond and need to seek guidance, do some research or dig deeper into your medical history to come up with a helpful response.     If you need refills please contact your pharmacist. They will send a refill request to me to review. Please allow 3-5 business days for us to respond to all refill requests.     Please call or send a medical message with any questions or concerns. Thank you for trusting me to be part of your healthcare team!  Patient Education   Preventive Care Advice   This is general advice given by our system to help you stay healthy. However, your care team may have specific advice just for you. Please talk to your care team about your preventive care needs.  Nutrition  Eat 5 or more servings of fruits and vegetables each day.  Try wheat bread, brown rice and whole grain pasta (instead of white bread, rice, and pasta).  Get enough calcium and vitamin D. Check the label on foods and aim for 100% of the RDA (recommended daily allowance).  Lifestyle  Exercise at least 150 minutes each week  (30 minutes a day, 5 days a week).  Do muscle strengthening activities 2 days a week. These help control your weight and prevent disease.  No  smoking.  Wear sunscreen to prevent skin cancer.  Have a dental exam and cleaning every 6 months.  Yearly exams  See your health care team every year to talk about:  Any changes in your health.  Any medicines your care team has prescribed.  Preventive care, family planning, and ways to prevent chronic diseases.  Shots (vaccines)   HPV shots (up to age 26), if you've never had them before.  Hepatitis B shots (up to age 59), if you've never had them before.  COVID-19 shot: Get this shot when it's due.  Flu shot: Get a flu shot every year.  Tetanus shot: Get a tetanus shot every 10 years.  Pneumococcal, hepatitis A, and RSV shots: Ask your care team if you need these based on your risk.  Shingles shot (for age 50 and up)  General health tests  Diabetes screening:  Starting at age 35, Get screened for diabetes at least every 3 years.  If you are younger than age 35, ask your care team if you should be screened for diabetes.  Cholesterol test: At age 39, start having a cholesterol test every 5 years, or more often if advised.  Bone density scan (DEXA): At age 50, ask your care team if you should have this scan for osteoporosis (brittle bones).  Hepatitis C: Get tested at least once in your life.  STIs (sexually transmitted infections)  Before age 24: Ask your care team if you should be screened for STIs.  After age 24: Get screened for STIs if you're at risk. You are at risk for STIs (including HIV) if:  You are sexually active with more than one person.  You don't use condoms every time.  You or a partner was diagnosed with a sexually transmitted infection.  If you are at risk for HIV, ask about PrEP medicine to prevent HIV.  Get tested for HIV at least once in your life, whether you are at risk for HIV or not.  Cancer screening tests  Cervical cancer screening: If you have a cervix, begin getting regular cervical cancer screening tests starting at age 21.  Breast cancer scan (mammogram): If you've ever had breasts,  begin having regular mammograms starting at age 40. This is a scan to check for breast cancer.  Colon cancer screening: It is important to start screening for colon cancer at age 45.  Have a colonoscopy test every 10 years (or more often if you're at risk) Or, ask your provider about stool tests like a FIT test every year or Cologuard test every 3 years.  To learn more about your testing options, visit:   .  For help making a decision, visit:   https://bit.ly/yf66136.  Prostate cancer screening test: If you have a prostate, ask your care team if a prostate cancer screening test (PSA) at age 55 is right for you.  Lung cancer screening: If you are a current or former smoker ages 50 to 80, ask your care team if ongoing lung cancer screenings are right for you.  For informational purposes only. Not to replace the advice of your health care provider. Copyright   2023 [employer]. All rights reserved. Clinically reviewed by the [facility]itions [facility]. [employer] 613944 - REV 01/24.  Learning About Sleeping Well  What does sleeping well mean?     Sleeping well means getting enough sleep to feel good and stay healthy. How much sleep is enough varies among people.  The number of hours you sleep and how you feel when you wake up are both important. If you do not feel refreshed, you probably need more sleep. Another sign of not getting enough sleep is feeling tired during the day.  Experts recommend that adults get at least 7 or more hours of sleep per day. Children and older adults need more sleep.  Why is getting enough sleep important?  Getting enough quality sleep is a basic part of good health. When your sleep suffers, your physical health, mood, and your thoughts can suffer too. You may find yourself feeling more grumpy or stressed. Not getting enough sleep also can lead to serious problems, including injury, accidents, anxiety, and depression.  What might cause poor sleeping?  Many things can  "cause sleep problems, including:  Changes to your sleep schedule.  Stress. Stress can be caused by fear about a single event, such as giving a speech. Or you may have ongoing stress, such as worry about work or school.  Depression, anxiety, and other mental or emotional conditions.  Changes in your sleep habits or surroundings. This includes changes that happen where you sleep, such as noise, light, or sleeping in a different bed. It also includes changes in your sleep pattern, such as having jet lag or working a late shift.  Health problems, such as pain, breathing problems, and restless legs syndrome.  Lack of regular exercise.  Using alcohol, nicotine, or caffeine before bed.  How can you help yourself?  Here are some tips that may help you sleep more soundly and wake up feeling more refreshed.  Your sleeping area   Use your bedroom only for sleeping and sex. A bit of light reading may help you fall asleep. But if it doesn't, do your reading elsewhere in the house. Try not to use your TV, computer, smartphone, or tablet while you are in bed.  Be sure your bed is big enough to stretch out comfortably, especially if you have a sleep partner.  Keep your bedroom quiet, dark, and cool. Use curtains, blinds, or a sleep mask to block out light. To block out noise, use earplugs, soothing music, or a \"white noise\" machine.  Your evening and bedtime routine   Create a relaxing bedtime routine. You might want to take a warm shower or bath, or listen to soothing music.  Go to bed at the same time every night. And get up at the same time every morning, even if you feel tired.  What to avoid   Limit caffeine (coffee, tea, caffeinated sodas) during the day, and don't have any for at least 6 hours before bedtime.  Avoid drinking alcohol before bedtime. Alcohol can cause you to wake up more often during the night.  Try not to smoke or use tobacco, especially in the evening. Nicotine can keep you awake.  Limit naps during the day, " "especially close to bedtime.  Avoid lying in bed awake for too long. If you can't fall asleep or if you wake up in the middle of the night and can't get back to sleep within about 20 minutes, get out of bed and go to another room until you feel sleepy.  Avoid taking medicine right before bed that may keep you awake or make you feel hyper or energized. Your doctor can tell you if your medicine may do this and if you can take it earlier in the day.  If you can't sleep   Imagine yourself in a peaceful, pleasant scene. Focus on the details and feelings of being in a place that is relaxing.  Get up and do a quiet or boring activity until you feel sleepy.  Avoid drinking any liquids before going to bed to help prevent waking up often to use the bathroom.  Where can you learn more?  Go to https://www.[employer].net/patiented  Enter J942 in the search box to learn more about \"Learning About Sleeping Well.\"  Current as of: July 31, 2024  Content Version: 14.3    2024 [employer].   Care instructions adapted under license by your healthcare professional. If you have questions about a medical condition or this instruction, always ask your healthcare professional. [employer] disclaims any warranty or liability for your use of this information.       "

## 2025-01-29 NOTE — PROGRESS NOTES
Assessment & Plan   Hospital discharge follow-up  Medicare annual wellness exam  Coronary artery disease involving native coronary artery of native heart without angina pectoris  Follow-up with cardiology as planned  Continue current medications without changes  Labs fasting done today  Norvasc 10 mg daily  Aspirin 81 mg daily  Repatha 140 mg every 14 days  - Lipid panel reflex to direct LDL Non-fasting  - CBC with platelets; Future  - Comprehensive metabolic panel (BMP + Alb, Alk Phos, ALT, AST, Total. Bili, TP); Future    Chronic obstructive pulmonary disease with acute exacerbation (H)  Follow-up with the [facility] as planned pulmonology  Chronic and ongoing  Continue DuoNeb.  New prescription generated  Continue guanfacine 1200 mg twice daily  Continue Striverdi 2 puffs lungs daily  - ipratropium - albuterol 0.5 mg/2.5 mg/3 mL (DUONEB) 0.5-2.5 (3) MG/3ML neb solution; Take 1 vial (3 mLs) by nebulization 4 times daily.  - Adult Pulmonary Medicine  Referral; Future  - CBC with platelets; Future  - Comprehensive metabolic panel (BMP + Alb, Alk Phos, ALT, AST, Total. Bili, TP); Future      Follow-up 3 months  Call or return to the clinic with any worsening of symptoms or no resolution. Patient/Parent verbalized understanding and is in agreement. Medication side effects reviewed.   Current Outpatient Medications   Medication Sig Dispense Refill    albuterol (PROAIR HFA/PROVENTIL HFA/VENTOLIN HFA) 108 (90 Base) MCG/ACT inhaler Inhale 2 puffs into the lungs every 4 hours as needed for shortness of breath, wheezing or cough. 17 g 0    amLODIPine (NORVASC) 10 MG tablet Take 1 tablet (10 mg) by mouth daily 90 tablet 2    aspirin 81 MG EC tablet Take 81 mg by mouth daily      ciclopirox (LOPROX) 0.77 % cream Apply daily to areas on brows, nose, cheeks. 30 g 4    co-enzyme Q-10 100 MG CAPS capsule Take 100 mg by mouth daily      desonide (DESOWEN) 0.05 % external cream Apply at bedtime if itchy,  inflamed to brows, cheek, nose. 15 g 4    evolocumab (REPATHA) 140 MG/ML prefilled autoinjector Inject 1 mL (140 mg) subcutaneously every 14 days. No further refills until seen by cardiology--call [phone] to schedule 6 mL 1    guaiFENesin (MUCINEX) 600 MG 12 hr tablet Take 2 tablets (1,200 mg) by mouth 2 times daily.      ipratropium - albuterol 0.5 mg/2.5 mg/3 mL (DUONEB) 0.5-2.5 (3) MG/3ML neb solution Take 1 vial (3 mLs) by nebulization 4 times daily. 360 mL 5    nicotine polacrilex (NICORETTE) 4 MG gum Place 4 mg inside cheek every hour as needed for smoking cessation      Olodaterol HCl (STRIVERDI RESPIMAT) 2.5 MCG/ACT AERS Inhale 2 puffs into the lungs daily 12 g 3    pantoprazole (PROTONIX) 40 MG EC tablet Take 1 tablet (40 mg) by mouth every morning (before breakfast). 30 tablet 0    PARoxetine (PAXIL) 30 MG tablet TAKE 2 TABLETS BY MOUTH once daily in the evening (Patient taking differently: Take 2 tablets by mouth every evening. TAKE 2 TABLETS BY MOUTH once daily in the evening) 180 tablet 1    Vitamin D, Cholecalciferol, 25 MCG (1000 UT) TABS Take 1,000 Units by mouth daily        Chart documentation with Dragon Voice recognition Software. Although reviewed after completion, some words and grammatical errors may remain.  Danielle Mercado MSN,FNP-BC  [address] [id number] [phone]                      Elly Mata is a 79 year old, presenting for the following health issues:  HOSP D/C        1/29/2025     2:48 PM   Additional Questions   Roomed by Telma MONTERROSO         Hospital Follow-up Visit:    Hospital/Nursing Home/IP Rehab Facility: [facility]  Date of Admission: 12/20/2024  Date of Discharge: 12/25/2024/  Reason(s) for Admission/: COPD   Was the patient in the ICU or did the patient experience delirium during hospitalization?  No  Do you have any other stressors you would like to discuss with your  provider? No    Problems taking medications regularly:  None  Medication changes since discharge: None  Problems adhering to non-medication therapy:  None    Summary of hospitalization:    admitted for a COPD exacerbation.   Referral to pulmonary rehab on discharge   Working with [facility] in [facility] seeing them next week    Discharge Diagnoses  Acute respiratory distress  COPD with acute exacerbation  Suspect esophageal spasm   Coronary artery disease S/P CABG x 4 2019  Ischemic cardiomyopathy  Benign essential hypertension  Tobacco abuse  Generalized anxiety disorder  Aortic arch aneurysm   Infrarenal abdominal aortic aneurysm (AAA) without rupture s/p repair 3/29/2024  Drug Induced Platelet Defect  Hypertension  History of CABG      Follow-up and recommended labs and tests        Follow up with primary care provider, Danielle Mercado, within 7 days for hospital follow- up.  The following labs/tests are recommended: CBC and CMP.      [facility] hospital discharge summary reviewed  Diagnostic Tests/Treatments reviewed.  Follow up needed: CARDIOLOGY, PULMONOLOGY, LAB   Other Healthcare Providers Involved in Patient s Care:         None  Update since discharge: improved.         Plan of care communicated with patient  Visit to    Annual Wellness Visit     Patient has been advised of split billing requirements and indicates understanding: Yes          Health Care Directive  Patient has a Health Care Directive on file  Discussed advance care planning with patient.  In general, how would you rate your overall physical health? good  Do you have a special diet?  Low salt and Low fat/cholesterol         No data to display              Do you see a dentist two times every year?  (!) NO  The patient was instructed to see the dentist every 6 months.   Have you been more tired than usual lately?  (!) YES   Discussed possible causes of fatigue.   If you drink alcohol do you typically have >3 drinks per day or >7 drinks  per week? No  Do you have a current opioid prescription? No  Do you use any other controlled substances or medications that are not prescribed by a provider? None  Social History     Tobacco Use    Smoking status: Former     Current packs/day: 0.00     Types: Cigarettes     Quit date: 2017     Years since quittin.0    Smokeless tobacco: Former     Quit date: 2017   Substance Use Topics    Alcohol use: No     Alcohol/week: 0.0 standard drinks of alcohol     Comment: quit     Drug use: No       Needs assistance for the following daily activities: no assistance needed  Which of the following safety concerns are present in your home?  none identified   Do you (or your family members) have any concerns about your safety while driving?  No  Do you have any of the following hearing concerns?: No hearing concerns  In the past 6 months, have you been bothered by leaking of urine? No        2023   Social Factors   Worry food won't last until get money to buy more No   Food not last or not have enough money for food? No   Do you have housing? (Housing is defined as stable permanent housing and does not include staying ouside in a car, in a tent, in an abandoned building, in an overnight shelter, or couch-surfing.) Yes   Are you worried about losing your housing? No   Lack of transportation? No   Unable to get utilities (heat,electricity)? No          2025   Fall Risk   Fallen 2 or more times in the past year? No   Trouble with walking or balance? No          Today's PHQ-9 Score:       2025     3:03 PM   PHQ-9 SCORE   PHQ-9 Total Score 1       ASCVD Risk   The 10-year ASCVD risk score (Deann THOMAS, et al., 2019) is: 33.8%    Values used to calculate the score:      Age: 79 years      Sex: Male      Is Non- : No      Diabetic: No      Tobacco smoker: No      Systolic Blood Pressure: 134 mmHg      Is BP treated: Yes      HDL Cholesterol: 62 mg/dL      Total  Cholesterol: 137 mg/dL            Reviewed and updated as needed this visit by Provider           Sexual Activity          Past Medical History:   Diagnosis Date    AAA (abdominal aortic aneurysm) without rupture     BCC (basal cell carcinoma), face     Congestive heart failure (H)     COPD (chronic obstructive pulmonary disease) (H)     Coronary artery disease     Depressive disorder, not elsewhere classified     HLD (hyperlipidemia)     Hypertension     Hypertrophy (benign) of prostate     Impotence of organic origin     Other and unspecified hyperlipidemia     Other anxiety states     PVD (peripheral vascular disease)     Tobacco use disorder      Past Surgical History:   Procedure Laterality Date    APPENDECTOMY  1966    BYPASS GRAFT ARTERY CORONARY N/A 9/3/2019    Procedure: CORONARY ARTERY BYPASS GRAFT X 4 - LIMA TO LAD, SV TO PL, OM, PDA ; ON PUMP WITH TERRENCE; ENDOVEIN HARVEST RIGHT LEG;  Surgeon: Lai Mchugh MD;  Location:  OR    COLONOSCOPY  3/1/2005    COLONOSCOPY N/A 5/9/2019    Procedure: COLONOSCOPY;  Surgeon: Camilo Beard MD;  Location: [facility]    CV LEFT HEART CATH N/A 8/8/2019    Procedure: Left Heart Cath--also measure LVEDP;  Surgeon: Krystle Barrera MD;  Location:  HEART CARDIAC CATH LAB    ESTABLISHMENT, VASCULAR ACCESS, FEM APPROACH, FOR ENDOVASC STENT INSERTION INTO ABD AORTIC ANEURYSM Bilateral 3/29/2024    Procedure: BILATERAL ESTABLISHMENT, VASCULAR ACCESS, FEMORAL APPROACH, FOR ENDOVASCULAR STENT INSERTION INTO ABDOMINAL AORTIC ANEURYSM;  Surgeon: Aleksander Ruiz MD;  Location: [facility] OR    HERNIORRHAPHY INGUINAL  7/14/2011    Procedure:HERNIORRHAPHY INGUINAL; Open Repair Right Inguinal Hernia Repair       HYDROCELECTOMY SCROTAL  01/2004    left hydrocele repair    IR LOWER EXTREMITY ANGIOGRAM BILATERAL  3/29/2024    SUPRAPUBIC PROSTATECTOMY       Labs reviewed in EPIC  BP Readings from Last 3 Encounters:   01/29/25 134/86   12/24/24 135/86   11/25/24  (!) 144/87    Wt Readings from Last 3 Encounters:   01/29/25 65.3 kg (144 lb)   12/20/24 63.5 kg (140 lb)   11/25/24 63.5 kg (140 lb)                  Patient Active Problem List   Diagnosis    MIXED HYPERLIPIDEMIA    Benign neoplasm of epididymis    HL (hearing loss)    Hyperlipidemia LDL goal <70    Muscle pain    Tobacco abuse    SANTIAGO (dyspnea on exertion)    Generalized anxiety disorder    Right inguinal hernia    HTN, goal below 140/90    COPD (chronic obstructive pulmonary disease) (H)    Thoracic aortic aneurysm without rupture    Centrilobular emphysema (H)    Aortic arch aneurysm    Unilateral atherosclerotic renal artery stenosis    Pulmonary nodules    Diverticulosis of large intestine    Coronary artery disease involving native coronary artery of native heart without angina pectoris    Chest pain    Status post coronary angiogram    Benign essential hypertension    Ischemic cardiomyopathy    S/P CABG x 4    Fluid overload    Anemia due to blood loss, acute    Transient hyperglycemia post procedure    H/O cardiomyopathy    Personal history of prostate cancer    Infrarenal abdominal aortic aneurysm (AAA) without rupture    Chronic obstructive pulmonary disease with acute exacerbation (H)    Personal history of tobacco use, presenting hazards to health     Past Surgical History:   Procedure Laterality Date    APPENDECTOMY  1966    BYPASS GRAFT ARTERY CORONARY N/A 9/3/2019    Procedure: CORONARY ARTERY BYPASS GRAFT X 4 - LIMA TO LAD, SV TO PL, OM, PDA ; ON PUMP WITH TERRENCE; ENDOVEIN HARVEST RIGHT LEG;  Surgeon: Lai Mchugh MD;  Location:  OR    COLONOSCOPY  3/1/2005    COLONOSCOPY N/A 5/9/2019    Procedure: COLONOSCOPY;  Surgeon: Camilo Beard MD;  Location: [facility]    CV LEFT HEART CATH N/A 8/8/2019    Procedure: Left Heart Cath--also measure LVEDP;  Surgeon: Krystle Barrera MD;  Location:  HEART CARDIAC CATH LAB    ESTABLISHMENT, VASCULAR ACCESS, FEM APPROACH, FOR ENDOVASC STENT INSERTION  INTO ABD AORTIC ANEURYSM Bilateral 3/29/2024    Procedure: BILATERAL ESTABLISHMENT, VASCULAR ACCESS, FEMORAL APPROACH, FOR ENDOVASCULAR STENT INSERTION INTO ABDOMINAL AORTIC ANEURYSM;  Surgeon: Aleksander Ruiz MD;  Location: [facility] OR    HERNIORRHAPHY INGUINAL  2011    Procedure:HERNIORRHAPHY INGUINAL; Open Repair Right Inguinal Hernia Repair       HYDROCELECTOMY SCROTAL  2004    left hydrocele repair    IR LOWER EXTREMITY ANGIOGRAM BILATERAL  3/29/2024    SUPRAPUBIC PROSTATECTOMY         Social History     Tobacco Use    Smoking status: Former     Current packs/day: 0.00     Types: Cigarettes     Quit date: 2017     Years since quittin.0    Smokeless tobacco: Former     Quit date: 2017   Substance Use Topics    Alcohol use: No     Alcohol/week: 0.0 standard drinks of alcohol     Comment: quit      Family History   Problem Relation Age of Onset    Cerebrovascular Disease Mother     Hypertension Mother     Prostate Cancer Brother     Dementia Brother     Arthritis Sister         rhematoid    Cancer Brother         Lung cancer, lymphoma    Other - See Comments Brother         HIV    Cancer Sister         skin cancer on nose    Pacemaker Sister     Aneurysm Sister         heart         Current Outpatient Medications   Medication Sig Dispense Refill    albuterol (PROAIR HFA/PROVENTIL HFA/VENTOLIN HFA) 108 (90 Base) MCG/ACT inhaler Inhale 2 puffs into the lungs every 4 hours as needed for shortness of breath, wheezing or cough. 17 g 0    amLODIPine (NORVASC) 10 MG tablet Take 1 tablet (10 mg) by mouth daily 90 tablet 2    aspirin 81 MG EC tablet Take 81 mg by mouth daily      ciclopirox (LOPROX) 0.77 % cream Apply daily to areas on brows, nose, cheeks. 30 g 4    co-enzyme Q-10 100 MG CAPS capsule Take 100 mg by mouth daily      desonide (DESOWEN) 0.05 % external cream Apply at bedtime if itchy, inflamed to brows, cheek, nose. 15 g 4    evolocumab (REPATHA) 140 MG/ML prefilled  "autoinjector Inject 1 mL (140 mg) subcutaneously every 14 days. No further refills until seen by cardiology--call [phone] to schedule 6 mL 1    guaiFENesin (MUCINEX) 600 MG 12 hr tablet Take 2 tablets (1,200 mg) by mouth 2 times daily.      ipratropium - albuterol 0.5 mg/2.5 mg/3 mL (DUONEB) 0.5-2.5 (3) MG/3ML neb solution Take 1 vial (3 mLs) by nebulization 4 times daily. 360 mL 5    nicotine polacrilex (NICORETTE) 4 MG gum Place 4 mg inside cheek every hour as needed for smoking cessation      Olodaterol HCl (STRIVERDI RESPIMAT) 2.5 MCG/ACT AERS Inhale 2 puffs into the lungs daily 12 g 3    pantoprazole (PROTONIX) 40 MG EC tablet Take 1 tablet (40 mg) by mouth every morning (before breakfast). 30 tablet 0    PARoxetine (PAXIL) 30 MG tablet TAKE 2 TABLETS BY MOUTH once daily in the evening (Patient taking differently: Take 2 tablets by mouth every evening. TAKE 2 TABLETS BY MOUTH once daily in the evening) 180 tablet 1    Vitamin D, Cholecalciferol, 25 MCG (1000 UT) TABS Take 1,000 Units by mouth daily        Allergies   Allergen Reactions    Coreg [Carvedilol] Other (See Comments) and Difficulty breathing     Depression, Couldn't think, felt miserable per patient.    Hctz [Hydrochlorothiazide] Fatigue     \"felt like zombie\"    Loratadine      Mood , irritablity     Metoprolol Fatigue     \"felt like zombie\"    Pravastatin Fatigue     \"felt like zombie\"    Remeron [Mirtazapine]      Agitation       Wellbutrin [Bupropion Hcl]      Sig mood issues          Current providers sharing in care for this patient include:  Patient Care Team:  Danielle Mercado APRN CNP as PCP - General (Family Practice)  Danielle Mercado APRN CNP as Assigned PCP  Aleksander Ruiz MD as Assigned Heart and Vascular Provider  Laura Vela PA-C as Physician Assistant (Dermatology)  Zack Alanis MD as MD (Urology)  Rik Thurston MD as Assigned Surgical Provider    The following health " "maintenance items are reviewed in Epic and correct as of today:  Health Maintenance   Topic Date Due    HF ACTION PLAN  Never done    RSV VACCINE (1 - 1-dose 75+ series) Never done    MEDICARE ANNUAL WELLNESS VISIT  11/14/2024    LIPID  11/14/2024    BMP  06/24/2025    PHQ-9  07/29/2025    LUNG CANCER SCREENING  12/20/2025    ALT  12/21/2025    CBC  12/21/2025    ANNUAL REVIEW OF HM ORDERS  01/29/2026    FALL RISK ASSESSMENT  01/29/2026    GLUCOSE  12/24/2027    COLORECTAL CANCER SCREENING  05/09/2029    ADVANCE CARE PLANNING  01/29/2030    DTAP/TDAP/TD IMMUNIZATION (2 - Td or Tdap) 12/05/2034    TSH W/FREE T4 REFLEX  Completed    SPIROMETRY  Completed    HEPATITIS C SCREENING  Completed    COPD ACTION PLAN  Completed    DEPRESSION ACTION PLAN  Completed    INFLUENZA VACCINE  Completed    Pneumococcal Vaccine: 50+ Years  Completed    ZOSTER IMMUNIZATION  Completed    COVID-19 Vaccine  Completed    HPV IMMUNIZATION  Aged Out    MENINGITIS IMMUNIZATION  Aged Out    RSV MONOCLONAL ANTIBODY  Aged Out       Appropriate preventive services were discussed with this patient, including applicable screening as appropriate for fall prevention, nutrition, physical activity, Tobacco-use cessation, weight loss and cognition.  Checklist reviewing preventive services available has been given to the patient.           1/29/2025   Mini Cog   Clock Draw Score 2 Normal   3 Item Recall 3 objects recalled   Mini Cog Total Score 5                Review of Systems  Constitutional, neuro, ENT, endocrine, pulmonary, cardiac, gastrointestinal, genitourinary, musculoskeletal, integument and psychiatric systems are negative, except as otherwise noted.      Objective    /86   Pulse 90   Temp 97.6  F (36.4  C) (Tympanic)   Resp 14   Ht 1.702 m (5' 7\")   Wt 65.3 kg (144 lb)   SpO2 94%   BMI 22.55 kg/m    Body mass index is 22.55 kg/m .  Physical Exam   GENERAL: alert and no distress  EYES: Eyes grossly normal to inspection, PERRL and " conjunctivae and sclerae normal  HENT: ear canals and TM's normal, nose and mouth without ulcers or lesions  NECK: no adenopathy, no asymmetry, masses, or scars  RESP: decreased breath sounds throughout  CV: regular rate and rhythm, normal S1 S2, no S3 or S4, no murmur, click or rub, no peripheral edema  ABDOMEN: soft, nontender, no hepatosplenomegaly, no masses and bowel sounds normal  MS: no gross musculoskeletal defects noted, no edema  SKIN: no suspicious lesions or rashes  NEURO: Normal strength and tone, mentation intact and speech normal  PSYCH: mentation appears normal, affect normal/bright    Admission on 12/20/2024, Discharged on 12/24/2024   Component Date Value Ref Range Status    Sodium 12/20/2024 140  135 - 145 mmol/L Final    Potassium 12/20/2024 3.7  3.4 - 5.3 mmol/L Final    Chloride 12/20/2024 101  98 - 107 mmol/L Final    Carbon Dioxide (CO2) 12/20/2024 28  22 - 29 mmol/L Final    Anion Gap 12/20/2024 11  7 - 15 mmol/L Final    Urea Nitrogen 12/20/2024 19.4  8.0 - 23.0 mg/dL Final    Creatinine 12/20/2024 1.14  0.67 - 1.17 mg/dL Final    GFR Estimate 12/20/2024 65  >60 mL/min/1.73m2 Final    eGFR calculated using 2021 CKD-EPI equation.  eGFR calculated using 2021 CKD-EPI equation.    Calcium 12/20/2024 9.2  8.8 - 10.4 mg/dL Final    Reference intervals for this test were updated on 7/16/2024 to reflect our healthy population more accurately. There may be differences in the flagging of prior results with similar values performed with this method. Those prior results can be interpreted in the context of the updated reference intervals.    Glucose 12/20/2024 119 (H)  70 - 99 mg/dL Final    WBC Count 12/20/2024 7.0  4.0 - 11.0 10e3/uL Final    RBC Count 12/20/2024 5.28  4.40 - 5.90 10e6/uL Final    Hemoglobin 12/20/2024 14.5  13.3 - 17.7 g/dL Final    Hematocrit 12/20/2024 44.1  40.0 - 53.0 % Final    MCV 12/20/2024 84  78 - 100 fL Final    MCH 12/20/2024 27.5  26.5 - 33.0 pg Final    MCHC 12/20/2024  32.9  31.5 - 36.5 g/dL Final    RDW 12/20/2024 13.4  10.0 - 15.0 % Final    Platelet Count 12/20/2024 195  150 - 450 10e3/uL Final    % Neutrophils 12/20/2024 56  % Final    % Lymphocytes 12/20/2024 15  % Final    % Monocytes 12/20/2024 7  % Final    % Eosinophils 12/20/2024 20  % Final    % Basophils 12/20/2024 1  % Final    % Immature Granulocytes 12/20/2024 0  % Final    NRBCs per 100 WBC 12/20/2024 0  <1 /100 Final    Absolute Neutrophils 12/20/2024 3.9  1.6 - 8.3 10e3/uL Final    Absolute Lymphocytes 12/20/2024 1.1  0.8 - 5.3 10e3/uL Final    Absolute Monocytes 12/20/2024 0.5  0.0 - 1.3 10e3/uL Final    Absolute Eosinophils 12/20/2024 1.4 (H)  0.0 - 0.7 10e3/uL Final    Absolute Basophils 12/20/2024 0.1  0.0 - 0.2 10e3/uL Final    Absolute Immature Granulocytes 12/20/2024 0.0  <=0.4 10e3/uL Final    Absolute NRBCs 12/20/2024 0.0  10e3/uL Final    Hold Specimen 12/20/2024 [facility]   Final    Hold Specimen 12/20/2024 [facility]   Final    Hold Specimen 12/20/2024 [facility]   Final    Influenza A PCR 12/20/2024 Negative  Negative Final    Influenza B PCR 12/20/2024 Negative  Negative Final    RSV PCR 12/20/2024 Negative  Negative Final    SARS CoV2 PCR 12/20/2024 Negative  Negative Final    NEGATIVE: SARS-CoV-2 (COVID-19) RNA not detected, presumed negative.    D-Dimer Quantitative 12/20/2024 2.47 (H)  0.00 - 0.50 ug/mL FEU Final    Sodium 12/20/2024 140  135 - 145 mmol/L Final    Potassium 12/20/2024 3.7  3.4 - 5.3 mmol/L Final    Carbon Dioxide (CO2) 12/20/2024 28  22 - 29 mmol/L Final    Anion Gap 12/20/2024 11  7 - 15 mmol/L Final    Urea Nitrogen 12/20/2024 19.4  8.0 - 23.0 mg/dL Final    Creatinine 12/20/2024 1.14  0.67 - 1.17 mg/dL Final    GFR Estimate 12/20/2024 65  >60 mL/min/1.73m2 Final    eGFR calculated using 2021 CKD-EPI equation.    Calcium 12/20/2024 9.2  8.8 - 10.4 mg/dL Final    Reference intervals for this test were updated on 7/16/2024 to reflect our healthy population more accurately. There may be differences  in the flagging of prior results with similar values performed with this method. Those prior results can be interpreted in the context of the updated reference intervals.    Chloride 12/20/2024 101  98 - 107 mmol/L Final    Glucose 12/20/2024 119 (H)  70 - 99 mg/dL Final    Alkaline Phosphatase 12/20/2024 57  40 - 150 U/L Final    AST 12/20/2024 18  0 - 45 U/L Final    ALT 12/20/2024 12  0 - 70 U/L Final    Protein Total 12/20/2024 7.2  6.4 - 8.3 g/dL Final    Albumin 12/20/2024 4.2  3.5 - 5.2 g/dL Final    Bilirubin Total 12/20/2024 0.3  <=1.2 mg/dL Final    N terminal Pro BNP Inpatient 12/20/2024 176  0 - 1,800 pg/mL Final    Reference range shown and results flagged as abnormal are suggested inpatient cut points for confirming diagnosis if CHF in an acute setting. Establishing a baseline value for each individual patient is useful for follow-up. An inpatient or emergency department NT-proPBNP <300 pg/mL effectively rules out acute CHF, with 99% negative predictive value.    The outpatient non-acute reference range for ruling out CHF is:  0-125 pg/mL (age 18 to less than 75)  0-450 pg/mL (age 75 yrs and older)     Sodium 12/21/2024 142  135 - 145 mmol/L Final    Potassium 12/21/2024 5.1  3.4 - 5.3 mmol/L Final    Carbon Dioxide (CO2) 12/21/2024 26  22 - 29 mmol/L Final    Anion Gap 12/21/2024 10  7 - 15 mmol/L Final    Urea Nitrogen 12/21/2024 26.1 (H)  8.0 - 23.0 mg/dL Final    Creatinine 12/21/2024 1.21 (H)  0.67 - 1.17 mg/dL Final    GFR Estimate 12/21/2024 61  >60 mL/min/1.73m2 Final    eGFR calculated using 2021 CKD-EPI equation.    Calcium 12/21/2024 9.8  8.8 - 10.4 mg/dL Final    Reference intervals for this test were updated on 7/16/2024 to reflect our healthy population more accurately. There may be differences in the flagging of prior results with similar values performed with this method. Those prior results can be interpreted in the context of the updated reference intervals.    Chloride 12/21/2024 106   98 - 107 mmol/L Final    Glucose 12/21/2024 130 (H)  70 - 99 mg/dL Final    Alkaline Phosphatase 12/21/2024 49  40 - 150 U/L Final    AST 12/21/2024 18  0 - 45 U/L Final    ALT 12/21/2024 11  0 - 70 U/L Final    Protein Total 12/21/2024 6.7  6.4 - 8.3 g/dL Final    Albumin 12/21/2024 4.1  3.5 - 5.2 g/dL Final    Bilirubin Total 12/21/2024 0.2  <=1.2 mg/dL Final    WBC Count 12/21/2024 4.3  4.0 - 11.0 10e3/uL Final    RBC Count 12/21/2024 5.10  4.40 - 5.90 10e6/uL Final    Hemoglobin 12/21/2024 13.7  13.3 - 17.7 g/dL Final    Hematocrit 12/21/2024 42.3  40.0 - 53.0 % Final    MCV 12/21/2024 83  78 - 100 fL Final    MCH 12/21/2024 26.9  26.5 - 33.0 pg Final    MCHC 12/21/2024 32.4  31.5 - 36.5 g/dL Final    RDW 12/21/2024 13.6  10.0 - 15.0 % Final    Platelet Count 12/21/2024 223  150 - 450 10e3/uL Final    Sodium 12/24/2024 139  135 - 145 mmol/L Final    Potassium 12/24/2024 4.0  3.4 - 5.3 mmol/L Final    Chloride 12/24/2024 103  98 - 107 mmol/L Final    Carbon Dioxide (CO2) 12/24/2024 27  22 - 29 mmol/L Final    Anion Gap 12/24/2024 9  7 - 15 mmol/L Final    Urea Nitrogen 12/24/2024 22.0  8.0 - 23.0 mg/dL Final    Creatinine 12/24/2024 1.04  0.67 - 1.17 mg/dL Final    GFR Estimate 12/24/2024 73  >60 mL/min/1.73m2 Final    eGFR calculated using 2021 CKD-EPI equation.    Calcium 12/24/2024 9.0  8.8 - 10.4 mg/dL Final    Reference intervals for this test were updated on 7/16/2024 to reflect our healthy population more accurately. There may be differences in the flagging of prior results with similar values performed with this method. Those prior results can be interpreted in the context of the updated reference intervals.    Glucose 12/24/2024 91  70 - 99 mg/dL Final    Hold Specimen 12/24/2024 [facility]   Final           Signed Electronically by: ANTONIO Carranza CNP

## 2025-02-20 ENCOUNTER — TRANSCRIBE ORDERS (OUTPATIENT)
Dept: OTHER | Age: 80
End: 2025-02-20

## 2025-02-20 DIAGNOSIS — J44.9 CHRONIC OBSTRUCTIVE PULMONARY DISEASE, UNSPECIFIED (H): Primary | ICD-10-CM

## 2025-02-25 DIAGNOSIS — F33.2 SEVERE EPISODE OF RECURRENT MAJOR DEPRESSIVE DISORDER, WITHOUT PSYCHOTIC FEATURES (H): ICD-10-CM

## 2025-02-25 RX ORDER — PAROXETINE 30 MG/1
TABLET, FILM COATED ORAL
Qty: 180 TABLET | Refills: 3 | Status: SHIPPED | OUTPATIENT
Start: 2025-02-25

## 2025-02-25 NOTE — TELEPHONE ENCOUNTER
Patient called and wanted to give us his [facility] Authorization # [id number]. In case it was needed for his medication    Vika Cortez/ Patient

## 2025-03-20 ENCOUNTER — TRANSFERRED RECORDS (OUTPATIENT)
Dept: HEALTH INFORMATION MANAGEMENT | Facility: CLINIC | Age: 80
End: 2025-03-20
Payer: COMMERCIAL

## 2025-03-20 ENCOUNTER — HOSPITAL ENCOUNTER (OUTPATIENT)
Dept: CARDIAC REHAB | Facility: CLINIC | Age: 80
Discharge: HOME | End: 2025-03-20
Attending: INTERNAL MEDICINE
Payer: COMMERCIAL

## 2025-03-20 PROCEDURE — 94626 PHY/QHP OP PULM RHB W/MNTR: CPT

## 2025-03-25 ENCOUNTER — HOSPITAL ENCOUNTER (OUTPATIENT)
Dept: CARDIAC REHAB | Facility: CLINIC | Age: 80
Discharge: HOME | End: 2025-03-25
Attending: INTERNAL MEDICINE
Payer: COMMERCIAL

## 2025-03-25 PROCEDURE — 94625 PHY/QHP OP PULM RHB W/O MNTR: CPT

## 2025-03-27 ENCOUNTER — HOSPITAL ENCOUNTER (OUTPATIENT)
Dept: CARDIAC REHAB | Facility: CLINIC | Age: 80
Discharge: HOME | End: 2025-03-27
Attending: NURSE PRACTITIONER
Payer: COMMERCIAL

## 2025-03-27 PROCEDURE — 94625 PHY/QHP OP PULM RHB W/O MNTR: CPT

## 2025-04-01 ENCOUNTER — HOSPITAL ENCOUNTER (OUTPATIENT)
Dept: CARDIAC REHAB | Facility: CLINIC | Age: 80
Discharge: HOME | End: 2025-04-01
Attending: NURSE PRACTITIONER
Payer: COMMERCIAL

## 2025-04-01 PROCEDURE — 94625 PHY/QHP OP PULM RHB W/O MNTR: CPT

## 2025-04-03 ENCOUNTER — HOSPITAL ENCOUNTER (OUTPATIENT)
Dept: CARDIAC REHAB | Facility: CLINIC | Age: 80
Discharge: HOME | End: 2025-04-03
Attending: NURSE PRACTITIONER
Payer: COMMERCIAL

## 2025-04-03 ENCOUNTER — OFFICE VISIT (OUTPATIENT)
Dept: FAMILY MEDICINE | Facility: CLINIC | Age: 80
End: 2025-04-03
Payer: COMMERCIAL

## 2025-04-03 VITALS
SYSTOLIC BLOOD PRESSURE: 139 MMHG | OXYGEN SATURATION: 93 % | BODY MASS INDEX: 22.13 KG/M2 | HEART RATE: 93 BPM | TEMPERATURE: 97 F | DIASTOLIC BLOOD PRESSURE: 87 MMHG | WEIGHT: 141 LBS | HEIGHT: 67 IN | RESPIRATION RATE: 14 BRPM

## 2025-04-03 DIAGNOSIS — I10 HTN, GOAL BELOW 140/90: ICD-10-CM

## 2025-04-03 DIAGNOSIS — J44.9 CHRONIC OBSTRUCTIVE PULMONARY DISEASE, UNSPECIFIED COPD TYPE (H): Primary | ICD-10-CM

## 2025-04-03 DIAGNOSIS — I25.10 CORONARY ARTERY DISEASE INVOLVING NATIVE CORONARY ARTERY OF NATIVE HEART WITHOUT ANGINA PECTORIS: ICD-10-CM

## 2025-04-03 DIAGNOSIS — R09.02 HYPOXIA: ICD-10-CM

## 2025-04-03 DIAGNOSIS — E78.5 HYPERLIPIDEMIA LDL GOAL <70: ICD-10-CM

## 2025-04-03 DIAGNOSIS — Z95.1 S/P CABG X 4: ICD-10-CM

## 2025-04-03 PROCEDURE — 94625 PHY/QHP OP PULM RHB W/O MNTR: CPT

## 2025-04-03 ASSESSMENT — PAIN SCALES - GENERAL: PAINLEVEL_OUTOF10: NO PAIN (0)

## 2025-04-03 NOTE — PROGRESS NOTES
Assessment & Plan     Chronic obstructive pulmonary disease, unspecified COPD type (H)  Hypoxia  Continue current medications  Home oxygen 1-4 liters to keep sats > 90 per nasal cannula   - Home Oxygen Order for DME - ONLY FOR DME  Continue pulmonary rehab  Will forward order and testing to Primary Care Provider at [facility] to see if this service can be covered thru his [facility] benefits  If not he is give the phone number for [facility] in [place] and  home medical to discuss treatment options   Would benefit from portable oxygen concentrator and stationary oxygen concentrator   Continue pulmonary rehab as planned        S/P CABG x 4  Hyperlipidemia LDL goal <70  HTN, goal below 140/90  Coronary artery disease involving native coronary artery of native heart without angina pectoris  Annual follow up due  Continue current meds  Formal cardiology referral placed.   - Adult Cardiology Eval  Referral; Future                      Call or return to the clinic with any worsening of symptoms or no resolution. Patient/Parent verbalized understanding and is in agreement. Medication side effects reviewed.   Current Outpatient Medications   Medication Sig Dispense Refill    albuterol (PROAIR HFA/PROVENTIL HFA/VENTOLIN HFA) 108 (90 Base) MCG/ACT inhaler Inhale 2 puffs into the lungs every 4 hours as needed for shortness of breath, wheezing or cough. 17 g 0    amLODIPine (NORVASC) 10 MG tablet Take 1 tablet (10 mg) by mouth daily 90 tablet 2    aspirin 81 MG EC tablet Take 81 mg by mouth daily      ciclopirox (LOPROX) 0.77 % cream Apply daily to areas on brows, nose, cheeks. 30 g 4    co-enzyme Q-10 100 MG CAPS capsule Take 100 mg by mouth daily      desonide (DESOWEN) 0.05 % external cream Apply at bedtime if itchy, inflamed to brows, cheek, nose. 15 g 4    evolocumab (REPATHA) 140 MG/ML prefilled autoinjector Inject 1 mL (140 mg) subcutaneously every 14 days. No further refills until seen by cardiology--call [phone] to  schedule 6 mL 1    guaiFENesin (MUCINEX) 600 MG 12 hr tablet Take 2 tablets (1,200 mg) by mouth 2 times daily.      ipratropium - albuterol 0.5 mg/2.5 mg/3 mL (DUONEB) 0.5-2.5 (3) MG/3ML neb solution Take 1 vial (3 mLs) by nebulization 4 times daily. 360 mL 5    nicotine polacrilex (NICORETTE) 4 MG gum Place 4 mg inside cheek every hour as needed for smoking cessation      Olodaterol HCl (STRIVERDI RESPIMAT) 2.5 MCG/ACT AERS Inhale 2 puffs into the lungs daily 12 g 3    pantoprazole (PROTONIX) 40 MG EC tablet Take 1 tablet (40 mg) by mouth every morning (before breakfast). 30 tablet 0    PARoxetine (PAXIL) 30 MG tablet TAKE 2 TABLETS BY MOUTH once daily in the evening 180 tablet 3    Vitamin D, Cholecalciferol, 25 MCG (1000 UT) TABS Take 1,000 Units by mouth daily        Chart documentation with Dragon Voice recognition Software. Although reviewed after completion, some words and grammatical errors may remain.  Danielle Mercado [place] [address]  [phone]        See Patient Instructions    Elly Mata is a 79 year old, presenting for the following health issues:  COPD        4/3/2025     1:27 PM   Additional Questions   Roomed by Telma MONTERROSO      Here for FTF visit for home oxygen therapy needed      COPD Follow-Up  Overall, how are your COPD symptoms since your last clinic visit?  No change  How much fatigue or shortness of breath do you have when you are walking?  Same as usual  How much shortness of breath do you have when you are resting?  Same as usual  How often do you cough? Sometimes  Have you noticed any change in your sputum/phlegm?  No  Have you experienced a recent fever? No    Braxton Castro [phone] telephone number [facility]   Fax number [phone]    3/20/2025 he completed the 6MWT. At rest, SPO2 was 88 % on RA. Pt stood up and SPO2 dropped to 88%. I then added 2LPM O2 with no  change, 3LPM and SPO2 dropped to 87%, then finally 4LPM with SPO2 then recovering to 92%. Pt completed the 6MWT on 4LPM, with lowest SPO2 of 90%.   History   Smoking Status    Former    Types: Cigarettes   Smokeless Tobacco    Former    Quit date: 9/1/2017     Lab Results   Component Value Date    FEV1 75 03/17/2017    YZS3NQC 59 03/17/2017     Component  Ref Range & Units 5 yr ago 8 yr ago     FVC-Pred  L 3.72 3.78    FVC-Pre  L 3.45 4.18    FVC-%Pred-Pre  % 92 110    FEV1-Pre  L 1.52 2.08    FEV1-%Pred-Pre  % 53 72    FEV1FVC-Pred  % 76 76    FEV1FVC-Pre  % 44 50    FEFMax-Pred  L/sec 7.39 7.57    FEFMax-Pre  L/sec 4.49 5.43    FEFMax-%Pred-Pre  % 60 71    FEF2575-Pred  L/sec 2.15 2.22    FEF2575-Pre  L/sec 0.49 0.72    LLO3108-%Pred-Pre  % 22 32    FEF2575-Post  L/sec 0.61 0.58    KRG9201-%Pred-Post  % 28 26    ExpTime-Pre  sec 10.32 10.59    FIFMax-Pre  L/sec 4.49 6.46    MEP-Pre  cmH2O 154     MIP-Pre  cmH2O -84     MVV-Pred  L/min 114     MVV-Pre  L/min 68     MVV-%Pred-Pre  % 59     VC-Pred  L 4.17 4.21    VC-Pre  L 4.29 5.94    VC-%Pred-Pre  % 102 140    IC-Pred  L 3.08 3.20    IC-Pre  L 2.88 3.79    IC-%Pred-Pre  % 93 118    ERV-Pred  L 1.09 1.01    ERV-Pre  L 1.30 1.18    ERV-%Pred-Pre  % 119 117    FEV1FEV6-Pred  % 77 77    FEV1FEV6-Pre  % 50 57    FRCPleth-Pred  L 3.55 3.53    FRCPleth-Pre  L 7.13 6.14    FRCPleth-%Pred-Pre  % 200 173    RVPleth-Pred  L 2.61 2.57    RVPleth-Pre  L 5.72 3.99    RVPleth-%Pred-Pre  % 219 155    TLCPleth-Pred  L 6.52 6.52    TLCPleth-Pre  L 10.01 9.93    TLCPleth-%Pred-Pre  % 153 152    DLCOunc-Pred  ml/min/mmHg 23.16 24.60    DLCOunc-Pre  ml/min/mmHg 18.47 23.90    DLCOunc-%Pred-Pre  % 79 97    DLCOcor-Pre  ml/min/mmHg 18.74 23.21    DLCOcor-%Pred-Pre  % 80 94    VA-Pre  L 6.99 9.55    VA-%Pred-Pre  % 122 153    FEV1SVC-Pred  % 68 68    FEV1SVC-Pre  % 35 35   Resulting Agency [facility]              Narrative  Performed by: [facility]  The FEV1 and FEV1/FVC ratio  "is reduced.   The inspiratory flow rates are within normal limits.  The MVV is within normal limits.  The FVC is reduced relative to the SVC indicating air trapping.  The TLC, FRC and RV are increased indicating overinflation.    Following administration of bronchodilators, there is a borderline-significant response.  The diffusing capacity is normal.  Aairway obstruction and overinflation are present.  A clinical trial of bronchodilators may be beneficial in view of the airway obstruction.  IMPRESSION:  Moderate Airflow Obstruction  MIP/MEP and MVV are normal  ____________________________________________M.D.    This interpretation has been electronically signed:  Breezy Alberts 08/27/2019  12:22:12 PM       Last seen by cardiology 12/2023   Previous statin intolerance severe myalgia /weakness   Tolerating repatha without difficulty- not covered by [facility] benefits   LDL Cholesterol Calculated   Date Value Ref Range Status   01/29/2025 97 <100 mg/dL Final   03/23/2021 47 <100 mg/dL Final     Comment:     Desirable:       <100 mg/dl           Review of Systems  Constitutional, neuro, ENT, endocrine, pulmonary, cardiac, gastrointestinal, genitourinary, musculoskeletal, integument and psychiatric systems are negative, except as otherwise noted.      Objective    BP (!) 143/87   Pulse 93   Temp 97  F (36.1  C) (Tympanic)   Resp 14   Ht 1.702 m (5' 7\")   Wt 64 kg (141 lb)   SpO2 93%   BMI 22.08 kg/m    Body mass index is 22.08 kg/m .  Physical Exam   GENERAL: alert and no distress  EYES: Eyes grossly normal to inspection, PERRL and conjunctivae and sclerae normal  HENT: ear canals and TM's normal, nose and mouth without ulcers or lesions  NECK: no adenopathy, no asymmetry, masses, or scars  RESP: prolonged expiratory phase and decreased breath sounds throughout  CV: regular rates and rhythm, peripheral pulses strong, and no peripheral edema  ABDOMEN: soft, nontender, no hepatosplenomegaly, no masses and bowel sounds " normal  MS: no gross musculoskeletal defects noted, no edema  SKIN: no suspicious lesions or rashes  NEURO: Normal strength and tone, mentation intact and speech normal  PSYCH: mentation appears normal, affect normal/bright    Office Visit on 01/29/2025   Component Date Value Ref Range Status    Cholesterol 01/29/2025 170  <200 mg/dL Final    Triglycerides 01/29/2025 48  <150 mg/dL Final    Direct Measure HDL 01/29/2025 63  >=40 mg/dL Final    LDL Cholesterol Calculated 01/29/2025 97  <100 mg/dL Final    Non HDL Cholesterol 01/29/2025 107  <130 mg/dL Final    Patient Fasting > 8hrs? 01/29/2025 Yes   Final    Sodium 01/29/2025 140  135 - 145 mmol/L Final    Potassium 01/29/2025 4.4  3.4 - 5.3 mmol/L Final    Carbon Dioxide (CO2) 01/29/2025 26  22 - 29 mmol/L Final    Anion Gap 01/29/2025 14  7 - 15 mmol/L Final    Urea Nitrogen 01/29/2025 20.5  8.0 - 23.0 mg/dL Final    Creatinine 01/29/2025 1.25 (H)  0.67 - 1.17 mg/dL Final    GFR Estimate 01/29/2025 59 (L)  >60 mL/min/1.73m2 Final    eGFR calculated using 2021 CKD-EPI equation.    Calcium 01/29/2025 9.4  8.8 - 10.4 mg/dL Final    Chloride 01/29/2025 100  98 - 107 mmol/L Final    Glucose 01/29/2025 82  70 - 99 mg/dL Final    Alkaline Phosphatase 01/29/2025 56  40 - 150 U/L Final    AST 01/29/2025 23  0 - 45 U/L Final    ALT 01/29/2025 15  0 - 70 U/L Final    Protein Total 01/29/2025 6.9  6.4 - 8.3 g/dL Final    Albumin 01/29/2025 4.2  3.5 - 5.2 g/dL Final    Bilirubin Total 01/29/2025 0.4  <=1.2 mg/dL Final    Patient Fasting > 8hrs? 01/29/2025 Yes   Final           Signed Electronically by: ANTONIO Carranza CNP

## 2025-04-08 ENCOUNTER — HOSPITAL ENCOUNTER (OUTPATIENT)
Dept: CARDIAC REHAB | Facility: CLINIC | Age: 80
Discharge: HOME | End: 2025-04-08
Attending: NURSE PRACTITIONER
Payer: COMMERCIAL

## 2025-04-08 PROCEDURE — 94625 PHY/QHP OP PULM RHB W/O MNTR: CPT

## 2025-04-10 ENCOUNTER — HOSPITAL ENCOUNTER (OUTPATIENT)
Dept: CARDIAC REHAB | Facility: CLINIC | Age: 80
Discharge: HOME | End: 2025-04-10
Attending: NURSE PRACTITIONER
Payer: COMMERCIAL

## 2025-04-10 PROCEDURE — 94625 PHY/QHP OP PULM RHB W/O MNTR: CPT

## 2025-04-15 ENCOUNTER — HOSPITAL ENCOUNTER (OUTPATIENT)
Dept: CARDIAC REHAB | Facility: CLINIC | Age: 80
Discharge: HOME | End: 2025-04-15
Attending: NURSE PRACTITIONER
Payer: COMMERCIAL

## 2025-04-15 PROCEDURE — 94625 PHY/QHP OP PULM RHB W/O MNTR: CPT

## 2025-04-17 ENCOUNTER — HOSPITAL ENCOUNTER (OUTPATIENT)
Dept: CARDIAC REHAB | Facility: CLINIC | Age: 80
Discharge: HOME | End: 2025-04-17
Attending: NURSE PRACTITIONER
Payer: COMMERCIAL

## 2025-04-17 PROCEDURE — 94625 PHY/QHP OP PULM RHB W/O MNTR: CPT

## 2025-04-22 ENCOUNTER — HOSPITAL ENCOUNTER (OUTPATIENT)
Dept: CARDIAC REHAB | Facility: CLINIC | Age: 80
Discharge: HOME | End: 2025-04-22
Attending: NURSE PRACTITIONER
Payer: COMMERCIAL

## 2025-04-22 PROCEDURE — 94625 PHY/QHP OP PULM RHB W/O MNTR: CPT

## 2025-04-24 ENCOUNTER — HOSPITAL ENCOUNTER (OUTPATIENT)
Dept: CARDIAC REHAB | Facility: CLINIC | Age: 80
Discharge: HOME | End: 2025-04-24
Attending: NURSE PRACTITIONER
Payer: COMMERCIAL

## 2025-04-24 PROCEDURE — 94625 PHY/QHP OP PULM RHB W/O MNTR: CPT

## 2025-04-29 ENCOUNTER — HOSPITAL ENCOUNTER (OUTPATIENT)
Dept: CARDIAC REHAB | Facility: CLINIC | Age: 80
Discharge: HOME | End: 2025-04-29
Attending: NURSE PRACTITIONER
Payer: COMMERCIAL

## 2025-04-29 PROCEDURE — 94625 PHY/QHP OP PULM RHB W/O MNTR: CPT

## 2025-05-01 ENCOUNTER — HOSPITAL ENCOUNTER (OUTPATIENT)
Dept: CARDIAC REHAB | Facility: CLINIC | Age: 80
Discharge: HOME | End: 2025-05-01
Attending: NURSE PRACTITIONER
Payer: COMMERCIAL

## 2025-05-01 PROCEDURE — 94625 PHY/QHP OP PULM RHB W/O MNTR: CPT

## 2025-05-06 ENCOUNTER — HOSPITAL ENCOUNTER (OUTPATIENT)
Dept: CARDIAC REHAB | Facility: CLINIC | Age: 80
Discharge: HOME | End: 2025-05-06
Attending: NURSE PRACTITIONER
Payer: COMMERCIAL

## 2025-05-06 PROCEDURE — 94625 PHY/QHP OP PULM RHB W/O MNTR: CPT

## 2025-05-08 ENCOUNTER — HOSPITAL ENCOUNTER (OUTPATIENT)
Dept: CARDIAC REHAB | Facility: CLINIC | Age: 80
Discharge: HOME | End: 2025-05-08
Attending: NURSE PRACTITIONER
Payer: COMMERCIAL

## 2025-05-08 PROCEDURE — 94625 PHY/QHP OP PULM RHB W/O MNTR: CPT

## 2025-05-13 ENCOUNTER — HOSPITAL ENCOUNTER (OUTPATIENT)
Dept: CARDIAC REHAB | Facility: CLINIC | Age: 80
Discharge: HOME | End: 2025-05-13
Attending: NURSE PRACTITIONER
Payer: COMMERCIAL

## 2025-05-13 PROCEDURE — 94625 PHY/QHP OP PULM RHB W/O MNTR: CPT

## 2025-05-20 ENCOUNTER — HOSPITAL ENCOUNTER (OUTPATIENT)
Dept: CARDIAC REHAB | Facility: CLINIC | Age: 80
Discharge: HOME | End: 2025-05-20
Attending: NURSE PRACTITIONER
Payer: COMMERCIAL

## 2025-05-20 PROCEDURE — 94625 PHY/QHP OP PULM RHB W/O MNTR: CPT

## 2025-05-27 ENCOUNTER — HOSPITAL ENCOUNTER (OUTPATIENT)
Dept: CARDIAC REHAB | Facility: CLINIC | Age: 80
Discharge: HOME | End: 2025-05-27
Attending: NURSE PRACTITIONER
Payer: COMMERCIAL

## 2025-05-27 PROCEDURE — 94625 PHY/QHP OP PULM RHB W/O MNTR: CPT

## 2025-06-03 ENCOUNTER — HOSPITAL ENCOUNTER (OUTPATIENT)
Dept: CARDIAC REHAB | Facility: CLINIC | Age: 80
Discharge: HOME | End: 2025-06-03
Attending: INTERNAL MEDICINE
Payer: COMMERCIAL

## 2025-06-03 PROCEDURE — 94625 PHY/QHP OP PULM RHB W/O MNTR: CPT

## 2025-06-05 ENCOUNTER — OFFICE VISIT (OUTPATIENT)
Dept: DERMATOLOGY | Facility: CLINIC | Age: 80
End: 2025-06-05
Payer: COMMERCIAL

## 2025-06-05 DIAGNOSIS — L57.0 ACTINIC KERATOSES: Primary | ICD-10-CM

## 2025-06-05 DIAGNOSIS — L21.9 DERMATITIS, SEBORRHEIC: ICD-10-CM

## 2025-06-05 RX ORDER — CICLOPIROX OLAMINE 7.7 MG/G
CREAM TOPICAL
Qty: 30 G | Refills: 11 | Status: SHIPPED | OUTPATIENT
Start: 2025-06-05

## 2025-06-05 RX ORDER — ROFLUMILAST 3 MG/G
1 AEROSOL, FOAM TOPICAL DAILY
Qty: 60 G | Refills: 5 | Status: SHIPPED | OUTPATIENT
Start: 2025-06-05

## 2025-06-05 NOTE — LETTER
6/5/2025      Adolfo Rendon  [address] [facility] [place] [address]      Dear Colleague,    Thank you for referring your patient, Adolfo Rendon, to the [facility]. Please see a copy of my visit note below.    [facility] Dermatology Note  Encounter Date: Jun 5, 2025  Office Visit     Reviewed patients past medical history and pertinent chart review prior to patients visit today.     Dermatology Problem List:    1.  Basal cell carcinoma, right forehead, status post Mohs 5/14/2024  2.  Actinic keratoses, status post cryotherapy  3.  Seborrheic dermatitis, face and scalp   - Selsun Blue shampoo, ciclopirox 0.77% cream, desonide 0.05% cream   - Will try for Zoryve 0.3% foam 6/5/2025    ____________________________________________    CC: No chief complaint on file.    HPI:  Mr. Adolfo Rendon is a(n) 79 year old male who presents today as a return patient for medication refills.  The patient has a history of seborrheic dermatitis involving his face and scalp.  He is using an over-the-counter Selsun Blue shampoo on the scalp and as a face wash.  The patient applies ciclopirox cream to his face daily.  Since running out of this medication, he has been using desonide 0.05% cream instead.  [facility].    Patient is otherwise feeling well, without additional skin concerns.    Medications:  Current Outpatient Medications   Medication Sig Dispense Refill     ciclopirox (LOPROX) 0.77 % cream Apply daily to twice daily to areas on face as needed for flaking. 30 g 11     desonide (DESOWEN) 0.05 % external cream Apply at bedtime if itchy, inflamed to brows, cheek, nose. 15 g 4     Roflumilast (ZORYVE) 0.3 % FOAM Externally apply 1 Application topically daily. Apply to scalp 60 g 5     albuterol (PROAIR HFA/PROVENTIL HFA/VENTOLIN HFA) 108 (90 Base) MCG/ACT inhaler Inhale 2 puffs into the lungs every 4 hours as needed for shortness of breath,  wheezing or cough. 17 g 0     amLODIPine (NORVASC) 10 MG tablet Take 1 tablet (10 mg) by mouth daily 90 tablet 2     aspirin 81 MG EC tablet Take 81 mg by mouth daily       co-enzyme Q-10 100 MG CAPS capsule Take 100 mg by mouth daily       evolocumab (REPATHA) 140 MG/ML prefilled autoinjector Inject 1 mL (140 mg) subcutaneously every 14 days. No further refills until seen by cardiology--call [phone] to schedule 6 mL 1     guaiFENesin (MUCINEX) 600 MG 12 hr tablet Take 2 tablets (1,200 mg) by mouth 2 times daily.       ipratropium - albuterol 0.5 mg/2.5 mg/3 mL (DUONEB) 0.5-2.5 (3) MG/3ML neb solution Take 1 vial (3 mLs) by nebulization 4 times daily. 360 mL 5     nicotine polacrilex (NICORETTE) 4 MG gum Place 4 mg inside cheek every hour as needed for smoking cessation       Olodaterol HCl (STRIVERDI RESPIMAT) 2.5 MCG/ACT AERS Inhale 2 puffs into the lungs daily 12 g 3     pantoprazole (PROTONIX) 40 MG EC tablet Take 1 tablet (40 mg) by mouth every morning (before breakfast). 30 tablet 0     PARoxetine (PAXIL) 30 MG tablet TAKE 2 TABLETS BY MOUTH once daily in the evening 180 tablet 3     Vitamin D, Cholecalciferol, 25 MCG (1000 UT) TABS Take 1,000 Units by mouth daily        No current facility-administered medications for this visit.      Past Medical History:   Patient Active Problem List   Diagnosis     MIXED HYPERLIPIDEMIA     Benign neoplasm of epididymis     HL (hearing loss)     Hyperlipidemia LDL goal <70     Muscle pain     Tobacco abuse     SANTIAGO (dyspnea on exertion)     Generalized anxiety disorder     Right inguinal hernia     HTN, goal below 140/90     COPD (chronic obstructive pulmonary disease) (H)     Thoracic aortic aneurysm without rupture     Centrilobular emphysema (H)     Aortic arch aneurysm     Unilateral atherosclerotic renal artery stenosis     Pulmonary nodules     Diverticulosis of large intestine     Coronary artery disease involving native coronary artery of native heart without  angina pectoris     Chest pain     Status post coronary angiogram     Benign essential hypertension     Ischemic cardiomyopathy     S/P CABG x 4     Fluid overload     Anemia due to blood loss, acute     Transient hyperglycemia post procedure     H/O cardiomyopathy     Personal history of prostate cancer     Infrarenal abdominal aortic aneurysm (AAA) without rupture     Chronic obstructive pulmonary disease with acute exacerbation (H)     Personal history of tobacco use, presenting hazards to health     Past Medical History:   Diagnosis Date     AAA (abdominal aortic aneurysm) without rupture      BCC (basal cell carcinoma), face      Congestive heart failure (H)      COPD (chronic obstructive pulmonary disease) (H)      Coronary artery disease      Depressive disorder, not elsewhere classified      HLD (hyperlipidemia)      Hypertension      Hypertrophy (benign) of prostate      Impotence of organic origin      Other and unspecified hyperlipidemia      Other anxiety states      PVD (peripheral vascular disease)      Tobacco use disorder        ____________________________________________     Physical Exam:  Vitals: There were no vitals taken for this visit.   SKIN: The exam included face, scalp, and ears.  - Morillo II.  - Scalp and face, mild white loose flaking  - Scalp x 7 and nasal dorsum x 2, pink to red rough adherent papules consistent with actinic keratoses    - No other lesions of concern on areas examined.     _________________________________________    Assessment & Plan:   # Seborrheic dermatitis, face and scalp  This is a benign, but often chronic condition with a goal of maintaining it well.  The patient may continue to use Selsun Blue shampoo on his scalp.  He also uses this as a face wash when showering.  Refills of ciclopirox 0.77% cream provided for the patient to apply to the affected areas on his face once to twice daily as needed for flaking.  This has been helpful for him in the past.  We  discussed that desonide 0.05% cream is a topical steroid and should not be applied daily long-term due to risk of atrophy.  However, it can be used twice daily for 1 to 2 weeks if needed for itching and irritation on the face.    We will try to get Zoryve 0.3% foam for the patient as well.     # Actinic keratoses  Actinic keratoses are pre-cancerous skin growths caused by sun exposure. Treatment is recommended and medically indicated. Treated with cryotherapy as outlined below.     Procedures performed: Cryotherapy  - Location(s): Scalp x 7 and nasal dorsum x 2. After verbal consent and discussion of risks and benefits including, but not limited to, dyspigmentation/scar, blister, and pain, 9 lesion(s) was(were) treated with 1-2 mm freeze border for 1-2 cycles with liquid nitrogen. Post cryotherapy instructions were provided. The patient should return if the lesions do not resolve.      All risks, benefits and alternatives were discussed with patient.  Patient is in agreement and understands the assessment and plan.  All questions were answered.    Jolie Guillaume PA-C  [facility] Dermatology    CC Referred Self, MD  No address on file on close of this encounter.    Again, thank you for allowing me to participate in the care of your patient.        Sincerely,        Jolie Guillaume PA-C    Electronically signed

## 2025-06-05 NOTE — PROGRESS NOTES
[facility] Dermatology Note  Encounter Date: Jun 5, 2025  Office Visit     Reviewed patients past medical history and pertinent chart review prior to patients visit today.     Dermatology Problem List:    1.  Basal cell carcinoma, right forehead, status post Mohs 5/14/2024  2.  Actinic keratoses, status post cryotherapy  3.  Seborrheic dermatitis, face and scalp   - Selsun Blue shampoo, ciclopirox 0.77% cream, desonide 0.05% cream   - Will try for Zoryve 0.3% foam 6/5/2025    ____________________________________________    CC: No chief complaint on file.    HPI:  Mr. Adolfo Rendon is a(n) 79 year old male who presents today as a return patient for medication refills.  The patient has a history of seborrheic dermatitis involving his face and scalp.  He is using an over-the-counter Selsun Blue shampoo on the scalp and as a face wash.  The patient applies ciclopirox cream to his face daily.  Since running out of this medication, he has been using desonide 0.05% cream instead.  [facility]    Patient is otherwise feeling well, without additional skin concerns.    Medications:  Current Outpatient Medications   Medication Sig Dispense Refill    ciclopirox (LOPROX) 0.77 % cream Apply daily to twice daily to areas on face as needed for flaking. 30 g 11    desonide (DESOWEN) 0.05 % external cream Apply at bedtime if itchy, inflamed to brows, cheek, nose. 15 g 4    Roflumilast (ZORYVE) 0.3 % FOAM Externally apply 1 Application topically daily. Apply to scalp 60 g 5    albuterol (PROAIR HFA/PROVENTIL HFA/VENTOLIN HFA) 108 (90 Base) MCG/ACT inhaler Inhale 2 puffs into the lungs every 4 hours as needed for shortness of breath, wheezing or cough. 17 g 0    amLODIPine (NORVASC) 10 MG tablet Take 1 tablet (10 mg) by mouth daily 90 tablet 2    aspirin 81 MG EC tablet Take 81 mg by mouth daily      co-enzyme Q-10 100 MG CAPS capsule Take 100 mg by mouth daily      evolocumab  (REPATHA) 140 MG/ML prefilled autoinjector Inject 1 mL (140 mg) subcutaneously every 14 days. No further refills until seen by cardiology--call [phone] to schedule 6 mL 1    guaiFENesin (MUCINEX) 600 MG 12 hr tablet Take 2 tablets (1,200 mg) by mouth 2 times daily.      ipratropium - albuterol 0.5 mg/2.5 mg/3 mL (DUONEB) 0.5-2.5 (3) MG/3ML neb solution Take 1 vial (3 mLs) by nebulization 4 times daily. 360 mL 5    nicotine polacrilex (NICORETTE) 4 MG gum Place 4 mg inside cheek every hour as needed for smoking cessation      Olodaterol HCl (STRIVERDI RESPIMAT) 2.5 MCG/ACT AERS Inhale 2 puffs into the lungs daily 12 g 3    pantoprazole (PROTONIX) 40 MG EC tablet Take 1 tablet (40 mg) by mouth every morning (before breakfast). 30 tablet 0    PARoxetine (PAXIL) 30 MG tablet TAKE 2 TABLETS BY MOUTH once daily in the evening 180 tablet 3    Vitamin D, Cholecalciferol, 25 MCG (1000 UT) TABS Take 1,000 Units by mouth daily        No current facility-administered medications for this visit.      Past Medical History:   Patient Active Problem List   Diagnosis    MIXED HYPERLIPIDEMIA    Benign neoplasm of epididymis    HL (hearing loss)    Hyperlipidemia LDL goal <70    Muscle pain    Tobacco abuse    SANTIAGO (dyspnea on exertion)    Generalized anxiety disorder    Right inguinal hernia    HTN, goal below 140/90    COPD (chronic obstructive pulmonary disease) (H)    Thoracic aortic aneurysm without rupture    Centrilobular emphysema (H)    Aortic arch aneurysm    Unilateral atherosclerotic renal artery stenosis    Pulmonary nodules    Diverticulosis of large intestine    Coronary artery disease involving native coronary artery of native heart without angina pectoris    Chest pain    Status post coronary angiogram    Benign essential hypertension    Ischemic cardiomyopathy    S/P CABG x 4    Fluid overload    Anemia due to blood loss, acute    Transient hyperglycemia post procedure    H/O cardiomyopathy    Personal history of  prostate cancer    Infrarenal abdominal aortic aneurysm (AAA) without rupture    Chronic obstructive pulmonary disease with acute exacerbation (H)    Personal history of tobacco use, presenting hazards to health     Past Medical History:   Diagnosis Date    AAA (abdominal aortic aneurysm) without rupture     BCC (basal cell carcinoma), face     Congestive heart failure (H)     COPD (chronic obstructive pulmonary disease) (H)     Coronary artery disease     Depressive disorder, not elsewhere classified     HLD (hyperlipidemia)     Hypertension     Hypertrophy (benign) of prostate     Impotence of organic origin     Other and unspecified hyperlipidemia     Other anxiety states     PVD (peripheral vascular disease)     Tobacco use disorder        ____________________________________________     Physical Exam:  Vitals: There were no vitals taken for this visit.   SKIN: The exam included face, scalp, and ears.  - Morillo II.  - Scalp and face, mild white loose flaking  - Scalp x 7 and nasal dorsum x 2, pink to red rough adherent papules consistent with actinic keratoses    - No other lesions of concern on areas examined.     _________________________________________    Assessment & Plan:   # Seborrheic dermatitis, face and scalp  This is a benign, but often chronic condition with a goal of maintaining it well.  The patient may continue to use Selsun Blue shampoo on his scalp.  He also uses this as a face wash when showering.  Refills of ciclopirox 0.77% cream provided for the patient to apply to the affected areas on his face once to twice daily as needed for flaking.  This has been helpful for him in the past.  We discussed that desonide 0.05% cream is a topical steroid and should not be applied daily long-term due to risk of atrophy.  However, it can be used twice daily for 1 to 2 weeks if needed for itching and irritation on the face.    We will try to get Zoryve 0.3% foam for the patient as well.     # Actinic  keratoses  Actinic keratoses are pre-cancerous skin growths caused by sun exposure. Treatment is recommended and medically indicated. Treated with cryotherapy as outlined below.     Procedures performed: Cryotherapy  - Location(s): Scalp x 7 and nasal dorsum x 2. After verbal consent and discussion of risks and benefits including, but not limited to, dyspigmentation/scar, blister, and pain, 9 lesion(s) was(were) treated with 1-2 mm freeze border for 1-2 cycles with liquid nitrogen. Post cryotherapy instructions were provided. The patient should return if the lesions do not resolve.      All risks, benefits and alternatives were discussed with patient.  Patient is in agreement and understands the assessment and plan.  All questions were answered.    Jolie Guillaume PA-C  [facility]    CC Referred MD Scooter  No address on file on close of this encounter.

## 2025-06-09 DIAGNOSIS — L21.9 DERMATITIS, SEBORRHEIC: ICD-10-CM

## 2025-06-09 RX ORDER — ROFLUMILAST 3 MG/G
1 AEROSOL, FOAM TOPICAL DAILY
Qty: 60 G | Refills: 5 | Status: CANCELLED | OUTPATIENT
Start: 2025-06-09

## 2025-06-09 NOTE — TELEPHONE ENCOUNTER
Requested Prescriptions   Pending Prescriptions Disp Refills    Roflumilast (ZORYVE) 0.3 % FOAM 60 g 5     Sig: Externally apply 1 Application topically daily. Apply to scalp       There is no refill protocol information for this order          Last office visit: 6/5/2025 ; last virtual visit: Visit date not found with prescribing provider: Jolie Guillaume    Future Office Visit:        Arleen Fierro   Clinic Station    [facility]  [phone]

## 2025-06-10 ENCOUNTER — HOSPITAL ENCOUNTER (OUTPATIENT)
Dept: CARDIAC REHAB | Facility: CLINIC | Age: 80
Discharge: HOME | End: 2025-06-10
Attending: INTERNAL MEDICINE
Payer: COMMERCIAL

## 2025-06-10 PROCEDURE — 94625 PHY/QHP OP PULM RHB W/O MNTR: CPT

## 2025-06-11 ENCOUNTER — TELEPHONE (OUTPATIENT)
Dept: DERMATOLOGY | Facility: CLINIC | Age: 80
End: 2025-06-11
Payer: COMMERCIAL

## 2025-06-11 NOTE — TELEPHONE ENCOUNTER
Prior Authorization Retail Medication Request    Medication/Dose: Zoryve 0.3% Foam   Diagnosis and ICD code (if different than what is on RX):    New/renewal/insurance change PA/secondary ins. PA:  Previously Tried and Failed:    Rationale:      Insurance   Primary:   Insurance ID:      Secondary (if applicable):  Insurance ID:      Pharmacy Information (if different than what is on RX)  Name:    Phone:    Fax:    Clinic Information  Preferred routing pool for dept communication:

## 2025-06-12 NOTE — TELEPHONE ENCOUNTER
PRIOR AUTHORIZATION DENIED    Medication: Zoryve 0.3% Foam     Denial Date: 6/12/2025    Denial Rational:                Appeal Information:    If you would like to appeal, please supply P/A team with a letter of medical necessity with clinical reason.

## 2025-06-12 NOTE — TELEPHONE ENCOUNTER
Central Prior Authorization Team   Phone: [phone]    PA Initiation    Medication: Zoryve 0.3% Foam   Insurance Company: "[employer] - Phone [phone] Fax [phone]  Pharmacy Filling the Rx: [facility] - [address]  Filling Pharmacy Phone: [phone]  Filling Pharmacy Fax:    Start Date: 6/12/2025    [facility] KEY: [id number]

## 2025-06-16 NOTE — TELEPHONE ENCOUNTER
Patient Contact    Attempt # 1    Was call answered?  No, left message to call.      [facility]   [phone]       Called patient and left message- re: letting him know info regarding coverage and that an appeal letter was done.

## 2025-06-16 NOTE — TELEPHONE ENCOUNTER
Medication Appeal Initiation    We have initiated an appeal for the requested medication:  Medication: Zoryve 0.3% Foam   Appeal Start Date:  6/16/2025  Insurance Company:    Comments:     E-Appeal

## 2025-06-19 NOTE — TELEPHONE ENCOUNTER
MEDICATION APPEAL APPROVED    Medication: Zoryve 0.3% Foam   Authorization Effective Date:    Authorization Expiration Date:    Approved Dose/Quantity:    Reference #:     Insurance Company:    Expected CoPay:       CoPay Card Available:      Foundation Assistance Needed:    Which Pharmacy is filling the prescription (Not needed for infusion/clinic administered): [facility] - [address]  **Instructed pharmacy to notify patient when script is ready to /ship.**

## 2025-06-25 NOTE — PROGRESS NOTES
~Cardiology Clinic Visit~    Primary Cardiologist: Dr. Espino  Reason for visit: Annual visit    History of Present Illness    Adolfo Rendon is a very pleasant 79 year old male with a past medical history notable for:      Coronary artery disease since 2015 when he had an angiogram that showed chronically occluded right coronary artery with left-to-right collaterals and moderate proximal LAD stenosis.  Had progressive angina in August 2019.  Noted to have multivessel disease and mild ischemic cardiomyopathy with EF of 45 to 50%.  Uncontrolled cardiovascular risk factors due to medication intolerance at that time.  Underwent four-vessel bypass surgery with LIMA to LAD and vein graft to the obtuse marginal PDA and PL in 2019.  Heart failure with recovered ejection fraction  Significant vascular disease that includes renal artery stenosis PAD and AAA -currently not interested in vascular involvement or surgery  Ongoing tobacco abuse  COPD  Hypertension hyperlipidemia and intolerance to multiple medications including statins     He was last in cardiology clinic in December 2023.  At that visit, he mentioned his dyspnea on exertion has worsened over the last 1 year.  He said it never improved after surgery but has been getting worse over the last 1 year.  Denies edema syncope presyncope.  Denies any typical symptoms of chest discomfort.  Denies any bleeding problems.  He is complaining of fatigue.  He has COPD and [facility]    In regards to pertinent data, his last nuclear stress test showed moderate infarction without ischemia nearly 2 years ago.  He has not had a recent echocardiogram.  Lipids are at goal on a combination of ezetimibe and Repatha.    Diagnostics:  FLP 1/29/2025: , HDL 63, LDL 97, TG 48; ALT 15  BMP 1/29/2025: CR 1.25, GFR 59, , K4.4.    Interval 06/30/25    He continues to have SOB relating to his COPD. He [facility].  He  does mention that at times when he is particularly SOB he notices that his heart is racing, 110s or higher.  Does have a family history of AF - sister, and brother in law.  His mother passed from CVA, years ago, etiology unknown.   Regular rhythm today.  Denies CP or jamie angina.  States he would like to be able to spend more time outdoors but his COPD limits his ability to do this.  __________________________________________________________________         Assessment and Impression:     Chronic obstructive pulmonary disease, unspecified COPD type  S/P CABG x 4  Hyperlipidemia LDL goal <70  HTN, goal below 140/90  CAD  Intermittent tachycardia, query underlying PAF?         Recommendations and Plan:     No medication changes for today.  Zio patch monitor for arrhythmia assessment, 7-day mail out.  Encourage [facility] with [facility] to optimize treatments.  Routine cardiology follow up 1-year.  __________________________________________________________________    Thank you for the opportunity to participate in this pleasant patient's care.    We would be happy to see this patient sooner for any concerns in the meantime.    Samira Sanchez, APRN, CNP, CCK   Nurse Practitioner  [facility]    Today's clinic visit entailed:  The following tests were independently interpreted by me as noted in my documentation: roberta, echo, labs  Prescription drug management  The level of medical decision making during this visit was of moderate complexity.  Review of the result(s) of each unique test - cardiac testing, cardiac imaging, labs  Care everywhere reviewed for additional records to facilitate comprehensive patient care.  Recent hospitalization records and notes reviewed to facilitate comprehensive care coordination.    Orders this Visit:  Orders Placed This Encounter   Procedures    Follow-Up with Cardiology- THOMAS     No orders of the defined types were placed in this encounter.    There are  no discontinued medications.  Encounter Diagnoses   Name Primary?    S/P CABG x 4     Hyperlipidemia LDL goal <70     HTN, goal below 140/90     Coronary artery disease involving native coronary artery of native heart without angina pectoris Yes    Tachycardia      CURRENT MEDICATIONS:  Current Outpatient Medications   Medication Sig Dispense Refill    albuterol (PROAIR HFA/PROVENTIL HFA/VENTOLIN HFA) 108 (90 Base) MCG/ACT inhaler Inhale 2 puffs into the lungs every 4 hours as needed for shortness of breath, wheezing or cough. 17 g 0    amLODIPine (NORVASC) 10 MG tablet Take 1 tablet (10 mg) by mouth daily 90 tablet 2    aspirin 81 MG EC tablet Take 81 mg by mouth daily      ciclopirox (LOPROX) 0.77 % cream Apply daily to twice daily to areas on face as needed for flaking. 30 g 11    co-enzyme Q-10 100 MG CAPS capsule Take 100 mg by mouth daily      desonide (DESOWEN) 0.05 % external cream Apply at bedtime if itchy, inflamed to brows, cheek, nose. 15 g 4    evolocumab (REPATHA) 140 MG/ML prefilled autoinjector Inject 1 mL (140 mg) subcutaneously every 14 days. No further refills until seen by cardiology--call [phone] to schedule 6 mL 1    guaiFENesin (MUCINEX) 600 MG 12 hr tablet Take 2 tablets (1,200 mg) by mouth 2 times daily.      ipratropium - albuterol 0.5 mg/2.5 mg/3 mL (DUONEB) 0.5-2.5 (3) MG/3ML neb solution Take 1 vial (3 mLs) by nebulization 4 times daily. 360 mL 5    nicotine polacrilex (NICORETTE) 4 MG gum Place 4 mg inside cheek every hour as needed for smoking cessation      Olodaterol HCl (STRIVERDI RESPIMAT) 2.5 MCG/ACT AERS Inhale 2 puffs into the lungs daily 12 g 3    pantoprazole (PROTONIX) 40 MG EC tablet Take 1 tablet (40 mg) by mouth every morning (before breakfast). 30 tablet 0    PARoxetine (PAXIL) 30 MG tablet TAKE 2 TABLETS BY MOUTH once daily in the evening 180 tablet 3    Roflumilast (ZORYVE) 0.3 % FOAM Externally apply 1 Application topically daily. Apply to scalp 60 g 5    Vitamin  "D, Cholecalciferol, 25 MCG (1000 UT) TABS Take 1,000 Units by mouth daily        ALLERGIES     Allergies   Allergen Reactions    Coreg [Carvedilol] Other (See Comments) and Difficulty breathing     Depression, Couldn't think, felt miserable per patient.    Hctz [Hydrochlorothiazide] Fatigue     \"felt like zombie\"    Loratadine      Mood , irritablity     Metoprolol Fatigue     \"felt like zombie\"    Pravastatin Fatigue     \"felt like zombie\"    Remeron [Mirtazapine]      Agitation       Wellbutrin [Bupropion Hcl]      Sig mood issues        PAST MEDICAL, SURGICAL, FAMILY, SOCIAL HISTORY:  History was reviewed and updated as needed, see medical record.    Review of Systems:  A 10-point Review Of Systems is otherwise normal except for that which is noted in the HPI and interval summary.    Physical Exam:    Vitals: /77 (BP Location: Right arm, Patient Position: Sitting, Cuff Size: Adult Regular)   Pulse 75   Resp 16   Ht 1.702 m (5' 7\")   Wt 62.3 kg (137 lb 6.4 oz)   SpO2 95%   BMI 21.52 kg/m    Constitutional: Appears stated age, well nourished, NAD.  Respiratory: Non-labored. Lungs dim, clear  Cardiovascular: RRR, normal S1 and S2. No murmur.  No edema.  GI: Soft, non-distended, non-tender.  Skin: Warm and dry.   Musculoskeletal/Extremities: Symmetrical movement.  Neurologic: No gross focal deficits. Alert, awake.  Psychiatric: Affect appropriate. Mentation normal.    Recent Lab Results:  LIPID RESULTS:  Lab Results   Component Value Date    CHOL 170 01/29/2025    CHOL 127 03/23/2021    HDL 63 01/29/2025    HDL 62 03/23/2021    LDL 97 01/29/2025    LDL 47 03/23/2021    TRIG 48 01/29/2025    TRIG 76 03/23/2021    CHOLHDLRATIO 4.1 03/24/2015     LIVER ENZYME RESULTS:  Lab Results   Component Value Date    AST 23 01/29/2025    AST 11 03/23/2021    ALT 15 01/29/2025    ALT 16 03/23/2021     CBC RESULTS:  Lab Results   Component Value Date    WBC 4.3 12/21/2024    WBC 4.5 03/23/2021    RBC 5.10 12/21/2024    " RBC 4.92 03/23/2021    HGB 13.7 12/21/2024    HGB 13.6 03/23/2021    HCT 42.3 12/21/2024    HCT 41.0 03/23/2021    MCV 83 12/21/2024    MCV 83 03/23/2021    MCH 26.9 12/21/2024    MCH 27.6 03/23/2021    MCHC 32.4 12/21/2024    MCHC 33.2 03/23/2021    RDW 13.6 12/21/2024    RDW 14.0 03/23/2021     12/21/2024     03/23/2021     BMP RESULTS:  Lab Results   Component Value Date     01/29/2025     03/23/2021    POTASSIUM 4.4 01/29/2025    POTASSIUM 4.6 10/25/2021    POTASSIUM 4.4 03/23/2021    CHLORIDE 100 01/29/2025    CHLORIDE 105 10/25/2021    CHLORIDE 103 03/23/2021    CO2 26 01/29/2025    CO2 30 10/25/2021    CO2 30 03/23/2021    ANIONGAP 14 01/29/2025    ANIONGAP 2 (L) 10/25/2021    ANIONGAP 3 03/23/2021    GLC 82 01/29/2025     (H) 03/29/2024    GLC 96 10/25/2021    GLC 87 03/23/2021    BUN 20.5 01/29/2025    BUN 19 10/25/2021    BUN 19 03/23/2021    CR 1.25 (H) 01/29/2025    CR 1.21 03/23/2021    GFRESTIMATED 59 (L) 01/29/2025    GFRESTIMATED >60 10/29/2024    GFRESTIMATED 58 (L) 03/23/2021    GFRESTBLACK 67 03/23/2021    KARL 9.4 01/29/2025    KARL 9.0 03/23/2021      A1C RESULTS:  Lab Results   Component Value Date    A1C 5.7 (H) 08/08/2019     INR RESULTS:  Lab Results   Component Value Date    INR 1.14 03/30/2024    INR 1.30 (H) 09/03/2019    INR 1.47 (H) 09/03/2019       No results found for this or any previous visit (from the past 4320 hours).

## 2025-06-30 ENCOUNTER — OFFICE VISIT (OUTPATIENT)
Dept: CARDIOLOGY | Facility: CLINIC | Age: 80
End: 2025-06-30
Attending: NURSE PRACTITIONER
Payer: COMMERCIAL

## 2025-06-30 VITALS
SYSTOLIC BLOOD PRESSURE: 120 MMHG | HEART RATE: 75 BPM | WEIGHT: 137.4 LBS | BODY MASS INDEX: 21.56 KG/M2 | RESPIRATION RATE: 16 BRPM | HEIGHT: 67 IN | DIASTOLIC BLOOD PRESSURE: 77 MMHG | OXYGEN SATURATION: 95 %

## 2025-06-30 DIAGNOSIS — R00.0 TACHYCARDIA: ICD-10-CM

## 2025-06-30 DIAGNOSIS — I10 HTN, GOAL BELOW 140/90: ICD-10-CM

## 2025-06-30 DIAGNOSIS — E78.5 HYPERLIPIDEMIA LDL GOAL <70: ICD-10-CM

## 2025-06-30 DIAGNOSIS — Z95.1 S/P CABG X 4: ICD-10-CM

## 2025-06-30 DIAGNOSIS — I25.10 CORONARY ARTERY DISEASE INVOLVING NATIVE CORONARY ARTERY OF NATIVE HEART WITHOUT ANGINA PECTORIS: Primary | ICD-10-CM

## 2025-06-30 PROCEDURE — 99214 OFFICE O/P EST MOD 30 MIN: CPT | Performed by: NURSE PRACTITIONER

## 2025-06-30 PROCEDURE — 3074F SYST BP LT 130 MM HG: CPT | Performed by: NURSE PRACTITIONER

## 2025-06-30 PROCEDURE — 3078F DIAST BP <80 MM HG: CPT | Performed by: NURSE PRACTITIONER

## 2025-06-30 NOTE — LETTER
6/30/2025    Danielle Janee Rogelio, APRN CNP  [address] [place] [id number]    RE: Adolfo CRAIG Justice       Dear Colleague,     I had the pleasure of seeing Adolfo Rendon in the [facility].              ~Cardiology Clinic Visit~    Primary Cardiologist: Dr. Espino  Reason for visit: Annual visit    History of Present Illness    Adolfo Rendon is a very pleasant 79 year old male with a past medical history notable for:      Coronary artery disease since 2015 when he had an angiogram that showed chronically occluded right coronary artery with left-to-right collaterals and moderate proximal LAD stenosis.  Had progressive angina in August 2019.  Noted to have multivessel disease and mild ischemic cardiomyopathy with EF of 45 to 50%.  Uncontrolled cardiovascular risk factors due to medication intolerance at that time.  Underwent four-vessel bypass surgery with LIMA to LAD and vein graft to the obtuse marginal PDA and PL in 2019.  Heart failure with recovered ejection fraction  Significant vascular disease that includes renal artery stenosis PAD and AAA -currently not interested in vascular involvement or surgery  Ongoing tobacco abuse  COPD  Hypertension hyperlipidemia and intolerance to multiple medications including statins     He was last in cardiology clinic in December 2023.  At that visit, he mentioned his dyspnea on exertion has worsened over the last 1 year.  He said it never improved after surgery but has been getting worse over the last 1 year.  Denies edema syncope presyncope.  Denies any typical symptoms of chest discomfort.  Denies any bleeding problems.  He is complaining of fatigue.  He has COPD and [facility]    In regards to pertinent data, his last nuclear stress test showed moderate infarction without ischemia nearly 2 years ago.  He has not had a recent echocardiogram.  Lipids are at goal on a combination of ezetimibe and  Repatha.    Diagnostics:  FLP 1/29/2025: , HDL 63, LDL 97, TG 48; ALT 15  BMP 1/29/2025: CR 1.25, GFR 59, , K4.4.    Interval 06/30/25    He continues to have SOB relating to his COPD. [facility].  He does mention that at times when he is particularly SOB he notices that his heart is racing, 110s or higher.  Does have a family history of AF - sister, and brother in law.  His mother passed from CVA, years ago, etiology unknown.   Regular rhythm today.  Denies CP or jamie angina.  States he would like to be able to spend more time outdoors but his COPD limits his ability to do this.  __________________________________________________________________         Assessment and Impression:     Chronic obstructive pulmonary disease, unspecified COPD type  S/P CABG x 4  Hyperlipidemia LDL goal <70  HTN, goal below 140/90  CAD  Intermittent tachycardia, query underlying PAF?         Recommendations and Plan:     No medication changes for today.  Zio patch monitor for arrhythmia assessment, 7-day mail out.  Encourage [facility] to optimize treatments.  Routine cardiology follow up 1-year.  __________________________________________________________________    Thank you for the opportunity to participate in this pleasant patient's care.    We would be happy to see this patient sooner for any concerns in the meantime.    ANTONIO Caceres, CNP, [facility]   Nurse Practitioner  [facility]    Today's clinic visit entailed:  The following tests were independently interpreted by me as noted in my documentation: roberta, echo, labs  Prescription drug management  The level of medical decision making during this visit was of moderate complexity.  Review of the result(s) of each unique test - cardiac testing, cardiac imaging, labs  Care everywhere reviewed for additional records to facilitate comprehensive patient care.  Recent hospitalization records  and notes reviewed to facilitate comprehensive care coordination.    Orders this Visit:  Orders Placed This Encounter   Procedures     Follow-Up with Cardiology- THOMAS     No orders of the defined types were placed in this encounter.    There are no discontinued medications.  Encounter Diagnoses   Name Primary?     S/P CABG x 4      Hyperlipidemia LDL goal <70      HTN, goal below 140/90      Coronary artery disease involving native coronary artery of native heart without angina pectoris Yes     Tachycardia      CURRENT MEDICATIONS:  Current Outpatient Medications   Medication Sig Dispense Refill     albuterol (PROAIR HFA/PROVENTIL HFA/VENTOLIN HFA) 108 (90 Base) MCG/ACT inhaler Inhale 2 puffs into the lungs every 4 hours as needed for shortness of breath, wheezing or cough. 17 g 0     amLODIPine (NORVASC) 10 MG tablet Take 1 tablet (10 mg) by mouth daily 90 tablet 2     aspirin 81 MG EC tablet Take 81 mg by mouth daily       ciclopirox (LOPROX) 0.77 % cream Apply daily to twice daily to areas on face as needed for flaking. 30 g 11     co-enzyme Q-10 100 MG CAPS capsule Take 100 mg by mouth daily       desonide (DESOWEN) 0.05 % external cream Apply at bedtime if itchy, inflamed to brows, cheek, nose. 15 g 4     evolocumab (REPATHA) 140 MG/ML prefilled autoinjector Inject 1 mL (140 mg) subcutaneously every 14 days. No further refills until seen by cardiology--call [phone] to schedule 6 mL 1     guaiFENesin (MUCINEX) 600 MG 12 hr tablet Take 2 tablets (1,200 mg) by mouth 2 times daily.       ipratropium - albuterol 0.5 mg/2.5 mg/3 mL (DUONEB) 0.5-2.5 (3) MG/3ML neb solution Take 1 vial (3 mLs) by nebulization 4 times daily. 360 mL 5     nicotine polacrilex (NICORETTE) 4 MG gum Place 4 mg inside cheek every hour as needed for smoking cessation       Olodaterol HCl (STRIVERDI RESPIMAT) 2.5 MCG/ACT AERS Inhale 2 puffs into the lungs daily 12 g 3     pantoprazole (PROTONIX) 40 MG EC tablet Take 1 tablet (40 mg) by  "mouth every morning (before breakfast). 30 tablet 0     PARoxetine (PAXIL) 30 MG tablet TAKE 2 TABLETS BY MOUTH once daily in the evening 180 tablet 3     Roflumilast (ZORYVE) 0.3 % FOAM Externally apply 1 Application topically daily. Apply to scalp 60 g 5     Vitamin D, Cholecalciferol, 25 MCG (1000 UT) TABS Take 1,000 Units by mouth daily        ALLERGIES     Allergies   Allergen Reactions     Coreg [Carvedilol] Other (See Comments) and Difficulty breathing     Depression, Couldn't think, felt miserable per patient.     Hctz [Hydrochlorothiazide] Fatigue     \"felt like zombie\"     Loratadine      Mood , irritablity      Metoprolol Fatigue     \"felt like zombie\"     Pravastatin Fatigue     \"felt like zombie\"     Remeron [Mirtazapine]      Agitation        Wellbutrin [Bupropion Hcl]      Sig mood issues        PAST MEDICAL, SURGICAL, FAMILY, SOCIAL HISTORY:  History was reviewed and updated as needed, see medical record.    Review of Systems:  A 10-point Review Of Systems is otherwise normal except for that which is noted in the HPI and interval summary.    Physical Exam:    Vitals: /77 (BP Location: Right arm, Patient Position: Sitting, Cuff Size: Adult Regular)   Pulse 75   Resp 16   Ht 1.702 m (5' 7\")   Wt 62.3 kg (137 lb 6.4 oz)   SpO2 95%   BMI 21.52 kg/m    Constitutional: Appears stated age, well nourished, NAD.  Respiratory: Non-labored. Lungs dim, clear  Cardiovascular: RRR, normal S1 and S2. No murmur.  No edema.  GI: Soft, non-distended, non-tender.  Skin: Warm and dry.   Musculoskeletal/Extremities: Symmetrical movement.  Neurologic: No gross focal deficits. Alert, awake.  Psychiatric: Affect appropriate. Mentation normal.    Recent Lab Results:  LIPID RESULTS:  Lab Results   Component Value Date    CHOL 170 01/29/2025    CHOL 127 03/23/2021    HDL 63 01/29/2025    HDL 62 03/23/2021    LDL 97 01/29/2025    LDL 47 03/23/2021    TRIG 48 01/29/2025    TRIG 76 03/23/2021    CHOLHDLRATIO 4.1 " 03/24/2015     LIVER ENZYME RESULTS:  Lab Results   Component Value Date    AST 23 01/29/2025    AST 11 03/23/2021    ALT 15 01/29/2025    ALT 16 03/23/2021     CBC RESULTS:  Lab Results   Component Value Date    WBC 4.3 12/21/2024    WBC 4.5 03/23/2021    RBC 5.10 12/21/2024    RBC 4.92 03/23/2021    HGB 13.7 12/21/2024    HGB 13.6 03/23/2021    HCT 42.3 12/21/2024    HCT 41.0 03/23/2021    MCV 83 12/21/2024    MCV 83 03/23/2021    MCH 26.9 12/21/2024    MCH 27.6 03/23/2021    MCHC 32.4 12/21/2024    MCHC 33.2 03/23/2021    RDW 13.6 12/21/2024    RDW 14.0 03/23/2021     12/21/2024     03/23/2021     BMP RESULTS:  Lab Results   Component Value Date     01/29/2025     03/23/2021    POTASSIUM 4.4 01/29/2025    POTASSIUM 4.6 10/25/2021    POTASSIUM 4.4 03/23/2021    CHLORIDE 100 01/29/2025    CHLORIDE 105 10/25/2021    CHLORIDE 103 03/23/2021    CO2 26 01/29/2025    CO2 30 10/25/2021    CO2 30 03/23/2021    ANIONGAP 14 01/29/2025    ANIONGAP 2 (L) 10/25/2021    ANIONGAP 3 03/23/2021    GLC 82 01/29/2025     (H) 03/29/2024    GLC 96 10/25/2021    GLC 87 03/23/2021    BUN 20.5 01/29/2025    BUN 19 10/25/2021    BUN 19 03/23/2021    CR 1.25 (H) 01/29/2025    CR 1.21 03/23/2021    GFRESTIMATED 59 (L) 01/29/2025    GFRESTIMATED >60 10/29/2024    GFRESTIMATED 58 (L) 03/23/2021    GFRESTBLACK 67 03/23/2021    KARL 9.4 01/29/2025    KARL 9.0 03/23/2021      A1C RESULTS:  Lab Results   Component Value Date    A1C 5.7 (H) 08/08/2019     INR RESULTS:  Lab Results   Component Value Date    INR 1.14 03/30/2024    INR 1.30 (H) 09/03/2019    INR 1.47 (H) 09/03/2019       No results found for this or any previous visit (from the past 4320 hours).                     Thank you for allowing me to participate in the care of your patient.      Sincerely,     ANTONIO Caceres [facility]  cc:   ANTONIO Carranza CNP  [id number]  [place] [address]

## 2025-06-30 NOTE — PATIENT INSTRUCTIONS
Thank you for your visit with the [facility].    Today's Summary:    No medication changes for today.  Will send out a heart monitor (Zio patch) to make sure we are not missing any underlying abnormal heart rhythms.    Follow-up:  Cardiology follow up at [facility]: 1-year.     Cardiology Scheduling~[phone]  Cardiology Clinic RN~[phone] (Melba RN, Glory RN; Jessica RN)    It was a pleasure seeing you today.     ANTONIO Caceres, CNP  Certified Nurse Practitioner  [facility]  June 30, 2025  ________________________________________________________

## 2025-07-01 ENCOUNTER — HOSPITAL ENCOUNTER (OUTPATIENT)
Dept: CARDIAC REHAB | Facility: CLINIC | Age: 80
Discharge: HOME | End: 2025-07-01
Attending: NURSE PRACTITIONER
Payer: COMMERCIAL

## 2025-07-01 ENCOUNTER — TRANSFERRED RECORDS (OUTPATIENT)
Dept: CARDIAC REHAB | Facility: CLINIC | Age: 80
End: 2025-07-01
Payer: COMMERCIAL

## 2025-07-01 PROCEDURE — 94626 PHY/QHP OP PULM RHB W/MNTR: CPT

## 2025-07-10 NOTE — PROGRESS NOTES
[facility] Medicine Progress Note  Date of Service: 12/21/2024    Assessment & Plan   Adolfo Rendon is a 79 year old male with past medical history of hypertension, emphysema, coronary artery disease status post bypass, anxiety, hypertension, hyperlipidemia, abdominal aortic aneurysm, depression, former tobacco use now presents on 12/20/2024 with dyspnea. He is being admitted for a COPD exacerbation.     Acute respiratory distress  COPD with acute exacerbation    History of COPD manage PTA with Striverdi respimat inhaler & albuterol inhaler. As weather got colder his breathing has gotten worse. Steroid burst a few weeks ago briefly improved sxs. Now using albuterol inhaler every hour while awake. Dog has a seizure evening 12/19 which significantly exacerbated dyspnea. Repeat seizure AM 12/20 caused additional respiratory distress, and patient could not find his albuterol.     On presentation to ER, tachypneic but oxygenating ok on room air.  Wheezy. No leukocytosis. Covid, flu, RSV negative. D-dimer elevated, but CT PE negative for PE. Does show emphysema & right hilar adenopathy with prominent borderline enlarged mediastinal lymph nodes.     Overall, suspect COPD exacerbation, likely made worse by emotional stress & anxiety.     Improving dyspnea on exertion. Patient afraid of having the severe dyspnea and anxiety again at home. He is also greiving his dog. Willing to try low dose narcotic for dyspnea.   - Prednisone 40mg PO Qday x5d then taper  - Albuterol neb Q2hrs PRN  - Ipratropium-albuterol neb four times daily   - Oxygen available PRN to maintain SPO2 >88%  - Oxycodone 2.5mg Q6hrs PRN available for air hunger to prevent triggering anxiety and patient to try today  - Mucinex 1200mg BID    Coronary artery disease S/P CABG x 4 2019  Ischemic cardiomyopathy  Follows with Dr. Espino, [place] cardiology. 2019 LIMA to LAD and vein graft to obtuse marginal PDA and PL.   ECHO  Jan 2024 LVEF 50-55%, basal inferior & inferolateral hypokinesis.   - Continue PTA aspirin 81mg daily   - Not on statin outpatient     Benign essential hypertension  Intermittently hypertensive since presentation. Probably secondary to respiratory distress & discomfort.   - Continue PTA amlodipine 10mg daily    Tobacco abuse  Stopped smoking few years ago. Still uses nicotine gum.   - Nicotine gum available during admission.     Generalized anxiety disorder  Acute anxiety related to health problems with his dog likely contributed to COPD exacerbation, above.   - Continue PTA paroxetine 60mg every evening    Aortic arch aneurysm   Infrarenal abdominal aortic aneurysm (AAA) without rupture s/p repair 3/29/2024  Follows with Dr. Ruiz of [place] vascular surgery. Last visit 10/29/2024 was doing well and plan is for follow up CT in 1 year.     Clinically Significant Risk Factors Present on Admission                 # Drug Induced Platelet Defect: home medication list includes an antiplatelet medication   # Hypertension: Noted on problem list               # Financial/Environmental Concerns:     # History of CABG: noted on surgical history       Diet: Combination Diet 2 gm NA Diet; Low Saturated Fat Na <2400mg Diet    DVT Prophylaxis: Low Risk/Ambulatory with no VTE prophylaxis indicated  Mo Catheter: Not present  Lines: None     Cardiac Monitoring: None  Code Status:  Full      Labs/tests ordered for tomorrow AM  None    Discussion/Disposition: Improving. No discharge today.    Medically Ready for Discharge: Anticipated Tomorrow         Medical Decision Making       40 MINUTES SPENT BY ME on the date of service doing chart review, history, exam, documentation & further activities per the note.        Shar Nielsen MD  [facility]  Securely message with [employer] (more info)  Text page via [employer] Paging/Directory     Interval History   Breathing is better and was up  walking. Still afraid of anxiety event associated with worsening shortness of breath. Also his dog was put down this morning and is grieving this loss. Does not feel ready for discharge today.     Physical Exam   Temp:  [97.6  F (36.4  C)-98.1  F (36.7  C)] 98.1  F (36.7  C)  Pulse:  [77-89] 84  Resp:  [16-18] 16  BP: (119-138)/(76-84) 120/83  SpO2:  [90 %-93 %] 91 %    Weights:   Vitals:    12/20/24 0942   Weight: 63.5 kg (140 lb)    Body mass index is 21.6 kg/m .    Constitutional: anxious appearing otherwise NAD  CV: Regular, no edema  Respiratory: Diminished breath sounds and decreased air movement bilateraly but no audible wheezes  Skin: Warm and dry    Data   Recent Labs   Lab 12/21/24  0518 12/20/24  0944   WBC 4.3 7.0   HGB 13.7 14.5   MCV 83 84    195    140  140   POTASSIUM 5.1 3.7  3.7   CHLORIDE 106 101  101   CO2 26 28  28   BUN 26.1* 19.4  19.4   CR 1.21* 1.14  1.14   ANIONGAP 10 11  11   KARL 9.8 9.2  9.2   * 119*  119*   ALBUMIN 4.1 4.2   PROTTOTAL 6.7 7.2   BILITOTAL 0.2 0.3   ALKPHOS 49 57   ALT 11 12   AST 18 18       Recent Labs   Lab 12/21/24  0518 12/20/24  0944   * 119*  119*        Unresulted Labs Ordered in the Past 30 Days of this Admission       No orders found for last 31 day(s).             Imaging: No results found for this or any previous visit (from the past 24 hours).     I reviewed all new labs and imaging results over the last 24 hours. I personally reviewed no images or EKG's today.    Medications   Current Facility-Administered Medications   Medication Dose Route Frequency Provider Last Rate Last Admin     Current Facility-Administered Medications   Medication Dose Route Frequency Provider Last Rate Last Admin    amLODIPine (NORVASC) tablet 10 mg  10 mg Oral Daily Barbara Angela PA-C   10 mg at 12/21/24 0850    aspirin EC tablet 81 mg  81 mg Oral QPM Barbara Angela PA-C   81 mg at 12/20/24 2049    guaiFENesin (MUCINEX) 12 hr tablet  1,200 mg  1,200 mg Oral BID Barbara Angela PA-C   1,200 mg at 12/21/24 0755    ipratropium - albuterol 0.5 mg/2.5 mg/3 mL (DUONEB) neb solution 3 mL  3 mL Nebulization 4x daily Barbara Angela PA-C   3 mL at 12/21/24 1147    PARoxetine (PAXIL) tablet 60 mg  60 mg Oral QPM Barbara Angela PA-C        predniSONE (DELTASONE) tablet 40 mg  40 mg Oral Daily Barbara Angela PA-C   40 mg at 12/21/24 0755    sodium chloride (PF) 0.9% PF flush 3 mL  3 mL Intracatheter Q8H Shar Nielsen MD   3 mL at 12/21/24 0756       Shar Nielsen MD  [facility] Medicine       Render In Strict Bullet Format?: No Detail Level: Generalized Initiate Treatment: AM\\nImiquimod RX\\nPM\\nAdapalene OTC

## 2025-09-03 DIAGNOSIS — Z95.1 S/P CABG X 4: ICD-10-CM

## 2025-09-03 DIAGNOSIS — I25.10 CORONARY ARTERY DISEASE INVOLVING NATIVE CORONARY ARTERY OF NATIVE HEART WITHOUT ANGINA PECTORIS: ICD-10-CM

## 2025-09-03 DIAGNOSIS — Z78.9 STATIN INTOLERANCE: ICD-10-CM

## 2025-09-03 DIAGNOSIS — E78.5 HYPERLIPIDEMIA LDL GOAL <70: ICD-10-CM

## (undated) DEVICE — SU VICRYL 2-0 CT-1 27" UND J259H

## (undated) DEVICE — PROTECTOR ARM ONE-STEP TRENDELENBURG 40418

## (undated) DEVICE — GEL ULTRASOUND AQUASONIC 20GM 01-01

## (undated) DEVICE — DECANTER BAG 2002S

## (undated) DEVICE — MANIFOLD KIT ANGIO AUTOMATED 014613

## (undated) DEVICE — SU STEEL 6 CCS 4X18" M654G

## (undated) DEVICE — MITT PRE-OP 7 L X 5 1/2 W 5177M1

## (undated) DEVICE — LINEN LEG ROLL 5489

## (undated) DEVICE — GLOVE DERMASSURE GREEN PF 7.5 LATEX FREE MSG6575

## (undated) DEVICE — SOL NACL 0.9% INJ 1000ML BAG 2B1324X

## (undated) DEVICE — SOL WATER IRRIG 1000ML BOTTLE 2F7114

## (undated) DEVICE — KIT SURGICAL TURNOVER FVSD-01D

## (undated) DEVICE — Device

## (undated) DEVICE — CATH ANGIO JUDKINS R4 6FRX100CM INFINITI 534621T

## (undated) DEVICE — INTRO GLIDESHEATH SLENDER 6FR 10X45CM 60-1060

## (undated) DEVICE — SYR ANGIO CONTRAST  150-FT-Q

## (undated) DEVICE — SU SILK 4-0 TIE 18' A183H

## (undated) DEVICE — CLOSURE DEVICE 6FR VASC PROGLIDE MEDICATED SUTURE 12673-03

## (undated) DEVICE — CABLE MYO/LEAD PACING WHITE DISP 019-530

## (undated) DEVICE — SURGICEL HEMOSTAT 2X14" 1951

## (undated) DEVICE — LINEN TOWEL PACK X30 5481

## (undated) DEVICE — SU PROLENE 6-0 C-1DA 30" M8706

## (undated) DEVICE — SOL NACL 0.9% IRRIG 1000ML BOTTLE 2F7124

## (undated) DEVICE — GLOVE SURG GAMMEX PI POLYISOPRENE WHITE SZ 8 LF 20685780

## (undated) DEVICE — SLEEVE TR BAND RADIAL COMPRESSION DEVICE 24CM TRB24-REG

## (undated) DEVICE — DRAPE STERI FLUOROSCOPE 35X43"1012 LATEX FREE

## (undated) DEVICE — CUSTOM PACK SPEC PROCEDURE SAN5BSPHEA

## (undated) DEVICE — CATH TRAY FOLEY 16FR W/METER A800365

## (undated) DEVICE — SYR KIT ANGIO LT BLUE SALINE 10ML K01-60083

## (undated) DEVICE — CLIP HORIZON SM YELLOW 001200

## (undated) DEVICE — CUSTOM PACK GEN MAJOR SBA5BGMHEA

## (undated) DEVICE — CABLE MYO/LEAD PACING BLUE DISP 019-535

## (undated) DEVICE — SU PROLENE 6-0 C-1DA 30" 8706H

## (undated) DEVICE — DRAPE STERI TOWEL LG 1010

## (undated) DEVICE — COVER ULTRASOUND PROBE STRL 9001C0197

## (undated) DEVICE — BONE WAX 2.5GM W31G

## (undated) DEVICE — GUIDEWIRE VASC 0.014IN DIA 185

## (undated) DEVICE — COVER TABLE POLY 65X90" 8186

## (undated) DEVICE — RX SURGIFLO HEMOSTATIC MATRIX W/THROMBIN 8ML 2994

## (undated) DEVICE — SU PROLENE 4-0 SHDA 36" 8521H

## (undated) DEVICE — KIT HAND CONTROL ANGIOTOUCH ACIST 65CM AT-P65

## (undated) DEVICE — CLIP HORIZON MULTI SM YELLOW 001204

## (undated) DEVICE — GOWN XLG DISP 9545

## (undated) DEVICE — SOMASENSOR CEREBRAL OXIMETER ADULT SAFB-SM

## (undated) DEVICE — CONNECTOR PERFUSION 3/8X3/8" W/O LL 6023

## (undated) DEVICE — GOWN SURGICAL SMARTGOWN 2XL 89075

## (undated) DEVICE — DRSG KERLIX 4 1/2"X4YDS ROLL 6715

## (undated) DEVICE — KIT WASH CELL SAVING ATL2001

## (undated) DEVICE — SU ETHIBOND 2-0 SHDA 30" X563H

## (undated) DEVICE — DEVICE MULTI TORQUE TD01

## (undated) DEVICE — PUNCH AORTIC 4.0MMX8" RCB40

## (undated) DEVICE — DRAPE C-ARMOR 5 SIDED 5523

## (undated) DEVICE — SYR EAR BULB 3OZ 0035830

## (undated) DEVICE — CLIP HORIZON MED BLUE 002200

## (undated) DEVICE — BLADE CLIPPER DISP 4406

## (undated) DEVICE — DRSG TEGADERM 4X4 3/4" 1626W

## (undated) DEVICE — CONNECTOR STOPCOCK 3-WAY HIGH PRESSURE (NAMIC) H965700550091

## (undated) DEVICE — PACK MINOR SINGLE BASIN SSK3001

## (undated) DEVICE — SU ETHIBOND 3-0 BBDA 36" X588H

## (undated) DEVICE — SUCTION CATH AIRLIFE TRI-FLO W/CONTROL PORT 14FR  T60C

## (undated) DEVICE — ADH LIQUID MASTISOL TOPICAL VIAL 2-3ML 0523-48

## (undated) DEVICE — CANNULA PERFUSION ARTERIAL EOPA 18FR 12" 77418

## (undated) DEVICE — RESERVOIR CELL SAVING BLOOD COLLECTION EL2120

## (undated) DEVICE — SU ETHIBOND 0 CT-1 CR 8X18" CX21D

## (undated) DEVICE — CONNECTOR DRAIN CHEST Y EXTENSION SET 19909

## (undated) DEVICE — KIT ENDO VASOVIEW HEMOPRO 2 VH-4000

## (undated) DEVICE — ADPT PERFUSION MULTIPLE

## (undated) DEVICE — TOWEL SURG 16INW X 26INL WHITE STRL XRAY DETECTABLE X8314W

## (undated) DEVICE — PREP CHLORAPREP 26ML TINTED HI-LITE ORANGE 930815

## (undated) DEVICE — CATH OMNIFLUSH 5FRX70CM SIZING 13709702

## (undated) DEVICE — PACK OPEN HEART PV12OH524

## (undated) DEVICE — GLIDEWIRE ADVANTAGE 25CM .035 180CM GA3501

## (undated) DEVICE — DECANTER VIAL 2006S

## (undated) DEVICE — SYR 10ML LL W/O NDL 302995

## (undated) DEVICE — ESU ELEC BLADE 2.75" COATED/INSULATED E1455

## (undated) DEVICE — SU VICRYL 0 CTX 36" J370H

## (undated) DEVICE — CATH ANGIO JUDKINS JL4 6FRX100CM INFINITI 534620T

## (undated) DEVICE — INTRO MICRO MINI STICK 4FR STIFF NITINOL 45-753

## (undated) DEVICE — BLOWER/MISTER CLEARVIEW 22150

## (undated) DEVICE — SYR KIT ANGIO YELLOW CONTRAST 10ML  K01-60102

## (undated) DEVICE — DRAIN CHEST TUBE 32FR STR 8032

## (undated) DEVICE — SU PLEDGET SOFT TFE 3/8"X3/26"X1/16" PCP40

## (undated) DEVICE — PACK TUBING MINI VAC CUSTOM 3/8X3/8T BB7V94R1

## (undated) DEVICE — CLIP HORIZON MULTI MED BLUE 002204

## (undated) DEVICE — NEEDLE HYPO 21GA X 1-1/2 SAFETY 305917

## (undated) DEVICE — CATH ANGIO ANG GLIDE 4FRX65CM CG415

## (undated) DEVICE — SU SILK 1 TIE 10X30" SA87G

## (undated) DEVICE — SU PROLENE 7-0 BV175-6 24' M8737

## (undated) DEVICE — PREP CHLORAPREP W/ORANGE TINT 10.5ML 260715

## (undated) DEVICE — SU PROLENE 7-0 BV-1DA 4X24" M8702

## (undated) DEVICE — WIRE GUIDE LUNDERQUIST 0.035"X260CM DBL CVD

## (undated) DEVICE — VALVE HEMOSTASIS .096" COPILOT MECH 1003331

## (undated) DEVICE — SU PROLENE 4-0 RB-1DA 36" 8557H

## (undated) DEVICE — SU VICRYL 3-0 FS-1 27" J442H

## (undated) DEVICE — LINEN TOWEL PACK X6 WHITE 5487

## (undated) DEVICE — INTRO SHEATH 5FRX10CM PINNACLE MARKER RSB512

## (undated) DEVICE — SOL RINGERS LACTATED 1000ML BAG 2B2324X

## (undated) DEVICE — CANNULA PERFUSION AORTIC ROOT 22FR 20012

## (undated) DEVICE — CATH BALLOON RELIANT STENT GRAFT 8FR REL46

## (undated) DEVICE — GOWN LG DISP 9515

## (undated) DEVICE — LINEN TOWEL PACK X5 5464

## (undated) DEVICE — DEVICE ASSEMBLY SUCTION/ANTI COAG BTC93

## (undated) DEVICE — SUCTION CANISTER MEDIVAC LINER 3000ML W/LID 65651-530

## (undated) DEVICE — SHEATH PINNACLE 8FR 10CM R/O II RSB802

## (undated) DEVICE — WIRE GUIDE 0.035"X260CM SAFE-T-J EXCHANGE G00517

## (undated) DEVICE — SUTURE BOOTS 051003PBX

## (undated) DEVICE — BLADE KNIFE BEAVER 6900

## (undated) DEVICE — ESU GROUND PAD ADULT REM W/15' CORD E7507DB

## (undated) DEVICE — TOTE ANGIO CORP PC15AT SAN32CC83O

## (undated) DEVICE — CARDIO VASC SHEET 9162

## (undated) DEVICE — BIPOLAR TEMPORARY MYOCARDIAL PACING LEAD

## (undated) DEVICE — KIT LG BORE TOUHY ACCESS PLUS MAP152

## (undated) DEVICE — DEFIB PRO-PADZ LVP LQD GEL ADULT 8900-2105-01

## (undated) DEVICE — SU DERMABOND ADVANCED .7ML DNX12

## (undated) DEVICE — PACK MINI VAC CUSTOM CARDOPULMONARY BB5Z97R15

## (undated) DEVICE — SYR 50ML LL W/O NDL 309653

## (undated) DEVICE — DRAIN CHEST TUBE RIGHT ANGLED 32FR 8132

## (undated) RX ORDER — LIDOCAINE HYDROCHLORIDE AND EPINEPHRINE 10; 10 MG/ML; UG/ML
INJECTION, SOLUTION INFILTRATION; PERINEURAL
Status: DISPENSED
Start: 2024-03-29

## (undated) RX ORDER — GLYCOPYRROLATE 0.2 MG/ML
INJECTION, SOLUTION INTRAMUSCULAR; INTRAVENOUS
Status: DISPENSED
Start: 2019-09-03

## (undated) RX ORDER — LIDOCAINE HYDROCHLORIDE 10 MG/ML
INJECTION, SOLUTION EPIDURAL; INFILTRATION; INTRACAUDAL; PERINEURAL
Status: DISPENSED
Start: 2019-08-08

## (undated) RX ORDER — MUPIROCIN 20 MG/G
OINTMENT TOPICAL
Status: DISPENSED
Start: 2019-09-03

## (undated) RX ORDER — VERAPAMIL HYDROCHLORIDE 2.5 MG/ML
INJECTION, SOLUTION INTRAVENOUS
Status: DISPENSED
Start: 2019-08-08

## (undated) RX ORDER — FENTANYL CITRATE 50 UG/ML
INJECTION, SOLUTION INTRAMUSCULAR; INTRAVENOUS
Status: DISPENSED
Start: 2019-09-03

## (undated) RX ORDER — HEPARIN SODIUM 1000 [USP'U]/ML
INJECTION, SOLUTION INTRAVENOUS; SUBCUTANEOUS
Status: DISPENSED
Start: 2019-09-03

## (undated) RX ORDER — NEOSTIGMINE METHYLSULFATE 1 MG/ML
VIAL (ML) INJECTION
Status: DISPENSED
Start: 2019-09-03

## (undated) RX ORDER — ADENOSINE 3 MG/ML
INJECTION, SOLUTION INTRAVENOUS
Status: DISPENSED
Start: 2019-08-08

## (undated) RX ORDER — VASOPRESSIN 20 U/ML
INJECTION PARENTERAL
Status: DISPENSED
Start: 2019-09-03

## (undated) RX ORDER — FENTANYL CITRATE 50 UG/ML
INJECTION, SOLUTION INTRAMUSCULAR; INTRAVENOUS
Status: DISPENSED
Start: 2024-03-29

## (undated) RX ORDER — IPRATROPIUM BROMIDE AND ALBUTEROL SULFATE 2.5; .5 MG/3ML; MG/3ML
SOLUTION RESPIRATORY (INHALATION)
Status: DISPENSED
Start: 2019-09-03

## (undated) RX ORDER — BUPIVACAINE HYDROCHLORIDE 5 MG/ML
INJECTION, SOLUTION EPIDURAL; INTRACAUDAL
Status: DISPENSED
Start: 2019-09-03

## (undated) RX ORDER — NITROGLYCERIN 5 MG/ML
VIAL (ML) INTRAVENOUS
Status: DISPENSED
Start: 2019-08-08

## (undated) RX ORDER — REGADENOSON 0.08 MG/ML
INJECTION, SOLUTION INTRAVENOUS
Status: DISPENSED
Start: 2024-01-08

## (undated) RX ORDER — FENTANYL CITRATE 0.05 MG/ML
INJECTION, SOLUTION INTRAMUSCULAR; INTRAVENOUS
Status: DISPENSED
Start: 2019-09-03

## (undated) RX ORDER — EPHEDRINE SULFATE 50 MG/ML
INJECTION, SOLUTION INTRAMUSCULAR; INTRAVENOUS; SUBCUTANEOUS
Status: DISPENSED
Start: 2024-03-29

## (undated) RX ORDER — ONDANSETRON 2 MG/ML
INJECTION INTRAMUSCULAR; INTRAVENOUS
Status: DISPENSED
Start: 2019-09-03

## (undated) RX ORDER — PAPAVERINE HYDROCHLORIDE 30 MG/ML
INJECTION INTRAMUSCULAR; INTRAVENOUS
Status: DISPENSED
Start: 2019-09-03

## (undated) RX ORDER — CEFAZOLIN SODIUM 1 G/3ML
INJECTION, POWDER, FOR SOLUTION INTRAMUSCULAR; INTRAVENOUS
Status: DISPENSED
Start: 2019-09-03

## (undated) RX ORDER — HEPARIN SODIUM 1000 [USP'U]/ML
INJECTION, SOLUTION INTRAVENOUS; SUBCUTANEOUS
Status: DISPENSED
Start: 2024-03-29

## (undated) RX ORDER — PROTAMINE SULFATE 10 MG/ML
INJECTION, SOLUTION INTRAVENOUS
Status: DISPENSED
Start: 2019-09-03

## (undated) RX ORDER — VECURONIUM BROMIDE 1 MG/ML
INJECTION, POWDER, LYOPHILIZED, FOR SOLUTION INTRAVENOUS
Status: DISPENSED
Start: 2019-09-03

## (undated) RX ORDER — CEFAZOLIN SODIUM 2 G/100ML
INJECTION, SOLUTION INTRAVENOUS
Status: DISPENSED
Start: 2019-09-03

## (undated) RX ORDER — ETOMIDATE 2 MG/ML
INJECTION INTRAVENOUS
Status: DISPENSED
Start: 2019-09-03

## (undated) RX ORDER — REGADENOSON 0.08 MG/ML
INJECTION, SOLUTION INTRAVENOUS
Status: DISPENSED
Start: 2022-02-16

## (undated) RX ORDER — FENTANYL CITRATE 50 UG/ML
INJECTION, SOLUTION INTRAMUSCULAR; INTRAVENOUS
Status: DISPENSED
Start: 2019-08-08

## (undated) RX ORDER — PROPOFOL 10 MG/ML
INJECTION, EMULSION INTRAVENOUS
Status: DISPENSED
Start: 2019-09-03

## (undated) RX ORDER — HEPARIN SODIUM 200 [USP'U]/100ML
INJECTION, SOLUTION INTRAVENOUS
Status: DISPENSED
Start: 2019-08-08

## (undated) RX ORDER — HEPARIN SODIUM 1000 [USP'U]/ML
INJECTION, SOLUTION INTRAVENOUS; SUBCUTANEOUS
Status: DISPENSED
Start: 2019-08-08

## (undated) RX ORDER — LIDOCAINE HYDROCHLORIDE 20 MG/ML
INJECTION, SOLUTION EPIDURAL; INFILTRATION; INTRACAUDAL; PERINEURAL
Status: DISPENSED
Start: 2019-09-03